# Patient Record
Sex: FEMALE | Employment: OTHER | URBAN - METROPOLITAN AREA
[De-identification: names, ages, dates, MRNs, and addresses within clinical notes are randomized per-mention and may not be internally consistent; named-entity substitution may affect disease eponyms.]

---

## 2018-05-22 ENCOUNTER — HOSPITAL ENCOUNTER (OUTPATIENT)
Dept: NON INVASIVE DIAGNOSTICS | Age: 68
Discharge: HOME | End: 2018-05-22

## 2018-05-22 ENCOUNTER — HOSPITAL ENCOUNTER (OUTPATIENT)
Dept: GENERAL RADIOLOGY | Age: 68
Discharge: HOME | End: 2018-05-22
Attending: OBSTETRICS & GYNECOLOGY
Payer: MEDICARE

## 2018-05-22 ENCOUNTER — HOSPITAL ENCOUNTER (OUTPATIENT)
Dept: PREADMISSION TESTING | Age: 68
Discharge: HOME | End: 2018-05-22
Payer: MEDICARE

## 2018-05-22 ENCOUNTER — HOSPITAL ENCOUNTER (OUTPATIENT)
Dept: GENERAL RADIOLOGY | Age: 68
Discharge: HOME | End: 2018-05-22
Payer: MEDICARE

## 2018-05-22 VITALS — BODY MASS INDEX: 36.82 KG/M2 | WEIGHT: 195 LBS | HEIGHT: 61 IN

## 2018-05-22 LAB
ALBUMIN SERPL-MCNC: 4 G/DL (ref 3.4–5)
ALBUMIN/GLOB SERPL: 1.1 {RATIO} (ref 0.8–1.7)
ALP SERPL-CCNC: 75 U/L (ref 45–117)
ALT SERPL-CCNC: 26 U/L (ref 13–56)
ANION GAP SERPL CALC-SCNC: 14 MMOL/L (ref 3–18)
AST SERPL-CCNC: 19 U/L (ref 15–37)
ATRIAL RATE: 98 BPM
BASOPHILS # BLD: 0 K/UL (ref 0–0.06)
BASOPHILS NFR BLD: 0 % (ref 0–2)
BILIRUB SERPL-MCNC: 0.4 MG/DL (ref 0.2–1)
BUN SERPL-MCNC: 14 MG/DL (ref 7–18)
BUN/CREAT SERPL: 18 (ref 12–20)
CALCIUM SERPL-MCNC: 9.1 MG/DL (ref 8.5–10.1)
CALCULATED P AXIS, ECG09: 9 DEGREES
CALCULATED R AXIS, ECG10: 19 DEGREES
CALCULATED T AXIS, ECG11: 19 DEGREES
CHLORIDE SERPL-SCNC: 101 MMOL/L (ref 100–108)
CO2 SERPL-SCNC: 25 MMOL/L (ref 21–32)
CREAT SERPL-MCNC: 0.78 MG/DL (ref 0.6–1.3)
DIAGNOSIS, 93000: NORMAL
DIFFERENTIAL METHOD BLD: ABNORMAL
EOSINOPHIL # BLD: 0.2 K/UL (ref 0–0.4)
EOSINOPHIL NFR BLD: 2 % (ref 0–5)
ERYTHROCYTE [DISTWIDTH] IN BLOOD BY AUTOMATED COUNT: 19.6 % (ref 11.6–14.5)
EST. AVERAGE GLUCOSE BLD GHB EST-MCNC: 137 MG/DL
GLOBULIN SER CALC-MCNC: 3.5 G/DL (ref 2–4)
GLUCOSE SERPL-MCNC: 104 MG/DL (ref 74–99)
HBA1C MFR BLD: 6.4 % (ref 4.5–5.6)
HCT VFR BLD AUTO: 40.6 % (ref 35–45)
HGB BLD-MCNC: 12.5 G/DL (ref 12–16)
LYMPHOCYTES # BLD: 3.8 K/UL (ref 0.9–3.6)
LYMPHOCYTES NFR BLD: 32 % (ref 21–52)
MCH RBC QN AUTO: 22 PG (ref 24–34)
MCHC RBC AUTO-ENTMCNC: 30.8 G/DL (ref 31–37)
MCV RBC AUTO: 71.4 FL (ref 74–97)
MONOCYTES # BLD: 1 K/UL (ref 0.05–1.2)
MONOCYTES NFR BLD: 8 % (ref 3–10)
NEUTS SEG # BLD: 6.9 K/UL (ref 1.8–8)
NEUTS SEG NFR BLD: 58 % (ref 40–73)
P-R INTERVAL, ECG05: 158 MS
PLATELET # BLD AUTO: 295 K/UL (ref 135–420)
POTASSIUM SERPL-SCNC: 3.9 MMOL/L (ref 3.5–5.5)
PROT SERPL-MCNC: 7.5 G/DL (ref 6.4–8.2)
Q-T INTERVAL, ECG07: 356 MS
QRS DURATION, ECG06: 86 MS
QTC CALCULATION (BEZET), ECG08: 454 MS
RBC # BLD AUTO: 5.69 M/UL (ref 4.2–5.3)
RBC MORPH BLD: ABNORMAL
SODIUM SERPL-SCNC: 140 MMOL/L (ref 136–145)
VENTRICULAR RATE, ECG03: 98 BPM
WBC # BLD AUTO: 11.9 K/UL (ref 4.6–13.2)

## 2018-05-22 PROCEDURE — 85025 COMPLETE CBC W/AUTO DIFF WBC: CPT | Performed by: OBSTETRICS & GYNECOLOGY

## 2018-05-22 PROCEDURE — 80053 COMPREHEN METABOLIC PANEL: CPT | Performed by: OBSTETRICS & GYNECOLOGY

## 2018-05-22 PROCEDURE — 71045 X-RAY EXAM CHEST 1 VIEW: CPT

## 2018-05-22 PROCEDURE — 83036 HEMOGLOBIN GLYCOSYLATED A1C: CPT | Performed by: OBSTETRICS & GYNECOLOGY

## 2018-05-22 PROCEDURE — 93005 ELECTROCARDIOGRAM TRACING: CPT

## 2018-05-22 RX ORDER — METFORMIN HYDROCHLORIDE 1000 MG/1
2000 TABLET ORAL
COMMUNITY
End: 2018-06-04

## 2018-05-22 RX ORDER — AMLODIPINE BESYLATE 10 MG/1
10 TABLET ORAL DAILY
COMMUNITY
End: 2018-07-10

## 2018-05-22 RX ORDER — SODIUM CHLORIDE, SODIUM LACTATE, POTASSIUM CHLORIDE, CALCIUM CHLORIDE 600; 310; 30; 20 MG/100ML; MG/100ML; MG/100ML; MG/100ML
125 INJECTION, SOLUTION INTRAVENOUS CONTINUOUS
Status: CANCELLED | OUTPATIENT
Start: 2018-05-22

## 2018-05-22 RX ORDER — VERAPAMIL HYDROCHLORIDE 180 MG/1
180 CAPSULE, EXTENDED RELEASE ORAL DAILY
COMMUNITY
End: 2018-07-10

## 2018-05-22 RX ORDER — ATORVASTATIN CALCIUM 10 MG/1
10 TABLET, FILM COATED ORAL DAILY
COMMUNITY
End: 2018-07-10

## 2018-05-22 RX ORDER — PHENAZOPYRIDINE HYDROCHLORIDE 100 MG/1
100 TABLET, FILM COATED ORAL ONCE
Status: CANCELLED | OUTPATIENT
Start: 2018-05-22 | End: 2018-05-23

## 2018-05-22 RX ORDER — LOSARTAN POTASSIUM AND HYDROCHLOROTHIAZIDE 12.5; 1 MG/1; MG/1
1 TABLET ORAL DAILY
COMMUNITY
End: 2018-07-10

## 2018-05-22 NOTE — PERIOP NOTES
No sleep apnea or previous sleep studies. No history of MH. PCP is aware of surgery. Care fusion instructions reviewed and kit given. Does not meet criteria for special population at this time. Not participating in clinical trials or research study.

## 2018-05-26 ENCOUNTER — ANESTHESIA EVENT (OUTPATIENT)
Dept: SURGERY | Age: 68
DRG: 737 | End: 2018-05-26
Payer: MEDICARE

## 2018-05-29 ENCOUNTER — HOSPITAL ENCOUNTER (INPATIENT)
Age: 68
LOS: 3 days | Discharge: HOME | DRG: 737 | End: 2018-06-01
Attending: OBSTETRICS & GYNECOLOGY | Admitting: OBSTETRICS & GYNECOLOGY
Payer: MEDICARE

## 2018-05-29 ENCOUNTER — ANESTHESIA (OUTPATIENT)
Dept: SURGERY | Age: 68
DRG: 737 | End: 2018-05-29
Payer: MEDICARE

## 2018-05-29 DIAGNOSIS — G89.18 POST-OP PAIN: Primary | ICD-10-CM

## 2018-05-29 PROBLEM — C56.2 LEFT OVARIAN EPITHELIAL CANCER (HCC): Status: ACTIVE | Noted: 2018-05-29

## 2018-05-29 LAB
ABO + RH BLD: NORMAL
BLOOD GROUP ANTIBODIES SERPL: NORMAL
GLUCOSE BLD STRIP.AUTO-MCNC: 139 MG/DL (ref 70–110)
GLUCOSE BLD STRIP.AUTO-MCNC: 177 MG/DL (ref 70–110)
SPECIMEN EXP DATE BLD: NORMAL

## 2018-05-29 PROCEDURE — 77030010512 HC APPL CLP LIG J&J -C: Performed by: OBSTETRICS & GYNECOLOGY

## 2018-05-29 PROCEDURE — 77030011640 HC PAD GRND REM COVD -A: Performed by: OBSTETRICS & GYNECOLOGY

## 2018-05-29 PROCEDURE — 74011250636 HC RX REV CODE- 250/636: Performed by: OBSTETRICS & GYNECOLOGY

## 2018-05-29 PROCEDURE — 0UT90ZZ RESECTION OF UTERUS, OPEN APPROACH: ICD-10-PCS | Performed by: OBSTETRICS & GYNECOLOGY

## 2018-05-29 PROCEDURE — 74011000258 HC RX REV CODE- 258: Performed by: OBSTETRICS & GYNECOLOGY

## 2018-05-29 PROCEDURE — 77030034154 HC SHR COAG HARM ACE J&J -F: Performed by: OBSTETRICS & GYNECOLOGY

## 2018-05-29 PROCEDURE — 88112 CYTOPATH CELL ENHANCE TECH: CPT | Performed by: OBSTETRICS & GYNECOLOGY

## 2018-05-29 PROCEDURE — 77030002933 HC SUT MCRYL J&J -A: Performed by: OBSTETRICS & GYNECOLOGY

## 2018-05-29 PROCEDURE — 77030016151 HC PROTCTR LNS DFOG COVD -B: Performed by: OBSTETRICS & GYNECOLOGY

## 2018-05-29 PROCEDURE — 88305 TISSUE EXAM BY PATHOLOGIST: CPT | Performed by: OBSTETRICS & GYNECOLOGY

## 2018-05-29 PROCEDURE — 0WJG4ZZ INSPECTION OF PERITONEAL CAVITY, PERCUTANEOUS ENDOSCOPIC APPROACH: ICD-10-PCS | Performed by: OBSTETRICS & GYNECOLOGY

## 2018-05-29 PROCEDURE — 77030020362 HC SOL INJ STRL H2O 1000ML BG LF: Performed by: OBSTETRICS & GYNECOLOGY

## 2018-05-29 PROCEDURE — 0TJB8ZZ INSPECTION OF BLADDER, VIA NATURAL OR ARTIFICIAL OPENING ENDOSCOPIC: ICD-10-PCS | Performed by: OBSTETRICS & GYNECOLOGY

## 2018-05-29 PROCEDURE — 76210000001 HC OR PH I REC 2.5 TO 3 HR: Performed by: OBSTETRICS & GYNECOLOGY

## 2018-05-29 PROCEDURE — 74011250637 HC RX REV CODE- 250/637: Performed by: OBSTETRICS & GYNECOLOGY

## 2018-05-29 PROCEDURE — 65270000029 HC RM PRIVATE

## 2018-05-29 PROCEDURE — 77030020782 HC GWN BAIR PAWS FLX 3M -B: Performed by: OBSTETRICS & GYNECOLOGY

## 2018-05-29 PROCEDURE — 77030002996 HC SUT SLK J&J -A: Performed by: OBSTETRICS & GYNECOLOGY

## 2018-05-29 PROCEDURE — 86304 IMMUNOASSAY TUMOR CA 125: CPT | Performed by: OBSTETRICS & GYNECOLOGY

## 2018-05-29 PROCEDURE — 77030018836 HC SOL IRR NACL ICUM -A: Performed by: OBSTETRICS & GYNECOLOGY

## 2018-05-29 PROCEDURE — 77030019927 HC TBNG IRR CYSTO BAXT -A: Performed by: OBSTETRICS & GYNECOLOGY

## 2018-05-29 PROCEDURE — 74011250636 HC RX REV CODE- 250/636: Performed by: ANESTHESIOLOGY

## 2018-05-29 PROCEDURE — 88309 TISSUE EXAM BY PATHOLOGIST: CPT | Performed by: OBSTETRICS & GYNECOLOGY

## 2018-05-29 PROCEDURE — 77030011294 HC FCPS BPLR MCR J&J -B: Performed by: OBSTETRICS & GYNECOLOGY

## 2018-05-29 PROCEDURE — 0UT70ZZ RESECTION OF BILATERAL FALLOPIAN TUBES, OPEN APPROACH: ICD-10-PCS | Performed by: OBSTETRICS & GYNECOLOGY

## 2018-05-29 PROCEDURE — 74011000250 HC RX REV CODE- 250

## 2018-05-29 PROCEDURE — 77030002904 HC SUT DEV RNNG LSIS -C: Performed by: OBSTETRICS & GYNECOLOGY

## 2018-05-29 PROCEDURE — 77030037400 HC ADH TISS HI VISC EXOFIN CHMP -B: Performed by: OBSTETRICS & GYNECOLOGY

## 2018-05-29 PROCEDURE — 0UTC0ZZ RESECTION OF CERVIX, OPEN APPROACH: ICD-10-PCS | Performed by: OBSTETRICS & GYNECOLOGY

## 2018-05-29 PROCEDURE — 74011250636 HC RX REV CODE- 250/636

## 2018-05-29 PROCEDURE — 77030002968 HC SUT PDS LSIS -B: Performed by: OBSTETRICS & GYNECOLOGY

## 2018-05-29 PROCEDURE — 77030003029 HC SUT VCRL J&J -B: Performed by: OBSTETRICS & GYNECOLOGY

## 2018-05-29 PROCEDURE — 77030002882 HC SUT CART KNOT LSIS -B: Performed by: OBSTETRICS & GYNECOLOGY

## 2018-05-29 PROCEDURE — 76060000041 HC ANESTHESIA 5 TO 5.5 HR: Performed by: OBSTETRICS & GYNECOLOGY

## 2018-05-29 PROCEDURE — 77030002903 HC SUT DEV PLCMNT LSIS -C: Performed by: OBSTETRICS & GYNECOLOGY

## 2018-05-29 PROCEDURE — 07BC0ZZ EXCISION OF PELVIS LYMPHATIC, OPEN APPROACH: ICD-10-PCS | Performed by: OBSTETRICS & GYNECOLOGY

## 2018-05-29 PROCEDURE — 77030008603 HC TRCR ENDOSC EPATH J&J -C: Performed by: OBSTETRICS & GYNECOLOGY

## 2018-05-29 PROCEDURE — 88331 PATH CONSLTJ SURG 1 BLK 1SPC: CPT | Performed by: OBSTETRICS & GYNECOLOGY

## 2018-05-29 PROCEDURE — 77030037241 HC PRT ACC BLDLSS AIRSEAL CNMD -B: Performed by: OBSTETRICS & GYNECOLOGY

## 2018-05-29 PROCEDURE — 0UT20ZZ RESECTION OF BILATERAL OVARIES, OPEN APPROACH: ICD-10-PCS | Performed by: OBSTETRICS & GYNECOLOGY

## 2018-05-29 PROCEDURE — 77030002966 HC SUT PDS J&J -A: Performed by: OBSTETRICS & GYNECOLOGY

## 2018-05-29 PROCEDURE — 74011000250 HC RX REV CODE- 250: Performed by: OBSTETRICS & GYNECOLOGY

## 2018-05-29 PROCEDURE — 88307 TISSUE EXAM BY PATHOLOGIST: CPT | Performed by: OBSTETRICS & GYNECOLOGY

## 2018-05-29 PROCEDURE — 77030034849: Performed by: OBSTETRICS & GYNECOLOGY

## 2018-05-29 PROCEDURE — 0DBU0ZZ EXCISION OF OMENTUM, OPEN APPROACH: ICD-10-PCS | Performed by: OBSTETRICS & GYNECOLOGY

## 2018-05-29 PROCEDURE — 07BD0ZZ EXCISION OF AORTIC LYMPHATIC, OPEN APPROACH: ICD-10-PCS | Performed by: OBSTETRICS & GYNECOLOGY

## 2018-05-29 PROCEDURE — 77030031139 HC SUT VCRL2 J&J -A: Performed by: OBSTETRICS & GYNECOLOGY

## 2018-05-29 PROCEDURE — 77030010517 HC APPL SEAL FLOSEL BAXT -B: Performed by: OBSTETRICS & GYNECOLOGY

## 2018-05-29 PROCEDURE — 77030011264 HC ELECTRD BLD EXT COVD -A: Performed by: OBSTETRICS & GYNECOLOGY

## 2018-05-29 PROCEDURE — 77030018673: Performed by: OBSTETRICS & GYNECOLOGY

## 2018-05-29 PROCEDURE — 76010000137 HC OR TIME 5 TO 5.5 HR: Performed by: OBSTETRICS & GYNECOLOGY

## 2018-05-29 PROCEDURE — 74011636637 HC RX REV CODE- 636/637: Performed by: ANESTHESIOLOGY

## 2018-05-29 PROCEDURE — C9113 INJ PANTOPRAZOLE SODIUM, VIA: HCPCS | Performed by: OBSTETRICS & GYNECOLOGY

## 2018-05-29 PROCEDURE — 86900 BLOOD TYPING SEROLOGIC ABO: CPT | Performed by: OBSTETRICS & GYNECOLOGY

## 2018-05-29 PROCEDURE — 77030018835 HC SOL IRR LR ICUM -A: Performed by: OBSTETRICS & GYNECOLOGY

## 2018-05-29 PROCEDURE — 77030032490 HC SLV COMPR SCD KNE COVD -B: Performed by: OBSTETRICS & GYNECOLOGY

## 2018-05-29 PROCEDURE — 77030018823 HC SLV COMPR VENO -B: Performed by: OBSTETRICS & GYNECOLOGY

## 2018-05-29 PROCEDURE — 36415 COLL VENOUS BLD VENIPUNCTURE: CPT | Performed by: OBSTETRICS & GYNECOLOGY

## 2018-05-29 PROCEDURE — 77030018832 HC SOL IRR H20 ICUM -A: Performed by: OBSTETRICS & GYNECOLOGY

## 2018-05-29 PROCEDURE — 82962 GLUCOSE BLOOD TEST: CPT

## 2018-05-29 RX ORDER — PROPOFOL 10 MG/ML
INJECTION, EMULSION INTRAVENOUS AS NEEDED
Status: DISCONTINUED | OUTPATIENT
Start: 2018-05-29 | End: 2018-05-29 | Stop reason: HOSPADM

## 2018-05-29 RX ORDER — BISACODYL 5 MG
10 TABLET, DELAYED RELEASE (ENTERIC COATED) ORAL DAILY
Status: DISCONTINUED | OUTPATIENT
Start: 2018-05-30 | End: 2018-06-01 | Stop reason: HOSPADM

## 2018-05-29 RX ORDER — ONDANSETRON 2 MG/ML
INJECTION INTRAMUSCULAR; INTRAVENOUS AS NEEDED
Status: DISCONTINUED | OUTPATIENT
Start: 2018-05-29 | End: 2018-05-29 | Stop reason: HOSPADM

## 2018-05-29 RX ORDER — FLUMAZENIL 0.1 MG/ML
0.2 INJECTION INTRAVENOUS
Status: DISCONTINUED | OUTPATIENT
Start: 2018-05-29 | End: 2018-05-29 | Stop reason: HOSPADM

## 2018-05-29 RX ORDER — FENTANYL CITRATE 50 UG/ML
50 INJECTION, SOLUTION INTRAMUSCULAR; INTRAVENOUS
Status: DISCONTINUED | OUTPATIENT
Start: 2018-05-29 | End: 2018-05-29 | Stop reason: HOSPADM

## 2018-05-29 RX ORDER — DEXAMETHASONE SODIUM PHOSPHATE 4 MG/ML
INJECTION, SOLUTION INTRA-ARTICULAR; INTRALESIONAL; INTRAMUSCULAR; INTRAVENOUS; SOFT TISSUE AS NEEDED
Status: DISCONTINUED | OUTPATIENT
Start: 2018-05-29 | End: 2018-05-29 | Stop reason: HOSPADM

## 2018-05-29 RX ORDER — KETOROLAC TROMETHAMINE 15 MG/ML
15 INJECTION, SOLUTION INTRAMUSCULAR; INTRAVENOUS EVERY 6 HOURS
Status: DISCONTINUED | OUTPATIENT
Start: 2018-05-29 | End: 2018-06-01 | Stop reason: HOSPADM

## 2018-05-29 RX ORDER — PHENAZOPYRIDINE HYDROCHLORIDE 100 MG/1
100 TABLET, FILM COATED ORAL ONCE
Status: COMPLETED | OUTPATIENT
Start: 2018-05-29 | End: 2018-05-29

## 2018-05-29 RX ORDER — KETAMINE HYDROCHLORIDE 10 MG/ML
INJECTION, SOLUTION INTRAMUSCULAR; INTRAVENOUS AS NEEDED
Status: DISCONTINUED | OUTPATIENT
Start: 2018-05-29 | End: 2018-05-29 | Stop reason: HOSPADM

## 2018-05-29 RX ORDER — ROCURONIUM BROMIDE 10 MG/ML
INJECTION, SOLUTION INTRAVENOUS AS NEEDED
Status: DISCONTINUED | OUTPATIENT
Start: 2018-05-29 | End: 2018-05-29 | Stop reason: HOSPADM

## 2018-05-29 RX ORDER — KETOROLAC TROMETHAMINE 30 MG/ML
INJECTION, SOLUTION INTRAMUSCULAR; INTRAVENOUS AS NEEDED
Status: DISCONTINUED | OUTPATIENT
Start: 2018-05-29 | End: 2018-05-29 | Stop reason: HOSPADM

## 2018-05-29 RX ORDER — DEXTROSE, SODIUM CHLORIDE, AND POTASSIUM CHLORIDE 5; .9; .15 G/100ML; G/100ML; G/100ML
125 INJECTION INTRAVENOUS CONTINUOUS
Status: DISCONTINUED | OUTPATIENT
Start: 2018-05-29 | End: 2018-06-01

## 2018-05-29 RX ORDER — AMLODIPINE BESYLATE 5 MG/1
10 TABLET ORAL DAILY
Status: DISCONTINUED | OUTPATIENT
Start: 2018-05-30 | End: 2018-06-01 | Stop reason: HOSPADM

## 2018-05-29 RX ORDER — LIDOCAINE HYDROCHLORIDE 20 MG/ML
INJECTION, SOLUTION EPIDURAL; INFILTRATION; INTRACAUDAL; PERINEURAL AS NEEDED
Status: DISCONTINUED | OUTPATIENT
Start: 2018-05-29 | End: 2018-05-29 | Stop reason: HOSPADM

## 2018-05-29 RX ORDER — INSULIN LISPRO 100 [IU]/ML
INJECTION, SOLUTION INTRAVENOUS; SUBCUTANEOUS ONCE
Status: COMPLETED | OUTPATIENT
Start: 2018-05-29 | End: 2018-05-29

## 2018-05-29 RX ORDER — MIDAZOLAM HYDROCHLORIDE 1 MG/ML
INJECTION, SOLUTION INTRAMUSCULAR; INTRAVENOUS AS NEEDED
Status: DISCONTINUED | OUTPATIENT
Start: 2018-05-29 | End: 2018-05-29 | Stop reason: HOSPADM

## 2018-05-29 RX ORDER — ONDANSETRON 2 MG/ML
4 INJECTION INTRAMUSCULAR; INTRAVENOUS
Status: DISCONTINUED | OUTPATIENT
Start: 2018-05-29 | End: 2018-06-01 | Stop reason: HOSPADM

## 2018-05-29 RX ORDER — ATORVASTATIN CALCIUM 10 MG/1
10 TABLET, FILM COATED ORAL DAILY
Status: DISCONTINUED | OUTPATIENT
Start: 2018-05-30 | End: 2018-06-01 | Stop reason: HOSPADM

## 2018-05-29 RX ORDER — GLYCOPYRROLATE 0.2 MG/ML
INJECTION INTRAMUSCULAR; INTRAVENOUS AS NEEDED
Status: DISCONTINUED | OUTPATIENT
Start: 2018-05-29 | End: 2018-05-29 | Stop reason: HOSPADM

## 2018-05-29 RX ORDER — SODIUM CHLORIDE 0.9 % (FLUSH) 0.9 %
5-10 SYRINGE (ML) INJECTION AS NEEDED
Status: DISCONTINUED | OUTPATIENT
Start: 2018-05-29 | End: 2018-06-01 | Stop reason: HOSPADM

## 2018-05-29 RX ORDER — VERAPAMIL HYDROCHLORIDE 180 MG/1
180 TABLET, EXTENDED RELEASE ORAL DAILY
Status: DISCONTINUED | OUTPATIENT
Start: 2018-05-30 | End: 2018-06-01 | Stop reason: HOSPADM

## 2018-05-29 RX ORDER — ENOXAPARIN SODIUM 100 MG/ML
40 INJECTION SUBCUTANEOUS DAILY
Status: DISCONTINUED | OUTPATIENT
Start: 2018-05-29 | End: 2018-06-01 | Stop reason: HOSPADM

## 2018-05-29 RX ORDER — NEOSTIGMINE METHYLSULFATE 5 MG/5 ML
SYRINGE (ML) INTRAVENOUS AS NEEDED
Status: DISCONTINUED | OUTPATIENT
Start: 2018-05-29 | End: 2018-05-29 | Stop reason: HOSPADM

## 2018-05-29 RX ORDER — DIPHENHYDRAMINE HYDROCHLORIDE 50 MG/ML
12.5 INJECTION, SOLUTION INTRAMUSCULAR; INTRAVENOUS
Status: DISCONTINUED | OUTPATIENT
Start: 2018-05-29 | End: 2018-06-01 | Stop reason: HOSPADM

## 2018-05-29 RX ORDER — NALOXONE HYDROCHLORIDE 0.4 MG/ML
0.1 INJECTION, SOLUTION INTRAMUSCULAR; INTRAVENOUS; SUBCUTANEOUS AS NEEDED
Status: DISCONTINUED | OUTPATIENT
Start: 2018-05-29 | End: 2018-06-01 | Stop reason: HOSPADM

## 2018-05-29 RX ORDER — FENTANYL CITRATE 50 UG/ML
INJECTION, SOLUTION INTRAMUSCULAR; INTRAVENOUS AS NEEDED
Status: DISCONTINUED | OUTPATIENT
Start: 2018-05-29 | End: 2018-05-29 | Stop reason: HOSPADM

## 2018-05-29 RX ORDER — SODIUM CHLORIDE, SODIUM LACTATE, POTASSIUM CHLORIDE, CALCIUM CHLORIDE 600; 310; 30; 20 MG/100ML; MG/100ML; MG/100ML; MG/100ML
125 INJECTION, SOLUTION INTRAVENOUS CONTINUOUS
Status: DISCONTINUED | OUTPATIENT
Start: 2018-05-29 | End: 2018-05-29

## 2018-05-29 RX ORDER — SODIUM CHLORIDE 0.9 % (FLUSH) 0.9 %
5-10 SYRINGE (ML) INJECTION AS NEEDED
Status: DISCONTINUED | OUTPATIENT
Start: 2018-05-29 | End: 2018-05-29 | Stop reason: HOSPADM

## 2018-05-29 RX ORDER — SODIUM CHLORIDE 0.9 % (FLUSH) 0.9 %
5-10 SYRINGE (ML) INJECTION EVERY 8 HOURS
Status: DISCONTINUED | OUTPATIENT
Start: 2018-05-29 | End: 2018-06-01

## 2018-05-29 RX ORDER — DEXTROMETHORPHAN HYDROBROMIDE, GUAIFENESIN 5; 100 MG/5ML; MG/5ML
650 LIQUID ORAL
COMMUNITY
End: 2018-07-10

## 2018-05-29 RX ORDER — NALOXONE HYDROCHLORIDE 0.4 MG/ML
0.1 INJECTION, SOLUTION INTRAMUSCULAR; INTRAVENOUS; SUBCUTANEOUS AS NEEDED
Status: DISCONTINUED | OUTPATIENT
Start: 2018-05-29 | End: 2018-05-29 | Stop reason: HOSPADM

## 2018-05-29 RX ORDER — ACETAMINOPHEN 10 MG/ML
INJECTION, SOLUTION INTRAVENOUS AS NEEDED
Status: DISCONTINUED | OUTPATIENT
Start: 2018-05-29 | End: 2018-05-29 | Stop reason: HOSPADM

## 2018-05-29 RX ORDER — MAGNESIUM SULFATE 100 %
4 CRYSTALS MISCELLANEOUS AS NEEDED
Status: DISCONTINUED | OUTPATIENT
Start: 2018-05-29 | End: 2018-05-29 | Stop reason: HOSPADM

## 2018-05-29 RX ORDER — SODIUM CHLORIDE, SODIUM LACTATE, POTASSIUM CHLORIDE, CALCIUM CHLORIDE 600; 310; 30; 20 MG/100ML; MG/100ML; MG/100ML; MG/100ML
1000 INJECTION, SOLUTION INTRAVENOUS CONTINUOUS
Status: DISCONTINUED | OUTPATIENT
Start: 2018-05-29 | End: 2018-05-29 | Stop reason: HOSPADM

## 2018-05-29 RX ORDER — DEXTROSE 50 % IN WATER (D50W) INTRAVENOUS SYRINGE
25-50 AS NEEDED
Status: DISCONTINUED | OUTPATIENT
Start: 2018-05-29 | End: 2018-05-29 | Stop reason: HOSPADM

## 2018-05-29 RX ORDER — BUPIVACAINE HYDROCHLORIDE AND EPINEPHRINE 5; 5 MG/ML; UG/ML
INJECTION, SOLUTION EPIDURAL; INTRACAUDAL; PERINEURAL AS NEEDED
Status: DISCONTINUED | OUTPATIENT
Start: 2018-05-29 | End: 2018-05-29 | Stop reason: HOSPADM

## 2018-05-29 RX ORDER — SODIUM CHLORIDE 0.9 % (FLUSH) 0.9 %
5-10 SYRINGE (ML) INJECTION EVERY 8 HOURS
Status: DISCONTINUED | OUTPATIENT
Start: 2018-05-29 | End: 2018-06-01 | Stop reason: HOSPADM

## 2018-05-29 RX ADMIN — SODIUM CHLORIDE, SODIUM LACTATE, POTASSIUM CHLORIDE, AND CALCIUM CHLORIDE 125 ML/HR: 600; 310; 30; 20 INJECTION, SOLUTION INTRAVENOUS at 09:28

## 2018-05-29 RX ADMIN — FENTANYL CITRATE 50 MCG: 50 INJECTION, SOLUTION INTRAMUSCULAR; INTRAVENOUS at 12:52

## 2018-05-29 RX ADMIN — KETAMINE HYDROCHLORIDE 20 MG: 10 INJECTION, SOLUTION INTRAMUSCULAR; INTRAVENOUS at 10:51

## 2018-05-29 RX ADMIN — SODIUM CHLORIDE, SODIUM LACTATE, POTASSIUM CHLORIDE, AND CALCIUM CHLORIDE 125 ML/HR: 600; 310; 30; 20 INJECTION, SOLUTION INTRAVENOUS at 17:15

## 2018-05-29 RX ADMIN — DEXAMETHASONE SODIUM PHOSPHATE 4 MG: 4 INJECTION, SOLUTION INTRA-ARTICULAR; INTRALESIONAL; INTRAMUSCULAR; INTRAVENOUS; SOFT TISSUE at 10:38

## 2018-05-29 RX ADMIN — PROPOFOL 200 MG: 10 INJECTION, EMULSION INTRAVENOUS at 10:04

## 2018-05-29 RX ADMIN — CEFOXITIN SODIUM 2 G: 2 POWDER, FOR SOLUTION INTRAVENOUS at 17:16

## 2018-05-29 RX ADMIN — KETAMINE HYDROCHLORIDE 30 MG: 10 INJECTION, SOLUTION INTRAMUSCULAR; INTRAVENOUS at 10:36

## 2018-05-29 RX ADMIN — FENTANYL CITRATE 50 MCG: 50 INJECTION, SOLUTION INTRAMUSCULAR; INTRAVENOUS at 12:37

## 2018-05-29 RX ADMIN — ROCURONIUM BROMIDE 10 MG: 10 INJECTION, SOLUTION INTRAVENOUS at 11:43

## 2018-05-29 RX ADMIN — PHENAZOPYRIDINE HYDROCHLORIDE 100 MG: 100 TABLET ORAL at 09:32

## 2018-05-29 RX ADMIN — KETAMINE HYDROCHLORIDE 20 MG: 10 INJECTION, SOLUTION INTRAMUSCULAR; INTRAVENOUS at 12:38

## 2018-05-29 RX ADMIN — SODIUM CHLORIDE, SODIUM LACTATE, POTASSIUM CHLORIDE, AND CALCIUM CHLORIDE: 600; 310; 30; 20 INJECTION, SOLUTION INTRAVENOUS at 12:02

## 2018-05-29 RX ADMIN — FENTANYL CITRATE 100 MCG: 50 INJECTION, SOLUTION INTRAMUSCULAR; INTRAVENOUS at 10:04

## 2018-05-29 RX ADMIN — KETOROLAC TROMETHAMINE 15 MG: 15 INJECTION, SOLUTION INTRAMUSCULAR; INTRAVENOUS at 20:15

## 2018-05-29 RX ADMIN — FENTANYL CITRATE 50 MCG: 50 INJECTION, SOLUTION INTRAMUSCULAR; INTRAVENOUS at 10:39

## 2018-05-29 RX ADMIN — Medication: at 17:42

## 2018-05-29 RX ADMIN — ROCURONIUM BROMIDE 20 MG: 10 INJECTION, SOLUTION INTRAVENOUS at 12:52

## 2018-05-29 RX ADMIN — FENTANYL CITRATE 50 MCG: 50 INJECTION, SOLUTION INTRAMUSCULAR; INTRAVENOUS at 15:58

## 2018-05-29 RX ADMIN — ONDANSETRON 4 MG: 2 INJECTION INTRAMUSCULAR; INTRAVENOUS at 22:10

## 2018-05-29 RX ADMIN — CEFOXITIN 2 G: 2 INJECTION, POWDER, FOR SOLUTION INTRAVENOUS at 10:15

## 2018-05-29 RX ADMIN — FENTANYL CITRATE 50 MCG: 50 INJECTION, SOLUTION INTRAMUSCULAR; INTRAVENOUS at 10:25

## 2018-05-29 RX ADMIN — FENTANYL CITRATE 50 MCG: 50 INJECTION, SOLUTION INTRAMUSCULAR; INTRAVENOUS at 11:09

## 2018-05-29 RX ADMIN — FENTANYL CITRATE 50 MCG: 50 INJECTION, SOLUTION INTRAMUSCULAR; INTRAVENOUS at 11:43

## 2018-05-29 RX ADMIN — SODIUM CHLORIDE, SODIUM LACTATE, POTASSIUM CHLORIDE, AND CALCIUM CHLORIDE: 600; 310; 30; 20 INJECTION, SOLUTION INTRAVENOUS at 11:01

## 2018-05-29 RX ADMIN — ROCURONIUM BROMIDE 40 MG: 10 INJECTION, SOLUTION INTRAVENOUS at 10:04

## 2018-05-29 RX ADMIN — CEFOXITIN SODIUM 2 G: 2 POWDER, FOR SOLUTION INTRAVENOUS at 22:11

## 2018-05-29 RX ADMIN — LIDOCAINE HYDROCHLORIDE 100 MG: 20 INJECTION, SOLUTION EPIDURAL; INFILTRATION; INTRACAUDAL; PERINEURAL at 10:04

## 2018-05-29 RX ADMIN — INSULIN LISPRO 3 UNITS: 100 INJECTION, SOLUTION INTRAVENOUS; SUBCUTANEOUS at 15:19

## 2018-05-29 RX ADMIN — GLYCOPYRROLATE 0.8 MG: 0.2 INJECTION INTRAMUSCULAR; INTRAVENOUS at 14:35

## 2018-05-29 RX ADMIN — SODIUM CHLORIDE, SODIUM LACTATE, POTASSIUM CHLORIDE, AND CALCIUM CHLORIDE: 600; 310; 30; 20 INJECTION, SOLUTION INTRAVENOUS at 14:03

## 2018-05-29 RX ADMIN — ROCURONIUM BROMIDE 10 MG: 10 INJECTION, SOLUTION INTRAVENOUS at 10:45

## 2018-05-29 RX ADMIN — CEFOXITIN 2 G: 2 INJECTION, POWDER, FOR SOLUTION INTRAVENOUS at 14:23

## 2018-05-29 RX ADMIN — MIDAZOLAM HYDROCHLORIDE 2 MG: 1 INJECTION, SOLUTION INTRAMUSCULAR; INTRAVENOUS at 09:58

## 2018-05-29 RX ADMIN — FENTANYL CITRATE 50 MCG: 50 INJECTION, SOLUTION INTRAMUSCULAR; INTRAVENOUS at 14:40

## 2018-05-29 RX ADMIN — FENTANYL CITRATE 50 MCG: 50 INJECTION, SOLUTION INTRAMUSCULAR; INTRAVENOUS at 16:14

## 2018-05-29 RX ADMIN — DEXTROSE MONOHYDRATE, SODIUM CHLORIDE, AND POTASSIUM CHLORIDE 125 ML/HR: 50; 9; 1.49 INJECTION, SOLUTION INTRAVENOUS at 20:35

## 2018-05-29 RX ADMIN — SODIUM CHLORIDE, SODIUM LACTATE, POTASSIUM CHLORIDE, AND CALCIUM CHLORIDE: 600; 310; 30; 20 INJECTION, SOLUTION INTRAVENOUS at 12:55

## 2018-05-29 RX ADMIN — ONDANSETRON 4 MG: 2 INJECTION INTRAMUSCULAR; INTRAVENOUS at 10:38

## 2018-05-29 RX ADMIN — ACETAMINOPHEN 1000 MG: 10 INJECTION, SOLUTION INTRAVENOUS at 10:15

## 2018-05-29 RX ADMIN — ROCURONIUM BROMIDE 10 MG: 10 INJECTION, SOLUTION INTRAVENOUS at 13:39

## 2018-05-29 RX ADMIN — Medication 4 MG: at 14:35

## 2018-05-29 RX ADMIN — ROCURONIUM BROMIDE 10 MG: 10 INJECTION, SOLUTION INTRAVENOUS at 12:03

## 2018-05-29 RX ADMIN — SODIUM CHLORIDE 40 MG: 9 INJECTION, SOLUTION INTRAMUSCULAR; INTRAVENOUS; SUBCUTANEOUS at 17:21

## 2018-05-29 RX ADMIN — KETOROLAC TROMETHAMINE 30 MG: 30 INJECTION, SOLUTION INTRAMUSCULAR; INTRAVENOUS at 14:17

## 2018-05-29 NOTE — BRIEF OP NOTE
BRIEF OPERATIVE NOTE    Date of Procedure: 5/29/2018   Preoperative Diagnosis: LEFT PELVIC MASS SUSPICIOUS FOR MALIGNANCY, URINARY URGENCY  Postoperative Diagnosis: LEFT OVARIAN ADENOCARCINOMA, URINARY URGENCY    Procedure(s):  LAPAROSCOPY CONVERTED TO LAPAROTOMY, TOTAL ABDOMINAL HYSTERECTOMY, BILATERAL SALPINGO OOPHORECTOMY, FROZEN PATHOLOGY [facility] SPECIMEN COLLECTION FOR CARIS, BILATERAL PELVIC AND PARA-AORTIC LYMPHADENECTOMY, RADICAL TUMOR DEBULKING TO R0, CYSTOSCOPY   Surgeon(s) and Role:     * Alan Flynn MD - Primary         Surgical Assistant: Rita Fairchild SA    Surgical Staff:  Circ-1: Charlie Urbina RN  Circ-Relief: Michael Manuel, RN  Scrub RN-1: Tiff Grimaldo RN  Surg Asst-1: Lisa Ponce  Surg Asst-2: Adele Vazquez  Surg Asst-Relief: Adele Vazquez  Event Time In   Incision Start 1030   Incision Close 1446     Anesthesia: General   Anesthesiologist: Demarco Zepeda MD  CRNA: Mary Arndt CRNA  Estimated Blood Loss: 250cc  Specimens:   ID Type Source Tests Collected by Time Destination   1 : UTERUS, CERVIX, BILATERAL TUBES AND OVARIES, LEFT PELVIC MASS Frozen Section   Alan Flynn MD 5/29/2018 1129 Pathology   2 : RIGHT PELVIC LYMPH NODE Preservative   Alan Flynn MD 5/29/2018 1218 Pathology   3 : LEFT PELVIC LYMPH NODE Preservative Pelvis  Alan Flynn MD 5/29/2018 1240 Pathology   4 : [facility], MD 5/29/2018 1249 Pathology   5 :  RIGHT PARAORTIC LYMPH NODE Preservative   Alan Flynn MD 5/29/2018 1323 Pathology   6 : LEFT PARAORTIC LYMPH NODE Preservative   Alan Flynn MD 5/29/2018 1351 Pathology   1 : PELVIC WASHING Fresh   Alan Flynn MD 5/29/2018 1135 Cytology      Findings: 15 cm cystic left pelvic mass densely adherent to the sigmoid colon, left pelvic sidewall, uterus, and pelvic culdesac. Frozen pathology was consistent with adenocarcinoma. Mass was ruptured during removal. Small uterus.  Atrophic right ovary densely adherent to the right pelvic sidewall. Normal appearing right fallopian tube. Generous omentum with no visible metastasis. No peritoneal implants. No enlarged pelvic or lolis-aortic lymph nodes. Dilated gallbladder. No gallstones. Normal stomach, liver, spleen, and bowel. Normal cystoscopy with active jets of urine seen from the right and left ureteral orifices. At the conclusion of surgery there was no visible or palpable residual disease. This was a R0 surgical resection.    Complications: None  Implants: * No implants in log *

## 2018-05-29 NOTE — ANESTHESIA PREPROCEDURE EVALUATION
Anesthetic History   No history of anesthetic complications            Review of Systems / Medical History  Patient summary reviewed, nursing notes reviewed and pertinent labs reviewed    Pulmonary  Within defined limits                 Neuro/Psych   Within defined limits           Cardiovascular    Hypertension              Exercise tolerance: >4 METS     GI/Hepatic/Renal  Within defined limits              Endo/Other    Diabetes: well controlled         Other Findings              Physical Exam    Airway  Mallampati: II  TM Distance: 4 - 6 cm  Neck ROM: normal range of motion        Cardiovascular  Regular rate and rhythm,  S1 and S2 normal,  no murmur, click, rub, or gallop             Dental         Pulmonary  Breath sounds clear to auscultation               Abdominal  GI exam deferred       Other Findings            Anesthetic Plan    ASA: 2  Anesthesia type: general          Induction: Intravenous  Anesthetic plan and risks discussed with: Patient

## 2018-05-29 NOTE — PERIOP NOTES
Dual skin assessment completed with Bryan Angel RN. Reviewed PTA medication list with patient/caregiver and patient/caregiver denies any additional medications.  Patient admits to having a responsible adult care for them for at least 24 hours after surgery.

## 2018-05-29 NOTE — IP AVS SNAPSHOT
[address] [address] 
[phone] Patient: Wilder Lowery MRN: [id number] HGH:3/1/[birth year]20 A check nereyda indicates which time of day the medication should be taken. My Medications START taking these medications Instructions Each Dose to Equal  
 Morning Noon Evening Bedtime  
 acetaminophen-codeine 300-30 mg per tablet Commonly known as:  TYLENOL #3 Your last dose was: Your next dose is: Take 1 Tab by mouth every four (4) hours as needed for Pain. Max Daily Amount: 6 Tabs. 1 Tab HYDROmorphone 2 mg tablet Commonly known as:  DILAUDID Your last dose was: Your next dose is: Take 1 Tab by mouth every three (3) hours as needed. Max Daily Amount: 16 mg.  
 2 mg CONTINUE taking these medications Instructions Each Dose to Equal  
 Morning Noon Evening Bedtime  
 amLODIPine 10 mg tablet Commonly known as:  Tunde Matar Your last dose was: Your next dose is: Take 10 mg by mouth daily. 10 mg  
    
   
   
   
  
 atorvastatin 10 mg tablet Commonly known as:  LIPITOR Your last dose was: Your next dose is: Take 10 mg by mouth daily. 10 mg  
    
   
   
   
  
 GLUCOPHAGE 1,000 mg tablet Generic drug:  metFORMIN Your last dose was: Your next dose is: Take 2,000 mg by mouth nightly. 2000 mg  
    
   
   
   
  
 losartan-hydroCHLOROthiazide 100-12.5 mg per tablet Commonly known as:  HYZAAR Your last dose was: Your next dose is: Take 1 Tab by mouth daily. 1 Tab  
    
   
   
   
  
 phenylephrine 1 % Spry Commonly known as:  NEOSYNEPHRINE Your last dose was: Your next dose is: 2 Sprays by Both Nostrils route every six (6) hours as needed. 2 Spray TYLENOL ARTHRITIS PAIN 650 mg Remedios Carbon Generic drug:  acetaminophen Your last dose was: Your next dose is: Take 650 mg by mouth every eight (8) hours. 650 mg  
    
   
   
   
  
 verapamil  mg CR capsule Commonly known as:  Fabrizio Fix Your last dose was: Your next dose is: Take 180 mg by mouth daily. 180 mg Where to Get Your Medications Information on where to get these meds will be given to you by the nurse or doctor. ! Ask your nurse or doctor about these medications  
  acetaminophen-codeine 300-30 mg per tablet HYDROmorphone 2 mg tablet

## 2018-05-29 NOTE — IP AVS SNAPSHOT
303 [facility] 
 
 
 [address] [address] 
[phone] Patient: Williams Rodgers MRN: [id number] ENB:[birth year] About your hospitalization You were admitted on:  May 29, 2018 You last received care in the:  [address] [place] [address] You were discharged on:  June 1, 2018 Why you were hospitalized Your primary diagnosis was:  Not on File Your diagnoses also included:  Left Ovarian Epithelial Cancer (Hcc) Follow-up Information Follow up With Details Comments Contact Info Jozef Figueroa, [address] [facility] [address] 
[phone] Jermaine Buitrago MD On 6/11/2018 Follow up appointment scheduled for June 11, 2018 at 3:00 p.m. [address] [address] 
[phone] Discharge Orders None A check nereyda indicates which time of day the medication should be taken. My Medications START taking these medications Instructions Each Dose to Equal  
 Morning Noon Evening Bedtime  
 acetaminophen-codeine 300-30 mg per tablet Commonly known as:  TYLENOL #3 Your last dose was: Your next dose is: Take 1 Tab by mouth every four (4) hours as needed for Pain. Max Daily Amount: 6 Tabs. 1 Tab HYDROmorphone 2 mg tablet Commonly known as:  DILAUDID Your last dose was: Your next dose is: Take 1 Tab by mouth every three (3) hours as needed. Max Daily Amount: 16 mg.  
 2 mg CONTINUE taking these medications Instructions Each Dose to Equal  
 Morning Noon Evening Bedtime  
 amLODIPine 10 mg tablet Commonly known as:  Richard Rings Your last dose was: Your next dose is: Take 10 mg by mouth daily. 10 mg  
    
   
   
   
  
 atorvastatin 10 mg tablet Commonly known as:  LIPITOR Your last dose was: Your next dose is: Take 10 mg by mouth daily. 10 mg  
    
   
   
   
  
 GLUCOPHAGE 1,000 mg tablet Generic drug:  metFORMIN Your last dose was: Your next dose is: Take 2,000 mg by mouth nightly. 2000 mg  
    
   
   
   
  
 losartan-hydroCHLOROthiazide 100-12.5 mg per tablet Commonly known as:  HYZAAR Your last dose was: Your next dose is: Take 1 Tab by mouth daily. 1 Tab  
    
   
   
   
  
 phenylephrine 1 % Spry Commonly known as:  NEOSYNEPHRINE Your last dose was: Your next dose is: 2 Sprays by Both Nostrils route every six (6) hours as needed. 2 Model TYLENOL ARTHRITIS PAIN 650 mg Tran Johnson Generic drug:  acetaminophen Your last dose was: Your next dose is: Take 650 mg by mouth every eight (8) hours. 650 mg  
    
   
   
   
  
 verapamil  mg CR capsule Commonly known as:  Blanca Gamaliel Your last dose was: Your next dose is: Take 180 mg by mouth daily. 180 mg Where to Get Your Medications Information on where to get these meds will be given to you by the nurse or doctor. ! Ask your nurse or doctor about these medications  
  acetaminophen-codeine 300-30 mg per tablet HYDROmorphone 2 mg tablet Opioid Education Prescription Opioids: What You Need to Know: 
 
 
 
F-face looks uneven A-arms unable to move or move unevenly S-speech slurred or non-existent T-time-call 911 as soon as signs and symptoms begin-DO NOT go Back to bed or wait to see if you get better-TIME IS BRAIN. Warning Signs of HEART ATTACK Call 911 if you have these symptoms: 
? Chest discomfort. Most heart attacks involve discomfort in the center of the chest that lasts more than a few minutes, or that goes away and comes back. It can feel like uncomfortable pressure, squeezing, fullness, or pain. ? Discomfort in other areas of the upper body. Symptoms can include pain or discomfort in one or both arms, the back, neck, jaw, or stomach. ? Shortness of breath with or without chest discomfort. ? Other signs may include breaking out in a cold sweat, nausea, or lightheadedness. Don't wait more than five minutes to call [address]! Fast action can save your life. Calling 911 is almost always the fastest way to get lifesaving treatment. Emergency Medical Services staff can begin treatment when they arrive  up to an hour sooner than if someone gets to the hospital by car. The discharge information has been reviewed with the patient. The patient verbalized understanding. Discharge medications reviewed with the patient and appropriate educational materials and side effects teaching were provided. ___________________________________________________________________________________________________________________________________ Introducing [facility]! [employer] introduces ReplyBuy patient portal. Now you can access parts of your medical record, email your doctor's office, and request medication refills online. 1. In your internet browser, go to [email or web address] [email or web address] 2. Click on the First Time User? Click Here link in the Sign In box. You will see the New Member Sign Up page. 3. Enter your [employer]Buy Access Code exactly as it appears below. You will not need to use this code after youve completed the sign-up process.  If you do not sign up before the expiration date, you must request a new code. · [employer] Access Code: [email or web address] Expires: 8/20/2018  2:58 PM 
 
4. Enter the last four digits of your Social Security Number (xxxx) and Date of Birth (mm/dd/yyyy) as indicated and click Submit. You will be taken to the next sign-up page. 5. Create a [employer]cross ID. This will be your [employer]cross login ID and cannot be changed, so think of one that is secure and easy to remember. 6. Create a Skycross password. You can change your password at any time. 7. Enter your Password Reset Question and Answer. This can be used at a later time if you forget your password. 8. Enter your e-mail address. You will receive e-mail notification when new information is available in [address] 9. Click Sign Up. You can now view and download portions of your medical record. 10. Click the Download Summary menu link to download a portable copy of your medical information. If you have questions, please visit the Frequently Asked Questions section of the [employer] website. Remember, [employer] is NOT to be used for urgent needs. For medical emergencies, dial 911. Now available from your iPhone and Android! Introducing Filippo Pop As a [facility] patient, I wanted to make you aware of our electronic visit tool called Filippo Pop. [facility] 24/7 allows you to connect within minutes with a medical provider 24 hours a day, seven days a week via a mobile device or tablet or logging into a secure website from your computer. You can access Filippo Pop from anywhere in the [place].  
 
A virtual visit might be right for you when you have a simple condition and feel like you just dont want to get out of bed, or cant get away from work for an appointment, when your regular [facility] provider is not available (evenings, weekends or holidays), or when youre out of town and need minor care. Electronic visits cost only $49 and if the [facility] provider determines a prescription is needed to treat your condition, one can be electronically transmitted to a nearby pharmacy*. Please take a moment to enroll today if you have not already done so. The enrollment process is free and takes just a few minutes. To enroll, please download the [facility] joseph to your tablet or phone, or visit [email or web address] to enroll on your computer. And, as an [address] [place] [address] patient with a [employer] account, the results of your visits will be scanned into your electronic medical record and your primary care provider will be able to view the scanned results. We urge you to continue to see your regular [facility] provider for your ongoing medical care. And while your primary care provider may not be the one available when you seek a [facility] virtual visit, the peace of mind you get from getting a real diagnosis real time can be priceless. For more information on [facility], view our Frequently Asked Questions (FAQs) at [email or web address]. Sincerely, 
 
Jaida Garcia MD 
Chief Medical Officer [address] [place] [address] *:  certain medications cannot be prescribed via [facility] Providers Seen During Your Hospitalization Provider Specialty Primary office phone Mandy Cherry MD Gynecologic Oncology [phone] Your Primary Care Physician (PCP) Primary Care Physician Office Phone Office Fax [address], [address] [phone] You are allergic to the following Allergen Reactions Hydrocodone Other (comments) Breaks into cold sweat Metformin Other (comments) Breaks out into cold sweat. Can Take Glucophage brand name med Recent Documentation Height Weight Breastfeeding? BMI OB Status Smoking Status 1.549 m 90.5 kg No 37.7 kg/m2 Postmenopausal Never Smoker Emergency Contacts Name Discharge Info Relation Home Work Mobile Janes Mathew DISCHARGE CAREGIVER [3] Brother [24] [phone] Patient Belongings The following personal items are in your possession at time of discharge: 
  Dental Appliances: None  Visual Aid: Glasses, With patient      Home Medications: None   Jewelry: None  Clothing: Undergarments, Socks, Footwear, Shirt, Pants (to be given to brotherMeche)    Other Valuables: Eyeglasses, Cell Phone, Marybel Feliciano (cell phone and wallet with brothMeche mcdermott - glasses to be given to him) Discharge Instructions Attachments/References HYSTERECTOMY: ABDOMINAL: POST-OP (ENGLISH) Patient Handouts Abdominal Hysterectomy: What to Expect at [facility] Your Recovery You can expect to feel better and stronger each day, although you may need pain medicine for a week or two. You may get tired easily or have less energy than usual. This may last for several weeks after surgery. You will probably notice that your belly is swollen and puffy. This is common. The swelling will take several weeks to go down. It may take about 4 to 6 weeks to fully recover. It is important to avoid lifting while you are recovering so that you can heal. 
This care sheet gives you a general idea about how long it will take for you to recover. But each person recovers at a different pace. Follow the steps below to get better as quickly as possible. How can you care for yourself at home? Activity ? · Rest when you feel tired. Getting enough sleep will help you recover. ? · Try to walk each day. Start by walking a little more than you did the day before. Bit by bit, increase the amount you walk. Walking boosts blood flow and helps prevent pneumonia and constipation. ? · Avoid lifting anything that would make you strain.  This may include a child, heavy grocery bags and milk containers, a heavy briefcase or backpack, cat litter or dog food bags, or a vacuum . ? · Avoid strenuous activities, such as biking, jogging, weight lifting, or aerobic exercise, until your doctor says it is okay. ? · You may shower. Pat the cut (incision) dry. Do not take a bath for the first 2 weeks, or until your doctor tells you it is okay. ? · Ask your doctor when you can drive again. ? · You will probably need to take 2 to 4 weeks off from work. It depends on the type of work you do and how you feel. ? · Your doctor will tell you when you can have sex again. Diet ? · You can eat your normal diet. If your stomach is upset, try bland, low-fat foods like plain rice, broiled chicken, toast, and yogurt. ? · Drink plenty of fluids (unless your doctor tells you not to). ? · You may notice that your bowel movements are not regular right after your surgery. This is common. Try to avoid constipation and straining with bowel movements. You may want to take a fiber supplement every day. If you have not had a bowel movement after a couple of days, ask your doctor about taking a mild laxative. Medicines ? · Your doctor will tell you if and when you can restart your medicines. He or she will also give you instructions about taking any new medicines. ? · If you take blood thinners, such as warfarin (Coumadin), clopidogrel (Plavix), or aspirin, be sure to talk to your doctor. He or she will tell you if and when to start taking those medicines again. Make sure that you understand exactly what your doctor wants you to do. ? · Be safe with medicines. Take pain medicines exactly as directed. ¨ If the doctor gave you a prescription medicine for pain, take it as prescribed. ¨ If you are not taking a prescription pain medicine, ask your doctor if you can take an over-the-counter medicine. ? · If your doctor prescribed antibiotics, take them as directed.  Do not stop taking them just because you feel better. You need to take the full course of antibiotics. ? · If you think your pain medicine is making you sick to your stomach: 
¨ Take your medicine after meals (unless your doctor has told you not to). ¨ Ask your doctor for a different pain medicine. Incision care ? · If you have strips of tape on the cut (incision) the doctor made, leave the tape on for a week or until it falls off. Or follow your doctor's instructions for removing the tape. ? · Wash the area daily with warm, soapy water, and pat it dry. Don't use hydrogen peroxide or alcohol, which can slow healing. You may cover the area with a gauze bandage if it weeps or rubs against clothing. Change the bandage every day. ? · Keep the area clean and dry. Other instructions ? · You may have some light vaginal bleeding. Wear sanitary pads if needed. Do not douche or use tampons. Follow-up care is a key part of your treatment and safety. Be sure to make and go to all appointments, and call your doctor if you are having problems. It's also a good idea to know your test results and keep a list of the medicines you take. When should you call for help? Call 911 anytime you think you may need emergency care. For example, call if: 
? · You passed out (lost consciousness). ? · You have chest pain, are short of breath, or cough up blood. ?Call your doctor now or seek immediate medical care if: 
? · You have pain that does not get better after you take pain medicine. ? · You cannot pass stools or gas. ? · You have vaginal discharge that has increased in amount or smells bad.  
? · You are sick to your stomach or cannot drink fluids. ? · You have loose stitches, or your incision comes open. ? · Bright red blood has soaked through the bandage over your incision. ? · You have signs of infection, such as: 
¨ Increased pain, swelling, warmth, or redness. ¨ Red streaks leading from the incision. ¨ Pus draining from the incision. ¨ A fever. ? · You have bright red vaginal bleeding that soaks one or more pads in an hour, or you have large clots. ? · You have signs of a blood clot in your leg (called a deep vein thrombosis), such as: 
¨ Pain in your calf, back of the knee, thigh, or groin. ¨ Redness and swelling in your leg. ? Watch closely for changes in your health, and be sure to contact your doctor if you have any problems. Where can you learn more? Go to http://eric-corby.info/. Enter M280 in the search box to learn more about \"Abdominal Hysterectomy: What to Expect at Home. \" Current as of: October 13, 2016 Content Version: 11.4 © 0364-8664 Healthwise, Incorporated. Care instructions adapted under license by [employer] (which disclaims liability or warranty for this information). If you have questions about a medical condition or this instruction, always ask your healthcare professional. [address] any warranty or liability for your use of this information. Please provide this summary of care documentation to your next provider. Signatures-by signing, you are acknowledging that this After Visit Summary has been reviewed with you and you have received a copy. Patient Signature:  ____________________________________________________________ Date:  ____________________________________________________________  
  
[facility] Provider Signature:  ____________________________________________________________ Date:  ____________________________________________________________

## 2018-05-29 NOTE — PERIOP NOTES
Patient still complain of pain see mar she is able to do the incentive spirometer 2000ml, need encouragement.

## 2018-05-29 NOTE — INTERVAL H&P NOTE
H&P Update:  Gustavo Rodriguez was seen and examined. History and physical has been reviewed. The patient has been examined.  There have been no significant clinical changes since the completion of the originally dated History and Physical.    Signed By: Jaxson Benitez MD     May 29, 2018 9:40 AM

## 2018-05-29 NOTE — PERIOP NOTES
Patient is real drowsy placed on nasal cannula 3lpm instructed on how to use spirometer unable to use presently. Encouraged to take deep breaths, skin warm and flushed appearance to face cool cloth applied.

## 2018-05-29 NOTE — PERIOP NOTES
Patient received by Graciela DERAS, Blood pressure 101/69, pulse 89, temperature 97.9 °F (36.6 °C), resp. rate 15, height 5' 1\" (1.549 m), weight 86.9 kg (191 lb 8 oz), SpO2 95 %. Patient stable, all questions answered, dual skin assessment performed, no skin brakedown noted.

## 2018-05-29 NOTE — ROUTINE PROCESS
TRANSFER - IN REPORT:    Verbal report received from [facility] RN(name) on Angie Eye  being received from [employer]) for routine post - op      Report consisted of patients Situation, Background, Assessment and   Recommendations(SBAR). Information from the following report(s) SBAR, Kardex, ED Summary, OR Summary, Procedure Summary, Intake/Output, MAR, Accordion, Recent Results, Med Rec Status and Alarm Parameters  was reviewed with the receiving nurse. Opportunity for questions and clarification was provided. Assessment completed upon patients arrival to unit and care assumed.

## 2018-05-29 NOTE — ANESTHESIA POSTPROCEDURE EVALUATION
Post-Anesthesia Evaluation & Assessment    Visit Vitals    /80    Pulse 90    Temp 37.8 °C (100 °F)    Resp 14    Ht 5' 1\" (1.549 m)    Wt 86.9 kg (191 lb 8 oz)    SpO2 99%    BMI 36.18 kg/m2       No untreated/active PONV    Post-operative hydration adequate. Adequate post-operative analgesia per PACU discharge criteria    Mental status & level of consciousness: alert and oriented x 3    Respiratory status: patent unassisted airway     No apparent anesthetic complications requiring additional post-anesthetic care    Patient has met all discharge requirements.             Luzmaria Crisostomo MD

## 2018-05-29 NOTE — PERIOP NOTES
TRANSFER - IN REPORT:    Verbal report received from ORN & CRNA on Ricki Valenzuela  being received from OR (unit) for routine post - op      Report consisted of patients Situation, Background, Assessment and   Recommendations(SBAR). Information from the following report(s) SBAR, Intake/Output and MAR was reviewed with the receiving nurse. Opportunity for questions and clarification was provided. Assessment completed upon patients arrival to unit and care assumed. Received to pacu via bed patent airway resp easy and even, skin warm and dry abdominal dressing is exofen  Fry drains gómez urine. Reoriented to place and time encouraged to keep face mask in place for now. Gown changed.

## 2018-05-30 LAB
ALBUMIN SERPL-MCNC: 2.8 G/DL (ref 3.4–5)
ALBUMIN/GLOB SERPL: 1 {RATIO} (ref 0.8–1.7)
ALP SERPL-CCNC: 53 U/L (ref 45–117)
ALT SERPL-CCNC: 21 U/L (ref 13–56)
ANION GAP SERPL CALC-SCNC: 8 MMOL/L (ref 3–18)
AST SERPL-CCNC: 16 U/L (ref 15–37)
BASOPHILS # BLD: 0 K/UL (ref 0–0.06)
BASOPHILS NFR BLD: 0 % (ref 0–2)
BILIRUB SERPL-MCNC: 0.4 MG/DL (ref 0.2–1)
BUN SERPL-MCNC: 11 MG/DL (ref 7–18)
BUN/CREAT SERPL: 12 (ref 12–20)
CALCIUM SERPL-MCNC: 7.7 MG/DL (ref 8.5–10.1)
CANCER AG125 SERPL-ACNC: 17.1 U/ML (ref 0–38.1)
CHLORIDE SERPL-SCNC: 105 MMOL/L (ref 100–108)
CO2 SERPL-SCNC: 28 MMOL/L (ref 21–32)
CREAT SERPL-MCNC: 0.93 MG/DL (ref 0.6–1.3)
DIFFERENTIAL METHOD BLD: ABNORMAL
EOSINOPHIL # BLD: 0 K/UL (ref 0–0.4)
EOSINOPHIL NFR BLD: 0 % (ref 0–5)
ERYTHROCYTE [DISTWIDTH] IN BLOOD BY AUTOMATED COUNT: 19.4 % (ref 11.6–14.5)
GLOBULIN SER CALC-MCNC: 2.9 G/DL (ref 2–4)
GLUCOSE BLD STRIP.AUTO-MCNC: 171 MG/DL (ref 70–110)
GLUCOSE SERPL-MCNC: 192 MG/DL (ref 74–99)
HCT VFR BLD AUTO: 32.1 % (ref 35–45)
HGB BLD-MCNC: 9.7 G/DL (ref 12–16)
LYMPHOCYTES # BLD: 1.3 K/UL (ref 0.9–3.6)
LYMPHOCYTES NFR BLD: 9 % (ref 21–52)
MAGNESIUM SERPL-MCNC: 1.7 MG/DL (ref 1.6–2.6)
MCH RBC QN AUTO: 21.7 PG (ref 24–34)
MCHC RBC AUTO-ENTMCNC: 30.2 G/DL (ref 31–37)
MCV RBC AUTO: 71.7 FL (ref 74–97)
MONOCYTES # BLD: 1.2 K/UL (ref 0.05–1.2)
MONOCYTES NFR BLD: 8 % (ref 3–10)
NEUTS SEG # BLD: 11.6 K/UL (ref 1.8–8)
NEUTS SEG NFR BLD: 83 % (ref 40–73)
PLATELET # BLD AUTO: 213 K/UL (ref 135–420)
POTASSIUM SERPL-SCNC: 4.3 MMOL/L (ref 3.5–5.5)
PROT SERPL-MCNC: 5.7 G/DL (ref 6.4–8.2)
RBC # BLD AUTO: 4.48 M/UL (ref 4.2–5.3)
SODIUM SERPL-SCNC: 141 MMOL/L (ref 136–145)
WBC # BLD AUTO: 14.1 K/UL (ref 4.6–13.2)

## 2018-05-30 PROCEDURE — C9113 INJ PANTOPRAZOLE SODIUM, VIA: HCPCS | Performed by: OBSTETRICS & GYNECOLOGY

## 2018-05-30 PROCEDURE — 85025 COMPLETE CBC W/AUTO DIFF WBC: CPT | Performed by: OBSTETRICS & GYNECOLOGY

## 2018-05-30 PROCEDURE — 74011250637 HC RX REV CODE- 250/637: Performed by: OBSTETRICS & GYNECOLOGY

## 2018-05-30 PROCEDURE — 77010033678 HC OXYGEN DAILY

## 2018-05-30 PROCEDURE — 74011636637 HC RX REV CODE- 636/637: Performed by: OBSTETRICS & GYNECOLOGY

## 2018-05-30 PROCEDURE — 74011000258 HC RX REV CODE- 258: Performed by: OBSTETRICS & GYNECOLOGY

## 2018-05-30 PROCEDURE — 74011250636 HC RX REV CODE- 250/636: Performed by: OBSTETRICS & GYNECOLOGY

## 2018-05-30 PROCEDURE — 82962 GLUCOSE BLOOD TEST: CPT

## 2018-05-30 PROCEDURE — 36415 COLL VENOUS BLD VENIPUNCTURE: CPT | Performed by: OBSTETRICS & GYNECOLOGY

## 2018-05-30 PROCEDURE — 83735 ASSAY OF MAGNESIUM: CPT | Performed by: OBSTETRICS & GYNECOLOGY

## 2018-05-30 PROCEDURE — 65270000029 HC RM PRIVATE

## 2018-05-30 PROCEDURE — 80053 COMPREHEN METABOLIC PANEL: CPT | Performed by: OBSTETRICS & GYNECOLOGY

## 2018-05-30 RX ORDER — INSULIN LISPRO 100 [IU]/ML
INJECTION, SOLUTION INTRAVENOUS; SUBCUTANEOUS
Status: DISCONTINUED | OUTPATIENT
Start: 2018-05-30 | End: 2018-06-01 | Stop reason: HOSPADM

## 2018-05-30 RX ADMIN — CEFOXITIN SODIUM 2 G: 2 POWDER, FOR SOLUTION INTRAVENOUS at 05:26

## 2018-05-30 RX ADMIN — BISACODYL 10 MG: 5 TABLET, COATED ORAL at 08:56

## 2018-05-30 RX ADMIN — HYDROCHLOROTHIAZIDE: 12.5 CAPSULE ORAL at 08:56

## 2018-05-30 RX ADMIN — AMLODIPINE BESYLATE 10 MG: 5 TABLET ORAL at 08:56

## 2018-05-30 RX ADMIN — ONDANSETRON 4 MG: 2 INJECTION INTRAMUSCULAR; INTRAVENOUS at 05:26

## 2018-05-30 RX ADMIN — INSULIN LISPRO 2 UNITS: 100 INJECTION, SOLUTION INTRAVENOUS; SUBCUTANEOUS at 22:04

## 2018-05-30 RX ADMIN — KETOROLAC TROMETHAMINE 15 MG: 15 INJECTION, SOLUTION INTRAMUSCULAR; INTRAVENOUS at 01:43

## 2018-05-30 RX ADMIN — KETOROLAC TROMETHAMINE 15 MG: 15 INJECTION, SOLUTION INTRAMUSCULAR; INTRAVENOUS at 17:11

## 2018-05-30 RX ADMIN — ATORVASTATIN CALCIUM 10 MG: 10 TABLET, FILM COATED ORAL at 08:56

## 2018-05-30 RX ADMIN — DEXTROSE MONOHYDRATE, SODIUM CHLORIDE, AND POTASSIUM CHLORIDE 125 ML/HR: 50; 9; 1.49 INJECTION, SOLUTION INTRAVENOUS at 15:46

## 2018-05-30 RX ADMIN — VERAPAMIL HYDROCHLORIDE 180 MG: 180 TABLET, FILM COATED, EXTENDED RELEASE ORAL at 08:56

## 2018-05-30 RX ADMIN — DEXTROSE MONOHYDRATE, SODIUM CHLORIDE, AND POTASSIUM CHLORIDE 125 ML/HR: 50; 9; 1.49 INJECTION, SOLUTION INTRAVENOUS at 05:26

## 2018-05-30 RX ADMIN — KETOROLAC TROMETHAMINE 15 MG: 15 INJECTION, SOLUTION INTRAMUSCULAR; INTRAVENOUS at 05:26

## 2018-05-30 RX ADMIN — SODIUM CHLORIDE 40 MG: 9 INJECTION, SOLUTION INTRAMUSCULAR; INTRAVENOUS; SUBCUTANEOUS at 17:12

## 2018-05-30 RX ADMIN — KETOROLAC TROMETHAMINE 15 MG: 15 INJECTION, SOLUTION INTRAMUSCULAR; INTRAVENOUS at 11:40

## 2018-05-30 NOTE — ROUTINE PROCESS
Bedside and Verbal shift change report given to Antonette Fang RN (oncoming nurse) by Perla Faulkner RN (offgoing nurse). Report included the following information SBAR, Kardex, OR Summary, Procedure Summary, Intake/Output, MAR, Recent Results and Med Rec Status.

## 2018-05-30 NOTE — PROGRESS NOTES
Assumed pt care. A&Ox4. Drowsy. Dual skin and VS assessment complete. Midline incision; approximated, no drainage. Lap sites x3; approximated, no drainage. Ice applied. SCD's on pt. Machine turned on. PCA pump dual verified. Dilaudid 30mg/30mL. Button placed in pt hand. Pt informed to push button Q 6 min when in pain. Bed in lowest position. Call bell w/ in reach. Will continue to monitor. [address] Pt more alert. Pt stated was not in pain, 0/10. Went over PCA button again. Pt verbalized understanding. 1850 Pt tolerated Clear liquid diet well. Bedside and Verbal shift change report given to Jorge Quiros RN (oncoming nurse) by Merline Sill RN (offgoing nurse). Report included the following information SBAR, Kardex, ED Summary, OR Summary, Procedure Summary, Intake/Output, MAR, Accordion, Recent Results, Med Rec Status and Alarm Parameters .

## 2018-05-30 NOTE — PROGRESS NOTES
1153 assisted patient to ambulate on the bedside commode. Patient voided. 1215 patient IV site infiltrated, reinserted another IV line. 1425 patient complaining of headache, schedule medication given.

## 2018-05-30 NOTE — PROGRESS NOTES
Transition of Care (YAMIL) Plan:        Pt admitted for an elective surgical procedure. Pt is independent. Please encourage ambulation. No plan of care needs identified. Anticipate pt will be medically stable for discharge within the next 24-48 hours. CM available to assist as needed. YAMIL Transportation:   How is patient being transported at discharge? Family/Friend      When? Once cleared by physician     Is transport scheduled? N/A      Follow-up appointment and transportation:   PCP/Specialist?  See AVS for Appointment         Who is transporting to the follow-up appointment? Self/Family/Friend      Is transport for follow up appointment scheduled? N/A    Communication plan (with patient/family): Who is being called? Patient or Next of Kin? Responsible party? Patient      What number(s) is to be used? See Facesheet      What service provider is calling for [facility] services? When are they calling? Readmission Risk? (Green/Low; Yellow/Moderate; Red/High):  Green      Care Management Interventions  PCP Verified by CM: Yes  Mode of Transport at Discharge:  Other (see comment) (Family)  Transition of Care Consult (CM Consult): Discharge Planning  Health Maintenance Reviewed: Yes  Current Support Network: Family Lives Nearby  Confirm Follow Up Transport: Family  Plan discussed with Pt/Family/Caregiver: Yes  Discharge Location  Discharge Placement: Home with family assistance

## 2018-05-30 NOTE — OP NOTES
[address] REPORT    Juaquin Marc  MR#: [id number]  : 1950  ACCOUNT #: [de-identified]   DATE OF SERVICE: 2018    PREOPERATIVE DIAGNOSES:  1. Left pelvic mass suspicious for malignancy. 2.  Urinary urgency and frequency. POSTOPERATIVE DIAGNOSES:   1. 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 2.  Pelvic and peritoneal adhesions. 3.  Urinary urgency and frequency. PROCEDURE PERFORMED:    1. Laparoscopy converted to laparotomy, total abdominal hysterectomy, bilateral salpingo-oophorectomy, omentectomy, bilateral pelvic and periaortic lymphadenectomy and radical tumor debulking to R0.  2.  Frozen pathology with specimen collection for Caris. 3.  Cystoscopy. SURGEON:  Laurel Davison MD    ASSISTANT:   Casimiro Olson SA    ANESTHESIA:  General.    ANESTHESIOLOGIST:  Rogerio Negrete MD    CRNA:  Trinidad Terrell. ESTIMATED BLOOD LOSS:  250 mL. SPECIMENS REMOVED:    1. Peritoneal washings for cytology. 2.  Uterus, cervix, bilateral fallopian tubes and ovaries including left pelvic mass for frozen pathologic evaluation. 3.  Right pelvic lymph nodes. 4.  Left pelvic lymph nodes. 5.  Omentum. 6.  Right paraaortic lymph nodes. 7.  Left periaortic lymph nodes. FINDINGS: A 15 cm cystic left pelvic mass densely adherent to the sigmoid colon, colon  mesentery, retroperitoneum, uterus and pelvic sidewall and pelvic cul-de-sac. Frozen pathology consistent with adenocarcinoma. The mass was ruptured during removal.  Uterus was small. Right ovary was atrophic and densely adherent to the right pelvic sidewall. The right fallopian tube was normal appearing. Omentum was generous with no visible metastasis. There were no peritoneal implants. No enlarged pelvic or periaortic lymph nodes. The gallbladder was very dilated. There was no palpable gallstones. Normal stomach, liver, spleen and bowel.   Cystoscopy was normal with normal urethra, urethral mucosa, bladder and active jets of urine were seen from the right and left ureteral orifices. At the conclusion of surgery, there was no visible or palpable residual disease. This was a R0 surgical resection. COMPLICATIONS:  None. IMPLANTS:  none. INDICATIONS:  The patient is a 65-year-old  [de-identified]  female referred by Dr. Matthew Milner, for a 14 cm pelvic mass identified on a well-woman examination. The patient's only reported symptoms were urinary urgency and frequency. Pelvic ultrasound on 2018 showed the mass to measure 10.6 x 12.6 x 14.0 cm predominantly cystic with mural nodularity inseparable from the uterus and displacing the uterus to the posterior right pelvis. A small uterine fundal fibroid was noted. CA-125 on 2018 was normal at 22.9. The patient had no personal or family history of ovarian, breast, endometrial colon malignancy. The patient's father had prostate cancer. The patient was seen in consultation and counseled regarding surgery for definitive diagnosis and treatment. Risks, benefits and alternatives of surgery were reviewed with the patient. The patient decided to proceed with surgery and written consent was obtained. DESCRIPTION OF PROCEDURE:  After the patient was accurately identified, consent was verified, signed on the chart and all questions were answered. The patient was taken to the operating room with IV running. She was placed in the supine position. Sequential compression devices were placed in the bilateral lower extremities and were functioning prior to induction of anesthesia. Two grams of Mefoxin was given intravenously as prophylactic antibiotics within 1 hour prior to surgery. Patient underwent dosing of general endotracheal anesthesia.   She was placed in the low dorsal lithotomy position in [facility] and prepped and draped in a normal fashion using Betadine in the perineum, Betadine in the vagina and ChloraPrep on the abdomen. A surgical timeout was performed and all OR staff present and participated including the anesthesia provider and myself. The patient was virginal and a speculum examination was not possible. The cervix was palpably normal, but no instrumentation could be placed on the cervix for manipulation. A Fry catheter was placed into the bladder via the urethra and left to gravity drainage. Attention was then paid to the abdomen. 0.5% Marcaine with epinephrine was injected in the skin prior to incisions. A 5 mm skin incision was made in the left upper quadrant. A 5 mm tissue  atraumatic trocar was placed directly into the peritoneal cavity. Once peritoneal access was obtained, the abdomen was insufflated with CO2 gas. Two additional trocars were placed. A 5 mm trocar in the left lower quadrant and a 5 mm trocar in the right lower quadrant. A complete pelvic and abdominal exploration were performed. The findings were mentioned above. Due to the size of the mass, the fact that the mass was inseparable from the uterus and the sigmoid colon, the decision was made to proceed with a laparotomy for completion of the surgery. Therefore, the air was allowed to escape from the abdomen. The trocars were removed. The remaining 0.5% Marcaine with epinephrine was injected into the vertical midline abdomen and a vertical midline skin incision was made from the pubic symphysis around the right of the umbilicus and up to 4 cm above the umbilicus with the 10 blade scalpel. Bovie cautery was used to cauterize vessels in the skin for hemostasis. Bovie cautery was used to incise the subcutaneous tissue to expose the fascia. The fascia was incised. The midline was identified. The fascial incision was extended. The rectus muscle was  and the peritoneum was entered sharply. The peritoneal incision was extended for the full length of the skin incision.   A Bookwalter retractor was assembled and the abdominal wall was retracted for surgical visualization. The small bowel was packed into the upper abdomen. The rectosigmoid colon was carefully dissected free from the cystic pelvic mass. The mass was very thin walled and ruptured with dissection. The cystic contents were aspirated. The pelvis and abdomen were copiously irrigated and the cyst wall was carefully dissected off of the colon and off of the mesentery of the colon, off of the pelvic sidewall and pelvic cul-de-sac. The round ligaments were suture ligated and transected. The retroperitoneum was incised bilaterally in a cephalad fashion and bilaterally in a caudad fashion extending this peritoneal incision over the lower uterine segment. The retroperitoneal spaces were developed including the pararectal and paravesical spaces. The ureter was identified bilaterally. The ovarian vessels were identified, skeletonized, clamped with curved Rudolph clamps, transected, and doubly suture ligated with 0 Vicryl suture. The broad ligament was incised. The uterine arteries were identified, cross clamped with curved Rudolph clamps, transected and suture ligated with 0 Vicryl suture. Serial pedicles of the broad ligament were created. The cardinal ligaments were cross clamped as well. Pedicles were created with hysterectomy clamps   and suture ligated with 0 Vicryl suture. Once the distal cervix was reached, the vagina was cross clamped just distal to the cervix and the cervix, uterus, bilateral adnexa including the left pelvic mass were all amputated from the vagina and sent to Pathology for frozen pathologic evaluation. The pelvis and abdomen were copiously irrigated. The vaginal cuff was closed with 0 Vicryl suture in interrupted figure-of-eight fashion. Attention was then paid to the retroperitoneum where the lymph nodes were examined. A lymphadenectomy, bilateral pelvic and periaortic was completed.   Careful dissection was performed sharply with Metzenbaum scissors, blunt dissection, Bovie cautery and Hemoclips. Lymph nodes were sent in bundles of right pelvic lymph nodes, left pelvic lymph nodes, right periaortic and left periaortic lymph nodes. The boundaries of the lymph node dissection where the deep circumflex iliac artery caudad the genitofemoral nerve lateral, the ureter and superior vesicle artery medially, the obturator nerve deep, and the aorta and vena cava deep in the upper abdomen and above the inferior mesenteric artery cephalad. All lymph node bearing tissue in these areas was removed entirely and sent to pathology for permanent pathologic evaluation. The area was irrigated. FloSeal was placed over the lymph node beds and attention was then paid to the upper abdomen. The diaphragm surfaces were smooth. There was no evidence of tumor in the upper abdomen. The omentum was generous, but there was no evidence of gross tumor. The gallbladder was dilated. There were no gallstones palpable. The spleen was palpably normal.  The omentectomy was then performed. The omentum was elevated and the avascular posterior omentum as it attached to the transverse colon was incised with Bovie cautery. The lesser sac was entered through the gastrocolic ligament and the short gastric vessels were cross clamped, transected and suture ligated with 0 silk suture. The omentum was removed and sent to Pathology for permanent pathologic evaluation. All pedicles were reexamined and hemostatic. The upper abdomen was copiously irrigated and aspirated. Attention was again paid to the pelvis. All pedicles were reexamined and hemostatic. The abdominal wall was closed in a bulk fashion using a #1 looped PDS in a modified Smead-Alvarez fashion. The suprafascial tissue was irrigated. Bleeding points were cauterized. The deep dermis was reapproximated with 3-0 Vicryl. The skin was reapproximated with 4-0 Monocryl.   Dermabond was placed as a dressing. The laparoscopic sites were also closed with 4-0 Monocryl and Dermabond was placed with dressing. All sharp, instrument, sponge counts were correct at all layers of closure of the abdomen. Attention was then paid to the perineum. The Fry catheter was removed and a cystourethroscopy was then performed. A 70 degree cystoscope, a 17-Pashto sheath was easily passed the urethra. The urethra mucosa was visualized. Bladder mucosa visualized. Active jets of urine were seen from the right and left ureteral orifices. There were no abnormalities in the bladder. The cystoscope was removed. The Fry catheter was replaced. The patient was replaced in the supine position, awakened from anesthesia, extubated, transferred to the hospital bed and transported to the PACU awake in stable condition. All sharp instruments and sponge counts were correct. There were no complications with this procedure.       Sameera Bueno MD DMD / ALBERTINA  D: 05/30/2018 18:38     T: 05/30/2018 19:54  JOB #: [id number]

## 2018-05-30 NOTE — DIABETES MGMT
GLYCEMIC CONTROL SCREENING INITIATED:  -known h/o T2DM, HbA1C within recommended range for age + comorbids on oral home regimen    Lab Results   Component Value Date/Time    Hemoglobin A1c 6.4 (H) 05/22/2018 02:15 PM      Lab Results   Component Value Date/Time    Glucose 192 (H) 05/30/2018 01:30 AM         [x]  Glucose values exceeding inpatient target range: -180mg/dl            non-ICU 70-140mg/dl      []  Patient meets criteria for pre-diabetes   HbA1c = 5.7-6.4%      [x]  Patient has h/o diabetes HbA1c ? 6.5%      []  Recommend discontinuing oral anti-diabetic medications until discharge. []  Recommend basal insulin per IP Insulin order set. []  Recommend meal time insulin per IP Insulin order set. [x]  Recommend corrective insulin per IP Insulin order set. [x]  Standard insulin scale  i[]  Very insuln resistant scale       []  Patient meets criteria for IV Insulin Infusion/GlucoStabilizer order set. []  Patient has had a hypoglycemic event, recommend      [x]  Recommend blood glucose monitoring. []  Patient will need prescription for glucometer, test strips and lancets. [x]  Recommend carbohydrate controlled diabetic diet, once diet advances. []  Diabetes Self-Management class schedule provided and patient encouraged to attend.     [] Other      Toney Mason RN, MS  Glycemic Control Team  Pager [phone]  *After Hours pager [phone]

## 2018-05-30 NOTE — PROGRESS NOTES
Bedside and Verbal shift change report given to SLY Guzman RN (oncoming nurse) by Cesar Zarate RN (offgoing nurse). Report included the following information SBAR and Kardex.      Patient Vitals for the past 12 hrs:   Temp Pulse Resp BP SpO2   05/30/18 1542 98.9 °F (37.2 °C) 94 16 123/73 92 %   05/30/18 1117 98.1 °F (36.7 °C) 88 17 122/77 95 %

## 2018-05-30 NOTE — ROUTINE PROCESS
Bedside and Verbal shift change report given to Catherine Portillo (oncoming nurse) by William Bettencourt RN   (offgoing nurse). Report included the following information SBAR, Kardex, Intake/Output and MAR.

## 2018-05-30 NOTE — PROGRESS NOTES
POD#1 status post LAPAROSCOPY CONVERTED TO LAPAROTOMY, TOTAL ABDOMINAL HYSTERECTOMY, BILATERAL SALPINGO OOPHORECTOMY, FROZEN PATHOLOGY WT SPECIMEN COLLECTION FOR CARIS, BILATERAL PELVIC AND PARA-AORTIC LYMPHADENECTOMY, RADICAL TUMOR DEBULKING TO R0, CYSTOSCOPY  S: Complains of nausea, no vomiting. No appetite. Taking very little of the clears. Pain controlled. Voiding without difficulty. Only ambulated to the BR.  O: AF,VSS  Gen A&O lying in bed  Lungs CTA, decreased in bases. IS within reach  Abdomen soft, moderately distended, NABS  Ext SCDs intact  A/P: Hemodynamically Stable  Routine post op care  Encourage IS  Will continue clear liquids until nausea subsides  Continue IV PCA  Reviewed surgical findings, cancer diagnosis and expected adjuvant treatment with patient.   Dawit Hogue MD

## 2018-05-31 PROBLEM — E66.01 SEVERE OBESITY (BMI 35.0-39.9): Status: ACTIVE | Noted: 2018-05-31

## 2018-05-31 LAB
ALBUMIN SERPL-MCNC: 2.8 G/DL (ref 3.4–5)
ALBUMIN/GLOB SERPL: 0.8 {RATIO} (ref 0.8–1.7)
ALP SERPL-CCNC: 53 U/L (ref 45–117)
ALT SERPL-CCNC: 20 U/L (ref 13–56)
ANION GAP SERPL CALC-SCNC: 8 MMOL/L (ref 3–18)
AST SERPL-CCNC: 19 U/L (ref 15–37)
BASOPHILS # BLD: 0 K/UL (ref 0–0.06)
BASOPHILS NFR BLD: 0 % (ref 0–2)
BILIRUB SERPL-MCNC: 0.4 MG/DL (ref 0.2–1)
BUN SERPL-MCNC: 4 MG/DL (ref 7–18)
BUN/CREAT SERPL: 6 (ref 12–20)
CALCIUM SERPL-MCNC: 8 MG/DL (ref 8.5–10.1)
CHLORIDE SERPL-SCNC: 105 MMOL/L (ref 100–108)
CO2 SERPL-SCNC: 28 MMOL/L (ref 21–32)
CREAT SERPL-MCNC: 0.72 MG/DL (ref 0.6–1.3)
DIFFERENTIAL METHOD BLD: ABNORMAL
EOSINOPHIL # BLD: 0.1 K/UL (ref 0–0.4)
EOSINOPHIL NFR BLD: 0 % (ref 0–5)
ERYTHROCYTE [DISTWIDTH] IN BLOOD BY AUTOMATED COUNT: 19.7 % (ref 11.6–14.5)
GLOBULIN SER CALC-MCNC: 3.4 G/DL (ref 2–4)
GLUCOSE BLD STRIP.AUTO-MCNC: 147 MG/DL (ref 70–110)
GLUCOSE BLD STRIP.AUTO-MCNC: 147 MG/DL (ref 70–110)
GLUCOSE BLD STRIP.AUTO-MCNC: 173 MG/DL (ref 70–110)
GLUCOSE BLD STRIP.AUTO-MCNC: 173 MG/DL (ref 70–110)
GLUCOSE SERPL-MCNC: 181 MG/DL (ref 74–99)
HCT VFR BLD AUTO: 31.1 % (ref 35–45)
HGB BLD-MCNC: 9.5 G/DL (ref 12–16)
LYMPHOCYTES # BLD: 1.7 K/UL (ref 0.9–3.6)
LYMPHOCYTES NFR BLD: 12 % (ref 21–52)
MAGNESIUM SERPL-MCNC: 1.8 MG/DL (ref 1.6–2.6)
MCH RBC QN AUTO: 22 PG (ref 24–34)
MCHC RBC AUTO-ENTMCNC: 30.5 G/DL (ref 31–37)
MCV RBC AUTO: 72.2 FL (ref 74–97)
MONOCYTES # BLD: 1.1 K/UL (ref 0.05–1.2)
MONOCYTES NFR BLD: 8 % (ref 3–10)
NEUTS SEG # BLD: 10.9 K/UL (ref 1.8–8)
NEUTS SEG NFR BLD: 80 % (ref 40–73)
PLATELET # BLD AUTO: 175 K/UL (ref 135–420)
POTASSIUM SERPL-SCNC: 3.8 MMOL/L (ref 3.5–5.5)
PROT SERPL-MCNC: 6.2 G/DL (ref 6.4–8.2)
RBC # BLD AUTO: 4.31 M/UL (ref 4.2–5.3)
SODIUM SERPL-SCNC: 141 MMOL/L (ref 136–145)
WBC # BLD AUTO: 13.7 K/UL (ref 4.6–13.2)

## 2018-05-31 PROCEDURE — 65270000029 HC RM PRIVATE

## 2018-05-31 PROCEDURE — 74011250636 HC RX REV CODE- 250/636: Performed by: OBSTETRICS & GYNECOLOGY

## 2018-05-31 PROCEDURE — 97116 GAIT TRAINING THERAPY: CPT

## 2018-05-31 PROCEDURE — 82962 GLUCOSE BLOOD TEST: CPT

## 2018-05-31 PROCEDURE — 83735 ASSAY OF MAGNESIUM: CPT | Performed by: OBSTETRICS & GYNECOLOGY

## 2018-05-31 PROCEDURE — 36415 COLL VENOUS BLD VENIPUNCTURE: CPT | Performed by: OBSTETRICS & GYNECOLOGY

## 2018-05-31 PROCEDURE — 85025 COMPLETE CBC W/AUTO DIFF WBC: CPT | Performed by: OBSTETRICS & GYNECOLOGY

## 2018-05-31 PROCEDURE — 97535 SELF CARE MNGMENT TRAINING: CPT

## 2018-05-31 PROCEDURE — 97165 OT EVAL LOW COMPLEX 30 MIN: CPT

## 2018-05-31 PROCEDURE — 74011636637 HC RX REV CODE- 636/637: Performed by: OBSTETRICS & GYNECOLOGY

## 2018-05-31 PROCEDURE — 97162 PT EVAL MOD COMPLEX 30 MIN: CPT

## 2018-05-31 PROCEDURE — 74011250637 HC RX REV CODE- 250/637: Performed by: OBSTETRICS & GYNECOLOGY

## 2018-05-31 PROCEDURE — C9113 INJ PANTOPRAZOLE SODIUM, VIA: HCPCS | Performed by: OBSTETRICS & GYNECOLOGY

## 2018-05-31 PROCEDURE — 80053 COMPREHEN METABOLIC PANEL: CPT | Performed by: OBSTETRICS & GYNECOLOGY

## 2018-05-31 RX ORDER — HYDROMORPHONE HYDROCHLORIDE 2 MG/1
2-4 TABLET ORAL
Status: DISCONTINUED | OUTPATIENT
Start: 2018-05-31 | End: 2018-06-01 | Stop reason: HOSPADM

## 2018-05-31 RX ORDER — ACETAMINOPHEN AND CODEINE PHOSPHATE 300; 30 MG/1; MG/1
1 TABLET ORAL
Status: DISCONTINUED | OUTPATIENT
Start: 2018-05-31 | End: 2018-06-01 | Stop reason: HOSPADM

## 2018-05-31 RX ADMIN — SODIUM CHLORIDE 40 MG: 9 INJECTION, SOLUTION INTRAMUSCULAR; INTRAVENOUS; SUBCUTANEOUS at 16:35

## 2018-05-31 RX ADMIN — KETOROLAC TROMETHAMINE 15 MG: 15 INJECTION, SOLUTION INTRAMUSCULAR; INTRAVENOUS at 06:52

## 2018-05-31 RX ADMIN — ATORVASTATIN CALCIUM 10 MG: 10 TABLET, FILM COATED ORAL at 08:39

## 2018-05-31 RX ADMIN — DEXTROSE MONOHYDRATE, SODIUM CHLORIDE, AND POTASSIUM CHLORIDE 125 ML/HR: 50; 9; 1.49 INJECTION, SOLUTION INTRAVENOUS at 17:00

## 2018-05-31 RX ADMIN — Medication 10 ML: at 16:36

## 2018-05-31 RX ADMIN — HYDROMORPHONE HYDROCHLORIDE 4 MG: 2 TABLET ORAL at 09:47

## 2018-05-31 RX ADMIN — INSULIN LISPRO 2 UNITS: 100 INJECTION, SOLUTION INTRAVENOUS; SUBCUTANEOUS at 08:40

## 2018-05-31 RX ADMIN — AMLODIPINE BESYLATE 10 MG: 5 TABLET ORAL at 08:39

## 2018-05-31 RX ADMIN — KETOROLAC TROMETHAMINE 15 MG: 15 INJECTION, SOLUTION INTRAMUSCULAR; INTRAVENOUS at 00:15

## 2018-05-31 RX ADMIN — INSULIN LISPRO 2 UNITS: 100 INJECTION, SOLUTION INTRAVENOUS; SUBCUTANEOUS at 12:17

## 2018-05-31 RX ADMIN — HYDROMORPHONE HYDROCHLORIDE 2 MG: 2 TABLET ORAL at 16:35

## 2018-05-31 RX ADMIN — HYDROMORPHONE HYDROCHLORIDE 2 MG: 2 TABLET ORAL at 20:50

## 2018-05-31 RX ADMIN — DEXTROSE MONOHYDRATE, SODIUM CHLORIDE, AND POTASSIUM CHLORIDE 125 ML/HR: 50; 9; 1.49 INJECTION, SOLUTION INTRAVENOUS at 08:38

## 2018-05-31 RX ADMIN — KETOROLAC TROMETHAMINE 15 MG: 15 INJECTION, SOLUTION INTRAMUSCULAR; INTRAVENOUS at 12:15

## 2018-05-31 RX ADMIN — VERAPAMIL HYDROCHLORIDE 180 MG: 180 TABLET, FILM COATED, EXTENDED RELEASE ORAL at 08:39

## 2018-05-31 RX ADMIN — ACETAMINOPHEN AND CODEINE PHOSPHATE 1 TABLET: 300; 30 TABLET ORAL at 12:15

## 2018-05-31 RX ADMIN — KETOROLAC TROMETHAMINE 15 MG: 15 INJECTION, SOLUTION INTRAMUSCULAR; INTRAVENOUS at 17:01

## 2018-05-31 RX ADMIN — DEXTROSE MONOHYDRATE, SODIUM CHLORIDE, AND POTASSIUM CHLORIDE 125 ML/HR: 50; 9; 1.49 INJECTION, SOLUTION INTRAVENOUS at 00:15

## 2018-05-31 RX ADMIN — BISACODYL 10 MG: 5 TABLET, COATED ORAL at 08:39

## 2018-05-31 RX ADMIN — HYDROCHLOROTHIAZIDE: 12.5 CAPSULE ORAL at 08:39

## 2018-05-31 NOTE — PROGRESS NOTES
POD#2 status post LAPAROSCOPY CONVERTED TO LAPAROTOMY, TOTAL ABDOMINAL HYSTERECTOMY, BILATERAL SALPINGO OOPHORECTOMY, FROZEN PATHOLOGY WT SPECIMEN COLLECTION FOR CARIS, BILATERAL PELVIC AND PARA-AORTIC LYMPHADENECTOMY, RADICAL TUMOR DEBULKING TO R0, CYSTOSCOPY  S: Complains of soreness. No flatus yet. O: AF,VSS  Gen A&O lying in bed  Lungs CTA, decreased in bases. IS within reach patient acknowledges use  Abdomen soft, moderately distended, NABS  Ext SCDs intact  A/P: Hemodynamically Stable  Routine post op care  Needs to ambulate and sit up more  Decreased O2 sats likely due to atelectasis and splinting. Will change pain meds. Stressed importance of IS and ambulation and sitting up with patient. Will advance diet to regular now. Headache. Patient says she takes Tylenol #3 for this at home. Order placed.     Salina Castro MD

## 2018-05-31 NOTE — ROUTINE PROCESS
1910 Bedside and Verbal shift change  Received from Bruno Keller RN (outgoing nurse), to COLTEN Brown (oncoming)  Pt. Is AOX 4. IV patent and infusing well, Pt. denies  pain at this time. Report included the following information SBAR, Kardex, Procedure Summary, Intake/Output, MAR, Recent Lab Results. Will resume care and monitor Pt. Condition. Pt. Educated on call bell when in need of help and assistance. Pt. verbalized understanding. Pt. Head to toe Assessment Done and documented. 2050 Pt. Given dilaudid po for abd pain per [facility] for pain management. 2145  Pt. Resting in bed comfortably, no sign of distress. 2300 Pt. In bed watching TV.    0029  Pt. Given pain meds per [facility].     0145 Pt made no complaints. Resting in bed comfortably. 0330  Pt. Resting comfortably in bed no sign of distress. 2356  Tylenol #3 given. 0630  Pt. Walk to the hallway with walker and standby assist.      Verbal and bedside Shift changed report given to Michel Salcido RN (oncoming RN) on Pt. Condition. Report consisted of patients Situation, History, Activities, intake/output,  Background, Assessment and Recommendations(SBAR). Information from the following report(s) Kardex, order Summary, Lab results and MAR was reviewed with the receiving nurse. Opportunity for questions and clarification was provided.

## 2018-05-31 NOTE — ROUTINE PROCESS
Bedside and Verbal shift change report given to ILANA Orozco Aas, RN (oncoming nurse) by Bonny Chawla RN   (offgoing nurse). Report included the following information SBAR, Kardex, OR Summary, Intake/Output, MAR and Recent Results.

## 2018-05-31 NOTE — PROGRESS NOTES
Problem: Mobility Impaired (Adult and Pediatric)  Goal: *Acute Goals and Plan of Care (Insert Text)  Physical Therapy Goals  Initiated 5/31/2018 and to be accomplished within 7 day(s)  1. Patient will move from supine to sit and sit to supine  in bed with supervision/set-up. 2.  Patient will transfer from bed to chair and chair to bed with supervision/set-up using the least restrictive device. 3.  Patient will perform sit to stand with supervision/set-up. 4.  Patient will ambulate with supervision/set-up for >150 feet with the least restrictive device. 5.  Patient will ascend/descend 4 stairs with handrail(s) with minimal assistance/contact guard assist for safe home entry. physical Therapy EVALUATION    Patient: Saumya Chance (5 y.o. female)  Date: 5/31/2018  Primary Diagnosis: LEFT PELVIC MASS  Left ovarian epithelial cancer (HCC)  Procedure(s) (LRB):  LAPAROSCOPY CONVERTED TO LAPAROTOMY, TOTAL ABDOMINAL HYSTERECTOMY, BILATERAL SALPINGO OOPHORECTOMY, FROZEN PATHOLOGY WT SPECIMEN COLLECTION FOR CARIS, BILATERAL PELVIC AND PARA-AORTIC LYMPHADENECTOMY, RADICAL TUMOR DEBULKING, CYSTOSCOPY  (N/A)   EXPLORATORY LAPAROTOMY WITH RESECTION OF PELVIC MASS (N/A) 2 Days Post-Op   Precautions:   Fall, WBAT    ASSESSMENT :  Based on the objective data described below, Patient present to PT with decreased functional mobility with regard to bed mobility, transfers, gait, balance, weakness, and overall tolerance for activity s/p Laprotomy. Pt c/o abdominal pain with any mobility. Educated pt on log roll technique and bracing with a pillow. Pt with flat affect and decreased motivation to participate; Olayinka Kwok RN notified. During gait training pt required the use of a RW due to fair dynamic standing balance; pt ambulated a total of 24 feet with a slow/antalgic gait pattern. Left pt in bed with all needs met and SCDs applied. Recommend HHPT vs. SNF at time of discharge.     Patient will benefit from skilled intervention to address the above impairments. Patients rehabilitation potential is considered to be Good  Factors which may influence rehabilitation potential include:   []         None noted  []         Mental ability/status  [x]         Medical condition  []         Home/family situation and support systems  []         Safety awareness  []         Pain tolerance/management  []         Other:      PLAN :  Recommendations and Planned Interventions:  [x]           Bed Mobility Training             [x]    Neuromuscular Re-Education  [x]           Transfer Training                   []    Orthotic/Prosthetic Training  [x]           Gait Training                          []    Modalities  [x]           Therapeutic Exercises          []    Edema Management/Control  [x]           Therapeutic Activities            [x]    Patient and Family Training/Education  []           Other (comment):    Frequency/Duration: Patient will be followed by physical therapy 1-2 times per day/4-7 days per week to address goals.   Discharge Recommendations: Home Health  Further Equipment Recommendations for Discharge: rolling walker     SUBJECTIVE:   Patient stated I am feeling a lot of pain, especially when I breathe in.    OBJECTIVE DATA SUMMARY:     Past Medical History:   Diagnosis Date    Diabetes ([facility] [address]     many years type 2    Hypertension     many years     Past Surgical History:   Procedure Laterality Date    HX TONSILLECTOMY      as a child      Barriers to Learning/Limitations: None  Compensate with: visual, verbal, tactile, kinesthetic cues/model  Prior Level of Function/Home Situation: Pt stated she was independent with ADLs  Home Situation  Home Environment: Private residence  # Steps to Enter: 4  Rails to Enter: Yes  Hand Rails : Bilateral  One/Two Story Residence: One story  Living Alone: No  Support Systems: Family member(s)  Patient Expects to be Discharged to[de-identified] Private residence  Current DME Used/Available at Home: Walker, rolling  Critical Behavior:  Neurologic State: Alert; Appropriate for age  Orientation Level: Appropriate for age;Oriented X4  Cognition: Appropriate decision making; Appropriate for age attention/concentration; Appropriate safety awareness; Follows commands  Safety/Judgement: Awareness of environment; Fall prevention;Good awareness of safety precautions; Insight into deficits  Psychosocial  Patient Behaviors: Flat affect;Calm  Purposeful Interaction: Yes  Pt Identified Daily Priority: Clinical issues (comment)  Caritas Process: Nurture loving kindness;Establish trust;Teaching/learning;Create healing environment  Caring Interventions: Reassure  Reassure: Therapeutic listening;Caring rounds  Therapeutic Modalities: Intentional therapeutic touch  Skin Condition/Temp: Dry;Warm   Skin Integrity: Incision (comment) (x3 lap sites to abd and midline incision)  Skin Integumentary  Skin Color: Appropriate for ethnicity  Skin Condition/Temp: Dry;Warm  Skin Integrity: Incision (comment) (x3 lap sites to abd and midline incision)  Turgor: Non-tenting  Hair Growth: Present  Varicosities: Absent   Strength:    Strength: Generally decreased, functional   Tone & Sensation:   Tone: Normal   Sensation: Intact   Range Of Motion:  AROM: Generally decreased, functional   PROM: Generally decreased, functional   Functional Mobility:  Bed Mobility:  Rolling: Minimum assistance  Supine to Sit: Minimum assistance; Additional time  Sit to Supine: Minimum assistance; Additional time  Scooting: Minimum assistance; Additional time  Transfers:  Sit to Stand: Minimum assistance; Additional time  Stand to Sit: Minimum assistance; Additional time  Balance:   Sitting: Intact  Standing: Impaired; With support  Standing - Static: Fair  Standing - Dynamic : Fair  Ambulation/Gait Training:  Distance (ft): 24 Feet (ft)  Assistive Device: Gait belt;Walker, rolling  Ambulation - Level of Assistance: Contact guard assistance   Gait Description (WDL): Exceptions to WDL  Gait Abnormalities: Antalgic;Decreased step clearance   Base of Support: Widened  Stance: Weight shift  Speed/Yanira: Slow;Shuffled  Step Length: Right shortened;Left shortened  Swing Pattern: Right asymmetrical;Left asymmetrical   Interventions: Visual/Demos; Verbal cues; Tactile cues; Safety awareness training   Therapeutic Exercises:   HEP included ankle pumps, heel slides x 10 reps  Pain:  Pain Scale 1: Numeric (0 - 10)  Pain Intensity 1: 6  Pain Location 1: Abdomen  Pain Orientation 1: Anterior  Pain Description 1: Aching  Pain Intervention(s) 1: Cold pack; Rest;Repositioned;Nurse notified  Activity Tolerance:   Fair  Please refer to the flowsheet for vital signs taken during this treatment. After treatment:   []         Patient left in no apparent distress sitting up in chair  [x]         Patient left in no apparent distress in bed  [x]         Call bell left within reach  [x]         Nursing notified  [x]         Caregiver present  []         Bed alarm activated    COMMUNICATION/EDUCATION:   [x]         Fall prevention education was provided and the patient/caregiver indicated understanding. [x]         Patient/family have participated as able in goal setting and plan of care. [x]         Patient/family agree to work toward stated goals and plan of care. []         Patient understands intent and goals of therapy, but is neutral about his/her participation. []         Patient is unable to participate in goal setting and plan of care.     Thank you for this referral.  Bernardo Ceja PT, DPT       Time Calculation: 25 mins

## 2018-05-31 NOTE — PROGRESS NOTES
Problem: Falls - Risk of  Goal: *Absence of Falls  Document Goyo Fall Risk and appropriate interventions in the flowsheet. Outcome: Progressing Towards Goal  Fall Risk Interventions:            Medication Interventions: Patient to call before getting OOB    Elimination Interventions: Call light in reach, Patient to call for help with toileting needs         5/30/18 2200 Pt refused Lovenox, SCD's on. Instructed on use and reminded to use IS when awake, pillow provided for splinting abdomen. PCA Dilaudid infusing      0600 Pt encouraged to use IS more, assisted up to [facility] with pillow used as splint. Abdomen with BS more on the L, tender to touch. Reminded to use PCA for out of bed activity.  Shift uneventful

## 2018-05-31 NOTE — PROGRESS NOTES
Bedside and Verbal shift change report given to COLTEN Villalobos RN (oncoming nurse) by Ericka Tanner RN (offgoing nurse). Report included the following information SBAR and Kardex.      Patient Vitals for the past 12 hrs:   Temp Pulse Resp BP SpO2   05/31/18 1048 98 °F (36.7 °C) (!) 103 20 127/72 93 %

## 2018-05-31 NOTE — PROGRESS NOTES
Problem: Self Care Deficits Care Plan (Adult)  Goal: *Acute Goals and Plan of Care (Insert Text)  Occupational Therapy Goals  Initiated 5/31/2018 within 7 day(s). 1.  Patient will perform lower body dressing with  modified independence using AE prn while sitting on EOB  2. Patient will perform grooming with Mod I while standing at sink. 3.  Patient will perform toilet transfers with  modified independence. 4.  Patient will perform all aspects of toileting with  modified independence. 5.  Patient will perform upper extremity therapeutic exercise/activities with  supervision/set-up for 15 minutes. 6.  Patient will utilize energy conservation techniques during functional activities with verbal cue(s). Occupational Therapy EVALUATION    Patient: Narendra Sandhu (68 y.o. female)  Date: 5/31/2018  Primary Diagnosis: LEFT PELVIC MASS  Left ovarian epithelial cancer (HCC)  Procedure(s) (LRB):  LAPAROSCOPY CONVERTED TO LAPAROTOMY, TOTAL ABDOMINAL HYSTERECTOMY, BILATERAL SALPINGO OOPHORECTOMY, FROZEN PATHOLOGY [facility] SPECIMEN COLLECTION FOR CARIS, BILATERAL PELVIC AND PARA-AORTIC LYMPHADENECTOMY, RADICAL TUMOR DEBULKING, CYSTOSCOPY  (N/A)   EXPLORATORY LAPAROTOMY WITH RESECTION OF PELVIC MASS (N/A) 2 Days Post-Op   Precautions:  Fall, WBAT    ASSESSMENT :  Based on the objective data described below, the patient presents with decreased activity tolerance, decreased independence with LB ADL completion and decreased functional mobility following exp lap surgery. Pt received in bed. Pt reporting abdominal soreness 5 out of 10 on numeric scale with mobility. SBA with bed mobility w/ HOB elevated. CGA for sit to stand transfer and ambulation to/from bathroom using RW for toileting. Pt able to perform toileting hygiene independently and tolerated standing at sink to perform handwashing, brush teeth and to wash face with set up/S and no LOB.   Max A and decreased LB reach for LB ADL completion due to abdominal soreness. Pt returned to bed at end of session. Encouraged pt to sit up for meals and to continue to ambulate to/from bathroom for toileting with assistance to increase LOF. Recommend HH therapy vs. SNF pending further progress. Cont OT    Patient will benefit from skilled intervention to address the above impairments. Patients rehabilitation potential is considered to be Good  Factors which may influence rehabilitation potential include:   []             None noted  []             Mental ability/status  []             Medical condition  []             Home/family situation and support systems  []             Safety awareness  []             Pain tolerance/management  []             Other:        PLAN :  Recommendations and Planned Interventions:  [x]               Self Care Training                  [x]        Therapeutic Activities  [x]               Functional Mobility Training    []        Cognitive Retraining  [x]               Therapeutic Exercises           [x]        Endurance Activities  []               Balance Training                   []        Neuromuscular Re-Education  []               Visual/Perceptual Training     []   Home Safety Training  [x]               Patient Education                 []        Family Training/Education  []               Other (comment):    Frequency/Duration: Patient will be followed by occupational therapy 3x per week to address goals. Discharge Recommendations: Home Health vs. rehab  Further Equipment Recommendations for Discharge: tbd     SUBJECTIVE:   Patient stated I need to use the bathroom.     OBJECTIVE DATA SUMMARY:     Past Medical History:   Diagnosis Date    Diabetes ([facility] [address]     many years type 2    Hypertension     many years     Past Surgical History:   Procedure Laterality Date    HX TONSILLECTOMY      as a child      Barriers to Learning/Limitations: None  Compensate with: visual, verbal, tactile, kinesthetic cues/model  Prior Level of Function/Home Situation: Indep with ADL, did not use AD and still driving  Home Situation  Home Environment: Private residence  # Steps to Enter: 4  Rails to Enter: Yes  Hand Rails : Bilateral  One/Two Story Residence: One story  Living Alone: No  Support Systems: Family member(s)  Patient Expects to be Discharged to[de-identified] Private residence  Current DME Used/Available at Home: Shower chair, Walker, rolling  Tub or Shower Type: Shower  [x]  Right hand dominant   []  Left hand dominant  Cognitive/Behavioral Status:  Neurologic State: Alert  Orientation Level: Oriented X4  Cognition: Appropriate decision making; Follows commands  Safety/Judgement: Awareness of environment  Skin: incision open to air abdomen  Vision/Perceptual:    Corrective Lenses: Glasses  Coordination:  Coordination: Within functional limits  Balance:  Sitting: Intact  Standing: Intact; With support  Standing - Static: Good  Standing - Dynamic : Fair  Strength:  Strength: Generally decreased, functional     Tone & Sensation:  Tone: Normal  Sensation: Intact  Range of Motion:  AROM: Generally decreased, functional  PROM: Generally decreased, functional  Functional Mobility and Transfers for ADLs:  Bed Mobility:  Rolling: Minimum assistance  Supine to Sit: Stand-by assistance (w/ HOB elevated)  Sit to Supine: Stand-by assistance (w/ HOB elevated)  Scooting: Minimum assistance; Additional time  Transfers:  Sit to Stand: Contact guard assistance   Toilet Transfer : Supervision;Contact guard assistance  ADL Assessment:  Feeding: Independent  Oral Facial Hygiene/Grooming: Supervision;Setup  Bathing:  Moderate assistance  Upper Body Dressing: Setup  Lower Body Dressing: Maximum assistance  Toileting: Supervision     ADL Intervention:  Grooming  Washing Face: Supervision/set-up  Brushing Teeth: Supervision/set-up     Toileting  Bladder Hygiene: Independent  Adaptive Equipment: Walker  Cognitive Retraining  Safety/Judgement: Awareness of environment    Pain:  Pre-treatment: 5/10 abdomen  Post-treatment: 5/10 abdomen    Activity Tolerance:   good  Please refer to the flowsheet for vital signs taken during this treatment. After treatment:   [] Patient left in no apparent distress sitting up in chair  [x] Patient left in no apparent distress in bed  [x] Call bell left within reach  [x] Nursing notified  [] Caregiver present  [] Bed alarm activated    COMMUNICATION/EDUCATION:    [] Home safety education was provided and the patient/caregiver indicated understanding. [] Patient/family have participated as able in goal setting and plan of care. [x] Patient/family agree to work toward stated goals and plan of care. [] Patient understands intent and goals of therapy, but is neutral about his/her participation. [] Patient is unable to participate in goal setting and plan of care. Thank you for this referral.  Monique Palafox, OTR/L  Time Calculation: 23 mins    G-Codes (GP)  Self Care   Current  CM= 80-99%   Goal  CI= 1-19%  The severity rating is based on the professional judgement & direct observation of Level of Assistance required for Functional Mobility and ADLs. Eval Complexity: History: LOW Complexity : Brief history review ; Examination: MEDIUM Complexity : 3-5 performance deficits relating to physical, cognitive , or psychosocial skils that result in activity limitations and / or participation restrictions; Decision Making:MEDIUM Complexity : Patient may present with comorbidities that affect occupational performnce.  Miniml to moderate modification of tasks or assistance (eg, physical or verbal ) with assesment(s) is necessary to enable patient to complete evaluation

## 2018-05-31 NOTE — PROGRESS NOTES
Occupational Therapy Screening:  Services are indicated. Evaluate and Treat Order is requested. An InBasket screening referral was triggered for occupational therapy based on results obtained during the nursing admission assessment. The patients chart was reviewed and the patient is appropriate for a skilled therapy evaluation. Patient s/p abdominal surgery and having difficulty with LE ADLs per PT. Please order a consult for occupational therapy if you are in agreement and would like an evaluation to be completed. Thank you.   Kaylynn Murray, OTR/L

## 2018-05-31 NOTE — ROUTINE PROCESS
Bedside and Verbal shift change report given to Zuleyka Foster (oncoming nurse) by Mohsen Cruz RN   (offgoing nurse). Report included the following information SBAR, Kardex, Intake/Output and MAR.

## 2018-06-01 VITALS
OXYGEN SATURATION: 94 % | HEIGHT: 61 IN | SYSTOLIC BLOOD PRESSURE: 141 MMHG | BODY MASS INDEX: 37.66 KG/M2 | TEMPERATURE: 98.5 F | HEART RATE: 102 BPM | RESPIRATION RATE: 14 BRPM | WEIGHT: 199.5 LBS | DIASTOLIC BLOOD PRESSURE: 79 MMHG

## 2018-06-01 LAB
ALBUMIN SERPL-MCNC: 2.5 G/DL (ref 3.4–5)
ALBUMIN/GLOB SERPL: 0.7 {RATIO} (ref 0.8–1.7)
ALP SERPL-CCNC: 56 U/L (ref 45–117)
ALT SERPL-CCNC: 20 U/L (ref 13–56)
ANION GAP SERPL CALC-SCNC: 11 MMOL/L (ref 3–18)
AST SERPL-CCNC: 18 U/L (ref 15–37)
BASOPHILS # BLD: 0 K/UL (ref 0–0.06)
BASOPHILS NFR BLD: 0 % (ref 0–2)
BILIRUB SERPL-MCNC: 0.4 MG/DL (ref 0.2–1)
BUN SERPL-MCNC: 3 MG/DL (ref 7–18)
BUN/CREAT SERPL: 5 (ref 12–20)
CALCIUM SERPL-MCNC: 7.8 MG/DL (ref 8.5–10.1)
CHLORIDE SERPL-SCNC: 106 MMOL/L (ref 100–108)
CO2 SERPL-SCNC: 25 MMOL/L (ref 21–32)
CREAT SERPL-MCNC: 0.62 MG/DL (ref 0.6–1.3)
DIFFERENTIAL METHOD BLD: ABNORMAL
EOSINOPHIL # BLD: 0.2 K/UL (ref 0–0.4)
EOSINOPHIL NFR BLD: 3 % (ref 0–5)
ERYTHROCYTE [DISTWIDTH] IN BLOOD BY AUTOMATED COUNT: 19.7 % (ref 11.6–14.5)
GLOBULIN SER CALC-MCNC: 3.6 G/DL (ref 2–4)
GLUCOSE BLD STRIP.AUTO-MCNC: 121 MG/DL (ref 70–110)
GLUCOSE BLD STRIP.AUTO-MCNC: 162 MG/DL (ref 70–110)
GLUCOSE SERPL-MCNC: 156 MG/DL (ref 74–99)
HCT VFR BLD AUTO: 30.7 % (ref 35–45)
HGB BLD-MCNC: 9.2 G/DL (ref 12–16)
LYMPHOCYTES # BLD: 1.4 K/UL (ref 0.9–3.6)
LYMPHOCYTES NFR BLD: 20 % (ref 21–52)
MAGNESIUM SERPL-MCNC: 1.9 MG/DL (ref 1.6–2.6)
MCH RBC QN AUTO: 21.6 PG (ref 24–34)
MCHC RBC AUTO-ENTMCNC: 30 G/DL (ref 31–37)
MCV RBC AUTO: 72.2 FL (ref 74–97)
MONOCYTES # BLD: 0.6 K/UL (ref 0.05–1.2)
MONOCYTES NFR BLD: 9 % (ref 3–10)
NEUTS SEG # BLD: 5.1 K/UL (ref 1.8–8)
NEUTS SEG NFR BLD: 68 % (ref 40–73)
PLATELET # BLD AUTO: 186 K/UL (ref 135–420)
POTASSIUM SERPL-SCNC: 3.4 MMOL/L (ref 3.5–5.5)
PROT SERPL-MCNC: 6.1 G/DL (ref 6.4–8.2)
RBC # BLD AUTO: 4.25 M/UL (ref 4.2–5.3)
SODIUM SERPL-SCNC: 142 MMOL/L (ref 136–145)
WBC # BLD AUTO: 7.3 K/UL (ref 4.6–13.2)

## 2018-06-01 PROCEDURE — 97116 GAIT TRAINING THERAPY: CPT

## 2018-06-01 PROCEDURE — 83735 ASSAY OF MAGNESIUM: CPT | Performed by: OBSTETRICS & GYNECOLOGY

## 2018-06-01 PROCEDURE — 85025 COMPLETE CBC W/AUTO DIFF WBC: CPT | Performed by: OBSTETRICS & GYNECOLOGY

## 2018-06-01 PROCEDURE — 80053 COMPREHEN METABOLIC PANEL: CPT | Performed by: OBSTETRICS & GYNECOLOGY

## 2018-06-01 PROCEDURE — 36415 COLL VENOUS BLD VENIPUNCTURE: CPT | Performed by: OBSTETRICS & GYNECOLOGY

## 2018-06-01 PROCEDURE — 82962 GLUCOSE BLOOD TEST: CPT

## 2018-06-01 PROCEDURE — 74011250636 HC RX REV CODE- 250/636: Performed by: OBSTETRICS & GYNECOLOGY

## 2018-06-01 PROCEDURE — 74011250637 HC RX REV CODE- 250/637: Performed by: OBSTETRICS & GYNECOLOGY

## 2018-06-01 RX ORDER — HYDROMORPHONE HYDROCHLORIDE 2 MG/1
2 TABLET ORAL
Qty: 30 TAB | Refills: 0 | Status: SHIPPED | OUTPATIENT
Start: 2018-06-01 | End: 2018-07-10

## 2018-06-01 RX ORDER — RANITIDINE 150 MG/1
150 TABLET, FILM COATED ORAL 2 TIMES DAILY
Status: DISCONTINUED | OUTPATIENT
Start: 2018-06-01 | End: 2018-06-01 | Stop reason: HOSPADM

## 2018-06-01 RX ORDER — ACETAMINOPHEN AND CODEINE PHOSPHATE 300; 30 MG/1; MG/1
1 TABLET ORAL
Qty: 60 TAB | Refills: 0 | Status: SHIPPED | OUTPATIENT
Start: 2018-06-01 | End: 2018-07-10

## 2018-06-01 RX ADMIN — RANITIDINE 150 MG: 150 TABLET ORAL at 09:06

## 2018-06-01 RX ADMIN — ACETAMINOPHEN AND CODEINE PHOSPHATE 1 TABLET: 300; 30 TABLET ORAL at 09:07

## 2018-06-01 RX ADMIN — VERAPAMIL HYDROCHLORIDE 180 MG: 180 TABLET, FILM COATED, EXTENDED RELEASE ORAL at 09:06

## 2018-06-01 RX ADMIN — BISACODYL 10 MG: 5 TABLET, COATED ORAL at 09:06

## 2018-06-01 RX ADMIN — KETOROLAC TROMETHAMINE 15 MG: 15 INJECTION, SOLUTION INTRAMUSCULAR; INTRAVENOUS at 00:25

## 2018-06-01 RX ADMIN — DEXTROSE MONOHYDRATE, SODIUM CHLORIDE, AND POTASSIUM CHLORIDE 125 ML/HR: 50; 9; 1.49 INJECTION, SOLUTION INTRAVENOUS at 00:25

## 2018-06-01 RX ADMIN — HYDROCHLOROTHIAZIDE: 12.5 CAPSULE ORAL at 09:06

## 2018-06-01 RX ADMIN — Medication 10 ML: at 05:12

## 2018-06-01 RX ADMIN — HYDROMORPHONE HYDROCHLORIDE 4 MG: 2 TABLET ORAL at 00:29

## 2018-06-01 RX ADMIN — KETOROLAC TROMETHAMINE 15 MG: 15 INJECTION, SOLUTION INTRAMUSCULAR; INTRAVENOUS at 05:12

## 2018-06-01 RX ADMIN — ACETAMINOPHEN AND CODEINE PHOSPHATE 1 TABLET: 300; 30 TABLET ORAL at 05:16

## 2018-06-01 RX ADMIN — HYDROMORPHONE HYDROCHLORIDE 4 MG: 2 TABLET ORAL at 10:56

## 2018-06-01 RX ADMIN — AMLODIPINE BESYLATE 10 MG: 5 TABLET ORAL at 09:07

## 2018-06-01 RX ADMIN — ATORVASTATIN CALCIUM 10 MG: 10 TABLET, FILM COATED ORAL at 09:07

## 2018-06-01 NOTE — PROGRESS NOTES
Problem: Mobility Impaired (Adult and Pediatric)  Goal: *Acute Goals and Plan of Care (Insert Text)  Physical Therapy Goals  Initiated 5/31/2018 and to be accomplished within 7 day(s)  1. Patient will move from supine to sit and sit to supine  in bed with supervision/set-up. 2.  Patient will transfer from bed to chair and chair to bed with supervision/set-up using the least restrictive device. 3.  Patient will perform sit to stand with supervision/set-up. 4.  Patient will ambulate with supervision/set-up for >150 feet with the least restrictive device. 5.  Patient will ascend/descend 4 stairs with handrail(s) with minimal assistance/contact guard assist for safe home entry. Outcome: Resolved/Met Date Met: 06/01/18  physical Therapy TREATMENT/DISCHARGE    Patient: Angie Rushing (18 y.o. female)  Date: 6/1/2018  Diagnosis: LEFT PELVIC MASS  Left ovarian epithelial cancer ([facility] [address] <principal problem not specified>  Procedure(s) (LRB):  LAPAROSCOPY CONVERTED TO LAPAROTOMY, TOTAL ABDOMINAL HYSTERECTOMY, BILATERAL SALPINGO OOPHORECTOMY, FROZEN PATHOLOGY WT SPECIMEN COLLECTION FOR CARIS, BILATERAL PELVIC AND PARA-AORTIC LYMPHADENECTOMY, RADICAL TUMOR DEBULKING, CYSTOSCOPY  (N/A)   EXPLORATORY LAPAROTOMY WITH RESECTION OF PELVIC MASS (N/A) 3 Days Post-Op  Precautions: Fall, WBAT  Chart, physical therapy assessment, plan of care and goals were reviewed. PLOF: independent, ambulatory without AD, has all necessary equip at home  ASSESSMENT:  Pt performed log roll to the L with VC and use of bed rail. No difficulty sit to stand with bed in lowest position. Ambulated 180ft with RW, reciprocal gt pattern, steady pace, no LOB or path deviations. Safely negotiated 4 steps holding (B) hand rails. Returned to supine in bed utilizing log roll tech. Pt reports comfort performing log roll, 0/10 pain.   EDUCATION log roll, stair nego tech, ambulate at home for exercise  Progression toward goals:  [x]      Goals met  [] Improving appropriately and progressing toward goals  []      Improving slowly and progressing toward goals  []      Not making progress toward goals and plan of care will be adjusted     PLAN:  Patient will be discharged from physical therapy at this time. Rationale for discharge:  [x] Goals Achieved  [] [address]  [] Patient not participating in therapy  [] Other:  Discharge Recommendations:  Home Health  Further Equipment Recommendations for Discharge:  Pt has all necessary equip at home     SUBJECTIVE:   Patient stated Tavon Farias don't have any pain.     OBJECTIVE DATA SUMMARY:     G CODES: n/a     Critical Behavior:  Neurologic State: Alert  Orientation Level: Oriented X4  Cognition: Appropriate decision making  Safety/Judgement: Awareness of environment  Functional Mobility Training:  Bed Mobility:  Rolling: Modified independent  Supine to Sit: Contact guard assistance;Modified independent  Sit to Supine: Contact guard assistance  Transfers:  Sit to Stand: Stand-by assistance  Stand to Sit: Supervision  Balance:  Sitting: Intact  Standing: Intact  Standing - Static: Good  Standing - Dynamic : Good  Ambulation/Gait Training:  Distance (ft): 180 Feet (ft)  Assistive Device: Gait belt;Walker, rolling  Ambulation - Level of Assistance: Modified independent  Stairs:  Number of Stairs Trained: 4  Stairs - Level of Assistance: Supervision   Rail Use: Both  Pain:  Pre treatment pain:  0  Post treatment pain:  0  Pain Scale 1: Numeric (0 - 10)  Activity Tolerance:   Good  Please refer to the flowsheet for vital signs taken during this treatment.   After treatment:   [] Patient left in no apparent distress sitting up in chair  [x] Patient left in no apparent distress in bed  [x] Call bell left within reach  [x] Nursing notified  [] Caregiver present  [] Bed alarm activated  Trae Thornton PTA   Time Calculation: 18 mins

## 2018-06-01 NOTE — PROGRESS NOTES
POD#3 status post LAPAROSCOPY CONVERTED TO LAPAROTOMY, TOTAL ABDOMINAL HYSTERECTOMY, BILATERAL SALPINGO OOPHORECTOMY, FROZEN PATHOLOGY [facility] SPECIMEN COLLECTION FOR CARIS, BILATERAL PELVIC AND PARA-AORTIC LYMPHADENECTOMY, RADICAL TUMOR DEBULKING TO R0, CYSTOSCOPY  S: No complaints. Pain is well controlled. Had loose BM last pm. Passing gas. Nursing reports that she is not moving much. Patient states that she has been getting up \"alot\" and twice by her self. She feels safe with ambulation. She has a walker at home that her mother had used. O: AF,VSS  Gen A&O lying in bed  Lungs CTA. IS within reach patient acknowledges use  Abdomen soft, moderately distended, NABS  Inc: c/d/i with ecchymosis  Ext: no edema  A/P: Needs to be cleared by PT for discharge home with or without home PT. Patient feels safe but we need and objective evaluation.   If clears with PT can be discharged this afternoon     Jose Dixon MD

## 2018-06-01 NOTE — PROGRESS NOTES
26- Assumed care of patient at this time. Report received from Brad Gooden RN. Patient in bed. Pain 1/10. Call bell left within reach. 0177- Patient in bed this time, assisted to bathroom then back to bed. AM medications tolerated well. A/O x 4. Lungs clear, abdomen soft and non-distended. Bowel sounds active, 22 G IV to right arm, capped. No signs of phlebitis or infiltration noted. Skin warm ,dry with midline incisioin to abdomen and 3 lap sites to abdominal area, closed with dermabond and open to air, no dressing in place. Patient denies pain or discomfort. Bed placed in lowest position, call bell within reach. 1200- Patient discharged by Kim Marroquin doing discharges for unit at this time.

## 2018-06-01 NOTE — DISCHARGE INSTRUCTIONS
DISCHARGE SUMMARY from Nurse    PATIENT INSTRUCTIONS:    After general anesthesia or intravenous sedation, for 24 hours or while taking prescription Narcotics:  · Limit your activities  · Do not drive and operate hazardous machinery  · Do not make important personal or business decisions  · Do  not drink alcoholic beverages  · If you have not urinated within 8 hours after discharge, please contact your surgeon on call. Report the following to your surgeon:  · Excessive pain, swelling, redness or odor of or around the surgical area  · Temperature over 100.5  · Nausea and vomiting lasting longer than 4 hours or if unable to take medications  · Any signs of decreased circulation or nerve impairment to extremity: change in color, persistent  numbness, tingling, coldness or increase pain  · Any questions    What to do at Home:  Recommended activity: Activity as tolerated    *  Please give a list of your current medications to your Primary Care Provider. *  Please update this list whenever your medications are discontinued, doses are      changed, or new medications (including over-the-counter products) are added. *  Please carry medication information at all times in case of emergency situations. These are general instructions for a healthy lifestyle:    No smoking/ No tobacco products/ Avoid exposure to second hand smoke  Surgeon General's Warning:  Quitting smoking now greatly reduces serious risk to your health. Obesity, smoking, and sedentary lifestyle greatly increases your risk for illness    A healthy diet, regular physical exercise & weight monitoring are important for maintaining a healthy lifestyle    You may be retaining fluid if you have a history of heart failure or if you experience any of the following symptoms:  Weight gain of 3 pounds or more overnight or 5 pounds in a week, increased swelling in our hands or feet or shortness of breath while lying flat in bed.   Please call your doctor as soon as you notice any of these symptoms; do not wait until your next office visit. Recognize signs and symptoms of STROKE:    F-face looks uneven    A-arms unable to move or move unevenly    S-speech slurred or non-existent    T-time-call 911 as soon as signs and symptoms begin-DO NOT go       Back to bed or wait to see if you get better-TIME IS BRAIN. Warning Signs of HEART ATTACK     Call 911 if you have these symptoms:   Chest discomfort. Most heart attacks involve discomfort in the center of the chest that lasts more than a few minutes, or that goes away and comes back. It can feel like uncomfortable pressure, squeezing, fullness, or pain.  Discomfort in other areas of the upper body. Symptoms can include pain or discomfort in one or both arms, the back, neck, jaw, or stomach.  Shortness of breath with or without chest discomfort.  Other signs may include breaking out in a cold sweat, nausea, or lightheadedness. Don't wait more than five minutes to call 911 - MINUTES MATTER! Fast action can save your life. Calling 911 is almost always the fastest way to get lifesaving treatment. Emergency Medical Services staff can begin treatment when they arrive -- up to an hour sooner than if someone gets to the hospital by car. The discharge information has been reviewed with the patient. The patient verbalized understanding. Discharge medications reviewed with the patient and appropriate educational materials and side effects teaching were provided.   ___________________________________________________________________________________________________________________________________

## 2018-06-01 NOTE — PROGRESS NOTES
Transition of Care (YAMIL) Plan:      Pt is to be discharged today. Pt has been cleared by therapy and is safe to return home. CM met with pt at bedside to discuss plan of care options. Pt has declined need for any HH services. Pt's brother will transport her home later today. No plan of care needs have been identified. YAMIL Transportation:       How is patient being transported at discharge? Family     When? 6/1/18     Is transport scheduled? N/A    Follow-up appointment and transportation:     PCP? See AVS     Specialist? See AVS     Time/Date? See AVS     Who is transporting to the follow-up appointment? Patient vs brother      Is transport for follow up appointment scheduled? N/A    Communication plan (with patient/family): Who is being called? Patient or Next of Kin? Responsible party? patient      What number(s) is to be used? See face sheet      What service provider is calling for [facility] services? N/A      When are they calling? N/A    Readmission Risk? (Green/Low; Yellow/Moderate; Red/High):  Low    Care Management Interventions  PCP Verified by CM: Yes  Mode of Transport at Discharge:  Other (see comment) (Family)  Transition of Care Consult (CM Consult): Discharge Planning  Health Maintenance Reviewed: Yes  Current Support Network: Family Lives Nearby  Confirm Follow Up Transport: Family  Plan discussed with Pt/Family/Caregiver: Yes  Discharge Location  Discharge Placement: Home with family assistance

## 2018-06-04 ENCOUNTER — HOSPITAL ENCOUNTER (INPATIENT)
Age: 68
LOS: 36 days | Discharge: NURSING, REHAB OR LONG-TERM CARE | DRG: 853 | End: 2018-07-10
Attending: EMERGENCY MEDICINE | Admitting: OBSTETRICS & GYNECOLOGY
Payer: MEDICARE

## 2018-06-04 ENCOUNTER — APPOINTMENT (OUTPATIENT)
Dept: GENERAL RADIOLOGY | Age: 68
DRG: 853 | End: 2018-06-04
Attending: EMERGENCY MEDICINE
Payer: MEDICARE

## 2018-06-04 ENCOUNTER — APPOINTMENT (OUTPATIENT)
Dept: CT IMAGING | Age: 68
DRG: 853 | End: 2018-06-04
Attending: EMERGENCY MEDICINE
Payer: MEDICARE

## 2018-06-04 ENCOUNTER — APPOINTMENT (OUTPATIENT)
Dept: GENERAL RADIOLOGY | Age: 68
DRG: 853 | End: 2018-06-04
Attending: INTERNAL MEDICINE
Payer: MEDICARE

## 2018-06-04 DIAGNOSIS — G89.18 POST-OP PAIN: ICD-10-CM

## 2018-06-04 DIAGNOSIS — R10.84 ABDOMINAL PAIN, GENERALIZED: Primary | ICD-10-CM

## 2018-06-04 DIAGNOSIS — E87.20 LACTIC ACID ACIDOSIS: ICD-10-CM

## 2018-06-04 PROBLEM — C56.9 OVARIAN CA (HCC): Status: ACTIVE | Noted: 2018-05-29

## 2018-06-04 PROBLEM — A41.9 SEPSIS (HCC): Status: ACTIVE | Noted: 2018-06-04

## 2018-06-04 PROBLEM — R10.9 ABDOMINAL PAIN: Status: ACTIVE | Noted: 2018-06-04

## 2018-06-04 PROBLEM — R34 OLIGURIA: Status: ACTIVE | Noted: 2018-06-04

## 2018-06-04 PROBLEM — K55.9 ISCHEMIC COLITIS (HCC): Status: ACTIVE | Noted: 2018-06-04

## 2018-06-04 LAB
ALBUMIN SERPL-MCNC: 2.5 G/DL (ref 3.4–5)
ALBUMIN SERPL-MCNC: 2.9 G/DL (ref 3.4–5)
ALBUMIN/GLOB SERPL: 0.7 {RATIO} (ref 0.8–1.7)
ALBUMIN/GLOB SERPL: 0.7 {RATIO} (ref 0.8–1.7)
ALP SERPL-CCNC: 63 U/L (ref 45–117)
ALP SERPL-CCNC: 74 U/L (ref 45–117)
ALT SERPL-CCNC: 31 U/L (ref 13–56)
ALT SERPL-CCNC: 37 U/L (ref 13–56)
ANION GAP SERPL CALC-SCNC: 17 MMOL/L (ref 3–18)
ANION GAP SERPL CALC-SCNC: 17 MMOL/L (ref 3–18)
ANION GAP SERPL CALC-SCNC: 18 MMOL/L (ref 3–18)
APAP SERPL-MCNC: <2 UG/ML (ref 10–30)
APPEARANCE UR: ABNORMAL
APTT PPP: 26.9 SEC (ref 23–36.4)
ARTERIAL PATENCY WRIST A: YES
AST SERPL-CCNC: 19 U/L (ref 15–37)
AST SERPL-CCNC: 29 U/L (ref 15–37)
BACTERIA URNS QL MICRO: ABNORMAL /HPF
BASE DEFICIT BLD-SCNC: 8 MMOL/L
BASOPHILS # BLD: 0 K/UL (ref 0–0.06)
BASOPHILS NFR BLD: 0 % (ref 0–2)
BDY SITE: ABNORMAL
BILIRUB DIRECT SERPL-MCNC: 0.3 MG/DL (ref 0–0.2)
BILIRUB SERPL-MCNC: 0.8 MG/DL (ref 0.2–1)
BILIRUB SERPL-MCNC: 0.9 MG/DL (ref 0.2–1)
BILIRUB UR QL: ABNORMAL
BODY TEMPERATURE: 98.6
BUN SERPL-MCNC: 15 MG/DL (ref 7–18)
BUN SERPL-MCNC: 16 MG/DL (ref 7–18)
BUN SERPL-MCNC: 19 MG/DL (ref 7–18)
BUN/CREAT SERPL: 10 (ref 12–20)
BUN/CREAT SERPL: 10 (ref 12–20)
BUN/CREAT SERPL: 13 (ref 12–20)
CA-I SERPL-SCNC: 1.05 MMOL/L (ref 1.12–1.32)
CALCIUM SERPL-MCNC: 7.5 MG/DL (ref 8.5–10.1)
CALCIUM SERPL-MCNC: 7.7 MG/DL (ref 8.5–10.1)
CALCIUM SERPL-MCNC: 8.9 MG/DL (ref 8.5–10.1)
CHLORIDE SERPL-SCNC: 103 MMOL/L (ref 100–108)
CHLORIDE SERPL-SCNC: 103 MMOL/L (ref 100–108)
CHLORIDE SERPL-SCNC: 98 MMOL/L (ref 100–108)
CO2 SERPL-SCNC: 18 MMOL/L (ref 21–32)
CO2 SERPL-SCNC: 20 MMOL/L (ref 21–32)
CO2 SERPL-SCNC: 22 MMOL/L (ref 21–32)
COLOR UR: ABNORMAL
CREAT SERPL-MCNC: 1.16 MG/DL (ref 0.6–1.3)
CREAT SERPL-MCNC: 1.53 MG/DL (ref 0.6–1.3)
CREAT SERPL-MCNC: 1.93 MG/DL (ref 0.6–1.3)
DIFFERENTIAL METHOD BLD: ABNORMAL
EOSINOPHIL # BLD: 0 K/UL (ref 0–0.4)
EOSINOPHIL NFR BLD: 0 % (ref 0–5)
EPITH CASTS URNS QL MICRO: ABNORMAL /LPF (ref 0–5)
ERYTHROCYTE [DISTWIDTH] IN BLOOD BY AUTOMATED COUNT: 19.3 % (ref 11.6–14.5)
ERYTHROCYTE [DISTWIDTH] IN BLOOD BY AUTOMATED COUNT: 19.4 % (ref 11.6–14.5)
ERYTHROCYTE [DISTWIDTH] IN BLOOD BY AUTOMATED COUNT: 19.4 % (ref 11.6–14.5)
EST. AVERAGE GLUCOSE BLD GHB EST-MCNC: 137 MG/DL
GAS FLOW.O2 O2 DELIVERY SYS: ABNORMAL L/MIN
GAS FLOW.O2 SETTING OXYMISER: 12 L/M
GLOBULIN SER CALC-MCNC: 3.6 G/DL (ref 2–4)
GLOBULIN SER CALC-MCNC: 3.9 G/DL (ref 2–4)
GLUCOSE BLD STRIP.AUTO-MCNC: 175 MG/DL (ref 70–110)
GLUCOSE BLD STRIP.AUTO-MCNC: 185 MG/DL (ref 70–110)
GLUCOSE SERPL-MCNC: 148 MG/DL (ref 74–99)
GLUCOSE SERPL-MCNC: 168 MG/DL (ref 74–99)
GLUCOSE SERPL-MCNC: 191 MG/DL (ref 74–99)
GLUCOSE UR STRIP.AUTO-MCNC: NEGATIVE MG/DL
HBA1C MFR BLD: 6.4 % (ref 4.5–5.6)
HCO3 BLD-SCNC: 18 MMOL/L (ref 22–26)
HCT VFR BLD AUTO: 33.6 % (ref 35–45)
HCT VFR BLD AUTO: 36.6 % (ref 35–45)
HCT VFR BLD AUTO: 40.5 % (ref 35–45)
HGB BLD-MCNC: 10.3 G/DL (ref 12–16)
HGB BLD-MCNC: 11.6 G/DL (ref 12–16)
HGB BLD-MCNC: 12.8 G/DL (ref 12–16)
HGB UR QL STRIP: NEGATIVE
HYALINE CASTS URNS QL MICRO: ABNORMAL /LPF (ref 0–2)
INR PPP: 1.3 (ref 0.8–1.2)
KETONES UR QL STRIP.AUTO: ABNORMAL MG/DL
LACTATE SERPL-SCNC: 4 MMOL/L (ref 0.4–2)
LACTATE SERPL-SCNC: 4.5 MMOL/L (ref 0.4–2)
LACTATE SERPL-SCNC: 4.7 MMOL/L (ref 0.4–2)
LACTATE SERPL-SCNC: 5.7 MMOL/L (ref 0.4–2)
LEUKOCYTE ESTERASE UR QL STRIP.AUTO: NEGATIVE
LIPASE SERPL-CCNC: 49 U/L (ref 73–393)
LYMPHOCYTES # BLD: 0.8 K/UL (ref 0.9–3.6)
LYMPHOCYTES NFR BLD: 18 % (ref 21–52)
MAGNESIUM SERPL-MCNC: 1.5 MG/DL (ref 1.6–2.6)
MAGNESIUM SERPL-MCNC: 2.2 MG/DL (ref 1.6–2.6)
MCH RBC QN AUTO: 22 PG (ref 24–34)
MCH RBC QN AUTO: 22.3 PG (ref 24–34)
MCH RBC QN AUTO: 22.5 PG (ref 24–34)
MCHC RBC AUTO-ENTMCNC: 30.7 G/DL (ref 31–37)
MCHC RBC AUTO-ENTMCNC: 31.6 G/DL (ref 31–37)
MCHC RBC AUTO-ENTMCNC: 31.7 G/DL (ref 31–37)
MCV RBC AUTO: 70.7 FL (ref 74–97)
MCV RBC AUTO: 70.9 FL (ref 74–97)
MCV RBC AUTO: 71.8 FL (ref 74–97)
MONOCYTES # BLD: 0.2 K/UL (ref 0.05–1.2)
MONOCYTES NFR BLD: 4 % (ref 3–10)
MUCOUS THREADS URNS QL MICRO: ABNORMAL /LPF
NEUTS SEG # BLD: 3.5 K/UL (ref 1.8–8)
NEUTS SEG NFR BLD: 78 % (ref 40–73)
NITRITE UR QL STRIP.AUTO: NEGATIVE
O2/TOTAL GAS SETTING VFR VENT: 100 %
PCO2 BLD: 36.7 MMHG (ref 35–45)
PH BLD: 7.3 [PH] (ref 7.35–7.45)
PH UR STRIP: 5 [PH] (ref 5–8)
PHOSPHATE SERPL-MCNC: 6.4 MG/DL (ref 2.5–4.9)
PLATELET # BLD AUTO: 244 K/UL (ref 135–420)
PLATELET # BLD AUTO: 279 K/UL (ref 135–420)
PLATELET # BLD AUTO: 419 K/UL (ref 135–420)
PMV BLD AUTO: 10.6 FL (ref 9.2–11.8)
PMV BLD AUTO: 10.9 FL (ref 9.2–11.8)
PMV BLD AUTO: 11 FL (ref 9.2–11.8)
PO2 BLD: 87 MMHG (ref 80–100)
POTASSIUM SERPL-SCNC: 3.1 MMOL/L (ref 3.5–5.5)
POTASSIUM SERPL-SCNC: 3.8 MMOL/L (ref 3.5–5.5)
POTASSIUM SERPL-SCNC: 4.1 MMOL/L (ref 3.5–5.5)
PROT SERPL-MCNC: 6.1 G/DL (ref 6.4–8.2)
PROT SERPL-MCNC: 6.8 G/DL (ref 6.4–8.2)
PROT UR STRIP-MCNC: 30 MG/DL
PROTHROMBIN TIME: 15.3 SEC (ref 11.5–15.2)
RBC # BLD AUTO: 4.68 M/UL (ref 4.2–5.3)
RBC # BLD AUTO: 5.16 M/UL (ref 4.2–5.3)
RBC # BLD AUTO: 5.73 M/UL (ref 4.2–5.3)
RBC #/AREA URNS HPF: ABNORMAL /HPF (ref 0–5)
SAO2 % BLD: 96 % (ref 92–97)
SERVICE CMNT-IMP: ABNORMAL
SODIUM SERPL-SCNC: 137 MMOL/L (ref 136–145)
SODIUM SERPL-SCNC: 139 MMOL/L (ref 136–145)
SODIUM SERPL-SCNC: 140 MMOL/L (ref 136–145)
SP GR UR REFRACTOMETRY: 1.03 (ref 1–1.03)
SPECIMEN TYPE: ABNORMAL
TOTAL RESP. RATE, ITRR: 15
TROPONIN I SERPL-MCNC: <0.02 NG/ML (ref 0–0.06)
UROBILINOGEN UR QL STRIP.AUTO: 1 EU/DL (ref 0.2–1)
WBC # BLD AUTO: 16.1 K/UL (ref 4.6–13.2)
WBC # BLD AUTO: 17.4 K/UL (ref 4.6–13.2)
WBC # BLD AUTO: 4.6 K/UL (ref 4.6–13.2)
WBC URNS QL MICRO: ABNORMAL /HPF (ref 0–5)

## 2018-06-04 PROCEDURE — 84100 ASSAY OF PHOSPHORUS: CPT | Performed by: INTERNAL MEDICINE

## 2018-06-04 PROCEDURE — 96375 TX/PRO/DX INJ NEW DRUG ADDON: CPT

## 2018-06-04 PROCEDURE — 74011000258 HC RX REV CODE- 258: Performed by: EMERGENCY MEDICINE

## 2018-06-04 PROCEDURE — 77030008768 HC TU NG VYGC -A

## 2018-06-04 PROCEDURE — 74018 RADEX ABDOMEN 1 VIEW: CPT

## 2018-06-04 PROCEDURE — 83735 ASSAY OF MAGNESIUM: CPT | Performed by: EMERGENCY MEDICINE

## 2018-06-04 PROCEDURE — 74011636637 HC RX REV CODE- 636/637: Performed by: INTERNAL MEDICINE

## 2018-06-04 PROCEDURE — 36600 WITHDRAWAL OF ARTERIAL BLOOD: CPT

## 2018-06-04 PROCEDURE — 80076 HEPATIC FUNCTION PANEL: CPT | Performed by: OBSTETRICS & GYNECOLOGY

## 2018-06-04 PROCEDURE — 83605 ASSAY OF LACTIC ACID: CPT | Performed by: SURGERY

## 2018-06-04 PROCEDURE — 77030013797 HC KT TRNSDUC PRSSR EDWD -A

## 2018-06-04 PROCEDURE — 85027 COMPLETE CBC AUTOMATED: CPT | Performed by: INTERNAL MEDICINE

## 2018-06-04 PROCEDURE — 93005 ELECTROCARDIOGRAM TRACING: CPT

## 2018-06-04 PROCEDURE — 77030019563 HC DEV ATTCH FEED HOLL -A

## 2018-06-04 PROCEDURE — 82962 GLUCOSE BLOOD TEST: CPT

## 2018-06-04 PROCEDURE — 02H633Z INSERTION OF INFUSION DEVICE INTO RIGHT ATRIUM, PERCUTANEOUS APPROACH: ICD-10-PCS | Performed by: INTERNAL MEDICINE

## 2018-06-04 PROCEDURE — 36592 COLLECT BLOOD FROM PICC: CPT

## 2018-06-04 PROCEDURE — 83735 ASSAY OF MAGNESIUM: CPT | Performed by: INTERNAL MEDICINE

## 2018-06-04 PROCEDURE — C1751 CATH, INF, PER/CENT/MIDLINE: HCPCS

## 2018-06-04 PROCEDURE — 99285 EMERGENCY DEPT VISIT HI MDM: CPT

## 2018-06-04 PROCEDURE — 83690 ASSAY OF LIPASE: CPT | Performed by: EMERGENCY MEDICINE

## 2018-06-04 PROCEDURE — 80053 COMPREHEN METABOLIC PANEL: CPT | Performed by: EMERGENCY MEDICINE

## 2018-06-04 PROCEDURE — 82330 ASSAY OF CALCIUM: CPT | Performed by: INTERNAL MEDICINE

## 2018-06-04 PROCEDURE — C8929 TTE W OR WO FOL WCON,DOPPLER: HCPCS

## 2018-06-04 PROCEDURE — 74011250636 HC RX REV CODE- 250/636: Performed by: EMERGENCY MEDICINE

## 2018-06-04 PROCEDURE — 83605 ASSAY OF LACTIC ACID: CPT | Performed by: EMERGENCY MEDICINE

## 2018-06-04 PROCEDURE — 77030033269 HC SLV COMPR SCD KNE2 CARD -B

## 2018-06-04 PROCEDURE — 85730 THROMBOPLASTIN TIME PARTIAL: CPT | Performed by: INTERNAL MEDICINE

## 2018-06-04 PROCEDURE — 86900 BLOOD TYPING SEROLOGIC ABO: CPT | Performed by: EMERGENCY MEDICINE

## 2018-06-04 PROCEDURE — 80048 BASIC METABOLIC PNL TOTAL CA: CPT | Performed by: INTERNAL MEDICINE

## 2018-06-04 PROCEDURE — 74011250636 HC RX REV CODE- 250/636: Performed by: INTERNAL MEDICINE

## 2018-06-04 PROCEDURE — 71045 X-RAY EXAM CHEST 1 VIEW: CPT

## 2018-06-04 PROCEDURE — 85025 COMPLETE CBC W/AUTO DIFF WBC: CPT | Performed by: EMERGENCY MEDICINE

## 2018-06-04 PROCEDURE — 74177 CT ABD & PELVIS W/CONTRAST: CPT

## 2018-06-04 PROCEDURE — 51702 INSERT TEMP BLADDER CATH: CPT

## 2018-06-04 PROCEDURE — 74011250636 HC RX REV CODE- 250/636: Performed by: SURGERY

## 2018-06-04 PROCEDURE — 74011250636 HC RX REV CODE- 250/636: Performed by: OBSTETRICS & GYNECOLOGY

## 2018-06-04 PROCEDURE — 77030005514 HC CATH URETH FOL14 BARD -A

## 2018-06-04 PROCEDURE — 84484 ASSAY OF TROPONIN QUANT: CPT | Performed by: INTERNAL MEDICINE

## 2018-06-04 PROCEDURE — 81001 URINALYSIS AUTO W/SCOPE: CPT | Performed by: EMERGENCY MEDICINE

## 2018-06-04 PROCEDURE — 82803 BLOOD GASES ANY COMBINATION: CPT

## 2018-06-04 PROCEDURE — 74011000258 HC RX REV CODE- 258: Performed by: INTERNAL MEDICINE

## 2018-06-04 PROCEDURE — 80307 DRUG TEST PRSMV CHEM ANLYZR: CPT | Performed by: EMERGENCY MEDICINE

## 2018-06-04 PROCEDURE — 65270000029 HC RM PRIVATE

## 2018-06-04 PROCEDURE — 74011636320 HC RX REV CODE- 636/320: Performed by: EMERGENCY MEDICINE

## 2018-06-04 PROCEDURE — 96365 THER/PROPH/DIAG IV INF INIT: CPT

## 2018-06-04 PROCEDURE — 74011250636 HC RX REV CODE- 250/636

## 2018-06-04 PROCEDURE — 74011000250 HC RX REV CODE- 250: Performed by: INTERNAL MEDICINE

## 2018-06-04 PROCEDURE — 85610 PROTHROMBIN TIME: CPT | Performed by: INTERNAL MEDICINE

## 2018-06-04 PROCEDURE — 77030018846 HC SOL IRR STRL H20 ICUM -A

## 2018-06-04 PROCEDURE — 36415 COLL VENOUS BLD VENIPUNCTURE: CPT | Performed by: INTERNAL MEDICINE

## 2018-06-04 PROCEDURE — 87040 BLOOD CULTURE FOR BACTERIA: CPT | Performed by: EMERGENCY MEDICINE

## 2018-06-04 PROCEDURE — 83036 HEMOGLOBIN GLYCOSYLATED A1C: CPT | Performed by: INTERNAL MEDICINE

## 2018-06-04 PROCEDURE — 86920 COMPATIBILITY TEST SPIN: CPT | Performed by: EMERGENCY MEDICINE

## 2018-06-04 RX ORDER — DEXTROSE 50 % IN WATER (D50W) INTRAVENOUS SYRINGE
25-50 AS NEEDED
Status: DISCONTINUED | OUTPATIENT
Start: 2018-06-04 | End: 2018-06-25 | Stop reason: SDUPTHER

## 2018-06-04 RX ORDER — DEXTROSE 50 % IN WATER (D50W) INTRAVENOUS SYRINGE
25-50 AS NEEDED
Status: DISCONTINUED | OUTPATIENT
Start: 2018-06-04 | End: 2018-06-04 | Stop reason: SDUPTHER

## 2018-06-04 RX ORDER — MAGNESIUM SULFATE 100 %
4 CRYSTALS MISCELLANEOUS AS NEEDED
Status: DISCONTINUED | OUTPATIENT
Start: 2018-06-04 | End: 2018-06-04 | Stop reason: SDUPTHER

## 2018-06-04 RX ORDER — HYDROMORPHONE HYDROCHLORIDE 1 MG/ML
4 INJECTION, SOLUTION INTRAMUSCULAR; INTRAVENOUS; SUBCUTANEOUS
Status: COMPLETED | OUTPATIENT
Start: 2018-06-04 | End: 2018-06-04

## 2018-06-04 RX ORDER — HYDROMORPHONE HYDROCHLORIDE 1 MG/ML
1 INJECTION, SOLUTION INTRAMUSCULAR; INTRAVENOUS; SUBCUTANEOUS
Status: DISCONTINUED | OUTPATIENT
Start: 2018-06-04 | End: 2018-06-05

## 2018-06-04 RX ORDER — HYDROMORPHONE HYDROCHLORIDE 2 MG/ML
1 INJECTION, SOLUTION INTRAMUSCULAR; INTRAVENOUS; SUBCUTANEOUS ONCE
Status: COMPLETED | OUTPATIENT
Start: 2018-06-04 | End: 2018-06-04

## 2018-06-04 RX ORDER — MAGNESIUM SULFATE 100 %
4 CRYSTALS MISCELLANEOUS AS NEEDED
Status: DISCONTINUED | OUTPATIENT
Start: 2018-06-04 | End: 2018-07-11 | Stop reason: HOSPADM

## 2018-06-04 RX ORDER — FLUTICASONE PROPIONATE 50 MCG
2 SPRAY, SUSPENSION (ML) NASAL
COMMUNITY
End: 2019-01-21

## 2018-06-04 RX ORDER — MORPHINE SULFATE 4 MG/ML
6 INJECTION INTRAVENOUS
Status: COMPLETED | OUTPATIENT
Start: 2018-06-04 | End: 2018-06-04

## 2018-06-04 RX ORDER — SODIUM CHLORIDE 9 MG/ML
150 INJECTION, SOLUTION INTRAVENOUS CONTINUOUS
Status: DISCONTINUED | OUTPATIENT
Start: 2018-06-04 | End: 2018-06-04

## 2018-06-04 RX ORDER — SODIUM CHLORIDE 9 MG/ML
250 INJECTION, SOLUTION INTRAVENOUS AS NEEDED
Status: DISCONTINUED | OUTPATIENT
Start: 2018-06-04 | End: 2018-06-05

## 2018-06-04 RX ORDER — METFORMIN HYDROCHLORIDE 500 MG/1
2000 TABLET, EXTENDED RELEASE ORAL
COMMUNITY
End: 2018-07-10

## 2018-06-04 RX ORDER — DEXTROSE MONOHYDRATE AND SODIUM CHLORIDE 5; .9 G/100ML; G/100ML
125 INJECTION, SOLUTION INTRAVENOUS CONTINUOUS
Status: DISCONTINUED | OUTPATIENT
Start: 2018-06-04 | End: 2018-06-04

## 2018-06-04 RX ORDER — METRONIDAZOLE 500 MG/100ML
500 INJECTION, SOLUTION INTRAVENOUS EVERY 8 HOURS
Status: DISCONTINUED | OUTPATIENT
Start: 2018-06-04 | End: 2018-06-07

## 2018-06-04 RX ORDER — SODIUM CHLORIDE 0.9 % (FLUSH) 0.9 %
5-10 SYRINGE (ML) INJECTION AS NEEDED
Status: DISCONTINUED | OUTPATIENT
Start: 2018-06-04 | End: 2018-07-11 | Stop reason: HOSPADM

## 2018-06-04 RX ORDER — HYDROMORPHONE HYDROCHLORIDE 1 MG/ML
1 INJECTION, SOLUTION INTRAMUSCULAR; INTRAVENOUS; SUBCUTANEOUS ONCE
Status: DISCONTINUED | OUTPATIENT
Start: 2018-06-04 | End: 2018-06-04 | Stop reason: RX

## 2018-06-04 RX ORDER — MORPHINE SULFATE 4 MG/ML
8 INJECTION INTRAVENOUS
Status: COMPLETED | OUTPATIENT
Start: 2018-06-04 | End: 2018-06-04

## 2018-06-04 RX ORDER — MAGNESIUM SULFATE HEPTAHYDRATE 40 MG/ML
2 INJECTION, SOLUTION INTRAVENOUS ONCE
Status: COMPLETED | OUTPATIENT
Start: 2018-06-04 | End: 2018-06-04

## 2018-06-04 RX ORDER — POTASSIUM CHLORIDE 7.45 MG/ML
10 INJECTION INTRAVENOUS
Status: COMPLETED | OUTPATIENT
Start: 2018-06-04 | End: 2018-06-04

## 2018-06-04 RX ORDER — INSULIN LISPRO 100 [IU]/ML
INJECTION, SOLUTION INTRAVENOUS; SUBCUTANEOUS EVERY 6 HOURS
Status: DISPENSED | OUTPATIENT
Start: 2018-06-04 | End: 2018-06-18

## 2018-06-04 RX ORDER — VANCOMYCIN HYDROCHLORIDE
1250 EVERY 12 HOURS
Status: DISCONTINUED | OUTPATIENT
Start: 2018-06-04 | End: 2018-06-05

## 2018-06-04 RX ORDER — INSULIN LISPRO 100 [IU]/ML
INJECTION, SOLUTION INTRAVENOUS; SUBCUTANEOUS EVERY 6 HOURS
Status: DISCONTINUED | OUTPATIENT
Start: 2018-06-04 | End: 2018-06-04 | Stop reason: SDUPTHER

## 2018-06-04 RX ORDER — SODIUM CHLORIDE, SODIUM LACTATE, POTASSIUM CHLORIDE, CALCIUM CHLORIDE 600; 310; 30; 20 MG/100ML; MG/100ML; MG/100ML; MG/100ML
1000 INJECTION, SOLUTION INTRAVENOUS ONCE
Status: COMPLETED | OUTPATIENT
Start: 2018-06-04 | End: 2018-06-10

## 2018-06-04 RX ORDER — SODIUM CHLORIDE 0.9 % (FLUSH) 0.9 %
5-10 SYRINGE (ML) INJECTION EVERY 8 HOURS
Status: DISCONTINUED | OUTPATIENT
Start: 2018-06-04 | End: 2018-06-05

## 2018-06-04 RX ORDER — HYDROMORPHONE HYDROCHLORIDE 1 MG/ML
INJECTION, SOLUTION INTRAMUSCULAR; INTRAVENOUS; SUBCUTANEOUS
Status: DISCONTINUED
Start: 2018-06-04 | End: 2018-06-05

## 2018-06-04 RX ADMIN — POTASSIUM CHLORIDE 10 MEQ: 10 INJECTION, SOLUTION INTRAVENOUS at 12:02

## 2018-06-04 RX ADMIN — PIPERACILLIN SODIUM,TAZOBACTAM SODIUM 3.38 G: 3; .375 INJECTION, POWDER, FOR SOLUTION INTRAVENOUS at 08:02

## 2018-06-04 RX ADMIN — Medication 10 ML: at 15:22

## 2018-06-04 RX ADMIN — Medication: at 14:57

## 2018-06-04 RX ADMIN — METRONIDAZOLE 500 MG: 500 INJECTION, SOLUTION INTRAVENOUS at 21:42

## 2018-06-04 RX ADMIN — Medication 10 ML: at 22:27

## 2018-06-04 RX ADMIN — CALCIUM GLUCONATE 2 G: 94 INJECTION, SOLUTION INTRAVENOUS at 22:25

## 2018-06-04 RX ADMIN — HYDROMORPHONE HYDROCHLORIDE 4 MG: 1 INJECTION, SOLUTION INTRAMUSCULAR; INTRAVENOUS; SUBCUTANEOUS at 14:46

## 2018-06-04 RX ADMIN — Medication 1 MG: at 22:25

## 2018-06-04 RX ADMIN — INSULIN LISPRO 2 UNITS: 100 INJECTION, SOLUTION INTRAVENOUS; SUBCUTANEOUS at 18:00

## 2018-06-04 RX ADMIN — MORPHINE SULFATE 8 MG: 4 INJECTION INTRAVENOUS at 08:31

## 2018-06-04 RX ADMIN — MAGNESIUM SULFATE HEPTAHYDRATE 2 G: 40 INJECTION, SOLUTION INTRAVENOUS at 10:21

## 2018-06-04 RX ADMIN — SODIUM CHLORIDE 150 ML/HR: 900 INJECTION, SOLUTION INTRAVENOUS at 12:06

## 2018-06-04 RX ADMIN — METRONIDAZOLE 500 MG: 500 INJECTION, SOLUTION INTRAVENOUS at 15:19

## 2018-06-04 RX ADMIN — PIPERACILLIN SODIUM,TAZOBACTAM SODIUM 3.38 G: 3; .375 INJECTION, POWDER, FOR SOLUTION INTRAVENOUS at 13:18

## 2018-06-04 RX ADMIN — PERFLUTREN 1 ML: 6.52 INJECTION, SUSPENSION INTRAVENOUS at 17:10

## 2018-06-04 RX ADMIN — MORPHINE SULFATE 6 MG: 4 INJECTION INTRAVENOUS at 08:02

## 2018-06-04 RX ADMIN — VANCOMYCIN HYDROCHLORIDE 1250 MG: 10 INJECTION, POWDER, LYOPHILIZED, FOR SOLUTION INTRAVENOUS at 17:31

## 2018-06-04 RX ADMIN — IOPAMIDOL 100 ML: 612 INJECTION, SOLUTION INTRAVENOUS at 11:16

## 2018-06-04 RX ADMIN — HYDROMORPHONE HYDROCHLORIDE 1 MG: 2 INJECTION INTRAMUSCULAR; INTRAVENOUS; SUBCUTANEOUS at 10:38

## 2018-06-04 RX ADMIN — SODIUM CHLORIDE, SODIUM LACTATE, POTASSIUM CHLORIDE, AND CALCIUM CHLORIDE 250 ML: 600; 310; 30; 20 INJECTION, SOLUTION INTRAVENOUS at 15:51

## 2018-06-04 RX ADMIN — PIPERACILLIN SODIUM,TAZOBACTAM SODIUM 3.38 G: 3; .375 INJECTION, POWDER, FOR SOLUTION INTRAVENOUS at 19:40

## 2018-06-04 RX ADMIN — SODIUM CHLORIDE 1000 ML: 900 INJECTION, SOLUTION INTRAVENOUS at 07:49

## 2018-06-04 RX ADMIN — POTASSIUM CHLORIDE 10 MEQ: 10 INJECTION, SOLUTION INTRAVENOUS at 10:36

## 2018-06-04 RX ADMIN — SODIUM CHLORIDE 517 ML: 900 INJECTION, SOLUTION INTRAVENOUS at 08:33

## 2018-06-04 RX ADMIN — SODIUM CHLORIDE, SODIUM LACTATE, POTASSIUM CHLORIDE, AND CALCIUM CHLORIDE 1000 ML: 600; 310; 30; 20 INJECTION, SOLUTION INTRAVENOUS at 21:54

## 2018-06-04 RX ADMIN — DEXTROSE MONOHYDRATE AND SODIUM CHLORIDE: 5; .45 INJECTION, SOLUTION INTRAVENOUS at 23:48

## 2018-06-04 RX ADMIN — DEXTROSE MONOHYDRATE AND SODIUM CHLORIDE: 5; .45 INJECTION, SOLUTION INTRAVENOUS at 16:49

## 2018-06-04 NOTE — PROGRESS NOTES
Admission Medication Reconciliation has been performed on this ED patient consisting of interview of the patient regarding their PTA Home Medication List, Allergies and PMH as well as obtaining outpatient pharmacy information. Interviewed patient and her brother who were a good historian and are health literate. Patient did not provide a written list of home medications. Discussed with pt the importance of and strategies they could employ to maintain a current and readily available home medication list .  Medications are managed by: self and brother  [facility]            Medication Compliance Issues and/or Medication Concerns:   Pt returns to [facility] for increased pain not controlled by dilaudid and Tylenol #3 prescribed post op last week. Brother states pain initially controlled then over past day or two not. Pt takes brand name only Glucophage XR 500mg #4 qpm stating generic gives her cold sweats. Pt suffers from chronic nasal congestion for which she uses phenylephrine nasal spray several times a day EVERY day. Of note:  brother presented an OTC Afrin (oxymetazoline) bottle stating she pours the phenylephrine into this bottle because the sprayer works better for her. Pt states she has chronic daily headaches for which she took #2 Goody's headache powders daily until it was held for surgery then she switched to Tylenol Arthritis which she uses EVERY DAY (in addition to tylenol #3 q4h around the clock) there is not currently a lab order for level. Wonder if HA attributable to chronic nasal decongestant use? Pt states she had seen ENT in the past for chronic sinus congestion and states they were no help. Updated PAML with med modifications and pharmacy info.         [address] [place] [address] Pharmacist  [phone]

## 2018-06-04 NOTE — ED TRIAGE NOTES
Pt reports removal of ovaries on 05/29 for tumor here at [facility] with Dr. Mykel Anders. Began to have increased pain in upper abd and on both sides of abd last night. Pain goes down into legs. Pt given 4 mg zofran IM and 50 mcg fentanyl IM en route.

## 2018-06-04 NOTE — CONSULTS
[address] [facility]  Phone [phone] Fax [phone]  Pulmonary Critical Care & Sleep Medicine       Name: Mattie Horvath MRN: [id number]   : 1950 Hospital: [facility] [place]   Date: 2018        Critical Care Initial Patient Consult    Requesting MD:                                                  Reason for CC Consult:    IMPRESSION:   Patient Active Problem List   Diagnosis Code    Left ovarian epithelial cancer ([facility] [address] C56.2    Severe obesity (BMI 35.0-39.9) ([facility] [address] E66.01    Abdominal pain R10.9 ·   ? Ischemic bowel vs severe sepsis with elevated lactate  · Hypotension post diludid   · Worsening urine out put worry about abdominal compartment syndrome   · Metabolic acidsis with lactate elevation ? Related to sepsis vs po   PLAN:  -- Check abd compartment pressure  -- Worry something acute like ischemic bowel going on Dr. Mamadou Martinez is out of town I think patient might need to go back to OR spoke with her over phone suggested to place General surgery consult and she will call Dr. Deangelo Dubon for official consult  -- NPO   -- NG tube and suction  -- ABG done showed acidosis will change iv fluid to D5 1/2 ns with bicarb and continue 125 cc/hr  -- Possible Sepsis -- pan culture-- add vanco and flagyl to zosyn  -- Glucose and electrolyte protocol  -- Received diludid 4 mg per gyn again its a very high dose advised patient to be very cautious on giving large doses of pain meds will defer to surgery and ob gyn    CVS:Echo monitor for blood pressure, Give iv fluids as much as possible,   RS:PO2 is low after diludid, ?  Due to poor ventilation-- will place on high flow  -- Aspiration precaution  -- Add bronchodilators  -- HOB elevated  ID:Received fluids per sepsis protocol Add vanco and flagyl to zosyn  ENDO:SSI monitor blood sugars  GI:NPO add PPI  RENAL:No urine out put worry on abdominal compartment asked nursing to check abdominal compartment pressure. Monitor UO  CNS:Mentation is ok for now on and off drowsy due to pain meds   HEMATOLOGY: Monitor hb get PT and INR  MUSCULOSKELETAL:no acute issued  PAIN AND SEDATION: Prn diludid  · Skin/Wound: Surgical wound looks clean   · Electrolytes: Replace electrolytes per ICU electrolyte replacement protocol. · IVF: D5 1/2 ns with bicarb  · Nutrition: NPO for now  · Prophylaxis: DVT Prophylaxis with scd,. GI Prophylaxis. · Restraints: none  · PT/OT eval and treat. OOB when appropriate. · Lines/Tubes: none. No romero or central line. ADVANCE DIRECTIVE:Full code  FAMILY DISCUSSION:spoke with patient and family, Also spoke with Dr. Lc Sahni and Dr Phi Garcia: PPI, DVT prophylaxis, HOB elevated, Infection control all reviewed and addressed. Events and notes from last 24 hours reviewed. Care plan discussed with nursing CC TIME:55 min excluding procedure time    · Labs and images personally seen and available reports reviewed. · All current medicines are reviewed and doses and prescription adjusted. Subjective/History:   Teja Lofton is a 75 y.o.  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 Was doing ok at home until yesterday when she started feeling a little more discomfort. She presented last night when she felt her pain was significant. In ER received IV fluid and zosyn   Lactate found to be in 5  Abd ct done showed area concerning for ?  Ischemic bowel  Spoke with Dr Lc Sahni on phone she is out of town and wants general surgery to be consulted   Patient just received 4 mg IV diludid by OB GYN in ICU post that her blood pressure running on lower side   She is on NRB after diludid  We asked IR to place PICC line but they declined and wanted to be done tomorrow as patient has peripheral line and concern for sepsis  No urine out put       Past Medical History:   Diagnosis Date  Diabetes ([facility] [address]     many years type 2    Hypertension     many years      Past Surgical History:   Procedure Laterality Date    HX OOPHORECTOMY  05/29/2018    HX TONSILLECTOMY      as a child       Prior to Admission medications    Medication Sig Start Date End Date Taking? Authorizing Provider   metFORMIN ER (GLUCOPHAGE XR) 500 mg tablet Take 2,000 mg by mouth daily (with dinner). Brand name only, pt reports allergy of cold sweats to the generic   Yes Historical Provider   fluticasone (FLONASE ALLERGY RELIEF) 50 mcg/actuation nasal spray 2 Sprays by Both Nostrils route daily as needed for Rhinitis. Yes Historical Provider   acetaminophen-codeine (TYLENOL #3) 300-30 mg per tablet Take 1 Tab by mouth every four (4) hours as needed for Pain. Max Daily Amount: 6 Tabs. 6/1/18  Yes Claribel Whelan MD   HYDROmorphone (DILAUDID) 2 mg tablet Take 1 Tab by mouth every three (3) hours as needed. Max Daily Amount: 16 mg. 6/1/18  Yes Claribel Whelan MD   acetaminophen (TYLENOL ARTHRITIS PAIN) 650 mg TbER Take 650 mg by mouth every eight (8) hours as needed. Yes Historical Provider   phenylephrine (NEOSYNEPHRINE) 1 % spry 2 Sprays by Both Nostrils route every six (6) hours as needed. Pt uses several times every day for chronic nasal congestion    **pt pours phenylephrine into an AFRIN nasal spray bottle as she prefers this device**   Yes Historical Provider   amLODIPine (NORVASC) 10 mg tablet Take 10 mg by mouth daily. Yes Historical Provider   verapamil ER (VERELAN) 180 mg CR capsule Take 180 mg by mouth daily. Yes Historical Provider   atorvastatin (LIPITOR) 10 mg tablet Take 10 mg by mouth daily. Yes Historical Provider   losartan-hydroCHLOROthiazide (HYZAAR) 100-12.5 mg per tablet Take 1 Tab by mouth daily. Yes Historical Provider   Aspirin-Acetaminophen-Caffeine (GOODY'S EXTRA STRENGTH) 500-325-65 mg pwpk Take 2 Packets by mouth daily.  Pt took daily for chronic HA until held for surgery in May 2018 then switched to tylenol arthritis daily    Historical Provider     Current Facility-Administered Medications   Medication Dose Route Frequency    piperacillin-tazobactam (ZOSYN) 3.375 g in 0.9% sodium chloride (MBP/ADV) 100 mL MBP  3.375 g IntraVENous Q6H    0.9% sodium chloride infusion  150 mL/hr IntraVENous CONTINUOUS    metroNIDAZOLE (FLAGYL) IVPB premix 500 mg  500 mg IntraVENous Q8H    sodium chloride (NS) flush 5-10 mL  5-10 mL IntraVENous Q8H    HYDROmorphone (PF) (DILAUDID) 30 mg / 30 mL PCA   IntraVENous TITRATE    Vancomycin - Pharmacokinetic Dosing  1 Each Other Rx Dosing/Monitoring    vancomycin (VANCOCIN) 1250 mg in  ml infusion  1,250 mg IntraVENous Q12H     Allergies   Allergen Reactions    Hydrocodone Other (comments)     Breaks into cold sweat    Metformin Other (comments)     Breaks out into cold sweat. Can Take Glucophage brand name med      Social History   Substance Use Topics    Smoking status: Never Smoker    Smokeless tobacco: Never Used    Alcohol use No      History reviewed. No pertinent family history.      Objective:   Vital Signs:    Visit Vitals    /65    Pulse (!) 113    Temp 97.8 °F (36.6 °C)    Resp 23    Ht 5' 1\" (1.549 m)    Wt 89.1 kg (196 lb 6.9 oz)    SpO2 90%    BMI 37.12 kg/m2       O2 Device: Non-rebreather mask   O2 Flow Rate (L/min): 6 l/min   Temp (24hrs), Av.7 °F (36.5 °C), Min:97.6 °F (36.4 °C), Max:97.8 °F (36.6 °C)       Intake/Output:   Last shift:       07 -  1900  In: 100 [I.V.:100]  Out: 750 [Urine:50]  Last 3 shifts:      Intake/Output Summary (Last 24 hours) at 18 1529  Last data filed at 18 1519   Gross per 24 hour   Intake              100 ml   Output              750 ml   Net             -650 ml     Review of Systems:  Limited history as patient just received pain meds     Objective:    General: comfortable, no acute distress  HEENT: pupils reactive, sclera anicteric, EOM intact  Neck: No adenopathy or thyroid swelling, no lymphadenopathy or JVD, supple  CVS: S1S2 no murmurs  RS: Mod AE bilaterally, no tactile fremitus or egophony, no accessory muscle use  Abd: distended complains of lots of pain   Neuro: non focal, awake, alert  Extrm: no leg edema, clubbing or cyanosis  Lymph node: No obvious palpable lymph node appreciated. Skin: no rash  CBC w/Diff Recent Labs      06/04/18   0740   WBC  4.6   RBC  5.73*   HGB  12.8   HCT  40.5   PLT  419   GRANS  78*   LYMPH  18*   EOS  0        Chemistry Recent Labs      06/04/18   0740   GLU  168*   NA  137   K  3.1*   CL  98*   CO2  22   BUN  15   CREA  1.16   CA  8.9   MG  1.5*   AGAP  17   BUCR  13   AP  74   TP  6.8   ALB  2.9*   GLOB  3.9   AGRAT  0.7*        Lactic Acid Lactic acid   Date Value Ref Range Status   06/04/2018 4.0 (HH) 0.4 - 2.0 MMOL/L Final     Comment:     CALLED TO AND CORRECTLY REPEATED BY:  AUGUSTA RODRIGUEZ ICU ON 06/04/18 AT 1423 TMB       Recent Labs      06/04/18   1324  06/04/18   0858  06/04/18   0740   LAC  4.0*  4.7*  5.7*        Micro  No results for input(s): SDES, CULT in the last 72 hours. No results for input(s): CULT in the last 72 hours. ABG No results for input(s): PHI, PHI, POC2, PCO2I, PO2, PO2I, HCO3, HCO3I, FIO2, FIO2I in the last 72 hours. Liver Enzymes Protein, total   Date Value Ref Range Status   06/04/2018 6.8 6.4 - 8.2 g/dL Final     Albumin   Date Value Ref Range Status   06/04/2018 2.9 (L) 3.4 - 5.0 g/dL Final     Globulin   Date Value Ref Range Status   06/04/2018 3.9 2.0 - 4.0 g/dL Final     A-G Ratio   Date Value Ref Range Status   06/04/2018 0.7 (L) 0.8 - 1.7   Final     AST (SGOT)   Date Value Ref Range Status   06/04/2018 29 15 - 37 U/L Final     Alk.  phosphatase   Date Value Ref Range Status   06/04/2018 74 45 - 117 U/L Final     Recent Labs      06/04/18   0740   TP  6.8   ALB  2.9*   GLOB  3.9   AGRAT  0.7*   SGOT  29   AP  74        Cardiac Enzymes No results found for: CPK, CK, CKMMB, CKMB, RCK3, CKMBT, CKNDX, CKND1, FABIAN, TROPT, TROIQ, KAYLAN, TROPT, TNIPOC, BNP, BNPP     BNP No results found for: BNP, BNPP, XBNPT     Coagulation Recent Labs      06/04/18   1438   PTP  15.3*   INR  1.3*   APTT  26.9         Thyroid  No results found for: T4, T3U, TSH, TSHEXT       Lipid Panel No results found for: CHOL, CHOLPOCT, CHOLX, CHLST, CHOLV, 152813, HDL, LDL, LDLC, DLDLP, 721772, VLDLC, VLDL, TGLX, TRIGL, TRIGP, TGLPOCT, CHHD, CHHDX       Urinalysis Lab Results   Component Value Date/Time    Color DARK YELLOW 06/04/2018 08:42 AM    Appearance CLOUDY 06/04/2018 08:42 AM    Specific gravity 1.028 06/04/2018 08:42 AM    pH (UA) 5.0 06/04/2018 08:42 AM    Protein 30 (A) 06/04/2018 08:42 AM    Glucose NEGATIVE  06/04/2018 08:42 AM    Ketone TRACE (A) 06/04/2018 08:42 AM    Bilirubin SMALL (A) 06/04/2018 08:42 AM    Urobilinogen 1.0 06/04/2018 08:42 AM    Nitrites NEGATIVE  06/04/2018 08:42 AM    Leukocyte Esterase NEGATIVE  06/04/2018 08:42 AM    Epithelial cells FEW 06/04/2018 08:42 AM    Bacteria 1+ (A) 06/04/2018 08:42 AM    WBC 0 to 3 06/04/2018 08:42 AM    RBC 0 to 3 06/04/2018 08:42 AM        XR (Most Recent). CXR reviewed by me and compared with previous CXR   Results from Hospital Encounter encounter on 06/04/18   XR ABD (KUB)   Narrative Abdomen, single view    COMPARISON: None. INDICATION: Abdominal pain    FINDINGS: Supine AP portable exam of the abdomen obtained. Nonobstructive and  nonspecific bowel gas pattern demonstrated. Surgical clips project over the  pelvis. Multilevel lumbar spondylosis noted. Compression deformity L1 vertebral  body, and likely to a lesser extent L2 vertebral body noted. Impression Impression:    1. Nonobstructive and nonspecific bowel gas pattern. 2. Compression deformities of the L1 and L2 vertebral bodies.           CT (Most Recent)   Results from Hospital Encounter encounter on 06/04/18   CT ABD PELV W CONT   Narrative EXAM: CT of the Abdomen and Pelvis    INDICATION: Abdominal pain with abdominal distention, status post hysterectomy  and appendectomy    COMPARISON: None. TECHNIQUE: Axial CT imaging of the abdomen and pelvis was performed with  intravenous contrast. Multiplanar reformats were generated. Dose reduction  techniques used: Automated exposure control, adjustment of the mAs and/or kVp  according to patient's size, and iterative reconstruction techniques. _______________    FINDINGS:    LOWER CHEST: Minimal bibasilar atelectasis without significant pleural or  pericardial effusion coronary artery atherosclerotic calcifications are  present. Sudie Mar LIVER, BILIARY: Hepatomegaly measuring 20.2 cm with mild diffuse parenchymal low  attenuation/steatosis. There are 2 separate rounded low attenuating lesions in  the left lobe of liver measuring 1.2 cm and 0.9 cm, measuring water attenuation  Hounsfield units. No enhancing hepatic mass. No biliary dilation. Distended  gallbladder/gallbladder hydrops which may be secondary to nothing by mouth  status. PANCREAS: Normal.    SPLEEN: Normal.    ADRENALS: Normal.    KIDNEYS: The kidneys are iso-attenuating without evidence of hydronephrosis,  nephrolithiasis or masses. No perinephric fluid collections. No hydroureter. LYMPH NODES: No enlarged lymph nodes. Bilateral pelvic lymphadenectomy surgical  clips are present. GASTROINTESTINAL TRACT:   -Nasogastric tube is present with the tip projecting towards the distal stomach  with moderate mid to proximal air-fluid level.  -Numerous abnormal edematous thick-walled small bowel loops in the left abdomen  and bilateral pelvis, with definite areas of lamellar edematous transformation  notably in the right lower quadrant. No renetta pneumatosis intestinalis. -Stool is present in the cecum and right colon to the hepatic flexure with  circumferential air, with mildly prominent but not discretely thick-walled  appearance.  Bowel gas is present in the transverse colon with decompressed  splenic flexure. Decompressed descending colon. Edematous thick-walled sigmoid  colon which may be secondary to decompression and diverticulosis. PELVIC ORGANS: Postsurgical changes of hysterectomy and bilateral nephrectomy  with a few tiny locules of gas far lateral right inferior pelvis. Decompressed bladder with Fry catheter in place. VASCULATURE: Normal caliber aorta without evidence of atherosclerotic disease. Patent celiac and SMA. Patent WILLIE. Patent bilateral iliofemoral vessels. Ovoid  infrarenal IVC. OTHER: Small volume of low attenuating abdominal and pelvic ascites,, with  mesenteric stranding. There is a very small amount of interloop mesenteric  fluid. No significant pneumatosis. Mild diffuse soft tissue anasarca  There is a small amount of subcutaneous emphysema lateral left upper abdominal  the soft tissues and left groin. Midline incision with no fluid collections    BONES: Age-indeterminate moderate to severe L1 vertebral body compression  fracture deformity with minimal dorsal retropulsion of the posterior superior  corner, producing mild central stenosis. Multilevel degenerative disc disease  and spondylosis with vacuum disc phenomena at several levels in the lumbar  spine. .    _______________         Impression IMPRESSION:    1. Postsurgical hysterectomy, bilateral oophorectomy, pelvic lymphadenectomy and  a mastectomy surgical changes. Small volume of ascites in the abdomen and  pelvis, with a few tiny locules of gas in the right hemipelvis would be in  keeping with recent postsurgical etiology. 2. Abnormal edematous thick-walled small bowel loops in the left abdomen and  pelvis, with TRAM lamellar edematous configuration, induration. No findings of  pneumatosis. The overall appearance is nonspecific. Diagnostic considerations  include infectious etiology, nonspecific edema which may be unresolved  postsurgical etiology.  Ischemia is also included in the differential diagnostic  consideration, however, there are no findings of pneumatosis, portal venous gas. 3. Stool in the right colon extending to the hepatic flexure with surrounding  gas, foci of pneumatosis could be obscured. No associated bowel wall thickening. 4. Satisfactory position of nasogastric tube. 5. Hepatomegaly, steatosis with 2 rounded low-density lesions, potentially  cysts. 6. Bibasilar atelectasis. -The findings of this exam were discussed extensively with Dr. Racquel Cano on  06/04/2018, prior to dictation. Imaging:  I have personally reviewed the patients radiographs and have reviewed the reports:     Best practice :  Fluids started at 30 ml/kg with aim to keep CVP 8 or above  Lactic acid ordered- initial and repeat Q6hrs if elevated till normalized. Cultures drawn. Antibiotic administered within 1hr-ICU  And 3hrs ED  Pressors aim MAP >65mmHg  Glycemic control  Sress ulcer prophylaxis  DVT prophylaxis    Vent bundle, Fry bundle and central line bundle followed. Scott Reyez M.D.   Pulmonary Critical Care & Sleep Medicine

## 2018-06-04 NOTE — ED PROVIDER NOTES
EMERGENCY DEPARTMENT HISTORY AND PHYSICAL EXAM    Date: 6/4/2018  Patient Name: Lily Pope    History of Presenting Illness     Chief Complaint   Patient presents with    Post-Op Problem         History Provided By: Patient    Chief Complaint: abd pain  Duration: last night   Timing:  Acute  Location: generalized  Severity: 10 out of 10  Modifying Factors: Unrelieved by Dilaudid and Tylenol. Associated Symptoms: genearlized weakness and nausea    Additional History (Context):   7:00 AM  Lily Pope is a 75 y.o. female with PMHX of HTN and DM who presents to the emergency department via EMS (given 4 mg Zofran IM and 50 mcg Fentanyl IM) C/O generalized abd pain onset least night s/p oophorectomy on 5/29/2018 with Dr. Angi Acosta. Pt was recovering well from home until last night. Associated sxs include generalized weakness and nausea. Unrelieved by Dilaudid and Tylenol. 911 called by brother. Vaginal bleeding resolved. Allergies reported to Hydrocodone and Metformin. Other PSHx of tonsillectomy. Pt is a non smoker and a non EtOH user. Pt denies vomiting, constipation, dysuria, and any other sxs or complaints.      PCP: Cecy Bernard MD    Current Facility-Administered Medications   Medication Dose Route Frequency Provider Last Rate Last Dose    sodium chloride 0.9 % bolus infusion 1,000 mL  1,000 mL IntraVENous ONCE Valentin Cason MD        morphine injection 6 mg  6 mg IntraVENous NOW Valentin Cason MD        piperacillin-tazobactam (ZOSYN) 3.375 g in 0.9% sodium chloride (MBP/ADV) 100 mL MBP  3.375 g IntraVENous Q6H Valentin Cason MD        sodium chloride 0.9 % bolus infusion 1,000 mL  1,000 mL IntraVENous ONCE Valentin Cason MD        Followed by   Sherry Malhotra sodium chloride 0.9 % bolus infusion 1,000 mL  1,000 mL IntraVENous ONCE Valentin Cason MD        Followed by   Sherry Malhotra sodium chloride 0.9 % bolus infusion 517 mL  517 mL IntraVENous ONCE Valentin Cason MD         Current Outpatient Prescriptions Medication Sig Dispense Refill    acetaminophen (TYLENOL ARTHRITIS PAIN) 650 mg TbER Take 650 mg by mouth every eight (8) hours.  phenylephrine (NEOSYNEPHRINE) 1 % spry 2 Sprays by Both Nostrils route every six (6) hours as needed.  amLODIPine (NORVASC) 10 mg tablet Take 10 mg by mouth daily.  verapamil ER (VERELAN) 180 mg CR capsule Take 180 mg by mouth daily.  atorvastatin (LIPITOR) 10 mg tablet Take 10 mg by mouth daily.  losartan-hydroCHLOROthiazide (HYZAAR) 100-12.5 mg per tablet Take 1 Tab by mouth daily.  acetaminophen-codeine (TYLENOL #3) 300-30 mg per tablet Take 1 Tab by mouth every four (4) hours as needed for Pain. Max Daily Amount: 6 Tabs. 60 Tab 0    HYDROmorphone (DILAUDID) 2 mg tablet Take 1 Tab by mouth every three (3) hours as needed. Max Daily Amount: 16 mg. 30 Tab 0    metFORMIN (GLUCOPHAGE) 1,000 mg tablet Take 2,000 mg by mouth nightly. Past History     Past Medical History:  Past Medical History:   Diagnosis Date    Diabetes ([address]     many years type 2    Hypertension     many years       Past Surgical History:  Past Surgical History:   Procedure Laterality Date    HX TONSILLECTOMY      as a child        Family History:  History reviewed. No pertinent family history. Social History:  Social History   Substance Use Topics    Smoking status: Never Smoker    Smokeless tobacco: Never Used    Alcohol use No       Allergies: Allergies   Allergen Reactions    Hydrocodone Other (comments)     Breaks into cold sweat    Metformin Other (comments)     Breaks out into cold sweat. Can Take Glucophage brand name med         Review of Systems   Review of Systems   Gastrointestinal: Positive for abdominal pain and nausea. Negative for constipation and vomiting. Genitourinary: Positive for vaginal bleeding (resolved now though). Negative for dysuria. Neurological: Positive for weakness (generalized).    All other systems reviewed and are negative. Physical Exam     Vitals:    06/04/18 0705   BP: 114/68   Pulse: (!) 116   Resp: 18   Temp: 97.6 °F (36.4 °C)   SpO2: 97%   Weight: 83.9 kg (185 lb)   Height: 5' 1\" (1.549 m)     Physical Exam   Nursing note and vitals reviewed. Constitutional: Ill appearing, severe acute distress. Pale. Head: Normocephalic, Atraumatic  Eyes: Pupils are equal, round, and reactive to light, EOMI  Neck: Supple, non-tender  Cardiovascular: Tachycardic rate and Irregularly irregular rhythm, no murmurs, rubs, or gallops  Chest: Normal work of breathing and chest excursion bilaterally  Lungs: Diminished to ausculation bilaterally  Abdomen: Soft, Exquisitely tender with guarding, distended, hypoactive bowel sounds  Back: No evidence of trauma or deformity  Extremities: No evidence of trauma or deformity, mild LE edema  Skin: Warm and dry, normal cap refill  Neuro: Alert and appropriate  Psychiatric: Normal mood and affect         Diagnostic Study Results     Labs -   No results found for this or any previous visit (from the past 12 hour(s)). Radiologic Studies -   XR CHEST PORT   Final Result   Impression:  Underexpanded lungs without superimposed acute radiographic abnormality. XR ABD (KUB)   Final Result   Impression:     1. Nonobstructive and nonspecific bowel gas pattern. 2. Compression deformities of the L1 and L2 vertebral bodies.     CT ABD PELV W CONT    (Results Pending)   As read by the radiologist.     CT Results  (Last 48 hours)    None            Medications given in the ED-  Medications   sodium chloride 0.9 % bolus infusion 1,000 mL (not administered)   morphine injection 6 mg (not administered)   piperacillin-tazobactam (ZOSYN) 3.375 g in 0.9% sodium chloride (MBP/ADV) 100 mL MBP (not administered)   sodium chloride 0.9 % bolus infusion 1,000 mL (not administered)     Followed by   sodium chloride 0.9 % bolus infusion 1,000 mL (not administered)     Followed by   sodium chloride 0.9 % bolus infusion 517 mL (not administered)         Medical Decision Making   I am the first provider for this patient. I reviewed the vital signs, available nursing notes, past medical history, past surgical history, family history and social history. Vital Signs-Reviewed the patient's vital signs. Pulse Oximetry Analysis - 97% on room air. Cardiac Monitor:  Rate: 108 bpm  Rhythm: sinus tachycardia    EKG interpretation: (Preliminary)  8:53 AM   Sinus tachycardia with Premature PACs at 114 bpm. QRS duration at 82 ms. EKG read by Vikki London MD at 8:57 AM     Records Reviewed: Nursing Notes    Provider Notes (Medical Decision Making): 75year old female less than 1 week post op from oophorectomy with peritoneal abdominal exam with vitals and labs concerning for sepsis. Evaluated by OB/GYN who will admit for further management. Sepsis protocol initiated. Procedures:  Procedures    ED Course:   7:00 AM Initial assessment performed. The patients presenting problems have been discussed, and they are in agreement with the care plan formulated and outlined with them. I have encouraged them to ask questions as they arise throughout their visit. 7:46 AM Discussed patient's history, exam, and available diagnostics results with Dr. Gianluca Pickard. MD Dasia (Gynecologic Oncology). He is aware of pt condition. 7:35 AM Pt appearing very uncomfortable. Pt had an episode of desaturation which improved with nasal cannula.    9:21 AM Discussed patient's history, exam, and available diagnostics results with Jovani Villalobos MD (ObGYN) who agree with admitting pt under her service. 8:04 AM Dr. Selvin Mancia called again. He is out of town, but partner Jovani Villalobos MD (ObGYN) will examine pt.      SEPSIS ASSESSMENT NOTE:   9:16 AM  Jag Guthrie MD has seen and assessed the patient as follows:    Capillary Refill:normal/brisk  Cardiopulmonary Evaluation:   Lung Sounds: dull/diminished   Cardiac Sounds: tachycardic and irregular  Peripheral Pulses: present  Skin Exam: dry, pink    Visit Vitals    BP (!) 79/43 (BP 1 Location: Left arm, BP Patient Position: At rest)    Pulse (!) 108    Temp 97.7 °F (36.5 °C)    Resp 30    Ht 5' 1\" (1.549 m)    Wt 83.9 kg (185 lb)    SpO2 90%    BMI 34.96 kg/m2       Written by ESAU Goodman, as dictated by Ynoi Julio MD     10:50 AM Lyssa Marquis MD (ObGYN) agrees that pt can go to ICU. Diagnosis and Disposition     Critical Care Time: 9:22 AM  I have spent 42 minutes of critical care time involved in lab review, consultations with specialist, family decision-making, and documentation. During this entire length of time I was immediately available to the patient. Critical Care: The reason for providing this level of medical care for this critically ill patient was due a critical illness that impaired one or more vital organ systems such that there was a high probability of imminent or life threatening deterioration in the patients condition. This care involved high complexity decision making to assess, manipulate, and support vital system functions, to treat this degreee vital organ system failure and to prevent further life threatening deterioration of the patients condition. Core Measures:  For Hospitalized Patients:    1. Hospitalization Decision Time:  The decision to hospitalize the patient was made by Yoni Julio MD at 7:00 AM on 6/4/2018    2. Aspirin: Aspirin was not given because the patient did not present with a stroke at the time of their Emergency Department evaluation    9:21 AM  Patient is being admitted to the hospital by Lyssa Marquis MD (ObGYN). The results of their tests and reasons for their admission have been discussed with them and/or available family. They convey agreement and understanding for the need to be admitted and for their admission diagnosis. CONDITIONS ON ADMISSION:  Sepsis is present at the time of admission. Deep Vein Thrombosis is not present at the time of admission. Thrombosis is not present at the time of admission. Urinary Tract Infection is not present at the time of admission. Pneumonia is not present at the time of admission. MRSA is not present at the time of admission. Wound infection is not present at the time of admission. Pressure Ulcer is not present at the time of admission. CLINICAL IMPRESSION:    1. Abdominal pain, generalized    2. Lactic acid acidosis        PLAN:  1. ADMIT    _______________________________    Attestations: This note is prepared by Kartik Warren, acting as Scribe for Rosie Matute MD.    Rosie Matute MD:  The scribe's documentation has been prepared under my direction and personally reviewed by me in its entirety.   I confirm that the note above accurately reflects all work, treatment, procedures, and medical decision making performed by me.  _______________________________

## 2018-06-04 NOTE — ED NOTES
Pt with 10mL urine output over past 90 minutes. O2 sats on 2L O2 hovering around 88-90%. Increased to 3L and sats 92%. Increased to 4L with sat increased to 95%. 300mL greenish/blue output noted from NG tube. Pt abdomen remains distended and painful. MEWS score 4. Discussed with Dr. Tara Cordero. Bed request changed to ICU bed. Discussed with pt and family at bedside.

## 2018-06-04 NOTE — IP AVS SNAPSHOT
[address] [place] [id number] 
[phone] Patient: Mattie Horvath MRN: [id number] JESS:[birth year] A check nereyda indicates which time of day the medication should be taken. My Medications START taking these medications Instructions Each Dose to Equal  
 Morning Noon Evening Bedtime  
 acetaminophen 325 mg tablet Commonly known as:  TYLENOL Replaces:  TYLENOL ARTHRITIS PAIN 650 mg Tber Your last dose was: Your next dose is: Take 1 Tab by mouth every six (6) hours as needed for Pain or Fever. 325 mg  
    
   
   
   
  
 alum-mag hydroxide-simeth 200-200-20 mg/5 mL Susp Commonly known as:  MYLANTA Your last dose was: Your next dose is: Take 30 mL by mouth every four (4) hours as needed for up to 14 days. 30 mL  
    
   
   
   
  
 furosemide 40 mg tablet Commonly known as:  LASIX Your last dose was: Your next dose is: Take 1 Tab by mouth daily. 40 mg  
    
   
   
   
  
 insulin glargine 100 unit/mL injection Commonly known as:  LANTUS Start taking on:  7/11/2018 Your last dose was: Your next dose is:    
   
   
 20 units subcutaneous daily at 0900  
     
   
   
   
  
 insulin lispro 100 unit/mL injection Commonly known as:  HUMALOG Your last dose was: Your next dose is: As of 6/22/2018: INITIATE INSULIN CORRECTIVE PROTOCOL: Normal Insulin Sensitivity  For Blood Sugar (mg/dL) of:  Less than 150 = 0 units  150 -199 = 2 units 200 -249 = 4 units 250 -299 = 6 units 300 -349 = 8 units 350 and above = 10 units and Call Physician  
     
   
   
   
  
 ipratropium 0.02 % Soln Commonly known as:  ATROVENT Your last dose was: Your next dose is:    
   
   
 2.5 mL by Nebulization route every four (4) hours as needed for up to 14 days.   
 0.5 mg  
    
   
   
   
  
 metoprolol tartrate 50 mg tablet Commonly known as:  LOPRESSOR Your last dose was: Your next dose is: Take 1 Tab by mouth every twelve (12) hours for 14 days. 50 mg Omeprazole Magnesium 10 mg Sudr  
Commonly known as:  PriLOSEC Your last dose was: Your next dose is: Take 20 mg by mouth daily. 20 mg  
    
   
   
   
  
 saliva stimulant Spry Commonly known as:  Rm Dull Your last dose was: Your next dose is: Take 1 Spray by mouth as needed for Other (Dry Mouth). 1 Spray  
    
   
   
   
  
 sertraline 50 mg tablet Commonly known as:  ZOLOFT Start taking on:  7/11/2018 Your last dose was: Your next dose is: Take 1 Tab by mouth daily. 50 mg CONTINUE taking these medications Instructions Each Dose to Equal  
 Morning Noon Evening Bedtime FLONASE ALLERGY RELIEF 50 mcg/actuation nasal spray Generic drug:  fluticasone Your last dose was: Your next dose is: 2 Sprays by Both Nostrils route daily as needed for Rhinitis. 2 Spray STOP taking these medications   
 acetaminophen-codeine 300-30 mg per tablet Commonly known as:  TYLENOL #3 amLODIPine 10 mg tablet Commonly known as:  NORVASC  
   
  
 atorvastatin 10 mg tablet Commonly known as:  LIPITOR  
   
  
 glucophage  mg tablet Generic drug:  metFORMIN ER  
   
  
 GOODY'S EXTRA STRENGTH 500-325-65 mg Pwpk Generic drug:  Aspirin-Acetaminophen-Caffeine HYDROmorphone 2 mg tablet Commonly known as:  DILAUDID  
   
  
 losartan-hydroCHLOROthiazide 100-12.5 mg per tablet Commonly known as:  HYZAAR  
   
  
 phenylephrine 1 % Spry Commonly known as:  NEOSYNEPHRINE  
   
  
 TYLENOL ARTHRITIS PAIN 650 mg Tber Generic drug:  acetaminophen Replaced by:  acetaminophen 325 mg tablet verapamil  mg CR capsule Commonly known as:  Mely Harris Where to Get Your Medications These medications were sent to [address] - [address]  [address], [address] [place] [id number] Phone:  [phone]  
  alum-mag hydroxide-simeth 200-200-20 mg/5 mL Susp Information on where to get these meds will be given to you by the nurse or doctor. ! Ask your nurse or doctor about these medications  
  acetaminophen 325 mg tablet  
 furosemide 40 mg tablet  
 insulin glargine 100 unit/mL injection  
 insulin lispro 100 unit/mL injection  
 ipratropium 0.02 % Soln  
 metoprolol tartrate 50 mg tablet Omeprazole Magnesium 10 mg Sudr  
 saliva stimulant Spry  
 sertraline 50 mg tablet

## 2018-06-04 NOTE — Clinical Note
Status[de-identified] Inpatient [101] Type of Bed: Surgical [18] Inpatient Hospitalization Certified Necessary for the Following Reasons: 3. Patient receiving treatment that can only be provided in an inpatient setting (further clarification in H&P documentation) Admitting Diagnosis: Abdominal pain [279838] Admitting Physician: Mica Cha Attending Physician: Mica Cha Estimated Length of Stay: 2 Midnights Discharge Plan[de-identified] Home with Office Follow-up

## 2018-06-04 NOTE — PROGRESS NOTES
Vancomycin - Pharmacy to Dose  Consult provided for this 75y.o. year old ,female for indication of Sepsis of Unknown Etiology. Therapy day 1        Wt Readings from Last 1 Encounters:   06/04/18 89.1 kg (196 lb 6.9 oz)       Ht Readings from Last 1 Encounters:   06/04/18 154.9 cm (61\")     Dosing Weight:  89.1 kg       Additional Antibiotics:  Zosyn, Flagyl    Date:  6/4/18   Lab Results   Component Value Date/Time    Creatinine 1.16 06/04/2018 07:40 AM     creatinine clearance   47.1 ml/min    white blood cell count    4.6    Will dose Vancomycin 1250mg IV q12hrs. Trough Level after 4-5 doses infused. Dose calculated to approximate a therapeutic trough of 15-20 mcg/mL. Pharmacy to follow daily and will make changes to dose and/or frequency based on clinical status.       [id number] [facility], [address] [place] [address]

## 2018-06-04 NOTE — PROGRESS NOTES
Reason for Readmission:   C/o pain to upper abd            RRAT Score and Risk Level:  8        Level of Readmission:  2        Care Conference scheduled:   no       Resources/supports as identified by patient/family:  TBD        Top Challenges facing patient (as identified by patient/family and CM):   TBD      Finances/Medication cost?       Transportation        Support system or lack thereof? Living arrangements? Self-care/ADLs/Cognition? Current Advanced Directive/Advance Care Plan:  Not on file cm will inform physician to speak possibly placing [facility] services           Plan for utilizing home health:  Declined last visit,highly recommend if needed              Likelihood of additional readmission:   no             Transition of Care Plan:    Based on readmission, the patient's previous Plan of Care   has been evaluated and/or modified. The current Transition of Care Plan is: chart reviewed pt recently discharged 6-2-18  Surgical procedure with ,pt declined h/h when discharged,unit cm will follow thru with d/c planning.

## 2018-06-04 NOTE — ED NOTES
TRANSFER - OUT REPORT:    Verbal report given to AUGUSTA Davis(name) on Isael Mccormack  being transferred to ICU(unit) for routine progression of care       Report consisted of patients Situation, Background, Assessment and   Recommendations(SBAR). Information from the following report(s) SBAR, ED Summary, Procedure Summary and Intake/Output was reviewed with the receiving nurse. Lines:   Peripheral IV 06/04/18 Right (Active)   Site Assessment Clean, dry, & intact 6/4/2018  8:12 AM   Phlebitis Assessment 0 6/4/2018  8:12 AM   Infiltration Assessment 0 6/4/2018  8:12 AM   Dressing Type Transparent 6/4/2018  8:12 AM       Peripheral IV 06/04/18 Left Forearm (Active)   Site Assessment Clean, dry, & intact 6/4/2018 10:51 AM   Phlebitis Assessment 0 6/4/2018 10:51 AM   Infiltration Assessment 0 6/4/2018 10:51 AM   Hub Color/Line Status Pink 6/4/2018 10:51 AM        Opportunity for questions and clarification was provided. Patient transported with:   Monitor  O2 @ 4 liters   RN    Bedside skin assessment performed with Susan. Surgical site viewed and redness to abdomen noted. Tele strip given to RN and reviewed. Sinus tach. Dr. Nichelle Sweet at bedside in ICU.  Reviewed output and decreased urine output with MD.

## 2018-06-04 NOTE — H&P
Gynecology History and Physical    Name: Leah Wheeler MRN: [id number] SSN: [id number]    YOB: 1950  Age: 75 y.o. Sex: female       Subjective:      Chief complaint: abdominal pain    Trena Felty is a 75 y.o.  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 Was doing ok at home until yesterday when she started feeling a little more discomfort. She presented last night when she felt her pain was significant. Positive flatus and diarrhea at home. OB History     No data available        Past Medical History:   Diagnosis Date    Diabetes ([facility] [address]     many years type 2    Hypertension     many years     Past Surgical History:   Procedure Laterality Date    HX TONSILLECTOMY      as a child      Social History     Occupational History    Not on file. Social History Main Topics    Smoking status: Never Smoker    Smokeless tobacco: Never Used    Alcohol use No    Drug use: No    Sexual activity: Not on file     History reviewed. No pertinent family history. Allergies   Allergen Reactions    Hydrocodone Other (comments)     Breaks into cold sweat    Metformin Other (comments)     Breaks out into cold sweat. Can Take Glucophage brand name med     Prior to Admission medications    Medication Sig Start Date End Date Taking? Authorizing Provider   acetaminophen (TYLENOL ARTHRITIS PAIN) 650 mg TbER Take 650 mg by mouth every eight (8) hours. Yes Historical Provider   phenylephrine (NEOSYNEPHRINE) 1 % spry 2 Sprays by Both Nostrils route every six (6) hours as needed. Yes Historical Provider   amLODIPine (NORVASC) 10 mg tablet Take 10 mg by mouth daily. Yes Historical Provider   verapamil ER (VERELAN) 180 mg CR capsule Take 180 mg by mouth daily. Yes Historical Provider   atorvastatin (LIPITOR) 10 mg tablet Take 10 mg by mouth daily.    Yes Historical Provider   losartan-hydroCHLOROthiazide Shanta Orozco) 100-12.5 mg per tablet Take 1 Tab by mouth daily. Yes Historical Provider   acetaminophen-codeine (TYLENOL #3) 300-30 mg per tablet Take 1 Tab by mouth every four (4) hours as needed for Pain. Max Daily Amount: 6 Tabs. 6/1/18   Rabia Casarez MD   HYDROmorphone (DILAUDID) 2 mg tablet Take 1 Tab by mouth every three (3) hours as needed. Max Daily Amount: 16 mg. 6/1/18   Rabia Casarez MD   metFORMIN (GLUCOPHAGE) 1,000 mg tablet Take 2,000 mg by mouth nightly. Historical Provider        Review of Systems:  A comprehensive review of systems was negative except for that written in the History of Present Illness. Objective:     Vitals:    06/04/18 0705   BP: 114/68   Pulse: (!) 116   Resp: 18   Temp: 97.6 °F (36.4 °C)   SpO2: 97%   Weight: 83.9 kg (185 lb)   Height: 5' 1\" (1.549 m)       Physical Exam:  Abd: tender to palpation perfuse - No specific area but diffuse pain all over. +Bowel sounds    Assessment:     Probable Sepsis induced by ileus    Plan:     1) Pain control- will administer a dose of Dilaudid IV  2) CT abd with IV contrast now- abdominal flat plate overall unremarkable  3) NG tube placement  4) Admission  5) Elevated Lactate: Will await CT IV contrast for further evaluation;  Will repeat lactate labs and continue IV Zosyn    Signed By:  Eusebio Clark MD     June 4, 2018

## 2018-06-04 NOTE — ED NOTES
Hourly rounding complete. Safety  Pt resting   [ x ]  On stretcher with side rails up and bed in locked position, call bell within reach  [  ]  Sitting in chair with casters locked, call bell within reach    Toileting  [ x ] pt denies need to use bathroom  [  ] pt assisted to bathroom  [  ] pt assisted with bedpan  [  ] pt independent to bathroom as needed    Ongoing Plan of Care  Plan of care and expected time for test and results reviewed with pt. Pain Management / Comfort  [  ] dimmed lights  [ x ] warm blanket provided  [x  ] pain assessed  [ x ] monitor alarms reviewed    Pt and pt brother updated on plan of care. Call bell within reach. Pt with no needs expressed at this time. Pt states that her pain level is manageable and she does not desire medication at this time. Central monitor in place with sinus tach noted.

## 2018-06-04 NOTE — ED NOTES
Per LJ So, pharmacist, pt noted to be taking tylenol arthritis and tylenol #3 around the clock per pt and family member while completing pt's med rec. Dr. Chandler Benz informed and tylenol level order placed.

## 2018-06-04 NOTE — ED NOTES
Hourly rounding complete. Safety  Pt resting   [ x ]  On stretcher with side rails up and bed in locked position, call bell within reach  [  ]  Sitting in chair with casters locked, call bell within reach    Toileting  [x  ] pt denies need to use bathroom  [  ] pt assisted to bathroom  [  ] pt assisted with bedpan  [  ] pt independent to bathroom as needed    Ongoing Plan of Care  Plan of care and expected time for test and results reviewed with pt. Pain Management / Comfort  [  ] dimmed lights  [x  ] warm blanket provided  [x  ] pain assessed  [ x ] monitor alarms reviewed      Humidity added to O2 for pt comfort. Awaiting ICU for transfer.

## 2018-06-04 NOTE — IP AVS SNAPSHOT
Summary of Care Report The Summary of Care report has been created to help improve care coordination. Users with access to [employer] or [address] (Web-based application) may access additional patient information including the Discharge Summary. If you are not currently a [address] user and need more information, please call the number listed below in the [place] [address] section and ask to be connected with Medical Records. Facility Information Name Address Phone [facility] [place] [address] [place] [id number] [phone] Patient Information Patient Name Sex  Fausto Backer ([id number]) Female 1950 Discharge Information Admitting Provider Service Area Unit Shae Guy MD / [address] [place] [address] Intensive Care / [phone] Discharge Provider Discharge Date/Time Discharge Disposition Destination (none) 7/10/2018 Afternoon (Pending) SNF (none) Patient Language Language ENGLISH [13] Hospital Problems as of 7/10/2018  Reviewed: 2018 12:37 AM by Aarti Lawton MD  
  
  
  
 Class Noted - Resolved Last Modified POA Active Problems Ovarian ca ([facility] [address]  2018 - Present 2018 by Varsha Schroeder MD Yes Entered by Emily Stevenson MD  
  Abdominal pain  2018 - Present 2018 by Varsha Schroeder MD Yes Entered by Dm Martin MD  
  * (Principal)Sepsis [facility])  2018 - Present 2018 by Varsha Schroeder MD Yes Entered by Varsha Schroeder MD  
  Oliguria  2018 - Present 2018 by Varsha Schroeder MD Yes Entered by Varsha Schroeder MD  
  Acute respiratory failure ([facility] [address]  2018 - Present 2018 by Varsha Schroeder MD No  
  Entered by Jarad Alan MD  
  JESS (acute kidney injury) [facility])  2018 - Present 2018 by Jarad Alan MD Unknown   Entered by Jarad Alan MD  
 Metabolic acidosis  7/3/0580 - Present 6/16/2018 by Danilo Kincaid MD No  
  Entered by Luther Carey MD  
  Acute deep vein thrombosis (DVT) of lower extremity ([facility] [address]  6/7/2018 - Present 6/16/2018 by Danilo Kincaid MD Yes Entered by Luther Carey MD  
  S/P ileostomy [facility])  6/16/2018 - Present 6/16/2018 by Danilo Kincaid MD No  
  Entered by Danilo Kincaid MD  
  Hypoalbuminemia  6/16/2018 - Present 6/16/2018 by Danilo Kincaid MD Unknown Entered by Danilo Kincaid MD  
  Peritonitis [facility])  6/16/2018 - Present 6/16/2018 by Danilo Kincaid MD Unknown Entered by Danilo Kincaid MD  
  Hypernatremia  6/25/2018 - Present 6/25/2018 by Luther Carey MD Unknown Entered by Luther Carey MD  
  Hypokalemia  6/27/2018 - Present 6/27/2018 by Luther Carey MD Unknown Entered by Luther Carey MD  
  Clear cell adenocarcinoma of ovary, left ([facility] [address]  7/9/2018 - Present 7/9/2018 by Harsha Salazar MD Yes Entered by Harsha Salazar MD  
  
Non-Hospital Problems as of 7/10/2018  Reviewed: 6/5/2018 12:37 AM by Dao Castano MD  
  
  
  
 Class Noted - Resolved Last Modified Active Problems Severe obesity (BMI 35.0-39.9) ([facility] [address]  5/31/2018 - Present 5/31/2018 by Harsha Salazar MD  
  Entered by Harsha Salazar MD  
  
You are allergic to the following Allergen Reactions Hydrocodone Other (comments) Breaks into cold sweat Metformin Other (comments) Breaks out into cold sweat. Can Take Glucophage brand name med Current Discharge Medication List  
  
START taking these medications Dose & Instructions Dispensing Information Comments  
 acetaminophen 325 mg tablet Commonly known as:  TYLENOL Replaces:  TYLENOL ARTHRITIS PAIN 650 mg Tber Dose:  325 mg Take 1 Tab by mouth every six (6) hours as needed for Pain or Fever. Quantity:  30 Tab Refills:  0  
   
 alum-mag hydroxide-simeth 200-200-20 mg/5 mL Susp Commonly known as:  MYLANTA Dose:  30 mL Take 30 mL by mouth every four (4) hours as needed for up to 14 days. Quantity:  1 Bottle Refills:  0  
   
 furosemide 40 mg tablet Commonly known as:  LASIX Dose:  40 mg Take 1 Tab by mouth daily. Quantity:  30 Tab Refills:  0  
   
 insulin glargine 100 unit/mL injection Commonly known as:  LANTUS Start taking on:  7/11/2018  
 20 units subcutaneous daily at 0900 Quantity:  1 Vial  
Refills:  0  
   
 insulin lispro 100 unit/mL injection Commonly known as:  HUMALOG As of 6/22/2018: INITIATE INSULIN CORRECTIVE PROTOCOL: Normal Insulin Sensitivity  For Blood Sugar (mg/dL) of:  Less than 150 = 0 units  150 -199 = 2 units 200 -249 = 4 units 250 -299 = 6 units 300 -349 = 8 units 350 and above = 10 units and Call Physician Quantity:  1 Vial  
Refills:  0  
   
 ipratropium 0.02 % Soln Commonly known as:  ATROVENT Dose:  0.5 mg  
2.5 mL by Nebulization route every four (4) hours as needed for up to 14 days. Quantity:  1 Vial  
Refills:  0  
   
 metoprolol tartrate 50 mg tablet Commonly known as:  LOPRESSOR Dose:  50 mg Take 1 Tab by mouth every twelve (12) hours for 14 days. Quantity:  28 Tab Refills:  0 Omeprazole Magnesium 10 mg Sudr  
Commonly known as:  PriLOSEC Dose:  20 mg Take 20 mg by mouth daily. Quantity:  30 Each Refills:  0  
   
 saliva stimulant Spry Commonly known as:  Leung Laming Dose:  1 Spray Take 1 Spray by mouth as needed for Other (Dry Mouth). Quantity:  1 Bottle Refills:  0  
   
 sertraline 50 mg tablet Commonly known as:  ZOLOFT Start taking on:  7/11/2018 Dose:  50 mg Take 1 Tab by mouth daily. Quantity:  30 Tab Refills:  0 CONTINUE these medications which have NOT CHANGED Dose & Instructions Dispensing Information Comments FLONASE ALLERGY RELIEF 50 mcg/actuation nasal spray Generic drug:  fluticasone Dose:  2 Spray 2 Sprays by Both Nostrils route daily as needed for Rhinitis. Refills:  0 STOP taking these medications Comments  
 acetaminophen-codeine 300-30 mg per tablet Commonly known as:  TYLENOL #3 amLODIPine 10 mg tablet Commonly known as:  NORVASC  
   
   
 atorvastatin 10 mg tablet Commonly known as:  LIPITOR  
   
   
 glucophage  mg tablet Generic drug:  metFORMIN ER  
   
   
 GOODY'S EXTRA STRENGTH 500-325-65 mg Pwpk Generic drug:  Aspirin-Acetaminophen-Caffeine HYDROmorphone 2 mg tablet Commonly known as:  DILAUDID  
   
   
 losartan-hydroCHLOROthiazide 100-12.5 mg per tablet Commonly known as:  HYZAAR  
   
   
 phenylephrine 1 % Spry Commonly known as:  NEOSYNEPHRINE  
   
   
 TYLENOL ARTHRITIS PAIN 650 mg Tber Generic drug:  acetaminophen Replaced by:  acetaminophen 325 mg tablet  
   
   
 verapamil  mg CR capsule Commonly known as:  Nik Patterson Surgery Information ID Date/Time Status Primary Surgeon All Procedures Location [id number] 6/5/2018 [address] [facility] 271 MD Francois DIAGNOSTIC LAPAROSCOPY CONVERTED TO LAPAROTOMY AT 0225, PERITONEAL BIOPSIES, AEROBIC AND ANAEROBIC CULTURES OF PERITONEAL FLUID, PERITONEAL LAVAGE 
LAPAROTOMY EXPLORATORY [facility] MAIN OR    
 [id number] 6/9/2018 Posted   ZZANESTHESIA [facility] - DO NOT SCHEDULE    
 [id number] 6/9/2018 Posted Radha Khan MD BRONCHOSCOPY [facility] ENDOSCOPY    
 [id number] 6/15/2018 [address] YANDEL Herrera MD WOUND EXPLORATION, EXPLORATORY LAPAROTOMY, ILLEOSTMOY, LYSES OF ADHESIONS AND WOUND VAC PLACEMENT [facility] MAIN OR Follow-up Information Follow up With Details Comments Contact Info Ade Salguero, [address] [place] [address] 
[phone] Discharge Instructions None Chart Review Routing History No Routing History on File

## 2018-06-04 NOTE — PROGRESS NOTES
S: Pt. States that pain has increased since this morning specifically in right lower quadrant.  NG tube in place- 700cc emptied- biliary color  O: tachycardic, O2 sats: 80s to 90s with oxygen mask, afebrile, RR:20  Abdomen: TTP right lower quadrant  Incision site c/d/i  A/P: POD# 4 s/p ovarian cancer debulking and appendectomy for Clear Cell Carcinoma  1) Pain control: Given Dilaudid 4mg IV x 1 and will continue with PCA pump  2) cbc, cmp, lactic acid ordered  3) Will await lab results/CT results

## 2018-06-04 NOTE — PROGRESS NOTES
S: Pt. Having central line inserted; still oliguric  O: afebrile; still tahcycardic; 97%; RR: 20  Labs: metabolic acidosis primary cause appears to be from lactic acidosis  1) CT with nonspecific findings- stool collection up to hepatic flexure in area of pain- not clearly delineating pneumatosis.   2) General surgery consult- will await further reccomendations

## 2018-06-04 NOTE — PROGRESS NOTES
Spoke to RN about PICC placement. Radiologist has concerns about pending blood cultures. Pt has IV access, will consider placement in the morning.

## 2018-06-04 NOTE — PROCEDURES
CIRA [address] [place] [address]  Phone [phone]   Fax [phone]    Pulmonary Critical Care & Sleep Medicine         Central Line Procedure Note      Indication: Sepsis protocol    Risks, benefits, alternatives explained and consent obtained. All risks including pneumothorax bleeding and respiratory failure explained. All questions answered. Central line Bundle:    Full sterile barrier precautions used. 7-Step Sterility Protocol followed. (cap, mask sterile gown, sterile gloves, large sterile sheet, hand hygiene, 2% chlorhexidine for cutaneous antisepsis)  5 mL 1% Lidocaine placed at insertion site. Subclavian line Right cannulated x 1 attempt(s) utilizing the modified Seldinger technique. mild  Good blood return. Catheter secured & Biopatch applied. Sterile Tegaderm placed. Patient tolerated procedure well. No complication noted.     CXR reviewed at bedside pending official report no pneumo line position looks good    Kenneth Gerber MD   Pulmonary Critical Care & Sleep Medicine   5:27 PM

## 2018-06-05 ENCOUNTER — APPOINTMENT (OUTPATIENT)
Dept: GENERAL RADIOLOGY | Age: 68
DRG: 853 | End: 2018-06-05
Attending: INTERNAL MEDICINE
Payer: MEDICARE

## 2018-06-05 ENCOUNTER — ANESTHESIA (OUTPATIENT)
Dept: SURGERY | Age: 68
DRG: 853 | End: 2018-06-05
Payer: MEDICARE

## 2018-06-05 ENCOUNTER — ANESTHESIA EVENT (OUTPATIENT)
Dept: SURGERY | Age: 68
DRG: 853 | End: 2018-06-05
Payer: MEDICARE

## 2018-06-05 PROBLEM — N17.9 AKI (ACUTE KIDNEY INJURY) (HCC): Status: ACTIVE | Noted: 2018-06-05

## 2018-06-05 PROBLEM — E87.20 METABOLIC ACIDOSIS: Status: ACTIVE | Noted: 2018-06-05

## 2018-06-05 PROBLEM — J96.00 ACUTE RESPIRATORY FAILURE (HCC): Status: ACTIVE | Noted: 2018-06-05

## 2018-06-05 LAB
ALBUMIN SERPL-MCNC: 1.9 G/DL (ref 3.4–5)
ALBUMIN/GLOB SERPL: 0.6 {RATIO} (ref 0.8–1.7)
ALP SERPL-CCNC: 50 U/L (ref 45–117)
ALT SERPL-CCNC: 27 U/L (ref 13–56)
ANION GAP SERPL CALC-SCNC: 15 MMOL/L (ref 3–18)
ARTERIAL PATENCY WRIST A: ABNORMAL
AST SERPL-CCNC: 32 U/L (ref 15–37)
BASE DEFICIT BLD-SCNC: 8 MMOL/L
BASOPHILS # BLD: 0 K/UL (ref 0–0.1)
BASOPHILS NFR BLD: 0 % (ref 0–3)
BDY SITE: ABNORMAL
BILIRUB SERPL-MCNC: 1.1 MG/DL (ref 0.2–1)
BODY TEMPERATURE: 99
BUN SERPL-MCNC: 25 MG/DL (ref 7–18)
BUN/CREAT SERPL: 12 (ref 12–20)
CA-I SERPL-SCNC: 1.07 MMOL/L (ref 1.12–1.32)
CALCIUM SERPL-MCNC: 7.5 MG/DL (ref 8.5–10.1)
CHLORIDE SERPL-SCNC: 100 MMOL/L (ref 100–108)
CO2 SERPL-SCNC: 19 MMOL/L (ref 21–32)
CREAT SERPL-MCNC: 2.13 MG/DL (ref 0.6–1.3)
DIFFERENTIAL METHOD BLD: ABNORMAL
EOSINOPHIL # BLD: 0.2 K/UL (ref 0–0.4)
EOSINOPHIL NFR BLD: 1 % (ref 0–5)
ERYTHROCYTE [DISTWIDTH] IN BLOOD BY AUTOMATED COUNT: 19.8 % (ref 11.6–14.5)
GAS FLOW.O2 O2 DELIVERY SYS: ABNORMAL L/MIN
GAS FLOW.O2 SETTING OXYMISER: 16 BPM
GLOBULIN SER CALC-MCNC: 3.3 G/DL (ref 2–4)
GLUCOSE BLD STRIP.AUTO-MCNC: 151 MG/DL (ref 70–110)
GLUCOSE BLD STRIP.AUTO-MCNC: 176 MG/DL (ref 70–110)
GLUCOSE BLD STRIP.AUTO-MCNC: 190 MG/DL (ref 70–110)
GLUCOSE BLD STRIP.AUTO-MCNC: 201 MG/DL (ref 70–110)
GLUCOSE SERPL-MCNC: 191 MG/DL (ref 74–99)
HCO3 BLD-SCNC: 18 MMOL/L (ref 22–26)
HCT VFR BLD AUTO: 35 % (ref 35–45)
HGB BLD-MCNC: 10.6 G/DL (ref 12–16)
INSPIRATION.DURATION SETTING TIME VENT: 0.9 SEC
LACTATE SERPL-SCNC: 2.9 MMOL/L (ref 0.4–2)
LACTATE SERPL-SCNC: 3.6 MMOL/L (ref 0.4–2)
LACTATE SERPL-SCNC: 3.8 MMOL/L (ref 0.4–2)
LYMPHOCYTES # BLD: 1.8 K/UL (ref 0.8–3.5)
LYMPHOCYTES NFR BLD: 12 % (ref 20–51)
MAGNESIUM SERPL-MCNC: 1.8 MG/DL (ref 1.6–2.6)
MAGNESIUM SERPL-MCNC: 2.3 MG/DL (ref 1.6–2.6)
MCH RBC QN AUTO: 21.9 PG (ref 24–34)
MCHC RBC AUTO-ENTMCNC: 30.3 G/DL (ref 31–37)
MCV RBC AUTO: 72.2 FL (ref 74–97)
MONOCYTES # BLD: 0.9 K/UL (ref 0–1)
MONOCYTES NFR BLD: 6 % (ref 2–9)
NEUTS BAND NFR BLD MANUAL: 27 % (ref 0–5)
NEUTS SEG # BLD: 8.2 K/UL (ref 1.8–8)
NEUTS SEG NFR BLD: 54 % (ref 42–75)
O2/TOTAL GAS SETTING VFR VENT: 1 %
PCO2 BLD: 33.5 MMHG (ref 35–45)
PEEP RESPIRATORY: 10 CMH2O
PH BLD: 7.34 [PH] (ref 7.35–7.45)
PIP ISTAT,IPIP: 18
PLATELET # BLD AUTO: 264 K/UL (ref 135–420)
PMV BLD AUTO: 10.7 FL (ref 9.2–11.8)
PO2 BLD: 70 MMHG (ref 80–100)
POTASSIUM SERPL-SCNC: 3.9 MMOL/L (ref 3.5–5.5)
PROT SERPL-MCNC: 5.2 G/DL (ref 6.4–8.2)
RBC # BLD AUTO: 4.85 M/UL (ref 4.2–5.3)
RBC MORPH BLD: ABNORMAL
SAO2 % BLD: 93 % (ref 92–97)
SERVICE CMNT-IMP: ABNORMAL
SODIUM SERPL-SCNC: 134 MMOL/L (ref 136–145)
SPECIMEN TYPE: ABNORMAL
TOTAL RESP. RATE, ITRR: 18
VENTILATION MODE VENT: ABNORMAL
VOLUME CONTROL PLUS IVLCP: YES
VT SETTING VENT: 450 ML
WBC # BLD AUTO: 15.1 K/UL (ref 4.6–13.2)

## 2018-06-05 PROCEDURE — 77030011264 HC ELECTRD BLD EXT COVD -A: Performed by: OBSTETRICS & GYNECOLOGY

## 2018-06-05 PROCEDURE — 94640 AIRWAY INHALATION TREATMENT: CPT

## 2018-06-05 PROCEDURE — 77030031139 HC SUT VCRL2 J&J -A: Performed by: OBSTETRICS & GYNECOLOGY

## 2018-06-05 PROCEDURE — 74011000258 HC RX REV CODE- 258: Performed by: INTERNAL MEDICINE

## 2018-06-05 PROCEDURE — 77030018673: Performed by: OBSTETRICS & GYNECOLOGY

## 2018-06-05 PROCEDURE — 77030016151 HC PROTCTR LNS DFOG COVD -B: Performed by: OBSTETRICS & GYNECOLOGY

## 2018-06-05 PROCEDURE — 94002 VENT MGMT INPAT INIT DAY: CPT

## 2018-06-05 PROCEDURE — 74011250636 HC RX REV CODE- 250/636: Performed by: INTERNAL MEDICINE

## 2018-06-05 PROCEDURE — 88112 CYTOPATH CELL ENHANCE TECH: CPT | Performed by: OBSTETRICS & GYNECOLOGY

## 2018-06-05 PROCEDURE — 87077 CULTURE AEROBIC IDENTIFY: CPT | Performed by: OBSTETRICS & GYNECOLOGY

## 2018-06-05 PROCEDURE — 77030018836 HC SOL IRR NACL ICUM -A: Performed by: OBSTETRICS & GYNECOLOGY

## 2018-06-05 PROCEDURE — 77030018835 HC SOL IRR LR ICUM -A: Performed by: OBSTETRICS & GYNECOLOGY

## 2018-06-05 PROCEDURE — 71045 X-RAY EXAM CHEST 1 VIEW: CPT

## 2018-06-05 PROCEDURE — 88305 TISSUE EXAM BY PATHOLOGIST: CPT | Performed by: OBSTETRICS & GYNECOLOGY

## 2018-06-05 PROCEDURE — 82330 ASSAY OF CALCIUM: CPT | Performed by: SURGERY

## 2018-06-05 PROCEDURE — 87205 SMEAR GRAM STAIN: CPT | Performed by: OBSTETRICS & GYNECOLOGY

## 2018-06-05 PROCEDURE — 77030038020 HC MANFLD NEPTUNE STRY -B: Performed by: OBSTETRICS & GYNECOLOGY

## 2018-06-05 PROCEDURE — 74011250636 HC RX REV CODE- 250/636: Performed by: EMERGENCY MEDICINE

## 2018-06-05 PROCEDURE — 74011000258 HC RX REV CODE- 258: Performed by: EMERGENCY MEDICINE

## 2018-06-05 PROCEDURE — 77030008477 HC STYL SATN SLP COVD -A: Performed by: ANESTHESIOLOGY

## 2018-06-05 PROCEDURE — 77010033678 HC OXYGEN DAILY

## 2018-06-05 PROCEDURE — 77030008603 HC TRCR ENDOSC EPATH J&J -C: Performed by: OBSTETRICS & GYNECOLOGY

## 2018-06-05 PROCEDURE — 83605 ASSAY OF LACTIC ACID: CPT | Performed by: INTERNAL MEDICINE

## 2018-06-05 PROCEDURE — 82962 GLUCOSE BLOOD TEST: CPT

## 2018-06-05 PROCEDURE — 0WJG4ZZ INSPECTION OF PERITONEAL CAVITY, PERCUTANEOUS ENDOSCOPIC APPROACH: ICD-10-PCS | Performed by: OBSTETRICS & GYNECOLOGY

## 2018-06-05 PROCEDURE — 0BBT0ZX EXCISION OF DIAPHRAGM, OPEN APPROACH, DIAGNOSTIC: ICD-10-PCS | Performed by: OBSTETRICS & GYNECOLOGY

## 2018-06-05 PROCEDURE — 74011250636 HC RX REV CODE- 250/636

## 2018-06-05 PROCEDURE — 87075 CULTR BACTERIA EXCEPT BLOOD: CPT | Performed by: OBSTETRICS & GYNECOLOGY

## 2018-06-05 PROCEDURE — 77030011640 HC PAD GRND REM COVD -A: Performed by: OBSTETRICS & GYNECOLOGY

## 2018-06-05 PROCEDURE — 77030039266 HC ADH SKN EXOFIN S2SG -A: Performed by: OBSTETRICS & GYNECOLOGY

## 2018-06-05 PROCEDURE — 74011250636 HC RX REV CODE- 250/636: Performed by: SURGERY

## 2018-06-05 PROCEDURE — 82803 BLOOD GASES ANY COMBINATION: CPT

## 2018-06-05 PROCEDURE — 76010000131 HC OR TIME 2 TO 2.5 HR: Performed by: OBSTETRICS & GYNECOLOGY

## 2018-06-05 PROCEDURE — 77030032490 HC SLV COMPR SCD KNE COVD -B: Performed by: OBSTETRICS & GYNECOLOGY

## 2018-06-05 PROCEDURE — 74011000250 HC RX REV CODE- 250: Performed by: INTERNAL MEDICINE

## 2018-06-05 PROCEDURE — 93970 EXTREMITY STUDY: CPT

## 2018-06-05 PROCEDURE — 77030034154 HC SHR COAG HARM ACE J&J -F: Performed by: OBSTETRICS & GYNECOLOGY

## 2018-06-05 PROCEDURE — 83605 ASSAY OF LACTIC ACID: CPT | Performed by: OBSTETRICS & GYNECOLOGY

## 2018-06-05 PROCEDURE — 74011636637 HC RX REV CODE- 636/637: Performed by: INTERNAL MEDICINE

## 2018-06-05 PROCEDURE — 74011000250 HC RX REV CODE- 250: Performed by: OBSTETRICS & GYNECOLOGY

## 2018-06-05 PROCEDURE — 74011250637 HC RX REV CODE- 250/637: Performed by: INTERNAL MEDICINE

## 2018-06-05 PROCEDURE — 87086 URINE CULTURE/COLONY COUNT: CPT | Performed by: INTERNAL MEDICINE

## 2018-06-05 PROCEDURE — 65270000029 HC RM PRIVATE

## 2018-06-05 PROCEDURE — C9113 INJ PANTOPRAZOLE SODIUM, VIA: HCPCS | Performed by: INTERNAL MEDICINE

## 2018-06-05 PROCEDURE — 77030006643: Performed by: ANESTHESIOLOGY

## 2018-06-05 PROCEDURE — 83735 ASSAY OF MAGNESIUM: CPT | Performed by: OBSTETRICS & GYNECOLOGY

## 2018-06-05 PROCEDURE — P9045 ALBUMIN (HUMAN), 5%, 250 ML: HCPCS | Performed by: INTERNAL MEDICINE

## 2018-06-05 PROCEDURE — 74011000250 HC RX REV CODE- 250

## 2018-06-05 PROCEDURE — 36600 WITHDRAWAL OF ARTERIAL BLOOD: CPT

## 2018-06-05 PROCEDURE — 80053 COMPREHEN METABOLIC PANEL: CPT | Performed by: OBSTETRICS & GYNECOLOGY

## 2018-06-05 PROCEDURE — 77030002933 HC SUT MCRYL J&J -A: Performed by: OBSTETRICS & GYNECOLOGY

## 2018-06-05 PROCEDURE — 76060000035 HC ANESTHESIA 2 TO 2.5 HR: Performed by: OBSTETRICS & GYNECOLOGY

## 2018-06-05 PROCEDURE — 83735 ASSAY OF MAGNESIUM: CPT | Performed by: INTERNAL MEDICINE

## 2018-06-05 PROCEDURE — 74011000258 HC RX REV CODE- 258

## 2018-06-05 PROCEDURE — 0WBH0ZX EXCISION OF RETROPERITONEUM, OPEN APPROACH, DIAGNOSTIC: ICD-10-PCS | Performed by: OBSTETRICS & GYNECOLOGY

## 2018-06-05 PROCEDURE — 87186 SC STD MICRODIL/AGAR DIL: CPT | Performed by: OBSTETRICS & GYNECOLOGY

## 2018-06-05 PROCEDURE — 77030002966 HC SUT PDS J&J -A: Performed by: OBSTETRICS & GYNECOLOGY

## 2018-06-05 PROCEDURE — 85025 COMPLETE CBC W/AUTO DIFF WBC: CPT | Performed by: OBSTETRICS & GYNECOLOGY

## 2018-06-05 PROCEDURE — 0UBF0ZX EXCISION OF CUL-DE-SAC, OPEN APPROACH, DIAGNOSTIC: ICD-10-PCS | Performed by: OBSTETRICS & GYNECOLOGY

## 2018-06-05 PROCEDURE — 77030008683 HC TU ET CUF COVD -A: Performed by: ANESTHESIOLOGY

## 2018-06-05 PROCEDURE — 77030013079 HC BLNKT BAIR HGGR 3M -A: Performed by: ANESTHESIOLOGY

## 2018-06-05 RX ORDER — IPRATROPIUM BROMIDE AND ALBUTEROL SULFATE 2.5; .5 MG/3ML; MG/3ML
3 SOLUTION RESPIRATORY (INHALATION)
Status: DISCONTINUED | OUTPATIENT
Start: 2018-06-05 | End: 2018-06-06

## 2018-06-05 RX ORDER — MAGNESIUM SULFATE 1 G/100ML
1 INJECTION INTRAVENOUS ONCE
Status: COMPLETED | OUTPATIENT
Start: 2018-06-05 | End: 2018-06-10

## 2018-06-05 RX ORDER — VANCOMYCIN HYDROCHLORIDE
1250 EVERY 24 HOURS
Status: DISCONTINUED | OUTPATIENT
Start: 2018-06-06 | End: 2018-06-06 | Stop reason: DRUGHIGH

## 2018-06-05 RX ORDER — DEXTROSE 50 % IN WATER (D50W) INTRAVENOUS SYRINGE
25-50 AS NEEDED
Status: CANCELLED | OUTPATIENT
Start: 2018-06-05

## 2018-06-05 RX ORDER — ROCURONIUM BROMIDE 10 MG/ML
INJECTION, SOLUTION INTRAVENOUS AS NEEDED
Status: DISCONTINUED | OUTPATIENT
Start: 2018-06-05 | End: 2018-06-05 | Stop reason: HOSPADM

## 2018-06-05 RX ORDER — PHENYLEPHRINE HCL IN 0.9% NACL 1 MG/10 ML
SYRINGE (ML) INTRAVENOUS AS NEEDED
Status: DISCONTINUED | OUTPATIENT
Start: 2018-06-05 | End: 2018-06-05 | Stop reason: HOSPADM

## 2018-06-05 RX ORDER — BUPIVACAINE HYDROCHLORIDE AND EPINEPHRINE 5; 5 MG/ML; UG/ML
INJECTION, SOLUTION EPIDURAL; INTRACAUDAL; PERINEURAL AS NEEDED
Status: DISCONTINUED | OUTPATIENT
Start: 2018-06-05 | End: 2018-06-15 | Stop reason: HOSPADM

## 2018-06-05 RX ORDER — FLUMAZENIL 0.1 MG/ML
0.2 INJECTION INTRAVENOUS
Status: CANCELLED | OUTPATIENT
Start: 2018-06-05

## 2018-06-05 RX ORDER — MIDAZOLAM HYDROCHLORIDE 1 MG/ML
2 INJECTION, SOLUTION INTRAMUSCULAR; INTRAVENOUS
Status: DISCONTINUED | OUTPATIENT
Start: 2018-06-05 | End: 2018-06-07

## 2018-06-05 RX ORDER — ONDANSETRON 2 MG/ML
INJECTION INTRAMUSCULAR; INTRAVENOUS AS NEEDED
Status: DISCONTINUED | OUTPATIENT
Start: 2018-06-05 | End: 2018-06-05 | Stop reason: HOSPADM

## 2018-06-05 RX ORDER — LIDOCAINE HYDROCHLORIDE 20 MG/ML
INJECTION, SOLUTION EPIDURAL; INFILTRATION; INTRACAUDAL; PERINEURAL AS NEEDED
Status: DISCONTINUED | OUTPATIENT
Start: 2018-06-05 | End: 2018-06-05 | Stop reason: HOSPADM

## 2018-06-05 RX ORDER — FENTANYL CITRATE 50 UG/ML
INJECTION, SOLUTION INTRAMUSCULAR; INTRAVENOUS AS NEEDED
Status: DISCONTINUED | OUTPATIENT
Start: 2018-06-05 | End: 2018-06-05 | Stop reason: HOSPADM

## 2018-06-05 RX ORDER — PROPOFOL 10 MG/ML
INJECTION, EMULSION INTRAVENOUS AS NEEDED
Status: DISCONTINUED | OUTPATIENT
Start: 2018-06-05 | End: 2018-06-05 | Stop reason: HOSPADM

## 2018-06-05 RX ORDER — SODIUM CHLORIDE 0.9 % (FLUSH) 0.9 %
5-10 SYRINGE (ML) INJECTION AS NEEDED
Status: CANCELLED | OUTPATIENT
Start: 2018-06-05

## 2018-06-05 RX ORDER — KETAMINE HYDROCHLORIDE 10 MG/ML
INJECTION, SOLUTION INTRAMUSCULAR; INTRAVENOUS AS NEEDED
Status: DISCONTINUED | OUTPATIENT
Start: 2018-06-05 | End: 2018-06-05 | Stop reason: HOSPADM

## 2018-06-05 RX ORDER — SUCCINYLCHOLINE CHLORIDE 20 MG/ML
INJECTION INTRAMUSCULAR; INTRAVENOUS AS NEEDED
Status: DISCONTINUED | OUTPATIENT
Start: 2018-06-05 | End: 2018-06-05 | Stop reason: HOSPADM

## 2018-06-05 RX ORDER — CHLORHEXIDINE GLUCONATE 1.2 MG/ML
10 RINSE ORAL EVERY 12 HOURS
Status: DISCONTINUED | OUTPATIENT
Start: 2018-06-05 | End: 2018-06-08

## 2018-06-05 RX ORDER — MAGNESIUM SULFATE 100 %
4 CRYSTALS MISCELLANEOUS AS NEEDED
Status: CANCELLED | OUTPATIENT
Start: 2018-06-05

## 2018-06-05 RX ORDER — MIDAZOLAM HYDROCHLORIDE 1 MG/ML
INJECTION, SOLUTION INTRAMUSCULAR; INTRAVENOUS AS NEEDED
Status: DISCONTINUED | OUTPATIENT
Start: 2018-06-05 | End: 2018-06-05 | Stop reason: HOSPADM

## 2018-06-05 RX ORDER — FLUTICASONE PROPIONATE 50 MCG
2 SPRAY, SUSPENSION (ML) NASAL
Status: DISCONTINUED | OUTPATIENT
Start: 2018-06-05 | End: 2018-07-11 | Stop reason: HOSPADM

## 2018-06-05 RX ORDER — SODIUM CHLORIDE, SODIUM LACTATE, POTASSIUM CHLORIDE, CALCIUM CHLORIDE 600; 310; 30; 20 MG/100ML; MG/100ML; MG/100ML; MG/100ML
1000 INJECTION, SOLUTION INTRAVENOUS CONTINUOUS
Status: CANCELLED | OUTPATIENT
Start: 2018-06-05

## 2018-06-05 RX ORDER — SODIUM CHLORIDE, SODIUM LACTATE, POTASSIUM CHLORIDE, CALCIUM CHLORIDE 600; 310; 30; 20 MG/100ML; MG/100ML; MG/100ML; MG/100ML
INJECTION, SOLUTION INTRAVENOUS
Status: DISCONTINUED | OUTPATIENT
Start: 2018-06-05 | End: 2018-06-05 | Stop reason: HOSPADM

## 2018-06-05 RX ORDER — FUROSEMIDE 10 MG/ML
80 INJECTION INTRAMUSCULAR; INTRAVENOUS ONCE
Status: COMPLETED | OUTPATIENT
Start: 2018-06-05 | End: 2018-06-05

## 2018-06-05 RX ORDER — INSULIN LISPRO 100 [IU]/ML
INJECTION, SOLUTION INTRAVENOUS; SUBCUTANEOUS ONCE
Status: CANCELLED | OUTPATIENT
Start: 2018-06-05 | End: 2018-06-05

## 2018-06-05 RX ORDER — ALBUMIN HUMAN 50 G/1000ML
25 SOLUTION INTRAVENOUS EVERY 6 HOURS
Status: DISCONTINUED | OUTPATIENT
Start: 2018-06-05 | End: 2018-06-06

## 2018-06-05 RX ORDER — NALOXONE HYDROCHLORIDE 0.4 MG/ML
0.1 INJECTION, SOLUTION INTRAMUSCULAR; INTRAVENOUS; SUBCUTANEOUS AS NEEDED
Status: CANCELLED | OUTPATIENT
Start: 2018-06-05

## 2018-06-05 RX ADMIN — MIDAZOLAM HYDROCHLORIDE 2 MG: 1 INJECTION, SOLUTION INTRAMUSCULAR; INTRAVENOUS at 04:57

## 2018-06-05 RX ADMIN — INSULIN LISPRO 2 UNITS: 100 INJECTION, SOLUTION INTRAVENOUS; SUBCUTANEOUS at 23:55

## 2018-06-05 RX ADMIN — SODIUM CHLORIDE 40 MG: 9 INJECTION INTRAMUSCULAR; INTRAVENOUS; SUBCUTANEOUS at 09:05

## 2018-06-05 RX ADMIN — Medication 100 MCG/HR: at 17:17

## 2018-06-05 RX ADMIN — KETAMINE HYDROCHLORIDE 10 MG: 10 INJECTION, SOLUTION INTRAMUSCULAR; INTRAVENOUS at 02:09

## 2018-06-05 RX ADMIN — PIPERACILLIN SODIUM,TAZOBACTAM SODIUM 2.25 G: 2; .25 INJECTION, POWDER, FOR SOLUTION INTRAVENOUS at 20:15

## 2018-06-05 RX ADMIN — Medication 100 MCG: at 01:44

## 2018-06-05 RX ADMIN — CALCIUM GLUCONATE 2 G: 94 INJECTION, SOLUTION INTRAVENOUS at 07:27

## 2018-06-05 RX ADMIN — MIDAZOLAM HYDROCHLORIDE 1 MG: 1 INJECTION, SOLUTION INTRAMUSCULAR; INTRAVENOUS at 01:28

## 2018-06-05 RX ADMIN — ALBUMIN (HUMAN) 25 G: 12.5 INJECTION, SOLUTION INTRAVENOUS at 13:02

## 2018-06-05 RX ADMIN — ONDANSETRON 4 MG: 2 INJECTION INTRAMUSCULAR; INTRAVENOUS at 02:41

## 2018-06-05 RX ADMIN — IPRATROPIUM BROMIDE AND ALBUTEROL SULFATE 3 ML: .5; 3 SOLUTION RESPIRATORY (INHALATION) at 20:04

## 2018-06-05 RX ADMIN — MIDAZOLAM HYDROCHLORIDE 2 MG: 1 INJECTION, SOLUTION INTRAMUSCULAR; INTRAVENOUS at 22:54

## 2018-06-05 RX ADMIN — INSULIN LISPRO 2 UNITS: 100 INJECTION, SOLUTION INTRAVENOUS; SUBCUTANEOUS at 00:10

## 2018-06-05 RX ADMIN — INSULIN LISPRO 4 UNITS: 100 INJECTION, SOLUTION INTRAVENOUS; SUBCUTANEOUS at 18:24

## 2018-06-05 RX ADMIN — PIPERACILLIN SODIUM,TAZOBACTAM SODIUM 2.25 G: 2; .25 INJECTION, POWDER, FOR SOLUTION INTRAVENOUS at 14:50

## 2018-06-05 RX ADMIN — PIPERACILLIN SODIUM,TAZOBACTAM SODIUM 3.38 G: 3; .375 INJECTION, POWDER, FOR SOLUTION INTRAVENOUS at 01:59

## 2018-06-05 RX ADMIN — Medication 100 MCG: at 03:10

## 2018-06-05 RX ADMIN — SODIUM CHLORIDE, SODIUM LACTATE, POTASSIUM CHLORIDE, AND CALCIUM CHLORIDE 500 ML: 600; 310; 30; 20 INJECTION, SOLUTION INTRAVENOUS at 16:00

## 2018-06-05 RX ADMIN — Medication 100 MCG: at 01:42

## 2018-06-05 RX ADMIN — Medication 100 MCG: at 02:20

## 2018-06-05 RX ADMIN — Medication 100 MCG: at 01:56

## 2018-06-05 RX ADMIN — PROPOFOL 80 MG: 10 INJECTION, EMULSION INTRAVENOUS at 01:38

## 2018-06-05 RX ADMIN — LIDOCAINE HYDROCHLORIDE 40 MG: 20 INJECTION, SOLUTION EPIDURAL; INFILTRATION; INTRACAUDAL; PERINEURAL at 01:38

## 2018-06-05 RX ADMIN — MAGNESIUM SULFATE HEPTAHYDRATE 1 G: 1 INJECTION, SOLUTION INTRAVENOUS at 09:05

## 2018-06-05 RX ADMIN — CHLORHEXIDINE GLUCONATE 10 ML: 1.2 RINSE ORAL at 10:30

## 2018-06-05 RX ADMIN — Medication 50 MCG/HR: at 03:58

## 2018-06-05 RX ADMIN — SODIUM CHLORIDE, SODIUM LACTATE, POTASSIUM CHLORIDE, CALCIUM CHLORIDE: 600; 310; 30; 20 INJECTION, SOLUTION INTRAVENOUS at 01:28

## 2018-06-05 RX ADMIN — FUROSEMIDE 80 MG: 10 INJECTION, SOLUTION INTRAMUSCULAR; INTRAVENOUS at 10:29

## 2018-06-05 RX ADMIN — METRONIDAZOLE 500 MG: 500 INJECTION, SOLUTION INTRAVENOUS at 23:12

## 2018-06-05 RX ADMIN — Medication 1 MG: at 12:00

## 2018-06-05 RX ADMIN — ROCURONIUM BROMIDE 10 MG: 10 INJECTION, SOLUTION INTRAVENOUS at 02:28

## 2018-06-05 RX ADMIN — IPRATROPIUM BROMIDE AND ALBUTEROL SULFATE 3 ML: .5; 3 SOLUTION RESPIRATORY (INHALATION) at 15:06

## 2018-06-05 RX ADMIN — INSULIN LISPRO 2 UNITS: 100 INJECTION, SOLUTION INTRAVENOUS; SUBCUTANEOUS at 05:29

## 2018-06-05 RX ADMIN — INSULIN LISPRO 2 UNITS: 100 INJECTION, SOLUTION INTRAVENOUS; SUBCUTANEOUS at 12:34

## 2018-06-05 RX ADMIN — PIPERACILLIN SODIUM,TAZOBACTAM SODIUM 3.38 G: 3; .375 INJECTION, POWDER, FOR SOLUTION INTRAVENOUS at 09:09

## 2018-06-05 RX ADMIN — METRONIDAZOLE 500 MG: 500 INJECTION, SOLUTION INTRAVENOUS at 05:21

## 2018-06-05 RX ADMIN — FENTANYL CITRATE 25 MCG: 50 INJECTION, SOLUTION INTRAMUSCULAR; INTRAVENOUS at 01:38

## 2018-06-05 RX ADMIN — IPRATROPIUM BROMIDE AND ALBUTEROL SULFATE 3 ML: .5; 3 SOLUTION RESPIRATORY (INHALATION) at 11:40

## 2018-06-05 RX ADMIN — DEXTROSE MONOHYDRATE AND SODIUM CHLORIDE: 5; .45 INJECTION, SOLUTION INTRAVENOUS at 10:35

## 2018-06-05 RX ADMIN — KETAMINE HYDROCHLORIDE 10 MG: 10 INJECTION, SOLUTION INTRAMUSCULAR; INTRAVENOUS at 02:42

## 2018-06-05 RX ADMIN — METRONIDAZOLE 500 MG: 500 INJECTION, SOLUTION INTRAVENOUS at 13:04

## 2018-06-05 RX ADMIN — MIDAZOLAM HYDROCHLORIDE 2 MG: 1 INJECTION, SOLUTION INTRAMUSCULAR; INTRAVENOUS at 14:50

## 2018-06-05 RX ADMIN — CHLORHEXIDINE GLUCONATE 10 ML: 1.2 RINSE ORAL at 20:20

## 2018-06-05 RX ADMIN — VANCOMYCIN HYDROCHLORIDE 1250 MG: 10 INJECTION, POWDER, LYOPHILIZED, FOR SOLUTION INTRAVENOUS at 04:08

## 2018-06-05 RX ADMIN — SUCCINYLCHOLINE CHLORIDE 120 MG: 20 INJECTION INTRAMUSCULAR; INTRAVENOUS at 01:38

## 2018-06-05 RX ADMIN — ALBUMIN (HUMAN) 25 G: 12.5 INJECTION, SOLUTION INTRAVENOUS at 18:29

## 2018-06-05 RX ADMIN — DEXTROSE MONOHYDRATE AND SODIUM CHLORIDE: 5; .45 INJECTION, SOLUTION INTRAVENOUS at 20:42

## 2018-06-05 NOTE — PROGRESS NOTES
Rpt labs reviewed  Cr slightly up  Lactate still little up  Dr Fernando Zuleta gave another 1 liter fluid  Spoke and discuss about worsening lactate and worry about bowel ischemia with surgical team.   At this point we will support with iv fluids and antibiotics and surgery is following closely for intervention and will defer to them  Continue iv fluids and monitor for worsening acidosis or respiratory failure    Recheck abg in am  Low threshold for intubation  Will monitor closely

## 2018-06-05 NOTE — PROGRESS NOTES
I have reviewed all interval clinical data, discussed with Dr. Farrah Rodgers, Dr. Octavio Hernandez, and Dr. Gary Herzog, and examined and counseled patient. Patient is septic with unclear etiology but intra-abdominal source is suspected. I have counseled patient regarding the unclear etiology and my recommendation to proceed with surgery for diagnosis and treatment if indicated depending on findings. I reviewed surgical plan of diagnostic laparoscopy with laparotomy and bowel resection with anastomosis or ostomy, cystoscopy and any other indicated procedures. I explained to patient that it is possible that surgery may not improve the clinical course. I also informed patient that she will remain intubated at least overnight and until stable from the pulmonary standpoint and will return to the ICU after surgery. Patient requests that I call her brother Shana Devries to review the plan. I have called Dr. Radha Carrero, Anesthesiology, and reviewed clinical findings and surgical plan. She requested 2 units pRBCs crossmatched and 1 unit FFP ordered and this has been done. Posted case with OR. Consent signed with patient. S: complains of pain that has not improved since surgery, most prominent in the RLQ. No nausea or vomiting, no fever, no appetite, loose bowel movements since surgery. No BM today. 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 Sepsis, suspect intra-abdominal source.   Plan diagnostic LPS/LAP

## 2018-06-05 NOTE — PROGRESS NOTES
Patient blood pressure slightly dropped after Versed and increase in fentanyl  We are trying to get CVP recording  Will give 500 cc of LR bolus now   If BP remains low and if CVP is <8 will give fluid boluses till cvp reaches >8 after that will consider levophed  Plan of care discussed with nursing      [address]  Alfredo Alvarado [address] [facility] [address] [facility] [address]  Phone [phone]   Fax [phone]  Pulmonary Critical Care & Sleep Medicine

## 2018-06-05 NOTE — DISCHARGE SUMMARY
GYN Discharge Summary                        Patient ID:  Ranjana Barnett  75 y.o.                                                                  Admission Date: 5/29/2018  Discharge Date:  6/1/2018                                                 Attending: Divya Chicas MD   PCP: Caro Adams MD                                                                                               Reason for admission: Surgery for left pelvic mass    Discharge  diagnosis: Active Problems:    Ovarian ca [facility]) (5/29/2018)      Severe obesity (BMI 35.0-39.9) ([facility] [address] (5/31/2018)        Associated Conditions:        Patient Active Problem List   Diagnosis Code    Ovarian ca ([facility] [address] C56.9    Severe obesity (BMI 35.0-39.9) ([address] E66.01    Abdominal pain R10.9    Sepsis ([facility] [address] A41.9    Ischemic colitis ([facility] [address] K55.9    Oliguria R34              Past Medical History:   Diagnosis Date    Diabetes ([facility] [address]     many years type 2    Hypertension     many years       Operative Procedure: LAPAROSCOPY CONVERTED TO LAPAROTOMY, TOTAL ABDOMINAL HYSTERECTOMY, BILATERAL SALPINGO OOPHORECTOMY, FROZEN PATHOLOGY [facility] SPECIMEN COLLECTION FOR CARIS, BILATERAL PELVIC AND PARA-AORTIC LYMPHADENECTOMY, RADICAL TUMOR DEBULKING TO R0, CYSTOSCOPY    Complications:  none                   Transfusion:  none    Hospital Course: uncomplicated    Condition at discharge: stable, Afebrile, Ambulating and Eating, Drinking, Voiding    Labs:  Lab Results   Component Value Date/Time    WBC 17.4 (H) 06/04/2018 07:05 PM    HGB 10.3 (L) 06/04/2018 07:05 PM    HCT 33.6 (L) 06/04/2018 07:05 PM    PLATELET 294 07/27/5100 07:05 PM    MCV 71.8 (L) 06/04/2018 07:05 PM     Lab Results   Component Value Date/Time    Sodium 140 06/04/2018 07:05 PM    Potassium 4.1 06/04/2018 07:05 PM    Chloride 103 06/04/2018 07:05 PM    CO2 20 (L) 06/04/2018 07:05 PM    Anion gap 17 06/04/2018 07:05 PM    Glucose 191 (H) 06/04/2018 07:05 PM    BUN 19 (H) 06/04/2018 07:05 PM Creatinine 1.93 (H) 06/04/2018 07:05 PM    BUN/Creatinine ratio 10 (L) 06/04/2018 07:05 PM    GFR est AA 31 (L) 06/04/2018 07:05 PM    GFR est non-AA 26 (L) 06/04/2018 07:05 PM         This patient is currently admitted, however, discharge medications can only be displayed within the current admission encounter.         Patient Instructions:        Disposition:  Home    Follow-up:  Follow up with Dr. Arthur Lancaster in 2 weeks    Author:   Alan Flynn MD  Gynecologic Oncology  6/5/2018  12:37 AM

## 2018-06-05 NOTE — BRIEF OP NOTE
BRIEF OPERATIVE NOTE    Date of Procedure: 6/5/2018   Preoperative Diagnosis: Post operative day #5, Abdominal Pain, Sepsis with suspected abdominal source  Postoperative Diagnosis: Post operative day #5, Abdominal Pain, Sepsis with suspected abdominal source, Inflammatory ascites, Inflammatory adhesions   Procedure(s):  DIAGNOSTIC LAPAROSCOPY CONVERTED TO LAPAROTOMY, PERITONEAL BIOPSIES, AEROBIC AND ANAEROBIC CULTURES OF PERITONEAL FLUID, PERITONEAL LAVAGE  Surgeon(s) and Role:     * Dewayne Long MD - Primary         Surgical Assistant: Luisito Padilla SA    Surgical Staff:  Circ-1: Kelton Turcios RN  Scrub Tech-1: Katlin Hilton  Surg Asst-1: Karenmarbinbelkys Kelsy  Event Time In   Incision Start 0204   Incision Close      Anesthesia: General   Anesthesiologist: Maria Teresa Pichardo MD  CRNA: Juan Gray CRNA  Estimated Blood Loss: 20cc  Specimens:   1. Peritoneal fluid for cytology  2. Aerobic, anaerobic cultures and gram stain  3. Anterior cul de sac biopsy  4. Left lolis-colic gutter biopsy  5. Left pelvic side wall biopsy  6. Posterior cul de sac biopsy  7. Right lolis-colic gutter biopsy  8. Right pelvic side wall biopsy  9. Diaphragm biopsy  Findings: Inflammatory adhesions of the small bowel to the anterior abdominal wall and right and left abdominal and pelvic sidewalls. Loop to loop adhesions of the small bowel. Small bowel adherent to the exposed retroperitoneum at the para-aortic, pre-caval and pelvic lymphadenectomy sites. Inflammatory ascites. No particulate matter. No odor. Normal appendix. Normal cecum. Normal ascending colon, transverse colon, and descending colon. Indurated rectosigmoid colon and rectum. No evidence of bowel injury or bowel perforation. No evidence of bowel ischemia. Extremely dilated gallbladder, not tense and without stones. Hepatomegaly. Normal stomach. NGT in place. No evidence of residual malignancy.   Complications: none  Implants: * No implants in log * within normal limits

## 2018-06-05 NOTE — PROCEDURES
[facility]  *** FINAL REPORT ***    Name: Jessa Nichole  MRN: [id number]    Inpatient  : 01 Aug 1950  HIS Order #: [id number]  [id number] [facility] Visit #: [id number]  Date: 2018    TYPE OF TEST: Peripheral Venous Testing    REASON FOR TEST  Suspected pulmonary embolism    Right Leg:-  Deep venous thrombosis:           Yes  Proximal extent of thrombus:      Peroneal  Superficial venous thrombosis:    No  Deep venous insufficiency:        Not examined  Superficial venous insufficiency: Not examined    Left Leg:-  Deep venous thrombosis:           Yes  Proximal extent of thrombus:      Peroneal  Superficial venous thrombosis:    No  Deep venous insufficiency:        Not examined  Superficial venous insufficiency: Not examined      INTERPRETATION/FINDINGS  Duplex images were obtained using 2-D gray scale, color flow, and  spectral Doppler analysis. Right leg :  1. Deep veins visualized include the common femoral, deep femoral,  proximal femoral, mid femoral, distal femoral, popliteal(above knee),  popliteal(fossa), popliteal(below knee), posterior tibial and peroneal   veins. 2. Deep venous thrombosis identified in the peroneal vein. 3. Thrombus appears acute. 4. Superficial veins visualized include the proximal great saphenous  vein. 5. No evidence of superficial thrombosis detected. Left leg :  1. Deep veins visualized include the common femoral, deep femoral,  proximal femoral, mid femoral, distal femoral, popliteal(above knee),  popliteal(fossa), popliteal(below knee), posterior tibial and peroneal   veins. 2. Deep venous thrombosis identified in the peroneal vein. 3. Thrombus appears acute. 4. Superficial veins visualized include the proximal great saphenous  vein. 5. No evidence of superficial thrombosis detected.     ADDITIONAL COMMENTS  The preliminary technical findings were discussed with Dr. Miles Rothman during   the exam.    I have personally reviewed the data relevant to the interpretation of  this  study. TECHNOLOGIST: Renata Barkley  Signed: 06/05/2018 04:54 PM    PHYSICIAN: Jerie Barthel.  [place] MD  Signed: 06/06/2018 10:35 AM

## 2018-06-05 NOTE — ANESTHESIA POSTPROCEDURE EVALUATION
Post-Anesthesia Evaluation & Assessment    Visit Vitals    /70    Pulse (!) 114    Temp 37.2 °C (99 °F)    Resp 26    Ht 5' 1\" (1.549 m)    Wt 89.1 kg (196 lb 6.9 oz)    SpO2 96%    BMI 37.12 kg/m2           Post-operative hydration adequate.         Mental status & Level of consciousness: unable to assess    Neurological status: moves all extremities, sensation grossly intact    Pulmonary status: intubated    Complications related to anesthesia: none    Patient has met all discharge requirements for transfer to ICU    Additional comments:        Elisa Lakhani MD

## 2018-06-05 NOTE — PROGRESS NOTES
Vancomycin - Pharmacy to Dose ( Sepsis of Unknown Etiology )  Patient currently on Vancomycin 1250 mg IV q12hrs. Received 2 doses thus far. Due to decreased renal function, S. Creat 2.13 ( CrCl 25.7 ml/min ) today,   will adjust dosing to 1250 mg IV q24hrs. Daily BMP ordered. Pharmacy will continue to monitor and adjust.  Roma Cranker D. Vilinda [facility]   [phone]

## 2018-06-05 NOTE — PROGRESS NOTES
Initial assessment completed. Arrosable when name called & follow command by squeezing both hands lighly. Vented w/ spon't resp, Monitor shows st w/out ectopy. On bicarb drip , see mar for dosage & concentration. Fentanyl pca also  Infusssing. Feverish. 1000-  VS stable. No sob. Monitor no changed. Dr Miles Rothman & Dr Luis Harris visited w/ new orders. 1200- Assessment no changed. More awake now Pain medication given as ordered w/  W/ fentanyl drip. Monitor no changed. Remained feverish. Brother remained @ beside. 1400- Urine output borderline. Dr Selvin Mancia visited w/out new order. 1600- B/P syst down to 69 after Fentanyl up to 100mg & versed 2mg given per Dr Miles Rothman. Lactated ringer 500 cc bolus started. Rest of assessment no changed. Duplex studies being done now. 1700- CVP reading started, see flowsheet 13-15    1800- B/p better after fluid  Bolus, also pain better. Rest of assessment no changed. Cvp line  Being used now for albumin. 1930-Bedside and Verbal shift change report given to [facility] (oncoming nurse) by Azalea Dey RN (offgoing nurse). Report included the following information SBAR, Kardex, ED Summary, OR Summary, Procedure Summary, Intake/Output, MAR and Recent Results.

## 2018-06-05 NOTE — DIABETES MGMT
GLYCEMIC CONTROL PROGRESS NOTE:    -discussed in rounds, known h/o T2DM HbA1C within recommended range for age + comorbids on oral home regimen  -BG out of target range ICU: 140-180 mg/dL  -TDD = 6 units  -on vent, NPO, pt may benefit from conservative dose of mealtime insulin once diet resumes    Recent Glucose Results:   Lab Results   Component Value Date/Time     (H) 06/05/2018 05:20 AM     (H) 06/04/2018 07:05 PM     (H) 06/04/2018 02:38 PM    GLUCPOC 190 (H) 06/05/2018 05:21 AM    GLUCPOC 175 (H) 06/04/2018 11:53 PM    GLUCPOC 185 (H) 06/04/2018 05:34 PM         Franko Wesley RN, MS  Glycemic Control Team  Pager [phone] (M-TH 8:30-5P)  *After Hours pager [phone]

## 2018-06-05 NOTE — ROUTINE PROCESS
TRANSFER - IN REPORT:    Verbal report received from [facility] RN(name) on Chantal Parent  being received from Emergency Room(unit) for routine progression of care      Report consisted of patients Situation, Background, Assessment and   Recommendations(SBAR). Information from the following report(s) SBAR, Kardex, ED Summary, Intake/Output, MAR, Recent Results, Med Rec Status and Cardiac Rhythm ST was reviewed with the receiving nurse. Opportunity for questions and clarification was provided. Assessment completed upon patients arrival to unit and care assumed.

## 2018-06-05 NOTE — PROGRESS NOTES
0450-Primary RN off unit for RRT. Assisted XRay tech with Xray. After Xray, Sats 86-89% at this time on 100%O2 and 10 Peep. ET suctioned with minimal results. Notified RT and stated will be down soon for ABG's.  0457-Continues to breat over vent at this time, abdominally pulling and Sats, 88%, . Gave PRN Versed per orders for sedation. 0503-Respirations even and with vent at this time. Sats 90%. Will cont to monitor while primary, RN off unit.

## 2018-06-05 NOTE — PROGRESS NOTES
conducted an initial consultation and Spiritual Assessment for Ania Fonseca, who is a 75 y.o.,female. Patient is on vent and trying to communicate but we are having a difficult time understanding, other than making it clear that she wants the vent out. She is being reminded of the need for it and other treatments but the goal is to remove it as soon as she is medically ready. Patient's brother is at the bedside. He stated that he is the medical power of  and an AMD has been completed. He will try to remember to bring in a copy. Patient never , has no children, parents are , so siblings would be legal NOK. Pt and brother live together in a home they have been in since childhood. Patient is only girl (2nd in birth order) with 6 brothers. She and her mother were very close. They have attended [address] [place] [address]. The reason the Patient came to the hospital is:   Patient Active Problem List    Diagnosis Date Noted    Acute respiratory failure ([facility] 2018    JESS (acute kidney injury) ([facility]     Metabolic acidosis     Abdominal pain 2018    Sepsis ([address] 2018    Ischemic colitis ([address] 2018    Oliguria 2018    Severe obesity (BMI 35.0-39.9) ([address] 2018    Ovarian ca ([facility] 2018        The  provided the following Interventions:  Initiated a relationship of care and support. Listened empathically. Provided information about Spiritual Care Services. Offered prayer and assurance of continued prayers on patients behalf. Chart reviewed. The following outcomes were achieved:  Family member shared limited information about patient's medical narrative, spiritual journey and beliefs.  confirmed Patient's Ep[employer] Affiliation. Brother expressed gratitude for Spiritual Care visit. Patient was reviewed in ICU Interdisciplinary Rounds.      Assessment:  There are no significant spiritual or Scientology issues which require further intervention at this time. Patient does not have any Scientology or cultural needs that will affect patients preferences in health care. Plan:  Chaplains will continue to follow and will provide pastoral care as needed or requested.  recommends bedside caregivers page  on duty if patient shows signs of acute spiritual or emotional distress. [address] [facility] [address].   Board Certified   [phone] - Office

## 2018-06-05 NOTE — ANESTHESIA PREPROCEDURE EVALUATION
Anesthetic History   No history of anesthetic complications            Review of Systems / Medical History  Patient summary reviewed, nursing notes reviewed and pertinent labs reviewed    Pulmonary  Within defined limits                 Neuro/Psych   Within defined limits           Cardiovascular    Hypertension: well controlled              Exercise tolerance: >4 METS  Comments: ekg has changes but this is an emergency. GI/Hepatic/Renal         Renal disease: ARF    Pertinent negatives: No GERD   Endo/Other    Diabetes: well controlled    Morbid obesity and anemia  Pertinent negatives: No hypothyroidism and hyperthyroidism   Other Findings   Comments: Ischemic colitis, decreased urine output, elevated wbc         Physical Exam    Airway  Mallampati: III  TM Distance: < 4 cm  Neck ROM: normal range of motion   Mouth opening: Normal     Cardiovascular    Rhythm: regular  Rate: abnormal         Dental    Dentition: Caps/crowns     Pulmonary  Breath sounds clear to auscultation               Abdominal  GI exam deferred       Other Findings            Anesthetic Plan    ASA: 4, emergent  Anesthesia type: general          Induction: Intravenous  Anesthetic plan and risks discussed with: Patient      Underwent gen anesthesia last week.

## 2018-06-05 NOTE — PROGRESS NOTES
DOS status post Diagnostic laparoscopy converted to laparotomy, peritoneal biopsies, aerobic and anaerobic cultures of peritoneal fluid and peritoneal lavage. S: Intubated. Indicates some pain. O: AF, 113, 106/75, 16, 96% intubated with FiO2 80% PEEP 10  UOP marginal  Abdomen: soft, appropriately tender, mildly distended  Incision: c/d/i no erythema, no ecchymosis  Ext: SCDs on, no edema, cool  Hgb 10.6, WBC 15.1, Cr 2.13, Lactic Acid 3.8  A/P: Sepsis. Improving on abx and after Dx Lap with peritoneal lavage. No evidence of bowel injury, perforation, or ischemia. Reviewed surgical findings with patient and brother present. Peritoneal cultures obtained. NGTD  Blood cx NGTD  WBC and Lactate improving. 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 Will need adjuvant chemotherapy. I have signed out to my covering team and patient will be seen daily from Surgery standpoint. Please do not hesitate to contact me directly as well.    Laurel Dueñas MD

## 2018-06-05 NOTE — PROGRESS NOTES
DVT study prelim report suggest peroneal DVT on right is positive  Patient just had laproscopy in am so will be high risk of bleeding will check with surgeon about anticoagulation  Its a distal DVT so we can repeat DVT study in 1 week to see propagation and consider IVC fillter vs anticoagulation      CIRA Patel.  Gisele Goodpasture [address] [facility] Graciela Estes Tashi [address] [place] [address]19  Phone [phone]   Fax [phone]  Pulmonary Critical Care & Sleep Medicine

## 2018-06-05 NOTE — PROGRESS NOTES
Pharmacist Renal Dosing Progress Note for Zosyn    The following medication: Zosyn was automatically dose-adjusted per THE [facility] P&T Committee Protocol, with respect to renal function. Consult provided for this   75 y.o. , female , for the indication of intra-abdominal infection. Dose adjusted to: Zosyn 2.25 grams IV every 6 hours. (discussed during ICU rounds). Pt Weight:   Wt Readings from Last 1 Encounters:   06/04/18 89.1 kg (196 lb 6.9 oz)     Previous Regimen    Zosyn 3.375 grams IV q6h   Serum Creatinine Lab Results   Component Value Date/Time    Creatinine 2.13 (H) 06/05/2018 05:20 AM       Creatinine Clearance Estimated Creatinine Clearance: 25.7 mL/min (based on Cr of 2.13). BUN Lab Results   Component Value Date/Time    BUN 25 (H) 06/05/2018 05:20 AM         Pharmacy to continue to monitor patient daily. Will make dosage adjustments based upon changing renal function.   Signed Towana Goldberg, [address] information:   [phone]

## 2018-06-05 NOTE — CONSULTS
Consult Note    Patient: Dusty Dempsey MRN: [id number]  CSN: [id number]    YOB: 1950  Age: 75 y.o. Sex: female    DOA: 6/4/2018 LOS:  LOS: 0 days        Requesting Physician: Dr Barbara Melchor  Reason for Consultation: abdominal pain               HPI:     Dusty Dempsey is a 75 y.o. female who has been seen for abdominal pain post op from gyn-onc surgery. Pt did well and went home post op. Recently complained of increasing pain with diarrhea. No F/C/V, some nausea. Past Medical History:   Diagnosis Date    Diabetes ([facility]     many years type 2    Hypertension     many years       Past Surgical History:   Procedure Laterality Date    HX OOPHORECTOMY  05/29/2018    HX TONSILLECTOMY      as a child        History reviewed. No pertinent family history. Social History     Social History    Marital status: SINGLE     Spouse name: N/A    Number of children: N/A    Years of education: N/A     Social History Main Topics    Smoking status: Never Smoker    Smokeless tobacco: Never Used    Alcohol use No    Drug use: No    Sexual activity: Not Asked     Other Topics Concern    None     Social History Narrative       Prior to Admission medications    Medication Sig Start Date End Date Taking? Authorizing Provider   metFORMIN ER (GLUCOPHAGE XR) 500 mg tablet Take 2,000 mg by mouth daily (with dinner). Brand name only, pt reports allergy of cold sweats to the generic   Yes Historical Provider   fluticasone (FLONASE ALLERGY RELIEF) 50 mcg/actuation nasal spray 2 Sprays by Both Nostrils route daily as needed for Rhinitis. Yes Historical Provider   acetaminophen-codeine (TYLENOL #3) 300-30 mg per tablet Take 1 Tab by mouth every four (4) hours as needed for Pain. Max Daily Amount: 6 Tabs. 6/1/18  Yes Christene Litten, MD   HYDROmorphone (DILAUDID) 2 mg tablet Take 1 Tab by mouth every three (3) hours as needed.  Max Daily Amount: 16 mg. 6/1/18  Yes Christene Litten, MD   acetaminophen (TYLENOL ARTHRITIS PAIN) 650 mg TbER Take 650 mg by mouth every eight (8) hours as needed. Yes Historical Provider   phenylephrine (NEOSYNEPHRINE) 1 % spry 2 Sprays by Both Nostrils route every six (6) hours as needed. Pt uses several times every day for chronic nasal congestion    **pt pours phenylephrine into an AFRIN nasal spray bottle as she prefers this device**   Yes Historical Provider   amLODIPine (NORVASC) 10 mg tablet Take 10 mg by mouth daily. Yes Historical Provider   verapamil ER (VERELAN) 180 mg CR capsule Take 180 mg by mouth daily. Yes Historical Provider   atorvastatin (LIPITOR) 10 mg tablet Take 10 mg by mouth daily. Yes Historical Provider   losartan-hydroCHLOROthiazide (HYZAAR) 100-12.5 mg per tablet Take 1 Tab by mouth daily. Yes Historical Provider   Aspirin-Acetaminophen-Caffeine (GOODY'S EXTRA STRENGTH) 500-325-65 mg pwpk Take 2 Packets by mouth daily. Pt took daily for chronic HA until held for surgery in May 2018 then switched to tylenol arthritis daily    Historical Provider       Allergies   Allergen Reactions    Hydrocodone Other (comments)     Breaks into cold sweat    Metformin Other (comments)     Breaks out into cold sweat. Can Take Glucophage brand name med       Review of Systems  Review of systems not obtained due to patient factors.       Physical Exam:      Visit Vitals    /60    Pulse (!) 105    Temp 97.5 °F (36.4 °C)    Resp 11    Ht 5' 1\" (1.549 m)    Wt 89.1 kg (196 lb 6.9 oz)    SpO2 94%    BMI 37.12 kg/m2       Physical Exam:  Constitutional: negative except for malaise  Eyes: negative  Ears, nose, mouth, throat, and face: negative  Respiratory: negative for hemoptysis or wheezing  Cardiovascular: positive for tachycardia  Gastrointestinal: positive for TTP, diffuse but R>L, no BS, NGT with dark green outpt, wound CDI  Genitourinary:negative  Integument/breast: negative  Hematologic/lymphatic: negative  Musculoskeletal:negative  Neurological: negative except for weakness  Behavioral/Psych: negative  Endocrine: negative  Allergic/Immunologic: negative    Labs Reviewed:  BMP:   Lab Results   Component Value Date/Time     06/04/2018 07:05 PM    K 4.1 06/04/2018 07:05 PM     06/04/2018 07:05 PM    CO2 20 (L) 06/04/2018 07:05 PM    AGAP 17 06/04/2018 07:05 PM     (H) 06/04/2018 07:05 PM    BUN 19 (H) 06/04/2018 07:05 PM    CREA 1.93 (H) 06/04/2018 07:05 PM    GFRAA 31 (L) 06/04/2018 07:05 PM    GFRNA 26 (L) 06/04/2018 07:05 PM     CMP:   Lab Results   Component Value Date/Time     06/04/2018 07:05 PM    K 4.1 06/04/2018 07:05 PM     06/04/2018 07:05 PM    CO2 20 (L) 06/04/2018 07:05 PM    AGAP 17 06/04/2018 07:05 PM     (H) 06/04/2018 07:05 PM    BUN 19 (H) 06/04/2018 07:05 PM    CREA 1.93 (H) 06/04/2018 07:05 PM    GFRAA 31 (L) 06/04/2018 07:05 PM    GFRNA 26 (L) 06/04/2018 07:05 PM    CA 7.5 (L) 06/04/2018 07:05 PM    MG 2.2 06/04/2018 07:05 PM    PHOS 6.4 (H) 06/04/2018 07:05 PM    ALB 2.5 (L) 06/04/2018 02:38 PM    TP 6.1 (L) 06/04/2018 02:38 PM    GLOB 3.6 06/04/2018 02:38 PM    AGRAT 0.7 (L) 06/04/2018 02:38 PM    SGOT 19 06/04/2018 02:38 PM    ALT 31 06/04/2018 02:38 PM     CBC:   Lab Results   Component Value Date/Time    WBC 17.4 (H) 06/04/2018 07:05 PM    HGB 10.3 (L) 06/04/2018 07:05 PM    HCT 33.6 (L) 06/04/2018 07:05 PM     06/04/2018 07:05 PM     Recent Glucose Results:   Lab Results   Component Value Date/Time     (H) 06/04/2018 07:05 PM     (H) 06/04/2018 02:38 PM     (H) 06/04/2018 07:40 AM     ABG:   Lab Results   Component Value Date/Time    PHI 7.299 (L) 06/04/2018 04:00 PM    PCO2I 36.7 06/04/2018 04:00 PM    PO2I 87 06/04/2018 04:00 PM    HCO3I 18.0 (L) 06/04/2018 04:00 PM    FIO2I 100 06/04/2018 04:00 PM     COAGS:   Lab Results   Component Value Date/Time    APTT 26.9 06/04/2018 02:38 PM    PTP 15.3 (H) 06/04/2018 02:38 PM    INR 1.3 (H) 06/04/2018 02:38 PM     Liver Panel:   Lab Results   Component Value Date/Time    ALB 2.5 (L) 06/04/2018 02:38 PM    CBIL 0.3 (H) 06/04/2018 02:38 PM    TP 6.1 (L) 06/04/2018 02:38 PM    GLOB 3.6 06/04/2018 02:38 PM    AGRAT 0.7 (L) 06/04/2018 02:38 PM    SGOT 19 06/04/2018 02:38 PM    ALT 31 06/04/2018 02:38 PM    AP 63 06/04/2018 02:38 PM     Pancreatic Markers:   Lab Results   Component Value Date/Time    LPSE 49 (L) 06/04/2018 07:40 AM       Assessment/Plan     Principal Problem:    Sepsis ([facility] (6/4/2018)    Active Problems:    Ovarian ca ([facility] (5/29/2018)      Abdominal pain (6/4/2018)      Oliguria (6/4/2018)    Renal failure    Very critically ill. Images of CT viewed and analyzed. Thickening of small bowel but no obstruction or closed loop. Hepatic flexure inflammation. No free air,  No pneumatosis. Mild fluid might be consistent with post operative changes. Discussed with Dr. Dowell Sa, Pt and brother Nelli Smiling my findings and unclear etiology. Surgical management would likely include resection of some kind with ostomy - possibly proximal SB, enterocutaneous fistula, open abdomen and death. Medical resuscitation currently underway with antibiotics. Will watch for response and follow closely.

## 2018-06-05 NOTE — PROGRESS NOTES
Hospitalist Progress Note-critical care note     Patient: Maryam Onofre MRN: [id number]  CSN: [id number]    YOB: 1950  Age: 75 y.o. Sex: female    DOA: 6/4/2018 LOS:  LOS: 1 day            Chief complaint: acute respiratory failure ,  Ischemic colitis, sepsis , jess.  Metabolic acidosis     Assessment/Plan         Hospital Problems  Date Reviewed: 6/5/2018          Codes Class Noted POA    Acute respiratory failure ([facility] ICD-10-CM: J96.00  ICD-9-CM: 518.81  6/5/2018 Unknown        JESS (acute kidney injury) ([facility] ICD-10-CM: N17.9  ICD-9-CM: 584.9  6/5/2018 Unknown        Metabolic acidosis RKY-11-CK: E87.2  ICD-9-CM: 276.2  6/5/2018 Unknown        Abdominal pain ICD-10-CM: R10.9  ICD-9-CM: 789.00  6/4/2018 Yes        * (Principal)Sepsis ([facility] ICD-10-CM: A41.9  ICD-9-CM: 038.9, 995.91  6/4/2018 Yes        Ischemic colitis ([facility] ICD-10-CM: K55.9  ICD-9-CM: 557.9  6/4/2018 Yes        Oliguria ICD-10-CM: R34  ICD-9-CM: 788.5  6/4/2018 Yes        Ovarian ca [facility]) ICD-10-CM: C56.9  ICD-9-CM: 183.0  5/29/2018 Yes            Sepsis   Due to abdomen source   On vanc/zosyn and flagyl   Central line placed per intensive   bcx negative       Ischemic colitis   Dr. Jd Valera was on board      acute respiratory failure   Intubated, managed per intensive     Ovarian cancer    Postoperative day number 6, status post laparotomy for 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 Smita Rayo   Due to sepsis, surgeon on board for possible compartment syndrome     Metabolic acidosis   Due to sepsis , improving , on bicarb       Review of systems:    Unable to obtain due to intubated      Vital signs/Intake and Output:  Visit Vitals    BP 97/65    Pulse (!) 119    Temp 98.5 °F (36.9 °C)    Resp 21    Ht 5' 1\" (1.549 m)    Wt 89.1 kg (196 lb 6.9 oz)    SpO2 96%    BMI 37.12 kg/m2     Current Shift:  06/05 0701 - 06/05 1900  In: -   Out: 100 [Urine:100]  Last three shifts:  06/03 1901 - 06/05 0700  In: 2796.9 [I.V.:2796.9]  Out: 9479 [Urine:665]    Physical Exam:  General: Intubated  no acute distress    HEENT: NC, Atraumatic. PERRLA, ET tube noted   Lungs: Mild Rales. Heart:  Tachy   No murmur, No Rubs, No Gallops  Abdomen: Soft, Non distended, surgical site noted, no discharge   Extremities: No c/c/e  Psych:   Calm   Neurologic:  Unable to obtain due to intubation           Labs: Results:       Chemistry Recent Labs      06/05/18   0520 06/04/18 1905 06/04/18   1438  06/04/18   0740   GLU  191*  191*  148*  168*   NA  134*  140  139  137   K  3.9  4.1  3.8  3.1*   CL  100  103  103  98*   CO2  19*  20*  18*  22   BUN  25*  19*  16  15   CREA  2.13*  1.93*  1.53*  1.16   CA  7.5*  7.5*  7.7*  8.9   AGAP  15  17  18  17   BUCR  12  10*  10*  13   AP  50   --   63  74   TP  5.2*   --   6.1*  6.8   ALB  1.9*   --   2.5*  2.9*   GLOB  3.3   --   3.6  3.9   AGRAT  0.6*   --   0.7*  0.7*      CBC w/Diff Recent Labs      06/05/18   0520 06/04/18 1905 06/04/18   1438  06/04/18   0740   WBC  15.1*  17.4*  16.1*  4.6   RBC  4.85  4.68  5.16  5.73*   HGB  10.6*  10.3*  11.6*  12.8   HCT  35.0  33.6*  36.6  40.5   PLT  264  244  279  419   GRANS  54   --    --   78*   LYMPH  12*   --    --   18*   EOS  1   --    --   0      Cardiac Enzymes No results for input(s): CPK, CKND1, FABIAN in the last 72 hours. No lab exists for component: CKRMB, TROIP   Coagulation Recent Labs      06/04/18   1438   PTP  15.3*   INR  1.3*   APTT  26.9       Lipid Panel No results found for: CHOL, CHOLPOCT, CHOLX, CHLST, CHOLV, 185257, HDL, LDL, LDLC, DLDLP, 343837, VLDLC, VLDL, TGLX, TRIGL, TRIGP, TGLPOCT, CHHD, CHHDX   BNP No results for input(s): BNPP in the last 72 hours.    Liver Enzymes Recent Labs      06/05/18   0520   TP  5.2*   ALB  1.9*   AP  50   SGOT  32      Thyroid Studies No results found for: T4, T3U, TSH, TSHEXT, TSHEXT     Procedures/imaging: see electronic medical records for all procedures/Xrays and details which were not copied into this note but were reviewed prior to creation of Grace Jones MD

## 2018-06-05 NOTE — PROGRESS NOTES
Pt under resuscitated as seen by her increasing creatinine and lactate and minimal urine outpt. Giving 1 L bolus. Pt may become more hypoxic however end organ perfusion needs to be maintained and pt can be intubated. Pt needs volume for induction in OR. Anesthesia requested to eval in preparation for going to the OR and Dr. Cristel Montes requesting 2 units of crossmatched blood ready before transport to the OR.

## 2018-06-05 NOTE — CONSULTS
Consult Note  Consult requested by:Dr Lakisha German is a 75 y.o. female [facility] who is being seen on consult for renal failure. Chief Complaint   Patient presents with    Post-Op Problem     Admission diagnosis: Sepsis ([facility]    HPI:  76 yo PMH DM, HTN, clear cell ovarian ca s/p debulking who presented with abdominal pain and possible sepsis/ischemic colitis. Pt underwent laparotomy/peritoneal lavage/bx, noted to have decreased UOP and increasing Cr to 2.1 today from baseline 0.6. CT neg for mass or hydro. Pt given lasix this am, still with high O2 requirements on vent. Unable to provide further history  Past Medical History:   Diagnosis Date    Diabetes ([facility]     many years type 2    Hypertension     many years     Past Surgical History:   Procedure Laterality Date    HX OOPHORECTOMY  05/29/2018    HX TONSILLECTOMY      as a child      Social History     Social History    Marital status: SINGLE     Spouse name: N/A    Number of children: N/A    Years of education: N/A     Occupational History    Not on file. Social History Main Topics    Smoking status: Never Smoker    Smokeless tobacco: Never Used    Alcohol use No    Drug use: No    Sexual activity: Not on file     Other Topics Concern    Not on file     Social History Narrative   above  Family Hx: no hx kidney disease  Allergies   Allergen Reactions    Hydrocodone Other (comments)     Breaks into cold sweat    Metformin Other (comments)     Breaks out into cold sweat.  Can Take Glucophage brand name med       Home Medications:     reviewed  Review of Systems:     Unable to obtain as pt on vent  Visit Vitals    /75    Pulse (!) 119    Temp 98.5 °F (36.9 °C)    Resp 16    Ht 5' 1\" (1.549 m)    Wt 89.1 kg (196 lb 6.9 oz)    SpO2 95%    BMI 37.12 kg/m2           Physical Assessment:     Wt Readings from Last 3 Encounters:   06/04/18 89.1 kg (196 lb 6.9 oz)   05/31/18 90.5 kg (199 lb 8 oz)   05/22/18 88.5 kg (195 lb)     Temp Readings from Last 3 Encounters:   06/05/18 98.5 °F (36.9 °C)   06/01/18 98.5 °F (36.9 °C)     BP Readings from Last 3 Encounters:   06/05/18 106/75   06/01/18 141/79     Pulse Readings from Last 3 Encounters:   06/05/18 (!) 119   06/01/18 (!) 102      Vented, sedated  HEENT- NCAT  Chest- clear  CV- tachy  Abd- distended  Extremities- no edema  Neuro- moving all extremities, no focal deficits  Skin- no rash  Psych- cannot assess    WBC 15.1 H/H 10.6/35 plt 264  Na 134 K 3.9 Cl 100 CO2 19 BUN/Cr 25/2.1  UA tr prot, neg blood  Impression:   JESS- likely ATN, nonoliguric  Ovarian ca s/p debulking then laparoscopy w/lavage  Respiratory failure  DM  HTN  Anemia  Met acidosis  Plan:   Decrease IV bicarb   Agree w/ Lasix, 80 mg IV given this AM  Monitor I/Os, renal panel  No indication for RRT    Tayler Turner MD  W- [phone]  [phone]

## 2018-06-05 NOTE — OP NOTES
[address] REPORT    Jenny Robertson  MR#: [id number]  : 1950  ACCOUNT #: [de-identified]   DATE OF SERVICE: 2018    PREOPERATIVE DIAGNOSES:   1. 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 2.  Abdominal pain. 3.  Sepsis with suspected abdominal source. POSTOPERATIVE DIAGNOSES:  1. 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 2.  Abdominal pain. 3.  Sepsis with suspected abdominal source. 4.  Inflammatory ascites. 5.  Inflammatory adhesions. PROCEDURES PERFORMED:  Diagnostic laparoscopy converted to laparotomy, peritoneal biopsies, aerobic and anaerobic cultures of peritoneal fluid and peritoneal lavage. SURGEON:  Elsy Forman MD     ASSISTANT:  Jethro Zepeda SA     ANESTHESIA:  General.    ANESTHESIOLOGIST:  Fuad Padilla MD    CRNA:  Anitra Miller CRNA    ESTIMATED BLOOD LOSS:  20 mL    SPECIMENS REMOVED:  1. Peritoneal fluid for cytology. 2.  Aerobic and anaerobic cultures and Gram stain of peritoneal fluid. 3.  Anterior cul-de-sac biopsy. 4.  Left pericolic gutter biopsy. 5.  Left pelvic sidewall biopsy. 6.  Posterior cul-de-sac biopsy. 7.  Right pericolic gutter biopsy. 8.  Right pelvic sidewall biopsy. 9.  Diaphragm biopsy. IMPLANTS:  none    FINDINGS:  Inflammatory adhesions of the small bowel to the anterior abdominal wall and right and left abdominal and pelvic sidewalls. Loop to loop adhesions of the small bowel. Small bowel adherent to the exposed retroperitoneum at the paraaortic, precaval and pelvic lymphadenectomy sites. Inflammatory ascites. No particulate matter. No odor. Normal appendix. Normal cecum. Normal ascending colon, transverse colon and descending colon. Indurated rectosigmoid colon and rectum.   No evidence of bowel injury or bowel perforation. No evidence of bowel ischemia. Extremely dilated gallbladder not tense and without stones. Hepatomegaly. Normal stomach with NG tube in place. No evidence of residual malignancy. COMPLICATIONS:  None. INDICATIONS:  The patient is a 11-year-old  [de-identified]  female with clear cell  adenocarcinoma of the left ovary postoperative day #7 status post primary surgical debulking to no visible residual disease on 2018. Patient presented to the Emergency Department this a.m. with diffuse abdominal pain  and tachycardia. Laboratory findings were significant for a lactic acid of 5.7, white blood cell count of 4.6 and normal creatinine and hypokalemia to 3.4. Patient had clinical signs of sepsis and antibiotics were initiated and blood cultures and urine cultures were sent. Throughout the day the patient's clinical status deteriorated with continued tachycardia, increased hypoxia with increasing oxygen requirement, elevating white count up to 17.4, decreased urine output and elevated creatinine up to 1.93 and arterial blood gas consistent with a metabolic acidosis. Imaging including abdominal x-ray, chest x-ray and CT scan were nondiagnostic. CT scan did show postoperative changes including fluid, very small locules of gas and inflamed bowel wall with a particularly inflamed segment of small bowel. There was no evidence of abscess or obvious bowel perforation or significant free air. In light of deteriorating clinical course and continued abdominal pain with rebound and guarding decision was made to proceed with surgical exploration for presumed abdominal source of sepsis. Patient was counseled regarding the planned procedure as well as the risks and benefits of surgery. Consent was signed.     DESCRIPTION OF PROCEDURE:  After the patient was accurately identified, consent was verified signed on the chart and all questions were answered, the patient was taken to the operating room with IV running. She was placed in the supine position. Fry catheter was already in place. NG tube was already in place. SCDs were in place and functioning prior to induction of anesthesia. The patient was placed in a supine position, underwent dosing of general endotracheal anesthesia. She was then prepped and draped in the normal fashion using ChloraPrep on the abdomen and Betadine on the perineum. A surgical timeout was performed and all OR staff present participated including the anesthesia provider and myself. Patient was receiving scheduled broad spectrum antibiotics. No additional antibiotics were given. 0.5% Marcaine with epinephrine was injected into the skin prior to incisions. The Dermabond was removed from the left upper quadrant incision. This was bluntly opened with a hemostat. A 5 mm trocar with Optiview was placed directly into the LUQ peritoneal cavity. Once peritoneal access was verified, the abdomen was insufflated with CO2 gas. A second 5 mm trocar was placed in the left lower quadrant, an abdominal and pelvic exploration were performed. Blunt lysis of peritoneal adhesions was performed. The patient was placed in Trendelenburg. The right colon including the cecum and the appendix were not able to be visualized laparoscopically due to more dense adhesions. There was inflammatory ascites visible. Decision was made to proceed with reopening the laparotomy site. The Dermabond was removed. The sutures were cut and the sutures were removed entirely. The abdominal wall was retracted and blunt lysis of adhesions was performed. The appendix and cecum were visualized. The ileum at the ileocecal valve was visualized. The bowel was run in its entirety, small bowel as well as the colon. All peritoneal surfaces of the bowel and mesentery were visualized and palpated. There was no area concerning for injury, perforation or ischemia.   Peritoneal biopsies were obtained, aerobic and anaerobic cultures were obtained. Peritoneal fluid was obtained and sent for cytology. The abdomen and pelvis were copiously irrigated with 6 liters of sterile water. All lap, instrument, and sponge counts were correct. The abdominal wall was closed in a bulk fashion using #1 looped PDS. The wound was irrigated copiously with sterile water and the deep dermis was reapproximated with 2-0 Vicryl suture. The skin was reapproximated with 3-0 Vicryl suture. The laparoscopic sites were closed with 4-0 Monocryl. Dermabond was placed as a dressing on all incisions. Patient was replaced in a supine position, awakened from anesthesia, transferred to the hospital bed and transported to the ICU intubated. There were no complications with this procedure.       Blanca Benites MD DMD / Myrna.[email or web address]  D: 06/05/2018 04:07     T: 06/05/2018 07:28  JOB #: [id number]

## 2018-06-05 NOTE — PROGRESS NOTES
Terrie Avila M.D  [address] [facility] [address]  Phone [phone] Fax [phone]  Pulmonary Critical Care & Sleep Medicine       Name: Leah Wheeler MRN: [id number]   : 1950 Hospital: [facility]   Date: 2018          IMPRESSION:   Patient Active Problem List   Diagnosis Code    Ovarian ca ([address] C56.9    Severe obesity (BMI 35.0-39.9) ([address] E66.01    Abdominal pain R10.9    Sepsis ([facility] A41.9    Ischemic colitis ([address] K55.9    Oliguria R34    Acute respiratory failure ([address] J96.00    JESS (acute kidney injury) ([facility] K44.4    Metabolic acidosis T85.8 ·   Severe sepsis with lactic acidosis ? source of sepsis  · ARDS   · Hypotension ? Related to pain meds ? Due to sepsis  · Worsening urine out put -- Possible ARF due to sepsis and hypotension  · Metabolic acidsis with lactate elevation ? Related to sepsis    PLAN:  -- Continue ventilatory support as requiring 90% FIO2 not a candidate for weaning  -- Nephrology consult Spoke with Dr. Ruffin Gaucher will evaluate today  -- Give large dose of lasix to see if that helps with UP might be going in ATN and will take time to recover  -- Due to ongoing sepsis might also need fluids will continue 125cc/hr fluid and give albumin on top   -- Laproscopy failed to show ischmic bowel but improvement in lactate and wbc noted today am  -- NPO   -- NG tube and suction  -- Possible Sepsis -- pan culture-- add vanco and flagyl to zosyn  -- Glucose and electrolyte protocol   -- Will get DVT study   CVS:Echo reviewed EF 65% RV is ok not dilated still tachycardic ?  Due to pain or undergoing sepsis   RS:ARDs vent protocol, Taper FIO2 as tolerated   -- Aspiration precaution  -- Add bronchodilators  -- HOB elevated  ID:No clear documentation but as per nursing received sepsis fluid in ER will continue 125cc/hr add albumin, Send urine culture and sputum culture, Follow blood and ascitic fluid culture and decide   ENDO:SSI monitor blood sugars  GI:NPO On PPI  RENAL: Nephro consult , iv fluids monitor  CNS:Mentation is ok for now on and off drowsy due to sedation  HEMATOLOGY: Monitor hb get PT and INR  MUSCULOSKELETAL:no acute issued  PAIN AND SEDATION: Fentanyl drip and prn versed if needed will add precedex or propofol  · Skin/Wound: Surgical wound looks clean   · Electrolytes: Replace electrolytes per ICU electrolyte replacement protocol. · IVF: D5 1/2 ns with bicarb  · Nutrition: NPO for now  · Prophylaxis: DVT Prophylaxis with scd,. GI Prophylaxis. · Restraints: none  · PT/OT eval and treat. OOB when appropriate. · Lines/Tubes: Subclavian line 6/4, Fry 6/4, Vent 6/5  ADVANCE DIRECTIVE:Full code  FAMILY DISCUSSION:Spoke with patient and Dr. Steff Cruz: PPI, DVT prophylaxis, HOB elevated, Infection control all reviewed and addressed. Events and notes from last 24 hours reviewed. Care plan discussed with nursing CC TIME:55 min excluding procedure time    · Labs and images personally seen and available reports reviewed. · All current medicines are reviewed and doses and prescription adjusted. Subjective/History:  6/5/18  Overnight taken to OR by Dr. Dominick Rod  Per report no evidence of ischemic bowel but adhesions noted and cultures taken   No fever poor urine out put and borderline blood pressure in am  Echo did not show any significant abnormalit with EF 55-65%  Cr is 2.13  Lactate is 2.5   Currently on AC mode with 90% FIO@ and 10 of peep    Solange Moreira is a 75 y.o.  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 Was doing ok at home until yesterday when she started feeling a little more discomfort. She presented last night when she felt her pain was significant. In ER received IV fluid and zosyn   Lactate found to be in 5  Abd ct done showed area concerning for ?  Ischemic bowel  Spoke with Dr Dominick Rod on phone she is out of town and wants general surgery to be consulted   Patient just received 4 mg IV diludid by OB GYN in ICU post that her blood pressure running on lower side   She is on NRB after diludid  We asked IR to place PICC line but they declined and wanted to be done tomorrow as patient has peripheral line and concern for sepsis  No urine out put       Current Facility-Administered Medications   Medication Dose Route Frequency    fentaNYL (PF) 900 mcg/30 ml infusion soln   mcg/hr IntraVENous TITRATE    magnesium sulfate 1 g/100 ml IVPB (premix or compounded)  1 g IntraVENous ONCE    [START ON 2018] vancomycin (VANCOCIN) 1250 mg in  ml infusion  1,250 mg IntraVENous Q24H    furosemide (LASIX) injection 80 mg  80 mg IntraVENous ONCE    piperacillin-tazobactam (ZOSYN) 3.375 g in 0.9% sodium chloride (MBP/ADV) 100 mL MBP  3.375 g IntraVENous Q6H    metroNIDAZOLE (FLAGYL) IVPB premix 500 mg  500 mg IntraVENous Q8H    Vancomycin - Pharmacokinetic Dosing  1 Each Other Rx Dosing/Monitoring    insulin lispro (HUMALOG) injection   SubCUTAneous Q6H    pantoprazole (PROTONIX) 40 mg in sodium chloride 0.9% 10 mL injection  40 mg IntraVENous DAILY    sodium bicarbonate (8.4%) 50 mEq in dextrose 5 % - 0.45% NaCl 1,000 mL infusion   IntraVENous CONTINUOUS       Objective:   Vital Signs:    Visit Vitals    BP 97/65    Pulse (!) 119    Temp 98.5 °F (36.9 °C)    Resp 21    Ht 5' 1\" (1.549 m)    Wt 89.1 kg (196 lb 6.9 oz)    SpO2 96%    BMI 37.12 kg/m2       O2 Device: Endotracheal tube, Ventilator   O2 Flow Rate (L/min): 45 l/min   Temp (24hrs), Av.2 °F (36.8 °C), Min:97.5 °F (36.4 °C), Max:99.3 °F (37.4 °C)       Intake/Output:   Last shift:      701 - 1900  In: -   Out: 100 [Urine:100]  Last 3 shifts: 1901 -  07  In: 2796.9 [I.V.:2796.9]  Out: 5395 [Urine:665]    Intake/Output Summary (Last 24 hours) at 18 0931  Last data filed at 18 7403   Gross per 24 hour   Intake          2796.92 ml   Output             1885 ml   Net           911.92 ml     Review of Systems:  Limited history as patient just received pain meds     Objective:    General: comfortable,  Sedated intubated   HEENT: pupils reactive, sclera anicteric, EOM intact  Neck: No adenopathy or thyroid swelling, no lymphadenopathy or JVD, supple  CVS: S1S2 no murmurs  RS: Mod AE bilaterally, no tactile fremitus or egophony, decrease air entry on left side  Abd: distended complains of lots of pain   Neuro: non focal, awake, alert  Extrm: no leg edema, clubbing or cyanosis  Lymph node: No obvious palpable lymph node appreciated.   Skin: no rash  CBC w/Diff Recent Labs      06/05/18   0520  06/04/18   1905  06/04/18   1438  06/04/18   0740   WBC  15.1*  17.4*  16.1*  4.6   RBC  4.85  4.68  5.16  5.73*   HGB  10.6*  10.3*  11.6*  12.8   HCT  35.0  33.6*  36.6  40.5   PLT  264  244  279  419   GRANS  54   --    --   78*   LYMPH  12*   --    --   18*   EOS  1   --    --   0        Chemistry Recent Labs      06/05/18   0520  06/04/18   1905  06/04/18   1438  06/04/18   0740   GLU  191*  191*  148*  168*   NA  134*  140  139  137   K  3.9  4.1  3.8  3.1*   CL  100  103  103  98*   CO2  19*  20*  18*  22   BUN  25*  19*  16  15   CREA  2.13*  1.93*  1.53*  1.16   CA  7.5*  7.5*  7.7*  8.9   MG  1.8  2.2   --   1.5*   PHOS   --   6.4*   --    --    AGAP  15  17  18  17   BUCR  12  10*  10*  13   AP  50   --   63  74   TP  5.2*   --   6.1*  6.8   ALB  1.9*   --   2.5*  2.9*   GLOB  3.3   --   3.6  3.9   AGRAT  0.6*   --   0.7*  0.7*        Lactic Acid Lactic acid   Date Value Ref Range Status   06/05/2018 3.8 (HH) 0.4 - 2.0 MMOL/L Final     Comment:     CALLED TO AND CORRECTLY REPEATED BY:  LEENA ROSALES RN, ICU ON 06/05/2018 AT 0636 TO JDA       Recent Labs      06/05/18   0520  06/04/18   1905  06/04/18   1324   LAC  3.8*  4.5*  4.0*        Micro  Recent Labs      06/04/18   0740   CULT  NO GROWTH 1 DAY  NO GROWTH 1 DAY     Recent Labs      06/04/18   0740   CULT  NO GROWTH 1 DAY  NO GROWTH 1 DAY        ABG Recent Labs      06/05/18   0539  06/04/18   1600   PHI  7.340*  7.299*   PCO2I  33.5*  36.7   PO2I  70*  87   HCO3I  18.0*  18.0*   FIO2I  1.0  100        Liver Enzymes Protein, total   Date Value Ref Range Status   06/05/2018 5.2 (L) 6.4 - 8.2 g/dL Final     Albumin   Date Value Ref Range Status   06/05/2018 1.9 (L) 3.4 - 5.0 g/dL Final     Globulin   Date Value Ref Range Status   06/05/2018 3.3 2.0 - 4.0 g/dL Final     A-G Ratio   Date Value Ref Range Status   06/05/2018 0.6 (L) 0.8 - 1.7   Final     AST (SGOT)   Date Value Ref Range Status   06/05/2018 32 15 - 37 U/L Final     Alk.  phosphatase   Date Value Ref Range Status   06/05/2018 50 45 - 117 U/L Final     Recent Labs      06/05/18   0520  06/04/18   1438  06/04/18   0740   TP  5.2*  6.1*  6.8   ALB  1.9*  2.5*  2.9*   GLOB  3.3  3.6  3.9   AGRAT  0.6*  0.7*  0.7*   SGOT  32  19  29   AP  50  63  74        Cardiac Enzymes Lab Results   Component Value Date/Time    TROIQ <0.02 06/04/2018 02:38 PM        BNP No results found for: BNP, BNPP, XBNPT     Coagulation Recent Labs      06/04/18   1438   PTP  15.3*   INR  1.3*   APTT  26.9         Thyroid  No results found for: T4, T3U, TSH, TSHEXT, TSHEXT       Lipid Panel No results found for: CHOL, CHOLPOCT, CHOLX, CHLST, CHOLV, 322601, HDL, LDL, LDLC, DLDLP, 670799, VLDLC, VLDL, TGLX, TRIGL, TRIGP, TGLPOCT, CHHD, CHHDX       Urinalysis Lab Results   Component Value Date/Time    Color DARK YELLOW 06/04/2018 08:42 AM    Appearance CLOUDY 06/04/2018 08:42 AM    Specific gravity 1.028 06/04/2018 08:42 AM    pH (UA) 5.0 06/04/2018 08:42 AM    Protein 30 (A) 06/04/2018 08:42 AM    Glucose NEGATIVE  06/04/2018 08:42 AM    Ketone TRACE (A) 06/04/2018 08:42 AM    Bilirubin SMALL (A) 06/04/2018 08:42 AM    Urobilinogen 1.0 06/04/2018 08:42 AM    Nitrites NEGATIVE  06/04/2018 08:42 AM    Leukocyte Esterase NEGATIVE 06/04/2018 08:42 AM    Epithelial cells FEW 06/04/2018 08:42 AM    Bacteria 1+ (A) 06/04/2018 08:42 AM    WBC 0 to 3 06/04/2018 08:42 AM    RBC 0 to 3 06/04/2018 08:42 AM        XR (Most Recent). CXR reviewed by me and compared with previous CXR   Results from Hospital Encounter encounter on 06/04/18   XR CHEST PORT   Narrative Portable chest xray     Reason for exam:ETT placement    Images: 1    Comparison: 5/4/2018 chest x-ray    Findings:     Endotracheal tube is present. The tip projects about 4.5 cm above the tan. Right arm PICC line tip extending to the cavoatrial junction region. GI tube is  looped in the stomach. Lung volumes remain low with elevated right hemidiaphragm. Minimal hazy opacity  at the right upper lobe persist, possibly infiltrate. Basilar subsegmental  atelectasis and perhaps a small left pleural effusion are noted. The  cardiomediastinal contours are stable. The trachea is midline. The cortical margins are intact. Impression Impression:    Endotracheal tube has tip about 4.5 cm above the tan. Other support devices  are stable. Mild right upper lobe hazy density, minimal basilar atelectasis and probable  small pleural effusion are again seen. CT (Most Recent)   Results from Hospital Encounter encounter on 06/04/18   CT ABD PELV W CONT   Narrative EXAM: CT of the Abdomen and Pelvis    INDICATION: Abdominal pain with abdominal distention, status post hysterectomy  and appendectomy    COMPARISON: None. TECHNIQUE: Axial CT imaging of the abdomen and pelvis was performed with  intravenous contrast. Multiplanar reformats were generated. Dose reduction  techniques used: Automated exposure control, adjustment of the mAs and/or kVp  according to patient's size, and iterative reconstruction techniques.     _______________    FINDINGS:    LOWER CHEST: Minimal bibasilar atelectasis without significant pleural or  pericardial effusion coronary artery atherosclerotic calcifications are  present. Yesi Riser LIVER, BILIARY: Hepatomegaly measuring 20.2 cm with mild diffuse parenchymal low  attenuation/steatosis. There are 2 separate rounded low attenuating lesions in  the left lobe of liver measuring 1.2 cm and 0.9 cm, measuring water attenuation  Hounsfield units. No enhancing hepatic mass. No biliary dilation. Distended  gallbladder/gallbladder hydrops which may be secondary to nothing by mouth  status. PANCREAS: Normal.    SPLEEN: Normal.    ADRENALS: Normal.    KIDNEYS: The kidneys are iso-attenuating without evidence of hydronephrosis,  nephrolithiasis or masses. No perinephric fluid collections. No hydroureter. LYMPH NODES: No enlarged lymph nodes. Bilateral pelvic lymphadenectomy surgical  clips are present. GASTROINTESTINAL TRACT:   -Nasogastric tube is present with the tip projecting towards the distal stomach  with moderate mid to proximal air-fluid level.  -Numerous abnormal edematous thick-walled small bowel loops in the left abdomen  and bilateral pelvis, with definite areas of lamellar edematous transformation  notably in the right lower quadrant. No renetta pneumatosis intestinalis. -Stool is present in the cecum and right colon to the hepatic flexure with  circumferential air, with mildly prominent but not discretely thick-walled  appearance. Bowel gas is present in the transverse colon with decompressed  splenic flexure. Decompressed descending colon. Edematous thick-walled sigmoid  colon which may be secondary to decompression and diverticulosis. PELVIC ORGANS: Postsurgical changes of hysterectomy and bilateral nephrectomy  with a few tiny locules of gas far lateral right inferior pelvis. Decompressed bladder with Fry catheter in place. VASCULATURE: Normal caliber aorta without evidence of atherosclerotic disease. Patent celiac and SMA. Patent WILLIE. Patent bilateral iliofemoral vessels. Ovoid  infrarenal IVC. OTHER: Small volume of low attenuating abdominal and pelvic ascites,, with  mesenteric stranding. There is a very small amount of interloop mesenteric  fluid. No significant pneumatosis. Mild diffuse soft tissue anasarca  There is a small amount of subcutaneous emphysema lateral left upper abdominal  the soft tissues and left groin. Midline incision with no fluid collections    BONES: Age-indeterminate moderate to severe L1 vertebral body compression  fracture deformity with minimal dorsal retropulsion of the posterior superior  corner, producing mild central stenosis. Multilevel degenerative disc disease  and spondylosis with vacuum disc phenomena at several levels in the lumbar  spine. .    _______________         Impression IMPRESSION:    1. Postsurgical hysterectomy, bilateral oophorectomy, pelvic lymphadenectomy and  a mastectomy surgical changes. Small volume of ascites in the abdomen and  pelvis, with a few tiny locules of gas in the right hemipelvis would be in  keeping with recent postsurgical etiology. 2. Abnormal edematous thick-walled small bowel loops in the left abdomen and  pelvis, with TRAM lamellar edematous configuration, induration. No findings of  pneumatosis. The overall appearance is nonspecific. Diagnostic considerations  include infectious etiology, nonspecific edema which may be unresolved  postsurgical etiology. Ischemia is also included in the differential diagnostic  consideration, however, there are no findings of pneumatosis, portal venous gas. 3. Stool in the right colon extending to the hepatic flexure with surrounding  gas, foci of pneumatosis could be obscured. No associated bowel wall thickening. 4. Satisfactory position of nasogastric tube. 5. Hepatomegaly, steatosis with 2 rounded low-density lesions, potentially  cysts. 6. Bibasilar atelectasis.     -The findings of this exam were discussed extensively with Dr. Angi Acosta on  06/04/2018, prior to dictation. Imaging:  I have personally reviewed the patients radiographs and have reviewed the reports:     Best practice :  Fluids started at 30 ml/kg with aim to keep CVP 8 or above  Lactic acid ordered- initial and repeat Q6hrs if elevated till normalized. Cultures drawn. Antibiotic administered within 1hr-ICU  And 3hrs ED  Pressors aim MAP >65mmHg  Glycemic control  Sress ulcer prophylaxis  DVT prophylaxis    Vent bundle, Fry bundle and central line bundle followed. Yrn Jaime M.D.   Pulmonary Critical Care & Sleep Medicine

## 2018-06-05 NOTE — PROGRESS NOTES
1930- Report and care received, assessment completed per flow sheet. Alert, oriented, NAD. Denies pain at this time, however, does report right sided abdominal pain with deep breathing. Hi flow nasal cannula in place, deep breathing encouraged, lungs diminished and respirations shallow. IVF infusing via IV pump without difficulty. 2008- Luis Fernando Mcdonough updated regarding lab results, no orders received. 2145-  updated regarding status, lactic acid, and urine output. Orders received for a 1 liter bolus of LR.     2350-  in to see, to take to OR, preop checklist in progress, chlorhexidine bath completed, blood bank has 2 units PRBCs prepared per MD request. MD to speak with family. 0000- Reassessment completed and without change. 65- OR RN on unit, states to stop PCA and IVF with sodium bicarb per CRNA and . See MAR.    0114- Transported to Preop per OR RN with cardiac monitor in place. 36- Received from OR intubated, moves extremities occasionally spontaneously. Fentanyl PCA started, see MAR.    0404- Assessment completed. Sedated, flutters eyes to name, no eye opening, + gag. Tachycardic. ETT secured at 22 cm, to obtain CXR for ETT placement confirmation. IVF infusing via IV pump without difficulty. Abdomen soft, rounded, lower/mid abdominal incision approximated, with dermabond, no drainage. 1717- Soft bilateral wrist restraints applied to protect integrity of lines/tubes, flow sheet in progress,    0431- This RN off unit for RRT. Suzanne Gomez RN to monitor while off unit.    1547- Returned to ICU, events that occurred while gone noted, awaiting RT to draw ABG. Currently resting quietly, SpO2= 90-91%.

## 2018-06-05 NOTE — PROGRESS NOTES
1446--Patient c/o severe pain to abdominal area. Received orders for 4mg IVP Dilaudid. 1457--Dilaudid PCA started. 1551--Patient is having some slight hypotension following narcotic administration, received orders from Dr. Néstor Bhatia for LR 250ml bolus. 1700--Set up intra-abdominal pressure monitoring per Dr. Néstor Bhatia. Reading 15-17.     1721--R Subclavian central line was inserted by Dr. Néstor Bhatia.     1905--Repeat Lactic Acid, CBC, BMP, and eletrolyte protocol labs were drawn and sent. Dr. Néstor Bhatia has asked me to pass along to night shift calling those results to Dr. Deangelo Dubon.

## 2018-06-05 NOTE — ROUTINE PROCESS
Patient taken directly to ICU with Benita Castellon CRNA using ambu bag on O2 on patient. Also accompanied by Dr. Nicole and Samson London, RN. Report called to Farrah Rivera RN, ICU. Respiratory present to put patient on ventilator when we arrived. Additional report given and care of patient turned over to SLY Victor RN at 5011.

## 2018-06-05 NOTE — CONSULTS
[address] [facility] [place]   MR#: [id number]  : 1950  ACCOUNT #: [de-identified]   DATE OF SERVICE: 2018    PRIMARY CARE PHYSICIAN:  Imani Marino MD    ADMITTING DIAGNOSES:  1. Possible sepsis. 2.  Ischemic colitis. 3.  Oliguria. 4.  Recent ovarian cancer staging and laparotomy surgery. HISTORY OF PRESENT ILLNESS:  This is a 59-year-old white female who was admitted by the GYN service this morning. She came back to the hospital after recently leaving after having had a laparotomy and a total abdominal hysterectomy. She also had an oophorectomy, frozen pathology, paraaortic lymphadenectomy, radical tumor debulking, and a cystoscopy that was performed on May 30th, so 3 days postop, she was able to leave the hospital.  She ended up coming back to the emergency room today with increasing abdominal pain. She had been doing okay until she started having slight pain that worsened acutely and has subsequently been admitted to the intensive care unit. She has been seen by GYN as well as critical care. Surgery has been consulted as there is a concern for potential ischemic colitis. Abdominal compartment pressure has been checked in the ICU. Her urine output has been very poor throughout the course of today. She is having an extreme amount of abdominal pain and required a PCA pump. She has just had a central line placed. She is getting IV fluid hydration. PAST MEDICAL HISTORY:  Includes as noted already, cancer surgery with ovarian cancer. She has a history of diabetes, hypertension, allergic rhinitis. PAST SURGICAL HISTORY:  Tonsillectomy. SOCIAL HISTORY:  Not a smoker. No regular alcohol use. FAMILY HISTORY:  No family history contributory. MEDICATIONS:  She was on at home include Norvasc, Goody's powder, Lipitor, Dilaudid, Hyzaar, Glucophage, Noble-Synephrine nasal spray, verapamil.     REVIEW OF SYSTEMS:  Very difficult to engage her in this currently as she has just had a central line placed. She is on pain medication and she is clear that she has abdominal pain, but she is a little bit somnolent at this point in time and on high flow oxygen, so, from what I can ascertain, there have been no fevers or chills throughout the course of the day. She has required high flow oxygen due to diminished O2 saturations. She has had no complaints of chest pain, but she has diffuse abdominal pain, poor urine output throughout the course of the day. Denies any current dysuria. No myalgias or arthralgias. PHYSICAL EXAMINATION:  GENERAL:  This is an acutely ill-appearing  female who is lying flat on the bed. VITAL SIGNS:  Currently, her blood pressure is 86/54, prior to that 99/61, temperature 99.3, respiratory rate is 15, SpO2 is 97% on high flow 45 L of O2. NECK:  Supple now with a central line in place in the right side. Oropharynx appears dry. HEENT:  Sclerae are anicteric. Conjunctivae pale. LUNGS:  Clear anteriorly. No rales, rhonchi or wheezes. HEART:  Regular rhythm, tachycardic. No murmur, rub or gallop. ABDOMEN:  Diffusely tender with diminished bowel sounds. Surgical incision midline intact. No evidence or drainage or cellulitis. Tender across all 4 quadrants. Moderate distention. Diminished bowel sounds. LOWER EXTREMITIES:  Distal pulses are palpable. Feet are warm. No clubbing, cyanosis or edema noted. LABORATORY DATA:  This afternoon, white count of 16.1, H and H 11.6 and 36.6, platelets are 124. Urinalysis earlier today showed no nitrites or leukocyte esterase with 0-3 WBCs. Her chloride was normal, bicarb was 18, anion gap was 18 also. Creatinine 1.53 with prior creatinine of 0.62 on day of discharge. Lactic acid on admission was 5.7, it is down to 4. Troponin less than 0.02. Blood cultures are drawn and currently pending. Those are from 7 o'clock this morning.   She had a CAT scan of her abdomen and pelvis that showed postsurgical hysterectomy, oophorectomy, lymphadenectomy, and a mastectomy with surgical changes. Small volume of ascites in the abdomen and pelvis, abnormal edematous thick walled small bowel loops in the left abdomen and pelvis. No findings of pneumatosis. Concern for ischemia, potential infection. Stool in the right colon extending to the hepatic flexure. She has a nasogastric tube in place and she has bibasilar atelectasis. Chest x-ray done earlier showed no acute radiographic abnormality. KUB showed nonobstructive and nonspecific bowel gas pattern. An EKG, sinus tachycardia with PACs. IMPRESSION:  1. Sepsis syndrome with elevated lactic acid, potential for ischemic colitis, and recent pelvic surgery. 2.  Abdominal pain, concerning for ischemia. 3.  Status post ovarian cancer surgery and debulking with laparotomy and details of surgery noted above. 4.  History of diabetes. 5.  Hypertension. In discussion, her blood sugars are currently not being checked, so we will go ahead and make sure that those are being evaluated for her with correctional insulin also to be ordered. I am placing the order on that now for every 6 hour blood sugars and sliding scale insulin as needed. It looks like her last hemoglobin A1c was 6.4% on May 22nd. She is currently on antibiotics to include Flagyl, Zosyn and vancomycin. Sepsis protocol was initiated. Blood pressure is currently not requiring pressor support, but she is on sodium bicarb and she does have a Dilaudid pump. She received a bolus of this earlier, which did diminish her blood pressure, but that appears to be recovering after fluids. A central line has been placed for resuscitative measures to continue and she has Protonix IV daily. For DVT prophylaxis, SCDs are applied. A Fry catheter is in place. Abdominal compartment pressure has been measured.   There is concern about her diminished urine output and the intra-abdominal acute process potentially an occurrence, so surgery has been consulted. She is on electrolyte replacement protocol. She is being closely monitored in the ICU. Critical care pulmonology is on her case. General surgery is coming to see her this evening and the hospitalist will follow along and monitor her progression such that when she is able to come on to the floor, we can help with continued hospital management. We will follow along closely with you in the intensive care unit at this point in time and I thank you very much for the consult for this patient.       MD LENA Samuel / Natalie Dacosta  D: 06/04/2018 17:38     T: 06/04/2018 21:25  JOB #: [id number]

## 2018-06-06 ENCOUNTER — APPOINTMENT (OUTPATIENT)
Dept: GENERAL RADIOLOGY | Age: 68
DRG: 853 | End: 2018-06-06
Attending: INTERNAL MEDICINE
Payer: MEDICARE

## 2018-06-06 LAB
ALBUMIN SERPL-MCNC: 2 G/DL (ref 3.4–5)
ALBUMIN/GLOB SERPL: 0.7 {RATIO} (ref 0.8–1.7)
ALP SERPL-CCNC: 45 U/L (ref 45–117)
ALT SERPL-CCNC: 17 U/L (ref 13–56)
ANION GAP SERPL CALC-SCNC: 16 MMOL/L (ref 3–18)
APTT PPP: 130.7 SEC (ref 23–36.4)
APTT PPP: 30.3 SEC (ref 23–36.4)
ARTERIAL PATENCY WRIST A: ABNORMAL
AST SERPL-CCNC: 19 U/L (ref 15–37)
BASE DEFICIT BLD-SCNC: 6 MMOL/L
BASOPHILS # BLD: 0 K/UL (ref 0–0.06)
BASOPHILS # BLD: 0 K/UL (ref 0–0.06)
BASOPHILS NFR BLD: 0 % (ref 0–2)
BASOPHILS NFR BLD: 0 % (ref 0–2)
BDY SITE: ABNORMAL
BILIRUB SERPL-MCNC: 0.5 MG/DL (ref 0.2–1)
BODY TEMPERATURE: 97.8
BUN SERPL-MCNC: 35 MG/DL (ref 7–18)
BUN/CREAT SERPL: 12 (ref 12–20)
CA-I SERPL-SCNC: 1.01 MMOL/L (ref 1.12–1.32)
CALCIUM SERPL-MCNC: 7 MG/DL (ref 8.5–10.1)
CHLORIDE SERPL-SCNC: 99 MMOL/L (ref 100–108)
CO2 SERPL-SCNC: 21 MMOL/L (ref 21–32)
CREAT SERPL-MCNC: 2.92 MG/DL (ref 0.6–1.3)
DIFFERENTIAL METHOD BLD: ABNORMAL
DIFFERENTIAL METHOD BLD: ABNORMAL
EOSINOPHIL # BLD: 0 K/UL (ref 0–0.4)
EOSINOPHIL # BLD: 0.1 K/UL (ref 0–0.4)
EOSINOPHIL NFR BLD: 0 % (ref 0–5)
EOSINOPHIL NFR BLD: 0 % (ref 0–5)
ERYTHROCYTE [DISTWIDTH] IN BLOOD BY AUTOMATED COUNT: 18.9 % (ref 11.6–14.5)
ERYTHROCYTE [DISTWIDTH] IN BLOOD BY AUTOMATED COUNT: 19.1 % (ref 11.6–14.5)
GAS FLOW.O2 O2 DELIVERY SYS: ABNORMAL L/MIN
GAS FLOW.O2 SETTING OXYMISER: 12 BPM
GLOBULIN SER CALC-MCNC: 2.9 G/DL (ref 2–4)
GLUCOSE BLD STRIP.AUTO-MCNC: 182 MG/DL (ref 70–110)
GLUCOSE BLD STRIP.AUTO-MCNC: 183 MG/DL (ref 70–110)
GLUCOSE BLD STRIP.AUTO-MCNC: 184 MG/DL (ref 70–110)
GLUCOSE SERPL-MCNC: 199 MG/DL (ref 74–99)
HCO3 BLD-SCNC: 19.4 MMOL/L (ref 22–26)
HCT VFR BLD AUTO: 25.2 % (ref 35–45)
HCT VFR BLD AUTO: 26.2 % (ref 35–45)
HGB BLD-MCNC: 8.1 G/DL (ref 12–16)
HGB BLD-MCNC: 8.2 G/DL (ref 12–16)
INSPIRATION.DURATION SETTING TIME VENT: 0.9 SEC
LACTATE SERPL-SCNC: 3.6 MMOL/L (ref 0.4–2)
LYMPHOCYTES # BLD: 1.1 K/UL (ref 0.9–3.6)
LYMPHOCYTES # BLD: 1.3 K/UL (ref 0.9–3.6)
LYMPHOCYTES NFR BLD: 10 % (ref 21–52)
LYMPHOCYTES NFR BLD: 9 % (ref 21–52)
MAGNESIUM SERPL-MCNC: 2.1 MG/DL (ref 1.6–2.6)
MCH RBC QN AUTO: 21.8 PG (ref 24–34)
MCH RBC QN AUTO: 22.2 PG (ref 24–34)
MCHC RBC AUTO-ENTMCNC: 31.3 G/DL (ref 31–37)
MCHC RBC AUTO-ENTMCNC: 32.1 G/DL (ref 31–37)
MCV RBC AUTO: 69 FL (ref 74–97)
MCV RBC AUTO: 69.7 FL (ref 74–97)
MONOCYTES # BLD: 0.9 K/UL (ref 0.05–1.2)
MONOCYTES # BLD: 1.2 K/UL (ref 0.05–1.2)
MONOCYTES NFR BLD: 8 % (ref 3–10)
MONOCYTES NFR BLD: 9 % (ref 3–10)
NEUTS SEG # BLD: 11.3 K/UL (ref 1.8–8)
NEUTS SEG # BLD: 9.7 K/UL (ref 1.8–8)
NEUTS SEG NFR BLD: 82 % (ref 40–73)
NEUTS SEG NFR BLD: 82 % (ref 40–73)
O2/TOTAL GAS SETTING VFR VENT: 0.5 %
PCO2 BLD: 31.9 MMHG (ref 35–45)
PEEP RESPIRATORY: 10 CMH2O
PH BLD: 7.39 [PH] (ref 7.35–7.45)
PHOSPHATE SERPL-MCNC: 5.5 MG/DL (ref 2.5–4.9)
PIP ISTAT,IPIP: 16
PLATELET # BLD AUTO: 165 K/UL (ref 135–420)
PLATELET # BLD AUTO: 166 K/UL (ref 135–420)
PMV BLD AUTO: 10.1 FL (ref 9.2–11.8)
PMV BLD AUTO: 10.5 FL (ref 9.2–11.8)
PO2 BLD: 69 MMHG (ref 80–100)
POTASSIUM SERPL-SCNC: 2.9 MMOL/L (ref 3.5–5.5)
POTASSIUM SERPL-SCNC: 3.4 MMOL/L (ref 3.5–5.5)
PROT SERPL-MCNC: 4.9 G/DL (ref 6.4–8.2)
RBC # BLD AUTO: 3.65 M/UL (ref 4.2–5.3)
RBC # BLD AUTO: 3.76 M/UL (ref 4.2–5.3)
SAO2 % BLD: 94 % (ref 92–97)
SERVICE CMNT-IMP: ABNORMAL
SODIUM SERPL-SCNC: 136 MMOL/L (ref 136–145)
SPECIMEN TYPE: ABNORMAL
TOTAL RESP. RATE, ITRR: 16
VENTILATION MODE VENT: ABNORMAL
VOLUME CONTROL PLUS IVLCP: YES
VT SETTING VENT: 450 ML
WBC # BLD AUTO: 11.8 K/UL (ref 4.6–13.2)
WBC # BLD AUTO: 13.9 K/UL (ref 4.6–13.2)

## 2018-06-06 PROCEDURE — 74011636637 HC RX REV CODE- 636/637: Performed by: INTERNAL MEDICINE

## 2018-06-06 PROCEDURE — 82330 ASSAY OF CALCIUM: CPT | Performed by: INTERNAL MEDICINE

## 2018-06-06 PROCEDURE — 74011250637 HC RX REV CODE- 250/637: Performed by: INTERNAL MEDICINE

## 2018-06-06 PROCEDURE — 83605 ASSAY OF LACTIC ACID: CPT | Performed by: OBSTETRICS & GYNECOLOGY

## 2018-06-06 PROCEDURE — 74011000250 HC RX REV CODE- 250: Performed by: INTERNAL MEDICINE

## 2018-06-06 PROCEDURE — 94640 AIRWAY INHALATION TREATMENT: CPT

## 2018-06-06 PROCEDURE — 83735 ASSAY OF MAGNESIUM: CPT | Performed by: OBSTETRICS & GYNECOLOGY

## 2018-06-06 PROCEDURE — 36600 WITHDRAWAL OF ARTERIAL BLOOD: CPT

## 2018-06-06 PROCEDURE — 65270000029 HC RM PRIVATE

## 2018-06-06 PROCEDURE — 74011250636 HC RX REV CODE- 250/636: Performed by: OBSTETRICS & GYNECOLOGY

## 2018-06-06 PROCEDURE — 94003 VENT MGMT INPAT SUBQ DAY: CPT

## 2018-06-06 PROCEDURE — 82803 BLOOD GASES ANY COMBINATION: CPT

## 2018-06-06 PROCEDURE — 74011250636 HC RX REV CODE- 250/636

## 2018-06-06 PROCEDURE — 74011250636 HC RX REV CODE- 250/636: Performed by: HOSPITALIST

## 2018-06-06 PROCEDURE — P9045 ALBUMIN (HUMAN), 5%, 250 ML: HCPCS | Performed by: INTERNAL MEDICINE

## 2018-06-06 PROCEDURE — 36415 COLL VENOUS BLD VENIPUNCTURE: CPT | Performed by: INTERNAL MEDICINE

## 2018-06-06 PROCEDURE — 74011000258 HC RX REV CODE- 258: Performed by: EMERGENCY MEDICINE

## 2018-06-06 PROCEDURE — 74011250636 HC RX REV CODE- 250/636: Performed by: INTERNAL MEDICINE

## 2018-06-06 PROCEDURE — 82962 GLUCOSE BLOOD TEST: CPT

## 2018-06-06 PROCEDURE — C9113 INJ PANTOPRAZOLE SODIUM, VIA: HCPCS | Performed by: INTERNAL MEDICINE

## 2018-06-06 PROCEDURE — 85025 COMPLETE CBC W/AUTO DIFF WBC: CPT | Performed by: OBSTETRICS & GYNECOLOGY

## 2018-06-06 PROCEDURE — 84100 ASSAY OF PHOSPHORUS: CPT | Performed by: INTERNAL MEDICINE

## 2018-06-06 PROCEDURE — 74011250636 HC RX REV CODE- 250/636: Performed by: EMERGENCY MEDICINE

## 2018-06-06 PROCEDURE — 77010033678 HC OXYGEN DAILY

## 2018-06-06 PROCEDURE — 71045 X-RAY EXAM CHEST 1 VIEW: CPT

## 2018-06-06 PROCEDURE — 84132 ASSAY OF SERUM POTASSIUM: CPT | Performed by: OBSTETRICS & GYNECOLOGY

## 2018-06-06 PROCEDURE — 85730 THROMBOPLASTIN TIME PARTIAL: CPT | Performed by: INTERNAL MEDICINE

## 2018-06-06 RX ORDER — POTASSIUM CHLORIDE 7.45 MG/ML
10 INJECTION INTRAVENOUS
Status: COMPLETED | OUTPATIENT
Start: 2018-06-06 | End: 2018-06-10

## 2018-06-06 RX ORDER — POTASSIUM CHLORIDE 7.45 MG/ML
10 INJECTION INTRAVENOUS
Status: COMPLETED | OUTPATIENT
Start: 2018-06-06 | End: 2018-06-06

## 2018-06-06 RX ORDER — IPRATROPIUM BROMIDE 0.5 MG/2.5ML
0.5 SOLUTION RESPIRATORY (INHALATION)
Status: DISCONTINUED | OUTPATIENT
Start: 2018-06-06 | End: 2018-06-13

## 2018-06-06 RX ORDER — DEXTROSE, SODIUM CHLORIDE, AND POTASSIUM CHLORIDE 5; .45; .075 G/100ML; G/100ML; G/100ML
50 INJECTION INTRAVENOUS CONTINUOUS
Status: DISCONTINUED | OUTPATIENT
Start: 2018-06-06 | End: 2018-06-09

## 2018-06-06 RX ORDER — LORAZEPAM 2 MG/ML
1 INJECTION INTRAMUSCULAR ONCE
Status: COMPLETED | OUTPATIENT
Start: 2018-06-06 | End: 2018-06-06

## 2018-06-06 RX ORDER — LORAZEPAM 2 MG/ML
INJECTION INTRAMUSCULAR
Status: COMPLETED
Start: 2018-06-06 | End: 2018-06-06

## 2018-06-06 RX ORDER — HEPARIN SODIUM 10000 [USP'U]/100ML
18-36 INJECTION, SOLUTION INTRAVENOUS
Status: DISCONTINUED | OUTPATIENT
Start: 2018-06-06 | End: 2018-06-08

## 2018-06-06 RX ADMIN — INSULIN LISPRO 2 UNITS: 100 INJECTION, SOLUTION INTRAVENOUS; SUBCUTANEOUS at 18:00

## 2018-06-06 RX ADMIN — POTASSIUM CHLORIDE 10 MEQ: 10 INJECTION, SOLUTION INTRAVENOUS at 08:00

## 2018-06-06 RX ADMIN — DEXTROSE MONOHYDRATE, SODIUM CHLORIDE, AND POTASSIUM CHLORIDE 100 ML/HR: 50; 4.5; .745 INJECTION, SOLUTION INTRAVENOUS at 21:25

## 2018-06-06 RX ADMIN — ALBUMIN (HUMAN) 25 G: 12.5 INJECTION, SOLUTION INTRAVENOUS at 00:48

## 2018-06-06 RX ADMIN — POTASSIUM CHLORIDE 10 MEQ: 10 INJECTION, SOLUTION INTRAVENOUS at 11:47

## 2018-06-06 RX ADMIN — POTASSIUM CHLORIDE 10 MEQ: 10 INJECTION, SOLUTION INTRAVENOUS at 06:03

## 2018-06-06 RX ADMIN — POTASSIUM CHLORIDE 10 MEQ: 10 INJECTION, SOLUTION INTRAVENOUS at 21:19

## 2018-06-06 RX ADMIN — Medication 100 MCG/HR: at 20:54

## 2018-06-06 RX ADMIN — LORAZEPAM 1 MG: 2 INJECTION INTRAMUSCULAR at 03:00

## 2018-06-06 RX ADMIN — METRONIDAZOLE 500 MG: 500 INJECTION, SOLUTION INTRAVENOUS at 21:19

## 2018-06-06 RX ADMIN — PIPERACILLIN SODIUM,TAZOBACTAM SODIUM 2.25 G: 2; .25 INJECTION, POWDER, FOR SOLUTION INTRAVENOUS at 09:50

## 2018-06-06 RX ADMIN — METRONIDAZOLE 500 MG: 500 INJECTION, SOLUTION INTRAVENOUS at 06:03

## 2018-06-06 RX ADMIN — POTASSIUM CHLORIDE 10 MEQ: 10 INJECTION, SOLUTION INTRAVENOUS at 07:51

## 2018-06-06 RX ADMIN — MIDAZOLAM HYDROCHLORIDE 2 MG: 1 INJECTION, SOLUTION INTRAMUSCULAR; INTRAVENOUS at 11:19

## 2018-06-06 RX ADMIN — POTASSIUM CHLORIDE 10 MEQ: 10 INJECTION, SOLUTION INTRAVENOUS at 10:46

## 2018-06-06 RX ADMIN — IPRATROPIUM BROMIDE AND ALBUTEROL SULFATE 3 ML: .5; 3 SOLUTION RESPIRATORY (INHALATION) at 07:46

## 2018-06-06 RX ADMIN — SODIUM CHLORIDE 40 MG: 9 INJECTION INTRAMUSCULAR; INTRAVENOUS; SUBCUTANEOUS at 09:52

## 2018-06-06 RX ADMIN — POTASSIUM CHLORIDE 10 MEQ: 10 INJECTION, SOLUTION INTRAVENOUS at 23:02

## 2018-06-06 RX ADMIN — HEPARIN SODIUM 18 UNITS/KG/HR: 10000 INJECTION, SOLUTION INTRAVENOUS at 10:38

## 2018-06-06 RX ADMIN — INSULIN LISPRO 2 UNITS: 100 INJECTION, SOLUTION INTRAVENOUS; SUBCUTANEOUS at 12:00

## 2018-06-06 RX ADMIN — POTASSIUM CHLORIDE 10 MEQ: 10 INJECTION, SOLUTION INTRAVENOUS at 19:35

## 2018-06-06 RX ADMIN — VANCOMYCIN HYDROCHLORIDE 1250 MG: 10 INJECTION, POWDER, LYOPHILIZED, FOR SOLUTION INTRAVENOUS at 05:06

## 2018-06-06 RX ADMIN — Medication 1 MG: at 03:00

## 2018-06-06 RX ADMIN — IPRATROPIUM BROMIDE 0.5 MG: 0.5 SOLUTION RESPIRATORY (INHALATION) at 19:44

## 2018-06-06 RX ADMIN — CHLORHEXIDINE GLUCONATE 10 ML: 1.2 RINSE ORAL at 21:19

## 2018-06-06 RX ADMIN — PIPERACILLIN SODIUM,TAZOBACTAM SODIUM 2.25 G: 2; .25 INJECTION, POWDER, FOR SOLUTION INTRAVENOUS at 02:31

## 2018-06-06 RX ADMIN — PIPERACILLIN SODIUM,TAZOBACTAM SODIUM 2.25 G: 2; .25 INJECTION, POWDER, FOR SOLUTION INTRAVENOUS at 15:00

## 2018-06-06 RX ADMIN — PIPERACILLIN SODIUM,TAZOBACTAM SODIUM 2.25 G: 2; .25 INJECTION, POWDER, FOR SOLUTION INTRAVENOUS at 21:18

## 2018-06-06 RX ADMIN — METRONIDAZOLE 500 MG: 500 INJECTION, SOLUTION INTRAVENOUS at 14:57

## 2018-06-06 RX ADMIN — INSULIN LISPRO 2 UNITS: 100 INJECTION, SOLUTION INTRAVENOUS; SUBCUTANEOUS at 06:03

## 2018-06-06 RX ADMIN — Medication 100 MCG/HR: at 12:33

## 2018-06-06 RX ADMIN — MIDAZOLAM HYDROCHLORIDE 2 MG: 1 INJECTION, SOLUTION INTRAMUSCULAR; INTRAVENOUS at 13:30

## 2018-06-06 RX ADMIN — IPRATROPIUM BROMIDE AND ALBUTEROL SULFATE 3 ML: .5; 3 SOLUTION RESPIRATORY (INHALATION) at 01:08

## 2018-06-06 RX ADMIN — CHLORHEXIDINE GLUCONATE 10 ML: 1.2 RINSE ORAL at 09:51

## 2018-06-06 RX ADMIN — ALBUMIN (HUMAN) 25 G: 12.5 INJECTION, SOLUTION INTRAVENOUS at 07:36

## 2018-06-06 RX ADMIN — IPRATROPIUM BROMIDE 0.5 MG: 0.5 SOLUTION RESPIRATORY (INHALATION) at 13:02

## 2018-06-06 RX ADMIN — DEXTROSE MONOHYDRATE, SODIUM CHLORIDE, AND POTASSIUM CHLORIDE 100 ML/HR: 50; 4.5; .745 INJECTION, SOLUTION INTRAVENOUS at 10:43

## 2018-06-06 RX ADMIN — SODIUM CHLORIDE 500 ML: 900 INJECTION, SOLUTION INTRAVENOUS at 06:04

## 2018-06-06 RX ADMIN — MIDAZOLAM HYDROCHLORIDE 2 MG: 1 INJECTION, SOLUTION INTRAMUSCULAR; INTRAVENOUS at 01:23

## 2018-06-06 RX ADMIN — POTASSIUM CHLORIDE 10 MEQ: 10 INJECTION, SOLUTION INTRAVENOUS at 09:50

## 2018-06-06 RX ADMIN — Medication 100 MCG/HR: at 02:31

## 2018-06-06 NOTE — PROGRESS NOTES
Bedside  shift change report given to Petey Enriquez RN (oncoming nurse) by Dennise Mcneill and Diony Scott (offgoing nurse). Report included the following information SBAR, Kardex, ED Summary, Procedure Summary, Intake/Output, MAR, Accordion, Recent Results, Med Rec Status, Cardiac Rhythm ST and Alarm Parameters . Patient continues on Fentanyl PCA and verified, dual assessment completed with Era Sparks RN    0800 Assessment completed     1200 reassessment,no change   1600 Reassessment  No change     Lab collected 1716 for ptt, ptt resulted at 1913- heparin changed per protocol    1915-Bedside shift change report given to [employer] (oncoming nurse) by Gianna Hill (offgoing nurse). Report included the following information SBAR, Kardex, Intake/Output, MAR, Accordion, Recent Results, Med Rec Status, Cardiac Rhythm ST and Alarm Parameters .

## 2018-06-06 NOTE — PROGRESS NOTES
Gynecology Progress Note    Patient doing well post-op day 1 from Procedure(s):  DIAGNOSTIC LAPAROSCOPY CONVERTED TO LAPAROTOMY AT 0225, PERITONEAL BIOPSIES, AEROBIC AND ANAEROBIC CULTURES OF PERITONEAL FLUID, PERITONEAL LAVAGE  LAPAROTOMY EXPLORATORY pt responsive to touch still on ventilator. Patient is not passing flatus. Vitals:  Blood pressure 97/63, pulse (!) 114, temperature 98.6 °F (37 °C), resp. rate 16, height 5' 1\" (1.549 m), weight 89.1 kg (196 lb 6.9 oz), SpO2 99 %. Temp (24hrs), Av.9 °F (36.6 °C), Min:97.2 °F (36.2 °C), Max:98.7 °F (37.1 °C)        Exam:                 Abdomen soft,                 Incision dry and clean without erythema. Lower extremities are negative for swelling, cords, or tenderness.     Labs:   Recent Results (from the past 24 hour(s))   LACTIC ACID    Collection Time: 18 12:45 PM   Result Value Ref Range    Lactic acid 3.6 (HH) 0.4 - 2.0 MMOL/L   MAGNESIUM    Collection Time: 18 12:45 PM   Result Value Ref Range    Magnesium 2.3 1.6 - 2.6 mg/dL   GLUCOSE, POC    Collection Time: 18  5:55 PM   Result Value Ref Range    Glucose (POC) 201 (H) 70 - 110 mg/dL   LACTIC ACID    Collection Time: 18  9:00 PM   Result Value Ref Range    Lactic acid 2.9 (HH) 0.4 - 2.0 MMOL/L   GLUCOSE, POC    Collection Time: 18 11:31 PM   Result Value Ref Range    Glucose (POC) 151 (H) 70 - 709 mg/dL   METABOLIC PANEL, COMPREHENSIVE    Collection Time: 18  4:30 AM   Result Value Ref Range    Sodium 136 136 - 145 mmol/L    Potassium 2.9 (LL) 3.5 - 5.5 mmol/L    Chloride 99 (L) 100 - 108 mmol/L    CO2 21 21 - 32 mmol/L    Anion gap 16 3.0 - 18 mmol/L    Glucose 199 (H) 74 - 99 mg/dL    BUN 35 (H) 7.0 - 18 MG/DL    Creatinine 2.92 (H) 0.6 - 1.3 MG/DL    BUN/Creatinine ratio 12 12 - 20      GFR est AA 19 (L) >60 ml/min/1.73m2    GFR est non-AA 16 (L) >60 ml/min/1.73m2    Calcium 7.0 (L) 8.5 - 10.1 MG/DL    Bilirubin, total 0.5 0.2 - 1.0 MG/DL    ALT (SGPT) 17 13 - 56 U/L    AST (SGOT) 19 15 - 37 U/L    Alk. phosphatase 45 45 - 117 U/L    Protein, total 4.9 (L) 6.4 - 8.2 g/dL    Albumin 2.0 (L) 3.4 - 5.0 g/dL    Globulin 2.9 2.0 - 4.0 g/dL    A-G Ratio 0.7 (L) 0.8 - 1.7     CBC WITH AUTOMATED DIFF    Collection Time: 06/06/18  4:30 AM   Result Value Ref Range    WBC 11.8 4.6 - 13.2 K/uL    RBC 3.76 (L) 4.20 - 5.30 M/uL    HGB 8.2 (L) 12.0 - 16.0 g/dL    HCT 26.2 (L) 35.0 - 45.0 %    MCV 69.7 (L) 74.0 - 97.0 FL    MCH 21.8 (L) 24.0 - 34.0 PG    MCHC 31.3 31.0 - 37.0 g/dL    RDW 19.1 (H) 11.6 - 14.5 %    PLATELET 296 738 - 448 K/uL    MPV 10.1 9.2 - 11.8 FL    NEUTROPHILS 82 (H) 40 - 73 %    LYMPHOCYTES 10 (L) 21 - 52 %    MONOCYTES 8 3 - 10 %    EOSINOPHILS 0 0 - 5 %    BASOPHILS 0 0 - 2 %    ABS. NEUTROPHILS 9.7 (H) 1.8 - 8.0 K/UL    ABS. LYMPHOCYTES 1.1 0.9 - 3.6 K/UL    ABS. MONOCYTES 0.9 0.05 - 1.2 K/UL    ABS. EOSINOPHILS 0.0 0.0 - 0.4 K/UL    ABS. BASOPHILS 0.0 0.0 - 0.06 K/UL    DF AUTOMATED     LACTIC ACID    Collection Time: 06/06/18  4:30 AM   Result Value Ref Range    Lactic acid 3.6 (HH) 0.4 - 2.0 MMOL/L   MAGNESIUM    Collection Time: 06/06/18  4:30 AM   Result Value Ref Range    Magnesium 2.1 1.6 - 2.6 mg/dL   CALCIUM, IONIZED    Collection Time: 06/06/18  4:30 AM   Result Value Ref Range    Ionized Calcium 1.01 (L) 1.12 - 1.32 MMOL/L   PHOSPHORUS    Collection Time: 06/06/18  4:30 AM   Result Value Ref Range    Phosphorus 5.5 (H) 2.5 - 4.9 MG/DL   POC G3    Collection Time: 06/06/18  4:56 AM   Result Value Ref Range    Device: VENT      FIO2 (POC) 0.5 %    pH (POC) 7.389 7.35 - 7.45      pCO2 (POC) 31.9 (L) 35.0 - 45.0 MMHG    pO2 (POC) 69 (L) 80 - 100 MMHG    HCO3 (POC) 19.4 (L) 22 - 26 MMOL/L    sO2 (POC) 94 92 - 97 %    Base deficit (POC) 6 mmol/L    Mode ASSIST CONTROL      Tidal volume 450 ml    Set Rate 12 bpm    PEEP/CPAP (POC) 10 cmH2O    PIP (POC) 16      Allens test (POC) N/A      Inspiratory Time 0.9 sec    Total resp.  rate 16      Site RIGHT RADIAL      Patient temp. 97.8      Specimen type (POC) ARTERIAL      Performed by [facility]     Volume control plus YES     GLUCOSE, POC    Collection Time: 06/06/18  5:49 AM   Result Value Ref Range    Glucose (POC) 184 (H) 70 - 110 mg/dL   CBC WITH AUTOMATED DIFF    Collection Time: 06/06/18 10:04 AM   Result Value Ref Range    WBC 13.9 (H) 4.6 - 13.2 K/uL    RBC 3.65 (L) 4.20 - 5.30 M/uL    HGB 8.1 (L) 12.0 - 16.0 g/dL    HCT 25.2 (L) 35.0 - 45.0 %    MCV 69.0 (L) 74.0 - 97.0 FL    MCH 22.2 (L) 24.0 - 34.0 PG    MCHC 32.1 31.0 - 37.0 g/dL    RDW 18.9 (H) 11.6 - 14.5 %    PLATELET 154 292 - 356 K/uL    MPV 10.5 9.2 - 11.8 FL    NEUTROPHILS 82 (H) 40 - 73 %    LYMPHOCYTES 9 (L) 21 - 52 %    MONOCYTES 9 3 - 10 %    EOSINOPHILS 0 0 - 5 %    BASOPHILS 0 0 - 2 %    ABS. NEUTROPHILS 11.3 (H) 1.8 - 8.0 K/UL    ABS. LYMPHOCYTES 1.3 0.9 - 3.6 K/UL    ABS. MONOCYTES 1.2 0.05 - 1.2 K/UL    ABS. EOSINOPHILS 0.1 0.0 - 0.4 K/UL    ABS. BASOPHILS 0.0 0.0 - 0.06 K/UL    DF AUTOMATED     PTT    Collection Time: 06/06/18 10:04 AM   Result Value Ref Range    aPTT 30.3 23.0 - 36.4 SEC   GLUCOSE, POC    Collection Time: 06/06/18 11:52 AM   Result Value Ref Range    Glucose (POC) 183 (H) 70 - 110 mg/dL       Assessment and Plan: Procedure(s):  DIAGNOSTIC LAPAROSCOPY CONVERTED TO LAPAROTOMY AT 0225, PERITONEAL BIOPSIES, AEROBIC AND ANAEROBIC CULTURES OF PERITONEAL FLUID, PERITONEAL LAVAGE  LAPAROTOMY EXPLORATORY  Continue post-op intensive  care. And support. Appreciate renal and pulm and critical care team. Pt with improving urine output but still with renal insufficiency, now with dvt on heparin tx. bp improved but still with tachycardia and sepsis on antibiotics. intrabd gram stains with many wbc cx pending. incicion appears well.  Continue car in icu

## 2018-06-06 NOTE — INTERDISCIPLINARY ROUNDS
IDR Rounds   Name: Aleyda Felix MRN: [id number]   : 1950 Hospital: [facility] [place]   Date: 2018              Diagnosis:      ·   Patient Active Problem List ·   Diagnosis · Code ·    · Ovarian ca ([facility] [address] [address] · C56.9 ·    · Severe obesity (BMI 35.0-39.9) (HCC) · E66.01 ·    · Abdominal pain · R10.9 ·    · Sepsis ([address] · A41.9 ·    · Ischemic colitis ([address] · K55.9 ·    · Oliguria · R34 ·    · Acute respiratory failure ([address] · J96.00 ·    · JESS (acute kidney injury) ([facility] [address] · R65.1 ·    · Metabolic acidosis · K54.5 ·   Severe sepsis with lactic acidosis ? source of sepsis  · ARDS   · Hypotension ? Related to pain meds ? Due to sepsis  · Worsening urine out put -- Possible ARF due to sepsis and hypotension  · Metabolic acidsis with lactate elevation ? Related to sepsis   · Bilateral peroneal DVT <18> Unknown if presented on admission   PLAN:Per DR Dana Fleming  -- Decrease FIO2 to 50 and PEEP 5  -- Change iv fluids to d5 1/2 ns as ph is ok bicarb coming up  -- DC albumin  -- K 2.9 and its replaced  -- Stheraputic heparin will start today per protocol for dvt  -- Follow Cdiff if negative dc flagyl and continue vanco and zosyn  -- Nephrology consult appreciated  -- Monitor CVP maintaining around 9   -- Laproscopy failed to show ischmic bowel but improvement in lactate and wbc noted today am  --NO major NG out put will start feeds today  -- Possible Sepsis all culture is negative so far  -- Glucose and electrolyte protocol    CVS:Echo reviewed EF 65% RV is ok not dilated still tachycardic ?  Due to pain or undergoing sepsis, Dc albutero and start atrovent  RS:ARDs vent protocol, Taper FIO2 as tolerated   -- Aspiration precaution  -- Add bronchodilators-- change to atrovent due to tachycardia  -- HOB elevated  ID:Lactate still elevated not sure due to lots of LR, or Bicarb infusion, WIll hold both iv fluids and monitor, On vanco start date 6/3, Zosyn 6/3, flagyl /3 pending cdiff all other culture negative so far  ENDO:SSI monitor blood sugars  GI:Start feeds On PPI  RENAL: Nephro consult , iv fluids monitor  CNS:Mentation is ok for now on and off drowsy due to sedation  HEMATOLOGY: Monitor hb get PT and INR on heparin drip   MUSCULOSKELETAL:no acute issued  PAIN AND SEDATION: Fentanyl drip and prn versed if needed will add precedex or propofol  · Skin/Wound: Surgical wound looks clean   · Electrolytes: Replace electrolytes per ICU electrolyte replacement protocol. · IVF: D5 1/2 ns   · Nutrition: Start feeds for now  · Prophylaxis: DVT Prophylaxis with scd,. GI Prophylaxis. · Restraints: none  · PT/OT eval and treat. OOB when appropriate. · Lines/Tubes: Subclavian line 6/4, Fry 6/4, Vent 6/5  ADVANCE DIRECTIVE:Full code  Quality Care: PPI, DVT prophylaxis, HOB elevated, Infection control all reviewed and addressed. Events and notes from last 24 hours reviewed.  Care plan discussed in IDR rounds

## 2018-06-06 NOTE — PROGRESS NOTES
INITIAL NUTRITION ASSESSMENT     RECOMMENDATIONS/PLAN:   Recc Nepro @ 30ml/hr to provide 1188kcal 53g PRO 479ml H20 110g CHO 63g Fat  Recc starting @ 10ml/hr increasing by 5ml/hr Q6 until goal rate met  Will defer Free H20 Flushes to MD  Monitor labs/lytes, tube feed tolerance, skin integrity, wt, fluid status, BM    REASON FOR ASSESSMENT:       [x] ICU admission  NUTRITION ASSESSMENT:   Client History: 75 yrs old Female admitted with acute resp failure on vent, ischemic colitis, sepsis, JESS, metabolic acidosis. 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 PMHx: DM, HTN  Cultural/Jain Food Preferences: None Identified    FOOD/NUTRITION HISTORY  Diet History: pt is intubated unable to assess at this time    Food Allergies:  [x] NKFA     Pertinent PTA Medications: metformin, lipitor,      NUTRITION INTAKE   Diet Order:  NPO      Average PO Intake:       No data found. Pertinent Medications:  [x] Reviewed; protonix, zosyn, KCl, sodium bicarbonate, vancomycin  Electrolyte Replacement Protocol: [x]K  [x]Mg  [x]PO4    Insulin:  [x] SSI  [] Pre-meal   []  Basal   [] Drip  [] None  Pt expected to meet estimated nutrient needs through next review:          []  Yes     [x] No; NPO does not meet estimated needs ANTHROPOMETRICS  Height: 5' 1\" (154.9 cm)       Weight: 89.1 kg (196 lb 6.9 oz)    BMI: 37.1 kg/m^2  -  obese (30%-39.9% BMI)        Weight change: no wt hx in chart unable to assess at this time                                   Comparison to Reference Standards:  IBW: 105 lbs      %IBW: 187%      AdjBW: 58.1 kg    NUTRITION-FOCUSED PHYSICAL ASSESSMENT  Skin: No PU.      GI: No BM    BIOCHEMICAL DATA & MEDICAL TESTS  Pertinent Labs:  [x] Reviewed; K-2.9 Glucose-199 Ca-7.0 Phos-5.5      NUTRITION PRESCRIPTION  Calories: 979-1246 kcal/day based on 11-14kcal/kg  Protein: 71-89 g/day based on 0.8-1.0 g/kg  CHO: 122-156 g/day based on 50% of total energy  Fluid: 979-1246 ml/day based on 1 kcal/ml      NUTRITION DIAGNOSES:   1. Inadequate oral intake related to intubation as evidence by NPO diet order. NUTRITION INTERVENTIONS:   INTERVENTIONS:        GOALS:  1. EN:Nepro @ 30ml/hr to provide 1188kcal 53g PRO 479ml H20 110g CHO 63g Fat 1. Start tube feeds by next review 3 days     LEARNING NEEDS (Diet, Supplementation, Food/Nutrient-Drug Interaction):   [x] None Identified  [] Inpatient education provided/documented    [] Identified and patient:  [] Declined     [] Was not appropriate/indicated  NUTRITION MONITORING /EVALUATION:   Adjust EN/PN as appropriate  Monitor wt  Monitor renal labs, electrolytes, fluid status    [x] Participated in Interdisciplinary Rounds  [x] Interdisciplinary Care Plan Reviewed/Documented  DISCHARGE NUTRITION RECOMMENDATIONS ADDRESSED:        [x] To be determined closer to discharge    NUTRITION RISK:     [x]  At risk                     []  Not currently at risk     Will follow-up per policy.   Karen Pagan, [id number] [address]

## 2018-06-06 NOTE — PROGRESS NOTES
Hospitalist Progress Note    Patient: Hafsa Albert MRN: [id number]  CSN: [id number]    YOB: 1950  Age: 75 y.o. Sex: female    DOA: 6/4/2018 LOS:  LOS: 2 days          Chief Complaint: resp failure on vent          Assessment/Plan     Disposition :  Patient Active Problem List   Diagnosis Code    Ovarian ca ([facility] [address] C56.9    Severe obesity (BMI 35.0-39.9) (HCC) E66.01    Abdominal pain R10.9    Sepsis ([address] A41.9    Ischemic colitis ([address] K55.9    Oliguria R34    Acute respiratory failure ([address] J96.00    JESS (acute kidney injury) ([facility] [address] I39.0    Metabolic acidosis T83.6     76 y/o gyn patient for followed in consult for medical issues. S/p debulking with possible ischemic colitis and sepsis. Sepsis  Acute respiratory failure. Ovarian cancer  JESS    Continue supportive care in the icu. Defer mgmt to admitting team and pulm/cc in the icu. Will follow along. Subjective:    Seen and examined. Intubated. Review of systems:  Unable to obtain. Vital signs/Intake and Output:  Visit Vitals    /62    Pulse (!) 120    Temp 98.7 °F (37.1 °C)    Resp 19    Ht 5' 1\" (1.549 m)    Wt 89.1 kg (196 lb 6.9 oz)    SpO2 100%    BMI 37.12 kg/m2     Current Shift:  06/06 0701 - 06/06 1900  In: 50   Out: 1075 [QJKHI:2446]  Last three shifts:  06/04 1901 - 06/06 0700  In: 4323.2 [I.V.:4273.2]  Out: 993 [Urine:865]    Exam:    General: nad  Head/Neck: NCAT, supple, No masses, No lymphadenopathy  CVS:Regular rate and rhythm, no M/R/G, S1/S2 heard, no thrill  Lungs:intubated. /  Abdomen: Soft, Nontender, No distention, Normal Bowel sounds, No hepatomegaly  Extremities: No edema.                 Labs: Results:       Chemistry Recent Labs      06/06/18   0430  06/05/18   0520  06/04/18   1905  06/04/18   1438   GLU  199*  191*  191*  148*   NA  136  134*  140  139   K  2.9*  3.9  4.1  3.8   CL  99*  100  103  103   CO2  21  19*  20*  18*   BUN  35*  25*  19*  16   CREA  2.92* 2.13*  1.93*  1.53*   CA  7.0*  7.5*  7.5*  7.7*   AGAP  16  15  17  18   BUCR  12  12  10*  10*   AP  45  50   --   63   TP  4.9*  5.2*   --   6.1*   ALB  2.0*  1.9*   --   2.5*   GLOB  2.9  3.3   --   3.6   AGRAT  0.7*  0.6*   --   0.7*      CBC w/Diff Recent Labs      06/06/18   1004  06/06/18   0430  06/05/18   0520   WBC  13.9*  11.8  15.1*   RBC  3.65*  3.76*  4.85   HGB  8.1*  8.2*  10.6*   HCT  25.2*  26.2*  35.0   PLT  166  165  264   GRANS  82*  82*  54   LYMPH  9*  10*  12*   EOS  0  0  1      Cardiac Enzymes No results for input(s): CPK, CKND1, FABIAN in the last 72 hours. No lab exists for component: CKRMB, TROIP   Coagulation Recent Labs      06/06/18   1004  06/04/18   1438   PTP   --   15.3*   INR   --   1.3*   APTT  30.3  26.9       Lipid Panel No results found for: CHOL, CHOLPOCT, CHOLX, CHLST, CHOLV, 797916, HDL, LDL, LDLC, DLDLP, 258864, VLDLC, VLDL, TGLX, TRIGL, TRIGP, TGLPOCT, CHHD, CHHDX   BNP No results for input(s): BNPP in the last 72 hours.    Liver Enzymes Recent Labs      06/06/18   0430   TP  4.9*   ALB  2.0*   AP  45   SGOT  19      Thyroid Studies No results found for: T4, T3U, TSH, TSHEXT     Procedures/imaging: see electronic medical records for all procedures/Xrays and details which were not copied into this note but were reviewed prior to creation of Loree Manrique MD

## 2018-06-06 NOTE — PROGRESS NOTES
Surgery Progress Note    Patient: Hafsa Albert MRN: [id number]  CSN: [id number]    YOB: 1950  Age: 75 y.o. Sex: female    DOA: 6/4/2018 LOS:  LOS: 2 days          Chief Complaint:          Assessment/Plan     Still seriously critically ill.   Management by intensivist.    Patient Active Problem List   Diagnosis Code    Ovarian ca ([facility] [address] C56.9    Severe obesity (BMI 35.0-39.9) ([facility]) E66.01    Abdominal pain R10.9    Sepsis ([facility] [address] A41.9    Ischemic colitis ([facility] [address] K55.9    Oliguria R34    Acute respiratory failure ([facility]) J96.00    [facility] Q94.3    Metabolic acidosis P62.3       Subjective:    Post ex lap    Review of systems:    Constitutional: denies fevers, chills, myalgias  Respiratory: denies SOB, cough  Cardiovascular: denies chest pain, palpitations  Gastrointestinal: denies nausea, vomiting, diarrhea      Vital signs/Intake and Output:  Visit Vitals    /68    Pulse (!) 116    Temp 98.7 °F (37.1 °C)    Resp 20    Ht 5' 1\" (1.549 m)    Wt 89.1 kg (196 lb 6.9 oz)    SpO2 100%    BMI 37.12 kg/m2     Current Shift:  06/06 0701 - 06/06 1900  In: 50   Out: 1625 [Urine:1625]  Last three shifts:  06/04 1901 - 06/06 0700  In: 4323.2 [I.V.:4273.2]  Out: 885 [Urine:865]    Exam:    General: intubated/sedated  Head/Neck: NCAT, supple, No masses, No lymphadenopathy  CVS:Regular rhythm, tachy  Lungs:decreased breath sounds  Abdomen: Soft, tender,  distention, no Bowel sounds, No hepatomegaly  Extremities: edema, pulses palpable 2+  Skin:normal texture and turgor, no rashes, no lesions  Neuro:grossly normal ,  Psych:N/A                Labs: Results:       Chemistry Recent Labs      06/06/18   1716  06/06/18   0430  06/05/18   0520  06/04/18   1905  06/04/18   1438   GLU   --   199*  191*  191*  148*   NA   --   136  134*  140  139   K  3.4*  2.9*  3.9  4.1  3.8   CL   --   99*  100  103  103   CO2   --   21  19*  20*  18*   BUN   --   35*  25*  19*  16   CREA   -- 2.92*  2.13*  1.93*  1.53*   CA   --   7.0*  7.5*  7.5*  7.7*   AGAP   --   16  15  17  18   BUCR   --   12  12  10*  10*   AP   --   45  50   --   63   TP   --   4.9*  5.2*   --   6.1*   ALB   --   2.0*  1.9*   --   2.5*   GLOB   --   2.9  3.3   --   3.6   AGRAT   --   0.7*  0.6*   --   0.7*      CBC w/Diff Recent Labs      06/06/18   1004  06/06/18   0430  06/05/18   0520   WBC  13.9*  11.8  15.1*   RBC  3.65*  3.76*  4.85   HGB  8.1*  8.2*  10.6*   HCT  25.2*  26.2*  35.0   PLT  166  165  264   GRANS  82*  82*  54   LYMPH  9*  10*  12*   EOS  0  0  1      Cardiac Enzymes No results for input(s): CPK, CKND1, FABIAN in the last 72 hours. No lab exists for component: CKRMB, TROIP   Coagulation Recent Labs      06/06/18   1004  06/04/18   1438   PTP   --   15.3*   INR   --   1.3*   APTT  30.3  26.9       Lipid Panel No results found for: CHOL, CHOLPOCT, CHOLX, CHLST, CHOLV, 606553, HDL, LDL, LDLC, DLDLP, 790428, VLDLC, VLDL, TGLX, TRIGL, TRIGP, TGLPOCT, CHHD, CHHDX   BNP No results for input(s): BNPP in the last 72 hours.    Liver Enzymes Recent Labs      06/06/18   0430   TP  4.9*   ALB  2.0*   AP  45   SGOT  19      Thyroid Studies No results found for: T4, T3U, TSH, TSHEXT     Procedures/imaging: see electronic medical records for all procedures/Xrays and details which were not copied into this note but were reviewed prior to creation of Plan      Earl Yeung, DO

## 2018-06-06 NOTE — PROGRESS NOTES
7282- Spoke with Dr. Isamar Fairchild about pt heart rate. New order for Ativan received. 36- Spoke with Dr. Isamar Fairchild about critical labs. New orders received. See MAR.

## 2018-06-06 NOTE — PROGRESS NOTES
Alejandro Og M.D  [address] [facility] [address] [facility] [address] [address]  Phone [phone] Fax [phone]  Pulmonary Critical Care & Sleep Medicine       Name: Paco Murrell MRN: [id number]   : 1950 Hospital: [facility] [place]   Date: 2018          IMPRESSION:   Patient Active Problem List   Diagnosis Code    Ovarian ca ([facility] [address] C56.9    Severe obesity (BMI 35.0-39.9) ([facility] [address] E66.01    Abdominal pain R10.9    Sepsis ([facility] [address] A41.9    Ischemic colitis ([facility] [address] K55.9    Oliguria R34    Acute respiratory failure ([facility] [address] J96.00    JESS (acute kidney injury) ([facility] [address] Y72.5    Metabolic acidosis F30.4 ·   Severe sepsis with lactic acidosis ? source of sepsis  · ARDS   · Hypotension ? Related to pain meds ? Due to sepsis  · Worsening urine out put -- Possible ARF due to sepsis and hypotension  · Metabolic acidsis with lactate elevation ? Related to sepsis   · Bilateral peroneal DVT <18> Unknown if presented on admission   PLAN:  -- Decrease FIO2 to 50 and PEEP 5  -- Change iv fluids to d5 1/2 ns as ph is ok bicarb coming up  -- DC albumin  -- K 2.9 and its replaced  -- Spoke with Dr. Tierney Mayfield and ok to start theraputic heparin will start today per protocol for dvt  -- Follow Cdiff if negative dc flagyl and continue vanco and zosyn  -- Nephrology consult appreciated  -- Monitor CVP maintaining around 9   -- Laproscopy failed to show ischmic bowel but improvement in lactate and wbc noted today am  --NO major NG out put will start feeds today  -- Possible Sepsis all culture is negative so far  -- Glucose and electrolyte protocol    CVS:Echo reviewed EF 65% RV is ok not dilated still tachycardic ?  Due to pain or undergoing sepsis, Dc albutero and start atrovent  RS:ARDs vent protocol, Taper FIO2 as tolerated   -- Aspiration precaution  -- Add bronchodilators-- change to atrovent due to tachycardia  -- HOB elevated  ID:Lactate still elevated not sure due to lots of LR, or Bicarb infusion, WIll hold both iv fluids and monitor, On vanco start date 6/3, Zosyn 6/3, flagyl 6/3 pending cdiff all other culture negative so far  ENDO:SSI monitor blood sugars  GI:Start feeds On PPI  RENAL: Nephro consult , iv fluids monitor  CNS:Mentation is ok for now on and off drowsy due to sedation  HEMATOLOGY: Monitor hb get PT and INR on heparin drip   MUSCULOSKELETAL:no acute issued  PAIN AND SEDATION: Fentanyl drip and prn versed if needed will add precedex or propofol  · Skin/Wound: Surgical wound looks clean   · Electrolytes: Replace electrolytes per ICU electrolyte replacement protocol. · IVF: D5 1/2 ns   · Nutrition: Start feeds for now  · Prophylaxis: DVT Prophylaxis with scd,. GI Prophylaxis. · Restraints: none  · PT/OT eval and treat. OOB when appropriate. · Lines/Tubes: Subclavian line 6/4, Fry 6/4, Vent 6/5  ADVANCE DIRECTIVE:Full code  FAMILY DISCUSSION:Spoke with patient/brothoer and Dr. Nola Julio: PPI, DVT prophylaxis, HOB elevated, Infection control all reviewed and addressed. Events and notes from last 24 hours reviewed. Care plan discussed with nursing CC TIME:51 min excluding procedure time    · Labs and images personally seen and available reports reviewed. · All current medicines are reviewed and doses and prescription adjusted. Subjective/History:  6/6/18  Doing ok overnight  Able to drop FIO2 to 50%  Bilateral DVT positive, K is 2.9, Lactate still elevated to 3.6, Cr worsening to 2.92 but putting out urine now   No other change wakes up with deep stimulation   Spoke with brother at bedside     Jeri Guevara is a 75 y.o.  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 Was doing ok at home until yesterday when she started feeling a little more discomfort. She presented last night when she felt her pain was significant.      In ER received IV fluid and zosyn   Lactate found to be in 5  Abd ct done showed area concerning for ?  Ischemic bowel  Spoke with Dr Ceballos Doctor on phone she is out of town and wants general surgery to be consulted   Patient just received 4 mg IV diludid by OB GYN in ICU post that her blood pressure running on lower side   She is on NRB after diludid  We asked IR to place PICC line but they declined and wanted to be done tomorrow as patient has peripheral line and concern for sepsis  No urine out put     6/5/18: S/P laproscopy by Dr. Ceballos Doctor, No perforation or ischemic bowel some purulant looking inflamatory fluid removed, Post procedure on vent, DVT -- Peroneal, worsening cr -- renal evaluated      Current Facility-Administered Medications   Medication Dose Route Frequency    potassium chloride 10 mEq in 100 ml IVPB  10 mEq IntraVENous Q1H    [START ON 6/7/2018] Vancomycin Random Level with am labs 6/7/18  1 Each Other ONCE    ipratropium (ATROVENT) 0.02 % nebulizer solution 0.5 mg  0.5 mg Nebulization Q6H RT    dextrose 5% - 0.45% NaCl with KCl 10 mEq/L infusion  100 mL/hr IntraVENous CONTINUOUS    heparin 25,000 units in D5W 250 ml infusion  18-36 Units/kg/hr IntraVENous TITRATE    fentaNYL (PF) 900 mcg/30 ml infusion soln   mcg/hr IntraVENous TITRATE    chlorhexidine (PERIDEX) 0.12 % mouthwash 10 mL  10 mL Oral Q12H    piperacillin-tazobactam (ZOSYN) 2.25 g in 0.9% sodium chloride (MBP/ADV) 50 mL MBP  2.25 g IntraVENous Q6H    metroNIDAZOLE (FLAGYL) IVPB premix 500 mg  500 mg IntraVENous Q8H    Vancomycin - Pharmacokinetic Dosing  1 Each Other Rx Dosing/Monitoring    insulin lispro (HUMALOG) injection   SubCUTAneous Q6H    pantoprazole (PROTONIX) 40 mg in sodium chloride 0.9% 10 mL injection  40 mg IntraVENous DAILY       Objective:   Vital Signs:    Visit Vitals    BP 91/66    Pulse (!) 111    Temp 98.7 °F (37.1 °C)    Resp 13    Ht 5' 1\" (1.549 m)    Wt 89.1 kg (196 lb 6.9 oz)    SpO2 99%    BMI 37.12 kg/m2       O2 Device: Ventilator   O2 Flow Rate (L/min): 45 l/min   Temp (24hrs), Av °F (36.7 °C), Min:97.2 °F (36.2 °C), Max:99.2 °F (37.3 °C)       Intake/Output:   Last shift:         Last 3 shifts: 1901 -  0700  In: 4323.2 [I.V.:4273.2]  Out: 250 [Urine:865]    Intake/Output Summary (Last 24 hours) at 18 0943  Last data filed at 18 0602   Gross per 24 hour   Intake          2683.75 ml   Output              400 ml   Net          2283.75 ml     Review of Systems:  Limited history as patient just received pain meds     Objective:    General: comfortable,  Sedated intubated   HEENT: pupils reactive, sclera anicteric, EOM intact  Neck: No adenopathy or thyroid swelling, no lymphadenopathy or JVD, supple  CVS: S1S2 no murmurs  RS: Mod AE bilaterally, no tactile fremitus or egophony, decrease air entry on left side  Abd: distended complains of lots of pain   Neuro: non focal, awake, alert  Extrm: no leg edema, clubbing or cyanosis  Lymph node: No obvious palpable lymph node appreciated. Skin: no rash  CBC w/Diff Recent Labs      18   0430  18   0518   0740   WBC  11.8  15.1*  17.4*   < >  4.6   RBC  3.76*  4.85  4.68   < >  5.73*   HGB  8.2*  10.6*  10.3*   < >  12.8   HCT  26.2*  35.0  33.6*   < >  40.5   PLT  165  264  244   < >  419   GRANS  82*  54   --    --   78*   LYMPH  10*  12*   --    --   18*   EOS  0  1   --    --   0    < > = values in this interval not displayed.         Chemistry Recent Labs      18   0430  18   1245  18   0520  18   1905  18   1438   GLU  199*   --   191*  191*  148*   NA  136   --   134*  140  139   K  2.9*   --   3.9  4.1  3.8   CL  99*   --   100  103  103   CO2  21   --   19*  20*  18*   BUN  35*   --   25*  19*  16   CREA  2.92*   --   2.13*  1.93*  1.53*   CA  7.0*   --   7.5*  7.5*  7.7*   MG  2.1  2.3  1.8  2.2   --    PHOS  5.5*   --    --   6.4*   --    AGAP  16   --   15  17  18   BUCR  12   --   12  10*  10*   AP  45 --   50   --   63   TP  4.9*   --   5.2*   --   6.1*   ALB  2.0*   --   1.9*   --   2.5*   GLOB  2.9   --   3.3   --   3.6   AGRAT  0.7*   --   0.6*   --   0.7*        Lactic Acid Lactic acid   Date Value Ref Range Status   06/06/2018 3.6 (HH) 0.4 - 2.0 MMOL/L Final     Comment:     CALLED TO AND CORRECTLY REPEATED BY:  WILLIAM MENDENHALL RN ICU TO  AT 0528 ON [id number]       Recent Labs      06/06/18   0430  06/05/18   2100  06/05/18   1245   LAC  3.6*  2.9*  3.6*        Micro  Recent Labs      06/05/18   0220  06/04/18   0740   CULT  PENDING  NO GROWTH 2 DAYS  NO GROWTH 2 DAYS     Recent Labs      06/05/18   0220  06/04/18   0740   CULT  PENDING  NO GROWTH 2 DAYS  NO GROWTH 2 DAYS        ABG Recent Labs      06/06/18   0456  06/05/18   0539  06/04/18   1600   PHI  7.389  7.340*  7.299*   PCO2I  31.9*  33.5*  36.7   PO2I  69*  70*  87   HCO3I  19.4*  18.0*  18.0*   FIO2I  0.5  1.0  100        Liver Enzymes Protein, total   Date Value Ref Range Status   06/06/2018 4.9 (L) 6.4 - 8.2 g/dL Final     Albumin   Date Value Ref Range Status   06/06/2018 2.0 (L) 3.4 - 5.0 g/dL Final     Globulin   Date Value Ref Range Status   06/06/2018 2.9 2.0 - 4.0 g/dL Final     A-G Ratio   Date Value Ref Range Status   06/06/2018 0.7 (L) 0.8 - 1.7   Final     AST (SGOT)   Date Value Ref Range Status   06/06/2018 19 15 - 37 U/L Final     Alk.  phosphatase   Date Value Ref Range Status   06/06/2018 45 45 - 117 U/L Final     Recent Labs      06/06/18   0430  06/05/18   0520  06/04/18   1438   TP  4.9*  5.2*  6.1*   ALB  2.0*  1.9*  2.5*   GLOB  2.9  3.3  3.6   AGRAT  0.7*  0.6*  0.7*   SGOT  19  32  19   AP  45  50  63        Cardiac Enzymes No results found for: CPK, CK, CKMMB, CKMB, RCK3, CKMBT, CKNDX, CKND1, FABIAN, TROPT, TROIQ, KAYLAN, TROPT, TNIPOC, BNP, BNPP     BNP No results found for: BNP, BNPP, XBNPT     Coagulation Recent Labs      06/04/18   1438   PTP  15.3*   INR  1.3*   APTT  26.9         Thyroid  No results found for: T4, T3U, TSH, TSHEXT, TSHEXT       Lipid Panel No results found for: CHOL, CHOLPOCT, CHOLX, CHLST, CHOLV, 743980, HDL, LDL, LDLC, DLDLP, 161570, VLDLC, VLDL, TGLX, TRIGL, TRIGP, TGLPOCT, CHHD, CHHDX       Urinalysis Lab Results   Component Value Date/Time    Color DARK YELLOW 06/04/2018 08:42 AM    Appearance CLOUDY 06/04/2018 08:42 AM    Specific gravity 1.028 06/04/2018 08:42 AM    pH (UA) 5.0 06/04/2018 08:42 AM    Protein 30 (A) 06/04/2018 08:42 AM    Glucose NEGATIVE  06/04/2018 08:42 AM    Ketone TRACE (A) 06/04/2018 08:42 AM    Bilirubin SMALL (A) 06/04/2018 08:42 AM    Urobilinogen 1.0 06/04/2018 08:42 AM    Nitrites NEGATIVE  06/04/2018 08:42 AM    Leukocyte Esterase NEGATIVE  06/04/2018 08:42 AM    Epithelial cells FEW 06/04/2018 08:42 AM    Bacteria 1+ (A) 06/04/2018 08:42 AM    WBC 0 to 3 06/04/2018 08:42 AM    RBC 0 to 3 06/04/2018 08:42 AM        XR (Most Recent). CXR reviewed by me and compared with previous CXR   Results from Hospital Encounter encounter on 06/04/18   XR CHEST PORT   Narrative Portable chest xray     Reason for exam:ETT placement    Images: 1    Comparison: 5/4/2018 chest x-ray    Findings:     Endotracheal tube is present. The tip projects about 4.5 cm above the tan. Right arm PICC line tip extending to the cavoatrial junction region. GI tube is  looped in the stomach. Lung volumes remain low with elevated right hemidiaphragm. Minimal hazy opacity  at the right upper lobe persist, possibly infiltrate. Basilar subsegmental  atelectasis and perhaps a small left pleural effusion are noted. The  cardiomediastinal contours are stable. The trachea is midline. The cortical margins are intact. Impression Impression:    Endotracheal tube has tip about 4.5 cm above the tan. Other support devices  are stable. Mild right upper lobe hazy density, minimal basilar atelectasis and probable  small pleural effusion are again seen. CT (Most Recent)   Results from Hospital Encounter encounter on 06/04/18   CT ABD PELV W CONT   Narrative EXAM: CT of the Abdomen and Pelvis    INDICATION: Abdominal pain with abdominal distention, status post hysterectomy  and appendectomy    COMPARISON: None. TECHNIQUE: Axial CT imaging of the abdomen and pelvis was performed with  intravenous contrast. Multiplanar reformats were generated. Dose reduction  techniques used: Automated exposure control, adjustment of the mAs and/or kVp  according to patient's size, and iterative reconstruction techniques. _______________    FINDINGS:    LOWER CHEST: Minimal bibasilar atelectasis without significant pleural or  pericardial effusion coronary artery atherosclerotic calcifications are  present. [place] LIVER, BILIARY: Hepatomegaly measuring 20.2 cm with mild diffuse parenchymal low  attenuation/steatosis. There are 2 separate rounded low attenuating lesions in  the left lobe of liver measuring 1.2 cm and 0.9 cm, measuring water attenuation  Hounsfield units. No enhancing hepatic mass. No biliary dilation. Distended  gallbladder/gallbladder hydrops which may be secondary to nothing by mouth  status. PANCREAS: Normal.    SPLEEN: Normal.    ADRENALS: Normal.    KIDNEYS: The kidneys are iso-attenuating without evidence of hydronephrosis,  nephrolithiasis or masses. No perinephric fluid collections. No hydroureter. LYMPH NODES: No enlarged lymph nodes. Bilateral pelvic lymphadenectomy surgical  clips are present. GASTROINTESTINAL TRACT:   -Nasogastric tube is present with the tip projecting towards the distal stomach  with moderate mid to proximal air-fluid level.  -Numerous abnormal edematous thick-walled small bowel loops in the left abdomen  and bilateral pelvis, with definite areas of lamellar edematous transformation  notably in the right lower quadrant. No renetta pneumatosis intestinalis.   -Stool is present in the cecum and right colon to the hepatic flexure with  circumferential air, with mildly prominent but not discretely thick-walled  appearance. Bowel gas is present in the transverse colon with decompressed  splenic flexure. Decompressed descending colon. Edematous thick-walled sigmoid  colon which may be secondary to decompression and diverticulosis. PELVIC ORGANS: Postsurgical changes of hysterectomy and bilateral nephrectomy  with a few tiny locules of gas far lateral right inferior pelvis. Decompressed bladder with Fry catheter in place. VASCULATURE: Normal caliber aorta without evidence of atherosclerotic disease. Patent celiac and SMA. Patent WILLIE. Patent bilateral iliofemoral vessels. Ovoid  infrarenal IVC. OTHER: Small volume of low attenuating abdominal and pelvic ascites,, with  mesenteric stranding. There is a very small amount of interloop mesenteric  fluid. No significant pneumatosis. Mild diffuse soft tissue anasarca  There is a small amount of subcutaneous emphysema lateral left upper abdominal  the soft tissues and left groin. Midline incision with no fluid collections    BONES: Age-indeterminate moderate to severe L1 vertebral body compression  fracture deformity with minimal dorsal retropulsion of the posterior superior  corner, producing mild central stenosis. Multilevel degenerative disc disease  and spondylosis with vacuum disc phenomena at several levels in the lumbar  spine. .    _______________         Impression IMPRESSION:    1. Postsurgical hysterectomy, bilateral oophorectomy, pelvic lymphadenectomy and  a mastectomy surgical changes. Small volume of ascites in the abdomen and  pelvis, with a few tiny locules of gas in the right hemipelvis would be in  keeping with recent postsurgical etiology. 2. Abnormal edematous thick-walled small bowel loops in the left abdomen and  pelvis, with TRAM lamellar edematous configuration, induration. No findings of  pneumatosis. The overall appearance is nonspecific.  Diagnostic considerations  include infectious etiology, nonspecific edema which may be unresolved  postsurgical etiology. Ischemia is also included in the differential diagnostic  consideration, however, there are no findings of pneumatosis, portal venous gas. 3. Stool in the right colon extending to the hepatic flexure with surrounding  gas, foci of pneumatosis could be obscured. No associated bowel wall thickening. 4. Satisfactory position of nasogastric tube. 5. Hepatomegaly, steatosis with 2 rounded low-density lesions, potentially  cysts. 6. Bibasilar atelectasis. -The findings of this exam were discussed extensively with Dr. Benny Lujan on  06/04/2018, prior to dictation. Imaging:  I have personally reviewed the patients radiographs and have reviewed the reports:     Best practice :  Fluids started at 30 ml/kg with aim to keep CVP 8 or above  Lactic acid ordered- initial and repeat Q6hrs if elevated till normalized. Cultures drawn. Antibiotic administered within 1hr-ICU  And 3hrs ED  Pressors aim MAP >65mmHg  Glycemic control  Sress ulcer prophylaxis  DVT prophylaxis    Vent bundle, Fry bundle and central line bundle followed. Deyvi Mayfield M.D.   Pulmonary Critical Care & Sleep Medicine

## 2018-06-06 NOTE — DIABETES MGMT
GLYCEMIC CONTROL PROGRESS NOTE:    -discussed in rounds, known h/o T2DM HbA1C within recommended range for age + comorbids on oral home regimen  -BG out of target range ICU: 140-180 mg/dL, D5 IVF  -TDD = 12 units - Humalog Normal Insulin Sensitivity Corrective Coverage  -on vent weaning, NPO, pt may benefit from conservative dose of Lantus + mealtime insulin once diet resumes  -Nepro TF @ 30/hour to start today    Recent Glucose Results:   Lab Results   Component Value Date/Time     (H) 06/06/2018 04:30 AM    GLUCPOC 184 (H) 06/06/2018 05:49 AM    GLUCPOC 151 (H) 06/05/2018 11:31 PM    GLUCPOC 201 (H) 06/05/2018 05:55 PM         Jhoana Khan RN, MS  Glycemic Control Team  Pager [phone] (M-TH 8:30-5P)  *After Hours pager [phone]

## 2018-06-06 NOTE — PROGRESS NOTES
Vancomycin - Pharmacy to Dose ( Sepsis of Unknown Etiology )  S. Creat now 2.92 ( CrCl 18.7 ml/min )   Will Hold Vancomycin for now and obtain a Random Vancomycin Level with am labs. Pharmacy will continue to follow and adjust.  Kassy Henderson Marina   [phone]

## 2018-06-06 NOTE — CDMP QUERY
On 6/5 this patient was diagnosed with DVT positive in L&R Peroneal vein. Please note if this was likely Present on Admission    Pt was admitted on 6/4 with generalized abdominal pain. Pt is s/p oopherectomy on 5/29/18    Pt had laporotomy on 6/5 also (DVT discovered on afternoon of 6/5)    Thank you.   Renuka Fu RN [facility]  [phone]

## 2018-06-06 NOTE — PROGRESS NOTES
Nephrology Progress note    Subjective:     Hafsa Albert is a 75 y.o. female with PMH DM, HTN, clear cell ovarian ca s/p debulking who presented with abdominal pain and possible sepsis/ischemic colitis. Pt underwent laparotomy/peritoneal lavage/bx, noted to have decreased UOP and increasing Cr to 2.1 today from baseline 0.6. CT neg for mass or hydro. Pt given lasix yesterday, still with high O2 requirements on vent. Unable to provide further history. Given IV Lasix, had >2L out, decreasing FIO2 today. Cr up to 2.9        Admit Date: 6/4/2018  Allergy:  Allergies   Allergen Reactions    Hydrocodone Other (comments)     Breaks into cold sweat    Metformin Other (comments)     Breaks out into cold sweat.  Can Take Glucophage brand name med        Objective:     Visit Vitals    BP 97/63    Pulse (!) 114    Temp 98.6 °F (37 °C)    Resp 16    Ht 5' 1\" (1.549 m)    Wt 89.1 kg (196 lb 6.9 oz)    SpO2 99%    BMI 37.12 kg/m2         Intake/Output Summary (Last 24 hours) at 06/06/18 1326  Last data filed at 06/06/18 1157   Gross per 24 hour   Intake          2683.75 ml   Output              975 ml   Net          1708.75 ml       Physical Exam:       General: vented   HENT: Atraumatic and normocephalic   Eyes: Normal conjunctiva   Neck: Supple    Cardiovascular: Normal S1 & S2, no m/r/g   Pulmonary/Chest Wall: Clear to auscultation bilaterally   Abdominal: Soft and non-tender   Musculoskeletal: no edema   Neurological: No focal deficits       Data Review:    @daiana@  Lab Results   Component Value Date/Time    WBC 13.9 (H) 06/06/2018 10:04 AM    RBC 3.65 (L) 06/06/2018 10:04 AM    HCT 25.2 (L) 06/06/2018 10:04 AM    MCV 69.0 (L) 06/06/2018 10:04 AM    MCH 22.2 (L) 06/06/2018 10:04 AM    MCHC 32.1 06/06/2018 10:04 AM    RDW 18.9 (H) 06/06/2018 10:04 AM      No results found for: IRONNo components found for: FERRITIN  No components found for: PTHINT  Urinalysis  No results found for: UGLU         Impression: JESS- likely ATN, nonoliguric.  Cr up to 2.9 but good UOP  Ovarian ca s/p debulking then laparoscopy w/lavage  Respiratory failure  DM  HTN  Anemia  Met acidosis  Hypokalemia  Plan:     IV bicarb switch ed to D51/2NS w/KCL  Monitor I/O  Hold further lasix- give prn  No need for RRT      MD Denver Mercedes  [phone]

## 2018-06-07 ENCOUNTER — APPOINTMENT (OUTPATIENT)
Dept: GENERAL RADIOLOGY | Age: 68
DRG: 853 | End: 2018-06-07
Attending: INTERNAL MEDICINE
Payer: MEDICARE

## 2018-06-07 PROBLEM — I82.409 ACUTE DEEP VEIN THROMBOSIS (DVT) OF LOWER EXTREMITY (HCC): Status: ACTIVE | Noted: 2018-06-07

## 2018-06-07 LAB
ALBUMIN SERPL-MCNC: 2 G/DL (ref 3.4–5)
ALBUMIN/GLOB SERPL: 0.6 {RATIO} (ref 0.8–1.7)
ALP SERPL-CCNC: 56 U/L (ref 45–117)
ALT SERPL-CCNC: 17 U/L (ref 13–56)
ANION GAP SERPL CALC-SCNC: 11 MMOL/L (ref 3–18)
APTT PPP: 80 SEC (ref 23–36.4)
ARTERIAL PATENCY WRIST A: YES
ARTERIAL PATENCY WRIST A: YES
AST SERPL-CCNC: 22 U/L (ref 15–37)
BACTERIA SPEC CULT: NORMAL
BASE DEFICIT BLD-SCNC: 2 MMOL/L
BASE DEFICIT BLD-SCNC: 3 MMOL/L
BASOPHILS # BLD: 0 K/UL (ref 0–0.06)
BASOPHILS NFR BLD: 0 % (ref 0–2)
BDY SITE: ABNORMAL
BDY SITE: ABNORMAL
BILIRUB SERPL-MCNC: 0.4 MG/DL (ref 0.2–1)
BODY TEMPERATURE: 98.8
BUN SERPL-MCNC: 30 MG/DL (ref 7–18)
BUN/CREAT SERPL: 16 (ref 12–20)
CALCIUM SERPL-MCNC: 7.1 MG/DL (ref 8.5–10.1)
CHLORIDE SERPL-SCNC: 103 MMOL/L (ref 100–108)
CO2 SERPL-SCNC: 24 MMOL/L (ref 21–32)
CREAT SERPL-MCNC: 1.92 MG/DL (ref 0.6–1.3)
DIFFERENTIAL METHOD BLD: ABNORMAL
EOSINOPHIL # BLD: 0.2 K/UL (ref 0–0.4)
EOSINOPHIL NFR BLD: 2 % (ref 0–5)
ERYTHROCYTE [DISTWIDTH] IN BLOOD BY AUTOMATED COUNT: 18.9 % (ref 11.6–14.5)
FERRITIN SERPL-MCNC: 85 NG/ML (ref 8–388)
GAS FLOW.O2 O2 DELIVERY SYS: ABNORMAL L/MIN
GAS FLOW.O2 O2 DELIVERY SYS: ABNORMAL L/MIN
GAS FLOW.O2 SETTING OXYMISER: 12 BPM
GLOBULIN SER CALC-MCNC: 3.1 G/DL (ref 2–4)
GLUCOSE BLD STRIP.AUTO-MCNC: 148 MG/DL (ref 70–110)
GLUCOSE BLD STRIP.AUTO-MCNC: 166 MG/DL (ref 70–110)
GLUCOSE BLD STRIP.AUTO-MCNC: 166 MG/DL (ref 70–110)
GLUCOSE BLD STRIP.AUTO-MCNC: 186 MG/DL (ref 70–110)
GLUCOSE BLD STRIP.AUTO-MCNC: 192 MG/DL (ref 70–110)
GLUCOSE SERPL-MCNC: 156 MG/DL (ref 74–99)
HCO3 BLD-SCNC: 21.7 MMOL/L (ref 22–26)
HCO3 BLD-SCNC: 22.2 MMOL/L (ref 22–26)
HCT VFR BLD AUTO: 25.2 % (ref 35–45)
HGB BLD-MCNC: 8 G/DL (ref 12–16)
IRON SATN MFR SERPL: 5 %
IRON SERPL-MCNC: 8 UG/DL (ref 50–175)
LACTATE SERPL-SCNC: 1.2 MMOL/L (ref 0.4–2)
LYMPHOCYTES # BLD: 1.2 K/UL (ref 0.9–3.6)
LYMPHOCYTES NFR BLD: 9 % (ref 21–52)
MAGNESIUM SERPL-MCNC: 2.2 MG/DL (ref 1.6–2.6)
MCH RBC QN AUTO: 21.9 PG (ref 24–34)
MCHC RBC AUTO-ENTMCNC: 31.7 G/DL (ref 31–37)
MCV RBC AUTO: 68.9 FL (ref 74–97)
MONOCYTES # BLD: 1.1 K/UL (ref 0.05–1.2)
MONOCYTES NFR BLD: 8 % (ref 3–10)
NEUTS SEG # BLD: 10.9 K/UL (ref 1.8–8)
NEUTS SEG NFR BLD: 81 % (ref 40–73)
O2/TOTAL GAS SETTING VFR VENT: 0.5 %
O2/TOTAL GAS SETTING VFR VENT: 40 %
PCO2 BLD: 34.6 MMHG (ref 35–45)
PCO2 BLD: 34.9 MMHG (ref 35–45)
PEEP RESPIRATORY: 5 CMH2O
PEEP RESPIRATORY: 5 CMH2O
PH BLD: 7.41 [PH] (ref 7.35–7.45)
PH BLD: 7.41 [PH] (ref 7.35–7.45)
PLATELET # BLD AUTO: 177 K/UL (ref 135–420)
PMV BLD AUTO: 10.1 FL (ref 9.2–11.8)
PO2 BLD: 68 MMHG (ref 80–100)
PO2 BLD: 70 MMHG (ref 80–100)
POTASSIUM SERPL-SCNC: 3.4 MMOL/L (ref 3.5–5.5)
POTASSIUM SERPL-SCNC: 3.4 MMOL/L (ref 3.5–5.5)
PRESSURE SUPPORT SETTING VENT: 7 CMH2O
PROT SERPL-MCNC: 5.1 G/DL (ref 6.4–8.2)
RBC # BLD AUTO: 3.66 M/UL (ref 4.2–5.3)
SAO2 % BLD: 93 % (ref 92–97)
SAO2 % BLD: 94 % (ref 92–97)
SERVICE CMNT-IMP: ABNORMAL
SERVICE CMNT-IMP: ABNORMAL
SERVICE CMNT-IMP: NORMAL
SODIUM SERPL-SCNC: 138 MMOL/L (ref 136–145)
SPECIMEN TYPE: ABNORMAL
SPECIMEN TYPE: ABNORMAL
TIBC SERPL-MCNC: 165 UG/DL (ref 250–450)
TOTAL RESP. RATE, ITRR: 17
VANCOMYCIN SERPL-MCNC: 19.8 UG/ML (ref 5–40)
VENTILATION MODE VENT: ABNORMAL
VENTILATION MODE VENT: ABNORMAL
VT SETTING VENT: 450 ML
WBC # BLD AUTO: 13.4 K/UL (ref 4.6–13.2)

## 2018-06-07 PROCEDURE — 94640 AIRWAY INHALATION TREATMENT: CPT

## 2018-06-07 PROCEDURE — C9113 INJ PANTOPRAZOLE SODIUM, VIA: HCPCS | Performed by: INTERNAL MEDICINE

## 2018-06-07 PROCEDURE — 77010033678 HC OXYGEN DAILY

## 2018-06-07 PROCEDURE — 74011250636 HC RX REV CODE- 250/636: Performed by: EMERGENCY MEDICINE

## 2018-06-07 PROCEDURE — 77030013140 HC MSK NEB VYRM -A

## 2018-06-07 PROCEDURE — 84132 ASSAY OF SERUM POTASSIUM: CPT | Performed by: OBSTETRICS & GYNECOLOGY

## 2018-06-07 PROCEDURE — 82962 GLUCOSE BLOOD TEST: CPT

## 2018-06-07 PROCEDURE — 83540 ASSAY OF IRON: CPT | Performed by: OBSTETRICS & GYNECOLOGY

## 2018-06-07 PROCEDURE — 74011000258 HC RX REV CODE- 258: Performed by: EMERGENCY MEDICINE

## 2018-06-07 PROCEDURE — 65270000029 HC RM PRIVATE

## 2018-06-07 PROCEDURE — 83735 ASSAY OF MAGNESIUM: CPT | Performed by: OBSTETRICS & GYNECOLOGY

## 2018-06-07 PROCEDURE — 85730 THROMBOPLASTIN TIME PARTIAL: CPT | Performed by: INTERNAL MEDICINE

## 2018-06-07 PROCEDURE — 77030018798 HC PMP KT ENTRL FED COVD -A

## 2018-06-07 PROCEDURE — 83605 ASSAY OF LACTIC ACID: CPT | Performed by: OBSTETRICS & GYNECOLOGY

## 2018-06-07 PROCEDURE — 36415 COLL VENOUS BLD VENIPUNCTURE: CPT | Performed by: INTERNAL MEDICINE

## 2018-06-07 PROCEDURE — 74011250636 HC RX REV CODE- 250/636: Performed by: INTERNAL MEDICINE

## 2018-06-07 PROCEDURE — 82728 ASSAY OF FERRITIN: CPT | Performed by: OBSTETRICS & GYNECOLOGY

## 2018-06-07 PROCEDURE — 74011250636 HC RX REV CODE- 250/636

## 2018-06-07 PROCEDURE — 74011636637 HC RX REV CODE- 636/637: Performed by: INTERNAL MEDICINE

## 2018-06-07 PROCEDURE — 74011000250 HC RX REV CODE- 250: Performed by: INTERNAL MEDICINE

## 2018-06-07 PROCEDURE — 36600 WITHDRAWAL OF ARTERIAL BLOOD: CPT

## 2018-06-07 PROCEDURE — 85025 COMPLETE CBC W/AUTO DIFF WBC: CPT | Performed by: OBSTETRICS & GYNECOLOGY

## 2018-06-07 PROCEDURE — 82803 BLOOD GASES ANY COMBINATION: CPT

## 2018-06-07 PROCEDURE — 80202 ASSAY OF VANCOMYCIN: CPT | Performed by: OBSTETRICS & GYNECOLOGY

## 2018-06-07 PROCEDURE — 71045 X-RAY EXAM CHEST 1 VIEW: CPT

## 2018-06-07 PROCEDURE — 94003 VENT MGMT INPAT SUBQ DAY: CPT

## 2018-06-07 PROCEDURE — 74011250637 HC RX REV CODE- 250/637: Performed by: INTERNAL MEDICINE

## 2018-06-07 RX ORDER — FENTANYL CITRATE 50 UG/ML
INJECTION, SOLUTION INTRAMUSCULAR; INTRAVENOUS
Status: COMPLETED
Start: 2018-06-07 | End: 2018-06-07

## 2018-06-07 RX ORDER — FUROSEMIDE 10 MG/ML
40 INJECTION INTRAMUSCULAR; INTRAVENOUS ONCE
Status: COMPLETED | OUTPATIENT
Start: 2018-06-07 | End: 2018-06-07

## 2018-06-07 RX ORDER — POTASSIUM CHLORIDE 7.45 MG/ML
10 INJECTION INTRAVENOUS
Status: COMPLETED | OUTPATIENT
Start: 2018-06-07 | End: 2018-06-07

## 2018-06-07 RX ORDER — HYDROCODONE BITARTRATE AND ACETAMINOPHEN 10; 325 MG/1; MG/1
2 TABLET ORAL
Status: DISCONTINUED | OUTPATIENT
Start: 2018-06-07 | End: 2018-06-19

## 2018-06-07 RX ORDER — POTASSIUM CHLORIDE 20MEQ/15ML
30 LIQUID (ML) ORAL
Status: DISCONTINUED | OUTPATIENT
Start: 2018-06-07 | End: 2018-06-07

## 2018-06-07 RX ORDER — POTASSIUM CHLORIDE 7.45 MG/ML
INJECTION INTRAVENOUS
Status: DISPENSED
Start: 2018-06-07 | End: 2018-06-08

## 2018-06-07 RX ORDER — FUROSEMIDE 10 MG/ML
INJECTION INTRAMUSCULAR; INTRAVENOUS
Status: COMPLETED
Start: 2018-06-07 | End: 2018-06-07

## 2018-06-07 RX ORDER — FENTANYL CITRATE 50 UG/ML
25 INJECTION, SOLUTION INTRAMUSCULAR; INTRAVENOUS
Status: DISCONTINUED | OUTPATIENT
Start: 2018-06-07 | End: 2018-06-28

## 2018-06-07 RX ORDER — OXYMETAZOLINE HCL 0.05 %
2 SPRAY, NON-AEROSOL (ML) NASAL
Status: DISCONTINUED | OUTPATIENT
Start: 2018-06-07 | End: 2018-07-11 | Stop reason: HOSPADM

## 2018-06-07 RX ORDER — MAG HYDROX/ALUMINUM HYD/SIMETH 200-200-20
30 SUSPENSION, ORAL (FINAL DOSE FORM) ORAL
Status: DISCONTINUED | OUTPATIENT
Start: 2018-06-07 | End: 2018-07-11 | Stop reason: HOSPADM

## 2018-06-07 RX ORDER — DOCUSATE SODIUM 50 MG/5ML
200 LIQUID ORAL 2 TIMES DAILY
Status: DISCONTINUED | OUTPATIENT
Start: 2018-06-07 | End: 2018-06-09

## 2018-06-07 RX ORDER — POTASSIUM CHLORIDE 7.45 MG/ML
10 INJECTION INTRAVENOUS
Status: COMPLETED | OUTPATIENT
Start: 2018-06-07 | End: 2018-06-10

## 2018-06-07 RX ADMIN — MIDAZOLAM HYDROCHLORIDE 2 MG: 1 INJECTION, SOLUTION INTRAMUSCULAR; INTRAVENOUS at 00:12

## 2018-06-07 RX ADMIN — FENTANYL CITRATE 25 MCG: 50 INJECTION, SOLUTION INTRAMUSCULAR; INTRAVENOUS at 15:49

## 2018-06-07 RX ADMIN — FUROSEMIDE 40 MG: 10 INJECTION, SOLUTION INTRAMUSCULAR; INTRAVENOUS at 09:02

## 2018-06-07 RX ADMIN — INSULIN LISPRO 2 UNITS: 100 INJECTION, SOLUTION INTRAVENOUS; SUBCUTANEOUS at 05:27

## 2018-06-07 RX ADMIN — HYDROCODONE BITARTRATE AND ACETAMINOPHEN 2 TABLET: 10; 325 TABLET ORAL at 15:23

## 2018-06-07 RX ADMIN — POTASSIUM CHLORIDE 10 MEQ: 10 INJECTION, SOLUTION INTRAVENOUS at 07:18

## 2018-06-07 RX ADMIN — DOCUSATE SODIUM 200 MG: 50 LIQUID ORAL at 13:46

## 2018-06-07 RX ADMIN — IPRATROPIUM BROMIDE 0.5 MG: 0.5 SOLUTION RESPIRATORY (INHALATION) at 20:08

## 2018-06-07 RX ADMIN — ALUMINUM HYDROXIDE, MAGNESIUM HYDROXIDE, AND SIMETHICONE 30 ML: 200; 200; 20 SUSPENSION ORAL at 22:25

## 2018-06-07 RX ADMIN — POTASSIUM CHLORIDE 10 MEQ: 10 INJECTION, SOLUTION INTRAVENOUS at 19:58

## 2018-06-07 RX ADMIN — PIPERACILLIN SODIUM,TAZOBACTAM SODIUM 2.25 G: 2; .25 INJECTION, POWDER, FOR SOLUTION INTRAVENOUS at 14:27

## 2018-06-07 RX ADMIN — INSULIN LISPRO 2 UNITS: 100 INJECTION, SOLUTION INTRAVENOUS; SUBCUTANEOUS at 00:30

## 2018-06-07 RX ADMIN — MIDAZOLAM HYDROCHLORIDE 2 MG: 1 INJECTION, SOLUTION INTRAMUSCULAR; INTRAVENOUS at 05:01

## 2018-06-07 RX ADMIN — FUROSEMIDE 40 MG: 10 INJECTION INTRAMUSCULAR; INTRAVENOUS at 09:02

## 2018-06-07 RX ADMIN — PIPERACILLIN SODIUM,TAZOBACTAM SODIUM 2.25 G: 2; .25 INJECTION, POWDER, FOR SOLUTION INTRAVENOUS at 02:18

## 2018-06-07 RX ADMIN — DEXTROSE MONOHYDRATE, SODIUM CHLORIDE, AND POTASSIUM CHLORIDE 100 ML/HR: 50; 4.5; .745 INJECTION, SOLUTION INTRAVENOUS at 07:16

## 2018-06-07 RX ADMIN — IPRATROPIUM BROMIDE 0.5 MG: 0.5 SOLUTION RESPIRATORY (INHALATION) at 04:02

## 2018-06-07 RX ADMIN — METRONIDAZOLE 500 MG: 500 INJECTION, SOLUTION INTRAVENOUS at 05:27

## 2018-06-07 RX ADMIN — POTASSIUM CHLORIDE 10 MEQ: 10 INJECTION, SOLUTION INTRAVENOUS at 22:19

## 2018-06-07 RX ADMIN — CHLORHEXIDINE GLUCONATE 10 ML: 1.2 RINSE ORAL at 09:01

## 2018-06-07 RX ADMIN — HEPARIN SODIUM 16 UNITS/KG/HR: 10000 INJECTION, SOLUTION INTRAVENOUS at 19:36

## 2018-06-07 RX ADMIN — Medication 150 MCG/HR: at 05:29

## 2018-06-07 RX ADMIN — SODIUM CHLORIDE 40 MG: 9 INJECTION INTRAMUSCULAR; INTRAVENOUS; SUBCUTANEOUS at 09:01

## 2018-06-07 RX ADMIN — CHLORHEXIDINE GLUCONATE 10 ML: 1.2 RINSE ORAL at 20:00

## 2018-06-07 RX ADMIN — IPRATROPIUM BROMIDE 0.5 MG: 0.5 SOLUTION RESPIRATORY (INHALATION) at 08:24

## 2018-06-07 RX ADMIN — IPRATROPIUM BROMIDE 0.5 MG: 0.5 SOLUTION RESPIRATORY (INHALATION) at 15:05

## 2018-06-07 RX ADMIN — PIPERACILLIN SODIUM,TAZOBACTAM SODIUM 2.25 G: 2; .25 INJECTION, POWDER, FOR SOLUTION INTRAVENOUS at 09:00

## 2018-06-07 RX ADMIN — INSULIN LISPRO 2 UNITS: 100 INJECTION, SOLUTION INTRAVENOUS; SUBCUTANEOUS at 23:54

## 2018-06-07 RX ADMIN — HEPARIN SODIUM 16 UNITS/KG/HR: 10000 INJECTION, SOLUTION INTRAVENOUS at 02:16

## 2018-06-07 RX ADMIN — POTASSIUM CHLORIDE 10 MEQ: 10 INJECTION, SOLUTION INTRAVENOUS at 09:01

## 2018-06-07 RX ADMIN — PIPERACILLIN SODIUM,TAZOBACTAM SODIUM 2.25 G: 2; .25 INJECTION, POWDER, FOR SOLUTION INTRAVENOUS at 20:00

## 2018-06-07 RX ADMIN — POTASSIUM CHLORIDE 10 MEQ: 10 INJECTION, SOLUTION INTRAVENOUS at 06:16

## 2018-06-07 RX ADMIN — POTASSIUM CHLORIDE 10 MEQ: 10 INJECTION, SOLUTION INTRAVENOUS at 21:11

## 2018-06-07 NOTE — PROGRESS NOTES
Progress Note    Patient: Austin Yo MRN: [id number]  SSN: [id number]    YOB: 1950  Age: 75 y.o. Sex: female    ROOM: 109/  Subjective:     Postop Day: 3     The patient responds. The patient is not ambulating.  IV feeding, chemistry stabilizing     Objective:        Vitals table based on RN flowsheet:  Patient Vitals for the past 4 hrs:   Temp   06/07/18 0400 97.9 °F (36.6 °C)    Patient Vitals for the past 4 hrs:   Pulse   06/07/18 0700 (!) 114   06/07/18 0600 (!) 112   06/07/18 0500 (!) 116   06/07/18 0404 (!) 115   06/07/18 0400 (!) 115    Patient Vitals for the past 4 hrs:   Resp   06/07/18 0700 13   06/07/18 0600 11   06/07/18 0500 17   06/07/18 0404 23   06/07/18 0400 21    Patient Vitals for the past 4 hrs:   BP   06/07/18 0700 124/71   06/07/18 0600 121/78   06/07/18 0500 135/76   06/07/18 0400 125/75            Patient Vitals for the past 48 hrs:   BP Temp Pulse Resp SpO2   06/07/18 0700 124/71 - (!) 114 13 99 %   06/07/18 0600 121/78 - (!) 112 11 99 %   06/07/18 0500 135/76 - (!) 116 17 99 %   06/07/18 0404 - - (!) 115 23 98 %   06/07/18 0400 125/75 97.9 °F (36.6 °C) (!) 115 21 100 %   06/07/18 0300 118/76 - (!) 111 14 99 %   06/07/18 0200 113/68 - (!) 109 13 100 %   06/07/18 0100 96/59 - (!) 107 12 -   06/07/18 0000 127/86 98.4 °F (36.9 °C) (!) 117 16 100 %   06/06/18 2300 106/64 - (!) 114 13 97 %   06/06/18 2210 132/66 - (!) 125 18 97 %   06/06/18 2200 - - (!) 127 24 96 %   06/06/18 2100 115/74 - (!) 116 17 -   06/06/18 2040 - 99.5 °F (37.5 °C) - - -   06/06/18 2000 131/76 - (!) 115 14 100 %   06/06/18 1900 111/62 - (!) 114 17 -   06/06/18 1800 95/62 - (!) 113 16 100 %   06/06/18 1730 102/68 - (!) 116 20 100 %   06/06/18 1700 106/67 - (!) 116 12 100 %   06/06/18 1630 102/60 - (!) 113 14 100 %   06/06/18 1600 94/63 - (!) 113 13 100 %   06/06/18 1552 - 98.7 °F (37.1 °C) - - -   06/06/18 1537 - - (!) 120 19 100 %   06/06/18 1530 112/60 - (!) 115 16 100 %   06/06/18 1500 97/60 - (!) 113 13 100 %   06/06/18 1430 112/73 - (!) 117 23 -   06/06/18 1230 104/62 - (!) 115 15 99 %   06/06/18 1200 97/63 98.6 °F (37 °C) (!) 114 16 99 %   06/06/18 1157 - 98.6 °F (37 °C) - - -   06/06/18 1145 - - (!) 111 15 98 %   06/06/18 1130 (!) 85/53 - (!) 114 14 97 %   06/06/18 1100 101/66 - (!) 116 14 99 %   06/06/18 1030 106/70 - (!) 113 13 99 %   06/06/18 1000 109/64 - (!) 114 12 99 %   06/06/18 0930 91/66 - (!) 111 13 99 %   06/06/18 0900 99/61 - (!) 111 14 99 %   06/06/18 0830 115/62 - (!) 118 14 99 %   06/06/18 0800 114/64 98.7 °F (37.1 °C) (!) 137 15 99 %   06/06/18 0756 - - (!) 124 15 99 %   06/06/18 0730 103/67 - (!) 109 15 99 %   06/06/18 0722 - 98.1 °F (36.7 °C) - - -   06/06/18 0700 118/49 - (!) 109 13 99 %   06/06/18 0630 96/65 - (!) 107 13 100 %   06/06/18 0600 98/63 - (!) 109 14 100 %   06/06/18 0500 95/57 - (!) 109 14 -   06/06/18 0445 - - (!) 110 16 100 %   06/06/18 0400 (!) 86/52 - (!) 118 14 -   06/06/18 0359 - 97.8 °F (36.6 °C) - - -   06/06/18 0343 105/51 - (!) 117 18 -   06/06/18 0313 - - - - 100 %   06/06/18 0231 - - - - 100 %   06/06/18 0230 111/70 - (!) 127 18 -   06/06/18 0208 105/60 - (!) 129 15 99 %   06/06/18 0200 101/54 - (!) 130 13 99 %   06/06/18 0130 108/74 - (!) 128 22 100 %   06/06/18 0110 - - - - 93 %   06/06/18 0101 122/75 - (!) 116 13 97 %   06/06/18 0049 - - (!) 129 18 99 %   06/06/18 0002 106/64 - - - -   06/06/18 0000 - - (!) 110 12 95 %   06/05/18 2300 122/73 - (!) 148 16 94 %   06/05/18 2257 - 97.7 °F (36.5 °C) - - -   06/05/18 2200 108/63 - (!) 119 15 95 %   06/05/18 2100 112/61 - (!) 121 14 96 %   06/05/18 2004 - - (!) 116 19 94 %   06/05/18 2000 99/63 - (!) 115 14 93 %   06/05/18 1906 - 97.2 °F (36.2 °C) - - -   06/05/18 1900 108/53 - (!) 115 14 94 %   06/05/18 1800 93/64 - (!) 117 15 92 %   06/05/18 1745 - - (!) 113 13 94 %   06/05/18 1730 (!) 86/63 - (!) 114 15 94 %   06/05/18 1715 - - (!) 116 17 95 %   06/05/18 1700 91/55 - (!) 116 15 93 %   06/05/18 1645 - - (!) 121 14 95 %   06/05/18 1641 - 97.2 °F (36.2 °C) - - -   06/05/18 1630 100/62 - (!) 143 16 96 %   06/05/18 1615 - - (!) 117 14 95 %   06/05/18 1600 (!) 86/56 - (!) 121 13 94 %   06/05/18 1545 - - (!) 135 13 94 %   06/05/18 1530 (!) 76/51 - (!) 122 15 92 %   06/05/18 1515 - - (!) 115 16 95 %   06/05/18 1507 - - (!) 117 20 95 %   06/05/18 1500 (!) 83/53 - (!) 112 19 93 %   06/05/18 1445 (!) 88/56 - (!) 111 16 95 %   06/05/18 1430 (!) 88/58 - (!) 115 17 95 %   06/05/18 1415 94/62 - (!) 116 19 95 %   06/05/18 1400 93/60 - (!) 116 19 96 %   06/05/18 1330 (!) 81/52 - (!) 112 19 95 %   06/05/18 1300 92/59 - (!) 115 21 96 %   06/05/18 1230 101/66 - (!) 118 18 94 %   06/05/18 1200 101/66 - (!) 118 24 95 %   06/05/18 1140 - - (!) 113 16 96 %   06/05/18 1130 94/71 - (!) 116 15 96 %   06/05/18 1100 98/62 99.2 °F (37.3 °C) (!) 119 26 95 %   06/05/18 1030 106/75 - (!) 119 19 96 %   06/05/18 1029 106/75 - (!) 119 - -   06/05/18 1000 107/63 - (!) 116 18 93 %   06/05/18 0930 98/68 - (!) 120 16 95 %   06/05/18 0900 107/67 - (!) 118 24 96 %   06/05/18 0830 107/70 - (!) 115 15 96 %   06/05/18 0800 103/63 - (!) 117 26 96 %   06/05/18 0750 - - (!) 119 21 96 %   06/05/18 0747 - 98.5 °F (36.9 °C) - - -       Objective:      Patient Vitals for the past 24 hrs:   BP Temp Pulse Resp SpO2   06/07/18 0700 124/71 - (!) 114 13 99 %   06/07/18 0600 121/78 - (!) 112 11 99 %   06/07/18 0500 135/76 - (!) 116 17 99 %   06/07/18 0404 - - (!) 115 23 98 %   06/07/18 0400 125/75 97.9 °F (36.6 °C) (!) 115 21 100 %   06/07/18 0300 118/76 - (!) 111 14 99 %   06/07/18 0200 113/68 - (!) 109 13 100 %   06/07/18 0100 96/59 - (!) 107 12 -   06/07/18 0000 127/86 98.4 °F (36.9 °C) (!) 117 16 100 %   06/06/18 2300 106/64 - (!) 114 13 97 %   06/06/18 2210 132/66 - (!) 125 18 97 %   06/06/18 2200 - - (!) 127 24 96 %   06/06/18 2100 115/74 - (!) 116 17 -   06/06/18 2040 - 99.5 °F (37.5 °C) - - -   06/06/18 2000 131/76 - (!) 115 14 100 %   06/06/18 1900 111/62 - (!) 114 17 - 06/06/18 1800 95/62 - (!) 113 16 100 %   06/06/18 1730 102/68 - (!) 116 20 100 %   06/06/18 1700 106/67 - (!) 116 12 100 %   06/06/18 1630 102/60 - (!) 113 14 100 %   06/06/18 1600 94/63 - (!) 113 13 100 %   06/06/18 1552 - 98.7 °F (37.1 °C) - - -   06/06/18 1537 - - (!) 120 19 100 %   06/06/18 1530 112/60 - (!) 115 16 100 %   06/06/18 1500 97/60 - (!) 113 13 100 %   06/06/18 1430 112/73 - (!) 117 23 -   06/06/18 1230 104/62 - (!) 115 15 99 %   06/06/18 1200 97/63 98.6 °F (37 °C) (!) 114 16 99 %   06/06/18 1157 - 98.6 °F (37 °C) - - -   06/06/18 1145 - - (!) 111 15 98 %   06/06/18 1130 (!) 85/53 - (!) 114 14 97 %   06/06/18 1100 101/66 - (!) 116 14 99 %   06/06/18 1030 106/70 - (!) 113 13 99 %   06/06/18 1000 109/64 - (!) 114 12 99 %   06/06/18 0930 91/66 - (!) 111 13 99 %   06/06/18 0900 99/61 - (!) 111 14 99 %   06/06/18 0830 115/62 - (!) 118 14 99 %   06/06/18 0800 114/64 98.7 °F (37.1 °C) (!) 137 15 99 %   06/06/18 0756 - - (!) 124 15 99 %     LABS: Recent Results (from the past 48 hour(s))   GLUCOSE, POC    Collection Time: 06/05/18 12:10 PM   Result Value Ref Range    Glucose (POC) 176 (H) 70 - 110 mg/dL   CULTURE, URINE    Collection Time: 06/05/18 12:45 PM   Result Value Ref Range    Special Requests: NO SPECIAL REQUESTS      Culture result: NO GROWTH AFTER 16 HOURS     LACTIC ACID    Collection Time: 06/05/18 12:45 PM   Result Value Ref Range    Lactic acid 3.6 (HH) 0.4 - 2.0 MMOL/L   MAGNESIUM    Collection Time: 06/05/18 12:45 PM   Result Value Ref Range    Magnesium 2.3 1.6 - 2.6 mg/dL   GLUCOSE, POC    Collection Time: 06/05/18  5:55 PM   Result Value Ref Range    Glucose (POC) 201 (H) 70 - 110 mg/dL   LACTIC ACID    Collection Time: 06/05/18  9:00 PM   Result Value Ref Range    Lactic acid 2.9 (HH) 0.4 - 2.0 MMOL/L   GLUCOSE, POC    Collection Time: 06/05/18 11:31 PM   Result Value Ref Range    Glucose (POC) 151 (H) 70 - 031 mg/dL   METABOLIC PANEL, COMPREHENSIVE    Collection Time: 06/06/18  4:30 AM Result Value Ref Range    Sodium 136 136 - 145 mmol/L    Potassium 2.9 (LL) 3.5 - 5.5 mmol/L    Chloride 99 (L) 100 - 108 mmol/L    CO2 21 21 - 32 mmol/L    Anion gap 16 3.0 - 18 mmol/L    Glucose 199 (H) 74 - 99 mg/dL    BUN 35 (H) 7.0 - 18 MG/DL    Creatinine 2.92 (H) 0.6 - 1.3 MG/DL    BUN/Creatinine ratio 12 12 - 20      GFR est AA 19 (L) >60 ml/min/1.73m2    GFR est non-AA 16 (L) >60 ml/min/1.73m2    Calcium 7.0 (L) 8.5 - 10.1 MG/DL    Bilirubin, total 0.5 0.2 - 1.0 MG/DL    ALT (SGPT) 17 13 - 56 U/L    AST (SGOT) 19 15 - 37 U/L    Alk. phosphatase 45 45 - 117 U/L    Protein, total 4.9 (L) 6.4 - 8.2 g/dL    Albumin 2.0 (L) 3.4 - 5.0 g/dL    Globulin 2.9 2.0 - 4.0 g/dL    A-G Ratio 0.7 (L) 0.8 - 1.7     CBC WITH AUTOMATED DIFF    Collection Time: 06/06/18  4:30 AM   Result Value Ref Range    WBC 11.8 4.6 - 13.2 K/uL    RBC 3.76 (L) 4.20 - 5.30 M/uL    HGB 8.2 (L) 12.0 - 16.0 g/dL    HCT 26.2 (L) 35.0 - 45.0 %    MCV 69.7 (L) 74.0 - 97.0 FL    MCH 21.8 (L) 24.0 - 34.0 PG    MCHC 31.3 31.0 - 37.0 g/dL    RDW 19.1 (H) 11.6 - 14.5 %    PLATELET 475 135 - 532 K/uL    MPV 10.1 9.2 - 11.8 FL    NEUTROPHILS 82 (H) 40 - 73 %    LYMPHOCYTES 10 (L) 21 - 52 %    MONOCYTES 8 3 - 10 %    EOSINOPHILS 0 0 - 5 %    BASOPHILS 0 0 - 2 %    ABS. NEUTROPHILS 9.7 (H) 1.8 - 8.0 K/UL    ABS. LYMPHOCYTES 1.1 0.9 - 3.6 K/UL    ABS. MONOCYTES 0.9 0.05 - 1.2 K/UL    ABS. EOSINOPHILS 0.0 0.0 - 0.4 K/UL    ABS.  BASOPHILS 0.0 0.0 - 0.06 K/UL    DF AUTOMATED     LACTIC ACID    Collection Time: 06/06/18  4:30 AM   Result Value Ref Range    Lactic acid 3.6 (HH) 0.4 - 2.0 MMOL/L   MAGNESIUM    Collection Time: 06/06/18  4:30 AM   Result Value Ref Range    Magnesium 2.1 1.6 - 2.6 mg/dL   CALCIUM, IONIZED    Collection Time: 06/06/18  4:30 AM   Result Value Ref Range    Ionized Calcium 1.01 (L) 1.12 - 1.32 MMOL/L   PHOSPHORUS    Collection Time: 06/06/18  4:30 AM   Result Value Ref Range    Phosphorus 5.5 (H) 2.5 - 4.9 MG/DL   POC G3 Collection Time: 06/06/18  4:56 AM   Result Value Ref Range    Device: VENT      FIO2 (POC) 0.5 %    pH (POC) 7.389 7.35 - 7.45      pCO2 (POC) 31.9 (L) 35.0 - 45.0 MMHG    pO2 (POC) 69 (L) 80 - 100 MMHG    HCO3 (POC) 19.4 (L) 22 - 26 MMOL/L    sO2 (POC) 94 92 - 97 %    Base deficit (POC) 6 mmol/L    Mode ASSIST CONTROL      Tidal volume 450 ml    Set Rate 12 bpm    PEEP/CPAP (POC) 10 cmH2O    PIP (POC) 16      Allens test (POC) N/A      Inspiratory Time 0.9 sec    Total resp. rate 16      Site RIGHT RADIAL      Patient temp. 97.8      Specimen type (POC) ARTERIAL      Performed by Johnathan De Leon     Volume control plus YES     GLUCOSE, POC    Collection Time: 06/06/18  5:49 AM   Result Value Ref Range    Glucose (POC) 184 (H) 70 - 110 mg/dL   CBC WITH AUTOMATED DIFF    Collection Time: 06/06/18 10:04 AM   Result Value Ref Range    WBC 13.9 (H) 4.6 - 13.2 K/uL    RBC 3.65 (L) 4.20 - 5.30 M/uL    HGB 8.1 (L) 12.0 - 16.0 g/dL    HCT 25.2 (L) 35.0 - 45.0 %    MCV 69.0 (L) 74.0 - 97.0 FL    MCH 22.2 (L) 24.0 - 34.0 PG    MCHC 32.1 31.0 - 37.0 g/dL    RDW 18.9 (H) 11.6 - 14.5 %    PLATELET 181 059 - 119 K/uL    MPV 10.5 9.2 - 11.8 FL    NEUTROPHILS 82 (H) 40 - 73 %    LYMPHOCYTES 9 (L) 21 - 52 %    MONOCYTES 9 3 - 10 %    EOSINOPHILS 0 0 - 5 %    BASOPHILS 0 0 - 2 %    ABS. NEUTROPHILS 11.3 (H) 1.8 - 8.0 K/UL    ABS. LYMPHOCYTES 1.3 0.9 - 3.6 K/UL    ABS. MONOCYTES 1.2 0.05 - 1.2 K/UL    ABS. EOSINOPHILS 0.1 0.0 - 0.4 K/UL    ABS.  BASOPHILS 0.0 0.0 - 0.06 K/UL    DF AUTOMATED     PTT    Collection Time: 06/06/18 10:04 AM   Result Value Ref Range    aPTT 30.3 23.0 - 36.4 SEC   GLUCOSE, POC    Collection Time: 06/06/18 11:52 AM   Result Value Ref Range    Glucose (POC) 183 (H) 70 - 110 mg/dL   PTT    Collection Time: 06/06/18  5:16 PM   Result Value Ref Range    aPTT 130.7 (H) 23.0 - 36.4 SEC   POTASSIUM    Collection Time: 06/06/18  5:16 PM   Result Value Ref Range    Potassium 3.4 (L) 3.5 - 5.5 mmol/L   GLUCOSE, POC Collection Time: 06/06/18  5:39 PM   Result Value Ref Range    Glucose (POC) 182 (H) 70 - 110 mg/dL   GLUCOSE, POC    Collection Time: 06/07/18 12:18 AM   Result Value Ref Range    Glucose (POC) 186 (H) 70 - 110 mg/dL   PTT    Collection Time: 06/07/18  1:30 AM   Result Value Ref Range    aPTT 80.0 (H) 23.0 - 13.3 SEC   METABOLIC PANEL, COMPREHENSIVE    Collection Time: 06/07/18  4:40 AM   Result Value Ref Range    Sodium 138 136 - 145 mmol/L    Potassium 3.4 (L) 3.5 - 5.5 mmol/L    Chloride 103 100 - 108 mmol/L    CO2 24 21 - 32 mmol/L    Anion gap 11 3.0 - 18 mmol/L    Glucose 156 (H) 74 - 99 mg/dL    BUN 30 (H) 7.0 - 18 MG/DL    Creatinine 1.92 (H) 0.6 - 1.3 MG/DL    BUN/Creatinine ratio 16 12 - 20      GFR est AA 31 (L) >60 ml/min/1.73m2    GFR est non-AA 26 (L) >60 ml/min/1.73m2    Calcium 7.1 (L) 8.5 - 10.1 MG/DL    Bilirubin, total 0.4 0.2 - 1.0 MG/DL    ALT (SGPT) 17 13 - 56 U/L    AST (SGOT) 22 15 - 37 U/L    Alk. phosphatase 56 45 - 117 U/L    Protein, total 5.1 (L) 6.4 - 8.2 g/dL    Albumin 2.0 (L) 3.4 - 5.0 g/dL    Globulin 3.1 2.0 - 4.0 g/dL    A-G Ratio 0.6 (L) 0.8 - 1.7     CBC WITH AUTOMATED DIFF    Collection Time: 06/07/18  4:40 AM   Result Value Ref Range    WBC 13.4 (H) 4.6 - 13.2 K/uL    RBC 3.66 (L) 4.20 - 5.30 M/uL    HGB 8.0 (L) 12.0 - 16.0 g/dL    HCT 25.2 (L) 35.0 - 45.0 %    MCV 68.9 (L) 74.0 - 97.0 FL    MCH 21.9 (L) 24.0 - 34.0 PG    MCHC 31.7 31.0 - 37.0 g/dL    RDW 18.9 (H) 11.6 - 14.5 %    PLATELET 041 718 - 968 K/uL    MPV 10.1 9.2 - 11.8 FL    NEUTROPHILS 81 (H) 40 - 73 %    LYMPHOCYTES 9 (L) 21 - 52 %    MONOCYTES 8 3 - 10 %    EOSINOPHILS 2 0 - 5 %    BASOPHILS 0 0 - 2 %    ABS. NEUTROPHILS 10.9 (H) 1.8 - 8.0 K/UL    ABS. LYMPHOCYTES 1.2 0.9 - 3.6 K/UL    ABS. MONOCYTES 1.1 0.05 - 1.2 K/UL    ABS. EOSINOPHILS 0.2 0.0 - 0.4 K/UL    ABS.  BASOPHILS 0.0 0.0 - 0.06 K/UL    DF AUTOMATED     LACTIC ACID    Collection Time: 06/07/18  4:40 AM   Result Value Ref Range    Lactic acid 1.2 0.4 - 2.0 MMOL/L   MAGNESIUM    Collection Time: 06/07/18  4:40 AM   Result Value Ref Range    Magnesium 2.2 1.6 - 2.6 mg/dL   VANCOMYCIN, RANDOM    Collection Time: 06/07/18  4:40 AM   Result Value Ref Range    Vancomycin, random 19.8 5.0 - 40.0 UG/ML   GLUCOSE, POC    Collection Time: 06/07/18  5:17 AM   Result Value Ref Range    Glucose (POC) 166 (H) 70 - 110 mg/dL   POC G3    Collection Time: 06/07/18  5:20 AM   Result Value Ref Range    Device: VENT      FIO2 (POC) 0.50 %    pH (POC) 7.411 7.35 - 7.45      pCO2 (POC) 34.9 (L) 35.0 - 45.0 MMHG    pO2 (POC) 70 (L) 80 - 100 MMHG    HCO3 (POC) 22.2 22 - 26 MMOL/L    sO2 (POC) 94 92 - 97 %    Base deficit (POC) 2 mmol/L    Mode ASSIST CONTROL      Tidal volume 450 ml    Set Rate 12 bpm    PEEP/CPAP (POC) 5 cmH2O    Allens test (POC) YES      Total resp. rate 17      Site LEFT RADIAL      Patient temp. 98.8      Specimen type (POC) ARTERIAL      Performed by Monica Randhawa               Abdomen:  Soft. notdistended, bowel sounds present    mildly tender   Incision:  no significant drainage, no dehiscence   DVT Evaluation:  No evidence of DVT seen on physical exam.  Negative Rizwana's sign. No cords or calf tenderness. No significant calf/ankle edema. Lab/Data Review: All lab results for the last 24 hours reviewed.     Assessment:     Status postop:  Improving slowly          Signed By: Sadie Weeks MD     June 7, 2018

## 2018-06-07 NOTE — PROGRESS NOTES
Chart reviewed attended IDR's cm met with pt and brother Naveed Fair at bedside,pt recently extubated at time of visit still somewhat drowsy,able to answer some questions, brother informed cm that he lives with pt and will be assisting her with care,feels his sister would likely want home health for PT,cm did provide brother with agency list for pt to review when more alert,denies his sister uses home DME's or home 02, cm will speak with pt when more alert.

## 2018-06-07 NOTE — DIABETES MGMT
GLYCEMIC CONTROL PROGRESS NOTE:    -discussed in rounds, known h/o T2DM HbA1C within recommended range for age + comorbids on oral home regimen  -BG in target range ICU: 140-180 mg/dL, D5 IVF decreased  -TDD = 6 units - Humalog Normal Insulin Sensitivity Corrective Coverage  -pt extubated today, & NG tube to be discontinued with bedside swallow evaluation  - t benefit from conservative dose of Lantus + mealtime insulin once diet resumes    Recent Glucose Results:   Lab Results   Component Value Date/Time     (H) 06/07/2018 04:40 AM    GLUCPOC 166 (H) 06/07/2018 05:17 AM    GLUCPOC 186 (H) 06/07/2018 12:18 AM    GLUCPOC 182 (H) 06/06/2018 05:39 PM         Franko Wesley RN, MS  Glycemic Control Team  Pager [phone] (M-TH 8:30-5P)  *After Hours pager [phone]

## 2018-06-07 NOTE — PROGRESS NOTES
[address] [facility] [address] [facility] [address]  Phone [phone] Fax [phone]  Pulmonary Critical Care & Sleep Medicine       Name: Paco Murrell MRN: [id number]   : 1950 Hospital: [facility] [place]   Date: 2018          IMPRESSION:   Patient Active Problem List   Diagnosis Code    Ovarian ca ([facility] C56.9    Severe obesity (BMI 35.0-39.9) ([address] E66.01    Abdominal pain R10.9    Sepsis ([address] A41.9    Ischemic colitis ([address] K55.9    Oliguria R34    Acute respiratory failure ([address] J96.00    JESS (acute kidney injury) ([address] N60.4    Metabolic acidosis V65.2    Acute deep vein thrombosis (DVT) of lower extremity ([facility] I82.409 ·   Severe sepsis with lactic acidosis ? source of sepsis  · ARDS   · Hypotension ? Related to pain meds ? Due to sepsis  · Worsening urine out put -- Possible ARF due to sepsis and hypotension  · Metabolic acidsis with lactate elevation ? Related to sepsis   · Bilateral peroneal DVT <18> Unknown if presented on admission-- Started on heparin drip after Ok by GYN on 18    PLAN:  -- Decrease FIO2 to 40 and PEEP 5  -- Taper sedation and plan SBT  -- Decrease iv fluids give one dose lasix  --K replaced  -- DVT on heparin drip no bleed noted  --Cdiff could not be send as no diarrhoea will continue flagyl for abdominal source sepsis for 10 days  -- Nephrology consult appreciated  -- Monitor CVP maintaining around 9   -- Laproscopy failed to show ischmic bowel but improvement in lactate and wbc noted today am  -- Possible Sepsis all culture is negative so far  -- Glucose and electrolyte protocol    CVS:Echo reviewed EF 65% RV is ok not dilated still tachycardic ?  Due to pain or undergoing sepsis, Dc albutero and start atrovent-- improvement in tachycardia noted today  RS:ARDs vent protocol, Taper FIO2 as tolerated -- SBT 6/7/18  -- Aspiration precaution  -- Add bronchodilators-- change to atrovent due to tachycardia  -- HOB elevated  ID:Lactate is normalized now On vanco start date 6/3, Zosyn 6/3, flagyl 6/3 pending cdiff all other culture negative so far  ENDO:SSI monitor blood sugars -- MIld increase decrease D5 fluids and monitor  GI:Start feeds On PPI  RENAL: Nephro consult , iv fluids monitor-- mild improvement noted on 6/7 give one dose lasix and monitor  CNS:Mentation is ok for now on and off drowsy due to sedation  HEMATOLOGY: Monitor hb get PT and INR on heparin drip   MUSCULOSKELETAL:no acute issued  PAIN AND SEDATION: Fentanyl drip and prn versed if needed will add precedex or propofol  · Skin/Wound: Surgical wound looks clean   · Electrolytes: Replace electrolytes per ICU electrolyte replacement protocol. · IVF: D5 1/2 ns -- decrease  · Nutrition: Hold feeds today for SBT  · Prophylaxis: DVT Prophylaxis with scd,. GI Prophylaxis. · Restraints: none  · PT/OT eval and treat. OOB when appropriate. · Lines/Tubes: Subclavian line 6/4, Fry 6/4, Vent 6/5  ADVANCE DIRECTIVE:Full code  FAMILY DISCUSSION:Spoke with patient/brothoer and Dr. Trang Seals: PPI, DVT prophylaxis, HOB elevated, Infection control all reviewed and addressed. Events and notes from last 24 hours reviewed. Care plan discussed with nursing CC TIME:46 min excluding procedure time    · Labs and images personally seen and available reports reviewed. · All current medicines are reviewed and doses and prescription adjusted. Subjective/History:  6/7/18  More improvement in parameters  Putting out urine now  Lactate is normal 1.2  Cr improved to 1.92  About 1700 negative  CXR shows possible air space on right lower lobe vs fluid overload     Norma Mar is a 75 y.o.  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 Was doing ok at home until yesterday when she started feeling a little more discomfort.  She presented last night when she felt her pain was significant. In ER received IV fluid and zosyn   Lactate found to be in 5  Abd ct done showed area concerning for ?  Ischemic bowel  Spoke with Dr Mykel Anders on phone she is out of town and wants general surgery to be consulted   Patient just received 4 mg IV diludid by OB GYN in ICU post that her blood pressure running on lower side   She is on NRB after diludid  We asked IR to place PICC line but they declined and wanted to be done tomorrow as patient has peripheral line and concern for sepsis  No urine out put     6/5/18: S/P laproscopy by Dr. Mykel Anders, No perforation or ischemic bowel some purulant looking inflamatory fluid removed, Post procedure on vent, DVT -- Peroneal, worsening cr -- renal evaluated  6/6/18 PH improved Bicarb stopped all culture negative Started heparin for DVT      Current Facility-Administered Medications   Medication Dose Route Frequency    potassium chloride 10 mEq in 100 ml IVPB  10 mEq IntraVENous Q1H    furosemide (LASIX) injection 40 mg  40 mg IntraVENous ONCE    Vancomycin Random Level with am labs 6/7/18  1 Each Other ONCE    ipratropium (ATROVENT) 0.02 % nebulizer solution 0.5 mg  0.5 mg Nebulization Q6H RT    dextrose 5% - 0.45% NaCl with KCl 10 mEq/L infusion  50 mL/hr IntraVENous CONTINUOUS    heparin 25,000 units in D5W 250 ml infusion  18-36 Units/kg/hr IntraVENous TITRATE    fentaNYL (PF) 900 mcg/30 ml infusion soln   mcg/hr IntraVENous TITRATE    chlorhexidine (PERIDEX) 0.12 % mouthwash 10 mL  10 mL Oral Q12H    piperacillin-tazobactam (ZOSYN) 2.25 g in 0.9% sodium chloride (MBP/ADV) 50 mL MBP  2.25 g IntraVENous Q6H    metroNIDAZOLE (FLAGYL) IVPB premix 500 mg  500 mg IntraVENous Q8H    Vancomycin - Pharmacokinetic Dosing  1 Each Other Rx Dosing/Monitoring    insulin lispro (HUMALOG) injection   SubCUTAneous Q6H    pantoprazole (PROTONIX) 40 mg in sodium chloride 0.9% 10 mL injection  40 mg IntraVENous DAILY       Objective:   Vital Signs: Visit Vitals    /78    Pulse 88    Temp 99.3 °F (37.4 °C)    Resp 18    Ht 5' 1\" (1.549 m)    Wt 89.1 kg (196 lb 6.9 oz)    SpO2 100%    BMI 37.12 kg/m2       O2 Device: Ventilator   O2 Flow Rate (L/min): 50 l/min   Temp (24hrs), Av.8 °F (37.1 °C), Min:97.9 °F (36.6 °C), Max:99.5 °F (37.5 °C)       Intake/Output:   Last shift:      701 - 1900  In: 100 [I.V.:100]  Out: 450 [Urine:450]  Last 3 shifts: 1901 - 700  In: 2858.3 [I.V.:2808.3]  Out: 9358 [Urine:3275]    Intake/Output Summary (Last 24 hours) at 18  Last data filed at 18 0800   Gross per 24 hour   Intake             1100 ml   Output             2825 ml   Net            -1725 ml     Review of Systems:  Limited history as patient just received pain meds     Objective:    General: comfortable,  Sedated intubated   HEENT: pupils reactive, sclera anicteric, EOM intact  Neck: No adenopathy or thyroid swelling, no lymphadenopathy or JVD, supple  CVS: S1S2 no murmurs  RS: Mod AE bilaterally, no tactile fremitus or egophony, decrease air entry on left side  Abd: distended complains of lots of pain   Neuro: non focal, awake, alert  Extrm: no leg edema, clubbing or cyanosis  Lymph node: No obvious palpable lymph node appreciated.   Skin: no rash  CBC w/Diff Recent Labs      18   0440  18   1004  18   0430   WBC  13.4*  13.9*  11.8   RBC  3.66*  3.65*  3.76*   HGB  8.0*  8.1*  8.2*   HCT  25.2*  25.2*  26.2*   PLT  177  166  165   GRANS  81*  82*  82*   LYMPH  9*  9*  10*   EOS  2  0  0        Chemistry Recent Labs      18   0440  18   1716  18   0430  18   1245  18   0520  18   1905   GLU  156*   --   199*   --   191*  191*   NA  138   --   136   --   134*  140   K  3.4*  3.4*  2.9*   --   3.9  4.1   CL  103   --   99*   --   100  103   CO2  24   --   21   --   19*  20*   BUN  30*   --   35*   --   25*  19*   CREA  1.92*   --   2.92*   --   2.13* 1.93*   CA  7.1*   --   7.0*   --   7.5*  7.5*   MG  2.2   --   2.1  2.3  1.8  2.2   PHOS   --    --   5.5*   --    --   6.4*   AGAP  11   --   16   --   15  17   BUCR  16   --   12   --   12  10*   AP  56   --   45   --   50   --    TP  5.1*   --   4.9*   --   5.2*   --    ALB  2.0*   --   2.0*   --   1.9*   --    GLOB  3.1   --   2.9   --   3.3   --    AGRAT  0.6*   --   0.7*   --   0.6*   --         Lactic Acid Lactic acid   Date Value Ref Range Status   06/07/2018 1.2 0.4 - 2.0 MMOL/L Final     Recent Labs      06/07/18   0440  06/06/18   0430  06/05/18   2100   LAC  1.2  3.6*  2.9*        Micro  Recent Labs      06/05/18   1245  06/05/18   0220   CULT  NO GROWTH AFTER 16 HOURS  CULTURE IN PROGRESS,FURTHER UPDATES TO FOLLOW     Recent Labs      06/05/18   1245  06/05/18   0220   CULT  NO GROWTH AFTER 16 HOURS  CULTURE IN PROGRESS,FURTHER UPDATES TO FOLLOW        ABG Recent Labs      06/07/18   0520  06/06/18   0456  06/05/18   0539   PHI  7.411  7.389  7.340*   PCO2I  34.9*  31.9*  33.5*   PO2I  70*  69*  70*   HCO3I  22.2  19.4*  18.0*   FIO2I  0.50  0.5  1.0        Liver Enzymes Protein, total   Date Value Ref Range Status   06/07/2018 5.1 (L) 6.4 - 8.2 g/dL Final     Albumin   Date Value Ref Range Status   06/07/2018 2.0 (L) 3.4 - 5.0 g/dL Final     Globulin   Date Value Ref Range Status   06/07/2018 3.1 2.0 - 4.0 g/dL Final     A-G Ratio   Date Value Ref Range Status   06/07/2018 0.6 (L) 0.8 - 1.7   Final     AST (SGOT)   Date Value Ref Range Status   06/07/2018 22 15 - 37 U/L Final     Alk.  phosphatase   Date Value Ref Range Status   06/07/2018 56 45 - 117 U/L Final     Recent Labs      06/07/18   0440  06/06/18   0430  06/05/18   0520   TP  5.1*  4.9*  5.2*   ALB  2.0*  2.0*  1.9*   GLOB  3.1  2.9  3.3   AGRAT  0.6*  0.7*  0.6*   SGOT  22  19  32   AP  56  45  50        Cardiac Enzymes No results found for: CPK, CK, CKMMB, CKMB, RCK3, CKMBT, CKNDX, CKND1, FABIAN, TROPT, TROIQ, KAYLAN, TROPT, TNIPOC, BNP, BNPP BNP No results found for: BNP, BNPP, XBNPT     Coagulation Recent Labs      06/07/18   0130  06/06/18   1716  06/06/18   1004  06/04/18   1438   PTP   --    --    --   15.3*   INR   --    --    --   1.3*   APTT  80.0*  130.7*  30.3  26.9         Thyroid  No results found for: T4, T3U, TSH, TSHEXT, TSHEXT       Lipid Panel No results found for: CHOL, CHOLPOCT, CHOLX, CHLST, CHOLV, 486286, HDL, LDL, LDLC, DLDLP, 236647, VLDLC, VLDL, TGLX, TRIGL, TRIGP, TGLPOCT, CHHD, CHHDX       Urinalysis Lab Results   Component Value Date/Time    Color DARK YELLOW 06/04/2018 08:42 AM    Appearance CLOUDY 06/04/2018 08:42 AM    Specific gravity 1.028 06/04/2018 08:42 AM    pH (UA) 5.0 06/04/2018 08:42 AM    Protein 30 (A) 06/04/2018 08:42 AM    Glucose NEGATIVE  06/04/2018 08:42 AM    Ketone TRACE (A) 06/04/2018 08:42 AM    Bilirubin SMALL (A) 06/04/2018 08:42 AM    Urobilinogen 1.0 06/04/2018 08:42 AM    Nitrites NEGATIVE  06/04/2018 08:42 AM    Leukocyte Esterase NEGATIVE  06/04/2018 08:42 AM    Epithelial cells FEW 06/04/2018 08:42 AM    Bacteria 1+ (A) 06/04/2018 08:42 AM    WBC 0 to 3 06/04/2018 08:42 AM    RBC 0 to 3 06/04/2018 08:42 AM        XR (Most Recent). CXR reviewed by me and compared with previous CXR   Results from Hospital Encounter encounter on 06/04/18   XR CHEST PORT   Narrative Chest, single view    Indication: Respiratory failure, endotracheal tube placement    Comparison: Several prior CT examinations, most recently June 5, 2018    Findings:  Portable upright AP view of the chest was obtained. There is an  endotracheal tube which projects approximately 5.4 cm above the tan. A right  subclavian central venous catheter noted with tip projecting over the superior  cavoatrial junction. Nasogastric tube is below the diaphragm, looped within the  stomach. Underexpanded lungs noted on this rotated projection of the chest with linear  atelectatic opacities in the right mid and left lower lung.  No pneumothorax or  pleural effusion. Cardiac size and mediastinal contours are stable. No acute  osseous abnormality present. Impression Impression:    1. Endotracheal tube, right subclavian central venous catheter, and nasogastric  tube as above. 2. Low lung volumes with bibasilar atelectatic opacities, similar to prior. CT (Most Recent)   Results from Hospital Encounter encounter on 06/04/18   CT ABD PELV W CONT   Narrative EXAM: CT of the Abdomen and Pelvis    INDICATION: Abdominal pain with abdominal distention, status post hysterectomy  and appendectomy    COMPARISON: None. TECHNIQUE: Axial CT imaging of the abdomen and pelvis was performed with  intravenous contrast. Multiplanar reformats were generated. Dose reduction  techniques used: Automated exposure control, adjustment of the mAs and/or kVp  according to patient's size, and iterative reconstruction techniques. _______________    FINDINGS:    LOWER CHEST: Minimal bibasilar atelectasis without significant pleural or  pericardial effusion coronary artery atherosclerotic calcifications are  present. Carline Jannet LIVER, BILIARY: Hepatomegaly measuring 20.2 cm with mild diffuse parenchymal low  attenuation/steatosis. There are 2 separate rounded low attenuating lesions in  the left lobe of liver measuring 1.2 cm and 0.9 cm, measuring water attenuation  Hounsfield units. No enhancing hepatic mass. No biliary dilation. Distended  gallbladder/gallbladder hydrops which may be secondary to nothing by mouth  status. PANCREAS: Normal.    SPLEEN: Normal.    ADRENALS: Normal.    KIDNEYS: The kidneys are iso-attenuating without evidence of hydronephrosis,  nephrolithiasis or masses. No perinephric fluid collections. No hydroureter. LYMPH NODES: No enlarged lymph nodes. Bilateral pelvic lymphadenectomy surgical  clips are present.     GASTROINTESTINAL TRACT:   -Nasogastric tube is present with the tip projecting towards the distal stomach  with moderate mid to proximal air-fluid level.  -Numerous abnormal edematous thick-walled small bowel loops in the left abdomen  and bilateral pelvis, with definite areas of lamellar edematous transformation  notably in the right lower quadrant. No renetta pneumatosis intestinalis. -Stool is present in the cecum and right colon to the hepatic flexure with  circumferential air, with mildly prominent but not discretely thick-walled  appearance. Bowel gas is present in the transverse colon with decompressed  splenic flexure. Decompressed descending colon. Edematous thick-walled sigmoid  colon which may be secondary to decompression and diverticulosis. PELVIC ORGANS: Postsurgical changes of hysterectomy and bilateral nephrectomy  with a few tiny locules of gas far lateral right inferior pelvis. Decompressed bladder with Fry catheter in place. VASCULATURE: Normal caliber aorta without evidence of atherosclerotic disease. Patent celiac and SMA. Patent WILLIE. Patent bilateral iliofemoral vessels. Ovoid  infrarenal IVC. OTHER: Small volume of low attenuating abdominal and pelvic ascites,, with  mesenteric stranding. There is a very small amount of interloop mesenteric  fluid. No significant pneumatosis. Mild diffuse soft tissue anasarca  There is a small amount of subcutaneous emphysema lateral left upper abdominal  the soft tissues and left groin. Midline incision with no fluid collections    BONES: Age-indeterminate moderate to severe L1 vertebral body compression  fracture deformity with minimal dorsal retropulsion of the posterior superior  corner, producing mild central stenosis. Multilevel degenerative disc disease  and spondylosis with vacuum disc phenomena at several levels in the lumbar  spine. .    _______________         Impression IMPRESSION:    1. Postsurgical hysterectomy, bilateral oophorectomy, pelvic lymphadenectomy and  a mastectomy surgical changes.  Small volume of ascites in the abdomen and  pelvis, with a few tiny locules of gas in the right hemipelvis would be in  keeping with recent postsurgical etiology. 2. Abnormal edematous thick-walled small bowel loops in the left abdomen and  pelvis, with TRAM lamellar edematous configuration, induration. No findings of  pneumatosis. The overall appearance is nonspecific. Diagnostic considerations  include infectious etiology, nonspecific edema which may be unresolved  postsurgical etiology. Ischemia is also included in the differential diagnostic  consideration, however, there are no findings of pneumatosis, portal venous gas. 3. Stool in the right colon extending to the hepatic flexure with surrounding  gas, foci of pneumatosis could be obscured. No associated bowel wall thickening. 4. Satisfactory position of nasogastric tube. 5. Hepatomegaly, steatosis with 2 rounded low-density lesions, potentially  cysts. 6. Bibasilar atelectasis. -The findings of this exam were discussed extensively with Dr. Dominick Rod on  06/04/2018, prior to dictation. Imaging:  I have personally reviewed the patients radiographs and have reviewed the reports:     Best practice :  Fluids started at 30 ml/kg with aim to keep CVP 8 or above  Lactic acid ordered- initial and repeat Q6hrs if elevated till normalized. Cultures drawn. Antibiotic administered within 1hr-ICU  And 3hrs ED  Pressors aim MAP >65mmHg  Glycemic control  Sress ulcer prophylaxis  DVT prophylaxis    Vent bundle, Fry bundle and central line bundle followed. Fanta Heredia M.D.   Pulmonary Critical Care & Sleep Medicine

## 2018-06-07 NOTE — PROGRESS NOTES
Vancomycin - Pharmacy Dosing Services ( Sepsis of Unknown Etiology )    Vancomycin random level: 19.8 (6/7/18 at 04:40)   Vancomycin has now been discontinued by Dr. Donna Sommer - consult has been closed.     Kelly Hutchins, PharmD     [phone]

## 2018-06-07 NOTE — PROGRESS NOTES
Speech Therapy Note:    ST acknowledging speech therapy evaluation orders post-extubation this day.  SLP will follow up next AM. Please contact SLP with questions/concerns.       Thank you for this referral.    Sidney Small M.S., CF-SLP  Speech Language Pathologist

## 2018-06-07 NOTE — PROGRESS NOTES
Nephrology Progress note    Subjective:     Mirella Newman is a 75 y.o. female with PMH DM, HTN, clear cell ovarian ca s/p debulking who presented with abdominal pain and possible sepsis/ischemic colitis. Pt underwent laparotomy/peritoneal lavage/bx, noted to have decreased UOP and increasing Cr to 2.1 today from baseline 0.6. CT neg for mass or hydro. Pt given lasix yesterday, still with high O2 requirements on vent. Unable to provide further history. - Pt still intubated. Additional IV Lasix given today, w/ good UOP, Cr down to 1.9        Admit Date: 6/4/2018  Allergy:  Allergies   Allergen Reactions    Hydrocodone Other (comments)     Breaks into cold sweat    Metformin Other (comments)     Breaks out into cold sweat.  Can Take Glucophage brand name med        Objective:     Visit Vitals    /86    Pulse (!) 112    Temp 97.3 °F (36.3 °C)    Resp 13    Ht 5' 1\" (1.549 m)    Wt 89.1 kg (196 lb 6.9 oz)    SpO2 100%    BMI 37.12 kg/m2         Intake/Output Summary (Last 24 hours) at 06/07/18 1021  Last data filed at 06/07/18 0915   Gross per 24 hour   Intake             1050 ml   Output             2825 ml   Net            -1775 ml       Physical Exam:       General: vented   HENT: Atraumatic and normocephalic   Eyes: Normal conjunctiva   Neck: Supple    Cardiovascular: tachy S1 & S2, no m/r/g   Pulmonary/Chest Wall: Clear to auscultation bilaterally   Abdominal: Soft, obese   Musculoskeletal: no edema   Neurological: sedated       Data Review:      Lab Results   Component Value Date/Time    WBC 13.4 (H) 06/07/2018 04:40 AM    RBC 3.66 (L) 06/07/2018 04:40 AM    HCT 25.2 (L) 06/07/2018 04:40 AM    MCV 68.9 (L) 06/07/2018 04:40 AM    MCH 21.9 (L) 06/07/2018 04:40 AM    MCHC 31.7 06/07/2018 04:40 AM    RDW 18.9 (H) 06/07/2018 04:40 AM      No results found for: IRONNo components found for: FERRITIN  No components found for: PTHINT  Urinalysis  No results found for: UGLU         Impression:     -JESS- likely ATN, nonoliguric.  Improving w/ Cr down to 1.9, from  2.9, good UOP on S/P lasix  -Ovarian ca s/p debulking then laparoscopy w/lavage  -GN sepsis  -Respiratory failure, intubated   -DM  -HTN  -Anemia  -Met acidosis  -Hypokalemia    Plan:     OK to d/c IVF   Monitor I/O  Replace K per protocol   Abx  No need for RRT        Damaso Lawson MD  [place]  [phone]

## 2018-06-07 NOTE — PROGRESS NOTES
0700 Bedside shift change report given to Davion Enriquez RN (oncoming nurse) by Gerber Lara (offgoing nurse). Report included the following information SBAR, Kardex, ED Summary, OR Summary, Procedure Summary, Intake/Output, MAR, Accordion, Recent Results and Med Rec Status. Assuumed care of patient at this time, Assessment in progress, dual skin assessment completed, Repositioned patient at this time, Patient following commands,nods head yes oor no appropriately, Contact isolation discontinued, Heparin gtt, fentanyl pca  verified. Heparin recheck in am, levels therapeutic. Patient denies pain at this time. 0800 Assessment completed    0837 Respiratory therapist Decreased fio2 to 45%,02 sats 100%    0900 40 mg lasix given per order, tube feed stopped at this time, goal for pt to be extubated this am. Fentanyl decreased to 50mcg/hr, Patient alert denies pain follows commands. 1050 Patient Extubated  and placed on 4L NC.02 sats 95%. Tolerated well    1130 NG tube removed, pt tolerated well  Patient passed SBT and extubated in am doing well  Passed swallow and started on diet    Complains of pain off fentanyl drip ,hydrocodone?  Allergy noted but patient received diludid when she came without any problem, started on Norco PRN for paiin without incident  or any adverse reactions

## 2018-06-07 NOTE — PROGRESS NOTES
1900- Bedside shift change report given to Alfa Barger RN (oncoming nurse) by Sade Dong (offgoing nurse). Report included the following information SBAR, Kardex, ED Summary, OR Summary, Procedure Summary, Intake/Output, MAR, Accordion, Recent Results and Med Rec Status. 1915- Initial assessment completed. Pt is intubated but can nod appropriately to questions. Also points appropriately to picture sheet. Lung sounds clear. Sinus Tach. VSS. Lower abdominal incision intact and without swelling, redness or oozing. BUE, BLE are edematous with +1 pitting and trace edema. IV pump infusing with no difficulties, will continue to monitor and assess. 46- Spoke with Dr. Karyn Schirmer in regards to restraints. See Manage orders. 0000- No change to previous assessment. Will continue to monitor and assess. 0400- No changes to previous assessment. VSS.  Will continue to monitor and assess/

## 2018-06-07 NOTE — PROGRESS NOTES
Hospitalist Progress Note-critical care note     Patient: Hamzah Quintero MRN: [id number]  CSN: [id number]    YOB: 1950  Age: 75 y.o. Sex: female    DOA: 6/4/2018 LOS:  LOS: 3 days            Chief complaint: acute respiratory failure ,  Ischemic colitis, sepsis , jess.  Metabolic acidosis , dvt     Assessment/Plan         Hospital Problems  Date Reviewed: 6/5/2018          Codes Class Noted POA    Acute deep vein thrombosis (DVT) of lower extremity ([facility] ICD-10-CM: I82.409  ICD-9-CM: 453.40  6/7/2018 Unknown        Acute respiratory failure ([facility] ICD-10-CM: J96.00  ICD-9-CM: 518.81  6/5/2018 Unknown        JESS (acute kidney injury) ([facility] ICD-10-CM: N17.9  ICD-9-CM: 584.9  6/5/2018 Unknown        Metabolic acidosis KBR-18-YF: E87.2  ICD-9-CM: 276.2  6/5/2018 Unknown        Abdominal pain ICD-10-CM: R10.9  ICD-9-CM: 789.00  6/4/2018 Yes        * (Principal)Sepsis ([facility] ICD-10-CM: A41.9  ICD-9-CM: 038.9, 995.91  6/4/2018 Yes        Ischemic colitis ([facility] ICD-10-CM: K55.9  ICD-9-CM: 557.9  6/4/2018 Yes        Oliguria ICD-10-CM: R34  ICD-9-CM: 788.5  6/4/2018 Yes        Ovarian ca [facility]) ICD-10-CM: C56.9  ICD-9-CM: 183.0  5/29/2018 Yes            Respiratory   acute respiratory failure   Intubated, managed per intensive       Cardiology   Tachy cardia, respond to ativan, will optimize K and mg     ID ;  Sepsis   Due to abdomen source   On vanc/zosyn and flagyl -vanc was hold now   Central line placed per intensive   bcx negative       GI   Ischemic colitis   Dr. 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 Davis Patches per primary team     -Bilateral dvt   On heparin gtt     Jess   -Due to sepsis, renal on board, K replacement   -Metabolic acidosis Due to sepsis  Was  on bicarb     Nurse; HR better   30  minutes of critical care time spent in the direct evaluation and treatment of this high risk patient. The reason for providing this level of medical care for this critically ill patient was due a critical illness that impaired one or more vital organ systems such that there was a high probability of imminent or life threatening deterioration in the patients condition. This care involved high complexity decision making to assess, manipulate, and support vital system functions, to treat this degreee vital organ system failure and to prevent further life threatening deterioration of the patients condition. Review of systems:    Unable to obtain due to intubated      Vital signs/Intake and Output:  Visit Vitals    /86    Pulse (!) 115    Temp 97.4 °F (36.3 °C)    Resp 17    Ht 5' 1\" (1.549 m)    Wt 89.1 kg (196 lb 6.9 oz)    SpO2 95%    BMI 37.12 kg/m2     Current Shift:  06/07 0701 - 06/07 1900  In: 270 [P.O.:120; I.V.:150]  Out: 2750 [Urine:2750]  Last three shifts:  06/05 1901 - 06/07 0700  In: 2858.3 [I.V.:2808.3]  Out: 3275 [Urine:3275]    Physical Exam:  General: Intubated  no acute distress    HEENT: NC, Atraumatic. PERRLA, ET tube noted   Lungs: Mild Rales.   Heart:  Tachy   No murmur, No Rubs, No Gallops  Abdomen: Soft, Non distended, surgical site with staples noted, no discharge   Extremities: No c/c/e  Psych:   Calm   Neurologic:  Unable to obtain due to intubation           Labs: Results:       Chemistry Recent Labs      06/07/18   0440  06/06/18   1716  06/06/18   0430  06/05/18   0520   GLU  156*   --   199*  191*   NA  138   --   136  134*   K  3.4*  3.4*  2.9*  3.9   CL  103   --   99*  100   CO2  24   --   21  19*   BUN  30*   --   35*  25*   CREA  1.92*   --   2.92*  2.13*   CA  7.1*   --   7.0*  7.5*   AGAP  11   --   16  15   BUCR  16   --   12  12   AP  56   --   45  50   TP  5.1*   --   4.9*  5.2*   ALB  2.0*   --   2.0*  1.9*   GLOB  3.1   --   2.9  3.3   AGRAT  0.6*   --   0.7*  0.6*      CBC w/Diff Recent Labs 06/07/18   0440  06/06/18   1004  06/06/18   0430   WBC  13.4*  13.9*  11.8   RBC  3.66*  3.65*  3.76*   HGB  8.0*  8.1*  8.2*   HCT  25.2*  25.2*  26.2*   PLT  177  166  165   GRANS  81*  82*  82*   LYMPH  9*  9*  10*   EOS  2  0  0      Cardiac Enzymes No results for input(s): CPK, CKND1, FABIAN in the last 72 hours. No lab exists for component: CKRMB, TROIP   Coagulation Recent Labs      06/07/18   0130  06/06/18   1716   06/04/18   1438   PTP   --    --    --   15.3*   INR   --    --    --   1.3*   APTT  80.0*  130.7*   < >  26.9    < > = values in this interval not displayed. Lipid Panel No results found for: CHOL, CHOLPOCT, CHOLX, CHLST, CHOLV, 935477, HDL, LDL, LDLC, DLDLP, 590607, VLDLC, VLDL, TGLX, TRIGL, TRIGP, TGLPOCT, CHHD, CHHDX   BNP No results for input(s): BNPP in the last 72 hours.    Liver Enzymes Recent Labs      06/07/18   0440   TP  5.1*   ALB  2.0*   AP  56   SGOT  22      Thyroid Studies No results found for: T4, T3U, TSH, TSHEXT, TSHEXT     Procedures/imaging: see electronic medical records for all procedures/Xrays and details which were not copied into this note but were reviewed prior to creation of Dayton Thurston MD

## 2018-06-07 NOTE — PROGRESS NOTES
Surgery Progress Note    Patient: Kindra Nathan MRN: [id number]  CSN: [id number]    YOB: 1950  Age: 75 y.o. Sex: female    DOA: 6/4/2018 LOS:  LOS: 3 days          Chief Complaint:          Assessment/Plan     Most parameters improving. Extubated. Patient Active Problem List   Diagnosis Code    Ovarian ca ([facility] C56.9    Severe obesity (BMI 35.0-39.9) ([facility]) E66.01    Abdominal pain R10.9    Sepsis ([facility] A41.9    Ischemic colitis ([facility] K55.9    Oliguria R34    Acute respiratory failure ([facility]) J96.00    JESS (acute kidney injury) ([facility] D60.4    Metabolic acidosis X70.2    Acute deep vein thrombosis (DVT) of lower extremity ([facility]) I82.409       Subjective:  GNR +      Review of systems:    Constitutional: denies fevers, chills, myalgias  Respiratory: denies SOB, cough  Cardiovascular: denies chest pain, palpitations  Gastrointestinal: denies nausea, vomiting, diarrhea      Vital signs/Intake and Output:  Visit Vitals    /86    Pulse (!) 115    Temp 97.4 °F (36.3 °C)    Resp 17    Ht 5' 1\" (1.549 m)    Wt 89.1 kg (196 lb 6.9 oz)    SpO2 95%    BMI 37.12 kg/m2     Current Shift:  06/07 0701 - 06/07 1900  In: 270 [P.O.:120;  I.V.:150]  Out: 2750 [Urine:2750]  Last three shifts:  06/05 1901 - 06/07 0700  In: 2858.3 [I.V.:2808.3]  Out: 3275 [Urine:3275]    Exam:    General: Well developed, alert, NAD, OX3  Head/Neck: NCAT, supple, No masses, No lymphadenopathy  CVS:Regular rate and rhythm, no M/R/G, S1/S2 heard, no thrill  Lungs:Clear to auscultation bilaterally, no wheezes, rhonchi, or rales  Abdomen: Soft, tender, distention, No Bowel sounds, No hepatomegaly  Extremities: No C/C/E, pulses palpable 2+  Skin:anasarca  Neuro:grossly normal , follows commands  Psych:appropriate                Labs: Results:       Chemistry Recent Labs      06/07/18   0440  06/06/18   1716  06/06/18   0430  06/05/18   0520   GLU  156*   --   199*  191*   NA  138   --   136  134*   K  3.4*  3.4* 2. 9*  3.9   CL  103   --   99*  100   CO2  24   --   21  19*   BUN  30*   --   35*  25*   CREA  1.92*   --   2.92*  2.13*   CA  7.1*   --   7.0*  7.5*   AGAP  11   --   16  15   BUCR  16   --   12  12   AP  56   --   45  50   TP  5.1*   --   4.9*  5.2*   ALB  2.0*   --   2.0*  1.9*   GLOB  3.1   --   2.9  3.3   AGRAT  0.6*   --   0.7*  0.6*      CBC w/Diff Recent Labs      06/07/18   0440  06/06/18   1004  06/06/18   0430   WBC  13.4*  13.9*  11.8   RBC  3.66*  3.65*  3.76*   HGB  8.0*  8.1*  8.2*   HCT  25.2*  25.2*  26.2*   PLT  177  166  165   GRANS  81*  82*  82*   LYMPH  9*  9*  10*   EOS  2  0  0      Cardiac Enzymes No results for input(s): CPK, CKND1, FABIAN in the last 72 hours. No lab exists for component: CKRMB, TROIP   Coagulation Recent Labs      06/07/18   0130  06/06/18   1716   06/04/18   1438   PTP   --    --    --   15.3*   INR   --    --    --   1.3*   APTT  80.0*  130.7*   < >  26.9    < > = values in this interval not displayed. Lipid Panel No results found for: CHOL, CHOLPOCT, CHOLX, CHLST, CHOLV, 044401, HDL, LDL, LDLC, DLDLP, 827416, VLDLC, VLDL, TGLX, TRIGL, TRIGP, TGLPOCT, CHHD, CHHDX   BNP No results for input(s): BNPP in the last 72 hours.    Liver Enzymes Recent Labs      06/07/18   0440   TP  5.1*   ALB  2.0*   AP  56   SGOT  22      Thyroid Studies No results found for: T4, T3U, TSH, TSHEXT     Procedures/imaging: see electronic medical records for all procedures/Xrays and details which were not copied into this note but were reviewed prior to creation of [address] [place] [address], DO

## 2018-06-07 NOTE — PROGRESS NOTES
IDR Rounds   Name: Dusty Dempsey MRN: [id number]   : 1950 Hospital: [facility] [place]   Date: 2018                  Diagnosis:         ·   Patient Active Problem List ·   Diagnosis · Code ·    · Ovarian ca ([address] · C56.9 ·    · Severe obesity (BMI 35.0-39.9) (HCC) · E66.01 ·    · Abdominal pain · R10.9 ·    · Sepsis ([address] · A41.9 ·    · Ischemic colitis ([address] · K55.9 ·    · Oliguria · R34 ·    · Acute respiratory failure ([address] · J96.00 ·    · JESS (acute kidney injury) ([facility] · P76.3 ·    · Metabolic acidosis · N78.4     · Bilateral peroneal DVT <18> Unknown if presented on admission   PLAN:Per DR Barbara Melchor  -- Decrease FIO2 to 50 and PEEP 5, extubation this am   -- Change iv fluids to d5 1/2 ns as ph is ok bicarb coming up  -- DC albumin  -- K 2.9 and its replaced  -- Stheraputic heparin will start today per protocol for dvt  --  dc flagyl, vanco and cont  Zosyn, gram neg rods with peritoneal fluid culture drawn from 18  -- Nephrology consult appreciated  -- Monitor CVP maintaining around 9   -- Laproscopy failed to show ischmic bowel but improvement in lactate and wbc noted today am  -d/c  NG after extubation, and swallow eval-- -- Glucose and electrolyte protocol    CVS:Echo reviewed EF 65% RV is ok not dilated still tachycardic ?  Due to pain or undergoing sepsis, Dc albutero and start atrovent  RS:ARDs vent protocol, Taper FIO2 as tolerated   -- Aspiration precaution  -- Add bronchodilators-- change to atrovent due to tachycardia  -- HOB elevated  ID:Lactate still elevated not sure due to lots of LR, or Bicarb infusion, WIll hold both iv fluids and monitor, On vanco start date 6/3, Zosyn 6/3, flagyl 6/3 pending cdiff all other culture negative so far  ENDO:SSI monitor blood sugars  GI:Start feeds On PPI  RENAL: Nephro consult , iv fluids monitor  CNS:Mentation is ok for now on and off drowsy due to sedation  HEMATOLOGY: Monitor hb get PT and INR on heparin drip   MUSCULOSKELETAL:no acute issued  PAIN AND SEDATION: Fentanyl drip and prn versed if needed will add precedex or propofol  · Skin/Wound: Surgical wound looks clean   · Electrolytes: Replace electrolytes per ICU electrolyte replacement protocol. · IVF: D5 1/2 ns   · Nutrition: Start feeds for now  · Prophylaxis: DVT Prophylaxis with scd,. GI Prophylaxis. · Restraints: none  · PT/OT eval and treat. OOB when appropriate. · Lines/Tubes: Subclavian line 6/4, Fry 6/4, Vent 6/5  ADVANCE DIRECTIVE:Full code  Quality Care: PPI, DVT prophylaxis, HOB elevated, Infection control all reviewed and addressed. Events and notes from last 24 hours reviewed.  Care plan discussed in IDR rounds

## 2018-06-08 ENCOUNTER — APPOINTMENT (OUTPATIENT)
Dept: GENERAL RADIOLOGY | Age: 68
DRG: 853 | End: 2018-06-08
Attending: INTERNAL MEDICINE
Payer: MEDICARE

## 2018-06-08 LAB
ABO + RH BLD: NORMAL
ALBUMIN SERPL-MCNC: 2.1 G/DL (ref 3.4–5)
ALBUMIN/GLOB SERPL: 0.6 {RATIO} (ref 0.8–1.7)
ALP SERPL-CCNC: 81 U/L (ref 45–117)
ALT SERPL-CCNC: 21 U/L (ref 13–56)
ANION GAP SERPL CALC-SCNC: 13 MMOL/L (ref 3–18)
APTT PPP: 65.5 SEC (ref 23–36.4)
ARTERIAL PATENCY WRIST A: YES
ARTERIAL PATENCY WRIST A: YES
AST SERPL-CCNC: 24 U/L (ref 15–37)
BACTERIA SPEC CULT: ABNORMAL
BACTERIA SPEC CULT: ABNORMAL
BASE DEFICIT BLD-SCNC: 3 MMOL/L
BASE DEFICIT BLD-SCNC: 4 MMOL/L
BDY SITE: ABNORMAL
BDY SITE: ABNORMAL
BILIRUB SERPL-MCNC: 0.5 MG/DL (ref 0.2–1)
BLD PROD TYP BPU: NORMAL
BLD PROD TYP BPU: NORMAL
BLOOD GROUP ANTIBODIES SERPL: NORMAL
BODY TEMPERATURE: 97.5
BODY TEMPERATURE: 97.8
BPU ID: NORMAL
BPU ID: NORMAL
BUN SERPL-MCNC: 23 MG/DL (ref 7–18)
BUN/CREAT SERPL: 18 (ref 12–20)
CA-I SERPL-SCNC: 1.06 MMOL/L (ref 1.12–1.32)
CA-I SERPL-SCNC: 1.1 MMOL/L (ref 1.12–1.32)
CALCIUM SERPL-MCNC: 7.7 MG/DL (ref 8.5–10.1)
CHLORIDE SERPL-SCNC: 103 MMOL/L (ref 100–108)
CO2 SERPL-SCNC: 23 MMOL/L (ref 21–32)
CREAT SERPL-MCNC: 1.26 MG/DL (ref 0.6–1.3)
CROSSMATCH RESULT,%XM: NORMAL
CROSSMATCH RESULT,%XM: NORMAL
ERYTHROCYTE [DISTWIDTH] IN BLOOD BY AUTOMATED COUNT: 18.8 % (ref 11.6–14.5)
GAS FLOW.O2 O2 DELIVERY SYS: ABNORMAL L/MIN
GAS FLOW.O2 O2 DELIVERY SYS: ABNORMAL L/MIN
GAS FLOW.O2 SETTING OXYMISER: 50 L/M
GAS FLOW.O2 SETTING OXYMISER: 6 L/M
GLOBULIN SER CALC-MCNC: 3.6 G/DL (ref 2–4)
GLUCOSE BLD STRIP.AUTO-MCNC: 144 MG/DL (ref 70–110)
GLUCOSE BLD STRIP.AUTO-MCNC: 167 MG/DL (ref 70–110)
GLUCOSE BLD STRIP.AUTO-MCNC: 171 MG/DL (ref 70–110)
GLUCOSE SERPL-MCNC: 158 MG/DL (ref 74–99)
GRAM STN SPEC: ABNORMAL
GRAM STN SPEC: ABNORMAL
HCO3 BLD-SCNC: 18.7 MMOL/L (ref 22–26)
HCO3 BLD-SCNC: 20.8 MMOL/L (ref 22–26)
HCT VFR BLD AUTO: 29 % (ref 35–45)
HGB BLD-MCNC: 9.2 G/DL (ref 12–16)
MAGNESIUM SERPL-MCNC: 1.8 MG/DL (ref 1.6–2.6)
MAGNESIUM SERPL-MCNC: 1.9 MG/DL (ref 1.6–2.6)
MCH RBC QN AUTO: 21.9 PG (ref 24–34)
MCHC RBC AUTO-ENTMCNC: 31.7 G/DL (ref 31–37)
MCV RBC AUTO: 68.9 FL (ref 74–97)
O2/TOTAL GAS SETTING VFR VENT: 50 %
PCO2 BLD: 23.4 MMHG (ref 35–45)
PCO2 BLD: 28 MMHG (ref 35–45)
PH BLD: 7.48 [PH] (ref 7.35–7.45)
PH BLD: 7.51 [PH] (ref 7.35–7.45)
PHOSPHATE SERPL-MCNC: 2.2 MG/DL (ref 2.5–4.9)
PHOSPHATE SERPL-MCNC: 2.4 MG/DL (ref 2.5–4.9)
PLATELET # BLD AUTO: 258 K/UL (ref 135–420)
PMV BLD AUTO: 10.5 FL (ref 9.2–11.8)
PO2 BLD: 58 MMHG (ref 80–100)
PO2 BLD: 60 MMHG (ref 80–100)
POTASSIUM SERPL-SCNC: 3.4 MMOL/L (ref 3.5–5.5)
POTASSIUM SERPL-SCNC: 3.8 MMOL/L (ref 3.5–5.5)
PROT SERPL-MCNC: 5.7 G/DL (ref 6.4–8.2)
RBC # BLD AUTO: 4.21 M/UL (ref 4.2–5.3)
SAO2 % BLD: 93 % (ref 92–97)
SAO2 % BLD: 93 % (ref 92–97)
SERVICE CMNT-IMP: ABNORMAL
SODIUM SERPL-SCNC: 139 MMOL/L (ref 136–145)
SPECIMEN EXP DATE BLD: NORMAL
SPECIMEN TYPE: ABNORMAL
SPECIMEN TYPE: ABNORMAL
STATUS OF UNIT,%ST: NORMAL
STATUS OF UNIT,%ST: NORMAL
TOTAL RESP. RATE, ITRR: 25
TOTAL RESP. RATE, ITRR: 36
UNIT DIVISION, %UDIV: 0
UNIT DIVISION, %UDIV: 0
WBC # BLD AUTO: 16.3 K/UL (ref 4.6–13.2)

## 2018-06-08 PROCEDURE — 71045 X-RAY EXAM CHEST 1 VIEW: CPT

## 2018-06-08 PROCEDURE — 74011250636 HC RX REV CODE- 250/636: Performed by: INTERNAL MEDICINE

## 2018-06-08 PROCEDURE — 36592 COLLECT BLOOD FROM PICC: CPT

## 2018-06-08 PROCEDURE — 74011250636 HC RX REV CODE- 250/636: Performed by: EMERGENCY MEDICINE

## 2018-06-08 PROCEDURE — 74011636637 HC RX REV CODE- 636/637: Performed by: INTERNAL MEDICINE

## 2018-06-08 PROCEDURE — 92610 EVALUATE SWALLOWING FUNCTION: CPT

## 2018-06-08 PROCEDURE — 36600 WITHDRAWAL OF ARTERIAL BLOOD: CPT

## 2018-06-08 PROCEDURE — 84100 ASSAY OF PHOSPHORUS: CPT | Performed by: OBSTETRICS & GYNECOLOGY

## 2018-06-08 PROCEDURE — 97116 GAIT TRAINING THERAPY: CPT

## 2018-06-08 PROCEDURE — 74011000250 HC RX REV CODE- 250: Performed by: INTERNAL MEDICINE

## 2018-06-08 PROCEDURE — 84100 ASSAY OF PHOSPHORUS: CPT | Performed by: INTERNAL MEDICINE

## 2018-06-08 PROCEDURE — 74011000258 HC RX REV CODE- 258: Performed by: OBSTETRICS & GYNECOLOGY

## 2018-06-08 PROCEDURE — 97165 OT EVAL LOW COMPLEX 30 MIN: CPT

## 2018-06-08 PROCEDURE — C9113 INJ PANTOPRAZOLE SODIUM, VIA: HCPCS | Performed by: INTERNAL MEDICINE

## 2018-06-08 PROCEDURE — 83735 ASSAY OF MAGNESIUM: CPT | Performed by: INTERNAL MEDICINE

## 2018-06-08 PROCEDURE — 94660 CPAP INITIATION&MGMT: CPT

## 2018-06-08 PROCEDURE — 74011250636 HC RX REV CODE- 250/636: Performed by: OBSTETRICS & GYNECOLOGY

## 2018-06-08 PROCEDURE — 82330 ASSAY OF CALCIUM: CPT | Performed by: INTERNAL MEDICINE

## 2018-06-08 PROCEDURE — 97535 SELF CARE MNGMENT TRAINING: CPT

## 2018-06-08 PROCEDURE — 84132 ASSAY OF SERUM POTASSIUM: CPT | Performed by: OBSTETRICS & GYNECOLOGY

## 2018-06-08 PROCEDURE — 82962 GLUCOSE BLOOD TEST: CPT

## 2018-06-08 PROCEDURE — 83735 ASSAY OF MAGNESIUM: CPT | Performed by: OBSTETRICS & GYNECOLOGY

## 2018-06-08 PROCEDURE — 74011250636 HC RX REV CODE- 250/636

## 2018-06-08 PROCEDURE — 74011000258 HC RX REV CODE- 258: Performed by: EMERGENCY MEDICINE

## 2018-06-08 PROCEDURE — 74011250637 HC RX REV CODE- 250/637: Performed by: INTERNAL MEDICINE

## 2018-06-08 PROCEDURE — 74011250636 HC RX REV CODE- 250/636: Performed by: SURGERY

## 2018-06-08 PROCEDURE — 97530 THERAPEUTIC ACTIVITIES: CPT

## 2018-06-08 PROCEDURE — 74011000258 HC RX REV CODE- 258: Performed by: INTERNAL MEDICINE

## 2018-06-08 PROCEDURE — 77030035694 HC MSK BIPAP FLL FAC PERFMAX PHIL -B

## 2018-06-08 PROCEDURE — 85730 THROMBOPLASTIN TIME PARTIAL: CPT | Performed by: OBSTETRICS & GYNECOLOGY

## 2018-06-08 PROCEDURE — 65270000029 HC RM PRIVATE

## 2018-06-08 PROCEDURE — 82803 BLOOD GASES ANY COMBINATION: CPT

## 2018-06-08 PROCEDURE — 94640 AIRWAY INHALATION TREATMENT: CPT

## 2018-06-08 PROCEDURE — 85027 COMPLETE CBC AUTOMATED: CPT | Performed by: INTERNAL MEDICINE

## 2018-06-08 PROCEDURE — 97162 PT EVAL MOD COMPLEX 30 MIN: CPT

## 2018-06-08 PROCEDURE — 77010033678 HC OXYGEN DAILY

## 2018-06-08 RX ORDER — AMLODIPINE BESYLATE 5 MG/1
5 TABLET ORAL DAILY
Status: DISCONTINUED | OUTPATIENT
Start: 2018-06-08 | End: 2018-06-09

## 2018-06-08 RX ORDER — ONDANSETRON 2 MG/ML
INJECTION INTRAMUSCULAR; INTRAVENOUS
Status: COMPLETED
Start: 2018-06-08 | End: 2018-06-08

## 2018-06-08 RX ORDER — FUROSEMIDE 10 MG/ML
60 INJECTION INTRAMUSCULAR; INTRAVENOUS
Status: COMPLETED | OUTPATIENT
Start: 2018-06-08 | End: 2018-06-08

## 2018-06-08 RX ORDER — LEVOFLOXACIN 5 MG/ML
500 INJECTION, SOLUTION INTRAVENOUS EVERY 24 HOURS
Status: DISCONTINUED | OUTPATIENT
Start: 2018-06-08 | End: 2018-06-10

## 2018-06-08 RX ORDER — MAGNESIUM SULFATE 1 G/100ML
1 INJECTION INTRAVENOUS ONCE
Status: COMPLETED | OUTPATIENT
Start: 2018-06-08 | End: 2018-06-10

## 2018-06-08 RX ORDER — FUROSEMIDE 10 MG/ML
20 INJECTION INTRAMUSCULAR; INTRAVENOUS ONCE
Status: COMPLETED | OUTPATIENT
Start: 2018-06-08 | End: 2018-06-08

## 2018-06-08 RX ORDER — POTASSIUM CHLORIDE 7.45 MG/ML
10 INJECTION INTRAVENOUS ONCE
Status: COMPLETED | OUTPATIENT
Start: 2018-06-08 | End: 2018-06-10

## 2018-06-08 RX ORDER — POTASSIUM CHLORIDE 7.45 MG/ML
10 INJECTION INTRAVENOUS
Status: COMPLETED | OUTPATIENT
Start: 2018-06-08 | End: 2018-06-08

## 2018-06-08 RX ORDER — MORPHINE SULFATE 2 MG/ML
2 INJECTION, SOLUTION INTRAMUSCULAR; INTRAVENOUS ONCE
Status: COMPLETED | OUTPATIENT
Start: 2018-06-08 | End: 2018-06-08

## 2018-06-08 RX ORDER — METOPROLOL TARTRATE 5 MG/5ML
5 INJECTION INTRAVENOUS ONCE
Status: COMPLETED | OUTPATIENT
Start: 2018-06-08 | End: 2018-06-08

## 2018-06-08 RX ORDER — HEPARIN SODIUM 1000 [USP'U]/ML
40 INJECTION, SOLUTION INTRAVENOUS; SUBCUTANEOUS ONCE
Status: COMPLETED | OUTPATIENT
Start: 2018-06-08 | End: 2018-06-08

## 2018-06-08 RX ORDER — SODIUM,POTASSIUM PHOSPHATES 280-250MG
2 POWDER IN PACKET (EA) ORAL
Status: COMPLETED | OUTPATIENT
Start: 2018-06-08 | End: 2018-06-08

## 2018-06-08 RX ORDER — NALOXONE HYDROCHLORIDE 0.4 MG/ML
0.4 INJECTION, SOLUTION INTRAMUSCULAR; INTRAVENOUS; SUBCUTANEOUS
Status: DISCONTINUED | OUTPATIENT
Start: 2018-06-08 | End: 2018-07-11 | Stop reason: HOSPADM

## 2018-06-08 RX ORDER — ENOXAPARIN SODIUM 100 MG/ML
80 INJECTION SUBCUTANEOUS EVERY 12 HOURS
Status: DISCONTINUED | OUTPATIENT
Start: 2018-06-08 | End: 2018-06-08

## 2018-06-08 RX ORDER — HEPARIN SODIUM 10000 [USP'U]/100ML
18-36 INJECTION, SOLUTION INTRAVENOUS
Status: DISCONTINUED | OUTPATIENT
Start: 2018-06-09 | End: 2018-06-22

## 2018-06-08 RX ORDER — MORPHINE SULFATE 2 MG/ML
1 INJECTION, SOLUTION INTRAMUSCULAR; INTRAVENOUS ONCE
Status: COMPLETED | OUTPATIENT
Start: 2018-06-08 | End: 2018-06-08

## 2018-06-08 RX ORDER — POTASSIUM CHLORIDE 7.45 MG/ML
INJECTION INTRAVENOUS
Status: COMPLETED
Start: 2018-06-08 | End: 2018-06-08

## 2018-06-08 RX ORDER — ONDANSETRON 2 MG/ML
4 INJECTION INTRAMUSCULAR; INTRAVENOUS
Status: DISCONTINUED | OUTPATIENT
Start: 2018-06-08 | End: 2018-07-11 | Stop reason: HOSPADM

## 2018-06-08 RX ADMIN — FENTANYL CITRATE 25 MCG: 50 INJECTION, SOLUTION INTRAMUSCULAR; INTRAVENOUS at 15:19

## 2018-06-08 RX ADMIN — INSULIN LISPRO 2 UNITS: 100 INJECTION, SOLUTION INTRAVENOUS; SUBCUTANEOUS at 12:00

## 2018-06-08 RX ADMIN — IPRATROPIUM BROMIDE 0.5 MG: 0.5 SOLUTION RESPIRATORY (INHALATION) at 15:18

## 2018-06-08 RX ADMIN — IPRATROPIUM BROMIDE 0.5 MG: 0.5 SOLUTION RESPIRATORY (INHALATION) at 19:34

## 2018-06-08 RX ADMIN — PIPERACILLIN SODIUM,TAZOBACTAM SODIUM 2.25 G: 2; .25 INJECTION, POWDER, FOR SOLUTION INTRAVENOUS at 15:16

## 2018-06-08 RX ADMIN — FUROSEMIDE 20 MG: 10 INJECTION, SOLUTION INTRAMUSCULAR; INTRAVENOUS at 13:36

## 2018-06-08 RX ADMIN — HEPARIN SODIUM 3560 UNITS: 1000 INJECTION, SOLUTION INTRAVENOUS; SUBCUTANEOUS at 06:05

## 2018-06-08 RX ADMIN — FUROSEMIDE 60 MG: 10 INJECTION, SOLUTION INTRAMUSCULAR; INTRAVENOUS at 18:14

## 2018-06-08 RX ADMIN — MORPHINE SULFATE 1 MG: 2 INJECTION, SOLUTION INTRAMUSCULAR; INTRAVENOUS at 20:28

## 2018-06-08 RX ADMIN — POTASSIUM CHLORIDE 10 MEQ: 10 INJECTION, SOLUTION INTRAVENOUS at 06:13

## 2018-06-08 RX ADMIN — POTASSIUM & SODIUM PHOSPHATES POWDER PACK 280-160-250 MG 2 PACKET: 280-160-250 PACK at 06:36

## 2018-06-08 RX ADMIN — HYDROCODONE BITARTRATE AND ACETAMINOPHEN 2 TABLET: 10; 325 TABLET ORAL at 09:21

## 2018-06-08 RX ADMIN — FENTANYL CITRATE 25 MCG: 50 INJECTION, SOLUTION INTRAMUSCULAR; INTRAVENOUS at 03:49

## 2018-06-08 RX ADMIN — POTASSIUM CHLORIDE 10 MEQ: 10 INJECTION, SOLUTION INTRAVENOUS at 09:30

## 2018-06-08 RX ADMIN — ONDANSETRON 4 MG: 2 INJECTION INTRAMUSCULAR; INTRAVENOUS at 21:52

## 2018-06-08 RX ADMIN — PIPERACILLIN SODIUM,TAZOBACTAM SODIUM 2.25 G: 2; .25 INJECTION, POWDER, FOR SOLUTION INTRAVENOUS at 02:33

## 2018-06-08 RX ADMIN — LEVOFLOXACIN 500 MG: 5 INJECTION, SOLUTION INTRAVENOUS at 20:28

## 2018-06-08 RX ADMIN — ENOXAPARIN SODIUM 80 MG: 80 INJECTION SUBCUTANEOUS at 16:00

## 2018-06-08 RX ADMIN — IPRATROPIUM BROMIDE 0.5 MG: 0.5 SOLUTION RESPIRATORY (INHALATION) at 07:37

## 2018-06-08 RX ADMIN — CALCIUM GLUCONATE 2 G: 94 INJECTION, SOLUTION INTRAVENOUS at 09:21

## 2018-06-08 RX ADMIN — DEXTROSE MONOHYDRATE, SODIUM CHLORIDE, AND POTASSIUM CHLORIDE 50 ML/HR: 50; 4.5; .745 INJECTION, SOLUTION INTRAVENOUS at 19:44

## 2018-06-08 RX ADMIN — HYDROCODONE BITARTRATE AND ACETAMINOPHEN 2 TABLET: 10; 325 TABLET ORAL at 02:30

## 2018-06-08 RX ADMIN — PIPERACILLIN SODIUM,TAZOBACTAM SODIUM 2.25 G: 2; .25 INJECTION, POWDER, FOR SOLUTION INTRAVENOUS at 20:23

## 2018-06-08 RX ADMIN — FENTANYL CITRATE 25 MCG: 50 INJECTION, SOLUTION INTRAMUSCULAR; INTRAVENOUS at 09:20

## 2018-06-08 RX ADMIN — PIPERACILLIN SODIUM,TAZOBACTAM SODIUM 2.25 G: 2; .25 INJECTION, POWDER, FOR SOLUTION INTRAVENOUS at 09:22

## 2018-06-08 RX ADMIN — IPRATROPIUM BROMIDE 0.5 MG: 0.5 SOLUTION RESPIRATORY (INHALATION) at 01:05

## 2018-06-08 RX ADMIN — CALCIUM GLUCONATE 2 G: 94 INJECTION, SOLUTION INTRAVENOUS at 18:28

## 2018-06-08 RX ADMIN — FENTANYL CITRATE 25 MCG: 50 INJECTION, SOLUTION INTRAMUSCULAR; INTRAVENOUS at 18:26

## 2018-06-08 RX ADMIN — SODIUM CHLORIDE 40 MG: 9 INJECTION INTRAMUSCULAR; INTRAVENOUS; SUBCUTANEOUS at 09:21

## 2018-06-08 RX ADMIN — METOPROLOL TARTRATE 5 MG: 5 INJECTION, SOLUTION INTRAVENOUS at 21:35

## 2018-06-08 RX ADMIN — POTASSIUM CHLORIDE 10 MEQ: 10 INJECTION, SOLUTION INTRAVENOUS at 18:12

## 2018-06-08 RX ADMIN — FENTANYL CITRATE 25 MCG: 50 INJECTION, SOLUTION INTRAMUSCULAR; INTRAVENOUS at 21:35

## 2018-06-08 RX ADMIN — MAGNESIUM SULFATE HEPTAHYDRATE 1 G: 1 INJECTION, SOLUTION INTRAVENOUS at 19:40

## 2018-06-08 RX ADMIN — MORPHINE SULFATE 2 MG: 2 INJECTION, SOLUTION INTRAMUSCULAR; INTRAVENOUS at 05:52

## 2018-06-08 RX ADMIN — HYDROCODONE BITARTRATE AND ACETAMINOPHEN 2 TABLET: 10; 325 TABLET ORAL at 16:43

## 2018-06-08 RX ADMIN — DEXTROSE MONOHYDRATE, SODIUM CHLORIDE, AND POTASSIUM CHLORIDE 50 ML/HR: 50; 4.5; .745 INJECTION, SOLUTION INTRAVENOUS at 01:03

## 2018-06-08 RX ADMIN — AMLODIPINE BESYLATE 5 MG: 5 TABLET ORAL at 13:35

## 2018-06-08 RX ADMIN — POTASSIUM CHLORIDE 10 MEQ: 10 INJECTION, SOLUTION INTRAVENOUS at 07:29

## 2018-06-08 NOTE — PROGRESS NOTES
Nephrology Progress note    Subjective:     Lily Pope is a 75 y.o. female with PMH DM, HTN, clear cell ovarian ca s/p debulking who presented with abdominal pain and possible sepsis/ischemic colitis. Pt underwent laparotomy/peritoneal lavage/bx, noted to have decreased UOP and increasing Cr to 2.1 today from baseline 0.6. CT neg for mass or hydro. Pt given lasix yesterday, still with high O2 requirements on vent. Unable to provide further history. - Pt exttubated. Some SOB, no CP. Excellent UOP with Lasix. Cr down to 1.2        Admit Date: 6/4/2018  Allergy:  Allergies   Allergen Reactions    Hydrocodone Other (comments)     Breaks into cold sweat    Metformin Other (comments)     Breaks out into cold sweat.  Can Take Glucophage brand name med        Objective:     Visit Vitals    /60    Pulse (!) 112    Temp 97.4 °F (36.3 °C)    Resp (!) 32    Ht 5' 1\" (1.549 m)    Wt 89.1 kg (196 lb 6.9 oz)    SpO2 93%    BMI 37.12 kg/m2         Intake/Output Summary (Last 24 hours) at 06/08/18 1336  Last data filed at 06/07/18 2341   Gross per 24 hour   Intake              100 ml   Output             1550 ml   Net            -1450 ml       Physical Exam:       General: sleepy   HENT: Atraumatic and normocephalic   Eyes: Normal conjunctiva   Neck: Supple    Cardiovascular: tachy S1 & S2, no m/r/g   Pulmonary/Chest Wall: Clear to auscultation bilaterally   Abdominal: Soft, obese   Musculoskeletal: trace edema   Neurological: Sleepy, oriented       Data Review:      Lab Results   Component Value Date/Time    WBC 13.4 (H) 06/07/2018 04:40 AM    RBC 3.66 (L) 06/07/2018 04:40 AM    HCT 25.2 (L) 06/07/2018 04:40 AM    MCV 68.9 (L) 06/07/2018 04:40 AM    MCH 21.9 (L) 06/07/2018 04:40 AM    MCHC 31.7 06/07/2018 04:40 AM    RDW 18.9 (H) 06/07/2018 04:40 AM      Lab Results   Component Value Date    IRON 8 (L) 06/07/2018   No components found for: FERRITIN  No components found for: PTHINT  Urinalysis  No results found for: UGLU         Impression:     -JESS- likely ATN, nonoliguric. Improving w/ Cr down to 1.2 today, from peak of 2.9.  Excellent UOP, s/p lasix  -Ovarian ca s/p debulking then laparoscopy w/lavage  -KLEBSIELLA OXYTOCA sepsis (PD fluid)  -Respiratory failure, now extubated   -DM  -HTN  -Anemia  -Met acidosis, improving  -Hypokalemia  -Hypophosphatemia     Plan:   IVF can be d/maida   Replace K and Phos per protocol   Monitor I/O  Continue Abx  No need for RRT        MD Denver Shanks  [phone]

## 2018-06-08 NOTE — PROGRESS NOTES
1926- Report and care received, SpO2 down 90-91%, increased RR, tachycardic in the 140's, increased respiratory effort noted. Attempted to put BiPAP back on, however, refuses. Confused to place and time. States she's at the \"[employer]". Brother at bedside and states that they want everything done to include intubation, chest compressions, and cardioversion. RT called in order to attempt hi flow nasal cannula in light of BiPAP refusal.    1940- Assessment completed per flow sheet.  at bedside, CXR completed. Alert, confused. Denies pain except with movement or coughing. MAEE, PERRL. Tolerating hi flow nasal cannula, lungs clear, respirations less labored. IVF infusing via IV pumps without difficulty. 1959- ABG results noted, FiO2 increased from 50% to 60%. 2009-  updated regarding status to include refusal of BiPAP, usage of hi flow, ABG results, respiratory status, CXR results, HR, 's suggestion that Lovenox be discontinued and heparin drip restarted. Also notified of 's inquiry about starting another antibiotic. Orders received to monitor respiratory status on hi flow and to place BiPAP if allowed, for morphine, Levaquin, and a heparin drip without a bolus to start at 0600 tomorrow. MD states probable next step will be intubation. 0000- Reassessment completed and without change, frequently takes oxygen off requiring RN to replace, SpO2 drops into the 80's when removed. Deep breathing encouraged. 2318- Increased work of breathing noted, respirations labored, SpO2= 90%, RT called in order to attempt BiPAP. States \"Help me, I cant breath. \".    7272- Refusing BiPAP, pulling hi flow oxygen off, remains confused, respirations labored, SpO2 in the 80's. RR 47/48.  paged. 56-  updated regarding status, orders received to intubate. 0119- Anesthesia in to intubate. Patient updated.      0122- #7.0 ETT placed, @20 cm at the lip, CO2 detector with + color change, lung sounds auscultated and appropriate. Vomited during intubation, suctioned and received small amount of light, thick brown secretions. Fentanyl drip started per orders. Calm, sedated. 0128- OGT placed without difficulty, placement verified with auscultation, green drainage noted, placed to low continuous suction and received 1100 ml quickly. 0133- Restless, coughing, soft bilateral wrist restraints placed to protect integrity of lines/tubes, flow sheet in progress. Versed administered per orders. 0140- Resting quietly with eyes closed, NAD, RASS= -2.    0216- Restless, tachypneic, SpO2 91-93% on 70% fi02.  updated regarding respiratory and sedation status. Also notified of episode of emesis during intubation and OGT output. Orders received to increase FiO2 to 80% and for a versed drip.     0226-  (on call for Radha Mahi) updated regarding intubation, OGT placement, and > 1 liter OGT output. No orders received. 0230- Brother Derrick Blanco updated regarding status. 0241- Agitated, restless, versed drip started per orders. See MAR.    0303- Remains agitated, versed drip increased and PRN dose administered per orders. 0330- Resting, sedated, calm. 0400- Reassessment completed. Responds to repeated verbal stimuli, does not follow commands, becomes agitated with stimulation. ETT secured, lungs coarse, breathing pattern remains somewhat labored SpO2 90-93. [place] called and in to see, MD updated. Orders received for metoprolol and tylenol. 0435- SpO2 84%, Fi02 up to 100%, RT called for blood gas.  updated. 0459- ABG results noted,  updated regading status. Orders received to request RT suction patient, however, she was just suctioned at 0435 without any secretions to assess. Orders also received to increase peep to 10 and to \"fully sedate her\". 2777- Increased work of breathing, RASS= +1, versed increased and PRN fentanyl administered per orders.      0156- NAD, resting quietly, RASS= -1.    0715- Hypotensive, rechecked on other arm, versed decreased,  updated. Orders received for 1 liter normal saline bolus, a levophed drip if NIBP remains unresponsive to fluid, CXR, ABG in 1 hour, solumedrol, and 1 amp of sodium bicarbonate. States he is en route. 56- Report given to day RN.

## 2018-06-08 NOTE — PROGRESS NOTES
Occupational Therapy    Patient refused/declined OT evaluation due to:  []  Nausea/vomiting  []  Eating  [x]  Pain  []  Pt lethargic  []  Off Unit  Pt stating \"I need my pain medicine now\". Pt did not identify location of pain when asked stating \"I don't know. \" Nurse aware. Will f/u later as schedule allows. Thank you.   Isaac Brown, OTR/L

## 2018-06-08 NOTE — PROGRESS NOTES
Orders received. Chart reviewed. PT assessment not completed at this time as pt reports she is in a lot of pain and needs pain meds. Spoke with nurse about pt's pain and needing of pain meds. Will f/u with pt once pt's schedule allows.

## 2018-06-08 NOTE — PROGRESS NOTES
Problem: Dysphagia (Adult)  Goal: *Acute Goals and Plan of Care (Insert Text)  Recommendations:  Diet: Full thin liquid   Meds: Per patient preference  Aspiration Precautions  Oral Care TID    Goals:  Patient will:  1. Tolerate PO trials with 0 s/s overt distress in 4/5 trials  2. Utilize compensatory swallow strategies/maneuvers (decrease bite/sip, size/rate, alt. liq/sol) with min cues in 4/5 trials        Outcome: Progressing Towards Goal  Speech LAnguage Pathology bedside swallow evaluation    Patient: Razia Strauss (34 y.o. female)  Date: 6/8/2018  Primary Diagnosis: Abdominal pain  Abdominal pain  Abdominal Pain  Procedure(s) (LRB):  DIAGNOSTIC LAPAROSCOPY CONVERTED TO LAPAROTOMY AT 0225, PERITONEAL BIOPSIES, AEROBIC AND ANAEROBIC CULTURES OF PERITONEAL FLUID, PERITONEAL LAVAGE (N/A)  LAPAROTOMY EXPLORATORY (N/A) 3 Days Post-Op   Precautions: Aspiration       PLOF: Independent    ASSESSMENT :  Based on the objective data described below, the patient presents with intact swallow function with thin liquids post extubation on 6/7. Pt on thin liquid diet per surgical recommendation. Evaluation completed with HOB 40 degrees 2/2 reported abdominal pain. Pt A&Ox4. Oral-motor exam revealed structures grossly intact for mastication and deglutition. Basic cognitive-linguistic functioning appears intact. Pt accepted thin liquid + straw with functional hyolaryngeal elevation upon palpation and 0 overt s/s of aspiration. Pt refused puree and solid PO trials. ST safe to continue current thin liquid diet with aspiration precautions. Advance diet as tolerated with surgical recommendation. ST will follow as indicated. D/w RNs, Solo Magana and Sofiya Ruth. Patient will benefit from skilled intervention to address the above impairments.   Patients rehabilitation potential is considered to be Good  Factors which may influence rehabilitation potential include:   [x]            None noted  []            Mental ability/status  [] Medical condition  []            Home/family situation and support systems  []            Safety awareness  []            Pain tolerance/management  []            Other:      PLAN :  Recommendations and Planned Interventions:  Full thin liquid diet   Frequency/Duration: Patient will be followed by speech-language pathology 1-2 times per day/4-7 days per week to address goals. Discharge Recommendations: To Be Determined     SUBJECTIVE:   Patient stated I need to call my brother. OBJECTIVE:     Past Medical History:   Diagnosis Date    Diabetes ([facility] [address]     many years type 2    Hypertension     many years     Past Surgical History:   Procedure Laterality Date    HX OOPHORECTOMY  05/29/2018    HX TONSILLECTOMY      as a child      Prior Level of Function/Home Situation: Independent     Diet prior to admission: Regular/thin  Current Diet:  Full thin liquid    Cognitive and Communication Status:  Neurologic State: Drowsy  Orientation Level: Oriented X4  Cognition: Follows commands  Perception: Appears intact  Perseveration: No perseveration noted  Safety/Judgement: Awareness of environment  Oral Assessment:  Oral Assessment  Labial: No impairment  Dentition: Natural;Intact  Oral Hygiene: Good  Lingual: Decreased strength;Decreased rate  Velum: Unable to visualize  Mandible: No impairment  P.O. Trials:  Patient Position: HOB 40  Vocal quality prior to P.O.: No impairment  Consistency Presented:  Thin liquid  How Presented: SLP-fed/presented;Straw;Successive swallows     Bolus Acceptance: No impairment  Bolus Formation/Control: No impairment     Propulsion: No impairment  Oral Residue: None  Initiation of Swallow: No impairment  Laryngeal Elevation: Functional  Aspiration Signs/Symptoms: None  Pharyngeal Phase Characteristics: No impairment, issues, or problems   Effective Modifications: Small sips and bites  Cues for Modifications: Minimal       Oral Phase Severity: No impairment  Pharyngeal Phase Severity : No impairment    GCODESwallowing:  Swallow Current Status CJ= 20-39%   Swallow Goal Status CH= 0%    The severity rating is based on the following outcomes:  LEOPOLDO Noms Swallow Level 5    Clinical Judgement    PAIN:  Start of Eval: 10  End of Eval: 10     After treatment:   []            Patient left in no apparent distress sitting up in chair  [x]            Patient left in no apparent distress in bed  [x]            Call bell left within reach  [x]            Nursing notified  []            Family present  [x]            Caregiver present  []            Bed alarm activated    COMMUNICATION/EDUCATION:   [x]            Safe swallowing guidelines; compensatory techniques. [x]            Patient/family have participated as able in goal setting and plan of care. [x]            Patient/family agree to work toward stated goals and plan of care. []            Patient understands intent and goals of therapy; neutral about participation. []            Patient unable to participate in goal setting/plan of care; educ ongoing with interdisciplinary staff  []         Posted safety precautions in patient's room.     Thank you for this referral.  KENNETH Groves  Time Calculation: 20 mins

## 2018-06-08 NOTE — PROGRESS NOTES
0730  Report received from Jackson Urias RN, (offgoing nurse) at bedside using [employer]. All questions answered and clinicals reviewed. 1000 Family at bedside. Pt continues to require pain medication, both fentanyl and norco x 2. Pt states stabbing pain in right flank, lower right abdomen. .    1100 Pt diuresing with Lasix and urine color more clear yellow. Pt continues on 6L NC. Pt up with PT and OT to bedside commode and has moderate loose brown diarrhea. Pt tolerated up to commode however was somewhat dyspneic on exertion. 1800  Pt continues to complain of pain despite fentanyl and norco prn, however sleeps very soundly and then wakes up to complain and then falls back to sleep. RR 30-50,  O2 sat dropping over past 1 hour to 89-91%,  HR has climbed over the past hour to 140s in sinus tachycardia, no extopy. Pt diaphoretic. CVP 9-10 and afebrile. Pt states she feels short of breath. 1805  Orders received by telephone from Dr. Magalys Hartmann to administer 60 mg IV Lasix now x 1 dose. Bipap to be initiated and ABG 1 hour after bipap initiated. RT contacted and orders communicated to RT J Carlos.    1900 Pt not tolerating bipap, medicated with fentanyl again for 10/10 pain. Dr. Zahira Deluna in to see patient. HR 140s sinus tach. Pt placed back on 6L NC and refusing bipap. 1930  Report given to Farrah Rivera RN, at bedside using SBAR format. Pt continues to refuse bipap mask. RT in to place patient on high flow O2.     1945  Pts brother in room, pt becoming more confused, now oriented only to person. Pts brother confirmed Full Code status. Dr. Zahira Deluna states she plans to order CT scan of abd/pelvis for AM.  Electrolyte replacement protocol with replacements as ordered. Dr. Roni Martinez in to see patient. ABG pending.

## 2018-06-08 NOTE — PROGRESS NOTES
Patient was doing well in am  Received a call late evening around 6 pm that her sats are dropping and she is becoming tachycardic  No complain no pain sleeping after pain pill per nursing    Plan:  Lasix 60 one dose  BIPAP12/7  CXR  RPT abg if sign of worsening will consider reintubation  If continue to have elevated WBC will consider repeating CT scan  Will monitor closely      Silvia Acevedo M.D   Sofiya Gomes.  [place] [address] [address]  Phone [phone]   Fax [phone]  Pulmonary Critical Care & Sleep Medicine

## 2018-06-08 NOTE — PROGRESS NOTES
[address] [facility] [address] [address]  Phone [phone] Fax [phone]  Pulmonary Critical Care & Sleep Medicine       Name: Opal Gomes MRN: [id number]   : 1950 Hospital: [facility] [place]   Date: 2018          IMPRESSION:   Patient Active Problem List   Diagnosis Code    Ovarian ca ([address] C56.9    Severe obesity (BMI 35.0-39.9) ([address] E66.01    Abdominal pain R10.9    Sepsis ([address] A41.9    Ischemic colitis ([address] K55.9    Oliguria R34    Acute respiratory failure ([address] J96.00    JESS (acute kidney injury) ([address] W92.1    Metabolic acidosis G37.8    Acute deep vein thrombosis (DVT) of lower extremity ([address] I82.409 ·   Severe sepsis with lactic acidosis ? source of sepsis  · ARDS   · Hypotension ? Related to pain meds ? Due to sepsis  · Worsening urine out put -- Possible ARF due to sepsis and hypotension  · Metabolic acidsis with lactate elevation ? Related to sepsis   · Bilateral peroneal DVT <18> Unknown if presented on admission-- Started on heparin drip after Ok by GYN on 18    PLAN:  -- Taper oxygen as tolerated  -- Dc flagyl  -- Continue zosyn can be switched to po cipro or levaquin give total 14 days  -- Decrease iv fluids give one dose lasix  -- K replaced  -- DVT on heparin drip no cbc check in am -- change to lovenox  -- Nephrology consult appreciated  -- Possible Sepsis due to peritonitis improving on zosyn   -- Glucose and electrolyte protocol   -- Dc romero  -- Get peripheral line  -- Monitor in ICU today if continue to do well or pressed for beds can be transferred out of ICU   CVS:Echo reviewed EF 65% RV is ok not dilated still tachycardic ?  Due to pain or undergoing sepsis, Dc albutero and start atrovent-- improvement in tachycardia noted today  RS:Liberated from vent on 18  -- Aspiration precaution  -- Add bronchodilators-- change to atrovent due to tachycardia  -- HOB elevated  ID:Lactate is normalized now On vanco start date 6/3, Zosyn 6/3, flagyl 6/3 pending cdiff all other culture negative so far-- Vanco and flagyl stopped 6/7/18   ENDO:SSI monitor blood sugars   GI:Start feeds On PPI  RENAL: Nephro consult , iv fluids monitor-- mild improvement noted on 6/8 give one dose lasix and monitor  CNS:Mentation is ok for now on and off drowsy due to sedation  HEMATOLOGY: Monitor hb get PT and INR on heparin drip   MUSCULOSKELETAL:no acute issued  · PAIN AND SEDATION: PRN norco and fentanyl  · Skin/Wound: Surgical wound looks clean   · Electrolytes: Replace electrolytes per ICU electrolyte replacement protocol. · IVF: D5 1/2 ns -- decrease  · Nutrition: Hold feeds today for SBT  · Prophylaxis: DVT Prophylaxis with scd,. GI Prophylaxis. · Restraints: none  · PT/OT eval and treat. OOB when appropriate. · Lines/Tubes: Subclavian line 6/4, Fry 6/4, Vent 6/5  ADVANCE DIRECTIVE:Full code  FAMILY DISCUSSION:Spoke with patient/brothoer   Quality Care: PPI, DVT prophylaxis, HOB elevated, Infection control all reviewed and addressed. Events and notes from last 24 hours reviewed. Care plan discussed with nursing CC TIME:44 min excluding procedure time    · Labs and images personally seen and available reports reviewed. · All current medicines are reviewed and doses and prescription adjusted. Subjective/History:  6/8/18  More improvement in parameters  S/P liberation from ventiolation on 6/7/18  Lactate is normal 1.2  Cr improved   Klebsiella on peritoneal fluid  CXR shows possible air space on right lower lobe vs fluid overload     Sai Melgar is a 75 y.o.  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 Was doing ok at home until yesterday when she started feeling a little more discomfort. She presented last night when she felt her pain was significant.      In ER received IV fluid and zosyn   Lactate found to be in 5  Abd ct done showed area concerning for ?  Ischemic bowel  Spoke with Dr Lilliana Elizabeth on phone she is out of town and wants general surgery to be consulted   Patient just received 4 mg IV diludid by OB GYN in ICU post that her blood pressure running on lower side   She is on NRB after diludid  We asked IR to place PICC line but they declined and wanted to be done tomorrow as patient has peripheral line and concern for sepsis  No urine out put     18: S/P laproscopy by Dr. Lilliana Elizabeth, No perforation or ischemic bowel some purulant looking inflamatory fluid removed, Post procedure on vent, DVT -- Peroneal, worsening cr -- renal evaluated  18 PH improved Bicarb stopped all culture negative Started heparin for DVT  18 SBT and extubated      Current Facility-Administered Medications   Medication Dose Route Frequency    enoxaparin (LOVENOX) injection 80 mg  80 mg SubCUTAneous Q12H    amLODIPine (NORVASC) tablet 5 mg  5 mg Oral DAILY    furosemide (LASIX) injection 20 mg  20 mg IntraVENous ONCE    docusate (COLACE) 50 mg/5 mL oral liquid 200 mg  200 mg Oral BID    ipratropium (ATROVENT) 0.02 % nebulizer solution 0.5 mg  0.5 mg Nebulization Q6H RT    dextrose 5% - 0.45% NaCl with KCl 10 mEq/L infusion  50 mL/hr IntraVENous CONTINUOUS    piperacillin-tazobactam (ZOSYN) 2.25 g in 0.9% sodium chloride (MBP/ADV) 50 mL MBP  2.25 g IntraVENous Q6H    insulin lispro (HUMALOG) injection   SubCUTAneous Q6H    pantoprazole (PROTONIX) 40 mg in sodium chloride 0.9% 10 mL injection  40 mg IntraVENous DAILY       Objective:   Vital Signs:    Visit Vitals    BP (!) 152/94    Pulse (!) 112    Temp 97.4 °F (36.3 °C)    Resp (!) 32    Ht 5' 1\" (1.549 m)    Wt 89.1 kg (196 lb 6.9 oz)    SpO2 93%    BMI 37.12 kg/m2       O2 Device: Nasal cannula   O2 Flow Rate (L/min): 6 l/min   Temp (24hrs), Av.5 °F (36.4 °C), Min:97.4 °F (36.3 °C), Max:97.6 °F (36.4 °C)       Intake/Output:   Last shift:         Last 3 shifts: 06/06 1901 - 06/08 0700  In: 520 [P.O.:120; I.V.:400]  Out: 7150 [Urine:7150]    Intake/Output Summary (Last 24 hours) at 06/08/18 1139  Last data filed at 06/07/18 2341   Gross per 24 hour   Intake              270 ml   Output             3050 ml   Net            -2780 ml     Review of Systems:  Limited history as patient just received pain meds     Objective:    General: comfortable, extubated sitting up in chair  HEENT: pupils reactive, sclera anicteric, EOM intact  Neck: No adenopathy or thyroid swelling, no lymphadenopathy or JVD, supple  CVS: S1S2 no murmurs  RS: Mod AE bilaterally, no tactile fremitus or egophony, decrease air entry on left side  Abd: distended complains of lots of pain   Neuro: non focal, awake, alert  Extrm: no leg edema, clubbing or cyanosis  Lymph node: No obvious palpable lymph node appreciated. Skin: no rash  CBC w/Diff Recent Labs      06/07/18   0440  06/06/18   1004  06/06/18   0430   WBC  13.4*  13.9*  11.8   RBC  3.66*  3.65*  3.76*   HGB  8.0*  8.1*  8.2*   HCT  25.2*  25.2*  26.2*   PLT  177  166  165   GRANS  81*  82*  82*   LYMPH  9*  9*  10*   EOS  2  0  0        Chemistry Recent Labs      06/08/18   0500  06/07/18   1700  06/07/18   0440   06/06/18   0430   GLU  158*   --   156*   --   199*   NA  139   --   138   --   136   K  3.4*  3.4*  3.4*   < >  2.9*   CL  103   --   103   --   99*   CO2  23   --   24   --   21   BUN  23*   --   30*   --   35*   CREA  1.26   --   1.92*   --   2.92*   CA  7.7*   --   7.1*   --   7.0*   MG  1.9   --   2.2   --   2.1   PHOS  2.4*   --    --    --   5.5*   AGAP  13   --   11   --   16   BUCR  18   --   16   --   12   AP  81   --   56   --   45   TP  5.7*   --   5.1*   --   4.9*   ALB  2.1*   --   2.0*   --   2.0*   GLOB  3.6   --   3.1   --   2.9   AGRAT  0.6*   --   0.6*   --   0.7*    < > = values in this interval not displayed.         Lactic Acid Lactic acid   Date Value Ref Range Status   06/07/2018 1.2 0.4 - 2.0 MMOL/L Final Recent Labs      06/07/18   0440  06/06/18   0430  06/05/18   2100   LAC  1.2  3.6*  2.9*        Micro  Recent Labs      06/05/18   1245   CULT  NO GROWTH 2 DAYS     Recent Labs      06/05/18   1245   CULT  NO GROWTH 2 DAYS        ABG Recent Labs      06/08/18   0514  06/07/18   1034  06/07/18   0520  06/06/18   0456   PHI  7.476*  7.406  7.411  7.389   PCO2I  28.0*  34.6*  34.9*  31.9*   PO2I  60*  68*  70*  69*   HCO3I  20.8*  21.7*  22.2  19.4*   FIO2I   --   40  0.50  0.5        Liver Enzymes Protein, total   Date Value Ref Range Status   06/08/2018 5.7 (L) 6.4 - 8.2 g/dL Final     Albumin   Date Value Ref Range Status   06/08/2018 2.1 (L) 3.4 - 5.0 g/dL Final     Globulin   Date Value Ref Range Status   06/08/2018 3.6 2.0 - 4.0 g/dL Final     A-G Ratio   Date Value Ref Range Status   06/08/2018 0.6 (L) 0.8 - 1.7   Final     AST (SGOT)   Date Value Ref Range Status   06/08/2018 24 15 - 37 U/L Final     Alk.  phosphatase   Date Value Ref Range Status   06/08/2018 81 45 - 117 U/L Final     Recent Labs      06/08/18   0500  06/07/18   0440  06/06/18   0430   TP  5.7*  5.1*  4.9*   ALB  2.1*  2.0*  2.0*   GLOB  3.6  3.1  2.9   AGRAT  0.6*  0.6*  0.7*   SGOT  24  22  19   AP  81  56  45        Cardiac Enzymes No results found for: CPK, CK, CKMMB, CKMB, RCK3, CKMBT, CKNDX, CKND1, FABIAN, TROPT, TROIQ, KAYLAN, TROPT, TNIPOC, BNP, BNPP     BNP No results found for: BNP, BNPP, XBNPT     Coagulation Recent Labs      06/08/18   0500  06/07/18   0130  06/06/18   1716   APTT  65.5*  80.0*  130.7*         Thyroid  No results found for: T4, T3U, TSH, TSHEXT, TSHEXT       Lipid Panel No results found for: CHOL, CHOLPOCT, CHOLX, CHLST, CHOLV, 212023, HDL, LDL, LDLC, DLDLP, 021612, VLDLC, VLDL, TGLX, TRIGL, TRIGP, TGLPOCT, CHHD, CHHDX       Urinalysis Lab Results   Component Value Date/Time    Color DARK YELLOW 06/04/2018 08:42 AM    Appearance CLOUDY 06/04/2018 08:42 AM    Specific gravity 1.028 06/04/2018 08:42 AM    pH (UA) 5.0 06/04/2018 08:42 AM    Protein 30 (A) 06/04/2018 08:42 AM    Glucose NEGATIVE  06/04/2018 08:42 AM    Ketone TRACE (A) 06/04/2018 08:42 AM    Bilirubin SMALL (A) 06/04/2018 08:42 AM    Urobilinogen 1.0 06/04/2018 08:42 AM    Nitrites NEGATIVE  06/04/2018 08:42 AM    Leukocyte Esterase NEGATIVE  06/04/2018 08:42 AM    Epithelial cells FEW 06/04/2018 08:42 AM    Bacteria 1+ (A) 06/04/2018 08:42 AM    WBC 0 to 3 06/04/2018 08:42 AM    RBC 0 to 3 06/04/2018 08:42 AM        XR (Most Recent). CXR reviewed by me and compared with previous CXR   Results from Hospital Encounter encounter on 06/04/18   XR CHEST PORT   Narrative Portable chest    Indication: Respiratory failure    Comparison: June 7. Support lines and tubes:   > Right subclavian central line, tip in the right atrium, unchanged.   > Interval discontinuation of endotracheal and nasogastric tubes. No change in heart size or mediastinal contours. Again noted coarse markings in  both lung bases, unchanged on the right but appearing slightly worse on the  left. Impression IMPRESSION:       1. Interval discontinuation of endotracheal tube and nasogastric tube. 2. Persistent coarse markings in the right lung base with increasing coarse  markings in the left base. Findings favor subsegmental atelectasis, although  pneumonia may have a similar appearance or may be coexistent. CT (Most Recent)   Results from Hospital Encounter encounter on 06/04/18   CT ABD PELV W CONT   Narrative EXAM: CT of the Abdomen and Pelvis    INDICATION: Abdominal pain with abdominal distention, status post hysterectomy  and appendectomy    COMPARISON: None. TECHNIQUE: Axial CT imaging of the abdomen and pelvis was performed with  intravenous contrast. Multiplanar reformats were generated. Dose reduction  techniques used:  Automated exposure control, adjustment of the mAs and/or kVp  according to patient's size, and iterative reconstruction techniques. _______________    FINDINGS:    LOWER CHEST: Minimal bibasilar atelectasis without significant pleural or  pericardial effusion coronary artery atherosclerotic calcifications are  present. [place] LIVER, BILIARY: Hepatomegaly measuring 20.2 cm with mild diffuse parenchymal low  attenuation/steatosis. There are 2 separate rounded low attenuating lesions in  the left lobe of liver measuring 1.2 cm and 0.9 cm, measuring water attenuation  Hounsfield units. No enhancing hepatic mass. No biliary dilation. Distended  gallbladder/gallbladder hydrops which may be secondary to nothing by mouth  status. PANCREAS: Normal.    SPLEEN: Normal.    ADRENALS: Normal.    KIDNEYS: The kidneys are iso-attenuating without evidence of hydronephrosis,  nephrolithiasis or masses. No perinephric fluid collections. No hydroureter. LYMPH NODES: No enlarged lymph nodes. Bilateral pelvic lymphadenectomy surgical  clips are present. GASTROINTESTINAL TRACT:   -Nasogastric tube is present with the tip projecting towards the distal stomach  with moderate mid to proximal air-fluid level.  -Numerous abnormal edematous thick-walled small bowel loops in the left abdomen  and bilateral pelvis, with definite areas of lamellar edematous transformation  notably in the right lower quadrant. No renetta pneumatosis intestinalis. -Stool is present in the cecum and right colon to the hepatic flexure with  circumferential air, with mildly prominent but not discretely thick-walled  appearance. Bowel gas is present in the transverse colon with decompressed  splenic flexure. Decompressed descending colon. Edematous thick-walled sigmoid  colon which may be secondary to decompression and diverticulosis. PELVIC ORGANS: Postsurgical changes of hysterectomy and bilateral nephrectomy  with a few tiny locules of gas far lateral right inferior pelvis. Decompressed bladder with Fry catheter in place.     VASCULATURE: Normal caliber aorta without evidence of atherosclerotic disease. Patent celiac and SMA. Patent WILLIE. Patent bilateral iliofemoral vessels. Ovoid  infrarenal IVC. OTHER: Small volume of low attenuating abdominal and pelvic ascites,, with  mesenteric stranding. There is a very small amount of interloop mesenteric  fluid. No significant pneumatosis. Mild diffuse soft tissue anasarca  There is a small amount of subcutaneous emphysema lateral left upper abdominal  the soft tissues and left groin. Midline incision with no fluid collections    BONES: Age-indeterminate moderate to severe L1 vertebral body compression  fracture deformity with minimal dorsal retropulsion of the posterior superior  corner, producing mild central stenosis. Multilevel degenerative disc disease  and spondylosis with vacuum disc phenomena at several levels in the lumbar  spine. .    _______________         Impression IMPRESSION:    1. Postsurgical hysterectomy, bilateral oophorectomy, pelvic lymphadenectomy and  a mastectomy surgical changes. Small volume of ascites in the abdomen and  pelvis, with a few tiny locules of gas in the right hemipelvis would be in  keeping with recent postsurgical etiology. 2. Abnormal edematous thick-walled small bowel loops in the left abdomen and  pelvis, with TRAM lamellar edematous configuration, induration. No findings of  pneumatosis. The overall appearance is nonspecific. Diagnostic considerations  include infectious etiology, nonspecific edema which may be unresolved  postsurgical etiology. Ischemia is also included in the differential diagnostic  consideration, however, there are no findings of pneumatosis, portal venous gas. 3. Stool in the right colon extending to the hepatic flexure with surrounding  gas, foci of pneumatosis could be obscured. No associated bowel wall thickening. 4. Satisfactory position of nasogastric tube. 5. Hepatomegaly, steatosis with 2 rounded low-density lesions, potentially  cysts.     6. Bibasilar atelectasis. -The findings of this exam were discussed extensively with Dr. Joaquina Spence on  06/04/2018, prior to dictation. Imaging:  I have personally reviewed the patients radiographs and have reviewed the reports:            Felicity Hinton M.D.   Pulmonary Critical Care & Sleep Medicine

## 2018-06-08 NOTE — PROGRESS NOTES
Surgery Progress Note    Patient: Lauren Neville MRN: [id number]  CSN: [id number]    YOB: 1950  Age: 75 y.o. Sex: female    DOA: 6/4/2018 LOS:  LOS: 4 days          Chief Complaint:          Assessment/Plan     Most parameters improving but worsening leukocytosis. Clear diet if tolerated. Management per ICU    Patient Active Problem List   Diagnosis Code    Ovarian ca ([facility] C56.9    Severe obesity (BMI 35.0-39.9) ([facility]) E66.01    Abdominal pain R10.9    Sepsis ([facility] A41.9         Oliguria R34    Acute respiratory failure ([facility]) J96.00    JESS (acute kidney injury) ([facility] L80.1    Metabolic acidosis M97.9    Acute deep vein thrombosis (DVT) of lower extremity ([facility]) I82.409         Subjective:  GNR +      Review of systems:    Constitutional: denies fevers, chills, myalgias  Respiratory: denies SOB, cough  Cardiovascular: denies chest pain, palpitations  Gastrointestinal: denies nausea, vomiting, diarrhea      Vital signs/Intake and Output:  Visit Vitals    BP (!) 161/98    Pulse (!) 133    Temp 97.7 °F (36.5 °C)    Resp (!) 37    Ht 5' 1\" (1.549 m)    Wt 89.1 kg (196 lb 6.9 oz)    SpO2 91%    BMI 37.12 kg/m2     Current Shift:  06/08 0701 - 06/08 1900  In: -   Out: 1100 [Urine:1100]  Last three shifts:  06/06 1901 - 06/08 0700  In: 520 [P.O.:120;  I.V.:400]  Out: 7150 [Urine:7150]    Exam:    General: Well developed, alert, NAD, OX3  Head/Neck: NCAT, supple, No masses, No lymphadenopathy  CVS:Regular rate and rhythm, no M/R/G, S1/S2 heard, no thrill  Lungs:Clear to auscultation bilaterally, no wheezes, rhonchi, or rales  Abdomen: Soft, tender, distention, No Bowel sounds, No hepatomegaly  Extremities: No C/C/E, pulses palpable 2+  Skin:anasarca  Neuro:grossly normal , follows commands  Psych:appropriate                Labs: Results:       Chemistry Recent Labs      06/08/18   1656  06/08/18   0500  06/07/18   1700  06/07/18   0440   06/06/18   0430   GLU   --   158*   --   156*   -- 199*   NA   --   139   --   138   --   136   K  3.8  3.4*  3.4*  3.4*   < >  2.9*   CL   --   103   --   103   --   99*   CO2   --   23   --   24   --   21   BUN   --   23*   --   30*   --   35*   CREA   --   1.26   --   1.92*   --   2.92*   CA   --   7.7*   --   7.1*   --   7.0*   AGAP   --   13   --   11   --   16   BUCR   --   18   --   16   --   12   AP   --   81   --   56   --   45   TP   --   5.7*   --   5.1*   --   4.9*   ALB   --   2.1*   --   2.0*   --   2.0*   GLOB   --   3.6   --   3.1   --   2.9   AGRAT   --   0.6*   --   0.6*   --   0.7*    < > = values in this interval not displayed. CBC w/Diff Recent Labs      06/08/18   1347  06/07/18   0440  06/06/18   1004  06/06/18   0430   WBC  16.3*  13.4*  13.9*  11.8   RBC  4.21  3.66*  3.65*  3.76*   HGB  9.2*  8.0*  8.1*  8.2*   HCT  29.0*  25.2*  25.2*  26.2*   PLT  258  177  166  165   GRANS   --   81*  82*  82*   LYMPH   --   9*  9*  10*   EOS   --   2  0  0      Cardiac Enzymes No results for input(s): CPK, CKND1, FABIAN in the last 72 hours. No lab exists for component: CKRMB, TROIP   Coagulation Recent Labs      06/08/18   0500  06/07/18   0130   APTT  65.5*  80.0*       Lipid Panel No results found for: CHOL, CHOLPOCT, CHOLX, CHLST, CHOLV, 858686, HDL, LDL, LDLC, DLDLP, 471276, VLDLC, VLDL, TGLX, TRIGL, TRIGP, TGLPOCT, CHHD, CHHDX   BNP No results for input(s): BNPP in the last 72 hours.    Liver Enzymes Recent Labs      06/08/18   0500   TP  5.7*   ALB  2.1*   AP  81   SGOT  24      Thyroid Studies No results found for: T4, T3U, TSH, TSHEXT, TSHEXT     Procedures/imaging: see electronic medical records for all procedures/Xrays and details which were not copied into this note but were reviewed prior to creation of Plan      Carlos Felix DO

## 2018-06-08 NOTE — PROGRESS NOTES
NUTRITION FOLLOW-UP    RECOMMENDATIONS/PLAN:   - avoid prolonged NPO/clears as this cannot provide for estimated needs  - Monitor need for oral supplements based on PO intakes <75%  - Monitor labs/lytes, fluid status, bowel function, skin integrity, weight    NUTRITION ASSESSMENT:   Client Update: 75 yrs old Female with respiratory failure, on vent, ischemic colitis, sepsis, martha, metabolic acidosis. Extubated yesterday. On clear liquid diet. 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 FOOD/NUTRITION INTAKE   Diet Order:  Clear liquid  Supplements: n/a  Food Allergies: NKFA  Average PO Intake:      No data found. Pertinent Medications:  [x] Reviewed; protonix  Electrolyte Replacement Protocol: []K []Mg []PO4  Insulin:  []SSI  []Pre-meal   []Basal    []Drip  [x]None  Cultural/Nondenominational Food Preferences: None Identified    BIOCHEMICAL DATA & MEDICAL TESTS  Pertinent Labs:  [x] Reviewed; K-3.4, Phos-2.4 ANTHROPOMETRICS  Height: 5' 1\" (154.9 cm)       Weight: 89.1 kg (196 lb 6.9 oz)         BMI: 37.1 kg/m^2 obese (30%-39.9% BMI)   Adm Weight: 196lbs                Weight change: n/a  Adjusted Body Weight: 58.1kg    NUTRITION-FOCUSED PHYSICAL ASSESSMENT  Skin: skin intact per documentation  GI: loose 6/8    NUTRITION PRESCRIPTION  Calories: 979-1246 kcal/day based on 11-14kcal/kg  Protein: 71-89 g/day based on 0.8-1.0 g/kg  CHO: 122-156 g/day based on 50% of total energy  Fluid: 979-1246 ml/day based on 1 kcal/ml       NUTRITION DIAGNOSES:   1. Predicted suboptimal energy intake related to clear liquid diet as evidenced by diet order of clear liquid    NUTRITION INTERVENTIONS:   INTERVENTIONS:        GOALS:  1. Other: advance diet as soon as clinically feasible 1.  Diet advanced past clear liquid as soon as clinically feasible within the next 72 hours     LEARNING NEEDS (Diet, Supplementation, Food/Nutrient-Drug Interaction):   [x] None Identified   [] Education provided/documented      Identified and patient: [] Declined   [] Was not appropriate/indicated        NUTRITION MONITORING /EVALUATION:   Follow PO intake  Monitor wt  Monitor renal labs, electrolytes, fluid status  Monitor for additional supplement needs     Previous Recommendations Implemented: yes        Previous Goals Met:  yes       [] Participated in Interdisciplinary Rounds    [x] Interdisciplinary Care Plan Reviewed  DISCHARGE NUTRITION RECOMMENDATIONS ADDRESSED:       [x] To be determined closer to discharge    NUTRITION RISK:           [x] At risk                        [] Not currently at risk        Will follow-up per policy.   Jessica Pino, Trg Chantal 1

## 2018-06-08 NOTE — PROGRESS NOTES
Cm spoke with Melba Carrillo from PT at this time recommending rehab, cm spoke with pt aware of recommendation shook head no to rehab due to bad experience,pt does live with brother who assist but at this time per pPT pt needs 2 assist,spoke with bedside side nurse and attended IDR's no plan for weekend d/c at this time cm will f/o on Monday.

## 2018-06-08 NOTE — PROGRESS NOTES
Problem: Self Care Deficits Care Plan (Adult)  Goal: *Acute Goals and Plan of Care (Insert Text)  Occupational Therapy Goals  Initiated 6/8/2018 within 7 day(s). 1.  Patient will perform self feeding with supervision/set-up to increase independence with ADL  2. Patient will perform grooming with supervision/set-up while sitting on side of bed  3. Patient will perform bed to [facility] transfers with Mod A x 1 for toileting   4. Patient will perform all aspects of toileting with  moderate assistance . 5. Patient will perform upper extremity therapeutic exercise/activities with  supervision/set-up for 15 minutes. 6.  Patient will utilize energy conservation techniques during functional activities with verbal cue(s). Occupational Therapy EVALUATION    Patient: Maggie Urbina (7 y.o. female)  Date: 6/8/2018  Primary Diagnosis: Abdominal pain  Abdominal pain  Abdominal Pain  Procedure(s) (LRB):  DIAGNOSTIC LAPAROSCOPY CONVERTED TO LAPAROTOMY AT 0225, PERITONEAL BIOPSIES, AEROBIC AND ANAEROBIC CULTURES OF PERITONEAL FLUID, PERITONEAL LAVAGE (N/A)  LAPAROTOMY EXPLORATORY (N/A) 3 Days Post-Op   Precautions:   Fall    ASSESSMENT :  Based on the objective data described below, the patient presents with decreased activity tolerance, decreased functional balance, decreased AROM and decresed strength limiting independence with ADL and functional mobility. Pt seen with PT. Pt requiring Max A x 2 with bed mobility for logrolling and Max/Total A with UB/LB ADL completion. Pt able to take several steps from bed to [facility] using RW with assist x 2. Pt was able to have BM and requiring total assist for hygiene needs. Pt HR increasing to 140's and RR to 50 once with transfer from bed <-> [facility] for toileting. Pt assisted back to bed at end of session and nurse aware. Recommend rehab at d/c. Cont OT    Patient will benefit from skilled intervention to address the above impairments.   Patients rehabilitation potential is considered to be Good  Factors which may influence rehabilitation potential include:   [x]             None noted  []             Mental ability/status  []             Medical condition  []             Home/family situation and support systems  []             Safety awareness  []             Pain tolerance/management  []             Other:        PLAN :  Recommendations and Planned Interventions:  [x]               Self Care Training                  [x]        Therapeutic Activities  [x]               Functional Mobility Training    []        Cognitive Retraining  [x]               Therapeutic Exercises           [x]        Endurance Activities  [x]               Balance Training                   []        Neuromuscular Re-Education  []               Visual/Perceptual Training     []   Home Safety Training  [x]               Patient Education                 []        Family Training/Education  []               Other (comment):    Frequency/Duration: Patient will be followed by occupational therapy 3 times a week to address goals. Discharge Recommendations: Rehab  Further Equipment Recommendations for Discharge: tbd     SUBJECTIVE:   Patient stated Ashley Pleasure I get on the commode?     OBJECTIVE DATA SUMMARY:     Past Medical History:   Diagnosis Date    Diabetes ([facility]     many years type 2    Hypertension     many years     Past Surgical History:   Procedure Laterality Date    HX OOPHORECTOMY  05/29/2018    HX TONSILLECTOMY      as a child      Barriers to Learning/Limitations: None  Compensate with: visual, verbal, tactile, kinesthetic cues/model  Prior Level of Function/Home Situation: Indep with ADL PTA, still driving per patient  Home Situation  Home Environment: Private residence  # Steps to Enter: 4  One/Two Story Residence: One story  Living Alone: No  Support Systems: Family member(s)  Patient Expects to be Discharged to[de-identified] Unknown  Current DME Used/Available at Home: Commode, bedside, Shower chair, Walker, rolling, Wheelchair  Tub or Shower Type: Shower  []  Right hand dominant   []  Left hand dominant  Cognitive/Behavioral Status:  Neurologic State: Alert  Orientation Level: Oriented X4  Cognition: Follows commands  Safety/Judgement: Awareness of environment  Skin: abdominal incision  Vision/Perceptual:    Corrective Lenses: Glasses  Coordination:  Coordination: Within functional limits  Balance:  Sitting: Impaired  Sitting - Static: Fair (occasional)  Sitting - Dynamic: Fair (occasional)  Standing: Impaired; With support  Standing - Static: Fair  Standing - Dynamic : Poor  Strength:  Strength: Generally decreased, functional B UE and LE  Tone & Sensation:  Tone: Normal  Sensation: Intact  Range of Motion:  AROM: Generally decreased, functional B UE and LE  Functional Mobility and Transfers for ADLs:  Bed Mobility:  Rolling: Maximum assistance  Supine to Sit: Maximum assistance; Additional time;Assist x2  Sit to Supine: Maximum assistance; Additional time;Assist x2  Scooting: Maximum assistance; Additional time  Transfers:  Sit to Stand: Maximum assistance;Assist x2   Toilet Transfer : Maximum assistance;Assist x2  ADL Assessment:  Feeding: Maximum assistance  Oral Facial Hygiene/Grooming: Maximum assistance  Bathing: Total assistance  Upper Body Dressing: Maximum assistance  Lower Body Dressing: Total assistance  Toileting: Total assistance     ADL Intervention:  Grooming  Washing Face: Maximum assistance     Lower Body Dressing Assistance  Socks: Total assistance (dependent)  Toileting  Bowel Hygiene: Total assistance (dependent)  Cognitive Retraining  Safety/Judgement: Awareness of environment    Activity Tolerance:   Fair   Please refer to the flowsheet for vital signs taken during this treatment.   After treatment:   [] Patient left in no apparent distress sitting up in chair  [x] Patient left in no apparent distress in bed  [x] Call bell left within reach  [x] Nursing notified  [] Caregiver present  [] Bed alarm activated    COMMUNICATION/EDUCATION:    [] Home safety education was provided and the patient/caregiver indicated understanding. [] Patient/family have participated as able in goal setting and plan of care. [x] Patient/family agree to work toward stated goals and plan of care. [] Patient understands intent and goals of therapy, but is neutral about his/her participation. [] Patient is unable to participate in goal setting and plan of care. Thank you for this referral.  Carlos Mayo, OTR/L  Time Calculation: 41 mins    G-Codes (GP)  Self Care   Current  CM= 80-99%   Goal  CK= 40-59%  The severity rating is based on the professional judgement & direct observation of Level of Assistance required for Functional Mobility and ADLs. Eval Complexity: History: LOW Complexity : Brief history review ; Examination: HIGH Complexity : 5 or more performance deficits relating to physical, cognitive , or psychosocial skils that result in activity limitations and / or participation restrictions; Decision Making:MEDIUM Complexity : Patient may present with comorbidities that affect occupational performnce.  Miniml to moderate modification of tasks or assistance (eg, physical or verbal ) with assesment(s) is necessary to enable patient to complete evaluation

## 2018-06-08 NOTE — PROGRESS NOTES
Problem: Pressure Injury - Risk of  Goal: *Prevention of pressure injury  Document Oscar Scale and appropriate interventions in the flowsheet. Outcome: Progressing Towards Goal  Pressure Injury Interventions:  Sensory Interventions: Assess changes in LOC    Moisture Interventions: Absorbent underpads    Activity Interventions: Assess need for specialty bed    Mobility Interventions: Pressure redistribution bed/mattress (bed type)    Nutrition Interventions: Document food/fluid/supplement intake    Friction and Shear Interventions: Apply protective barrier, creams and emollients               Problem: Falls - Risk of  Goal: *Absence of Falls  Document Goyo Fall Risk and appropriate interventions in the flowsheet.    Outcome: Progressing Towards Goal  Fall Risk Interventions:  Mobility Interventions: Bed/chair exit alarm         Medication Interventions: Bed/chair exit alarm    Elimination Interventions: Bed/chair exit alarm

## 2018-06-08 NOTE — PROGRESS NOTES
@9446 pt taken over awake alert oriented x4 denies pain at this time, vital signs stable, remains on 4L/nc, spo2 94%. Demobond observed to midline abdomen vertically from umbilical down , intact. Fry remains in situ draining clear yellow urine. Heparin drip in progress. Assessment done and documented in appropriate flow sheets . Nursing management continues. @4427 pt finally agreed to have accu-check done as she refused per previous nurse. Pt need insulin per protocol but refuses . Relative present in room verbalizes that pt should not be allowed to make decision because of analgesia she had earlier. Off going nurse Alma Hernandez present, relative came to pt side all orientation questions were answered correctly and pt remains alert and oriented x4 she continues to refuse meds. Relative was told it is the patients right to refuse . At This time relative left room. Nursing care continues. @2130 no changes pt had several BM loose brown ,complaints of pain when moving only refuse analgesia at this time. @1854 pt complaint of some acid reflux Mylanta administered as prescribed  @2330 no new developments vital signs WNL no changes observation continues. @0130 pt asleep intermittently care continues. @0230 pt complaint of pain repositioned no relief analgesia administered care continues. @8357 pt verbalized that pain was reduced. Requested phone which was given to her , phone number was dialed several times no response care continues. @5562 pt complaint of pain while getting on bedpan , fentanyl administered as requested observation continues. @3754 pt verbalized pain has decreased but still present.]  @0539 pt complaint of pain to soon to give meds that are already ordered. Dr. Madhu Finch was called and orders received for morphine 2mg IV .  @1748 pt verbalized a reduction in pain  @0759 Bedside and Verbal shift change report given to Margie Nevarez (oncoming nurse) by Xiomara Lucero (offgoing nurse).  Report included the following information SBAR, Kardex, Intake/Output, MAR, Recent Results and Med Rec Status.    Alarm parameters reviewed, on and audible Appropriate for patient clinical condition

## 2018-06-08 NOTE — PROGRESS NOTES
Problem: Mobility Impaired (Adult and Pediatric)  Goal: *Acute Goals and Plan of Care (Insert Text)  Physical Therapy Goals   Initiated 6/8/2018 and to be accomplished within 5-7 day(s)  1. Patient will move from supine <> sit with Min-CGA in prep for out of bed activity and change of position. 2.  Patient will perform sit<> stand with Min-CGA with LRAD in prep for transfers/ambulation. 3.  Patient will transfer from bed <> chair with Min-CGA with LRAD for time up in chair for completion of ADL activity. 4.  Patient will ambulate >100 feet with LRAD/Min-CGA for improved functional mobility/safe discharge. 5.  Patient will ascend/descend 3-5 stairs with handrail with minimal assistance/contact guard assist for home re-entry as needed. Outcome: Progressing Towards Goal  physical Therapy EVALUATION    Patient: Razia Strauss (37 y.o. female)  Date: 6/8/2018  Primary Diagnosis: Abdominal pain  Abdominal pain  Abdominal Pain  Procedure(s) (LRB):  DIAGNOSTIC LAPAROSCOPY CONVERTED TO LAPAROTOMY AT 0225, PERITONEAL BIOPSIES, AEROBIC AND ANAEROBIC CULTURES OF PERITONEAL FLUID, PERITONEAL LAVAGE (N/A)  LAPAROTOMY EXPLORATORY (N/A) 3 Days Post-Op   Precautions:  Fall    ASSESSMENT :  Based on the objective data described below, the patient presents with decrease independence in bed mobility, transfers, gait, and step negotiation. Pt seen w/ supplemental O2,  central line, IV, telemonitor, pulse ox, BP cuff, and romero catheter. Pt reports 10/10 pain in abdomen prior to session but has already been administered pain meds at this time. Pt alert and oriented x 4 but drowsy during session. Pt has anxiety and  is very tachycardic and hyperpneic during mobility task. Pt educated on log rolling technique w/ pillow brace to alleviate pt's abdominal pain. Pt initially demonstrated impaired sitting balance however once given Vcing pt able to sitting upright w/o assistance.  Once in standing pt given VCing to maintain upright standing w/ RW/GB however pt demonstrated difficulty performing task and demonstrates a posterior COG. Pt able to ambulate to [facility] w/ RW/GB but requires contant VCing for proper sequencing w/ RW. Pt's HR increased to 150s and RR in the high 40s during mobility. After toileting task were completed pt was transferred back to supine in bed, call bell and tray in reach, pts RR assessed in the mid 30s, HR assessed in 120s, nurse notified after session. Patient will benefit from skilled intervention to address the above impairments. Patients rehabilitation potential is considered to be Fair  Factors which may influence rehabilitation potential include:   []         None noted  []         Mental ability/status  [x]         Medical condition  [x]         Home/family situation and support systems  [x]         Safety awareness  [x]         Pain tolerance/management  []         Other:      PLAN :  Recommendations and Planned Interventions:  [x]           Bed Mobility Training             [x]    Neuromuscular Re-Education  [x]           Transfer Training                   []    Orthotic/Prosthetic Training  [x]           Gait Training                          []    Modalities  [x]           Therapeutic Exercises          []    Edema Management/Control  [x]           Therapeutic Activities            [x]    Patient and Family Training/Education  []           Other (comment):    Frequency/Duration: Patient will be followed by physical therapy once/twice daily to address goals. Discharge Recommendations: Rehab pending pt's progress  Further Equipment Recommendations for Discharge: rolling walker     SUBJECTIVE:   Patient stated I have to move my bowels.     OBJECTIVE DATA SUMMARY:     Past Medical History:   Diagnosis Date    Diabetes ([facility] [address]     many years type 2    Hypertension     many years     Past Surgical History:   Procedure Laterality Date    HX OOPHORECTOMY  05/29/2018    HX TONSILLECTOMY      as a child Barriers to Learning/Limitations: yes;  physical  Compensate with: Verbal Cues and Tactile Cues  Prior Level of Function/Home Situation:   Home Situation  Home Environment: Private residence  # Steps to Enter: 4  One/Two Story Residence: One story  Living Alone: No  Support Systems: Family member(s)  Patient Expects to be Discharged to[de-identified] Unknown  Current DME Used/Available at Home: Commode, bedside, Shower chair, Walker, rolling, Wheelchair  Tub or Shower Type: Shower  Critical Behavior:  Neurologic State: Alert  Orientation Level: Oriented X4  Cognition: Follows commands  Safety/Judgement: Awareness of environment  Psychosocial  Patient Behaviors: Calm; Cooperative  Purposeful Interaction: Yes  Strength:    Strength: Generally decreased, functional  Tone & Sensation:   Tone: Normal  Sensation: Intact  Range Of Motion:  AROM: Generally decreased, functional  Functional Mobility:  Bed Mobility:  Rolling: Maximum assistance  Supine to Sit: Maximum assistance; Additional time;Assist x2  Sit to Supine: Maximum assistance; Additional time;Assist x2  Scooting: Maximum assistance; Additional time  Transfers:  Sit to Stand: Maximum assistance;Assist x2  Stand to Sit: Maximum assistance;Assist x2  Balance:   Sitting: Impaired  Sitting - Static: Fair (occasional)  Sitting - Dynamic: Fair (occasional)  Standing: Impaired; With support  Standing - Static: Fair  Standing - Dynamic : Poor  Ambulation/Gait Training:  Distance (ft): 5 Feet (ft)  Assistive Device: Gait belt;Walker, rolling  Ambulation - Level of Assistance: Maximum assistance; Additional time;Assist x2  Gait Description (WDL): Exceptions to WDL  Gait Abnormalities: Decreased step clearance;Shuffling gait  Base of Support: Widened  Stance: Time  Speed/Yanira: Shuffled; Slow  Step Length: Left shortened;Right shortened  Swing Pattern: Left asymmetrical;Right asymmetrical  Pain:  Pain Scale 1: Numeric (0 - 10)  Pain Intensity 1: 10  Pain Location 1: Abdomen  Pain Orientation 1: Anterior  Pain Description 1: Aching  Pain Intervention(s) 1: Medication (see MAR)  Activity Tolerance:   Fair-  Please refer to the flowsheet for vital signs taken during this treatment. After treatment:   []         Patient left in no apparent distress sitting up in chair  [x]         Patient left in no apparent distress in bed  [x]         Call bell left within reach  [x]         Nursing notified  []         Caregiver present  []         Bed alarm activated    COMMUNICATION/EDUCATION:   [x]         Fall prevention education was provided and the patient/caregiver indicated understanding. [x]         Patient/family have participated as able in goal setting and plan of care. [x]         Patient/family agree to work toward stated goals and plan of care. []         Patient understands intent and goals of therapy, but is neutral about his/her participation. []         Patient is unable to participate in goal setting and plan of care. Thank you for this referral.  Chaz Escalera, PT   Time Calculation: 41 mins     Mobility:  Current  CM= 80-99%   Goal  CJ= 20-39%. The severity rating is based on the Other Based on functional assessment

## 2018-06-08 NOTE — PROGRESS NOTES
Hospitalist Progress Note-critical care note     Patient: Asher London MRN: [id number]  CSN: [id number]    YOB: 1950  Age: 75 y.o. Sex: female    DOA: 6/4/2018 LOS:  LOS: 4 days            Chief complaint: acute respiratory failure ,  Ischemic colitis, sepsis , jess. Metabolic acidosis , dvt     Assessment/Plan         Hospital Problems  Date Reviewed: 6/5/2018          Codes Class Noted POA    Acute deep vein thrombosis (DVT) of lower extremity ([facility] ICD-10-CM: I82.409  ICD-9-CM: 453.40  6/7/2018 Unknown        Acute respiratory failure ([facility] ICD-10-CM: J96.00  ICD-9-CM: 518.81  6/5/2018 Unknown        JESS (acute kidney injury) ([facility] ICD-10-CM: N17.9  ICD-9-CM: 584.9  6/5/2018 Unknown        Metabolic acidosis FNP-97-AU: E87.2  ICD-9-CM: 276.2  6/5/2018 Unknown        Abdominal pain ICD-10-CM: R10.9  ICD-9-CM: 789.00  6/4/2018 Yes        * (Principal)Sepsis ([facility] ICD-10-CM: A41.9  ICD-9-CM: 038.9, 995.91  6/4/2018 Yes        Ischemic colitis ([facility] ICD-10-CM: K55.9  ICD-9-CM: 557.9  6/4/2018 Yes        Oliguria ICD-10-CM: R34  ICD-9-CM: 788.5  6/4/2018 Yes        Ovarian ca [facility]) ICD-10-CM: C56.9  ICD-9-CM: 183.0  5/29/2018 Yes            acute respiratory failure   Intubated-extubated yesterday      Cardiology   Tachy cardia,    Sepsis   Due to abdomen source   On vanc/zosyn and flagyl -will continue zosyn and flagyl   bcx negative         -Ovarian cancer    -status post laparotomy for 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 Lyndsay Barrett per primary team     -Bilateral dvt   On heparin gtt     Jess   Resolved per iv fluid     Metabolic acidosis Due to sepsis  Was  on bicarb , resolved     Nurse; no acute issue         Subjective: I am sick and dying, want to talk Jamey Hawk [phone], talked with [place] daughter and informed pt's wish.  She will let [place] father know   Review of systems:    General: no fever/chills, tired   Lung: sob better, no wheezing   Heart: no chest pain, no palpitation   Gi: abdomen pain,     Vital signs/Intake and Output:  Visit Vitals    BP (!) 152/94    Pulse (!) 112    Temp 97.4 °F (36.3 °C)    Resp (!) 32    Ht 5' 1\" (1.549 m)    Wt 89.1 kg (196 lb 6.9 oz)    SpO2 93%    BMI 37.12 kg/m2     Current Shift:     Last three shifts:  06/06 1901 - 06/08 0700  In: 520 [P.O.:120; I.V.:400]  Out: 7150 [Urine:7150]    Physical Exam:  General: Intubated  no acute distress    HEENT: NC, Atraumatic. PERRLA, ET tube noted   Lungs: Mild Rales. Heart:  Tachy   No murmur, No Rubs, No Gallops  Abdomen: Soft, Non distended, surgical site with staples noted, no discharge   Extremities: No c/c/e  Psych:   Calm   Neurologic:  Unable to obtain due to intubation           Labs: Results:       Chemistry Recent Labs      06/08/18   0500  06/07/18   1700  06/07/18   0440   06/06/18   0430   GLU  158*   --   156*   --   199*   NA  139   --   138   --   136   K  3.4*  3.4*  3.4*   < >  2.9*   CL  103   --   103   --   99*   CO2  23   --   24   --   21   BUN  23*   --   30*   --   35*   CREA  1.26   --   1.92*   --   2.92*   CA  7.7*   --   7.1*   --   7.0*   AGAP  13   --   11   --   16   BUCR  18   --   16   --   12   AP  81   --   56   --   45   TP  5.7*   --   5.1*   --   4.9*   ALB  2.1*   --   2.0*   --   2.0*   GLOB  3.6   --   3.1   --   2.9   AGRAT  0.6*   --   0.6*   --   0.7*    < > = values in this interval not displayed. CBC w/Diff Recent Labs      06/07/18   0440  06/06/18   1004  06/06/18   0430   WBC  13.4*  13.9*  11.8   RBC  3.66*  3.65*  3.76*   HGB  8.0*  8.1*  8.2*   HCT  25.2*  25.2*  26.2*   PLT  177  166  165   GRANS  81*  82*  82*   LYMPH  9*  9*  10*   EOS  2  0  0      Cardiac Enzymes No results for input(s): CPK, CKND1, FABIAN in the last 72 hours.     No lab exists for component: CKRMB, TROIP   Coagulation Recent Labs      06/08/18   0500  06/07/18   0130   APTT  65.5*  80.0*       Lipid Panel No results found for: CHOL, CHOLPOCT, CHOLX, CHLST, CHOLV, 168851, HDL, LDL, LDLC, DLDLP, 520977, VLDLC, VLDL, TGLX, TRIGL, TRIGP, TGLPOCT, CHHD, CHHDX   BNP No results for input(s): BNPP in the last 72 hours.    Liver Enzymes Recent Labs      06/08/18   0500   TP  5.7*   ALB  2.1*   AP  81   SGOT  24      Thyroid Studies No results found for: T4, T3U, TSH, TSHEXT, TSHEXT     Procedures/imaging: see electronic medical records for all procedures/Xrays and details which were not copied into this note but were reviewed prior to creation of Key Azevedo MD

## 2018-06-09 ENCOUNTER — ANESTHESIA (OUTPATIENT)
Dept: ICU | Age: 68
DRG: 853 | End: 2018-06-09
Payer: MEDICARE

## 2018-06-09 ENCOUNTER — ANESTHESIA EVENT (OUTPATIENT)
Dept: ICU | Age: 68
DRG: 853 | End: 2018-06-09
Payer: MEDICARE

## 2018-06-09 ENCOUNTER — APPOINTMENT (OUTPATIENT)
Dept: GENERAL RADIOLOGY | Age: 68
DRG: 853 | End: 2018-06-09
Attending: INTERNAL MEDICINE
Payer: MEDICARE

## 2018-06-09 LAB
ALBUMIN SERPL-MCNC: 1.8 G/DL (ref 3.4–5)
ALBUMIN/GLOB SERPL: 0.5 {RATIO} (ref 0.8–1.7)
ALP SERPL-CCNC: 70 U/L (ref 45–117)
ALT SERPL-CCNC: 17 U/L (ref 13–56)
ANION GAP SERPL CALC-SCNC: 15 MMOL/L (ref 3–18)
APTT PPP: 104.1 SEC (ref 23–36.4)
APTT PPP: 25.9 SEC (ref 23–36.4)
ARTERIAL PATENCY WRIST A: YES
AST SERPL-CCNC: 16 U/L (ref 15–37)
ATRIAL RATE: 114 BPM
BASE DEFICIT BLD-SCNC: 11 MMOL/L
BASE DEFICIT BLD-SCNC: 3 MMOL/L
BASE DEFICIT BLD-SCNC: 4 MMOL/L
BASE DEFICIT BLD-SCNC: 9 MMOL/L
BASOPHILS # BLD: 0 K/UL (ref 0–0.1)
BASOPHILS NFR BLD: 0 % (ref 0–3)
BDY SITE: ABNORMAL
BILIRUB SERPL-MCNC: 0.5 MG/DL (ref 0.2–1)
BODY TEMPERATURE: 100
BODY TEMPERATURE: 100.1
BODY TEMPERATURE: 100.1
BODY TEMPERATURE: 99.7
BRONCH. LAVAGE DIFF.,BR: NORMAL
BUN SERPL-MCNC: 27 MG/DL (ref 7–18)
BUN/CREAT SERPL: 15 (ref 12–20)
CA-I SERPL-SCNC: 1.14 MMOL/L (ref 1.12–1.32)
CALCIUM SERPL-MCNC: 8.3 MG/DL (ref 8.5–10.1)
CALCULATED P AXIS, ECG09: 43 DEGREES
CALCULATED R AXIS, ECG10: 28 DEGREES
CALCULATED T AXIS, ECG11: -37 DEGREES
CHLORIDE SERPL-SCNC: 103 MMOL/L (ref 100–108)
CO2 SERPL-SCNC: 19 MMOL/L (ref 21–32)
CREAT SERPL-MCNC: 1.84 MG/DL (ref 0.6–1.3)
DIAGNOSIS, 93000: NORMAL
DIFFERENTIAL METHOD BLD: ABNORMAL
EOSINOPHIL # BLD: 0 K/UL (ref 0–0.4)
EOSINOPHIL NFR BLD: 0 % (ref 0–5)
EOSINOPHIL NFR BRONCH MANUAL: 0 %
ERYTHROCYTE [DISTWIDTH] IN BLOOD BY AUTOMATED COUNT: 19.3 % (ref 11.6–14.5)
ERYTHROCYTE [DISTWIDTH] IN BLOOD BY AUTOMATED COUNT: 19.6 % (ref 11.6–14.5)
GAS FLOW.O2 O2 DELIVERY SYS: ABNORMAL L/MIN
GAS FLOW.O2 SETTING OXYMISER: 14 BPM
GLOBULIN SER CALC-MCNC: 3.7 G/DL (ref 2–4)
GLUCOSE BLD STRIP.AUTO-MCNC: 126 MG/DL (ref 70–110)
GLUCOSE BLD STRIP.AUTO-MCNC: 179 MG/DL (ref 70–110)
GLUCOSE BLD STRIP.AUTO-MCNC: 216 MG/DL (ref 70–110)
GLUCOSE SERPL-MCNC: 157 MG/DL (ref 74–99)
HCO3 BLD-SCNC: 17.4 MMOL/L (ref 22–26)
HCO3 BLD-SCNC: 18.2 MMOL/L (ref 22–26)
HCO3 BLD-SCNC: 20.8 MMOL/L (ref 22–26)
HCO3 BLD-SCNC: 20.9 MMOL/L (ref 22–26)
HCT VFR BLD AUTO: 34 % (ref 35–45)
HCT VFR BLD AUTO: 37.1 % (ref 35–45)
HGB BLD-MCNC: 11.1 G/DL (ref 12–16)
HGB BLD-MCNC: 11.6 G/DL (ref 12–16)
INSPIRATION.DURATION SETTING TIME VENT: 0.68 SEC
INSPIRATION.DURATION SETTING TIME VENT: 0.68 SEC
INSPIRATION.DURATION SETTING TIME VENT: 1 SEC
LACTATE SERPL-SCNC: 5.2 MMOL/L (ref 0.4–2)
LYMPHOCYTES # BLD: 2.9 K/UL (ref 0.8–3.5)
LYMPHOCYTES NFR BLD: 16 % (ref 20–51)
LYMPHOCYTES NFR BRONCH MANUAL: 33 %
MACROPHAGES NFR BRONCH MANUAL: 54 %
MAGNESIUM SERPL-MCNC: 2.1 MG/DL (ref 1.6–2.6)
MAGNESIUM SERPL-MCNC: 2.2 MG/DL (ref 1.6–2.6)
MCH RBC QN AUTO: 21.8 PG (ref 24–34)
MCH RBC QN AUTO: 22 PG (ref 24–34)
MCHC RBC AUTO-ENTMCNC: 31.3 G/DL (ref 31–37)
MCHC RBC AUTO-ENTMCNC: 32.6 G/DL (ref 31–37)
MCV RBC AUTO: 67.3 FL (ref 74–97)
MCV RBC AUTO: 69.7 FL (ref 74–97)
MONOCYTES # BLD: 0.4 K/UL (ref 0–1)
MONOCYTES NFR BLD: 2 % (ref 2–9)
NEUTROPHILS NFR BRONCH MANUAL: 13 %
NEUTS BAND NFR BLD MANUAL: 5 % (ref 0–5)
NEUTS SEG # BLD: 14 K/UL (ref 1.8–8)
NEUTS SEG NFR BLD: 77 % (ref 42–75)
NRBC BLD-RTO: 1 PER 100 WBC
O2/TOTAL GAS SETTING VFR VENT: 100 %
O2/TOTAL GAS SETTING VFR VENT: 80 %
P-R INTERVAL, ECG05: 142 MS
PCO2 BLD: 32.4 MMHG (ref 35–45)
PCO2 BLD: 36.1 MMHG (ref 35–45)
PCO2 BLD: 44.5 MMHG (ref 35–45)
PCO2 BLD: 46.3 MMHG (ref 35–45)
PEEP RESPIRATORY: 10 CMH2O
PEEP RESPIRATORY: 5 CMH2O
PH BLD: 7.19 [PH] (ref 7.35–7.45)
PH BLD: 7.22 [PH] (ref 7.35–7.45)
PH BLD: 7.37 [PH] (ref 7.35–7.45)
PH BLD: 7.42 [PH] (ref 7.35–7.45)
PHOSPHATE SERPL-MCNC: 2.9 MG/DL (ref 2.5–4.9)
PIP ISTAT,IPIP: 20
PIP ISTAT,IPIP: 22
PIP ISTAT,IPIP: 23
PLATELET # BLD AUTO: 297 K/UL (ref 135–420)
PLATELET # BLD AUTO: 356 K/UL (ref 135–420)
PLATELET COMMENTS,PCOM: ABNORMAL
PMV BLD AUTO: 10.6 FL (ref 9.2–11.8)
PO2 BLD: 40 MMHG (ref 80–100)
PO2 BLD: 41 MMHG (ref 80–100)
PO2 BLD: 62 MMHG (ref 80–100)
PO2 BLD: 65 MMHG (ref 80–100)
POTASSIUM SERPL-SCNC: 4 MMOL/L (ref 3.5–5.5)
POTASSIUM SERPL-SCNC: 4.4 MMOL/L (ref 3.5–5.5)
PROT SERPL-MCNC: 5.5 G/DL (ref 6.4–8.2)
Q-T INTERVAL, ECG07: 372 MS
QRS DURATION, ECG06: 82 MS
QTC CALCULATION (BEZET), ECG08: 512 MS
RBC # BLD AUTO: 5.05 M/UL (ref 4.2–5.3)
RBC # BLD AUTO: 5.32 M/UL (ref 4.2–5.3)
RBC MORPH BLD: ABNORMAL
SAO2 % BLD: 62 % (ref 92–97)
SAO2 % BLD: 72 % (ref 92–97)
SAO2 % BLD: 84 % (ref 92–97)
SAO2 % BLD: 92 % (ref 92–97)
SERVICE CMNT-IMP: ABNORMAL
SODIUM SERPL-SCNC: 137 MMOL/L (ref 136–145)
SPECIMEN TYPE: ABNORMAL
TOTAL RESP. RATE, ITRR: 30
TOTAL RESP. RATE, ITRR: 31
TOTAL RESP. RATE, ITRR: 34
TOTAL RESP. RATE, ITRR: 37
VENTILATION MODE VENT: ABNORMAL
VENTRICULAR RATE, ECG03: 114 BPM
VOLUME CONTROL IVLC: YES
VT SETTING VENT: 400 ML
WBC # BLD AUTO: 18.2 K/UL (ref 4.6–13.2)
WBC # BLD AUTO: 9.1 K/UL (ref 4.6–13.2)

## 2018-06-09 PROCEDURE — 5A1955Z RESPIRATORY VENTILATION, GREATER THAN 96 CONSECUTIVE HOURS: ICD-10-PCS | Performed by: OBSTETRICS & GYNECOLOGY

## 2018-06-09 PROCEDURE — C9113 INJ PANTOPRAZOLE SODIUM, VIA: HCPCS | Performed by: INTERNAL MEDICINE

## 2018-06-09 PROCEDURE — 83605 ASSAY OF LACTIC ACID: CPT | Performed by: INTERNAL MEDICINE

## 2018-06-09 PROCEDURE — 31500 INSERT EMERGENCY AIRWAY: CPT

## 2018-06-09 PROCEDURE — 74011000250 HC RX REV CODE- 250: Performed by: INTERNAL MEDICINE

## 2018-06-09 PROCEDURE — 74011000258 HC RX REV CODE- 258: Performed by: OBSTETRICS & GYNECOLOGY

## 2018-06-09 PROCEDURE — 89051 BODY FLUID CELL COUNT: CPT | Performed by: OBSTETRICS & GYNECOLOGY

## 2018-06-09 PROCEDURE — 74011250636 HC RX REV CODE- 250/636

## 2018-06-09 PROCEDURE — 88112 CYTOPATH CELL ENHANCE TECH: CPT | Performed by: INTERNAL MEDICINE

## 2018-06-09 PROCEDURE — 0BH17EZ INSERTION OF ENDOTRACHEAL AIRWAY INTO TRACHEA, VIA NATURAL OR ARTIFICIAL OPENING: ICD-10-PCS | Performed by: OBSTETRICS & GYNECOLOGY

## 2018-06-09 PROCEDURE — 87116 MYCOBACTERIA CULTURE: CPT | Performed by: INTERNAL MEDICINE

## 2018-06-09 PROCEDURE — 88305 TISSUE EXAM BY PATHOLOGIST: CPT | Performed by: INTERNAL MEDICINE

## 2018-06-09 PROCEDURE — 36415 COLL VENOUS BLD VENIPUNCTURE: CPT | Performed by: OBSTETRICS & GYNECOLOGY

## 2018-06-09 PROCEDURE — 84132 ASSAY OF SERUM POTASSIUM: CPT | Performed by: OBSTETRICS & GYNECOLOGY

## 2018-06-09 PROCEDURE — 94640 AIRWAY INHALATION TREATMENT: CPT

## 2018-06-09 PROCEDURE — 74011250636 HC RX REV CODE- 250/636: Performed by: INTERNAL MEDICINE

## 2018-06-09 PROCEDURE — 71045 X-RAY EXAM CHEST 1 VIEW: CPT

## 2018-06-09 PROCEDURE — 85025 COMPLETE CBC W/AUTO DIFF WBC: CPT | Performed by: INTERNAL MEDICINE

## 2018-06-09 PROCEDURE — 77010033678 HC OXYGEN DAILY

## 2018-06-09 PROCEDURE — 82330 ASSAY OF CALCIUM: CPT | Performed by: INTERNAL MEDICINE

## 2018-06-09 PROCEDURE — 85027 COMPLETE CBC AUTOMATED: CPT | Performed by: OBSTETRICS & GYNECOLOGY

## 2018-06-09 PROCEDURE — 85730 THROMBOPLASTIN TIME PARTIAL: CPT | Performed by: OBSTETRICS & GYNECOLOGY

## 2018-06-09 PROCEDURE — 74011000250 HC RX REV CODE- 250

## 2018-06-09 PROCEDURE — 0B9D8ZX DRAINAGE OF RIGHT MIDDLE LUNG LOBE, VIA NATURAL OR ARTIFICIAL OPENING ENDOSCOPIC, DIAGNOSTIC: ICD-10-PCS | Performed by: INTERNAL MEDICINE

## 2018-06-09 PROCEDURE — 94002 VENT MGMT INPAT INIT DAY: CPT

## 2018-06-09 PROCEDURE — 87102 FUNGUS ISOLATION CULTURE: CPT | Performed by: INTERNAL MEDICINE

## 2018-06-09 PROCEDURE — 83735 ASSAY OF MAGNESIUM: CPT | Performed by: OBSTETRICS & GYNECOLOGY

## 2018-06-09 PROCEDURE — 74011250636 HC RX REV CODE- 250/636: Performed by: OBSTETRICS & GYNECOLOGY

## 2018-06-09 PROCEDURE — 82803 BLOOD GASES ANY COMBINATION: CPT

## 2018-06-09 PROCEDURE — 87070 CULTURE OTHR SPECIMN AEROBIC: CPT | Performed by: INTERNAL MEDICINE

## 2018-06-09 PROCEDURE — 74011636637 HC RX REV CODE- 636/637: Performed by: INTERNAL MEDICINE

## 2018-06-09 PROCEDURE — 74011250637 HC RX REV CODE- 250/637: Performed by: INTERNAL MEDICINE

## 2018-06-09 PROCEDURE — 85730 THROMBOPLASTIN TIME PARTIAL: CPT | Performed by: INTERNAL MEDICINE

## 2018-06-09 PROCEDURE — 51798 US URINE CAPACITY MEASURE: CPT

## 2018-06-09 PROCEDURE — 36600 WITHDRAWAL OF ARTERIAL BLOOD: CPT

## 2018-06-09 PROCEDURE — 65270000029 HC RM PRIVATE

## 2018-06-09 PROCEDURE — 87040 BLOOD CULTURE FOR BACTERIA: CPT | Performed by: INTERNAL MEDICINE

## 2018-06-09 PROCEDURE — 76040000019: Performed by: INTERNAL MEDICINE

## 2018-06-09 RX ORDER — SODIUM BICARBONATE 1 MEQ/ML
SYRINGE (ML) INTRAVENOUS
Status: COMPLETED
Start: 2018-06-09 | End: 2018-06-09

## 2018-06-09 RX ORDER — ACETAMINOPHEN 325 MG/1
650 TABLET ORAL ONCE
Status: COMPLETED | OUTPATIENT
Start: 2018-06-09 | End: 2018-06-09

## 2018-06-09 RX ORDER — LEVALBUTEROL 1.25 MG/.5ML
1.25 SOLUTION, CONCENTRATE RESPIRATORY (INHALATION)
Status: DISCONTINUED | OUTPATIENT
Start: 2018-06-09 | End: 2018-06-09

## 2018-06-09 RX ORDER — MIDAZOLAM HYDROCHLORIDE 1 MG/ML
2 INJECTION, SOLUTION INTRAMUSCULAR; INTRAVENOUS
Status: DISCONTINUED | OUTPATIENT
Start: 2018-06-09 | End: 2018-06-17

## 2018-06-09 RX ORDER — SODIUM BICARBONATE 1 MEQ/ML
50 SYRINGE (ML) INTRAVENOUS ONCE
Status: COMPLETED | OUTPATIENT
Start: 2018-06-09 | End: 2018-06-09

## 2018-06-09 RX ORDER — MIDAZOLAM IN 0.9 % SOD.CHLORID 1 MG/ML
2-6 PLASTIC BAG, INJECTION (ML) INTRAVENOUS
Status: DISCONTINUED | OUTPATIENT
Start: 2018-06-09 | End: 2018-06-16

## 2018-06-09 RX ORDER — PROPOFOL 10 MG/ML
INJECTION, EMULSION INTRAVENOUS AS NEEDED
Status: DISCONTINUED | OUTPATIENT
Start: 2018-06-09 | End: 2018-06-12 | Stop reason: HOSPADM

## 2018-06-09 RX ORDER — SODIUM CHLORIDE 9 MG/ML
1000 INJECTION, SOLUTION INTRAVENOUS ONCE
Status: COMPLETED | OUTPATIENT
Start: 2018-06-10 | End: 2018-06-10

## 2018-06-09 RX ORDER — SODIUM CHLORIDE 9 MG/ML
1000 INJECTION, SOLUTION INTRAVENOUS ONCE
Status: COMPLETED | OUTPATIENT
Start: 2018-06-09 | End: 2018-06-10

## 2018-06-09 RX ORDER — METOPROLOL TARTRATE 5 MG/5ML
5 INJECTION INTRAVENOUS ONCE
Status: COMPLETED | OUTPATIENT
Start: 2018-06-09 | End: 2018-06-09

## 2018-06-09 RX ORDER — NOREPINEPHRINE BITARTRATE/D5W 8 MG/250ML
2-30 PLASTIC BAG, INJECTION (ML) INTRAVENOUS
Status: DISCONTINUED | OUTPATIENT
Start: 2018-06-09 | End: 2018-06-16

## 2018-06-09 RX ORDER — FLUCONAZOLE 2 MG/ML
200 INJECTION, SOLUTION INTRAVENOUS ONCE
Status: COMPLETED | OUTPATIENT
Start: 2018-06-09 | End: 2018-06-10

## 2018-06-09 RX ORDER — HYDROCORTISONE SODIUM SUCCINATE 100 MG/2ML
50 INJECTION, POWDER, FOR SOLUTION INTRAMUSCULAR; INTRAVENOUS EVERY 6 HOURS
Status: DISCONTINUED | OUTPATIENT
Start: 2018-06-09 | End: 2018-06-11

## 2018-06-09 RX ADMIN — MIDAZOLAM HYDROCHLORIDE 2 MG: 1 INJECTION, SOLUTION INTRAMUSCULAR; INTRAVENOUS at 01:33

## 2018-06-09 RX ADMIN — HYDROCORTISONE SODIUM SUCCINATE 50 MG: 100 INJECTION, POWDER, FOR SOLUTION INTRAMUSCULAR; INTRAVENOUS at 14:50

## 2018-06-09 RX ADMIN — SODIUM CHLORIDE 40 MG: 9 INJECTION INTRAMUSCULAR; INTRAVENOUS; SUBCUTANEOUS at 08:35

## 2018-06-09 RX ADMIN — HEPARIN SODIUM 18 UNITS/KG/HR: 10000 INJECTION, SOLUTION INTRAVENOUS at 10:20

## 2018-06-09 RX ADMIN — SODIUM CHLORIDE 1000 ML: 900 INJECTION, SOLUTION INTRAVENOUS at 21:30

## 2018-06-09 RX ADMIN — LEVOFLOXACIN 500 MG: 5 INJECTION, SOLUTION INTRAVENOUS at 21:30

## 2018-06-09 RX ADMIN — SODIUM CHLORIDE 1000 ML: 900 INJECTION, SOLUTION INTRAVENOUS at 07:25

## 2018-06-09 RX ADMIN — DEXTROSE MONOHYDRATE: 5 INJECTION, SOLUTION INTRAVENOUS at 19:18

## 2018-06-09 RX ADMIN — NOREPINEPHRINE BITARTRATE 6 MCG/MIN: 8 SOLUTION at 07:56

## 2018-06-09 RX ADMIN — MIDAZOLAM HYDROCHLORIDE 2 MG: 1 INJECTION, SOLUTION INTRAMUSCULAR; INTRAVENOUS at 03:02

## 2018-06-09 RX ADMIN — IPRATROPIUM BROMIDE 0.5 MG: 0.5 SOLUTION RESPIRATORY (INHALATION) at 20:27

## 2018-06-09 RX ADMIN — SODIUM CHLORIDE 1000 MG: 900 INJECTION, SOLUTION INTRAVENOUS at 09:55

## 2018-06-09 RX ADMIN — NOREPINEPHRINE BITARTRATE 10 MCG/MIN: 8 SOLUTION at 08:03

## 2018-06-09 RX ADMIN — INSULIN LISPRO 2 UNITS: 100 INJECTION, SOLUTION INTRAVENOUS; SUBCUTANEOUS at 00:06

## 2018-06-09 RX ADMIN — Medication 25 MCG/HR: at 19:21

## 2018-06-09 RX ADMIN — FENTANYL CITRATE 25 MCG: 50 INJECTION, SOLUTION INTRAMUSCULAR; INTRAVENOUS at 05:06

## 2018-06-09 RX ADMIN — HEPARIN SODIUM 18 UNITS/KG/HR: 10000 INJECTION, SOLUTION INTRAVENOUS at 05:36

## 2018-06-09 RX ADMIN — LEVALBUTEROL 1.25 MG: 1.25 SOLUTION, CONCENTRATE RESPIRATORY (INHALATION) at 07:33

## 2018-06-09 RX ADMIN — ACETAMINOPHEN 650 MG: 325 TABLET ORAL at 04:16

## 2018-06-09 RX ADMIN — METHYLPREDNISOLONE SODIUM SUCCINATE 100 MG: 125 INJECTION, POWDER, FOR SOLUTION INTRAMUSCULAR; INTRAVENOUS at 07:31

## 2018-06-09 RX ADMIN — NOREPINEPHRINE BITARTRATE 15 MCG/MIN: 8 SOLUTION at 08:24

## 2018-06-09 RX ADMIN — SODIUM CHLORIDE 1000 ML: 900 INJECTION, SOLUTION INTRAVENOUS at 23:45

## 2018-06-09 RX ADMIN — METOPROLOL TARTRATE 5 MG: 5 INJECTION, SOLUTION INTRAVENOUS at 04:16

## 2018-06-09 RX ADMIN — IPRATROPIUM BROMIDE 0.5 MG: 0.5 SOLUTION RESPIRATORY (INHALATION) at 01:47

## 2018-06-09 RX ADMIN — Medication 3 MG/HR: at 02:41

## 2018-06-09 RX ADMIN — PIPERACILLIN SODIUM,TAZOBACTAM SODIUM 2.25 G: 2; .25 INJECTION, POWDER, FOR SOLUTION INTRAVENOUS at 02:49

## 2018-06-09 RX ADMIN — NOREPINEPHRINE BITARTRATE 4 MCG/MIN: 8 SOLUTION at 07:48

## 2018-06-09 RX ADMIN — PIPERACILLIN SODIUM,TAZOBACTAM SODIUM 2.25 G: 2; .25 INJECTION, POWDER, FOR SOLUTION INTRAVENOUS at 08:35

## 2018-06-09 RX ADMIN — IPRATROPIUM BROMIDE 0.5 MG: 0.5 SOLUTION RESPIRATORY (INHALATION) at 07:33

## 2018-06-09 RX ADMIN — PROPOFOL 100 MG: 10 INJECTION, EMULSION INTRAVENOUS at 01:15

## 2018-06-09 RX ADMIN — Medication 50 MEQ: at 07:31

## 2018-06-09 RX ADMIN — FENTANYL CITRATE 25 MCG: 50 INJECTION, SOLUTION INTRAMUSCULAR; INTRAVENOUS at 03:10

## 2018-06-09 RX ADMIN — FLUCONAZOLE 200 MG: 2 INJECTION, SOLUTION INTRAVENOUS at 09:58

## 2018-06-09 RX ADMIN — Medication 50 MEQ: at 08:30

## 2018-06-09 RX ADMIN — PIPERACILLIN SODIUM,TAZOBACTAM SODIUM 2.25 G: 2; .25 INJECTION, POWDER, FOR SOLUTION INTRAVENOUS at 14:49

## 2018-06-09 RX ADMIN — SODIUM BICARBONATE 50 MEQ: 84 INJECTION, SOLUTION INTRAVENOUS at 07:31

## 2018-06-09 RX ADMIN — NOREPINEPHRINE BITARTRATE 2 MCG/MIN: 8 SOLUTION at 07:39

## 2018-06-09 RX ADMIN — NOREPINEPHRINE BITARTRATE 25 MCG/MIN: 8 SOLUTION at 19:34

## 2018-06-09 RX ADMIN — SODIUM BICARBONATE 50 MEQ: 84 INJECTION, SOLUTION INTRAVENOUS at 08:30

## 2018-06-09 RX ADMIN — Medication 50 MCG/HR: at 01:23

## 2018-06-09 RX ADMIN — HEPARIN SODIUM 18 UNITS/KG/HR: 10000 INJECTION, SOLUTION INTRAVENOUS at 19:35

## 2018-06-09 RX ADMIN — PIPERACILLIN SODIUM,TAZOBACTAM SODIUM 2.25 G: 2; .25 INJECTION, POWDER, FOR SOLUTION INTRAVENOUS at 21:30

## 2018-06-09 RX ADMIN — IPRATROPIUM BROMIDE 0.5 MG: 0.5 SOLUTION RESPIRATORY (INHALATION) at 13:30

## 2018-06-09 RX ADMIN — DEXTROSE MONOHYDRATE: 5 INJECTION, SOLUTION INTRAVENOUS at 09:18

## 2018-06-09 RX ADMIN — HYDROCORTISONE SODIUM SUCCINATE 50 MG: 100 INJECTION, POWDER, FOR SOLUTION INTRAMUSCULAR; INTRAVENOUS at 19:34

## 2018-06-09 NOTE — ANESTHESIA PROCEDURE NOTES
Emergent Intubation  Performed by: Clementine Mcgill  Authorized by: Clementine Mcgill     Emergent Intubation:   Location:  ICU  Date/Time:  6/9/2018 1:16 AM  Indications:  Respiratory failure  Spontaneous Ventilation: present    Level of Consciousness: awake  Preoxygenated: Yes      Airway Documentation:   Airway:  ETT - Cuffed  Technique:  Intubating stylet  Advanced Technique:  Patrick  Insertion Site:  Oral  Blade Size:  3  ETT size (mm):  7.0  ETT Line Kamron:  Lips  ETT Insertion depth (cm):  22  Placement verified by: auscultation and EtCO2    Attempts:  1  Difficult airway: No    Thin brownish liquid suctioned from below cords. crocoid pressure held during intubation.  Pt sedated with propofol 100mg iv at 0115

## 2018-06-09 NOTE — PROGRESS NOTES
Bronch without any significant food particle  Discussed at [facility] and overall extremly critical condition with 4 of the brothers and updated on plan of care by my self and Dr. Arti White  Patient family was very appreciative of efforts we have put  Will monitor closely      CIRA Gates.  Diego Leal [address] [facility] Graciela Akins [address] [place] [address] 6346  Phone [phone]   Fax [phone]  Pulmonary Critical Care & Sleep Medicine

## 2018-06-09 NOTE — PROGRESS NOTES
Assumed surgical care from Dr. Lisa Benito. Records reviewed. Discussed events in detail with Dr. Lisa Benito. Clinical picture highly concerning for PE with acute decompensation. Currently on vent support with FiO2 of 100% and PEEP of 10. On Heparin gtt not yet therapeutic but checking PTT now. TPA considered but with recent surgery risks versus benefits weighed and was not given. Patient was not stable enough to go to CT last PM   Currently treating for thromboembolic disease and sepsis. Dr. Kayla Castro has kept family updated continuously and family has made decision to change code status to DNR.   Rodrick Fernando MD

## 2018-06-09 NOTE — PROGRESS NOTES
At bedside with family  Dr. Jessica Almonte and Dr. Jovanni Haque at bedside discussing pt. Status  A/P:Hypotension and Respiratory Distress  Bronchoscopy this am; spoke with family and reassured that we will continue to monitor closely and she still has a chance of improvement at this point- will continue to follow.

## 2018-06-09 NOTE — PROGRESS NOTES
Acute decompensation overnight. Seems to have started from a respiratory standpoint. Pgt very unstable. Etioogy includes PE or sepsis but favor PE. At bedside for bronch. Talking to family. Will give more details    Dr. Dominick Rod aware this AM.   Ayo Mariee,   6/9/2018  10:01 AM      Examined pt and reviewed all numbers and findings and discussed with all consultants. Wound has been without redness, induration or drainage - even with palpation. Abd tender diffusely but without peritoneal signs yesterday and this am before the bronch. Yesterday while at the bedside with the brother at 7pm pt was on CPAP but talking and demanding CPAP removed. She also asked to be pulled up in the bed. She was afeb, tachy but had elevated BP to 150-160, and was having increasing hypoxia and lactic acid. Overnight pt worsened in status and became too unstable for CT scan/CTPAgram which had been ordered stat. Was re-intubated for respiratory status but at that time had maintained her BP. This morning she became hypotensive requiring pressors and has continued to fail from a respiratory status. Appears primary respiratory failure process, with renal failure and cardiovascular failure with possible sepsis. On maximal support vent settings and still unable to maintain sats above 90. Currently at 85%. Now unresponsive off sedation. On pressors and with zero urine output. Seriously critically ill. Very poor outlook and unlikely to survive. Discussed situation with family with Dr. Regino Dan. Pt family understood and questions answered.     D/W Dr. Regino Dan and nursing team and Dr. Ophelia Ko,   6/9/2018  11:51 AM

## 2018-06-09 NOTE — PROGRESS NOTES
Patient refusing to wear bipap  On high flow  P.H. is 7.51 and oxygen Satara are 94%  Cxr unchanged    Plan  Continue high flow if agree consider bipap if deteriorating will consider intubation  Add levaquin  Hold lovenox and start heparin in am  Will monitor closely  Discussed with nursing

## 2018-06-09 NOTE — PROGRESS NOTES
Pharmacy Dosing Services: Vancomycin    Consult for Vancomycin Dosing by Pharmacy by Dr. Loza Sites provided for this 75y.o. year old female , for indication of Sepsis of unknown etiology. Day of Therapy 1    Ht Readings from Last 1 Encounters:   06/04/18 154.9 cm (61\")        Wt Readings from Last 1 Encounters:   06/04/18 89.1 kg (196 lb 6.9 oz)        Last Level 19.8 mcg/mL   Other Current Antibiotics Levofloxacin   Significant Cultures Klebsiella oxytoca   Serum Creatinine Lab Results   Component Value Date/Time    Creatinine 1.84 (H) 06/09/2018 03:45 AM      Creatinine Clearance Estimated Creatinine Clearance: 29.7 mL/min (based on Cr of 1.84). BUN Lab Results   Component Value Date/Time    BUN 27 (H) 06/09/2018 03:45 AM      WBC Lab Results   Component Value Date/Time    WBC 9.1 06/09/2018 03:45 AM      H/H Lab Results   Component Value Date/Time    HGB 11.6 (L) 06/09/2018 03:45 AM      Platelets Lab Results   Component Value Date/Time    PLATELET 477 34/84/2073 03:45 AM      Temp 100.1 °F (37.8 °C)     Plan   Patient received 3 doses of Vancomycin 1250 mg IV on 6/4/18 @ 1731, 6/5/18 @ 0408, and 6/6/18 @ 0506   Vancomycin Random Level = 19.8 mcg/mL on 6/7/18 @ 0440   SCr fluctuating between 1.26 mg/dL to 2.92 mg/dL over past 4 days.  Will restart maintenance dose with Vancomycin 1000 mg IV every 24 hours starting on 6/9/18 @ 1000   Dose calculated to approximate a therapeutic trough of 15-20 mcg/mL.  Plan for Vanc trough before dose on Mon 6/10/18    Pharmacy to follow daily and will make changes to dose and/or frequency based on clinical status.     Pharmacist Laz Kuo, [address]

## 2018-06-09 NOTE — PROGRESS NOTES
5645  Report received from Paige Savage, RN, offgoing nurse, at bedside using [employer]. Pt hypotensive 48/22 at 0700. Repeat BPs bilateral arm cuffs verify SBP 40s. O2 Sat 78% on 100% Fio2. Versed currently at 6mg/hr. Versed turned to 3mg/hr by Paige Savage RN, then to off. Fentanyl continues at 50 mcg/hr via PCA. Heparin drip infusing at 18u/kg/hr. Orders received from Dr. Taz Haas. See MAR. Levophed started and titrating for MAP of 60. CXR completed.       5682  Dr. Lawrence Jacobo called and updated on pts status. He recommended Dr. Josh Jerez and Dr. Joaquina Spence be contacted. Dr. Josh Jerez paged and I spoke with her, she was updated and will call Dr. Joaquina Spence to inform her of pt status. 2932  Dr. Taz Haas called in to give orders and updatedd on pts status. Pts brother, Amy Alonzo, called and also updated. Levophed at 10mcg/kg/min and pts BP 66/24.    0900  Dr. Taz Haas in to speak with pts family. Consent received for bronchoscopy this am.  Dr. Josh Jerez in to see patient and updated on status. Dr. Carrasco Board also aware. Levo at 25mcg/kg/min. Map 40.    0930 Bronchoscopy in progress by Dr. Taz Haas after receiving consent from Arearlbigg Clinton, pts brother. 56 Dr. Taz Haas in to speak with family and update with 4 of pts brothers. Pt unresponsive and is not withdrawing to pain. Pupils 2, round and fixed bilaterally. Gag and cough absent. Pt without any urine output. Dr. Taz Haas and Elfego notified. 200  Dr. Josh Jerez at bedside. Heparin restarted at 18u/kg/hr. 1430  PTT and blood cultures sent from [facility]. Dr. Joaquina Spence at bedside. Pts brother, Amy Alonzo, at bedside with Dr. Taz Haas. Pts brother explains that all 5 brothers agree to change code status to DNR, however, will continue to treat pt currently without changes. 65  Dr. Joaquina Spence meeting with Dr. Taz Haas and pts family. Pt declining with RR 40s and O2 sats 79-82 on 100% Fio2 and 10 PEEP. Continues to be anuric.    1700 Pts O2 sats continue to deciline into the low 70's.    Dasia and Dr. Nahid Donald aware. 1900  Report given to tommy Pace RN, at bedside using SBAR format. All questions and clinicals reviewed. Alarm parameters verified. Family just left the hospital and any changes, they request a call to pts brother, Mary Cummings, at home tel number. Family would like us to continue all supportive care as appropriate during the night and, upon update of pt status in the am, will make a decision whether to change the plan to palliative/comfort care. Pt remains DNR.

## 2018-06-09 NOTE — PROGRESS NOTES
Problem: Mobility Impaired (Adult and Pediatric)  Goal: *Acute Goals and Plan of Care (Insert Text)  Physical Therapy Goals   Initiated 6/8/2018 and to be accomplished within 5-7 day(s)  1. Patient will move from supine <> sit with Min-CGA in prep for out of bed activity and change of position. 2.  Patient will perform sit<> stand with Min-CGA with LRAD in prep for transfers/ambulation. 3.  Patient will transfer from bed <> chair with Min-CGA with LRAD for time up in chair for completion of ADL activity. 4.  Patient will ambulate >100 feet with LRAD/Min-CGA for improved functional mobility/safe discharge. 5.  Patient will ascend/descend 3-5 stairs with handrail with minimal assistance/contact guard assist for home re-entry as needed. Patient had decline in respiratory function and subsequently intubated. Patient is not medically appropriate for PT at this time. Will discharge from Physical Therapy. Please reorder when medically stable. Thank you.  ITZEL Garcia

## 2018-06-09 NOTE — PROGRESS NOTES
Felicity Hinton M.D  [address] Ge[address] [facility] [address]  Phone [phone] Fax [phone]  Pulmonary Critical Care & Sleep Medicine       Name: Hal Johnson MRN: [id number]   : 1950 Hospital: [facility] [place]   Date: 2018          IMPRESSION:   Patient Active Problem List   Diagnosis Code    Ovarian ca ([address] C56.9    Severe obesity (BMI 35.0-39.9) ([address] E66.01    Abdominal pain R10.9    Sepsis ([address] A41.9    Oliguria R34    Acute respiratory failure ([address] J96.00    JESS (acute kidney injury) ([address] L25.5    Metabolic acidosis S20.7    Acute deep vein thrombosis (DVT) of lower extremity ([address] I82.409 ·   Severe sepsis with lactic acidosis ? source of sepsis ? Aspiration vs abdominal peritonitis  · ARDS   · Hypotension and shock due to overwhelming sepsis  · Worsening urine out put -- Possible ARF due to sepsis and hypotension  · Metabolic acidsis with lactate elevation ?  Related to sepsis   · Bilateral peroneal DVT <18> Unknown if presented on admission-- Started on heparin drip after Ok by GYN on 18    PLAN:  -- ARDS vent protocol, Currently on FIO2 100% and Peep of 10 with that sats remaining around 90%  -- Hypotension on iv fluids and levophed maxed out, added bicarb drip, added hydrocortisone  -- Klebsiella in abd fluid sensitive to zosyn but due to downward spiral we added vaco levaquin and diflucan to current zosyn  -- NPO  -- Too unstable to go for any CT scan  -- Patient with peroneal DVT was on lovenox and heparin so PE is very unlikely  -- Resume heparin after bronch  -- Taper oxygen as tolerated  -- Nephrology consult to follow  -- Possible Sepsis due to peritonitis unsure if its causing her to go back to septic shock will inform Dr. Fausto Pierre  -- Glucose and electrolyte protocol   -- Place romero back   CVS:Echo reviewed EF 65% RV is ok not dilated, Hypotension due to septic shock, IV fluid bolues received total 6L fluid yesterday, Continue levophed if needed add phenylephrine avoide vasopressin, Received some metoprolol in am given calcium to counterect, Will give dose of glucagone also   RS:Liberated from vent on 6/7/18, Reintubated on 6/9/18  -- ARDS vent protocol  -- DC zopenex and continue atrovent due to tachycardia  -- ? Aspiration will get bronch to lavage but looking at xray it is not causing significant atelectasis  -- Aspiration precaution  -- Add bronchodilators-- change to atrovent due to tachycardia  -- HOB elevated  ID:Another bout of septic shock, Added vanco/levaquin/diflucan to zosyn  ENDO:SSI monitor blood sugars started on hydrocortisone for septic shock  GI:NPO for now PPI  RENAL: Nephro consult ,another bout of renal failure, Ideally will benefit from CRRT but we do not have that available will defer to nephrology with hypotension like this she will not tolerate HD, She is not a candidate for transfer   CNS: Not responding  HEMATOLOGY: Monitor hb get PT and INR resume heparin drip after Bronch  MUSCULOSKELETAL:no acute issued  · PAIN AND SEDATION: On fentanyl drip versed if off due to hypotension  · Skin/Wound: Surgical wound looks clean   · Electrolytes: Replace electrolytes per ICU electrolyte replacement protocol. · IVF: Bicarb infusion  · Nutrition: Hold feeds   · Prophylaxis: DVT Prophylaxis with heparin,. GI Prophylaxis. · Restraints: none  · PT/OT eval and treat. OOB when appropriate. · Lines/Tubes: Subclavian line 6/4, Fry 6/4, Vent 6/5- 6/9  ADVANCE DIRECTIVE:Full code discussed advance directive he would like to speak with his brother  FAMILY DISCUSSION:Spoke with patient/brothoer   Quality Care: PPI, DVT prophylaxis, HOB elevated, Infection control all reviewed and addressed. Events and notes from last 24 hours reviewed. Care plan discussed with nursing CC TIME:75  min excluding procedure time    · Labs and images personally seen and available reports reviewed.   · All current medicines are reviewed and doses and prescription adjusted. Subjective/History:  6/9/18  Multiple event happened overnight  Around evening started becoming hypoxic managed with high flow ABG was ok, BIPAP and Lasix advised patient refused bipap  Around 1 am patient continue to decline -- reintubated, Abg showed some acidosis so bicarb given and added levaquin to zosyn. Around 6 am patient continue to detoriorate and BP started dropping Iv fluid bolus followed by levophed and steroids given added vanco   Am abg still showed worsening acidosis another amp of bicarb was given  Spoke with brother at length, Explained detoriorating condition and poor urine out put with worsening lactate of 5.2 and worsening acidosis  Plan for bronch in am as last night with NG tube about 1100 out and possible aspiration   Mark Jack is a 75 y.o.  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 Was doing ok at home until yesterday when she started feeling a little more discomfort. She presented last night when she felt her pain was significant. In ER received IV fluid and zosyn   Lactate found to be in 5  Abd ct done showed area concerning for ?  Ischemic bowel  Spoke with Dr Brad Dakins on phone she is out of town and wants general surgery to be consulted   Patient just received 4 mg IV diludid by OB GYN in ICU post that her blood pressure running on lower side   She is on NRB after diludid  We asked IR to place PICC line but they declined and wanted to be done tomorrow as patient has peripheral line and concern for sepsis  No urine out put     6/5/18: S/P laproscopy by Dr. Brad Dakins, No perforation or ischemic bowel some purulant looking inflamatory fluid removed, Post procedure on vent, DVT -- Peroneal, worsening cr -- renal evaluated  6/6/18 PH improved Bicarb stopped all culture negative Started heparin for DVT  6/7/18 SBT and extubated  6/8/18 Continue to improve, Sat in chair, Lactate normal and cr normalized      Current Facility-Administered Medications   Medication Dose Route Frequency    fentaNYL (PF) 900 mcg/30 ml infusion soln  25-50 mcg/hr IntraVENous TITRATE    midazolam in normal saline (VERSED) 1 mg/mL infusion  2-6 mg/hr IntraVENous TITRATE    NOREPINephrine (LEVOPHED) 8 mg in 5% dextrose 250mL infusion  2-16 mcg/min IntraVENous TITRATE    sodium bicarbonate (8.4%) 150 mEq in dextrose 5% 1,000 mL infusion   IntraVENous CONTINUOUS    sodium bicarbonate (8.4%) 150 mEq in dextrose 5% 1,000 mL infusion   IntraVENous CONTINUOUS    fluconazole (DIFLUCAN) 200mg/100 mL IVPB (premix)  200 mg IntraVENous ONCE    hydrocortisone Sod Succ (PF) (SOLU-CORTEF) injection 50 mg  50 mg IntraVENous Q6H    piperacillin-tazobactam (ZOSYN) 2.25 g in 0.9% sodium chloride (MBP/ADV) 50 mL MBP  2.25 g IntraVENous Q6H    levoFLOXacin (LEVAQUIN) 500 mg in D5W IVPB  500 mg IntraVENous Q24H    heparin 25,000 units in D5W 250 ml infusion  18-36 Units/kg/hr IntraVENous TITRATE    ipratropium (ATROVENT) 0.02 % nebulizer solution 0.5 mg  0.5 mg Nebulization Q6H RT    insulin lispro (HUMALOG) injection   SubCUTAneous Q6H    pantoprazole (PROTONIX) 40 mg in sodium chloride 0.9% 10 mL injection  40 mg IntraVENous DAILY       Objective:   Vital Signs:    Visit Vitals    BP (!) 52/14    Pulse (!) 113    Temp 100.1 °F (37.8 °C)    Resp 24    Ht 5' 1\" (1.549 m)    Wt 89.1 kg (196 lb 6.9 oz)    SpO2 (!) 84%    BMI 37.12 kg/m2       O2 Device: Hi flow nasal cannula   O2 Flow Rate (L/min): 50 l/min   Temp (24hrs), Av.6 °F (37 °C), Min:97.7 °F (36.5 °C), Max:100.1 °F (37.8 °C)       Intake/Output:   Last shift:         Last 3 shifts:  1901 -  0700  In: 6070.5 [I.V.:6070.5]  Out: 4250 [Urine:3150]    Intake/Output Summary (Last 24 hours) at 18 0908  Last data filed at 18 0700   Gross per 24 hour   Intake          2011.28 ml   Output             2950 ml Net          -938.72 ml     Review of Systems:  Limited history as patient just received pain meds     Objective:    General: Intubated in distress breathing above vent  HEENT: Pupil are minimally reactive  Neck: No adenopathy or thyroid swelling, no lymphadenopathy or JVD, supple  CVS: S1S2 no murmurs  RS: Mod AE bilaterally, no tactile fremitus or egophony, decrease air entry on left side  Abd: distended ? Soft tissue swelling around incision difficult to ascertain due to large belly   Neuro: Unresponsive  Extrm: Leg edema 2 plus  Lymph node: No obvious palpable lymph node appreciated. Skin: no rash  CBC w/Diff Recent Labs      06/09/18   0345  06/09/18   0006  06/08/18   1347  06/07/18   0440  06/06/18   1004   WBC  9.1  18.2*  16.3*  13.4*  13.9*   RBC  5.32*  5.05  4.21  3.66*  3.65*   HGB  11.6*  11.1*  9.2*  8.0*  8.1*   HCT  37.1  34.0*  29.0*  25.2*  25.2*   PLT  297  356  258  177  166   GRANS   --   77*   --   81*  82*   LYMPH   --   16*   --   9*  9*   EOS   --   0   --   2  0        Chemistry Recent Labs      06/09/18   0345  06/08/18   2355  06/08/18   1656  06/08/18   0500   06/07/18   0440   GLU  157*   --    --   158*   --   156*   NA  137   --    --   139   --   138   K  4.4  4.0  3.8  3.4*   < >  3.4*   CL  103   --    --   103   --   103   CO2  19*   --    --   23   --   24   BUN  27*   --    --   23*   --   30*   CREA  1.84*   --    --   1.26   --   1.92*   CA  8.3*   --    --   7.7*   --   7.1*   MG  2.2  2.1  1.8  1.9   --   2.2   PHOS   --   2.9  2.2*  2.4*   --    --    AGAP  15   --    --   13   --   11   BUCR  15   --    --   18   --   16   AP  70   --    --   81   --   56   TP  5.5*   --    --   5.7*   --   5.1*   ALB  1.8*   --    --   2.1*   --   2.0*   GLOB  3.7   --    --   3.6   --   3.1   AGRAT  0.5*   --    --   0.6*   --   0.6*    < > = values in this interval not displayed.         Lactic Acid Lactic acid   Date Value Ref Range Status   06/09/2018 5.2 (HH) 0.4 - 2.0 MMOL/L Final     Comment:     CALLED TO AND CORRECTLY REPEATED BY:  Antoine Gunderson RN ICU TO JRW AT 5614 06/09/18       Recent Labs      06/09/18   0535  06/07/18   0440   LAC  5.2*  1.2        Micro  No results for input(s): SDES, CULT in the last 72 hours. No results for input(s): CULT in the last 72 hours. ABG Recent Labs      06/09/18   0833  06/09/18   0454  06/08/18   1958   PHI  7.187*  7.223*  7.510*   PCO2I  46.3*  44.5  23.4*   PO2I  62*  40*  58*   HCO3I  17.4*  18.2*  18.7*   FIO2I  100  80  50        Liver Enzymes Protein, total   Date Value Ref Range Status   06/09/2018 5.5 (L) 6.4 - 8.2 g/dL Final     Albumin   Date Value Ref Range Status   06/09/2018 1.8 (L) 3.4 - 5.0 g/dL Final     Globulin   Date Value Ref Range Status   06/09/2018 3.7 2.0 - 4.0 g/dL Final     A-G Ratio   Date Value Ref Range Status   06/09/2018 0.5 (L) 0.8 - 1.7   Final     AST (SGOT)   Date Value Ref Range Status   06/09/2018 16 15 - 37 U/L Final     Alk.  phosphatase   Date Value Ref Range Status   06/09/2018 70 45 - 117 U/L Final     Recent Labs      06/09/18   0345  06/08/18   0500  06/07/18   0440   TP  5.5*  5.7*  5.1*   ALB  1.8*  2.1*  2.0*   GLOB  3.7  3.6  3.1   AGRAT  0.5*  0.6*  0.6*   SGOT  16  24  22   AP  70  81  56        Cardiac Enzymes No results found for: CPK, CK, CKMMB, CKMB, RCK3, CKMBT, CKNDX, CKND1, FABIAN, TROPT, TROIQ, KAYLAN, TROPT, TNIPOC, BNP, BNPP     BNP No results found for: BNP, BNPP, XBNPT     Coagulation Recent Labs      06/09/18   0345  06/08/18   0500  06/07/18   0130   APTT  25.9  65.5*  80.0*         Thyroid  No results found for: T4, T3U, TSH, TSHEXT, TSHEXT       Lipid Panel No results found for: CHOL, CHOLPOCT, CHOLX, CHLST, CHOLV, 320547, HDL, LDL, LDLC, DLDLP, 628015, VLDLC, VLDL, TGLX, TRIGL, TRIGP, TGLPOCT, CHHD, CHHDX       Urinalysis Lab Results   Component Value Date/Time    Color DARK YELLOW 06/04/2018 08:42 AM    Appearance CLOUDY 06/04/2018 08:42 AM    Specific gravity 1.028 06/04/2018 08:42 AM    pH (UA) 5.0 06/04/2018 08:42 AM    Protein 30 (A) 06/04/2018 08:42 AM    Glucose NEGATIVE  06/04/2018 08:42 AM    Ketone TRACE (A) 06/04/2018 08:42 AM    Bilirubin SMALL (A) 06/04/2018 08:42 AM    Urobilinogen 1.0 06/04/2018 08:42 AM    Nitrites NEGATIVE  06/04/2018 08:42 AM    Leukocyte Esterase NEGATIVE  06/04/2018 08:42 AM    Epithelial cells FEW 06/04/2018 08:42 AM    Bacteria 1+ (A) 06/04/2018 08:42 AM    WBC 0 to 3 06/04/2018 08:42 AM    RBC 0 to 3 06/04/2018 08:42 AM        XR (Most Recent). CXR reviewed by me and compared with previous CXR   Results from Hospital Encounter encounter on 06/04/18   XR CHEST PORT   Narrative A portable AP radiograph of the chest was obtained at 0735 hours:  History:  Hypoxia. Comparison:  Multiple prior studies most recent being 0207 hours same day. FINDINGS:     Lines and tubes: ET tube tip is 3.5 cm above the tan. NG/OG tube extends into  the stomach beyond the margin of the image. Right-sided subclavian line tip is  near the cavoatrial junction. Heart and mediastinum: Unremarkable. Lungs and pleura: Increasing consolidations are seen in the right perihilar and  particularly on the left basilar regions since the earlier study. No  pneumothorax. Aorta: Unremarkable. Bones: Within normal limits for age. Other: None         Impression IMPRESSION:    Worsening consolidations since the earlier study particularly left base. Lines and tubes as described without pneumothorax. CT (Most Recent)   Results from Hospital Encounter encounter on 06/04/18   CT ABD PELV W CONT   Narrative EXAM: CT of the Abdomen and Pelvis    INDICATION: Abdominal pain with abdominal distention, status post hysterectomy  and appendectomy    COMPARISON: None. TECHNIQUE: Axial CT imaging of the abdomen and pelvis was performed with  intravenous contrast. Multiplanar reformats were generated. Dose reduction  techniques used:  Automated exposure control, adjustment of the mAs and/or kVp  according to patient's size, and iterative reconstruction techniques. _______________    FINDINGS:    LOWER CHEST: Minimal bibasilar atelectasis without significant pleural or  pericardial effusion coronary artery atherosclerotic calcifications are  present. Christiane Cathy LIVER, BILIARY: Hepatomegaly measuring 20.2 cm with mild diffuse parenchymal low  attenuation/steatosis. There are 2 separate rounded low attenuating lesions in  the left lobe of liver measuring 1.2 cm and 0.9 cm, measuring water attenuation  Hounsfield units. No enhancing hepatic mass. No biliary dilation. Distended  gallbladder/gallbladder hydrops which may be secondary to nothing by mouth  status. PANCREAS: Normal.    SPLEEN: Normal.    ADRENALS: Normal.    KIDNEYS: The kidneys are iso-attenuating without evidence of hydronephrosis,  nephrolithiasis or masses. No perinephric fluid collections. No hydroureter. LYMPH NODES: No enlarged lymph nodes. Bilateral pelvic lymphadenectomy surgical  clips are present. GASTROINTESTINAL TRACT:   -Nasogastric tube is present with the tip projecting towards the distal stomach  with moderate mid to proximal air-fluid level.  -Numerous abnormal edematous thick-walled small bowel loops in the left abdomen  and bilateral pelvis, with definite areas of lamellar edematous transformation  notably in the right lower quadrant. No renetta pneumatosis intestinalis. -Stool is present in the cecum and right colon to the hepatic flexure with  circumferential air, with mildly prominent but not discretely thick-walled  appearance. Bowel gas is present in the transverse colon with decompressed  splenic flexure. Decompressed descending colon. Edematous thick-walled sigmoid  colon which may be secondary to decompression and diverticulosis. PELVIC ORGANS: Postsurgical changes of hysterectomy and bilateral nephrectomy  with a few tiny locules of gas far lateral right inferior pelvis.   Decompressed bladder with Fry catheter in place. VASCULATURE: Normal caliber aorta without evidence of atherosclerotic disease. Patent celiac and SMA. Patent WILLIE. Patent bilateral iliofemoral vessels. Ovoid  infrarenal IVC. OTHER: Small volume of low attenuating abdominal and pelvic ascites,, with  mesenteric stranding. There is a very small amount of interloop mesenteric  fluid. No significant pneumatosis. Mild diffuse soft tissue anasarca  There is a small amount of subcutaneous emphysema lateral left upper abdominal  the soft tissues and left groin. Midline incision with no fluid collections    BONES: Age-indeterminate moderate to severe L1 vertebral body compression  fracture deformity with minimal dorsal retropulsion of the posterior superior  corner, producing mild central stenosis. Multilevel degenerative disc disease  and spondylosis with vacuum disc phenomena at several levels in the lumbar  spine. .    _______________         Impression IMPRESSION:    1. Postsurgical hysterectomy, bilateral oophorectomy, pelvic lymphadenectomy and  a mastectomy surgical changes. Small volume of ascites in the abdomen and  pelvis, with a few tiny locules of gas in the right hemipelvis would be in  keeping with recent postsurgical etiology. 2. Abnormal edematous thick-walled small bowel loops in the left abdomen and  pelvis, with TRAM lamellar edematous configuration, induration. No findings of  pneumatosis. The overall appearance is nonspecific. Diagnostic considerations  include infectious etiology, nonspecific edema which may be unresolved  postsurgical etiology. Ischemia is also included in the differential diagnostic  consideration, however, there are no findings of pneumatosis, portal venous gas. 3. Stool in the right colon extending to the hepatic flexure with surrounding  gas, foci of pneumatosis could be obscured. No associated bowel wall thickening. 4. Satisfactory position of nasogastric tube.     5. Hepatomegaly, steatosis with 2 rounded low-density lesions, potentially  cysts. 6. Bibasilar atelectasis. -The findings of this exam were discussed extensively with Dr. Lilliana Elizabeth on  06/04/2018, prior to dictation. Imaging:  I have personally reviewed the patients radiographs and have reviewed the reports:                Josué Chamberlain M.D.   Pulmonary Critical Care & Sleep Medicine

## 2018-06-09 NOTE — PROGRESS NOTES
Hospitalist Progress Note    Patient: Maggie Urbina MRN: [id number]  CSN: [id number]    YOB: 1950  Age: 75 y.o. Sex: female    DOA: 6/4/2018 LOS:  LOS: 5 days          Chief Complaint: resp failure          Assessment/Plan       Disposition :  Patient Active Problem List   Diagnosis Code    Ovarian ca ([facility] C56.9    Severe obesity (BMI 35.0-39.9) ([facility]CC) E66.01    Abdominal pain R10.9    Sepsis ([facility] A41.9    Oliguria R34    Acute respiratory failure ([facility] J96.00    JESS (acute kidney injury) ([facility] H63.7    Metabolic acidosis U19.5    Acute deep vein thrombosis (DVT) of lower extremity ([facility] I82.409       Doing poorly. High risk of decompensation and or death. Care per pulm cc and surgical tream.    Will follow along. Subjective:    Seen and examined. Very ill. D/w Dr. Millie Serna. Review of systems:    Constitutional: denies fevers, chills, myalgias  Respiratory: denies SOB, cough  Cardiovascular: denies chest pain, palpitations  Gastrointestinal: denies nausea, vomiting, diarrhea      Vital signs/Intake and Output:  Visit Vitals    BP 99/64    Pulse (!) 140    Temp (!) 100.9 °F (38.3 °C)    Resp (!) 41    Ht 5' 1\" (1.549 m)    Wt 89.1 kg (196 lb 6.9 oz)    SpO2 (!) 67%    BMI 37.12 kg/m2     Current Shift:  06/09 0701 - 06/09 1900  In: 1669.3 [I.V.:1669.3]  Out: 0   Last three shifts:  06/07 1901 - 06/09 0700  In: 6070.5 [I.V.:6070.5]  Out: 4250 [Urine:3150]    Exam:    General: intubate  Head/Neck: mmm  CVS:Regular rate and rhythm, no M/R/G, S1/S2 heard, no thrill  Lungs:Clear to auscultation bilaterally, no wheezes, rhonchi, or rales  Abdomen: Soft, Nontender, No distention, Normal Bowel sounds, No hepatomegaly  Extremities: some edema.                 Labs: Results:       Chemistry Recent Labs      06/09/18   0345  06/08/18   2355  06/08/18   1656  06/08/18   0500   06/07/18   0440   GLU  157*   --    --   158*   --   156*   NA  137   --    --   139   --   138   K 4.4  4.0  3.8  3.4*   < >  3.4*   CL  103   --    --   103   --   103   CO2  19*   --    --   23   --   24   BUN  27*   --    --   23*   --   30*   CREA  1.84*   --    --   1.26   --   1.92*   CA  8.3*   --    --   7.7*   --   7.1*   AGAP  15   --    --   13   --   11   BUCR  15   --    --   18   --   16   AP  70   --    --   81   --   56   TP  5.5*   --    --   5.7*   --   5.1*   ALB  1.8*   --    --   2.1*   --   2.0*   GLOB  3.7   --    --   3.6   --   3.1   AGRAT  0.5*   --    --   0.6*   --   0.6*    < > = values in this interval not displayed. CBC w/Diff Recent Labs      06/09/18   0345  06/09/18   0006  06/08/18   1347  06/07/18   0440   WBC  9.1  18.2*  16.3*  13.4*   RBC  5.32*  5.05  4.21  3.66*   HGB  11.6*  11.1*  9.2*  8.0*   HCT  37.1  34.0*  29.0*  25.2*   PLT  297  356  258  177   GRANS   --   77*   --   81*   LYMPH   --   16*   --   9*   EOS   --   0   --   2      Cardiac Enzymes No results for input(s): CPK, CKND1, FABIAN in the last 72 hours. No lab exists for component: CKRMB, TROIP   Coagulation Recent Labs      06/09/18   1423  06/09/18   0345   APTT  104.1*  25.9       Lipid Panel No results found for: CHOL, CHOLPOCT, CHOLX, CHLST, CHOLV, 558878, HDL, LDL, LDLC, DLDLP, 019925, VLDLC, VLDL, TGLX, TRIGL, TRIGP, TGLPOCT, CHHD, CHHDX   BNP No results for input(s): BNPP in the last 72 hours.    Liver Enzymes Recent Labs      06/09/18   0345   TP  5.5*   ALB  1.8*   AP  70   SGOT  16      Thyroid Studies No results found for: T4, T3U, TSH, TSHEXT     Procedures/imaging: see electronic medical records for all procedures/Xrays and details which were not copied into this note but were reviewed prior to creation of Marilu Taveras MD

## 2018-06-09 NOTE — PROGRESS NOTES
Notified by the nurse as patient still remained somewhat visually dyspneic despite being intubated earlier in the night. Due to worsening of her resp status patient was intubated at the intensivist, Dr Collette Bullocks, discretion. Patient was also started on Versed and fentanyl at the time. Apparently as soon as OG tube was placed; >1L of gastric content was suctioned with some concerns of aspiration as well. At this time her temp is 99.7; BP 180s/110s; HR >140 Sinus tachycardia with sat 88-92% on 80% FiO2 and 5 PEEP. When I examined the patient, she remained sedated and intubated with very coarse diffuse exp breath sounds. Tachycardia noted, rest of the PE remained unchanged. Hard mass noted in abdominal area. She had put out about 700cc of urine after getting lasix 60mg iv last night. CXR showed some atelectasis with similar changes being noted post intubation. She is already on Levaquin and zosyn. Plans to start Hep drip this morning. Received therapeutic dose of lovenox yesterday evening. I am concerned at this time that her worsening resp status could be from Pulm embolism (she was already dx of DVT 4 days ago and has been on lovenox). I am hesitant to get stat CTA as she is already on Hep drip and her renal function is just starting to get better (am labs pending) but she will certainly need CT chest without contrast once her vitals are more stable to evaluate for any other pathology such as ARDS/pneumonitis? Santana Rutherford Would suggest also get CT A/P without contrast - possible as there is concern for possible intraperitoneal abscess. Will order One time tylenol and lopressor 5mg IV  AM labs pending.  Plans to get ABG at 5am.

## 2018-06-09 NOTE — PROCEDURES
Irais Maxwell, CIRA Shepherd. Elveria Apgar [address] [address]  Phone [phone]   Fax [phone]    Pulmonary Critical Care & Sleep Medicine               Name: Adiel Nur MRN: [id number]   : 1950 Hospital: [facility] [place]   Date: 2018        Bronchoscopy Report    Procedure: Diagnostic bronchoscopy. Indication: Mucus Plugging    Consent/Treatment: Informed consent was obtained from the  family after risks, benefits and alternatives were explained. Timeout verified the correct patient and correct procedure. Anesthesia:   Patient on ventilator and receiving  Fentanyl 50    Procedure Details:   -- The bronchoscope was introduced through an endotracheal tube. -- The vocal cords were found to be normal.  -- The trachea and tan were completely inspected and were found to be normal.  -- The right-sided endobronchial anatomy was completely inspected and was found to be normal.  -- The left-sided endobronchial anatomy was completely inspected and was found to be normal.     Specimens:    The bronchoscope was wedged in the RML and bronchoalveolar lavage was performed; material was sent for  microbiology, cytology, AFB smear and culture, fungal culture, surgical pathology and flow cytometry    Rapid On-Site Evaluation: A preliminary diagnosis of aspiration no significant secretions     Complications: none          Christen Morales MD  2018  10:19 AM

## 2018-06-09 NOTE — PROGRESS NOTES
3124  Report received from Lonny Brito RN, offgoing nurse, at bedside using [employer]. Pt hypotensive 48/22 at 0700. Repeat BPs bilateral arm cuffs verify SBP 40s. O2 Sat 78% on 100% Fio2. Versed currently at 6mg/hr. Versed turned to 3mg/hr by Lonny Brito RN, then to off. Fentanyl continues at 50 mcg/hr via PCA. Heparin drip infusing at 18u/kg/hr. Orders received from Dr. Earl Busch. See MAR. Levophed started and titrating for MAP of 60. CXR completed. Dr. Anette Ramirez called and updated on pts status. He recommended Dr. Fernando Zuleta and Dr. Sukumar Koenig be contacted. Dr. Fernando Zuleta paged and I spoke with her, she was updated and will call Dr. Sukumar Koenig to inform her of pt status.

## 2018-06-09 NOTE — PROGRESS NOTES
Nephrology Progress note    Subjective:     Opal Gomes is a 75 y.o. female with PMH DM, HTN, clear cell ovarian ca s/p debulking who presented with abdominal pain and possible sepsis/ischemic colitis. Pt underwent laparotomy/peritoneal lavage/bx, noted to have decreased UOP and increasing Cr to 2.1 today from baseline 0.6. CT neg for mass or hydro. Pt given lasix yesterday, still with high O2 requirements on vent. Unable to provide further history. - Pt extubated yesterday but unfortunately decompensated, ? Aspiration, now back on vent hypoxic despite  Fi02 of 100%, , Hypotensive on multiple pressors, Acidotic and on HC03 drip, Urine output decreased markedly. Admit Date: 6/4/2018  Allergy:  Allergies   Allergen Reactions    Hydrocodone Other (comments)     Breaks into cold sweat    Metformin Other (comments)     Breaks out into cold sweat. Can Take Glucophage brand name med        Objective:     Visit Vitals    BP (!) 52/14    Pulse (!) 113    Temp 100.1 °F (37.8 °C)    Resp 24    Ht 5' 1\" (1.549 m)    Wt 89.1 kg (196 lb 6.9 oz)    SpO2 (!) 84%    BMI 37.12 kg/m2         Intake/Output Summary (Last 24 hours) at 06/09/18 0934  Last data filed at 06/09/18 0700   Gross per 24 hour   Intake          2011.28 ml   Output             2950 ml   Net          -938.72 ml       Physical Exam:       General: On Vent, unresponsive   HENT: Atraumatic and normocephalic.  ET tube in place   Eyes: Sclera anicteric   Neck: No JVD   Cardiovascular: tachy S1 & S2, tachy   Pulmonary/Chest Wall: Decreased breath sounds bilaterally   Abdominal: Soft, obese   Musculoskeletal: trace edema   Neurological: Unresponsive       Data Review:      Lab Results   Component Value Date/Time    WBC 9.1 06/09/2018 03:45 AM    RBC 5.32 (H) 06/09/2018 03:45 AM    HCT 37.1 06/09/2018 03:45 AM    MCV 69.7 (L) 06/09/2018 03:45 AM    MCH 21.8 (L) 06/09/2018 03:45 AM    MCHC 31.3 06/09/2018 03:45 AM    RDW 19.3 (H) 06/09/2018 03:45 AM Lab Results   Component Value Date    IRON 8 (L) 06/07/2018   No components found for: FERRITIN  No components found for: PTHINT  Urinalysis  No results found for: UGLU         Impression:     -JESS- likely ATN,  S Cr was down to 1.2 on 6/8 now up 1.84 and oliguric following Hypotension and resp failure. - Septic Shock  -Resp Failure, now re-intubated and back on vent. Suspect Aspiration, ? PE  - Severe Met acidosis, Lactate 5.2  -Ovarian ca s/p debulking then laparoscopy w/lavage  -KLEBSIELLA OXYTOCA sepsis (PD fluid)  -DM  -HTN  -Anemia  Continue     Plan:   Continue Supportive care: Vent, IV pressors, IV Antibiotic  Continue IV HC03 drip  Anticipate Bronch this a.m. Monitor I/O  Monitor chemistry  Unfortunately patient will not be able to tolerate conventional Acute HD given her hemodynamic instability. CRRT is not an option at this institution and I agree that patient is rather unstable for transfer. Discussed with Critical care consultant.   Will follow      Hugo Officer, MD [place] Leonardo  [phone]

## 2018-06-09 NOTE — PROGRESS NOTES
There is lots of concern made about PE on this patient   Again patient has peroneal dvt so possibility of PE can not be excluded how ever patietn was on anticoagulation,   Patient has recent major surgery and redosurgery within 3 week and its a relative contraindication   Patient is already been treated with heparin drip and overall extremly poor condition with DNR status so without obvious diagnosis or PE and relative contraindication I think risk of giving TPA is higher than just maintaining with iv heparin  Will get Stat Echo per discussion with Dr Khalida Conrad to at least evaluate for Right side  Plan of care discussed and updated family  Spent another 20 min

## 2018-06-09 NOTE — PROGRESS NOTES
BRONCHOSCOPY PERFORMED AT BEDSIDE BY DR. Francheska Sorensen. PATIENT REMAINS ON FIO2 100%/PEEP 10.  CURRENT SPO2 89% POST PROCEDURE.

## 2018-06-09 NOTE — PROGRESS NOTES
1215  Dr. Lisa Back called with orders. Orlando Paul, pts brother, called with update on pts status. Pt on 10mcg/kg/min of Levophed with b/p 66/42.

## 2018-06-09 NOTE — PROGRESS NOTES
Patient brother came back they had discussion among them selves  He suggested he sees this as inevitable  He and family does not wish to escalate care at this point  They do not wish CPR if her heart stops  All questions answered  DNR order placed in chart  Will respect family wishes       CIRA [address]  [facility] [address] [facility] [address] [facility] [address]  Phone [phone]   Fax [phone]  Pulmonary Critical Care & Sleep Medicine

## 2018-06-10 ENCOUNTER — APPOINTMENT (OUTPATIENT)
Dept: GENERAL RADIOLOGY | Age: 68
DRG: 853 | End: 2018-06-10
Attending: INTERNAL MEDICINE
Payer: MEDICARE

## 2018-06-10 LAB
ALBUMIN SERPL-MCNC: 1.4 G/DL (ref 3.4–5)
ALBUMIN/GLOB SERPL: 0.4 {RATIO} (ref 0.8–1.7)
ALP SERPL-CCNC: 57 U/L (ref 45–117)
ALT SERPL-CCNC: 437 U/L (ref 13–56)
ANION GAP SERPL CALC-SCNC: 12 MMOL/L (ref 3–18)
APTT PPP: 107.4 SEC (ref 23–36.4)
APTT PPP: 145.7 SEC (ref 23–36.4)
APTT PPP: 32.1 SEC (ref 23–36.4)
ARTERIAL PATENCY WRIST A: YES
AST SERPL-CCNC: 805 U/L (ref 15–37)
BACTERIA SPEC CULT: NORMAL
BACTERIA SPEC CULT: NORMAL
BASE EXCESS BLD CALC-SCNC: 1 MMOL/L
BASOPHILS # BLD: 0 K/UL (ref 0–0.1)
BASOPHILS NFR BLD: 0 % (ref 0–3)
BDY SITE: ABNORMAL
BILIRUB SERPL-MCNC: 0.4 MG/DL (ref 0.2–1)
BLD PROD TYP BPU: NORMAL
BODY TEMPERATURE: 98.1
BPU ID: NORMAL
BUN SERPL-MCNC: 42 MG/DL (ref 7–18)
BUN/CREAT SERPL: 14 (ref 12–20)
CA-I SERPL-SCNC: 0.97 MMOL/L (ref 1.12–1.32)
CA-I SERPL-SCNC: 1.01 MMOL/L (ref 1.12–1.32)
CALCIUM SERPL-MCNC: 7.1 MG/DL (ref 8.5–10.1)
CHLORIDE SERPL-SCNC: 100 MMOL/L (ref 100–108)
CO2 SERPL-SCNC: 25 MMOL/L (ref 21–32)
CREAT SERPL-MCNC: 3.09 MG/DL (ref 0.6–1.3)
DIFFERENTIAL METHOD BLD: ABNORMAL
EOSINOPHIL # BLD: 0 K/UL (ref 0–0.4)
EOSINOPHIL NFR BLD: 0 % (ref 0–5)
ERYTHROCYTE [DISTWIDTH] IN BLOOD BY AUTOMATED COUNT: 18.9 % (ref 11.6–14.5)
ERYTHROCYTE [DISTWIDTH] IN BLOOD BY AUTOMATED COUNT: 19.1 % (ref 11.6–14.5)
GAS FLOW.O2 O2 DELIVERY SYS: ABNORMAL L/MIN
GAS FLOW.O2 SETTING OXYMISER: 14 BPM
GLOBULIN SER CALC-MCNC: 3.3 G/DL (ref 2–4)
GLUCOSE BLD STRIP.AUTO-MCNC: 181 MG/DL (ref 70–110)
GLUCOSE BLD STRIP.AUTO-MCNC: 182 MG/DL (ref 70–110)
GLUCOSE BLD STRIP.AUTO-MCNC: 183 MG/DL (ref 70–110)
GLUCOSE BLD STRIP.AUTO-MCNC: 198 MG/DL (ref 70–110)
GLUCOSE BLD STRIP.AUTO-MCNC: 217 MG/DL (ref 70–110)
GLUCOSE BLD STRIP.AUTO-MCNC: 226 MG/DL (ref 70–110)
GLUCOSE SERPL-MCNC: 192 MG/DL (ref 74–99)
HCO3 BLD-SCNC: 24 MMOL/L (ref 22–26)
HCT VFR BLD AUTO: 24.6 % (ref 35–45)
HCT VFR BLD AUTO: 24.9 % (ref 35–45)
HCT VFR BLD AUTO: 25.5 % (ref 35–45)
HGB BLD-MCNC: 7.9 G/DL (ref 12–16)
HGB BLD-MCNC: 8.2 G/DL (ref 12–16)
HGB BLD-MCNC: 8.2 G/DL (ref 12–16)
INR PPP: 1.6 (ref 0.8–1.2)
INSPIRATION.DURATION SETTING TIME VENT: 0.68 SEC
LACTATE SERPL-SCNC: 4.7 MMOL/L (ref 0.4–2)
LYMPHOCYTES # BLD: 4.6 K/UL (ref 0.8–3.5)
LYMPHOCYTES NFR BLD: 16 % (ref 20–51)
MAGNESIUM SERPL-MCNC: 1.7 MG/DL (ref 1.6–2.6)
MAGNESIUM SERPL-MCNC: 2.1 MG/DL (ref 1.6–2.6)
MCH RBC QN AUTO: 21.8 PG (ref 24–34)
MCH RBC QN AUTO: 21.9 PG (ref 24–34)
MCHC RBC AUTO-ENTMCNC: 32.1 G/DL (ref 31–37)
MCHC RBC AUTO-ENTMCNC: 32.2 G/DL (ref 31–37)
MCV RBC AUTO: 68 FL (ref 74–97)
MCV RBC AUTO: 68 FL (ref 74–97)
METAMYELOCYTES NFR BLD MANUAL: 1 %
MONOCYTES # BLD: 0.6 K/UL (ref 0–1)
MONOCYTES NFR BLD: 2 % (ref 2–9)
NEUTS BAND NFR BLD MANUAL: 21 % (ref 0–5)
NEUTS SEG # BLD: 17.1 K/UL (ref 1.8–8)
NEUTS SEG NFR BLD: 60 % (ref 42–75)
NRBC BLD-RTO: 1 PER 100 WBC
O2/TOTAL GAS SETTING VFR VENT: 90 %
PCO2 BLD: 31 MMHG (ref 35–45)
PEEP RESPIRATORY: 10 CMH2O
PH BLD: 7.5 [PH] (ref 7.35–7.45)
PHOSPHATE SERPL-MCNC: 3.6 MG/DL (ref 2.5–4.9)
PIP ISTAT,IPIP: 26
PLATELET # BLD AUTO: 223 K/UL (ref 135–420)
PLATELET # BLD AUTO: 226 K/UL (ref 135–420)
PLATELET COMMENTS,PCOM: ABNORMAL
PMV BLD AUTO: 10.5 FL (ref 9.2–11.8)
PMV BLD AUTO: 10.7 FL (ref 9.2–11.8)
PO2 BLD: 59 MMHG (ref 80–100)
POTASSIUM SERPL-SCNC: 3.8 MMOL/L (ref 3.5–5.5)
POTASSIUM SERPL-SCNC: 3.9 MMOL/L (ref 3.5–5.5)
PROT SERPL-MCNC: 4.7 G/DL (ref 6.4–8.2)
PROTHROMBIN TIME: 18 SEC (ref 11.5–15.2)
RBC # BLD AUTO: 3.62 M/UL (ref 4.2–5.3)
RBC # BLD AUTO: 3.75 M/UL (ref 4.2–5.3)
RBC MORPH BLD: ABNORMAL
SAO2 % BLD: 93 % (ref 92–97)
SERVICE CMNT-IMP: ABNORMAL
SERVICE CMNT-IMP: NORMAL
SERVICE CMNT-IMP: NORMAL
SODIUM SERPL-SCNC: 137 MMOL/L (ref 136–145)
SPECIMEN TYPE: ABNORMAL
STATUS OF UNIT,%ST: NORMAL
TOTAL RESP. RATE, ITRR: 26
UNIT DIVISION, %UDIV: 0
VENTILATION MODE VENT: ABNORMAL
VT SETTING VENT: 400 ML
WBC # BLD AUTO: 26.7 K/UL (ref 4.6–13.2)
WBC # BLD AUTO: 28.5 K/UL (ref 4.6–13.2)
WBC MORPH BLD: ABNORMAL

## 2018-06-10 PROCEDURE — C9113 INJ PANTOPRAZOLE SODIUM, VIA: HCPCS | Performed by: INTERNAL MEDICINE

## 2018-06-10 PROCEDURE — 74011636637 HC RX REV CODE- 636/637: Performed by: INTERNAL MEDICINE

## 2018-06-10 PROCEDURE — 74011250636 HC RX REV CODE- 250/636: Performed by: OBSTETRICS & GYNECOLOGY

## 2018-06-10 PROCEDURE — 94003 VENT MGMT INPAT SUBQ DAY: CPT

## 2018-06-10 PROCEDURE — 85018 HEMOGLOBIN: CPT | Performed by: INTERNAL MEDICINE

## 2018-06-10 PROCEDURE — 74011000258 HC RX REV CODE- 258: Performed by: INTERNAL MEDICINE

## 2018-06-10 PROCEDURE — 74011000250 HC RX REV CODE- 250: Performed by: INTERNAL MEDICINE

## 2018-06-10 PROCEDURE — 93306 TTE W/DOPPLER COMPLETE: CPT

## 2018-06-10 PROCEDURE — 36600 WITHDRAWAL OF ARTERIAL BLOOD: CPT

## 2018-06-10 PROCEDURE — 36415 COLL VENOUS BLD VENIPUNCTURE: CPT | Performed by: INTERNAL MEDICINE

## 2018-06-10 PROCEDURE — 83735 ASSAY OF MAGNESIUM: CPT | Performed by: OBSTETRICS & GYNECOLOGY

## 2018-06-10 PROCEDURE — 85610 PROTHROMBIN TIME: CPT | Performed by: OBSTETRICS & GYNECOLOGY

## 2018-06-10 PROCEDURE — 85730 THROMBOPLASTIN TIME PARTIAL: CPT | Performed by: INTERNAL MEDICINE

## 2018-06-10 PROCEDURE — 65270000029 HC RM PRIVATE

## 2018-06-10 PROCEDURE — 85025 COMPLETE CBC W/AUTO DIFF WBC: CPT | Performed by: OBSTETRICS & GYNECOLOGY

## 2018-06-10 PROCEDURE — 83605 ASSAY OF LACTIC ACID: CPT | Performed by: OBSTETRICS & GYNECOLOGY

## 2018-06-10 PROCEDURE — 85730 THROMBOPLASTIN TIME PARTIAL: CPT | Performed by: OBSTETRICS & GYNECOLOGY

## 2018-06-10 PROCEDURE — 82962 GLUCOSE BLOOD TEST: CPT

## 2018-06-10 PROCEDURE — 85027 COMPLETE CBC AUTOMATED: CPT | Performed by: INTERNAL MEDICINE

## 2018-06-10 PROCEDURE — 71045 X-RAY EXAM CHEST 1 VIEW: CPT

## 2018-06-10 PROCEDURE — 83735 ASSAY OF MAGNESIUM: CPT | Performed by: INTERNAL MEDICINE

## 2018-06-10 PROCEDURE — 82803 BLOOD GASES ANY COMBINATION: CPT

## 2018-06-10 PROCEDURE — 82330 ASSAY OF CALCIUM: CPT | Performed by: INTERNAL MEDICINE

## 2018-06-10 PROCEDURE — 77010033678 HC OXYGEN DAILY

## 2018-06-10 PROCEDURE — 84100 ASSAY OF PHOSPHORUS: CPT | Performed by: OBSTETRICS & GYNECOLOGY

## 2018-06-10 PROCEDURE — 74011250636 HC RX REV CODE- 250/636: Performed by: INTERNAL MEDICINE

## 2018-06-10 PROCEDURE — 94640 AIRWAY INHALATION TREATMENT: CPT

## 2018-06-10 PROCEDURE — 84132 ASSAY OF SERUM POTASSIUM: CPT | Performed by: OBSTETRICS & GYNECOLOGY

## 2018-06-10 PROCEDURE — 74011000258 HC RX REV CODE- 258: Performed by: OBSTETRICS & GYNECOLOGY

## 2018-06-10 RX ORDER — MAGNESIUM SULFATE 1 G/100ML
1 INJECTION INTRAVENOUS ONCE
Status: COMPLETED | OUTPATIENT
Start: 2018-06-10 | End: 2018-06-10

## 2018-06-10 RX ORDER — DEXTROSE MONOHYDRATE AND SODIUM CHLORIDE 5; .9 G/100ML; G/100ML
75 INJECTION, SOLUTION INTRAVENOUS CONTINUOUS
Status: DISCONTINUED | OUTPATIENT
Start: 2018-06-10 | End: 2018-06-14

## 2018-06-10 RX ORDER — LEVOFLOXACIN 5 MG/ML
500 INJECTION, SOLUTION INTRAVENOUS
Status: DISCONTINUED | OUTPATIENT
Start: 2018-06-11 | End: 2018-06-13

## 2018-06-10 RX ORDER — POTASSIUM CHLORIDE 7.45 MG/ML
10 INJECTION INTRAVENOUS ONCE
Status: COMPLETED | OUTPATIENT
Start: 2018-06-10 | End: 2018-06-10

## 2018-06-10 RX ORDER — HEPARIN SODIUM 1000 [USP'U]/ML
80 INJECTION, SOLUTION INTRAVENOUS; SUBCUTANEOUS ONCE
Status: COMPLETED | OUTPATIENT
Start: 2018-06-10 | End: 2018-06-10

## 2018-06-10 RX ORDER — FUROSEMIDE 10 MG/ML
60 INJECTION INTRAMUSCULAR; INTRAVENOUS ONCE
Status: COMPLETED | OUTPATIENT
Start: 2018-06-10 | End: 2018-06-10

## 2018-06-10 RX ADMIN — CALCIUM GLUCONATE 4 G: 94 INJECTION, SOLUTION INTRAVENOUS at 06:44

## 2018-06-10 RX ADMIN — IPRATROPIUM BROMIDE 0.5 MG: 0.5 SOLUTION RESPIRATORY (INHALATION) at 20:51

## 2018-06-10 RX ADMIN — POTASSIUM CHLORIDE 10 MEQ: 10 INJECTION, SOLUTION INTRAVENOUS at 09:41

## 2018-06-10 RX ADMIN — HYDROCORTISONE SODIUM SUCCINATE 50 MG: 100 INJECTION, POWDER, FOR SOLUTION INTRAMUSCULAR; INTRAVENOUS at 08:29

## 2018-06-10 RX ADMIN — MIDAZOLAM HYDROCHLORIDE 2 MG: 1 INJECTION, SOLUTION INTRAMUSCULAR; INTRAVENOUS at 14:09

## 2018-06-10 RX ADMIN — PIPERACILLIN SODIUM,TAZOBACTAM SODIUM 2.25 G: 2; .25 INJECTION, POWDER, FOR SOLUTION INTRAVENOUS at 17:01

## 2018-06-10 RX ADMIN — INSULIN LISPRO 4 UNITS: 100 INJECTION, SOLUTION INTRAVENOUS; SUBCUTANEOUS at 12:41

## 2018-06-10 RX ADMIN — MIDAZOLAM HYDROCHLORIDE 2 MG: 1 INJECTION, SOLUTION INTRAMUSCULAR; INTRAVENOUS at 08:56

## 2018-06-10 RX ADMIN — SODIUM CHLORIDE 40 MG: 9 INJECTION INTRAMUSCULAR; INTRAVENOUS; SUBCUTANEOUS at 09:00

## 2018-06-10 RX ADMIN — INSULIN LISPRO 2 UNITS: 100 INJECTION, SOLUTION INTRAVENOUS; SUBCUTANEOUS at 06:08

## 2018-06-10 RX ADMIN — MIDAZOLAM HYDROCHLORIDE 2 MG: 1 INJECTION, SOLUTION INTRAMUSCULAR; INTRAVENOUS at 23:11

## 2018-06-10 RX ADMIN — PIPERACILLIN SODIUM,TAZOBACTAM SODIUM 2.25 G: 2; .25 INJECTION, POWDER, FOR SOLUTION INTRAVENOUS at 02:56

## 2018-06-10 RX ADMIN — SODIUM CHLORIDE 250 ML: 900 INJECTION, SOLUTION INTRAVENOUS at 10:08

## 2018-06-10 RX ADMIN — INSULIN LISPRO 4 UNITS: 100 INJECTION, SOLUTION INTRAVENOUS; SUBCUTANEOUS at 00:30

## 2018-06-10 RX ADMIN — DEXTROSE MONOHYDRATE AND SODIUM CHLORIDE 75 ML/HR: 5; .9 INJECTION, SOLUTION INTRAVENOUS at 09:24

## 2018-06-10 RX ADMIN — NOREPINEPHRINE BITARTRATE 4 MCG/MIN: 8 SOLUTION at 11:11

## 2018-06-10 RX ADMIN — PIPERACILLIN SODIUM,TAZOBACTAM SODIUM 2.25 G: 2; .25 INJECTION, POWDER, FOR SOLUTION INTRAVENOUS at 21:23

## 2018-06-10 RX ADMIN — HEPARIN SODIUM 7130 UNITS: 1000 INJECTION, SOLUTION INTRAVENOUS; SUBCUTANEOUS at 06:15

## 2018-06-10 RX ADMIN — CALCIUM GLUCONATE 2 G: 94 INJECTION, SOLUTION INTRAVENOUS at 17:09

## 2018-06-10 RX ADMIN — HYDROCORTISONE SODIUM SUCCINATE 50 MG: 100 INJECTION, POWDER, FOR SOLUTION INTRAMUSCULAR; INTRAVENOUS at 14:47

## 2018-06-10 RX ADMIN — Medication 25 MCG/HR: at 00:37

## 2018-06-10 RX ADMIN — NOREPINEPHRINE BITARTRATE 3 MCG/MIN: 8 SOLUTION at 11:33

## 2018-06-10 RX ADMIN — DEXTROSE MONOHYDRATE: 5 INJECTION, SOLUTION INTRAVENOUS at 03:46

## 2018-06-10 RX ADMIN — IPRATROPIUM BROMIDE 0.5 MG: 0.5 SOLUTION RESPIRATORY (INHALATION) at 07:27

## 2018-06-10 RX ADMIN — IPRATROPIUM BROMIDE 0.5 MG: 0.5 SOLUTION RESPIRATORY (INHALATION) at 14:16

## 2018-06-10 RX ADMIN — DEXTROSE MONOHYDRATE AND SODIUM CHLORIDE 75 ML/HR: 5; .9 INJECTION, SOLUTION INTRAVENOUS at 23:11

## 2018-06-10 RX ADMIN — INSULIN LISPRO 2 UNITS: 100 INJECTION, SOLUTION INTRAVENOUS; SUBCUTANEOUS at 18:18

## 2018-06-10 RX ADMIN — HYDROCORTISONE SODIUM SUCCINATE 50 MG: 100 INJECTION, POWDER, FOR SOLUTION INTRAMUSCULAR; INTRAVENOUS at 02:56

## 2018-06-10 RX ADMIN — PIPERACILLIN SODIUM,TAZOBACTAM SODIUM 2.25 G: 2; .25 INJECTION, POWDER, FOR SOLUTION INTRAVENOUS at 08:19

## 2018-06-10 RX ADMIN — FUROSEMIDE 60 MG: 10 INJECTION, SOLUTION INTRAMUSCULAR; INTRAVENOUS at 21:22

## 2018-06-10 RX ADMIN — IPRATROPIUM BROMIDE 0.5 MG: 0.5 SOLUTION RESPIRATORY (INHALATION) at 00:27

## 2018-06-10 RX ADMIN — MAGNESIUM SULFATE HEPTAHYDRATE 1 G: 1 INJECTION, SOLUTION INTRAVENOUS at 08:20

## 2018-06-10 RX ADMIN — HEPARIN SODIUM 22 UNITS/KG/HR: 10000 INJECTION, SOLUTION INTRAVENOUS at 11:34

## 2018-06-10 RX ADMIN — SODIUM CHLORIDE 1000 MG: 900 INJECTION, SOLUTION INTRAVENOUS at 10:32

## 2018-06-10 RX ADMIN — HYDROCORTISONE SODIUM SUCCINATE 50 MG: 100 INJECTION, POWDER, FOR SOLUTION INTRAMUSCULAR; INTRAVENOUS at 21:22

## 2018-06-10 NOTE — PROGRESS NOTES
Came in to re-evaluate patient after receiving call from patient's brother who was worried that patient is suffering and wants to know if there is anything more we can do to make her comfortable. I called and spoke with attending nurse, Binh Chavez RN, regarding current clinical status which had improved somewhat. Repeated ABG which has improved. Decreased FiO2. Still no UOP despite MAP of 70. Gave 1L NS bolus. Currently, niece and her  are at the bedside. Patient responded to my voice and turned her head in my direction. Also nodded to nurses question. AF, P 114, R 20, /70 (on 25 Levo). O2 97% on FiO2 85%   Small amount of gómez urine in romero now. PERRL  Lungs clear  Abdomen moderate distension, tympanic in upper abdomen  Incision c/d/i without erythema  Extremities warm with trace edema and 1+ in RLE    Discussed status with family present. Will continue current support including ventilation, abx and hep gtt. Repeat bolus now. Will wean FiO2 and Levo as patient can tolerate. Will continue Fentanyl for pain. ECHO ordered to be done in am.  Labs and repeat ABG ordered for early am.  Discussed with attending nurse.     Emily Stevenson MD

## 2018-06-10 NOTE — PROGRESS NOTES
0700 - Bedside and Verbal shift change report given to Blaire Irvin RN (oncoming nurse) by Aarti Rodrigez (offgoing nurse). Report included the following information SBAR, Kardex and Cardiac Rhythm Sinus Tach . 0800 -  Initial assessment completed, see EMR. Triturating levophed per orders. Replacing electrolytes per protocol. 1144 - Levophed stopped. 1200- Reassessment completed, no changes. Family remains at bedside. 1310 - Heparin drip stopped per protocol, PTT returned 145.7.     1410 - Heparin restarted, with rate decreased by 3 per protocol. Next PTT to be drawn at 2000.     1600 - No changes to inistial assessment. Patients Sp02 desaturating to 89-90% respiration rate up to 30's, suctioned, scant secretions, called RT,  Fi02 changed to 100%, Sp02 saturating at 93%  Reviewed Echo, Ejection fraction was estimated in the range of 65 % to 70 %. 1900- Verbal shift change report given to Cyn Ann RN (oncoming nurse) by Blaire Irvin RN (offgoing nurse). Report included the following information SBAR, Kardex and Cardiac Rhythm Sinus Tach.

## 2018-06-10 NOTE — PROGRESS NOTES
More responsive this am. Opening eyes to voice. Grimaces with deep abdominal palpation but not to light touch. AF, 116 93% on FiO2 90%, R 28, 122/74, CVP 5  UOP 25 cc over past 3 hours. WBC 26.7, Lactate 4.7  Hgb 7.9  Cr 3.09  PTT subtherapeutic  LFTs elevated  Responds to voice  Lungs clear  Abdomen distended, tender to deep palpation, hypoBS  Ext warm, 1+ edema    Discussed status with family present. Will continue current support including ventilation, abx and hep gtt. Weaning FiO2 and Levo as patient can tolerate. Fentanyl for pain. Discussed with Dr. Vikas Brumfield and attending nurse.   Demarco Faria MD

## 2018-06-10 NOTE — PROGRESS NOTES
Hospitalist Progress Note    Patient: Dariela Richmond MRN: [id number]  CSN: [id number]    YOB: 1950  Age: 75 y.o. Sex: female    DOA: 6/4/2018 LOS:  LOS: 6 days          Chief Complaint: resp failure. Assessment/Plan       Disposition :  Patient Active Problem List   Diagnosis Code    Ovarian ca ([facility] C56.9    Severe obesity (BMI 35.0-39.9) ([facility]) E66.01    Abdominal pain R10.9    Sepsis ([facility] A41.9    Oliguria R34    Acute respiratory failure ([address] J96.00    JESS (acute kidney injury) ([facility] H51.2    Metabolic acidosis Y62.7    Acute deep vein thrombosis (DVT) of lower extremity ([facility] I82.409       Care in icu per icu physician and surgical team.  Will follow along peripherally. Nothing to add at this juncture. Subjective:    Seen and examined. Remains in icu on vent and levophed. Review of systems:    Unable to obtain. Vital signs/Intake and Output:  Visit Vitals    /68    Pulse (!) 120    Temp 98.1 °F (36.7 °C)    Resp (!) 31    Ht 5' 1\" (1.549 m)    Wt 89.1 kg (196 lb 6.9 oz)    SpO2 93%    BMI 37.12 kg/m2     Current Shift:     Last three shifts:  06/08 1901 - 06/10 0700  In: 4824 [I.V.:4734]  Out: 2273 [Urine:773]    Exam:    General: nad  Head/Neck: mmm  CVS:s1s2   Lungs:breath sounds b/l. Intubate on vent  Abdomen: Soft, bs infrequent  Extremities: Some edema.     Labs: Results:       Chemistry Recent Labs      06/10/18   0528  06/09/18   0345  06/08/18   2355   06/08/18   0500   GLU  192*  157*   --    --   158*   NA  137  137   --    --   139   K  3.8  4.4  4.0   < >  3.4*   CL  100  103   --    --   103   CO2  25  19*   --    --   23   BUN  42*  27*   --    --   23*   CREA  3.09*  1.84*   --    --   1.26   CA  7.1*  8.3*   --    --   7.7*   AGAP  12  15   --    --   13   BUCR  14  15   --    --   18   AP  57  70   --    --   81   TP  4.7*  5.5*   --    --   5.7*   ALB  1.4*  1.8*   --    --   2.1*   GLOB  3.3  3.7   --    --   3.6 AGRAT  0.4*  0.5*   --    --   0.6*    < > = values in this interval not displayed. CBC w/Diff Recent Labs      06/09/18   0345  06/09/18   0006  06/08/18   1347   WBC  9.1  18.2*  16.3*   RBC  5.32*  5.05  4.21   HGB  11.6*  11.1*  9.2*   HCT  37.1  34.0*  29.0*   PLT  297  356  258   GRANS   --   77*   --    LYMPH   --   16*   --    EOS   --   0   --       Cardiac Enzymes No results for input(s): CPK, CKND1, FABIAN in the last 72 hours. No lab exists for component: CKRMB, TROIP   Coagulation Recent Labs      06/10/18   0528  06/09/18   1423   PTP  18.0*   --    INR  1.6*   --    APTT  32.1  104.1*       Lipid Panel No results found for: CHOL, CHOLPOCT, CHOLX, CHLST, CHOLV, 135563, HDL, LDL, LDLC, DLDLP, 164821, VLDLC, VLDL, TGLX, TRIGL, TRIGP, TGLPOCT, CHHD, CHHDX   BNP No results for input(s): BNPP in the last 72 hours.    Liver Enzymes Recent Labs      06/10/18   0528   TP  4.7*   ALB  1.4*   AP  57   SGOT  805*      Thyroid Studies No results found for: T4, T3U, TSH, TSHEXT     Procedures/imaging: see electronic medical records for all procedures/Xrays and details which were not copied into this note but were reviewed prior to creation of Sierra Lorenz MD

## 2018-06-10 NOTE — PROGRESS NOTES
Steph Mackey, reported Hgb Results for Maribell Lechuga (MRN [id number]) as of 6/10/2018 13:44   Ref. Range 6/10/2018 10:43   HGB Latest Ref Range: 12.0 - 16.0 g/dL 8.2 (L)     Per MD, continue to monitor. Redraw Hgb at 1800, if <8.0 transfuse 1 unit pRBC's.

## 2018-06-10 NOTE — PROGRESS NOTES
Pharmacy Dosing Services: Vancomycin    Indication: Sepsis    Recent Labs      06/10/18   1043  06/10/18   0910  06/10/18   0528  06/09/18   0345   06/08/18   0500   WBC  28.5*  26.7*   --   9.1   < >   --    CREA   --    --   3.09*  1.84*   --   1.26   BUN   --    --   42*  27*   --   23*    < > = values in this interval not displayed. Estimated Creatinine Clearance: 17.7 mL/min (based on Cr of 3.09). Temp: 98.1 °F (36.7 °C)     Current Antibiotics:   Current Antimicrobial Therapy (168h ago through future)      Ordered     Start Stop      06/10/18 0904  levoFLOXacin (LEVAQUIN) 500 mg in D5W IVPB  500 mg,   IntraVENous,   EVERY 48 HOURS      06/11/18 2100 --    06/09/18 0942  vancomycin (VANCOCIN) 1,000 mg in 0.9% sodium chloride (MBP/ADV) 250 mL adv  1,000 mg,   IntraVENous,   EVERY 24 HOURS      06/09/18 1000 --    06/08/18 1707  piperacillin-tazobactam (ZOSYN) 2.25 g in 0.9% sodium chloride (MBP/ADV) 50 mL MBP  2.25 g,   IntraVENous,   EVERY 6 HOURS      06/08/18 2100 --          Significant Cultures: Body fluid cx: Klebsiella oxytoca. Cx from 6/9/18 Pending    Assessment/Plan  · Patient received 3 doses of Vancomycin 1250 mg IV on 6/4/18 @ 1731, 6/5/18 @ 0408, and 6/6/18 @ 0506. Restarted on 6/9. · Vancomycin Random Level = 19.8 mcg/mL on 6/7/18 @ 0440  · SCr inc from 1.84 mg/dL -> 3.09 mg/dL overnight (6/10/18)  · Vancomycin trough ordered for [facility] 6/11/18 @ 0930. · Levofloxacin adjusted per renal dosing protocol from Levofloxacin 500 IV q24h to Levofloxacin 500 mg IV q48h      Pharmacy to follow daily and will make changes to dose and/or frequency based on clinical status.     Pharmacist Martell Greenberg [address]

## 2018-06-10 NOTE — PROGRESS NOTES
CIRA Alatorre. Anthony Hou [address] [facility] [place] [address] [address] [address]  Phone [phone] Fax [phone]  Pulmonary Critical Care & Sleep Medicine       Name: Holli Alejandre MRN: [id number]   : 1950 Hospital: [facility] [place]   Date: 6/10/2018          IMPRESSION:   Patient Active Problem List   Diagnosis Code    Ovarian ca ([address] C56.9    Severe obesity (BMI 35.0-39.9) ([address] E66.01    Abdominal pain R10.9    Sepsis ([address] A41.9    Oliguria R34    Acute respiratory failure ([address] J96.00    JESS (acute kidney injury) ([address] M32.8    Metabolic acidosis G55.5    Acute deep vein thrombosis (DVT) of lower extremity ([address] I82.409 ·   Severe sepsis with lactic acidosis ? source of sepsis ? Aspiration vs abdominal peritonitis-- S/P bronch - no significant aspiration noted pendign culture  · ARDS   · Shock liver  · Foreign body reported on Peritoneal G biospy will discuss with Dr. Kathy Forman  · Hypotension and shock due to overwhelming sepsis  · Worsening urine out put -- Possible ARF due to sepsis and hypotension  · Metabolic acidsis with lactate elevation ?  Related to sepsis   · Bilateral peroneal DVT <18> Unknown if presented on admission-- Started on heparin drip after Ok by GYN on 18    PLAN:  -- Taper levophed as tolerated  -- DC bicarb drip and start D5ns at 75cc/hr  -- There was lots of concern raised for Massive PE for acute presentation-- Echo done at bedside prelim does not show enlarged RV suggest against massive pe I think all is related to sepsis  -- RPT blood cultures are negative so far  -- Some improvement but still unstable to go for CT scan  -- ARDS vent protocol, Currently on FIO2 90% and Peep of 10 with that sats remaining around 94%  -- Hypotension on iv fluids and levophed also on hydrocortisone  -- Klebsiella in abd fluid sensitive to zosyn but due to downward spiral we added vaco levaquin and diflucan to current zosyn  -- NPO  -- Patient with peroneal DVT was on lovenox and heparin so PE is very unlikely  -- On heparin per protocol  -- Taper oxygen as tolerated  -- Nephrology consult to follow  -- Possible Sepsis due to peritonitis unsure if its causing her to go back to septic shock -- Perioneal biopsy reported foreign body not sure what that is will discuss with Dr. Dominick Rod  -- Glucose and electrolyte protocol    CVS:Echo reviewed EF 65% RV is ok not dilated, Hypotension due to septic shock, , Continue levophed if needed add phenylephrine avoide vasopressin, Follow RPT echo done on 6/10/18  RS:Liberated from vent on 6/7/18, Reintubated on 6/9/18  -- ARDS vent protocol-- Decrease FIO2 to 90%, Pleatus is <30   -- DC zopenex and continue atrovent due to tachycardia  -- ? Aspiration S/P bronch follow am xray  -- Aspiration precaution  -- HOB elevated  ID:Another bout of septic shock, Added vanco/levaquin/diflucan to zosyn-- RPT culture is negative   ENDO:SSI monitor blood sugars started on hydrocortisone for septic shock  GI:NPO for now PPI  RENAL: Nephro consult ,another bout of renal failure, Ideally will benefit from CRRT but we do not have that available will defer to nephrology - Cr is getting worse but some urine out put last night. CNS: Minimal response  HEMATOLOGY: Monitor hb get PT and INR resume heparin drip pending CBC in am  MUSCULOSKELETAL:no acute issued  · PAIN AND SEDATION: On fentanyl drip versed if off due to hypotension  · Skin/Wound: Surgical wound looks clean   · Electrolytes: Replace electrolytes per ICU electrolyte replacement protocol. · IVF: D5 NS  · Nutrition: Hold feeds   · Prophylaxis: DVT Prophylaxis with heparin,. GI Prophylaxis. · Restraints: none  · PT/OT eval and treat. OOB when appropriate.    · Lines/Tubes: Subclavian line 6/4, Fry 6/4, Vent 6/5- 6/9  ADVANCE DIRECTIVE:DNR now wants to continue aggressive measures   FAMILY DISCUSSION:Spoke with patient/brothoer   Quality Care: PPI, DVT prophylaxis, HOB elevated, Infection control all reviewed and addressed. Events and notes from last 24 hours reviewed. Care plan discussed with nursing CC TIME:55  min excluding procedure time    · Labs and images personally seen and available reports reviewed. · All current medicines are reviewed and doses and prescription adjusted. Subjective/History:  6/10/18  Showing sign of improvement with nodding head and responding appropriately  BP is still low but on levophe 7 mcg and slowly improving  ABG showed PH imprved to 7.49, PO2 is still low about 59 but able to bring FIO2 down to 90% with PEEP 10  Worsening Cr 3.09 and sign of shock liver noted   Echo done at bedside    Solange Moreira is a 75 y.o.  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 Was doing ok at home until yesterday when she started feeling a little more discomfort. She presented last night when she felt her pain was significant. In ER received IV fluid and zosyn   Lactate found to be in 5  Abd ct done showed area concerning for ?  Ischemic bowel  Spoke with Dr Dominick Rod on phone she is out of town and wants general surgery to be consulted   Patient just received 4 mg IV diludid by OB GYN in ICU post that her blood pressure running on lower side   She is on NRB after diludid  We asked IR to place PICC line but they declined and wanted to be done tomorrow as patient has peripheral line and concern for sepsis  No urine out put     6/5/18: S/P laproscopy by Dr. Dominick Rod, No perforation or ischemic bowel some purulant looking inflamatory fluid removed, Post procedure on vent, DVT -- Peroneal, worsening cr -- renal evaluated  6/6/18 [address] improved Bicarb stopped all culture negative Started heparin for DVT  6/7/18 SBT and extubated  6/8/18 Continue to improve, Sat in chair, Lactate normal and cr normalized  6/9 possible another bout of septic shock, Managed with vent, bicarb drip, Added vanco/levaquin and diflucan/ Family made DNR but started showing some improvement at night, Bronch done but no significant aspiration      Current Facility-Administered Medications   Medication Dose Route Frequency    magnesium sulfate 1 g/100 ml IVPB (premix or compounded)  1 g IntraVENous ONCE    potassium chloride 10 mEq in 100 ml IVPB  10 mEq IntraVENous ONCE    [START ON 2018] levoFLOXacin (LEVAQUIN) 500 mg in D5W IVPB  500 mg IntraVENous Q48H    fentaNYL (PF) 900 mcg/30 ml infusion soln  25-50 mcg/hr IntraVENous TITRATE    midazolam in normal saline (VERSED) 1 mg/mL infusion  2-6 mg/hr IntraVENous TITRATE    NOREPINephrine (LEVOPHED) 8 mg in 5% dextrose 250mL infusion  2-30 mcg/min IntraVENous TITRATE    sodium bicarbonate (8.4%) 150 mEq in dextrose 5% 1,000 mL infusion   IntraVENous CONTINUOUS    hydrocortisone Sod Succ (PF) (SOLU-CORTEF) injection 50 mg  50 mg IntraVENous Q6H    Vancomycin - Pharmacy to dose  1 Each Other Rx Dosing/Monitoring    vancomycin (VANCOCIN) 1,000 mg in 0.9% sodium chloride (MBP/ADV) 250 mL adv  1,000 mg IntraVENous Q24H    piperacillin-tazobactam (ZOSYN) 2.25 g in 0.9% sodium chloride (MBP/ADV) 50 mL MBP  2.25 g IntraVENous Q6H    heparin 25,000 units in D5W 250 ml infusion  18-36 Units/kg/hr IntraVENous TITRATE    ipratropium (ATROVENT) 0.02 % nebulizer solution 0.5 mg  0.5 mg Nebulization Q6H RT    insulin lispro (HUMALOG) injection   SubCUTAneous Q6H    pantoprazole (PROTONIX) 40 mg in sodium chloride 0.9% 10 mL injection  40 mg IntraVENous DAILY       Objective:   Vital Signs:    Visit Vitals    /68    Pulse (!) 120    Temp 98.1 °F (36.7 °C)    Resp (!) 31    Ht 5' 1\" (1.549 m)    Wt 89.1 kg (196 lb 6.9 oz)    SpO2 93%    BMI 37.12 kg/m2       O2 Device: Endotracheal tube, Ventilator   O2 Flow Rate (L/min): 50 l/min   Temp (24hrs), Av.4 °F (37.4 °C), Min:98.1 °F (36.7 °C), Max:100.9 °F (38.3 °C)       Intake/Output:   Last shift:         Last 3 shifts: 06/08 1901 - 06/10 0700  In: 4824 [I.V.:4734]  Out: 0959 [Urine:773]    Intake/Output Summary (Last 24 hours) at 06/10/18 0905  Last data filed at 06/10/18 0616   Gross per 24 hour   Intake          3876.73 ml   Output               23 ml   Net          3853.73 ml     Review of Systems:  Limited history as patient just received pain meds     Objective:    General: Intubated minimal response appropriate  HEENT: Pupil are minimally reactive  Neck: No adenopathy or thyroid swelling, no lymphadenopathy or JVD, supple  CVS: S1S2 no murmurs  RS: Mod AE bilaterally, no tactile fremitus or egophony, decrease air entry on left side  Abd: distended ? Soft tissue swelling around incision difficult to ascertain due to large belly   Neuro: Minimal appropriate response  Extrm: Leg edema 2 plus  Lymph node: No obvious palpable lymph node appreciated.   Skin: no rash  CBC w/Diff Recent Labs      06/09/18   0345  06/09/18   0006  06/08/18   1347   WBC  9.1  18.2*  16.3*   RBC  5.32*  5.05  4.21   HGB  11.6*  11.1*  9.2*   HCT  37.1  34.0*  29.0*   PLT  297  356  258   GRANS   --   77*   --    LYMPH   --   16*   --    EOS   --   0   --         Chemistry Recent Labs      06/10/18   0528  06/09/18   0345  06/08/18   2355  06/08/18   1656  06/08/18   0500   GLU  192*  157*   --    --   158*   NA  137  137   --    --   139   K  3.8  4.4  4.0  3.8  3.4*   CL  100  103   --    --   103   CO2  25  19*   --    --   23   BUN  42*  27*   --    --   23*   CREA  3.09*  1.84*   --    --   1.26   CA  7.1*  8.3*   --    --   7.7*   MG  1.7  2.2  2.1  1.8  1.9   PHOS  3.6   --   2.9  2.2*  2.4*   AGAP  12  15   --    --   13   BUCR  14  15   --    --   18   AP  57  70   --    --   81   TP  4.7*  5.5*   --    --   5.7*   ALB  1.4*  1.8*   --    --   2.1*   GLOB  3.3  3.7   --    --   3.6   AGRAT  0.4*  0.5*   --    --   0.6*        Lactic Acid Lactic acid   Date Value Ref Range Status   06/10/2018 4.7 (HH) 0.4 - 2.0 MMOL/L Final     Comment:     CALLED TO AND CORRECTLY REPEATED BY:  SOBEIDA ROSALES RN ICU TO  AT 4040 [facility] [id number]       Recent Labs      06/10/18   0528  06/09/18   0535   LAC  4.7*  5.2*        Micro  Recent Labs      06/09/18   1423  06/09/18   1223  06/09/18   1024   CULT  NO GROWTH AFTER 16 HOURS  NO GROWTH AFTER 16 HOURS  PENDING  PENDING     Recent Labs      06/09/18   1423  06/09/18   1223  06/09/18   1024   CULT  NO GROWTH AFTER 16 HOURS  NO GROWTH AFTER 16 HOURS  PENDING  PENDING        ABG Recent Labs      06/10/18   0516  06/09/18 2051 06/09/18   2041   PHI  7.496*  7.421  7.373   PCO2I  31.0*  32.4*  36.1   PO2I  59*  65*  41*   HCO3I  24.0  20.9*  20.8*   FIO2I  90  100  100        Liver Enzymes Protein, total   Date Value Ref Range Status   06/10/2018 4.7 (L) 6.4 - 8.2 g/dL Final     Albumin   Date Value Ref Range Status   06/10/2018 1.4 (L) 3.4 - 5.0 g/dL Final     Globulin   Date Value Ref Range Status   06/10/2018 3.3 2.0 - 4.0 g/dL Final     A-G Ratio   Date Value Ref Range Status   06/10/2018 0.4 (L) 0.8 - 1.7   Final     AST (SGOT)   Date Value Ref Range Status   06/10/2018 805 (H) 15 - 37 U/L Final     Alk.  phosphatase   Date Value Ref Range Status   06/10/2018 57 45 - 117 U/L Final     Recent Labs      06/10/18   0528  06/09/18   0345  06/08/18   0500   TP  4.7*  5.5*  5.7*   ALB  1.4*  1.8*  2.1*   GLOB  3.3  3.7  3.6   AGRAT  0.4*  0.5*  0.6*   SGOT  805*  16  24   AP  57  70  81        Cardiac Enzymes No results found for: CPK, CK, CKMMB, CKMB, RCK3, CKMBT, CKNDX, CKND1, FABIAN, TROPT, TROIQ, KAYLAN, TROPT, TNIPOC, BNP, BNPP     BNP No results found for: BNP, BNPP, XBNPT     Coagulation Recent Labs      06/10/18   0528  06/09/18   1423  06/09/18   0345   PTP  18.0*   --    --    INR  1.6*   --    --    APTT  32.1  104.1*  25.9         Thyroid  No results found for: T4, T3U, TSH, TSHEXT, TSHEXT       Lipid Panel No results found for: CHOL, CHOLPOCT, CHOLX, CHLST, CHOLV, 644876, HDL, LDL, LDLC, DLDLP, 439195, VLDLC, VLDL, TGLX, TRIGL, TRIGP, TGLPOCT, CHHD, CHHDX       Urinalysis Lab Results   Component Value Date/Time    Color DARK YELLOW 06/04/2018 08:42 AM    Appearance CLOUDY 06/04/2018 08:42 AM    Specific gravity 1.028 06/04/2018 08:42 AM    pH (UA) 5.0 06/04/2018 08:42 AM    Protein 30 (A) 06/04/2018 08:42 AM    Glucose NEGATIVE  06/04/2018 08:42 AM    Ketone TRACE (A) 06/04/2018 08:42 AM    Bilirubin SMALL (A) 06/04/2018 08:42 AM    Urobilinogen 1.0 06/04/2018 08:42 AM    Nitrites NEGATIVE  06/04/2018 08:42 AM    Leukocyte Esterase NEGATIVE  06/04/2018 08:42 AM    Epithelial cells FEW 06/04/2018 08:42 AM    Bacteria 1+ (A) 06/04/2018 08:42 AM    WBC 0 to 3 06/04/2018 08:42 AM    RBC 0 to 3 06/04/2018 08:42 AM        XR (Most Recent). CXR reviewed by me and compared with previous CXR   Results from Hospital Encounter encounter on 06/04/18   XR CHEST PORT   Narrative A portable AP radiograph of the chest was obtained at 0735 hours:  History:  Hypoxia. Comparison:  Multiple prior studies most recent being 0207 hours same day. FINDINGS:     Lines and tubes: ET tube tip is 3.5 cm above the tan. NG/OG tube extends into  the stomach beyond the margin of the image. Right-sided subclavian line tip is  near the cavoatrial junction. Heart and mediastinum: Unremarkable. Lungs and pleura: Increasing consolidations are seen in the right perihilar and  particularly on the left basilar regions since the earlier study. No  pneumothorax. Aorta: Unremarkable. Bones: Within normal limits for age. Other: None         Impression IMPRESSION:    Worsening consolidations since the earlier study particularly left base. Lines and tubes as described without pneumothorax.          CT (Most Recent)   Results from Hospital Encounter encounter on 06/04/18   CT ABD PELV W CONT   Narrative EXAM: CT of the Abdomen and Pelvis    INDICATION: Abdominal pain with abdominal distention, status post hysterectomy  and appendectomy    COMPARISON: None. TECHNIQUE: Axial CT imaging of the abdomen and pelvis was performed with  intravenous contrast. Multiplanar reformats were generated. Dose reduction  techniques used: Automated exposure control, adjustment of the mAs and/or kVp  according to patient's size, and iterative reconstruction techniques. _______________    FINDINGS:    LOWER CHEST: Minimal bibasilar atelectasis without significant pleural or  pericardial effusion coronary artery atherosclerotic calcifications are  present. Edward Keel LIVER, BILIARY: Hepatomegaly measuring 20.2 cm with mild diffuse parenchymal low  attenuation/steatosis. There are 2 separate rounded low attenuating lesions in  the left lobe of liver measuring 1.2 cm and 0.9 cm, measuring water attenuation  Hounsfield units. No enhancing hepatic mass. No biliary dilation. Distended  gallbladder/gallbladder hydrops which may be secondary to nothing by mouth  status. PANCREAS: Normal.    SPLEEN: Normal.    ADRENALS: Normal.    KIDNEYS: The kidneys are iso-attenuating without evidence of hydronephrosis,  nephrolithiasis or masses. No perinephric fluid collections. No hydroureter. LYMPH NODES: No enlarged lymph nodes. Bilateral pelvic lymphadenectomy surgical  clips are present. GASTROINTESTINAL TRACT:   -Nasogastric tube is present with the tip projecting towards the distal stomach  with moderate mid to proximal air-fluid level.  -Numerous abnormal edematous thick-walled small bowel loops in the left abdomen  and bilateral pelvis, with definite areas of lamellar edematous transformation  notably in the right lower quadrant. No renetta pneumatosis intestinalis. -Stool is present in the cecum and right colon to the hepatic flexure with  circumferential air, with mildly prominent but not discretely thick-walled  appearance. Bowel gas is present in the transverse colon with decompressed  splenic flexure. Decompressed descending colon.  Edematous thick-walled sigmoid  colon which may be secondary to decompression and diverticulosis. PELVIC ORGANS: Postsurgical changes of hysterectomy and bilateral nephrectomy  with a few tiny locules of gas far lateral right inferior pelvis. Decompressed bladder with Fry catheter in place. VASCULATURE: Normal caliber aorta without evidence of atherosclerotic disease. Patent celiac and SMA. Patent WILLIE. Patent bilateral iliofemoral vessels. Ovoid  infrarenal IVC. OTHER: Small volume of low attenuating abdominal and pelvic ascites,, with  mesenteric stranding. There is a very small amount of interloop mesenteric  fluid. No significant pneumatosis. Mild diffuse soft tissue anasarca  There is a small amount of subcutaneous emphysema lateral left upper abdominal  the soft tissues and left groin. Midline incision with no fluid collections    BONES: Age-indeterminate moderate to severe L1 vertebral body compression  fracture deformity with minimal dorsal retropulsion of the posterior superior  corner, producing mild central stenosis. Multilevel degenerative disc disease  and spondylosis with vacuum disc phenomena at several levels in the lumbar  spine. .    _______________         Impression IMPRESSION:    1. Postsurgical hysterectomy, bilateral oophorectomy, pelvic lymphadenectomy and  a mastectomy surgical changes. Small volume of ascites in the abdomen and  pelvis, with a few tiny locules of gas in the right hemipelvis would be in  keeping with recent postsurgical etiology. 2. Abnormal edematous thick-walled small bowel loops in the left abdomen and  pelvis, with TRAM lamellar edematous configuration, induration. No findings of  pneumatosis. The overall appearance is nonspecific. Diagnostic considerations  include infectious etiology, nonspecific edema which may be unresolved  postsurgical etiology.  Ischemia is also included in the differential diagnostic  consideration, however, there are no findings of pneumatosis, portal venous gas. 3. Stool in the right colon extending to the hepatic flexure with surrounding  gas, foci of pneumatosis could be obscured. No associated bowel wall thickening. 4. Satisfactory position of nasogastric tube. 5. Hepatomegaly, steatosis with 2 rounded low-density lesions, potentially  cysts. 6. Bibasilar atelectasis. -The findings of this exam were discussed extensively with Dr. Lilliana Elizabeth on  06/04/2018, prior to dictation. Imaging:  I have personally reviewed the patients radiographs and have reviewed the reports:                Josué Chamberlain M.D.   Pulmonary Critical Care & Sleep Medicine

## 2018-06-10 NOTE — PROGRESS NOTES
Problem: Pressure Injury - Risk of  Goal: *Prevention of pressure injury  Document Oscar Scale and appropriate interventions in the flowsheet.    Outcome: Progressing Towards Goal  Pressure Injury Interventions:  Sensory Interventions: Assess changes in LOC, Assess need for specialty bed    Moisture Interventions: Absorbent underpads    Activity Interventions: Pressure redistribution bed/mattress(bed type)    Mobility Interventions: Float heels    Nutrition Interventions: Document food/fluid/supplement intake    Friction and Shear Interventions: Apply protective barrier, creams and emollients, Lift sheet, Minimize layers

## 2018-06-10 NOTE — PROGRESS NOTES
1945- Report and care received, assessment completed per flow sheet. Intubated, unresponsive, slight cough to deep suctioning, no corneal reflex. ETT secured, SpO2 in the 90's. IVF/drips infusing via IV pumps without difficulty. 2035- Received telephone call from Migel Christianson MD updated, ABG orders received. 2110-  updated regarding ABG results and status, orders received for 1 liter normal saline bolus and to titrate FiO2 in light of SpO2 being 100%. 2345-  in to see. Reassessment completed. Pupils now reactive, cough stronger with suctioning, furrows brows when suctioned, nods head yes when repeatedly asked if she can hear RN, occasionally stacking breaths, no extremity movement noted. HR now down in the 1teens, levophed decreased, normal saline bolus started per orders, SpO2 98%, FiO2 decreased. Rest of assessment without change.

## 2018-06-10 NOTE — PROGRESS NOTES
Nephrology Progress note    Subjective:     Wilver Singh is a 75 y.o. female with PMH DM, HTN, clear cell ovarian ca s/p debulking who presented with abdominal pain and possible sepsis/ischemic colitis. Pt underwent laparotomy/peritoneal lavage/bx, noted to have decreased UOP and increasing Cr to 2.1 today from baseline 0.6. CT neg for mass or hydro. Pt given lasix yesterday, still with high O2 requirements on vent. Unable to provide further history. - Pt 6/8 but unfortunately decompensated, ? Aspiration,  back on vent was hypoxic despite  Fi02 of 100%, , Hypotensive on IV pressors, Acidotic and on HC03 drip, Urine output decreased markedly. Underwent Bronchoscopy 6/9  Patient seems to be making a remarkable turnaround overnight, now more responsive, requiring less pressor and Oxygenation improving though still on FiO2 90%    Admit Date: 6/4/2018  Allergy:  Allergies   Allergen Reactions    Hydrocodone Other (comments)     Breaks into cold sweat    Metformin Other (comments)     Breaks out into cold sweat. Can Take Glucophage brand name med        Objective:     Visit Vitals    /68    Pulse (!) 120    Temp 98.1 °F (36.7 °C)    Resp (!) 31    Ht 5' 1\" (1.549 m)    Wt 89.1 kg (196 lb 6.9 oz)    SpO2 93%    BMI 37.12 kg/m2         Intake/Output Summary (Last 24 hours) at 06/10/18 0955  Last data filed at 06/10/18 0616   Gross per 24 hour   Intake          3876.73 ml   Output               23 ml   Net          3853.73 ml       Physical Exam:     General: On Vent, more responsive   HENT: Atraumatic and normocephalic. ET tube in place   Eyes: Sclera anicteric   Neck: No JVD   Cardiovascular: tachy S1 & S2, tachy   Pulmonary/Chest Wall: Decreased breath sounds bilaterally   Abdominal: Soft, obese   Musculoskeletal: trace edema   Neurological: Opens eyes in response to name. Moves extremities.        Data Review:      Lab Results   Component Value Date/Time    WBC 9.1 06/09/2018 03:45 AM    RBC 5.32 (H) 06/09/2018 03:45 AM    HCT 37.1 06/09/2018 03:45 AM    MCV 69.7 (L) 06/09/2018 03:45 AM    MCH 21.8 (L) 06/09/2018 03:45 AM    MCHC 31.3 06/09/2018 03:45 AM    RDW 19.3 (H) 06/09/2018 03:45 AM      Lab Results   Component Value Date    IRON 8 (L) 06/07/2018   No components found for: FERRITIN  No components found for: PTHINT  Urinalysis  No results found for: UGLU         Impression:     -JSES- likely ATN,  S Cr was down to 1.2 on 6/8 => 1.84 6/9 => 3.09 today,  oliguric following Hypotension and resp failure. - Septic Shock  -Resp Failure,  re-intubated 6/9,  back on vent. Suspect Aspiration, ? PE  - Severe Metabolic acidosis, improving. Lactate 4.7  -Ovarian ca s/p debulking then laparoscopy w/lavage  -KLEBSIELLA OXYTOCA sepsis (PD fluid)  -DM  -HTN  -Anemia  - Elevated transminasis    Plan:   Continue Supportive care: Vent, IV pressors, IV Antibiotics  Continue IV Fluid. Monitor I/O  Monitor chemistry  Given improvement in hemodynamic profile, will consider support with HD. Will monitor progress over the next 24 to 48 hrs. No urgent indication for initiating Acute HD today  We will follow closely and discuss with family.      Elizabeth Romero MD MPH [facility]  [phone]

## 2018-06-11 LAB
ALBUMIN SERPL-MCNC: 1.4 G/DL (ref 3.4–5)
ALBUMIN/GLOB SERPL: 0.4 {RATIO} (ref 0.8–1.7)
ALP SERPL-CCNC: 85 U/L (ref 45–117)
ALT SERPL-CCNC: 494 U/L (ref 13–56)
ANION GAP SERPL CALC-SCNC: 14 MMOL/L (ref 3–18)
APTT PPP: 75.7 SEC (ref 23–36.4)
ARTERIAL PATENCY WRIST A: YES
ARTERIAL PATENCY WRIST A: YES
AST SERPL-CCNC: 653 U/L (ref 15–37)
BACTERIA SPEC CULT: NORMAL
BACTERIA SPEC CULT: NORMAL
BASE EXCESS BLD CALC-SCNC: 0 MMOL/L
BASE EXCESS BLD CALC-SCNC: 1 MMOL/L
BDY SITE: ABNORMAL
BDY SITE: ABNORMAL
BILIRUB SERPL-MCNC: 0.4 MG/DL (ref 0.2–1)
BODY TEMPERATURE: 97.3
BODY TEMPERATURE: 97.4
BUN SERPL-MCNC: 56 MG/DL (ref 7–18)
BUN/CREAT SERPL: 17 (ref 12–20)
CALCIUM SERPL-MCNC: 7.7 MG/DL (ref 8.5–10.1)
CHLORIDE SERPL-SCNC: 99 MMOL/L (ref 100–108)
CO2 SERPL-SCNC: 25 MMOL/L (ref 21–32)
CREAT SERPL-MCNC: 3.21 MG/DL (ref 0.6–1.3)
DATE LAST DOSE: NORMAL
ERYTHROCYTE [DISTWIDTH] IN BLOOD BY AUTOMATED COUNT: 18.8 % (ref 11.6–14.5)
GAS FLOW.O2 O2 DELIVERY SYS: ABNORMAL L/MIN
GAS FLOW.O2 O2 DELIVERY SYS: ABNORMAL L/MIN
GAS FLOW.O2 SETTING OXYMISER: 14 BPM
GAS FLOW.O2 SETTING OXYMISER: 14 BPM
GLOBULIN SER CALC-MCNC: 3.9 G/DL (ref 2–4)
GLUCOSE BLD STRIP.AUTO-MCNC: 183 MG/DL (ref 70–110)
GLUCOSE BLD STRIP.AUTO-MCNC: 185 MG/DL (ref 70–110)
GLUCOSE BLD STRIP.AUTO-MCNC: 190 MG/DL (ref 70–110)
GLUCOSE BLD STRIP.AUTO-MCNC: 204 MG/DL (ref 70–110)
GLUCOSE SERPL-MCNC: 166 MG/DL (ref 74–99)
GRAM STN SPEC: NORMAL
GRAM STN SPEC: NORMAL
HCO3 BLD-SCNC: 23.8 MMOL/L (ref 22–26)
HCO3 BLD-SCNC: 24.7 MMOL/L (ref 22–26)
HCT VFR BLD AUTO: 26.3 % (ref 35–45)
HGB BLD-MCNC: 8.4 G/DL (ref 12–16)
INSPIRATION.DURATION SETTING TIME VENT: 0.68 SEC
MAGNESIUM SERPL-MCNC: 2.2 MG/DL (ref 1.6–2.6)
MCH RBC QN AUTO: 21.5 PG (ref 24–34)
MCHC RBC AUTO-ENTMCNC: 31.9 G/DL (ref 31–37)
MCV RBC AUTO: 67.4 FL (ref 74–97)
O2/TOTAL GAS SETTING VFR VENT: 1 %
O2/TOTAL GAS SETTING VFR VENT: 100 %
PCO2 BLD: 32.9 MMHG (ref 35–45)
PCO2 BLD: 34 MMHG (ref 35–45)
PEEP RESPIRATORY: 10 CMH2O
PEEP RESPIRATORY: 10 CMH2O
PH BLD: 7.45 [PH] (ref 7.35–7.45)
PH BLD: 7.48 [PH] (ref 7.35–7.45)
PIP ISTAT,IPIP: 25
PLATELET # BLD AUTO: 203 K/UL (ref 135–420)
PO2 BLD: 47 MMHG (ref 80–100)
PO2 BLD: 53 MMHG (ref 80–100)
POTASSIUM SERPL-SCNC: 4 MMOL/L (ref 3.5–5.5)
PROT SERPL-MCNC: 5.3 G/DL (ref 6.4–8.2)
RBC # BLD AUTO: 3.9 M/UL (ref 4.2–5.3)
REPORTED DOSE,DOSE: NORMAL UNITS
REPORTED DOSE/TIME,TMG: NORMAL
SAO2 % BLD: 86 % (ref 92–97)
SAO2 % BLD: 90 % (ref 92–97)
SERVICE CMNT-IMP: ABNORMAL
SERVICE CMNT-IMP: ABNORMAL
SERVICE CMNT-IMP: NORMAL
SERVICE CMNT-IMP: NORMAL
SODIUM SERPL-SCNC: 138 MMOL/L (ref 136–145)
SPECIMEN TYPE: ABNORMAL
SPECIMEN TYPE: ABNORMAL
TOTAL RESP. RATE, ITRR: 26
TOTAL RESP. RATE, ITRR: 38
VANCOMYCIN TROUGH SERPL-MCNC: 16.9 UG/ML (ref 10–20)
VENTILATION MODE VENT: ABNORMAL
VENTILATION MODE VENT: ABNORMAL
VOLUME CONTROL IVLC: YES
VT SETTING VENT: 400 ML
VT SETTING VENT: 400 ML
WBC # BLD AUTO: 23.3 K/UL (ref 4.6–13.2)

## 2018-06-11 PROCEDURE — C9113 INJ PANTOPRAZOLE SODIUM, VIA: HCPCS | Performed by: INTERNAL MEDICINE

## 2018-06-11 PROCEDURE — 74011250636 HC RX REV CODE- 250/636: Performed by: OBSTETRICS & GYNECOLOGY

## 2018-06-11 PROCEDURE — 82803 BLOOD GASES ANY COMBINATION: CPT

## 2018-06-11 PROCEDURE — 74011250636 HC RX REV CODE- 250/636: Performed by: INTERNAL MEDICINE

## 2018-06-11 PROCEDURE — 36600 WITHDRAWAL OF ARTERIAL BLOOD: CPT

## 2018-06-11 PROCEDURE — 77010033678 HC OXYGEN DAILY

## 2018-06-11 PROCEDURE — 80053 COMPREHEN METABOLIC PANEL: CPT | Performed by: OBSTETRICS & GYNECOLOGY

## 2018-06-11 PROCEDURE — 83735 ASSAY OF MAGNESIUM: CPT | Performed by: OBSTETRICS & GYNECOLOGY

## 2018-06-11 PROCEDURE — 74011000250 HC RX REV CODE- 250: Performed by: INTERNAL MEDICINE

## 2018-06-11 PROCEDURE — 82962 GLUCOSE BLOOD TEST: CPT

## 2018-06-11 PROCEDURE — 85027 COMPLETE CBC AUTOMATED: CPT | Performed by: OBSTETRICS & GYNECOLOGY

## 2018-06-11 PROCEDURE — 74011250636 HC RX REV CODE- 250/636

## 2018-06-11 PROCEDURE — 80202 ASSAY OF VANCOMYCIN: CPT | Performed by: INTERNAL MEDICINE

## 2018-06-11 PROCEDURE — 85730 THROMBOPLASTIN TIME PARTIAL: CPT | Performed by: OBSTETRICS & GYNECOLOGY

## 2018-06-11 PROCEDURE — 94640 AIRWAY INHALATION TREATMENT: CPT

## 2018-06-11 PROCEDURE — 36415 COLL VENOUS BLD VENIPUNCTURE: CPT | Performed by: OBSTETRICS & GYNECOLOGY

## 2018-06-11 PROCEDURE — 94003 VENT MGMT INPAT SUBQ DAY: CPT

## 2018-06-11 PROCEDURE — 74011636637 HC RX REV CODE- 636/637: Performed by: INTERNAL MEDICINE

## 2018-06-11 PROCEDURE — 74011000258 HC RX REV CODE- 258: Performed by: OBSTETRICS & GYNECOLOGY

## 2018-06-11 PROCEDURE — 65270000029 HC RM PRIVATE

## 2018-06-11 PROCEDURE — 74011000258 HC RX REV CODE- 258: Performed by: INTERNAL MEDICINE

## 2018-06-11 RX ORDER — FUROSEMIDE 10 MG/ML
40 INJECTION INTRAMUSCULAR; INTRAVENOUS ONCE
Status: COMPLETED | OUTPATIENT
Start: 2018-06-11 | End: 2018-06-11

## 2018-06-11 RX ORDER — HYDROCORTISONE SODIUM SUCCINATE 100 MG/2ML
50 INJECTION, POWDER, FOR SOLUTION INTRAMUSCULAR; INTRAVENOUS EVERY 8 HOURS
Status: DISCONTINUED | OUTPATIENT
Start: 2018-06-11 | End: 2018-06-12

## 2018-06-11 RX ORDER — FUROSEMIDE 10 MG/ML
40 INJECTION INTRAMUSCULAR; INTRAVENOUS ONCE
Status: ACTIVE | OUTPATIENT
Start: 2018-06-11 | End: 2018-06-12

## 2018-06-11 RX ORDER — FUROSEMIDE 10 MG/ML
INJECTION INTRAMUSCULAR; INTRAVENOUS
Status: COMPLETED
Start: 2018-06-11 | End: 2018-06-11

## 2018-06-11 RX ADMIN — HYDROCORTISONE SODIUM SUCCINATE 50 MG: 100 INJECTION, POWDER, FOR SOLUTION INTRAMUSCULAR; INTRAVENOUS at 23:46

## 2018-06-11 RX ADMIN — INSULIN LISPRO 2 UNITS: 100 INJECTION, SOLUTION INTRAVENOUS; SUBCUTANEOUS at 07:11

## 2018-06-11 RX ADMIN — HYDROCORTISONE SODIUM SUCCINATE 50 MG: 100 INJECTION, POWDER, FOR SOLUTION INTRAMUSCULAR; INTRAVENOUS at 15:14

## 2018-06-11 RX ADMIN — INSULIN LISPRO 4 UNITS: 100 INJECTION, SOLUTION INTRAVENOUS; SUBCUTANEOUS at 23:46

## 2018-06-11 RX ADMIN — IPRATROPIUM BROMIDE 0.5 MG: 0.5 SOLUTION RESPIRATORY (INHALATION) at 20:19

## 2018-06-11 RX ADMIN — MIDAZOLAM HYDROCHLORIDE 2 MG: 1 INJECTION, SOLUTION INTRAMUSCULAR; INTRAVENOUS at 15:17

## 2018-06-11 RX ADMIN — INSULIN LISPRO 2 UNITS: 100 INJECTION, SOLUTION INTRAVENOUS; SUBCUTANEOUS at 01:03

## 2018-06-11 RX ADMIN — LEVOFLOXACIN 500 MG: 5 INJECTION, SOLUTION INTRAVENOUS at 20:10

## 2018-06-11 RX ADMIN — PIPERACILLIN SODIUM,TAZOBACTAM SODIUM 2.25 G: 2; .25 INJECTION, POWDER, FOR SOLUTION INTRAVENOUS at 20:10

## 2018-06-11 RX ADMIN — MIDAZOLAM HYDROCHLORIDE 2 MG: 1 INJECTION, SOLUTION INTRAMUSCULAR; INTRAVENOUS at 13:09

## 2018-06-11 RX ADMIN — DEXTROSE MONOHYDRATE AND SODIUM CHLORIDE 75 ML/HR: 5; .9 INJECTION, SOLUTION INTRAVENOUS at 13:07

## 2018-06-11 RX ADMIN — HYDROCORTISONE SODIUM SUCCINATE 50 MG: 100 INJECTION, POWDER, FOR SOLUTION INTRAMUSCULAR; INTRAVENOUS at 03:40

## 2018-06-11 RX ADMIN — MIDAZOLAM HYDROCHLORIDE 2 MG: 1 INJECTION, SOLUTION INTRAMUSCULAR; INTRAVENOUS at 07:58

## 2018-06-11 RX ADMIN — SODIUM CHLORIDE 40 MG: 9 INJECTION INTRAMUSCULAR; INTRAVENOUS; SUBCUTANEOUS at 09:55

## 2018-06-11 RX ADMIN — IPRATROPIUM BROMIDE 0.5 MG: 0.5 SOLUTION RESPIRATORY (INHALATION) at 13:06

## 2018-06-11 RX ADMIN — HEPARIN SODIUM 19 UNITS/KG/HR: 10000 INJECTION, SOLUTION INTRAVENOUS at 03:42

## 2018-06-11 RX ADMIN — HYDROCORTISONE SODIUM SUCCINATE 50 MG: 100 INJECTION, POWDER, FOR SOLUTION INTRAMUSCULAR; INTRAVENOUS at 08:21

## 2018-06-11 RX ADMIN — MIDAZOLAM HYDROCHLORIDE 2 MG: 1 INJECTION, SOLUTION INTRAMUSCULAR; INTRAVENOUS at 04:42

## 2018-06-11 RX ADMIN — IPRATROPIUM BROMIDE 0.5 MG: 0.5 SOLUTION RESPIRATORY (INHALATION) at 07:38

## 2018-06-11 RX ADMIN — INSULIN LISPRO 2 UNITS: 100 INJECTION, SOLUTION INTRAVENOUS; SUBCUTANEOUS at 12:00

## 2018-06-11 RX ADMIN — MIDAZOLAM HYDROCHLORIDE 2 MG: 1 INJECTION, SOLUTION INTRAMUSCULAR; INTRAVENOUS at 21:34

## 2018-06-11 RX ADMIN — Medication 25 MCG/HR: at 01:28

## 2018-06-11 RX ADMIN — MIDAZOLAM HYDROCHLORIDE 2 MG: 1 INJECTION, SOLUTION INTRAMUSCULAR; INTRAVENOUS at 18:41

## 2018-06-11 RX ADMIN — FUROSEMIDE 40 MG: 10 INJECTION, SOLUTION INTRAVENOUS at 12:00

## 2018-06-11 RX ADMIN — MIDAZOLAM HYDROCHLORIDE 2 MG: 1 INJECTION, SOLUTION INTRAMUSCULAR; INTRAVENOUS at 23:46

## 2018-06-11 RX ADMIN — FENTANYL CITRATE 25 MCG: 50 INJECTION, SOLUTION INTRAMUSCULAR; INTRAVENOUS at 07:04

## 2018-06-11 RX ADMIN — IPRATROPIUM BROMIDE 0.5 MG: 0.5 SOLUTION RESPIRATORY (INHALATION) at 02:58

## 2018-06-11 RX ADMIN — PIPERACILLIN SODIUM,TAZOBACTAM SODIUM 2.25 G: 2; .25 INJECTION, POWDER, FOR SOLUTION INTRAVENOUS at 09:59

## 2018-06-11 RX ADMIN — FUROSEMIDE 40 MG: 10 INJECTION, SOLUTION INTRAMUSCULAR; INTRAVENOUS at 22:09

## 2018-06-11 RX ADMIN — SODIUM CHLORIDE 1000 MG: 900 INJECTION, SOLUTION INTRAVENOUS at 13:04

## 2018-06-11 RX ADMIN — FENTANYL CITRATE 25 MCG: 50 INJECTION, SOLUTION INTRAMUSCULAR; INTRAVENOUS at 20:10

## 2018-06-11 RX ADMIN — PIPERACILLIN SODIUM,TAZOBACTAM SODIUM 2.25 G: 2; .25 INJECTION, POWDER, FOR SOLUTION INTRAVENOUS at 03:40

## 2018-06-11 NOTE — PROGRESS NOTES
Patient visited with pt after IDR's remains on vent no family present at that time,cm will follow up tomorrow.

## 2018-06-11 NOTE — PROGRESS NOTES
0800  Bedside  shift change report given to Jennifer Enriquez RN (oncoming nurse) by Alfonzo Daniels (offgoing nurse). Report included the following information SBAR, Kardex, ED Summary, Procedure Summary, Intake/Output, MAR, Accordion, Recent Results, Med Rec Status, Cardiac Rhythm ST and Alarm Parameters . No fever. Off levophed. CVP 8-9. On heparin drip. Rate verified with offgoing Nurse,Hb stable. No active external bleeding. Fentanyl PCA also infusing without incident and rate verified. tachypenia on vent. Still on 100% fio2. Leg edema +. Awake but restless on vent. Versed given at this time, noted effective, patient less restless. The patient is critically ill and can not provide additional history due to Ventilated. 1000 Vanco held, trough not avail at this time, DR Justin Linder made aware       1200- Reassessment- no changes    1245 bedside report given to oncoming Nurse Brad Quintero, in SBAR format, Report included recent results, Kardex, Accordion and summary report. Patient currently resting, Calm appears in no distress, family at bedside. Vanco reschedueled per pharmacy dosing 1300.

## 2018-06-11 NOTE — DIABETES MGMT
GLYCEMIC CONTROL PROGRESS NOTE:    -discussed in rounds, known h/o T2DM HbA1C within recommended range for age + comorbids on oral home regimen  -BG out of target range ICU: 140-180 mg/dL, D5 IVF   -TDD = 12 units - Humalog Normal Insulin Sensitivity Corrective Coverage  -pt transferred back to ICU, NPO intubated  -24 hour BG trending down, IV steroids to decrease today  -monitor BG trends overnight, pt may benefit from conservative dose of Lantus       Recent Glucose Results:   Lab Results   Component Value Date/Time     (H) 06/11/2018 04:51 AM    GLUCPOC 185 (H) 06/11/2018 05:37 AM    GLUCPOC 181 (H) 06/10/2018 11:11 PM    GLUCPOC 183 (H) 06/10/2018 09:28 PM         Dennise Gurrola RN, MS  Glycemic Control Team  Pager [phone] (M-TH 8:30-5P)  *After Hours pager [phone]

## 2018-06-11 NOTE — PROGRESS NOTES
Summary: Pt stable through night. More alert, able to follow simple commands. RASS -1 -0. Nods and shakes head appropriately. Course breathe sounds with moderate amount of yellow sputum through ET. Midline incision with dermabond CDI, lap sites CDI. Electrolytes replaced per protocol. Urine output better with Lasix. No acute changes over night. Bedside and Verbal shift change report given to MIRIAM Leslie RN (oncoming nurse) by Stevo Camacho RN   (offgoing nurse). Report included the following information SBAR, Kardex and MAR.

## 2018-06-11 NOTE — PROGRESS NOTES
[facility]                  Pulmonary, Critical Care, and Sleep Medicine     Name: Conchita Caldwell MRN: [id number]   : 1950 Hospital: [facility] [place]    Date: 2018        [facility] Note                                                IMPRESSION:   · Severe sepsis due to peritonitis. · Klebsiella oxytoca peritonitis. S/p laparotomy and peritoneal lavage: Klebsiella positive. Ruled out ischemic bowel. · S/p bronchoscopy 18: no aspiration noted. Cx NGTD. · VDRF due to sepsis, reintubated on 18  · S/p Shock likely due to sepsis and obstructive due to presumed PE. Off vasopressors. · Acute b/l peroneal DVT  · Presumed PE with severe hypoxia and high A-a gradient, elevated RVSP 34 mm Hg. · Anemia, no active external bleeding. · JESS  · Metabolic ad lactic acidosis, improving. · Elevated LFT, likely shock liver. · Lactic acidosis due to hypotension, hypoperfusion, sepsis, abnormal LFT etc. Improving. · Foreign body on peritoneal biopsy, per path report. · Code status: DNR. · Very poor overall prognosis. RECOMMENDATIONS:   Respiratory:  Presumed PE with 100% fio2 and 10 peep requirement. C/w heparin drip. Monitor any bleedign or complications. No tPA given due to risk of bleeding with recent two abd surgeries. Ventilator bundle & Sedation protocol followed. Daily sedation holiday, assessment for readiness for SBT and then re-titrate if required. Chlorhexidine mouth washes. Keep SPO2 >=92%. HOB 30 degree elevation all the time. Aggressive pulmonary toileting. Incentive spirometry when appropriate. Aspiration precautions. CVS: tapered off levophed. CVP 8-9. Echo ok with mildly elevated RVSP but normal RV systolic function. Leg edema due to b/l DVT and fluid resuscitation. On heparin drip. ID:  C/w vanco, levaquin and zosyn for kelbsiella oxytoca. Off vasopressors. Taper off hydrocortisone, reduce to 50 mg q8 hrs today.    Sepsis bundle and protocol followed. Follow serial lactic acid, frequent BMP check, fluid resuscitation. Follow cultures. Deescalate antibiotic when appropriate. Repeat CT abd/pelvis/chest when fio2 reduced enough to be transported on portable ventilator and when it is safer to transport. Will call ID consult. Hematology/Oncology: monitor H&H. Renal: per nephrologist. Off bicarb drip. Monitor UOP, slightly improved with lasix 40 mg x1 today by nephro. GI/: NPO  Endocrine: Maintain blood glucose 140-180. Humalog sub cut. Taper off steroids. Neurology: agitated due to peritonitis and spepsis. Pain/Sedation: fentanyl drip. Versed drip. Skin/Wound: local surgical site care. Electrolytes: Replace electrolytes per ICU electrolyte replacement protocol. Nutrition: NPO for now. Prophylaxis: DVT Rx with heparin drip. GI Prophylaxis with protonix. Restraints: none  Lines/Tubes:   ETT: 6/8/18. OGT: 6/8/18. Central line: right subclavina 6/4/18 (site examined, no erythema, induration, discharge or sign of infection. Dressing intact. Medically necessary, will remove it when not needed. Central line bundle followed). Fry: 6/4/18 (Medically necessary for strict input/output monitoring in critically ill patient, will remove it when not needed. Fry bundle followed). Very poor overall prognosis with MODS/MOF discussed with pt's brother at bedside, answered all questions to his satisfaction. Will defer respective systems problem management to primary and other respective consultant and follow patient in ICU with primary and other medical team.  Further recommendations will be based on the patient's response to recommended treatment and results of the investigation ordered. Quality Care: PPI, DVT prophylaxis, HOB elevated, Infection control all reviewed and addressed.     Care of plan d/w RN, RT, PT/OT, ST, pharmacist, palliative care team, MDR, family- brother at bedside (answered all questions to satisfaction). High complexity decision making was performed during the evaluation of this patient at high risk for decompensation with multiple organ involvement. Total critical care time spent rendering care exclusive of procedures: 37 minutes. Subjective/History of Present Illness:     Katherine Llanes has been seen and evaluated in ICU room 109. Patient is a 75 y.o. female admitted abd pain after ovarian cancer debulking surgery, s/p laparoscopy and peritoneal lavage 6/5/18, ruled out ischemic bowel, Klebsielle peritoneal cx positive, JESS, peroneal DVT, severe hypoxic respiratory failure with presumed PE, on ventilator, shock likely septic vs obstructive. 6/11/18:  No fever. Off levophed. CVP 8-9. On heparin drip. Hb stable. No active external bleeding. tachypenia on vent. Still on 100% fio2. Leg edema +. Awake but restless on vent. The patient is critically ill and can not provide additional history due to Ventilated. Review of Systems:  Review of systems not obtained due to patient factors. Allergies   Allergen Reactions    Hydrocodone Other (comments)     Breaks into cold sweat    Metformin Other (comments)     Breaks out into cold sweat. Can Take Glucophage brand name med      Past Medical History:   Diagnosis Date    Diabetes ([facility] [address]     many years type 2    Hypertension     many years      Past Surgical History:   Procedure Laterality Date    HX OOPHORECTOMY  05/29/2018    HX TONSILLECTOMY      as a child       Social History   Substance Use Topics    Smoking status: Never Smoker    Smokeless tobacco: Never Used    Alcohol use No      History reviewed. No pertinent family history.      Current Facility-Administered Medications   Medication Dose Route Frequency    furosemide (LASIX) injection 40 mg  40 mg IntraVENous ONCE    hydrocortisone Sod Succ (PF) (SOLU-CORTEF) injection 50 mg  50 mg IntraVENous Q8H    levoFLOXacin (LEVAQUIN) 500 mg in D5W IVPB  500 mg IntraVENous Q48H    dextrose 5% and 0.9% NaCl infusion  75 mL/hr IntraVENous CONTINUOUS    fentaNYL (PF) 900 mcg/30 ml infusion soln  25-50 mcg/hr IntraVENous TITRATE    midazolam in normal saline (VERSED) 1 mg/mL infusion  2-6 mg/hr IntraVENous TITRATE    NOREPINephrine (LEVOPHED) 8 mg in 5% dextrose 250mL infusion  2-30 mcg/min IntraVENous TITRATE    Vancomycin - Pharmacy to dose  1 Each Other Rx Dosing/Monitoring    vancomycin (VANCOCIN) 1,000 mg in 0.9% sodium chloride (MBP/ADV) 250 mL adv  1,000 mg IntraVENous Q24H    piperacillin-tazobactam (ZOSYN) 2.25 g in 0.9% sodium chloride (MBP/ADV) 50 mL MBP  2.25 g IntraVENous Q6H    heparin 25,000 units in D5W 250 ml infusion  18-36 Units/kg/hr IntraVENous TITRATE    ipratropium (ATROVENT) 0.02 % nebulizer solution 0.5 mg  0.5 mg Nebulization Q6H RT    insulin lispro (HUMALOG) injection   SubCUTAneous Q6H    pantoprazole (PROTONIX) 40 mg in sodium chloride 0.9% 10 mL injection  40 mg IntraVENous DAILY         Objective:   Vital Signs:    Visit Vitals    /87    Pulse (!) 105    Temp 97.4 °F (36.3 °C)    Resp (!) 36    Ht 5' 1\" (1.549 m)    Wt 89.1 kg (196 lb 6.9 oz)    SpO2 90%    BMI 37.12 kg/m2       O2 Device: Endotracheal tube   O2 Flow Rate (L/min): 50 l/min   Temp (24hrs), Av.7 °F (36.5 °C), Min:97.2 °F (36.2 °C), Max:98.3 °F (36.8 °C)       Intake/Output:   Last shift:      701 - 1900  In: -   Out: 550 [Urine:550]    Last 3 shifts: 1901 -  07  In: 4753.6 [I.V.:4663.6]  Out: 1413 [Urine:1073]      Intake/Output Summary (Last 24 hours) at 18 1157  Last data filed at 18 1130   Gross per 24 hour   Intake          2546.22 ml   Output             1765 ml   Net           781.22 ml       Last 3 Recorded Weights in this Encounter    18 0705 18 1352   Weight: 83.9 kg (185 lb) 89.1 kg (196 lb 6.9 oz)       Ventilator Settings:  Mode Rate Tidal Volume Pressure FiO2 PEEP   Assist control   400 ml 100 % 10 cm H20     Peak airway pressure: 26 cm H2O    Plateau pressure:     Tidal volume:    Minute ventilation: 15.4 l/min   SPO2 90%       Physical Exam:     General/Neurology: drowsy, arousable, but restless. Moving all 4 limbs spontaneously. Head:   Normocephalic, without obvious abnormality, atraumatic. Eye:   no scleral icterus, no pallor, no cyanosis. Throat:  No oral thrush. ETT and OGT. Neck:   Symmetric. No lymphadenopathy. Trachea midline  Lung: Moderate air entry bilateral equal. Decreased AE bibasilar. No rales. No rhonchi. No wheezing. No stridors. Heart:   Regular rate & rhythm. S1 S2 present. No murmur. No JVD. Abdomen:  Soft. Obese. +BS. Midline lower abd surgical site without induration/discharge. Extremities:  Trace to 1 + b/l pitting pedal edema. No cyanosis. No clubbing. Pulses: 2+ and symmetric in DP. Capillary refill: normal  Lymphatic:  No cervical or supraclavicular palpable lymphadenopathy. Skin:   No rashes or lesions.        Data:       Recent Results (from the past 24 hour(s))   GLUCOSE, POC    Collection Time: 06/10/18 11:59 AM   Result Value Ref Range    Glucose (POC) 217 (H) 70 - 110 mg/dL   PTT    Collection Time: 06/10/18 12:22 PM   Result Value Ref Range    aPTT 145.7 (HH) 23.0 - 36.4 SEC   MAGNESIUM    Collection Time: 06/10/18  3:00 PM   Result Value Ref Range    Magnesium 2.1 1.6 - 2.6 mg/dL   POTASSIUM    Collection Time: 06/10/18  3:00 PM   Result Value Ref Range    Potassium 3.9 3.5 - 5.5 mmol/L   CALCIUM, IONIZED    Collection Time: 06/10/18  3:00 PM   Result Value Ref Range    Ionized Calcium 1.01 (L) 1.12 - 1.32 MMOL/L   HGB & HCT    Collection Time: 06/10/18  5:58 PM   Result Value Ref Range    HGB 8.2 (L) 12.0 - 16.0 g/dL    HCT 24.9 (L) 35.0 - 45.0 %   GLUCOSE, POC    Collection Time: 06/10/18  6:10 PM   Result Value Ref Range    Glucose (POC) 182 (H) 70 - 110 mg/dL   PTT    Collection Time: 06/10/18  8:01 PM   Result Value Ref Range    aPTT 107.4 (H) 23.0 - 36.4 SEC   GLUCOSE, POC    Collection Time: 06/10/18  9:28 PM   Result Value Ref Range    Glucose (POC) 183 (H) 70 - 110 mg/dL   GLUCOSE, POC    Collection Time: 06/10/18 11:11 PM   Result Value Ref Range    Glucose (POC) 181 (H) 70 - 110 mg/dL   MAGNESIUM    Collection Time: 06/11/18  4:51 AM   Result Value Ref Range    Magnesium 2.2 1.6 - 2.6 mg/dL   METABOLIC PANEL, COMPREHENSIVE    Collection Time: 06/11/18  4:51 AM   Result Value Ref Range    Sodium 138 136 - 145 mmol/L    Potassium 4.0 3.5 - 5.5 mmol/L    Chloride 99 (L) 100 - 108 mmol/L    CO2 25 21 - 32 mmol/L    Anion gap 14 3.0 - 18 mmol/L    Glucose 166 (H) 74 - 99 mg/dL    BUN 56 (H) 7.0 - 18 MG/DL    Creatinine 3.21 (H) 0.6 - 1.3 MG/DL    BUN/Creatinine ratio 17 12 - 20      GFR est AA 17 (L) >60 ml/min/1.73m2    GFR est non-AA 14 (L) >60 ml/min/1.73m2    Calcium 7.7 (L) 8.5 - 10.1 MG/DL    Bilirubin, total 0.4 0.2 - 1.0 MG/DL    ALT (SGPT) 494 (H) 13 - 56 U/L    AST (SGOT) 653 (H) 15 - 37 U/L    Alk.  phosphatase 85 45 - 117 U/L    Protein, total 5.3 (L) 6.4 - 8.2 g/dL    Albumin 1.4 (L) 3.4 - 5.0 g/dL    Globulin 3.9 2.0 - 4.0 g/dL    A-G Ratio 0.4 (L) 0.8 - 1.7     CBC W/O DIFF    Collection Time: 06/11/18  4:51 AM   Result Value Ref Range    WBC 23.3 (H) 4.6 - 13.2 K/uL    RBC 3.90 (L) 4.20 - 5.30 M/uL    HGB 8.4 (L) 12.0 - 16.0 g/dL    HCT 26.3 (L) 35.0 - 45.0 %    MCV 67.4 (L) 74.0 - 97.0 FL    MCH 21.5 (L) 24.0 - 34.0 PG    MCHC 31.9 31.0 - 37.0 g/dL    RDW 18.8 (H) 11.6 - 14.5 %    PLATELET 790 034 - 531 K/uL   PTT    Collection Time: 06/11/18  4:51 AM   Result Value Ref Range    aPTT 75.7 (H) 23.0 - 36.4 SEC   POC G3    Collection Time: 06/11/18  5:02 AM   Result Value Ref Range    Device: VENT      FIO2 (POC) 100 %    pH (POC) 7.482 (H) 7.35 - 7.45      pCO2 (POC) 32.9 (L) 35.0 - 45.0 MMHG    pO2 (POC) 53 (L) 80 - 100 MMHG    HCO3 (POC) 24.7 22 - 26 MMOL/L    sO2 (POC) 90 (L) 92 - 97 %    Base excess (POC) 1 mmol/L    Mode ASSIST CONTROL      Tidal volume 400 ml    Set Rate 14 bpm    PEEP/CPAP (POC) 10 cmH2O    PIP (POC) 25      Allens test (POC) YES      Inspiratory Time 0.68 sec    Total resp.  rate 26      Site RIGHT RADIAL      Patient temp. 97.3      Specimen type (POC) ARTERIAL      Performed by Yonis Burk    GLUCOSE, POC    Collection Time: 06/11/18  5:37 AM   Result Value Ref Range    Glucose (POC) 185 (H) 70 - 110 mg/dL   GLUCOSE, POC    Collection Time: 06/11/18 11:25 AM   Result Value Ref Range    Glucose (POC) 190 (H) 70 - 110 mg/dL         Chemistry Recent Labs      06/11/18   0451  06/10/18   1500  06/10/18   0528  06/09/18   0345  06/08/18   2355  06/08/18   1656   GLU  166*   --   192*  157*   --    --    NA  138   --   137  137   --    --    K  4.0  3.9  3.8  4.4  4.0  3.8   CL  99*   --   100  103   --    --    CO2  25   --   25  19*   --    --    BUN  56*   --   42*  27*   --    --    CREA  3.21*   --   3.09*  1.84*   --    --    CA  7.7*   --   7.1*  8.3*   --    --    MG  2.2  2.1  1.7  2.2  2.1  1.8   PHOS   --    --   3.6   --   2.9  2.2*   AGAP  14   --   12  15   --    --    BUCR  17   --   14  15   --    --    AP  85   --   57  70   --    --    TP  5.3*   --   4.7*  5.5*   --    --    ALB  1.4*   --   1.4*  1.8*   --    --    GLOB  3.9   --   3.3  3.7   --    --    AGRAT  0.4*   --   0.4*  0.5*   --    --         Lactic Acid Lactic acid   Date Value Ref Range Status   06/10/2018 4.7 (HH) 0.4 - 2.0 MMOL/L Final     Comment:     CALLED TO AND CORRECTLY REPEATED BY:  SOBEIDA ROSALES RN ICU TO  AT 0615 ON [id number]       Recent Labs      06/10/18   0528  06/09/18   0535   LAC  4.7*  5.2*        Liver Enzymes Protein, total   Date Value Ref Range Status   06/11/2018 5.3 (L) 6.4 - 8.2 g/dL Final     Albumin   Date Value Ref Range Status   06/11/2018 1.4 (L) 3.4 - 5.0 g/dL Final     Globulin   Date Value Ref Range Status   06/11/2018 3.9 2.0 - 4.0 g/dL Final     A-G Ratio   Date Value Ref Range Status   06/11/2018 0.4 (L) 0.8 - 1.7   Final     AST (SGOT)   Date Value Ref Range Status   06/11/2018 653 (H) 15 - 37 U/L Final     Alk. phosphatase   Date Value Ref Range Status   06/11/2018 85 45 - 117 U/L Final     Recent Labs      06/11/18   0451  06/10/18   0528  06/09/18   0345   TP  5.3*  4.7*  5.5*   ALB  1.4*  1.4*  1.8*   GLOB  3.9  3.3  3.7   AGRAT  0.4*  0.4*  0.5*   SGOT  653*  805*  16   AP  85  57  70        CBC w/Diff Recent Labs      06/11/18   0451  06/10/18   1758  06/10/18   1043  06/10/18   0910   06/09/18   0006   WBC  23.3*   --   28.5*  26.7*   < >  18.2*   RBC  3.90*   --   3.75*  3.62*   < >  5.05   HGB  8.4*  8.2*  8.2*  7.9*   < >  11.1*   HCT  26.3*  24.9*  25.5*  24.6*   < >  34.0*   PLT  203   --   223  226   < >  356   GRANS   --    --   60   --    --   77*   LYMPH   --    --   16*   --    --   16*   EOS   --    --   0   --    --   0    < > = values in this interval not displayed.         Cardiac Enzymes No results found for: CPK, CK, CKMMB, CKMB, RCK3, CKMBT, CKNDX, CKND1, FABIAN, TROPT, TROIQ, KAYLAN, TROPT, TNIPOC, BNP, BNPP     BNP No results found for: BNP, BNPP, XBNPT     Coagulation Recent Labs      06/11/18   0451  06/10/18   2001  06/10/18   1222  06/10/18   0528   PTP   --    --    --   18.0*   INR   --    --    --   1.6*   APTT  75.7*  107.4*  145.7*  32.1         Thyroid  No results found for: T4, T3U, TSH, TSHEXT    No results found for: T4     Urinalysis Lab Results   Component Value Date/Time    Color DARK YELLOW 06/04/2018 08:42 AM    Appearance CLOUDY 06/04/2018 08:42 AM    Specific gravity 1.028 06/04/2018 08:42 AM    pH (UA) 5.0 06/04/2018 08:42 AM    Protein 30 (A) 06/04/2018 08:42 AM    Glucose NEGATIVE  06/04/2018 08:42 AM    Ketone TRACE (A) 06/04/2018 08:42 AM    Bilirubin SMALL (A) 06/04/2018 08:42 AM    Urobilinogen 1.0 06/04/2018 08:42 AM    Nitrites NEGATIVE  06/04/2018 08:42 AM    Leukocyte Esterase NEGATIVE  06/04/2018 08:42 AM    Epithelial cells FEW 06/04/2018 08:42 AM    Bacteria 1+ (A) 06/04/2018 08:42 AM    WBC 0 to 3 06/04/2018 08:42 AM    RBC 0 to 3 06/04/2018 08:42 AM        Micro  Recent Labs      06/09/18   1423  06/09/18   1223  06/09/18   1024   CULT  NO GROWTH 2 DAYS  NO GROWTH 2 DAYS  RARE NORMAL RESPIRATORY JOSETTE  PENDING     Recent Labs      06/09/18   1423  06/09/18   1223  06/09/18   1024   CULT  NO GROWTH 2 DAYS  NO GROWTH 2 DAYS  RARE NORMAL RESPIRATORY JOSETTE  PENDING        ABG Recent Labs      06/11/18   0502  06/10/18   0516  06/09/18   2051   PHI  7.482*  7.496*  7.421   PCO2I  32.9*  31.0*  32.4*   PO2I  53*  59*  65*   HCO3I  24.7  24.0  20.9*   FIO2I  100  90  100          Echo 6/9/18:  Left ventricle: Systolic function was normal. Ejection fraction was estimated   in the range of 65 % to 70 %. No obvious  wall motion abnormalities identified in the views obtained. Wall thickness   was mildly increased. Doppler parameters  were consistent with abnormal left ventricular relaxation (grade 1 diastolic   dysfunction). Right ventricle: The size was normal. Systolic function was normal.  Mitral valve: There was mild annular calcification. Tricuspid valve: Pulmonary artery systolic pressure was mildly increased. Pulmonary artery systolic pressure: 34 mmHg. PVL LE 6/6/18: acute b/l peroneal DVT. CT abd pelvis 6/4/18:  1. Postsurgical hysterectomy, bilateral oophorectomy, pelvic lymphadenectomy and  a mastectomy surgical changes. Small volume of ascites in the abdomen and  pelvis, with a few tiny locules of gas in the right hemipelvis would be in  keeping with recent postsurgical etiology. 2. Abnormal edematous thick-walled small bowel loops in the left abdomen and  pelvis, with TRAM lamellar edematous configuration, induration. No findings of  pneumatosis. The overall appearance is nonspecific. Diagnostic considerations  include infectious etiology, nonspecific edema which may be unresolved  postsurgical etiology.  Ischemia is also included in the differential diagnostic  consideration, however, there are no findings of pneumatosis, portal venous gas. 3. Stool in the right colon extending to the hepatic flexure with surrounding  gas, foci of pneumatosis could be obscured. No associated bowel wall thickening. 4. Satisfactory position of nasogastric tube. 5. Hepatomegaly, steatosis with 2 rounded low-density lesions, potentially  cysts. 6. Bibasilar atelectasis. CXR reviewed by me:  CXR 6/10/18:  1. Endotracheal tube, nasogastric tube, and right subclavian central venous  catheter as above. 2. Low lung volumes, with patchy asymmetric opacities as described, potentially  reflecting atelectasis, with or without associated airspace disease.     Chadd Wesley MD  6/11/2018

## 2018-06-11 NOTE — PROGRESS NOTES
Pharmacy Dosing Services: Vancomycin    Consult for Vancomycin Dosing by Pharmacy by Dr. Reese Miguel provided for this 75y.o. year old female , for indication of sepsis. Day of Therapy 3 (restarted on 6/9/18)   Pt previous received Vancomycin 6/4/18 - 6/6/18    Scr = 3.21 (increased from 1.84 on 6/9/18)   CrCl = 17 ml/min     Vancomycin Level = 16.9 (6/11/18 at 10:15) - obtained after 2 doses administered due to increase in Scr. Level is currently within therapeutic range:  15 - 20     Order entered for Vancomycin 1000 mg IV once, scheduled for 6/11/18 at ~ 13:00. Subsequent dosing to be determined based on renal function monitoring and/or future levels. Ht Readings from Last 1 Encounters:   06/04/18 154.9 cm (61\")        Wt Readings from Last 1 Encounters:   06/04/18 89.1 kg (196 lb 6.9 oz)      Previous Regimen Vancomycin 1000 mg IV q24h   Other Current Antibiotics Zosyn 2.25 grams IV q8h  Levofloxacin 500 mg IV q48h   Significant Cultures New blood cultures (6/9/18) pending  Peritoneal fluid culture (6/5/18): Klebsiella oxytoca   Serum Creatinine Lab Results   Component Value Date/Time    Creatinine 3.21 (H) 06/11/2018 04:51 AM      Creatinine Clearance Estimated Creatinine Clearance: 17 mL/min (based on Cr of 3.21). BUN Lab Results   Component Value Date/Time    BUN 56 (H) 06/11/2018 04:51 AM      WBC Lab Results   Component Value Date/Time    WBC 23.3 (H) 06/11/2018 04:51 AM      H/H Lab Results   Component Value Date/Time    HGB 8.4 (L) 06/11/2018 04:51 AM      Platelets Lab Results   Component Value Date/Time    PLATELET 608 56/81/7655 04:51 AM      Temp 97.4 °F (36.3 °C)     Pharmacy to follow daily and will make changes to dose and/or frequency based on clinical status.   Pharmacist Yudith Lisa, [facility]

## 2018-06-11 NOTE — ACP (ADVANCE CARE PLANNING)
Patient is on vent. Brother was present but left to get lunch. Patient was able to nod her head and communicate some. Patient is a DNR. Brother states that she has completed an Advance Medical Directive in the past.  I have asked him to bring in a copy. Patient never . No children. Parents . 7 brothers and patient. She lives with this brother. Other siblings are out of the area. Patient is a member of [employer]. [address] [facility], M.Div.   Board Certified   [phone] - Office

## 2018-06-11 NOTE — PROGRESS NOTES
Pharmacist Renal Dosing Progress Note for Zosyn    The following medication: Zosyn was automatically dose-adjusted per THE [facility] P&T Committee Protocol, with respect to renal function. Consult provided for this   75 y.o. , female , for the indication of intra-abdominal infection. Scr = 3.21   CrCl = 17 ml/min     Dose adjusted to:  Zosyn 2.25 grams IV q8h    Pt Weight:   Wt Readings from Last 1 Encounters:   06/04/18 89.1 kg (196 lb 6.9 oz)     Previous Regimen   Zosyn 2.25 grams IV q6h   Serum Creatinine Lab Results   Component Value Date/Time    Creatinine 3.21 (H) 06/11/2018 04:51 AM       Creatinine Clearance Estimated Creatinine Clearance: 17 mL/min (based on Cr of 3.21). BUN Lab Results   Component Value Date/Time    BUN 56 (H) 06/11/2018 04:51 AM         Pharmacy to continue to monitor patient daily. Will make dosage adjustments based upon changing renal function.   Signed Jennie Butler information:   [phone]

## 2018-06-11 NOTE — PROGRESS NOTES
More responsive this am. Opening eyes to voice. AF, Less tachy, Normotensive now off pressors, O2 sats 94% on FiO2 of 100%  UOP greatly improved. Responds to voice  Lungs clear  Abdomen distended, appropriately tender, hypoBS  Ext warm, 2+ edema     Discussed status with family present. Respiratory failure, Sepsis, ARF, DVT suspect PE  Will continue current support including ventilation, abx, hep gtt and fentanyl.     Diane Odell MD

## 2018-06-11 NOTE — PROGRESS NOTES
Speech Therapy Note:    Could not progress with ST treatment because patient :     [ ] was unresponsive. [X] deferred due to: on ventilator 2/2 change in status   [ ] was off the unit. [ ] on hold. [ ] NPO for procedure    ST to D/C accordingly. Please re-order speech consult following extubation, prior to initiating PO diet.      Stephany Silverman M.S., CF-SLP  Speech Language Pathologist

## 2018-06-11 NOTE — PROGRESS NOTES
Hospitalist Progress Note    Patient: Isael Mccormack MRN: [id number]  CSN: [id number]    YOB: 1950  Age: 75 y.o.   Sex: female    DOA: 6/4/2018 LOS:  LOS: 7 days          Chief Complaint:    sepsis      Assessment/Plan     Sepsis upon admission due to peritonitis  BL DVT likely POA  Presumed PE  Ac resp failure and hypotension  Oliguria  Ovarian ca s/p recent debulking surgery    Very ill  Remains on vent  Crit care managing vent and critical illness, s/p bronch  S/p laparotomy per gen surgery  Cultures identified and appropriate IV abx in place\    Steroids IV, heparin gtt, IV antibiotics, PPI IV    Fry in place    Poor prog  DNR    Disposition :  Patient Active Problem List   Diagnosis Code    Ovarian ca ([facility] C56.9    Severe obesity (BMI 35.0-39.9) ([facility] E66.01    Abdominal pain R10.9    Sepsis ([address] A41.9    Oliguria R34    Acute respiratory failure ([address] J96.00    JESS (acute kidney injury) ([address] K06.0    Metabolic acidosis X36.8    Acute deep vein thrombosis (DVT) of lower extremity ([address] I82.409       Subjective:  No events overnight      Review of systems:    Not able to give due to sedated state      Vital signs/Intake and Output:  Visit Vitals    /89    Pulse (!) 103    Temp 97.4 °F (36.3 °C)    Resp (!) 38    Ht 5' 1\" (1.549 m)    Wt 89.1 kg (196 lb 6.9 oz)    SpO2 92%    BMI 37.12 kg/m2     Current Shift:  06/11 0701 - 06/11 1900  In: 225 [I.V.:225]  Out: 550 [Urine:550]  Last three shifts:  06/09 1901 - 06/11 0700  In: 4753.6 [I.V.:4663.6]  Out: 1413 [Urine:1073]    Exam:    General: sedate elderly WF on vent  Head/Neck: NCAT, supple, No masses, No lymphadenopathy  CVS:Regular rate and rhythm, no M/R/G, S1/S2 heard, no thrill  Lungs:Clear to auscultation bilaterally, no wheezes, rhonchi, or rales  Abdomen: Soft, Nontender, mod, incision CDI , diminishedl Bowel sounds, No hepatomegaly  Extremities: No C/C/E, pulses palpable 2+  Skin:normal texture and turgor, no rashes, no lesions  Psych:appropriate                Labs: Results:       Chemistry Recent Labs      06/11/18   0451  06/10/18   1500  06/10/18   0528  06/09/18   0345   GLU  166*   --   192*  157*   NA  138   --   137  137   K  4.0  3.9  3.8  4.4   CL  99*   --   100  103   CO2  25   --   25  19*   BUN  56*   --   42*  27*   CREA  3.21*   --   3.09*  1.84*   CA  7.7*   --   7.1*  8.3*   AGAP  14   --   12  15   BUCR  17   --   14  15   AP  85   --   57  70   TP  5.3*   --   4.7*  5.5*   ALB  1.4*   --   1.4*  1.8*   GLOB  3.9   --   3.3  3.7   AGRAT  0.4*   --   0.4*  0.5*      CBC w/Diff Recent Labs      06/11/18   0451  06/10/18   1758  06/10/18   1043  06/10/18   0910   06/09/18   0006   WBC  23.3*   --   28.5*  26.7*   < >  18.2*   RBC  3.90*   --   3.75*  3.62*   < >  5.05   HGB  8.4*  8.2*  8.2*  7.9*   < >  11.1*   HCT  26.3*  24.9*  25.5*  24.6*   < >  34.0*   PLT  203   --   223  226   < >  356   GRANS   --    --   60   --    --   77*   LYMPH   --    --   16*   --    --   16*   EOS   --    --   0   --    --   0    < > = values in this interval not displayed. Cardiac Enzymes No results for input(s): CPK, CKND1, FABIAN in the last 72 hours. No lab exists for component: CKRMB, TROIP   Coagulation Recent Labs      06/11/18   0451  06/10/18   2001   06/10/18   0528   PTP   --    --    --   18.0*   INR   --    --    --   1.6*   APTT  75.7*  107.4*   < >  32.1    < > = values in this interval not displayed. Lipid Panel No results found for: CHOL, CHOLPOCT, CHOLX, CHLST, CHOLV, 896754, HDL, LDL, LDLC, DLDLP, 997024, VLDLC, VLDL, TGLX, TRIGL, TRIGP, TGLPOCT, CHHD, CHHDX   BNP No results for input(s): BNPP in the last 72 hours.    Liver Enzymes Recent Labs      06/11/18   0451   TP  5.3*   ALB  1.4*   AP  85   SGOT  653*      Thyroid Studies No results found for: T4, T3U, TSH, TSHEXT     Procedures/imaging: see electronic medical records for all procedures/Xrays and details which were not copied into this note but were reviewed prior to creation of Concha Louise MD

## 2018-06-11 NOTE — PROGRESS NOTES
NUTRITION FOLLOW-UP    RECOMMENDATIONS/PLAN:   - initiation of enteral nutrition when clinically feasible  - monitor weight/fluid status  -monitor labs, OGT output, skin integrity, lytes, bowel function    NUTRITION ASSESSMENT:   Client Update: 75 yrs old Female with respiratory failure, s/p intubation with extubation on 6/7, reintubated on 6/8. 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 Per MD note yesterday possible sepsis d/t peritonitis. Pt with ogt to suction, total output was 340 yesterday. Hold feeds for now per discussion in rounds. FOOD/NUTRITION INTAKE   Diet Order:  npo  Supplements: n/a   Food Allergies: NKFA  Average PO Intake:      No data found. Pertinent Medications:  [x] Reviewed; lasix, lispro, protonix  Electrolyte Replacement Protocol: []K []Mg []PO4  Insulin:  []SSI  [x]Pre-meal   []Basal    []Drip  []None  Cultural/Latter day Food Preferences: None Identified    BIOCHEMICAL DATA & MEDICAL TESTS  Pertinent Labs:  [x] Reviewed  ANTHROPOMETRICS  Height: 5' 1\" (154.9 cm)       Weight: 89.1 kg (196 lb 6.9 oz)         BMI: 37.1 kg/m^2 obese (30%-39.9% BMI)   Adm Weight: 196 lbs                Weight change: n/a  Adjusted Body Weight: 58.1    NUTRITION-FOCUSED PHYSICAL ASSESSMENT  Skin: no pressure area per documentation  GI: BM 6/8    NUTRITION PRESCRIPTION  Calories: 979-1246 kcal/day based on 11-14kcal/kg  Protein: 71-89 g/day based on 0.8-1.0 g/kg  CHO: 122-156 g/day based on 50% of total energy  YJQYO: 178-5214 TC/MVF based on 1 kcal/ml       NUTRITION DIAGNOSES:   1. Predicted suboptimal related to NPO status as evidenced by pt intubated, npo at this time    NUTRITION INTERVENTIONS:   INTERVENTIONS:        GOALS:  1. Other: advance diet as soon as clinically feasible 1.  Initiation of enteral nutrition within the next 72 hours     LEARNING NEEDS (Diet, Supplementation, Food/Nutrient-Drug Interaction):   [x] None Identified   [] Education provided/documented      Identified and patient: [] Declined   [] Was not appropriate/indicated        NUTRITION MONITORING /EVALUATION:   Follow PO intake  Monitor wt  Monitor renal labs, electrolytes, fluid status  Monitor for additional supplement needs     Previous Recommendations Implemented: yes        Previous Goals Met:  yes      [x] Participated in Interdisciplinary Rounds    [x] Interdisciplinary Care Plan Reviewed  DISCHARGE NUTRITION RECOMMENDATIONS ADDRESSED:       [x] To be determined closer to discharge    NUTRITION RISK:           [x] At risk                        [] Not currently at risk        Will follow-up per policy.   Bao Chance 1

## 2018-06-11 NOTE — PROGRESS NOTES
OT note: Pt has had decrease in medical status from prior session and has been intubated and sedated. Will sign off of OT orders. Please re-order if pt medically appropriate.  Thank you Prudence Belle Mina OTR/L

## 2018-06-12 ENCOUNTER — APPOINTMENT (OUTPATIENT)
Dept: GENERAL RADIOLOGY | Age: 68
DRG: 853 | End: 2018-06-12
Attending: INTERNAL MEDICINE
Payer: MEDICARE

## 2018-06-12 LAB
ALBUMIN SERPL-MCNC: 1.4 G/DL (ref 3.4–5)
ALBUMIN/GLOB SERPL: 0.5 {RATIO} (ref 0.8–1.7)
ALP SERPL-CCNC: 97 U/L (ref 45–117)
ALT SERPL-CCNC: 525 U/L (ref 13–56)
ANION GAP SERPL CALC-SCNC: 15 MMOL/L (ref 3–18)
APTT PPP: 93.8 SEC (ref 23–36.4)
ARTERIAL PATENCY WRIST A: YES
ARTERIAL PATENCY WRIST A: YES
AST SERPL-CCNC: 509 U/L (ref 15–37)
BASE EXCESS BLD CALC-SCNC: 0 MMOL/L
BASE EXCESS BLD CALC-SCNC: 0 MMOL/L
BDY SITE: ABNORMAL
BDY SITE: ABNORMAL
BILIRUB SERPL-MCNC: 0.4 MG/DL (ref 0.2–1)
BODY TEMPERATURE: 97.5
BODY TEMPERATURE: 98
BUN SERPL-MCNC: 60 MG/DL (ref 7–18)
BUN/CREAT SERPL: 22 (ref 12–20)
CA-I SERPL-SCNC: 0.96 MMOL/L (ref 1.12–1.32)
CALCIUM SERPL-MCNC: 7 MG/DL (ref 8.5–10.1)
CHLORIDE SERPL-SCNC: 104 MMOL/L (ref 100–108)
CO2 SERPL-SCNC: 24 MMOL/L (ref 21–32)
CREAT SERPL-MCNC: 2.69 MG/DL (ref 0.6–1.3)
ERYTHROCYTE [DISTWIDTH] IN BLOOD BY AUTOMATED COUNT: 18.5 % (ref 11.6–14.5)
GAS FLOW.O2 O2 DELIVERY SYS: ABNORMAL L/MIN
GAS FLOW.O2 O2 DELIVERY SYS: ABNORMAL L/MIN
GAS FLOW.O2 SETTING OXYMISER: 14 BPM
GAS FLOW.O2 SETTING OXYMISER: 14 BPM
GLOBULIN SER CALC-MCNC: 3.1 G/DL (ref 2–4)
GLUCOSE BLD STRIP.AUTO-MCNC: 164 MG/DL (ref 70–110)
GLUCOSE BLD STRIP.AUTO-MCNC: 173 MG/DL (ref 70–110)
GLUCOSE BLD STRIP.AUTO-MCNC: 187 MG/DL (ref 70–110)
GLUCOSE BLD STRIP.AUTO-MCNC: 205 MG/DL (ref 70–110)
GLUCOSE SERPL-MCNC: 187 MG/DL (ref 74–99)
HCO3 BLD-SCNC: 23.7 MMOL/L (ref 22–26)
HCO3 BLD-SCNC: 24.1 MMOL/L (ref 22–26)
HCT VFR BLD AUTO: 24.8 % (ref 35–45)
HGB BLD-MCNC: 8 G/DL (ref 12–16)
MAGNESIUM SERPL-MCNC: 2 MG/DL (ref 1.6–2.6)
MCH RBC QN AUTO: 21.4 PG (ref 24–34)
MCHC RBC AUTO-ENTMCNC: 32.3 G/DL (ref 31–37)
MCV RBC AUTO: 66.5 FL (ref 74–97)
O2/TOTAL GAS SETTING VFR VENT: 0.75 %
O2/TOTAL GAS SETTING VFR VENT: 1 %
PCO2 BLD: 33 MMHG (ref 35–45)
PCO2 BLD: 33.5 MMHG (ref 35–45)
PEEP RESPIRATORY: 14 CMH2O
PEEP RESPIRATORY: 14 CMH2O
PH BLD: 7.46 [PH] (ref 7.35–7.45)
PH BLD: 7.46 [PH] (ref 7.35–7.45)
PLATELET # BLD AUTO: 190 K/UL (ref 135–420)
PO2 BLD: 63 MMHG (ref 80–100)
PO2 BLD: 94 MMHG (ref 80–100)
POTASSIUM SERPL-SCNC: 3.4 MMOL/L (ref 3.5–5.5)
POTASSIUM SERPL-SCNC: 3.5 MMOL/L (ref 3.5–5.5)
PROT SERPL-MCNC: 4.5 G/DL (ref 6.4–8.2)
RBC # BLD AUTO: 3.73 M/UL (ref 4.2–5.3)
SAO2 % BLD: 94 % (ref 92–97)
SAO2 % BLD: 98 % (ref 92–97)
SERVICE CMNT-IMP: ABNORMAL
SERVICE CMNT-IMP: ABNORMAL
SODIUM SERPL-SCNC: 143 MMOL/L (ref 136–145)
SPECIMEN TYPE: ABNORMAL
SPECIMEN TYPE: ABNORMAL
TOTAL RESP. RATE, ITRR: 28
TOTAL RESP. RATE, ITRR: 30
VANCOMYCIN SERPL-MCNC: 20.7 UG/ML (ref 5–40)
VENTILATION MODE VENT: ABNORMAL
VENTILATION MODE VENT: ABNORMAL
VT SETTING VENT: 400 ML
VT SETTING VENT: 415 ML
WBC # BLD AUTO: 22.4 K/UL (ref 4.6–13.2)

## 2018-06-12 PROCEDURE — C9113 INJ PANTOPRAZOLE SODIUM, VIA: HCPCS | Performed by: INTERNAL MEDICINE

## 2018-06-12 PROCEDURE — 36600 WITHDRAWAL OF ARTERIAL BLOOD: CPT

## 2018-06-12 PROCEDURE — 74011250636 HC RX REV CODE- 250/636: Performed by: INTERNAL MEDICINE

## 2018-06-12 PROCEDURE — 82803 BLOOD GASES ANY COMBINATION: CPT

## 2018-06-12 PROCEDURE — 80202 ASSAY OF VANCOMYCIN: CPT | Performed by: INTERNAL MEDICINE

## 2018-06-12 PROCEDURE — 74011250636 HC RX REV CODE- 250/636: Performed by: OBSTETRICS & GYNECOLOGY

## 2018-06-12 PROCEDURE — 94640 AIRWAY INHALATION TREATMENT: CPT

## 2018-06-12 PROCEDURE — 74011250636 HC RX REV CODE- 250/636: Performed by: HOSPITALIST

## 2018-06-12 PROCEDURE — 85027 COMPLETE CBC AUTOMATED: CPT | Performed by: OBSTETRICS & GYNECOLOGY

## 2018-06-12 PROCEDURE — 82962 GLUCOSE BLOOD TEST: CPT

## 2018-06-12 PROCEDURE — 74011000250 HC RX REV CODE- 250: Performed by: INTERNAL MEDICINE

## 2018-06-12 PROCEDURE — 82330 ASSAY OF CALCIUM: CPT | Performed by: SURGERY

## 2018-06-12 PROCEDURE — 74011000258 HC RX REV CODE- 258: Performed by: INTERNAL MEDICINE

## 2018-06-12 PROCEDURE — 83735 ASSAY OF MAGNESIUM: CPT | Performed by: OBSTETRICS & GYNECOLOGY

## 2018-06-12 PROCEDURE — 71045 X-RAY EXAM CHEST 1 VIEW: CPT

## 2018-06-12 PROCEDURE — 85730 THROMBOPLASTIN TIME PARTIAL: CPT | Performed by: OBSTETRICS & GYNECOLOGY

## 2018-06-12 PROCEDURE — 77010033678 HC OXYGEN DAILY

## 2018-06-12 PROCEDURE — 65270000029 HC RM PRIVATE

## 2018-06-12 PROCEDURE — 80053 COMPREHEN METABOLIC PANEL: CPT | Performed by: OBSTETRICS & GYNECOLOGY

## 2018-06-12 PROCEDURE — 74011636637 HC RX REV CODE- 636/637: Performed by: INTERNAL MEDICINE

## 2018-06-12 PROCEDURE — 94003 VENT MGMT INPAT SUBQ DAY: CPT

## 2018-06-12 PROCEDURE — 74011000258 HC RX REV CODE- 258: Performed by: OBSTETRICS & GYNECOLOGY

## 2018-06-12 RX ORDER — HYDROCORTISONE SODIUM SUCCINATE 100 MG/2ML
50 INJECTION, POWDER, FOR SOLUTION INTRAMUSCULAR; INTRAVENOUS EVERY 12 HOURS
Status: DISCONTINUED | OUTPATIENT
Start: 2018-06-12 | End: 2018-06-13

## 2018-06-12 RX ORDER — POTASSIUM CHLORIDE 7.45 MG/ML
10 INJECTION INTRAVENOUS
Status: COMPLETED | OUTPATIENT
Start: 2018-06-12 | End: 2018-06-13

## 2018-06-12 RX ORDER — PIPERACILLIN SODIUM, TAZOBACTAM SODIUM 2; .25 G/10ML; G/10ML
INJECTION, POWDER, LYOPHILIZED, FOR SOLUTION INTRAVENOUS
Status: DISPENSED
Start: 2018-06-12 | End: 2018-06-13

## 2018-06-12 RX ORDER — FUROSEMIDE 10 MG/ML
40 INJECTION INTRAMUSCULAR; INTRAVENOUS DAILY
Status: DISCONTINUED | OUTPATIENT
Start: 2018-06-12 | End: 2018-06-17

## 2018-06-12 RX ADMIN — FENTANYL CITRATE 25 MCG: 50 INJECTION, SOLUTION INTRAMUSCULAR; INTRAVENOUS at 04:56

## 2018-06-12 RX ADMIN — HYDROCORTISONE SODIUM SUCCINATE 50 MG: 100 INJECTION, POWDER, FOR SOLUTION INTRAMUSCULAR; INTRAVENOUS at 21:19

## 2018-06-12 RX ADMIN — IPRATROPIUM BROMIDE 0.5 MG: 0.5 SOLUTION RESPIRATORY (INHALATION) at 19:18

## 2018-06-12 RX ADMIN — PIPERACILLIN SODIUM,TAZOBACTAM SODIUM 2.25 G: 2; .25 INJECTION, POWDER, FOR SOLUTION INTRAVENOUS at 12:08

## 2018-06-12 RX ADMIN — FUROSEMIDE 40 MG: 10 INJECTION, SOLUTION INTRAMUSCULAR; INTRAVENOUS at 08:30

## 2018-06-12 RX ADMIN — INSULIN LISPRO 2 UNITS: 100 INJECTION, SOLUTION INTRAVENOUS; SUBCUTANEOUS at 18:00

## 2018-06-12 RX ADMIN — INSULIN LISPRO 2 UNITS: 100 INJECTION, SOLUTION INTRAVENOUS; SUBCUTANEOUS at 06:33

## 2018-06-12 RX ADMIN — MIDAZOLAM HYDROCHLORIDE 2 MG: 1 INJECTION, SOLUTION INTRAMUSCULAR; INTRAVENOUS at 13:45

## 2018-06-12 RX ADMIN — PIPERACILLIN SODIUM,TAZOBACTAM SODIUM 2.25 G: 2; .25 INJECTION, POWDER, FOR SOLUTION INTRAVENOUS at 21:19

## 2018-06-12 RX ADMIN — FENTANYL CITRATE 25 MCG: 50 INJECTION, SOLUTION INTRAMUSCULAR; INTRAVENOUS at 01:21

## 2018-06-12 RX ADMIN — Medication 25 MCG/HR: at 12:03

## 2018-06-12 RX ADMIN — POTASSIUM CHLORIDE 10 MEQ: 10 INJECTION, SOLUTION INTRAVENOUS at 06:33

## 2018-06-12 RX ADMIN — POTASSIUM CHLORIDE 10 MEQ: 10 INJECTION, SOLUTION INTRAVENOUS at 09:00

## 2018-06-12 RX ADMIN — INSULIN LISPRO 2 UNITS: 100 INJECTION, SOLUTION INTRAVENOUS; SUBCUTANEOUS at 12:00

## 2018-06-12 RX ADMIN — DEXTROSE MONOHYDRATE AND SODIUM CHLORIDE 75 ML/HR: 5; .9 INJECTION, SOLUTION INTRAVENOUS at 02:18

## 2018-06-12 RX ADMIN — MIDAZOLAM HYDROCHLORIDE 2 MG: 1 INJECTION, SOLUTION INTRAMUSCULAR; INTRAVENOUS at 02:16

## 2018-06-12 RX ADMIN — POTASSIUM CHLORIDE 10 MEQ: 10 INJECTION, SOLUTION INTRAVENOUS at 22:37

## 2018-06-12 RX ADMIN — IPRATROPIUM BROMIDE 0.5 MG: 0.5 SOLUTION RESPIRATORY (INHALATION) at 03:54

## 2018-06-12 RX ADMIN — IPRATROPIUM BROMIDE 0.5 MG: 0.5 SOLUTION RESPIRATORY (INHALATION) at 14:40

## 2018-06-12 RX ADMIN — POTASSIUM CHLORIDE 10 MEQ: 10 INJECTION, SOLUTION INTRAVENOUS at 20:13

## 2018-06-12 RX ADMIN — CALCIUM GLUCONATE 4 G: 94 INJECTION, SOLUTION INTRAVENOUS at 07:24

## 2018-06-12 RX ADMIN — PIPERACILLIN SODIUM,TAZOBACTAM SODIUM 2.25 G: 2; .25 INJECTION, POWDER, FOR SOLUTION INTRAVENOUS at 04:57

## 2018-06-12 RX ADMIN — HYDROCORTISONE SODIUM SUCCINATE 50 MG: 100 INJECTION, POWDER, FOR SOLUTION INTRAMUSCULAR; INTRAVENOUS at 09:49

## 2018-06-12 RX ADMIN — INSULIN LISPRO 4 UNITS: 100 INJECTION, SOLUTION INTRAVENOUS; SUBCUTANEOUS at 23:38

## 2018-06-12 RX ADMIN — HEPARIN SODIUM 19 UNITS/KG/HR: 10000 INJECTION, SOLUTION INTRAVENOUS at 10:53

## 2018-06-12 RX ADMIN — MIDAZOLAM HYDROCHLORIDE 2 MG: 1 INJECTION, SOLUTION INTRAMUSCULAR; INTRAVENOUS at 04:56

## 2018-06-12 RX ADMIN — POTASSIUM CHLORIDE 10 MEQ: 10 INJECTION, SOLUTION INTRAVENOUS at 08:00

## 2018-06-12 RX ADMIN — IPRATROPIUM BROMIDE 0.5 MG: 0.5 SOLUTION RESPIRATORY (INHALATION) at 07:29

## 2018-06-12 RX ADMIN — SODIUM CHLORIDE 40 MG: 9 INJECTION INTRAMUSCULAR; INTRAVENOUS; SUBCUTANEOUS at 09:49

## 2018-06-12 RX ADMIN — MIDAZOLAM HYDROCHLORIDE 2 MG: 1 INJECTION, SOLUTION INTRAMUSCULAR; INTRAVENOUS at 09:50

## 2018-06-12 NOTE — PROGRESS NOTES
Nephrology Progress note    Subjective:     Paco Murrell is a 75 y.o. female with PMH DM, HTN, clear cell ovarian ca s/p debulking who presented with abdominal pain and possible sepsis/ischemic colitis. Pt underwent laparotomy/peritoneal lavage/bx, noted to have decreased UOP and increasing Cr to 2.1 today from baseline 0.6. CT neg for mass or hydro. Pt given lasix yesterday, still with high O2 requirements on vent. Unable to provide further history. On 6/8 Pt decompensated, ? Aspiration,  back on vent was hypoxic despite  Fi02 of 100%, , Hypotensive on IV pressors, Acidotic and on HC03 drip, Urine output decreased markedly over the weekend. Underwent Bronchoscopy 6/9. Patient seems to be making a remarkable turnaround overnight, now more responsive, requiring less pressor and Oxygenation improving though still on FiO2 90%. UOP picking up with 2.4L yesterday. Cr down to 2.6    Admit Date: 6/4/2018  Allergy:  Allergies   Allergen Reactions    Hydrocodone Other (comments)     Breaks into cold sweat    Metformin Other (comments)     Breaks out into cold sweat. Can Take Glucophage brand name med        Objective:     Visit Vitals    /81    Pulse 91    Temp 97.6 °F (36.4 °C)    Resp 27    Ht 5' 1\" (1.549 m)    Wt 89.1 kg (196 lb 6.9 oz)    SpO2 99%    BMI 37.12 kg/m2         Intake/Output Summary (Last 24 hours) at 06/12/18 0827  Last data filed at 06/12/18 0745   Gross per 24 hour   Intake             1334 ml   Output             2650 ml   Net            -1316 ml       Physical Exam:     General: On Vent   HENT: Atraumatic and normocephalic.  ET tube in place   Eyes: Sclera anicteric   Neck: No JVD or mass   Cardiovascular: tachy S1 & S2, tachy   Pulmonary/Chest Wall: Decreased breath sounds bilaterally   Abdominal: Soft, obese   Musculoskeletal: trace edema LEs   Neurological: Opens eyes in response to name       Data Review:      Lab Results   Component Value Date/Time    WBC 22.4 (H) 06/12/2018 04:25 AM    RBC 3.73 (L) 06/12/2018 04:25 AM    HCT 24.8 (L) 06/12/2018 04:25 AM    MCV 66.5 (L) 06/12/2018 04:25 AM    MCH 21.4 (L) 06/12/2018 04:25 AM    MCHC 32.3 06/12/2018 04:25 AM    RDW 18.5 (H) 06/12/2018 04:25 AM      Lab Results   Component Value Date    IRON 8 (L) 06/07/2018   No components found for: FERRITIN  No components found for: PTHINT  Urinalysis  No results found for: UGLU         Impression:     -JESS- likely ATN,  S Cr was down to 1.2 on 6/8 => 1.84 6/9 => 3.09=>3.2, oliguric following Hypotension and resp failure. UOP inproving and cr down further today  -Septic Shock  -Resp Failure,  re-intubated 6/9,  back on vent. Suspect Aspiration, ? PE  - Severe Metabolic acidosis, improving. Lactate 4.7  -Ovarian ca s/p debulking then laparoscopy w/lavage  -KLEBSIELLA OXYTOCA sepsis (PD fluid)  -DM  -h.o.  HTN  -Anemia  -Elevated transminasis    Plan:   Continue Supportive care: Vent, IV pressors, IV Antibiotics  Monitor I/O  Monitor chemistry  IV lasix 40 mg daily, discussed with intensivist  No indication for HD today  We will follow closely     MD Denver Chinchilla  [phone]

## 2018-06-12 NOTE — PROGRESS NOTES
Hospitalist Progress Note    Patient: Razia Strauss MRN: [id number]  CSN: [id number]    YOB: 1950  Age: 75 y.o. Sex: female    DOA: 6/4/2018 LOS:  LOS: 8 days          Chief Complaint:      Ac resp failure          Assessment/Plan     Ac resp failure-worsening resp status on vent last night, requiring inc PEEP and continued vent support  Sepsis and shock due to peritonitis presumed  S/p laparotomy  Probable PE with LE DVT  Ovarian cancer with recent debulking surgery  Oliguric JESS  Anemia  Hypoalbuminemia    Continue IV abx at guidance of ID  Lasix IV daily-dec UOP thru night-better after lasix  Vent mgmt per crit care    DNR status  Heparin gtt to be continued  Pressor support     Critical illness, hospitalists following along with multiple specialists, uncertain whether she will survive this very severe illness and continued worsening respiratory status      Disposition :  Patient Active Problem List   Diagnosis Code    Ovarian ca ([facility] C56.9    Severe obesity (BMI 35.0-39.9) ([facility]) E66.01    Abdominal pain R10.9    Sepsis ([facility] A41.9    Oliguria R34    Acute respiratory failure ([facility] [facility] J96.00    JESS (acute kidney injury) ([facility] I15.3    Metabolic acidosis O21.3    Acute deep vein thrombosis (DVT) of lower extremity ([facility]) I82.409       Subjective:  Events overnight included worsening resp status, and increased vent setting requirements      Review of systems:  She cannot give ROS due to intubation      Vital signs/Intake and Output:  Visit Vitals    /85 (BP Patient Position: At rest)    Pulse 85    Temp 96.9 °F (36.1 °C)    Resp 28    Ht 5' 1\" (1.549 m)    Wt 89.1 kg (196 lb 6.9 oz)    SpO2 100%    BMI 37.12 kg/m2     Current Shift:  06/12 0701 - 06/12 1900  In: 150 [I.V.:150]  Out: 1200 [Urine:1200]  Last three shifts:  06/10 1901 - 06/12 0700  In: 2540.2 [I.V.:2540.2]  Out: 3400 [Urine:3350]    Exam:    General: elderly WF on vent, NAD  CVS:Regular rate and rhythm, no M/R/G, S1/S2 heard, no thrill  Lungs:Clear to auscultation bilaterally, no wheezes, rhonchi, or rales  Abdomen: surgical incision CDI, mod distention, diminished BS, no mass  Extremities: 1 plus edema LE  Neuro:sedate on vent                Labs: Results:       Chemistry Recent Labs      06/12/18   0425  06/11/18   0451  06/10/18   1500  06/10/18   0528   GLU  187*  166*   --   192*   NA  143  138   --   137   K  3.4*  4.0  3.9  3.8   CL  104  99*   --   100   CO2  24  25   --   25   BUN  60*  56*   --   42*   CREA  2.69*  3.21*   --   3.09*   CA  7.0*  7.7*   --   7.1*   AGAP  15  14   --   12   BUCR  22*  17   --   14   AP  97  85   --   57   TP  4.5*  5.3*   --   4.7*   ALB  1.4*  1.4*   --   1.4*   GLOB  3.1  3.9   --   3.3   AGRAT  0.5*  0.4*   --   0.4*      CBC w/Diff Recent Labs      06/12/18   0425  06/11/18   0451  06/10/18   1758  06/10/18   1043   WBC  22.4*  23.3*   --   28.5*   RBC  3.73*  3.90*   --   3.75*   HGB  8.0*  8.4*  8.2*  8.2*   HCT  24.8*  26.3*  24.9*  25.5*   PLT  190  203   --   223   GRANS   --    --    --   60   LYMPH   --    --    --   16*   EOS   --    --    --   0      Cardiac Enzymes No results for input(s): CPK, CKND1, FABIAN in the last 72 hours. No lab exists for component: CKRMB, TROIP   Coagulation Recent Labs      06/12/18   0425  06/11/18   0451   06/10/18   0528   PTP   --    --    --   18.0*   INR   --    --    --   1.6*   APTT  93.8*  75.7*   < >  32.1    < > = values in this interval not displayed. Lipid Panel No results found for: CHOL, CHOLPOCT, CHOLX, CHLST, CHOLV, 493532, HDL, LDL, LDLC, DLDLP, 533742, VLDLC, VLDL, TGLX, TRIGL, TRIGP, TGLPOCT, CHHD, CHHDX   BNP No results for input(s): BNPP in the last 72 hours.    Liver Enzymes Recent Labs      06/12/18   0425   TP  4.5*   ALB  1.4*   AP  97   SGOT  509*      Thyroid Studies No results found for: T4, T3U, TSH, TSHEXT     Procedures/imaging: see electronic medical records for all procedures/Xrays and details which were not copied into this note but were reviewed prior to creation of Delio Salazar MD

## 2018-06-12 NOTE — PROGRESS NOTES
0715 Bedside shift change report given to Jennifer Enriquez RN (oncoming nurse) by Prema cancino. Report included the following information SBAR, Kardex, ED Summary, Procedure Summary, Intake/Output, MAR, Accordion, Recent Results, Med Rec Status, Cardiac Rhythm NSR and Alarm Parameters . Assumed care of patient at this time, Patient resting in bed, follows commands, Nods head appropriately, Cont on heparin gtt, and fentanyl pca without incident, and rate verified Patient denies any pain at this time. Ac resp failure-worsening resp status on vent last night, requiring inc PEEP to 14 from 10  and continued vent support. Sepsis and shock due to peritonitis presumed,S/p laparotomy,Probable PE with LE DVT,Ovarian cancer with recent debulking surgery. Oliguric JESS  Anemia,Hypoalbuminemia. Continues on IV abx at guidance of ID  Lasix IV daily-dec UOP thru night-better after lasix  Vent mgmt per crit care continues ,DNR status    0800 Assessment completed    1200 Reassessment,No change     1600 Reassessment, No  change    1900 Bedside shift report given to Molina Guy, Patient more alert, watching tv, holding her brothers hand, denies pain by nodding head no.

## 2018-06-12 NOTE — PROGRESS NOTES
2210  CTB for desaturation and increasing resp rate. ABG with PAO2 47,  PEEP up to 14, lasix to be given, Versed given. Will monitor.     2300  O2 SATS 93%

## 2018-06-12 NOTE — PROGRESS NOTES
2135 PRN Versed given due to patient becoming more tachypneic. Pt turned and repositioned in bed for comfort. Suctioned. Moderate thin yellow secretions noted. 2140 Pt O2 sat dropping. Range 85-88%. RT paged to further assess. 2145 Will obtain ABGs. Intensivist paged. O2 saturations remain 84-87%. FiO2 already 100%. 2200 Dr. Alcala Model informed of patient vital signs, increased respiratory efforts above vent, course wet lung fields, and ABG results. Lasix ordered. Also order obtained to increase PEEP initially to 12 and then slowly to 14 if needed. RT informed. 2230 PEEP 14. FiO2 100%. Pt O2 now remaining above 90%. Generally in range of 91-94%. Respirations now decreased to 28. FLACC 0.     2300 No change. O2 saturation now 94%. Resting comfortably. 0300 No longer wet lung fields, diminished. O2 saturation 95%. FLACC 0. Summary:  More alert, able to follow simple commands. RASS -1 -0. Nods and shakes head appropriately. Episode of oxygen desaturation, course breathe sounds with moderate amount of yellow sputum through ET, and respiratons >40. Intensivist made aware of situation. Lasix administered and vent settings adjusted. Pt remained stable after these measures. Midline incision with dermabond CDI, lap sites CDI. Electrolytes replaced per protocol. PTT therapeutic. Repeat lab set for tomorrow AM.     Bedside and Verbal shift change report given to MIRIAM Enriquez, [facility] nurse) by Johnny Calderon RN   (offgoing nurse). Report included the following information SBAR, Kardex and MAR.

## 2018-06-12 NOTE — DIABETES MGMT
GLYCEMIC CONTROL PROGRESS NOTE:    -discussed in rounds, known h/o T2DM HbA1C within recommended range for age + comorbids on oral home regimen  -BG out of target range ICU: 140-180 mg/dL, D5 IVF   -TDD = 10 units - Humalog Normal Insulin Sensitivity Corrective Coverage  -NPO intubated, TF on hold r/t residual  -IV steroids 50 mg IV q 8 hours, hyperglycemia  -24 hour BG trending up, steroids will d/c tomorrow  -monitor BG trends overnight, pt may benefit from conservative dose of Lantus       Recent Glucose Results:   Lab Results   Component Value Date/Time     (H) 06/12/2018 04:25 AM    GLUCPOC 187 (H) 06/12/2018 05:52 AM    GLUCPOC 204 (H) 06/11/2018 11:28 PM    GLUCPOC 183 (H) 06/11/2018 05:47 PM         Dennise Gurrola RN, MS  Glycemic Control Team  Pager [phone] (M-TH 8:30-5P)  *After Hours pager [phone]

## 2018-06-12 NOTE — PROGRESS NOTES
Pharmacy Dosing Services: Vancomycin     Consult for Vancomycin Dosing by Pharmacy by Dr. Tammy Wray provided for this 75y.o. year old female , for indication of sepsis. Day of Therapy 4 (restarted on 6/9/18)   Pt previous received Vancomycin 6/4/18 - 6/6/18     Scr = 2.69   CrCl = 20.3 ml/min     Pt received Vancomycin 1000 mg IV once, administered 6/11/18 at 14:04. Ordered Vancomycin Random level for 6/12/18 at 19:00. Subsequent dosing to be determined based on results of random level. Pharmacy to follow daily and will make changes to dose and/or frequency based on clinical status.   Pharmacist Nick Wright

## 2018-06-12 NOTE — PROGRESS NOTES
Attended IDR's at this time pt remains on vent, too ill to consider LTACH at this time cm will cont to review and remain available.

## 2018-06-12 NOTE — PROGRESS NOTES
[facility] [place] [place] [facility]                  Pulmonary, Critical Care, and Sleep Medicine     Name: Miriam Sierra MRN: [id number]   : 1950 Hospital: [facility] [place]    Date: 2018        [facility] Note                                                IMPRESSION:   · Severe sepsis due to peritonitis. · Klebsiella oxytoca peritonitis. S/p laparotomy and peritoneal lavage: Klebsiella positive. Ruled out ischemic bowel. · S/p bronchoscopy 18: no aspiration noted. Cx NGTD. · VDRF due to sepsis, reintubated on 18  · S/p Shock likely due to sepsis and obstructive due to presumed PE. Off vasopressors. · Acute b/l peroneal DVT  · Presumed PE with severe hypoxia and high A-a gradient, elevated RVSP 34 mm Hg. · Anemia, no active external bleeding. · JESS  · Metabolic ad lactic acidosis, improving. · Elevated LFT, likely shock liver. · Lactic acidosis due to hypotension, hypoperfusion, sepsis, abnormal LFT etc. Improving. · Foreign body on peritoneal biopsy, per path report. · Anasarca due to fluid resuscitation, JESS, hypoalbuminemia and sequale of sepsis. · Code status: DNR. · Very poor overall prognosis. RECOMMENDATIONS:   Respiratory:  Presumed PE with 100% fio2 and 14 PEEP requirement now. Worsened hypoxia overnight, required to increase PEEP and use lasix. C/w heparin drip. Monitor any bleedign or complications. No tPA given due to risk of bleeding with recent two abd surgeries. Ventilator bundle & Sedation protocol followed. Daily sedation holiday, assessment for readiness for SBT and then re-titrate if required. Chlorhexidine mouth washes. Keep SPO2 >=92%. HOB 30 degree elevation all the time. Aggressive pulmonary toileting. Incentive spirometry when appropriate. Aspiration precautions. CVS: off levophed since couple of days. CVP 8-9. Echo ok with mildly elevated RVSP but normal RV systolic function.  Anasarca and pedema due to b/l DVT and fluid resuscitation. On heparin drip. ID:    Bronch cx normal robin. Peritoneal cx Klebsiella Oxytoca resistant to ampicillin. Doubt pneumonia. D/w ID Dr. Juliano Ann on phone, reviewed all cx and clinical data: d/c vanco and levaquin. Zosyn (start 6/4/18) for 2 weeks. Off vasopressor since last couple days, but persistent leucocytosis could be due to steroids/stress. Taper off hydrocortisone, reduce to 50 mg q12 hrs today. Repeat CT abd/pelvis/chest when fio2 reduced enough to be transported on portable ventilator and when it is safer to transport. Hematology/Oncology: monitor H&H. Renal: d/w nephrologist. Off bicarb drip. Monitor UOP, slightly improved with lasix 40 mg x1 today by nephro. GI/: NPO  Endocrine: Maintain blood glucose 140-180. Humalog sub cut. Taper off steroids. Neurology: agitated due to peritonitis and spepsis. Pain/Sedation: fentanyl drip. Versed drip. Skin/Wound: local surgical site care. Electrolytes: Replace electrolytes per ICU electrolyte replacement protocol. Nutrition: NPO for now. Prophylaxis: DVT Rx with heparin drip. GI Prophylaxis with protonix. Restraints: none  Lines/Tubes:   ETT: 6/8/18. OGT: 6/8/18. Central line: right subclavina 6/4/18 (site examined, no erythema, induration, discharge or sign of infection. Dressing intact. Medically necessary, will remove it when not needed. Central line bundle followed). Fry: 6/4/18 (Medically necessary for strict input/output monitoring in critically ill patient, will remove it when not needed. Fry bundle followed). Very poor overall prognosis with MODS/MOF discussed with pt's brother at bedside, answered all questions to his satisfaction.      Will defer respective systems problem management to primary and other respective consultant and follow patient in ICU with primary and other medical team.  Further recommendations will be based on the patient's response to recommended treatment and results of the investigation ordered. Quality Care: PPI, DVT prophylaxis, HOB elevated, Infection control all reviewed and addressed. Care of plan d/w RN, RT, pharmacist, palliative care team, MDR, family- brother at bedside (answered all questions to satisfaction). D/w nephrologist.     High complexity decision making was performed during the evaluation of this patient at high risk for decompensation with multiple organ involvement. Total critical care time spent rendering care exclusive of procedures: 38 minutes. Subjective/History of Present Illness:     Patient is a 75 y.o. female admitted abd pain after ovarian cancer debulking surgery, s/p laparoscopy and peritoneal lavage 6/5/18, ruled out ischemic bowel, Klebsielle peritoneal cx positive, JESS, peroneal DVT, severe hypoxic respiratory failure with presumed PE, on ventilator, shock likely septic vs obstructive. 6/12/18:  Overnight, had hypoxia and rhonchi/crackles on lung exam, improved after lasix 40 iv x1 and increasing PEEP, with improved po2 on repeat ABG. No fever. CVP 8-9. On heparin drip. Hb stable. No active external bleeding. tachypenia on vent improved. Peep increased to 14 and on 100% fio2. Arm and leg edema +. Drowsy and sleepy, arousable but restless on vent. No fever  BP ok      Allergies   Allergen Reactions    Hydrocodone Other (comments)     Breaks into cold sweat    Metformin Other (comments)     Breaks out into cold sweat. Can Take Glucophage brand name med      Past Medical History:   Diagnosis Date    Diabetes ([facility] [address]     many years type 2    Hypertension     many years      Past Surgical History:   Procedure Laterality Date    HX OOPHORECTOMY  05/29/2018    HX TONSILLECTOMY      as a child       Social History   Substance Use Topics    Smoking status: Never Smoker    Smokeless tobacco: Never Used    Alcohol use No      History reviewed. No pertinent family history.      Current Facility-Administered Medications Medication Dose Route Frequency    calcium gluconate 4 g in 0.9% sodium chloride 100 mL IVPB  4 g IntraVENous ONCE    furosemide (LASIX) injection 40 mg  40 mg IntraVENous DAILY    hydrocortisone Sod Succ (PF) (SOLU-CORTEF) injection 50 mg  50 mg IntraVENous Q8H    piperacillin-tazobactam (ZOSYN) 2.25 g in 0.9% sodium chloride (MBP/ADV) 50 mL MBP  2.25 g IntraVENous Q8H    levoFLOXacin (LEVAQUIN) 500 mg in D5W IVPB  500 mg IntraVENous Q48H    dextrose 5% and 0.9% NaCl infusion  75 mL/hr IntraVENous CONTINUOUS    fentaNYL (PF) 900 mcg/30 ml infusion soln  25-50 mcg/hr IntraVENous TITRATE    midazolam in normal saline (VERSED) 1 mg/mL infusion  2-6 mg/hr IntraVENous TITRATE    NOREPINephrine (LEVOPHED) 8 mg in 5% dextrose 250mL infusion  2-30 mcg/min IntraVENous TITRATE    Vancomycin - Pharmacy to dose  1 Each Other Rx Dosing/Monitoring    heparin 25,000 units in D5W 250 ml infusion  18-36 Units/kg/hr IntraVENous TITRATE    ipratropium (ATROVENT) 0.02 % nebulizer solution 0.5 mg  0.5 mg Nebulization Q6H RT    insulin lispro (HUMALOG) injection   SubCUTAneous Q6H    pantoprazole (PROTONIX) 40 mg in sodium chloride 0.9% 10 mL injection  40 mg IntraVENous DAILY         Objective:   Vital Signs:    Visit Vitals    BP (!) 123/103    Pulse 87    Temp 97.6 °F (36.4 °C)    Resp 19    Ht 5' 1\" (1.549 m)    Wt 89.1 kg (196 lb 6.9 oz)    SpO2 98%    BMI 37.12 kg/m2       O2 Device: Endotracheal tube   O2 Flow Rate (L/min): 50 l/min   Temp (24hrs), Av.3 °F (36.3 °C), Min:96.2 °F (35.7 °C), Max:97.6 °F (36.4 °C)       Intake/Output:   Last shift:       07 - 1900  In: 75 [I.V.:75]  Out: 700 [Urine:700]    Last 3 shifts: 06/10 190 -  0700  In: 2540.2 [I.V.:2540.2]  Out: 3400 [Urine:3350]      Intake/Output Summary (Last 24 hours) at 18 1117  Last data filed at 18 1000   Gross per 24 hour   Intake             1259 ml   Output             3150 ml   Net            -1891 ml Last 3 Recorded Weights in this Encounter    06/04/18 0705 06/04/18 1352   Weight: 83.9 kg (185 lb) 89.1 kg (196 lb 6.9 oz)       Ventilator Settings:  Mode Rate Tidal Volume Pressure FiO2 PEEP   Assist control   400 ml    100 % (Simultaneous filing. User may not have seen previous data.) 14 cm H20     Peak airway pressure: 26 cm H2O    Plateau pressure:     Tidal volume:    Minute ventilation: 11.1 l/min   SPO2 90%       Physical Exam:     General/Neurology: drowsy, arousable, but restless. Moving all 4 limbs spontaneously. Head:   Normocephalic, without obvious abnormality, atraumatic. Eye:   no scleral icterus, no pallor, no cyanosis. Throat:  No oral thrush. ETT and OGT. Neck:   Symmetric. No lymphadenopathy. Trachea midline  Lung: Moderate air entry bilateral equal. Decreased AE bibasilar. No rales. No rhonchi. No wheezing. No stridors. Heart:   Regular rate & rhythm. S1 S2 present. No murmur. No JVD. Abdomen:  Soft. Obese. +BS. Midline lower abd surgical site without induration/discharge. Extremities:  Trace to 1 + b/l pitting pedal and hand edema. No cyanosis. No clubbing. Pulses: 2+ and symmetric in DP. Capillary refill: normal  Lymphatic:  No cervical or supraclavicular palpable lymphadenopathy. Skin:   No rashes or lesions.        Data:       Recent Results (from the past 24 hour(s))   GLUCOSE, POC    Collection Time: 06/11/18 11:25 AM   Result Value Ref Range    Glucose (POC) 190 (H) 70 - 110 mg/dL   GLUCOSE, POC    Collection Time: 06/11/18  5:47 PM   Result Value Ref Range    Glucose (POC) 183 (H) 70 - 110 mg/dL   POC G3    Collection Time: 06/11/18 10:03 PM   Result Value Ref Range    Device: VENT      FIO2 (POC) 1.0 %    pH (POC) 7.450 7.35 - 7.45      pCO2 (POC) 34.0 (L) 35.0 - 45.0 MMHG    pO2 (POC) 47 (LL) 80 - 100 MMHG    HCO3 (POC) 23.8 22 - 26 MMOL/L    sO2 (POC) 86 (L) 92 - 97 %    Base excess (POC) 0 mmol/L    Mode ASSIST CONTROL      Tidal volume 400 ml    Set Rate 14 bpm    PEEP/CPAP (POC) 10 cmH2O    Allens test (POC) YES      Total resp. rate 38      Site LEFT RADIAL      Patient temp. 97.4      Specimen type (POC) ARTERIAL      Performed by Lary Snider     Volume control YES     GLUCOSE, POC    Collection Time: 06/11/18 11:28 PM   Result Value Ref Range    Glucose (POC) 204 (H) 70 - 110 mg/dL   MAGNESIUM    Collection Time: 06/12/18  4:25 AM   Result Value Ref Range    Magnesium 2.0 1.6 - 2.6 mg/dL   METABOLIC PANEL, COMPREHENSIVE    Collection Time: 06/12/18  4:25 AM   Result Value Ref Range    Sodium 143 136 - 145 mmol/L    Potassium 3.4 (L) 3.5 - 5.5 mmol/L    Chloride 104 100 - 108 mmol/L    CO2 24 21 - 32 mmol/L    Anion gap 15 3.0 - 18 mmol/L    Glucose 187 (H) 74 - 99 mg/dL    BUN 60 (H) 7.0 - 18 MG/DL    Creatinine 2.69 (H) 0.6 - 1.3 MG/DL    BUN/Creatinine ratio 22 (H) 12 - 20      GFR est AA 21 (L) >60 ml/min/1.73m2    GFR est non-AA 18 (L) >60 ml/min/1.73m2    Calcium 7.0 (L) 8.5 - 10.1 MG/DL    Bilirubin, total 0.4 0.2 - 1.0 MG/DL    ALT (SGPT) 525 (H) 13 - 56 U/L    AST (SGOT) 509 (H) 15 - 37 U/L    Alk.  phosphatase 97 45 - 117 U/L    Protein, total 4.5 (L) 6.4 - 8.2 g/dL    Albumin 1.4 (L) 3.4 - 5.0 g/dL    Globulin 3.1 2.0 - 4.0 g/dL    A-G Ratio 0.5 (L) 0.8 - 1.7     CBC W/O DIFF    Collection Time: 06/12/18  4:25 AM   Result Value Ref Range    WBC 22.4 (H) 4.6 - 13.2 K/uL    RBC 3.73 (L) 4.20 - 5.30 M/uL    HGB 8.0 (L) 12.0 - 16.0 g/dL    HCT 24.8 (L) 35.0 - 45.0 %    MCV 66.5 (L) 74.0 - 97.0 FL    MCH 21.4 (L) 24.0 - 34.0 PG    MCHC 32.3 31.0 - 37.0 g/dL    RDW 18.5 (H) 11.6 - 14.5 %    PLATELET 304 232 - 875 K/uL   PTT    Collection Time: 06/12/18  4:25 AM   Result Value Ref Range    aPTT 93.8 (H) 23.0 - 36.4 SEC   CALCIUM, IONIZED    Collection Time: 06/12/18  4:25 AM   Result Value Ref Range    Ionized Calcium 0.96 (L) 1.12 - 1.32 MMOL/L   POC G3    Collection Time: 06/12/18  5:25 AM   Result Value Ref Range    Device: VENT      FIO2 (POC) 1.0 %    pH (POC) 7.463 (H) 7.35 - 7.45      pCO2 (POC) 33.0 (L) 35.0 - 45.0 MMHG    pO2 (POC) 63 (L) 80 - 100 MMHG    HCO3 (POC) 23.7 22 - 26 MMOL/L    sO2 (POC) 94 92 - 97 %    Base excess (POC) 0 mmol/L    Mode ASSIST CONTROL      Tidal volume 415 ml    Set Rate 14 bpm    PEEP/CPAP (POC) 14 cmH2O    Allens test (POC) YES      Total resp. rate 28      Site LEFT RADIAL      Patient temp. 97.5      Specimen type (POC) ARTERIAL      Performed by Raymond Kerr    GLUCOSE, POC    Collection Time: 06/12/18  5:52 AM   Result Value Ref Range    Glucose (POC) 187 (H) 70 - 110 mg/dL         Chemistry Recent Labs      06/12/18   0425  06/11/18   0451  06/10/18   1500  06/10/18   0528   GLU  187*  166*   --   192*   NA  143  138   --   137   K  3.4*  4.0  3.9  3.8   CL  104  99*   --   100   CO2  24  25   --   25   BUN  60*  56*   --   42*   CREA  2.69*  3.21*   --   3.09*   CA  7.0*  7.7*   --   7.1*   MG  2.0  2.2  2.1  1.7   PHOS   --    --    --   3.6   AGAP  15  14   --   12   BUCR  22*  17   --   14   AP  97  85   --   57   TP  4.5*  5.3*   --   4.7*   ALB  1.4*  1.4*   --   1.4*   GLOB  3.1  3.9   --   3.3   AGRAT  0.5*  0.4*   --   0.4*        Lactic Acid Lactic acid   Date Value Ref Range Status   06/10/2018 4.7 (HH) 0.4 - 2.0 MMOL/L Final     Comment:     CALLED TO AND CORRECTLY REPEATED BY:  SOBEIDA ROSALES RN ICU TO  AT 0615 ON [id number]       Recent Labs      06/10/18   0528   LAC  4.7*        Liver Enzymes Protein, total   Date Value Ref Range Status   06/12/2018 4.5 (L) 6.4 - 8.2 g/dL Final     Albumin   Date Value Ref Range Status   06/12/2018 1.4 (L) 3.4 - 5.0 g/dL Final     Globulin   Date Value Ref Range Status   06/12/2018 3.1 2.0 - 4.0 g/dL Final     A-G Ratio   Date Value Ref Range Status   06/12/2018 0.5 (L) 0.8 - 1.7   Final     AST (SGOT)   Date Value Ref Range Status   06/12/2018 509 (H) 15 - 37 U/L Final     Alk.  phosphatase   Date Value Ref Range Status   06/12/2018 97 45 - 117 U/L Final     Recent Labs 06/12/18   0425  06/11/18   0451  06/10/18   0528   TP  4.5*  5.3*  4.7*   ALB  1.4*  1.4*  1.4*   GLOB  3.1  3.9  3.3   AGRAT  0.5*  0.4*  0.4*   SGOT  509*  653*  805*   AP  97  85  57        CBC w/Diff Recent Labs      06/12/18   0425  06/11/18   0451  06/10/18   1758  06/10/18   1043   WBC  22.4*  23.3*   --   28.5*   RBC  3.73*  3.90*   --   3.75*   HGB  8.0*  8.4*  8.2*  8.2*   HCT  24.8*  26.3*  24.9*  25.5*   PLT  190  203   --   223   GRANS   --    --    --   60   LYMPH   --    --    --   16*   EOS   --    --    --   0        Cardiac Enzymes No results found for: CPK, CK, CKMMB, CKMB, RCK3, CKMBT, CKNDX, CKND1, FABIAN, TROPT, TROIQ, KAYLAN, TROPT, TNIPOC, BNP, BNPP     BNP No results found for: BNP, BNPP, XBNPT     Coagulation Recent Labs      06/12/18   0425  06/11/18   0451  06/10/18   2001   06/10/18   0528   PTP   --    --    --    --   18.0*   INR   --    --    --    --   1.6*   APTT  93.8*  75.7*  107.4*   < >  32.1    < > = values in this interval not displayed.          Thyroid  No results found for: T4, T3U, TSH, TSHEXT, TSHEXT    No results found for: T4     Urinalysis Lab Results   Component Value Date/Time    Color DARK YELLOW 06/04/2018 08:42 AM    Appearance CLOUDY 06/04/2018 08:42 AM    Specific gravity 1.028 06/04/2018 08:42 AM    pH (UA) 5.0 06/04/2018 08:42 AM    Protein 30 (A) 06/04/2018 08:42 AM    Glucose NEGATIVE  06/04/2018 08:42 AM    Ketone TRACE (A) 06/04/2018 08:42 AM    Bilirubin SMALL (A) 06/04/2018 08:42 AM    Urobilinogen 1.0 06/04/2018 08:42 AM    Nitrites NEGATIVE  06/04/2018 08:42 AM    Leukocyte Esterase NEGATIVE  06/04/2018 08:42 AM    Epithelial cells FEW 06/04/2018 08:42 AM    Bacteria 1+ (A) 06/04/2018 08:42 AM    WBC 0 to 3 06/04/2018 08:42 AM    RBC 0 to 3 06/04/2018 08:42 AM        Micro  Recent Labs      06/09/18   1423  06/09/18   1223   CULT  NO GROWTH 3 DAYS  NO GROWTH 3 DAYS     Recent Labs      06/09/18   1423  06/09/18   1223   CULT  NO GROWTH 3 DAYS  NO GROWTH 3 DAYS        ABG Recent Labs      06/12/18   0525  06/11/18   2203  06/11/18   0502   PHI  7.463*  7.450  7.482*   PCO2I  33.0*  34.0*  32.9*   PO2I  63*  47*  53*   HCO3I  23.7  23.8  24.7   FIO2I  1.0  1.0  100          Echo 6/9/18:  Left ventricle: Systolic function was normal. Ejection fraction was estimated   in the range of 65 % to 70 %. No obvious  wall motion abnormalities identified in the views obtained. Wall thickness   was mildly increased. Doppler parameters  were consistent with abnormal left ventricular relaxation (grade 1 diastolic   dysfunction). Right ventricle: The size was normal. Systolic function was normal.  Mitral valve: There was mild annular calcification. Tricuspid valve: Pulmonary artery systolic pressure was mildly increased. Pulmonary artery systolic pressure: 34 mmHg. PVL LE 6/6/18: acute b/l peroneal DVT. CT abd pelvis 6/4/18:  1. Postsurgical hysterectomy, bilateral oophorectomy, pelvic lymphadenectomy and  a mastectomy surgical changes. Small volume of ascites in the abdomen and  pelvis, with a few tiny locules of gas in the right hemipelvis would be in  keeping with recent postsurgical etiology. 2. Abnormal edematous thick-walled small bowel loops in the left abdomen and  pelvis, with TRAM lamellar edematous configuration, induration. No findings of  pneumatosis. The overall appearance is nonspecific. Diagnostic considerations  include infectious etiology, nonspecific edema which may be unresolved  postsurgical etiology. Ischemia is also included in the differential diagnostic  consideration, however, there are no findings of pneumatosis, portal venous gas. 3. Stool in the right colon extending to the hepatic flexure with surrounding  gas, foci of pneumatosis could be obscured. No associated bowel wall thickening. 4. Satisfactory position of nasogastric tube. 5. Hepatomegaly, steatosis with 2 rounded low-density lesions, potentially  cysts.   6. Bibasilar atelectasis. CXR reviewed by me:  CXR 6/12/18:  1. Endotracheal tube, visualized nasogastric tube, and right subclavian central  venous catheter has above. 2. Rotated projection of the chest with mild interval increase in perihilar  predominant interstitial opacities, potentially reflecting worsening  interstitial edema, with unchanged basilar atelectasis/airspace disease.     Steven Camacho MD  6/12/2018

## 2018-06-12 NOTE — PROGRESS NOTES
Reviewed overnight events with attending nurse, Melinda Gomez. Patient responsive and nods/shakes head to questions appropriately. Denies pain. O2 sats improved today but still requiring high FiO2 and PEEP  Remains afebrile, on abx, WBC still elevated. Creatinine trending down. Abdomen soft, nontender, hypo BS. Incision c/d/i  Continue current management with vent, abx, and Hep gtt. Appreciate Pulmonary and Medicine management.   Mina Franco MD

## 2018-06-13 ENCOUNTER — APPOINTMENT (OUTPATIENT)
Dept: GENERAL RADIOLOGY | Age: 68
DRG: 853 | End: 2018-06-13
Attending: INTERNAL MEDICINE
Payer: MEDICARE

## 2018-06-13 LAB
ALBUMIN SERPL-MCNC: 1.4 G/DL (ref 3.4–5)
ALBUMIN/GLOB SERPL: 0.4 {RATIO} (ref 0.8–1.7)
ALP SERPL-CCNC: 111 U/L (ref 45–117)
ALT SERPL-CCNC: 490 U/L (ref 13–56)
ANION GAP SERPL CALC-SCNC: 14 MMOL/L (ref 3–18)
APTT PPP: 95.3 SEC (ref 23–36.4)
AST SERPL-CCNC: 348 U/L (ref 15–37)
BILIRUB SERPL-MCNC: 0.4 MG/DL (ref 0.2–1)
BUN SERPL-MCNC: 64 MG/DL (ref 7–18)
BUN/CREAT SERPL: 27 (ref 12–20)
CA-I SERPL-SCNC: 1.05 MMOL/L (ref 1.12–1.32)
CA-I SERPL-SCNC: 1.1 MMOL/L (ref 1.12–1.32)
CA-I SERPL-SCNC: 1.11 MMOL/L (ref 1.12–1.32)
CALCIUM SERPL-MCNC: 7.4 MG/DL (ref 8.5–10.1)
CHLORIDE SERPL-SCNC: 106 MMOL/L (ref 100–108)
CO2 SERPL-SCNC: 24 MMOL/L (ref 21–32)
CREAT SERPL-MCNC: 2.41 MG/DL (ref 0.6–1.3)
ERYTHROCYTE [DISTWIDTH] IN BLOOD BY AUTOMATED COUNT: 18.9 % (ref 11.6–14.5)
GLOBULIN SER CALC-MCNC: 3.7 G/DL (ref 2–4)
GLUCOSE BLD STRIP.AUTO-MCNC: 150 MG/DL (ref 70–110)
GLUCOSE BLD STRIP.AUTO-MCNC: 159 MG/DL (ref 70–110)
GLUCOSE BLD STRIP.AUTO-MCNC: 160 MG/DL (ref 70–110)
GLUCOSE BLD STRIP.AUTO-MCNC: 175 MG/DL (ref 70–110)
GLUCOSE BLD STRIP.AUTO-MCNC: 190 MG/DL (ref 70–110)
GLUCOSE SERPL-MCNC: 169 MG/DL (ref 74–99)
HCT VFR BLD AUTO: 23.6 % (ref 35–45)
HGB BLD-MCNC: 7.6 G/DL (ref 12–16)
MAGNESIUM SERPL-MCNC: 2 MG/DL (ref 1.6–2.6)
MCH RBC QN AUTO: 21.6 PG (ref 24–34)
MCHC RBC AUTO-ENTMCNC: 32.2 G/DL (ref 31–37)
MCV RBC AUTO: 67 FL (ref 74–97)
PHOSPHATE SERPL-MCNC: 4.7 MG/DL (ref 2.5–4.9)
PLATELET # BLD AUTO: 167 K/UL (ref 135–420)
POTASSIUM SERPL-SCNC: 3.1 MMOL/L (ref 3.5–5.5)
POTASSIUM SERPL-SCNC: 3.4 MMOL/L (ref 3.5–5.5)
POTASSIUM SERPL-SCNC: 3.5 MMOL/L (ref 3.5–5.5)
PROT SERPL-MCNC: 5.1 G/DL (ref 6.4–8.2)
RBC # BLD AUTO: 3.52 M/UL (ref 4.2–5.3)
SODIUM SERPL-SCNC: 144 MMOL/L (ref 136–145)
WBC # BLD AUTO: 19.6 K/UL (ref 4.6–13.2)

## 2018-06-13 PROCEDURE — 85027 COMPLETE CBC AUTOMATED: CPT | Performed by: OBSTETRICS & GYNECOLOGY

## 2018-06-13 PROCEDURE — 94003 VENT MGMT INPAT SUBQ DAY: CPT

## 2018-06-13 PROCEDURE — 65270000029 HC RM PRIVATE

## 2018-06-13 PROCEDURE — 83735 ASSAY OF MAGNESIUM: CPT | Performed by: OBSTETRICS & GYNECOLOGY

## 2018-06-13 PROCEDURE — 30233K1 TRANSFUSION OF NONAUTOLOGOUS FROZEN PLASMA INTO PERIPHERAL VEIN, PERCUTANEOUS APPROACH: ICD-10-PCS | Performed by: OBSTETRICS & GYNECOLOGY

## 2018-06-13 PROCEDURE — 74011250636 HC RX REV CODE- 250/636: Performed by: HOSPITALIST

## 2018-06-13 PROCEDURE — 82330 ASSAY OF CALCIUM: CPT | Performed by: INTERNAL MEDICINE

## 2018-06-13 PROCEDURE — 74011250637 HC RX REV CODE- 250/637: Performed by: INTERNAL MEDICINE

## 2018-06-13 PROCEDURE — 86920 COMPATIBILITY TEST SPIN: CPT | Performed by: OBSTETRICS & GYNECOLOGY

## 2018-06-13 PROCEDURE — 77010033678 HC OXYGEN DAILY

## 2018-06-13 PROCEDURE — 30233L1 TRANSFUSION OF NONAUTOLOGOUS FRESH PLASMA INTO PERIPHERAL VEIN, PERCUTANEOUS APPROACH: ICD-10-PCS | Performed by: OBSTETRICS & GYNECOLOGY

## 2018-06-13 PROCEDURE — 77030019938 HC TBNG IV PCA ICUM -A

## 2018-06-13 PROCEDURE — 84100 ASSAY OF PHOSPHORUS: CPT | Performed by: OBSTETRICS & GYNECOLOGY

## 2018-06-13 PROCEDURE — 85730 THROMBOPLASTIN TIME PARTIAL: CPT | Performed by: OBSTETRICS & GYNECOLOGY

## 2018-06-13 PROCEDURE — 74011250636 HC RX REV CODE- 250/636: Performed by: INTERNAL MEDICINE

## 2018-06-13 PROCEDURE — 82962 GLUCOSE BLOOD TEST: CPT

## 2018-06-13 PROCEDURE — 86900 BLOOD TYPING SEROLOGIC ABO: CPT | Performed by: OBSTETRICS & GYNECOLOGY

## 2018-06-13 PROCEDURE — 74011000258 HC RX REV CODE- 258: Performed by: INTERNAL MEDICINE

## 2018-06-13 PROCEDURE — 74011250636 HC RX REV CODE- 250/636: Performed by: OBSTETRICS & GYNECOLOGY

## 2018-06-13 PROCEDURE — 36430 TRANSFUSION BLD/BLD COMPNT: CPT

## 2018-06-13 PROCEDURE — 84132 ASSAY OF SERUM POTASSIUM: CPT | Performed by: OBSTETRICS & GYNECOLOGY

## 2018-06-13 PROCEDURE — 71045 X-RAY EXAM CHEST 1 VIEW: CPT

## 2018-06-13 PROCEDURE — 74011000250 HC RX REV CODE- 250: Performed by: INTERNAL MEDICINE

## 2018-06-13 PROCEDURE — P9016 RBC LEUKOCYTES REDUCED: HCPCS | Performed by: OBSTETRICS & GYNECOLOGY

## 2018-06-13 PROCEDURE — 30233N1 TRANSFUSION OF NONAUTOLOGOUS RED BLOOD CELLS INTO PERIPHERAL VEIN, PERCUTANEOUS APPROACH: ICD-10-PCS | Performed by: OBSTETRICS & GYNECOLOGY

## 2018-06-13 PROCEDURE — 84132 ASSAY OF SERUM POTASSIUM: CPT | Performed by: INTERNAL MEDICINE

## 2018-06-13 PROCEDURE — 74011000258 HC RX REV CODE- 258: Performed by: OBSTETRICS & GYNECOLOGY

## 2018-06-13 PROCEDURE — C9113 INJ PANTOPRAZOLE SODIUM, VIA: HCPCS | Performed by: INTERNAL MEDICINE

## 2018-06-13 PROCEDURE — 94640 AIRWAY INHALATION TREATMENT: CPT

## 2018-06-13 PROCEDURE — 74011636637 HC RX REV CODE- 636/637: Performed by: INTERNAL MEDICINE

## 2018-06-13 RX ORDER — POTASSIUM CHLORIDE 20MEQ/15ML
20 LIQUID (ML) ORAL
Status: COMPLETED | OUTPATIENT
Start: 2018-06-13 | End: 2018-06-13

## 2018-06-13 RX ORDER — POTASSIUM CHLORIDE 20MEQ/15ML
20 LIQUID (ML) ORAL ONCE
Status: COMPLETED | OUTPATIENT
Start: 2018-06-13 | End: 2018-06-13

## 2018-06-13 RX ORDER — FUROSEMIDE 10 MG/ML
40 INJECTION INTRAMUSCULAR; INTRAVENOUS ONCE
Status: COMPLETED | OUTPATIENT
Start: 2018-06-13 | End: 2018-06-13

## 2018-06-13 RX ORDER — POTASSIUM CHLORIDE 7.45 MG/ML
10 INJECTION INTRAVENOUS
Status: DISCONTINUED | OUTPATIENT
Start: 2018-06-13 | End: 2018-06-13

## 2018-06-13 RX ORDER — SODIUM CHLORIDE 9 MG/ML
250 INJECTION, SOLUTION INTRAVENOUS AS NEEDED
Status: DISCONTINUED | OUTPATIENT
Start: 2018-06-13 | End: 2018-06-19

## 2018-06-13 RX ORDER — POTASSIUM CHLORIDE 20MEQ/15ML
40 LIQUID (ML) ORAL
Status: COMPLETED | OUTPATIENT
Start: 2018-06-14 | End: 2018-06-13

## 2018-06-13 RX ORDER — AMOXICILLIN 250 MG
1 CAPSULE ORAL 2 TIMES DAILY
Status: DISCONTINUED | OUTPATIENT
Start: 2018-06-13 | End: 2018-06-15

## 2018-06-13 RX ORDER — IPRATROPIUM BROMIDE 0.5 MG/2.5ML
0.5 SOLUTION RESPIRATORY (INHALATION)
Status: DISCONTINUED | OUTPATIENT
Start: 2018-06-13 | End: 2018-07-11 | Stop reason: HOSPADM

## 2018-06-13 RX ADMIN — FUROSEMIDE 40 MG: 10 INJECTION, SOLUTION INTRAMUSCULAR; INTRAVENOUS at 08:41

## 2018-06-13 RX ADMIN — POTASSIUM CHLORIDE 40 MEQ: 20 SOLUTION ORAL at 23:41

## 2018-06-13 RX ADMIN — SODIUM CHLORIDE 1000 MG: 900 INJECTION, SOLUTION INTRAVENOUS at 02:31

## 2018-06-13 RX ADMIN — INSULIN LISPRO 2 UNITS: 100 INJECTION, SOLUTION INTRAVENOUS; SUBCUTANEOUS at 18:41

## 2018-06-13 RX ADMIN — SODIUM CHLORIDE 40 MG: 9 INJECTION INTRAMUSCULAR; INTRAVENOUS; SUBCUTANEOUS at 08:37

## 2018-06-13 RX ADMIN — POTASSIUM CHLORIDE 20 MEQ: 20 SOLUTION ORAL at 17:03

## 2018-06-13 RX ADMIN — POTASSIUM CHLORIDE 10 MEQ: 10 INJECTION, SOLUTION INTRAVENOUS at 09:12

## 2018-06-13 RX ADMIN — IPRATROPIUM BROMIDE 0.5 MG: 0.5 SOLUTION RESPIRATORY (INHALATION) at 01:29

## 2018-06-13 RX ADMIN — IPRATROPIUM BROMIDE 0.5 MG: 0.5 SOLUTION RESPIRATORY (INHALATION) at 07:46

## 2018-06-13 RX ADMIN — FUROSEMIDE 40 MG: 10 INJECTION, SOLUTION INTRAMUSCULAR; INTRAVENOUS at 17:03

## 2018-06-13 RX ADMIN — HYDROCODONE BITARTRATE AND ACETAMINOPHEN 2 TABLET: 10; 325 TABLET ORAL at 23:40

## 2018-06-13 RX ADMIN — HEPARIN SODIUM 19 UNITS/KG/HR: 10000 INJECTION, SOLUTION INTRAVENOUS at 03:22

## 2018-06-13 RX ADMIN — PIPERACILLIN SODIUM,TAZOBACTAM SODIUM 2.25 G: 2; .25 INJECTION, POWDER, FOR SOLUTION INTRAVENOUS at 04:45

## 2018-06-13 RX ADMIN — INSULIN LISPRO 2 UNITS: 100 INJECTION, SOLUTION INTRAVENOUS; SUBCUTANEOUS at 23:41

## 2018-06-13 RX ADMIN — HEPARIN SODIUM 19 UNITS/KG/HR: 10000 INJECTION, SOLUTION INTRAVENOUS at 19:18

## 2018-06-13 RX ADMIN — CALCIUM GLUCONATE 2 G: 94 INJECTION, SOLUTION INTRAVENOUS at 16:38

## 2018-06-13 RX ADMIN — INSULIN LISPRO 2 UNITS: 100 INJECTION, SOLUTION INTRAVENOUS; SUBCUTANEOUS at 06:01

## 2018-06-13 RX ADMIN — FENTANYL CITRATE 25 MCG: 50 INJECTION, SOLUTION INTRAMUSCULAR; INTRAVENOUS at 22:26

## 2018-06-13 RX ADMIN — HYDROCORTISONE SODIUM SUCCINATE 50 MG: 100 INJECTION, POWDER, FOR SOLUTION INTRAMUSCULAR; INTRAVENOUS at 08:40

## 2018-06-13 RX ADMIN — FENTANYL CITRATE 25 MCG: 50 INJECTION, SOLUTION INTRAMUSCULAR; INTRAVENOUS at 18:09

## 2018-06-13 RX ADMIN — POTASSIUM CHLORIDE 20 MEQ: 20 SOLUTION ORAL at 10:12

## 2018-06-13 RX ADMIN — PIPERACILLIN SODIUM,TAZOBACTAM SODIUM 2.25 G: 2; .25 INJECTION, POWDER, FOR SOLUTION INTRAVENOUS at 23:46

## 2018-06-13 RX ADMIN — POTASSIUM CHLORIDE 10 MEQ: 10 INJECTION, SOLUTION INTRAVENOUS at 06:02

## 2018-06-13 RX ADMIN — DOCUSATE SODIUM AND SENNOSIDES 1 TABLET: 8.6; 5 TABLET, FILM COATED ORAL at 20:21

## 2018-06-13 RX ADMIN — INSULIN LISPRO 2 UNITS: 100 INJECTION, SOLUTION INTRAVENOUS; SUBCUTANEOUS at 14:07

## 2018-06-13 RX ADMIN — PIPERACILLIN SODIUM,TAZOBACTAM SODIUM 2.25 G: 2; .25 INJECTION, POWDER, FOR SOLUTION INTRAVENOUS at 18:11

## 2018-06-13 RX ADMIN — DOCUSATE SODIUM AND SENNOSIDES 1 TABLET: 8.6; 5 TABLET, FILM COATED ORAL at 11:50

## 2018-06-13 RX ADMIN — CALCIUM GLUCONATE 2 G: 94 INJECTION, SOLUTION INTRAVENOUS at 07:28

## 2018-06-13 RX ADMIN — Medication 25 MCG/HR: at 05:29

## 2018-06-13 RX ADMIN — PIPERACILLIN SODIUM,TAZOBACTAM SODIUM 2.25 G: 2; .25 INJECTION, POWDER, FOR SOLUTION INTRAVENOUS at 11:40

## 2018-06-13 NOTE — PROGRESS NOTES
Hospitalist Progress Note    Patient: Adiel Nur MRN: [id number]  CSN: [id number]    YOB: 1950  Age: 75 y.o. Sex: female    DOA: 6/4/2018 LOS:  LOS: 9 days          Chief Complaint:sepsis          Assessment/Plan       Disposition :  Patient Active Problem List   Diagnosis Code    Ovarian ca ([facility] [address] C56.9    Severe obesity (BMI 35.0-39.9) ([facility]) E66.01    Abdominal pain R10.9    Sepsis ([facility] [address] A41.9    Oliguria R34    Acute respiratory failure ([address] J96.00    JESS (acute kidney injury) ([facility] [address] V83.5    Metabolic acidosis Z33.8    Acute deep vein thrombosis (DVT) of lower extremity ([facility]) I82.409     Ac resp failure-worsening resp status on vent last night, requiring inc PEEP and continued vent support  Sepsis and shock due to peritonitis presumed  S/p laparotomy  Probable PE with LE DVT  Ovarian cancer with recent debulking surgery  Oliguric JESS  Anemia  Hypoalbuminemia    Remains critically ill in the icu. Will continue to follow along. IV abx. Vent mgmt per Pulmonology  Heparin infusion. Follow hgb levels. Wbc downward trending. Subjective:        Review of systems:    Constitutional: denies fevers, chills, myalgias  Respiratory: denies SOB, cough  Cardiovascular: denies chest pain, palpitations  Gastrointestinal: denies nausea, vomiting, diarrhea      Vital signs/Intake and Output:  Visit Vitals    /79    Pulse 88    Temp 97.4 °F (36.3 °C)    Resp 20    Ht 5' 1\" (1.549 m)    Wt 89.1 kg (196 lb 6.9 oz)    SpO2 100%    BMI 37.12 kg/m2     Current Shift:  06/13 0701 - 06/13 1900  In: -   Out: 950 [Urine:950]  Last three shifts:  06/11 1901 - 06/13 0700  In: 4321.9 [I.V.:4321.9]  Out: 4300 [Urine:4300]    Exam:    General: nad  Head/Neck: mmm  CVS:s1s2   Lungs:ctab/l  Abdomen: Soft, bs+  Extremities: Edema.                 Labs: Results:       Chemistry Recent Labs      06/13/18   1220  06/13/18   0425  06/12/18   1800  06/12/18   0425  06/11/18   0451   GLU   -- 169*   --   187*  166*   NA   --   144   --   143  138   K  3.5  3.4*  3.5  3.4*  4.0   CL   --   106   --   104  99*   CO2   --   24   --   24  25   BUN   --   64*   --   60*  56*   CREA   --   2.41*   --   2.69*  3.21*   CA   --   7.4*   --   7.0*  7.7*   AGAP   --   14   --   15  14   BUCR   --   27*   --   22*  17   AP   --   111   --   97  85   TP   --   5.1*   --   4.5*  5.3*   ALB   --   1.4*   --   1.4*  1.4*   GLOB   --   3.7   --   3.1  3.9   AGRAT   --   0.4*   --   0.5*  0.4*      CBC w/Diff Recent Labs      06/13/18 0425 06/12/18 0425 06/11/18   0451   WBC  19.6*  22.4*  23.3*   RBC  3.52*  3.73*  3.90*   HGB  7.6*  8.0*  8.4*   HCT  23.6*  24.8*  26.3*   PLT  167  190  203      Cardiac Enzymes No results for input(s): CPK, CKND1, FABIAN in the last 72 hours. No lab exists for component: CKRMB, TROIP   Coagulation Recent Labs      06/13/18 0425 06/12/18 0425   APTT  95.3*  93.8*       Lipid Panel No results found for: CHOL, CHOLPOCT, CHOLX, CHLST, CHOLV, 866532, HDL, LDL, LDLC, DLDLP, 009653, VLDLC, VLDL, TGLX, TRIGL, TRIGP, TGLPOCT, CHHD, CHHDX   BNP No results for input(s): BNPP in the last 72 hours.    Liver Enzymes Recent Labs      06/13/18 0425   TP  5.1*   ALB  1.4*   AP  111   SGOT  348*      Thyroid Studies No results found for: T4, T3U, TSH, TSHEXT     Procedures/imaging: see electronic medical records for all procedures/Xrays and details which were not copied into this note but were reviewed prior to creation of Robyn Salazar MD

## 2018-06-13 NOTE — PROGRESS NOTES
[facility]                  Pulmonary, Critical Care, and Sleep Medicine     Name: August Curran MRN: [id number]   : 1950 Hospital: [facility] [place]    Date: 2018        [facility] Note                                                IMPRESSION:   · Severe sepsis due to Klebsiella Oxytoca peritonitis. · Klebsiella oxytoca peritonitis. S/p laparotomy and peritoneal lavage: Klebsiella positive. Ruled out ischemic bowel. · S/p bronchoscopy 18: no aspiration noted. Cx NGTD. · VDRF due to sepsis, reintubated on 18  · S/p Shock likely due to sepsis and obstructive due to presumed PE. Off vasopressors. · Acute b/l peroneal DVT  · Presumed PE with severe hypoxia and high A-a gradient, elevated RVSP 34 mm Hg. · Anemia, no active external bleeding. · Thrombocytopenia   · JESS  · Metabolic ad lactic acidosis, improving. · Elevated LFT, likely shock liver. · Lactic acidosis due to hypotension, hypoperfusion, sepsis, abnormal LFT etc. Improving. · Foreign body on peritoneal biopsy, per path report. · Anasarca due to fluid resuscitation, JESS, hypoalbuminemia and sequale of sepsis. · Code status: DNR. · Very poor overall prognosis. RECOMMENDATIONS:   Respiratory:  Presumed PE. PF ratio improved after diuresing. Fio2 reduced to 70% but PEEP still 14. C/w titrate fio2 to <60 % first before lowering PEEP. On lasix 40 daily. Extra 40 iv with K 20 after PRBC transfusion. Keep I<0. Diurese as tolerated. C/w heparin drip. Monitor any bleedign or complications. Platelets slightly lower, monitor closely. No tPA given due to risk of bleeding with recent two abd surgeries. Not a candidate for SBT eval yet. Ventilator bundle & Sedation protocol followed. Chlorhexidine mouth washes. Keep SPO2 >=92%. HOB 30 degree elevation all the time. Aggressive pulmonary toileting. Aspiration precautions. CVS: off levophed since couple of days.  Echo ok with mildly elevated RVSP but normal RV systolic function. Anasarca and pedema due to b/l DVT and fluid resuscitation. On heparin drip and lasix. ID:    Bronch cx normal robin. Peritoneal cx Klebsiella Oxytoca resistant to ampicillin. Doubt pneumonia. D/w ID Dr. Bala Castellanos on phone 6/12/18: reviewed all cx and clinical data: d/c vanco and levaquin. Zosyn (start 6/4/18) for 2 weeks. Off vasopressor since last couple days, but persistent leucocytosis could be due to steroids/stress. Improving wbc. Taper off hydrocortisone, d/c today. Repeat CT abd/pelvis/chest when fio2 reduced enough to be transported on portable ventilator and when it is safer to transport. Hematology/Oncology: monitor H&H. 1 PRBC today. Monitor platelets on heparin, slightly lower today. Renal: d/w nephrologist. Creat and UOP improving with improved fio2 requirement. Lasix plan as above. GI/: start trickle feed. Last BM on 6/11/18. Treat constipation with senokote s and colace. Endocrine: Maintain blood glucose 140-180. Humalog sub cut. Neurology: agitated due to peritonitis and spepsis. Pain/Sedation: fentanyl drip at 25. Skin/Wound: local surgical site care. Electrolytes: Replace electrolytes per ICU electrolyte replacement protocol. Nutrition: start trickle feed. Prophylaxis: DVT Rx with heparin drip. GI Prophylaxis with protonix. Restraints: none  Lines/Tubes:   ETT: 6/8/18. OGT: 6/8/18. Central line: right subclavina 6/4/18 (site examined, no erythema, induration, discharge or sign of infection. Dressing intact. Medically necessary, will remove it when not needed. Central line bundle followed). Fry: 6/4/18 (Medically necessary for strict input/output monitoring in critically ill patient, will remove it when not needed. Fry bundle followed). Very poor overall prognosis with MODS/MOF discussed with pt's brother at bedside, answered all questions to his satisfaction.      Will defer respective systems problem management to primary and other respective consultant and follow patient in ICU with primary and other medical team.  Further recommendations will be based on the patient's response to recommended treatment and results of the investigation ordered. Quality Care: PPI, DVT prophylaxis, HOB elevated, Infection control all reviewed and addressed. Care of plan d/w RN, RT, pharmacist, palliative care team, MDR, family- brother at bedside (answered all questions to satisfaction). D/w nephrologist.     High complexity decision making was performed during the evaluation of this patient at high risk for decompensation with multiple organ involvement. Total critical care time spent rendering care exclusive of procedures: 37 minutes. Subjective/History of Present Illness:     Patient is a 75 y.o. female admitted abd pain after ovarian cancer debulking surgery, s/p laparoscopy and peritoneal lavage 6/5/18, ruled out ischemic bowel, Klebsielle peritoneal cx positive, JESS, peroneal DVT, severe hypoxic respiratory failure with presumed PE, on ventilator, shock likely septic vs obstructive. 6/13/18:  Electrolytes being replaced, change to OGT replacement rather than iv  Edema improving  I/o improved with lasix yesterday  I/o -1100  fio2 reduced to 70%, still on peep 14  No fever   BP stable  More awake and following commands, pain in abd only 1. Only on fentanyl 25. No BM since 6/11  Start trickle feed  Will receive 1 prbc today  Platelets slight drop  On heparin drip. Hb stable. No active external bleeding. Arm and leg edema improving         Allergies   Allergen Reactions    Hydrocodone Other (comments)     Breaks into cold sweat    Metformin Other (comments)     Breaks out into cold sweat.  Can Take Glucophage brand name med      Past Medical History:   Diagnosis Date    Diabetes ([facility] [address]     many years type 2    Hypertension     many years      Past Surgical History:   Procedure Laterality Date    HX OOPHORECTOMY  2018    HX TONSILLECTOMY      as a child       Social History   Substance Use Topics    Smoking status: Never Smoker    Smokeless tobacco: Never Used    Alcohol use No      History reviewed. No pertinent family history.      Current Facility-Administered Medications   Medication Dose Route Frequency    furosemide (LASIX) injection 40 mg  40 mg IntraVENous DAILY    hydrocortisone Sod Succ (PF) (SOLU-CORTEF) injection 50 mg  50 mg IntraVENous Q12H    piperacillin-tazobactam (ZOSYN) 2.25 g in 0.9% sodium chloride (MBP/ADV) 50 mL MBP  2.25 g IntraVENous Q8H    levoFLOXacin (LEVAQUIN) 500 mg in D5W IVPB  500 mg IntraVENous Q48H    dextrose 5% and 0.9% NaCl infusion  75 mL/hr IntraVENous CONTINUOUS    fentaNYL (PF) 900 mcg/30 ml infusion soln  25-50 mcg/hr IntraVENous TITRATE    midazolam in normal saline (VERSED) 1 mg/mL infusion  2-6 mg/hr IntraVENous TITRATE    NOREPINephrine (LEVOPHED) 8 mg in 5% dextrose 250mL infusion  2-30 mcg/min IntraVENous TITRATE    Vancomycin - Pharmacy to dose  1 Each Other Rx Dosing/Monitoring    heparin 25,000 units in D5W 250 ml infusion  18-36 Units/kg/hr IntraVENous TITRATE    ipratropium (ATROVENT) 0.02 % nebulizer solution 0.5 mg  0.5 mg Nebulization Q6H RT    insulin lispro (HUMALOG) injection   SubCUTAneous Q6H    pantoprazole (PROTONIX) 40 mg in sodium chloride 0.9% 10 mL injection  40 mg IntraVENous DAILY         Objective:   Vital Signs:    Visit Vitals    /79    Pulse 84    Temp 98.1 °F (36.7 °C)    Resp 23    Ht 5' 1\" (1.549 m)    Wt 89.1 kg (196 lb 6.9 oz)    SpO2 100%    BMI 37.12 kg/m2       O2 Device: Endotracheal tube   O2 Flow Rate (L/min): 50 l/min   Temp (24hrs), Av.4 °F (36.3 °C), Min:96.9 °F (36.1 °C), Max:98.1 °F (36.7 °C)       Intake/Output:   Last shift:       07 -  1900  In: -   Out: 250 [Urine:250]    Last 3 shifts: 1901 -  0700  In: 4321.9 [I.V.:4321.9]  Out: 0179 [PIPDY:3615]      Intake/Output Summary (Last 24 hours) at 06/13/18 1043  Last data filed at 06/13/18 0748   Gross per 24 hour   Intake          2912.86 ml   Output             1850 ml   Net          1062.86 ml       Last 3 Recorded Weights in this Encounter    06/04/18 0705 06/04/18 1352   Weight: 83.9 kg (185 lb) 89.1 kg (196 lb 6.9 oz)       Ventilator Settings:  Mode Rate Tidal Volume Pressure FiO2 PEEP   Assist control   400 ml    70 % 14 cm H20     Peak airway pressure: 23 cm H2O    Plateau pressure:     Tidal volume:    Minute ventilation: 10.7 l/min   SPO2 90%       Physical Exam:     General/Neurology: awake, following all commands. Moving all 4 limbs spontaneously. Head:   Normocephalic, without obvious abnormality, atraumatic. Eye:   no scleral icterus, no pallor, no cyanosis. Throat:  No oral thrush. ETT and OGT. Neck:   Symmetric. No lymphadenopathy. Trachea midline  Lung: Moderate air entry bilateral equal. Decreased AE bibasilar. No rales. No rhonchi. No wheezing. No stridors. Heart:   Regular rate & rhythm. S1 S2 present. No murmur. No JVD. Abdomen:  Soft. Obese. +BS. Midline lower abd surgical site without induration/discharge. Extremities:  1 + b/l pitting pedal and hand edema. No cyanosis. No clubbing. Pulses: 2+ and symmetric in DP. Capillary refill: normal  Lymphatic:  No cervical or supraclavicular palpable lymphadenopathy. Skin:   No rashes or lesions.        Data:       Recent Results (from the past 24 hour(s))   GLUCOSE, POC    Collection Time: 06/12/18 12:00 PM   Result Value Ref Range    Glucose (POC) 164 (H) 70 - 110 mg/dL   GLUCOSE, POC    Collection Time: 06/12/18  5:57 PM   Result Value Ref Range    Glucose (POC) 173 (H) 70 - 110 mg/dL   POTASSIUM    Collection Time: 06/12/18  6:00 PM   Result Value Ref Range    Potassium 3.5 3.5 - 5.5 mmol/L   VANCOMYCIN, RANDOM    Collection Time: 06/12/18  6:00 PM   Result Value Ref Range    Vancomycin, random 20.7 5.0 - 40.0 UG/ML POC G3    Collection Time: 06/12/18 11:24 PM   Result Value Ref Range    Device: VENT      FIO2 (POC) 0.75 %    pH (POC) 7.463 (H) 7.35 - 7.45      pCO2 (POC) 33.5 (L) 35.0 - 45.0 MMHG    pO2 (POC) 94 80 - 100 MMHG    HCO3 (POC) 24.1 22 - 26 MMOL/L    sO2 (POC) 98 (H) 92 - 97 %    Base excess (POC) 0 mmol/L    Mode ASSIST CONTROL      Tidal volume 400 ml    Set Rate 14 bpm    PEEP/CPAP (POC) 14 cmH2O    Allens test (POC) YES      Total resp. rate 30      Site LEFT RADIAL      Patient temp. 98.0      Specimen type (POC) ARTERIAL      Performed by Jeral Cheadle    GLUCOSE, POC    Collection Time: 06/12/18 11:29 PM   Result Value Ref Range    Glucose (POC) 205 (H) 70 - 110 mg/dL   MAGNESIUM    Collection Time: 06/13/18  4:25 AM   Result Value Ref Range    Magnesium 2.0 1.6 - 2.6 mg/dL   METABOLIC PANEL, COMPREHENSIVE    Collection Time: 06/13/18  4:25 AM   Result Value Ref Range    Sodium 144 136 - 145 mmol/L    Potassium 3.4 (L) 3.5 - 5.5 mmol/L    Chloride 106 100 - 108 mmol/L    CO2 24 21 - 32 mmol/L    Anion gap 14 3.0 - 18 mmol/L    Glucose 169 (H) 74 - 99 mg/dL    BUN 64 (H) 7.0 - 18 MG/DL    Creatinine 2.41 (H) 0.6 - 1.3 MG/DL    BUN/Creatinine ratio 27 (H) 12 - 20      GFR est AA 24 (L) >60 ml/min/1.73m2    GFR est non-AA 20 (L) >60 ml/min/1.73m2    Calcium 7.4 (L) 8.5 - 10.1 MG/DL    Bilirubin, total 0.4 0.2 - 1.0 MG/DL    ALT (SGPT) 490 (H) 13 - 56 U/L    AST (SGOT) 348 (H) 15 - 37 U/L    Alk.  phosphatase 111 45 - 117 U/L    Protein, total 5.1 (L) 6.4 - 8.2 g/dL    Albumin 1.4 (L) 3.4 - 5.0 g/dL    Globulin 3.7 2.0 - 4.0 g/dL    A-G Ratio 0.4 (L) 0.8 - 1.7     PTT    Collection Time: 06/13/18  4:25 AM   Result Value Ref Range    aPTT 95.3 (H) 23.0 - 36.4 SEC   CBC W/O DIFF    Collection Time: 06/13/18  4:25 AM   Result Value Ref Range    WBC 19.6 (H) 4.6 - 13.2 K/uL    RBC 3.52 (L) 4.20 - 5.30 M/uL    HGB 7.6 (L) 12.0 - 16.0 g/dL    HCT 23.6 (L) 35.0 - 45.0 %    MCV 67.0 (L) 74.0 - 97.0 FL    MCH 21.6 (L) 24.0 - 34.0 PG    MCHC 32.2 31.0 - 37.0 g/dL    RDW 18.9 (H) 11.6 - 14.5 %    PLATELET 066 180 - 355 K/uL   CALCIUM, IONIZED    Collection Time: 06/13/18  4:25 AM   Result Value Ref Range    Ionized Calcium 1.05 (L) 1.12 - 1.32 MMOL/L   PHOSPHORUS    Collection Time: 06/13/18  4:25 AM   Result Value Ref Range    Phosphorus 4.7 2.5 - 4.9 MG/DL   GLUCOSE, POC    Collection Time: 06/13/18  5:27 AM   Result Value Ref Range    Glucose (POC) 190 (H) 70 - 110 mg/dL   TYPE + CROSSMATCH    Collection Time: 06/13/18  8:30 AM   Result Value Ref Range    Crossmatch Expiration 06/16/2018     ABO/Rh(D) PENDING     Antibody screen PENDING          Chemistry Recent Labs      06/13/18   0425  06/12/18   1800  06/12/18   0425  06/11/18   0451   GLU  169*   --   187*  166*   NA  144   --   143  138   K  3.4*  3.5  3.4*  4.0   CL  106   --   104  99*   CO2  24   --   24  25   BUN  64*   --   60*  56*   CREA  2.41*   --   2.69*  3.21*   CA  7.4*   --   7.0*  7.7*   MG  2.0   --   2.0  2.2   PHOS  4.7   --    --    --    AGAP  14   --   15  14   BUCR  27*   --   22*  17   AP  111   --   97  85   TP  5.1*   --   4.5*  5.3*   ALB  1.4*   --   1.4*  1.4*   GLOB  3.7   --   3.1  3.9   AGRAT  0.4*   --   0.5*  0.4*        Lactic Acid Lactic acid   Date Value Ref Range Status   06/10/2018 4.7 (HH) 0.4 - 2.0 MMOL/L Final     Comment:     CALLED TO AND CORRECTLY REPEATED BY:  SOBEIDA ROSALES RN ICU TO  AT 0615 ON [id number]       No results for input(s): LAC in the last 72 hours. Liver Enzymes Protein, total   Date Value Ref Range Status   06/13/2018 5.1 (L) 6.4 - 8.2 g/dL Final     Albumin   Date Value Ref Range Status   06/13/2018 1.4 (L) 3.4 - 5.0 g/dL Final     Globulin   Date Value Ref Range Status   06/13/2018 3.7 2.0 - 4.0 g/dL Final     A-G Ratio   Date Value Ref Range Status   06/13/2018 0.4 (L) 0.8 - 1.7   Final     AST (SGOT)   Date Value Ref Range Status   06/13/2018 348 (H) 15 - 37 U/L Final     Alk.  phosphatase   Date Value Ref Range Status   06/13/2018 111 45 - 117 U/L Final     Recent Labs      06/13/18 0425 06/12/18 0425 06/11/18   0451   TP  5.1*  4.5*  5.3*   ALB  1.4*  1.4*  1.4*   GLOB  3.7  3.1  3.9   AGRAT  0.4*  0.5*  0.4*   SGOT  348*  509*  653*   AP  111  97  85        CBC w/Diff Recent Labs      06/13/18   0425  06/12/18   0425  06/11/18   0451   06/10/18   1043   WBC  19.6*  22.4*  23.3*   --   28.5*   RBC  3.52*  3.73*  3.90*   --   3.75*   HGB  7.6*  8.0*  8.4*   < >  8.2*   HCT  23.6*  24.8*  26.3*   < >  25.5*   PLT  167  190  203   --   223   GRANS   --    --    --    --   60   LYMPH   --    --    --    --   16*   EOS   --    --    --    --   0    < > = values in this interval not displayed. Cardiac Enzymes No results found for: CPK, CK, CKMMB, CKMB, RCK3, CKMBT, CKNDX, CKND1, FABIAN, TROPT, TROIQ, KAYLAN, TROPT, TNIPOC, BNP, BNPP     BNP No results found for: BNP, BNPP, XBNPT     Coagulation Recent Labs      06/13/18 0425 06/12/18 0425 06/11/18 0451   APTT  95.3*  93.8*  75.7*         Thyroid  No results found for: T4, T3U, TSH, TSHEXT, TSHEXT    No results found for: T4     Urinalysis Lab Results   Component Value Date/Time    Color DARK YELLOW 06/04/2018 08:42 AM    Appearance CLOUDY 06/04/2018 08:42 AM    Specific gravity 1.028 06/04/2018 08:42 AM    pH (UA) 5.0 06/04/2018 08:42 AM    Protein 30 (A) 06/04/2018 08:42 AM    Glucose NEGATIVE  06/04/2018 08:42 AM    Ketone TRACE (A) 06/04/2018 08:42 AM    Bilirubin SMALL (A) 06/04/2018 08:42 AM    Urobilinogen 1.0 06/04/2018 08:42 AM    Nitrites NEGATIVE  06/04/2018 08:42 AM    Leukocyte Esterase NEGATIVE  06/04/2018 08:42 AM    Epithelial cells FEW 06/04/2018 08:42 AM    Bacteria 1+ (A) 06/04/2018 08:42 AM    WBC 0 to 3 06/04/2018 08:42 AM    RBC 0 to 3 06/04/2018 08:42 AM        Micro  No results for input(s): SDES, CULT in the last 72 hours. No results for input(s): CULT in the last 72 hours.      ABG Recent Labs      06/12/18   2324  06/12/18   0525 06/11/18   2203   PHI  7.463*  7.463*  7.450   PCO2I  33.5*  33.0*  34.0*   PO2I  94  63*  47*   HCO3I  24.1  23.7  23.8   FIO2I  0.75  1.0  1.0          Echo 6/9/18:  Left ventricle: Systolic function was normal. Ejection fraction was estimated   in the range of 65 % to 70 %. No obvious  wall motion abnormalities identified in the views obtained. Wall thickness   was mildly increased. Doppler parameters  were consistent with abnormal left ventricular relaxation (grade 1 diastolic   dysfunction). Right ventricle: The size was normal. Systolic function was normal.  Mitral valve: There was mild annular calcification. Tricuspid valve: Pulmonary artery systolic pressure was mildly increased. Pulmonary artery systolic pressure: 34 mmHg. PVL LE 6/6/18: acute b/l peroneal DVT. CT abd pelvis 6/4/18:  1. Postsurgical hysterectomy, bilateral oophorectomy, pelvic lymphadenectomy and  a mastectomy surgical changes. Small volume of ascites in the abdomen and  pelvis, with a few tiny locules of gas in the right hemipelvis would be in  keeping with recent postsurgical etiology. 2. Abnormal edematous thick-walled small bowel loops in the left abdomen and  pelvis, with TRAM lamellar edematous configuration, induration. No findings of  pneumatosis. The overall appearance is nonspecific. Diagnostic considerations  include infectious etiology, nonspecific edema which may be unresolved  postsurgical etiology. Ischemia is also included in the differential diagnostic  consideration, however, there are no findings of pneumatosis, portal venous gas. 3. Stool in the right colon extending to the hepatic flexure with surrounding  gas, foci of pneumatosis could be obscured. No associated bowel wall thickening. 4. Satisfactory position of nasogastric tube. 5. Hepatomegaly, steatosis with 2 rounded low-density lesions, potentially  cysts. 6. Bibasilar atelectasis.       CXR reviewed by me:  CXR 6/13/18:  Diffuse bilateral increased interstitial markings and bilateral infiltrates  similar to previous. Endotracheal tube, right central line and gastric tubes in place. No significant interval change. Tomeka Lux MD  6/13/2018

## 2018-06-13 NOTE — PROGRESS NOTES
NUTRITION FOLLOW-UP    RECOMMENDATIONS/PLAN:   - will order trophic feeds as discussed in rounds. Pt was previously on nepro, tolerating feeds at 30ml/hr. Will order Uehling@27 bards for 24 hours (This will provide: 396kcal, 18g protein, 160ml H2O, 37g CHO) . If tolerates recc advance 10ml/[id number] as tolerated until goal of 30ml/hr is reached. This will provide 1188kcal, 53g Protein, 479ml free water, 110g CHO. - monitor bowel function, initiaition of lolis colace today    NUTRITION ASSESSMENT:   Client Update: 75 yrs old Female with acute respiratory failure, ischemia colitis, sepsis, JESS, metabolic acidosis. 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 Pt was extubated, and 1 day later reintubated. Thought to have peritonitis. Pt is s/p laparotomy and peritoneal lavage with klebsiella positive. Feeds were help secondary to high GI output, however discussion in rounds today revealed pt was eating prior to intubation (this could be what caused the high output). Per discussion in rounds today okay to restart trophic feeds. FOOD/NUTRITION INTAKE   Diet Order:  NPO  Supplements: n/a  Food Allergies: NKFA  Average PO Intake:      No data found.      Pertinent Medications:  [x] Reviewed; lasix, lispro, protonix, pericolace  Electrolyte Replacement Protocol: []K []Mg []PO4  Insulin:  []SSI  [x]Pre-meal   []Basal    []Drip  []None  Cultural/Pentec[employer]alism Food Preferences: None Identified    BIOCHEMICAL DATA & MEDICAL TESTS  Pertinent Labs:  [x] Reviewed; K-3.4  ANTHROPOMETRICS  Height: 5' 1\" (154.9 cm)       Weight: 89.1 kg (196 lb 6.9 oz)         BMI: 37.1 kg/m^2 obese (30%-39.9% BMI)   Adm Weight: 196 lbs                Weight change: n/a  Adjusted Body Weight: 58.1    NUTRITION-FOCUSED PHYSICAL ASSESSMENT  Skin: no pressure injury per documenation    GI: BM 6/11 per discussion in rounds    NUTRITION PRESCRIPTION  Calories: 979-1246 kcal/day based on 11-14kcal/kg  Protein: 71-89 g/day based on 0.8-1.0 g/kg  CHO: 122-156 g/day based on 50% of total energy  Fluid: 979-1246 ml/day based on 1 kcal/ml      NUTRITION DIAGNOSES:   1. Inadequate energy/protein intake related to NPO status as pt NPO x 3 days. NUTRITION INTERVENTIONS:   INTERVENTIONS:        GOALS:  1. Enteral nutrition 1. Initiation and tolerate enteral nutrition of [email or web address] throughout the next 24 hours     LEARNING NEEDS (Diet, Supplementation, Food/Nutrient-Drug Interaction):   [x] None Identified   [] Education provided/documented      Identified and patient: [] Declined   [] Was not appropriate/indicated        NUTRITION MONITORING /EVALUATION:   Adjust EN/PN as appropriate  Monitor wt  Monitor renal labs, electrolytes, fluid status  Monitor for additional supplement needs     Previous Recommendations Implemented: yes        Previous Goals Met:  yes       [x] Participated in Interdisciplinary Rounds    [x] Interdisciplinary Care Plan Reviewed  DISCHARGE NUTRITION RECOMMENDATIONS ADDRESSED:        [x] To be determined closer to discharge    NUTRITION RISK:           [x] At risk                        [] Not currently at risk        Will follow-up per policy.   Bao Brown 1

## 2018-06-13 NOTE — PROGRESS NOTES
0700- Bedside and Verbal shift change report given to Ivy Mcdaniel (oncoming nurse) by Miles Poole RN (offgoing nurse). Report included the following information SBAR, Kardex, MAR and Cardiac Rhythm Sinus tach. 0800- Initial assessment completed. 1200- No changes to original assessment, patient down to 65% on Fio2.   1330- Patient changed after having a bowel movment. 1355- Patient currently transfusing 1 PRBC. Patient also indicating that she is having a harder time breathing after RT decreased her Fio2 to 65%, down from 70%. RT notified. 1400- Patient had another bowel movement, a small rash noted on her perineal area. 1500- Lasix and Potassium held until blood transfusion is completed. 1600 - Assessment completed, no changes. Patient appears to be breathing easier, and is nodding her head indicating that she is comfortable. Patient is also reporting that she is having stomach pain that is rated at a 3 out of 10.   1600- Patient started on Nepro 1.8 izzy NG tube feeding at 10ml/hr  1900- Patient stating that her pain is now a 0 out of 10 after Fentanyl administration, see MAR.  1900- Bedside and Verbal shift change report given to Ivy Mcdaniel (oncoming nurse) by Miles Poole RN (offgoing nurse). Report included the following information SBAR, Kardex, MAR and Cardiac Rhythm Sinus Tach.

## 2018-06-13 NOTE — PROGRESS NOTES
Reviewed overnight events with attending nurses, Reid Baez and Tiana Davis. Patient alert and appropriately responsive. Denies pain. Multiple BMs today. TF started today. No complaints now. AF, VSS O2 sats remain 100% with weaning FiO2, now at 65%   WBC trending down slowly. Hgb 7.6 and 1 unit pRBC ordered. Creatinine trending down. Abdomen soft, nontender, NABS. Incision c/d/i  Continue current management with vent, abx, and Hep gtt. Continue to wean FiO2. Will order PT and OT. Appreciate Pulmonary and Medicine management.   Maggi Mo MD

## 2018-06-13 NOTE — PROGRESS NOTES
Bedside and Verbal shift change report received from Essence Prince RN (offgoing nurse). Report included the following information SBAR, Kardex, Intake/Output, MAR and Recent Results. Alarm parameters reviewed, on and audible Appropriate for patient clinical condition      Shift assessment completed see EMR    2345 Reassessment completed, see EMR    0425 PTT 95.3 therapeutic, no change to heparin drip. 0430 Reassessment completed, see EMR    0530 Fentanyl , 15 ml wasted with [place] Rant[place] RN and line was changed. Bedside and Verbal shift change report given to BERE Pollack RN (oncoming nurse)  Report included the following information SBAR, Kardex, Intake/Output, MAR and Recent Results. Verified heparin and fentanyl drip.     0745 Dr. Alvaro Moore called for update on patient status overnight. Hgb 7.6, Hct 23.6, Bun 64 and Cr 2.41. Vital sign was stable [id number]'s and Spo2 95 -100 and Rt was able to wean FiO2 to 75%. New order given for 1 unit RBC.

## 2018-06-13 NOTE — PROGRESS NOTES
Problem: Pressure Injury - Risk of  Goal: *Prevention of pressure injury  Document Oscar Scale and appropriate interventions in the flowsheet.    Outcome: Progressing Towards Goal  Pressure Injury Interventions:  Sensory Interventions: Assess changes in LOC, Minimize linen layers, Keep linens dry and wrinkle-free    Moisture Interventions: Apply protective barrier, creams and emollients, Moisture barrier    Activity Interventions: PT/OT evaluation    Mobility Interventions: Pressure redistribution bed/mattress (bed type)    Nutrition Interventions: Discuss nutritional consult with provider    Friction and Shear Interventions: HOB 30 degrees or less, Apply protective barrier, creams and emollients

## 2018-06-13 NOTE — INTERDISCIPLINARY ROUNDS
Patient : Jolanta Tolentino Age: 89 year old Sex: female   MRN: [id number] Encounter Date: 3/23/2018    83905/A    History     Chief Complaint   Patient presents with   • Fall   • Altered Mental Status   • Facial Droop     HPI     3/23/2018 HPI and ROS  Limited due aphasia.  8:30 PM Jolanta Tolentino is a 89 year old female who presents to the ED via EMS from [facility] for a fall that began today at 2 PM. Per EMS, the pt was found face forward on the ground. She was felt to be initially speaking normally, until EMS rolled her over to her back and began having aphasia. EMS states that they then noted that the pt had left-sided facial droop and L arm drift. The pt's down time is unknown. There are no further complaints or modifying factors at this time.  The pateint is able to tell me that she fell at 2 pm and had been unable to get up.  She denies any pain or injury.    LWKT: 3/23/18 at 2 PM    PCP: Timothy Smyth MD      Allergies   Allergen Reactions   • Bactrim      Dizziness and nausea but no rash   • Glyburide DIARRHEA   • Lidocaine Viscous Other (See Comments)     States became \"Disoriented'\" by a mix of maalox and lidocaine.     • Lisinopril      Cough     • Penicillins RASH     fever       Prior to Admission Medications    ALLOPURINOL (ZYLOPRIM) 100 MG TABLET    Take 100 mg by mouth daily.    ASPIRIN 81 MG EC TABLET    Take 1 tablet by mouth nightly.    ATORVASTATIN (LIPITOR) 40 MG TABLET    TAKE 1 TABLET DAILY    CHOLECALCIFEROL (VITAMIN D) 2000 UNITS CAP    Take 1 capsule by mouth daily.    CYANOCOBALAMIN 1000 MCG TABLET    Take 1 tablet by mouth daily.    FREESTYLE LITE TEST STRIP    FOR 2 TIMES DAILY TESTING    LANCETS (FREESTYLE) MISC    USE TWICE DAILY FOR TESTING    LEVOTHYROXINE (SYNTHROID, LEVOTHROID) 75 MCG TABLET    TAKE 1 TABLET 6 DAYS PER WEEK AND NONE ON SUNDAY OR AS DIRECTED BY PHYSICIAN    METFORMIN (GLUCOPHAGE) 500 MG TABLET    Take 1 tablet by mouth 2 times  Yaniv Odom Registered Nurse Addendum NURSING Interdisciplinary Rounds Date of Service: 18 [address] Rounds   Name: Paco Murrell MRN: [id number]   : 1950 Hospital: [facility] [place]   Date: 2018  Rhea Lan  Diagnosis:         ·   Patient Active Problem List ·   Diagnosis · Code ·    · Ovarian ca ([address] · C56.9 ·    · Severe obesity (BMI 35.0-39.9) (HCC) · E66.01 ·    · Abdominal pain · R10.9 ·    · Sepsis ([address] · A41.9 ·    · Ischemic colitis ([address] · K55.9 ·    · Oliguria · R34 ·    · Acute respiratory failure ([address] · J96.00 ·    · JESS (acute kidney injury) ([facility] [address] · X23.1 ·    · Metabolic acidosis · Q36.9 ·   Severe sepsis with lactic acidosis ? source of sepsis  · ARDS   · Hypotension ? Related to pain meds ? Due to sepsis  · Worsening urine out put -- Possible ARF due to sepsis and hypotension  · Metabolic acidsis with lactate elevation ? Related to sepsis   · Bilateral peroneal DVT <18> Unknown if presented on admission   PLAN:Per DR Kayden Meyer  ---- --theraputic heparin per protocol for dvt  --  Monitor CVP maintaining around 9   -- Laproscopy failed to show ischmic bowel but improvement in lactate and wbc noted today am  --hold feeds NG output 400   -- Glucose and electrolyte protocol    CVS:Echo reviewed EF 65% RV is ok not dilated still tachycardic ?  Due to pain or undergoing sepsis, Dc albutero and start atrovent  RS:ARDs vent protocol, Taper FIO2 as tolerated   -- Aspiration precaution  -- -- HOB elevated  ID:On vanco start date 6/3, Zosyn 6/3,   ENDO:SSI monitor blood sugars  GI:Start feeds On PPI  RENAL: Nephro consult , iv fluids monitor  CNS:Mentation is ok for now on and off drowsy due to sedation  HEMATOLOGY: Monitor hb get PT and INR on heparin drip   MUSCULOSKELETAL:no acute issued  · PAIN AND SEDATION: Fentanyl drip and prn versed if needed   · Skin/Wound: Surgical wound looks clean   · Electrolytes: Replace electrolytes per ICU electrolyte daily (with meals).    METOPROLOL TARTRATE (LOPRESSOR) 25 MG TABLET    Take 1 tablet by mouth 2 times daily.    PANTOPRAZOLE (PROTONIX) 40 MG TABLET    Take 1 tablet by mouth daily.    POLYETHYLENE GLYCOL (MIRALAX) POWDER    Take 17 g by mouth daily as needed. Indications: Constipation      Past Medical History:   Diagnosis Date   • Abscess of buttock 09/13/10   • Anal and rectal polyp 11/09/1993    tubular adenoma   • Anemia    • Chronic or unspecified duodenal ulcer with hemorrhage, perforation, and obstruction 01/01/1995   • Coronary artery disease    • Degenerative joint disease    • Fracture     Left wrist \"many years ago\"   • Gout, unspecified 04/01/2006   • Helicobacter pylori (H. pylori) 05/15/1995    positive   • Hiatal Hernia 04/07/2005   • Hyperlipidemia     elev LDL   • Hypertension    • Ischemic heart disease     CABG 1999; 2001 stent to rca; 05 ptca stent to 3rd cflx;    • Kidney Stone 01/01/1982   • Myocardial infarction    • SALMONELLA ENTERITIDIS 01/01/1995   • Thyroid condition    • Type II or unspecified type diabetes mellitus without mention of complication, not stated as uncontrolled     non compliant on either metformin or actos b/c she wants to be diet controlled and not take meds.    • Urinary incontinence    • Urinary tract infection    • UTI (recurrent) 4/16/2009    CT urogram normal, chronic, asymptomatic- not treated       Past Surgical History:   Procedure Laterality Date   • ANGIOPLASTY  12/2015   • BIOPSY OF BREAST, INCISIONAL  06/26/2008    left- exc of duct- benign papilloma   • CABG, VEIN, THREE  01/01/1999    CABG, 3 veins   • CARDIAC SURGERY     • COLONOSCOPY DIAGNOSTIC  08/21/2001    Unremarkable colonscopy.   • COLONOSCOPY W BIOPSY  11/09/1993    Rectal polyp: tubular adenoma.  No procedure report in chart.   • CORONARY ART DIL,ONE VESSEL  01/01/2001    with stent   • CORONARY ART DIL,ONE VESSEL  01/01/2005   • DEXA BONE DENSITY AXIAL SKELETON  01/01/2005    T scores: -0.3,  replacement protocol. · IVF: D5 1/2 ns   · Nutrition: Start feeds for now  · Prophylaxis: DVT Prophylaxis with scd,. GI Prophylaxis. · Restraints: none  · PT/OT eval and treat. OOB when appropriate. · Lines/Tubes: Subclavian line 6/4, Fry 6/4, Vent 6/5  ADVANCE DIRECTIVE:Full code  Quality Care: PPI, DVT prophylaxis, HOB elevated, Infection control all reviewed and addressed. Events and notes from last 24 hours reviewed.  Care plan discussed in IDR rounds         -1.1, -0.0   • ESOPHAGOGASTRODUODENOSCOPY TRANSORAL FLEX W/BX SINGLE OR MULT  04/07/2005    Hiatus hernia.  Erosive gastroduodenitis.  No bx report in chart.   • FLEXIBLE SIGMOIDOSCOPY DX  01/01/2004    normal   • FLEXIBLE SIGMOIDOSCOPY DX  04/20/2007    Unremarkable and nomral sigmoidoscopy.   • HYSTERECTOMY     • LAPAROSCOPY, CHOLECYSTECTOMY  01/01/1975    Cholecystectomy, laparoscopic   • PTCA     • REMOVAL GALLBLADDER     • TOTAL ABDOM HYSTERECTOMY  01/01/1967    Total Abd Hyst w BSO   • US CAROTID COMPLETE BILAT  01/01/2004    normal       Family History   Problem Relation Age of Onset   • Heart Father      MI   • Heart disease Father    • Heart Mother    • Heart disease Mother    • Hypertension Mother    • Cancer Sister    • Cancer Sister      breast   • Cancer Brother      lung   • Respiratory Brother      emphysema   • Diabetes Sister      x2   • Heart Sister    • Hypertension Other      whole family       Social History   Substance Use Topics   • Smoking status: Never Smoker   • Smokeless tobacco: Never Used   • Alcohol use No       Review of Systems   Reason unable to perform ROS: aphasia.   Neurological: Positive for facial asymmetry (left-sided facial droop).        Positive for L arm drift       Physical Exam     ED Triage Vitals   ED Triage Vitals Group      Temp 03/23/18 2121 97.8 °F (36.6 °C)      Pulse 03/23/18 2100 78      Resp 03/23/18 2100 17      BP 03/23/18 2100 168/72      SpO2 03/23/18 2100 99 %      EtCO2 mmHg --       Height --       Weight 03/23/18 2121 138 lb 11.2 oz (62.9 kg)      Weight Scale Used 03/23/18 2121 ED Actual       Physical Exam   Constitutional: She appears well-developed and well-nourished. No distress.   HENT:   Head: Normocephalic and atraumatic.   Right Ear: External ear normal.   Left Ear: External ear normal.   Mouth/Throat: Oropharynx is clear and moist.   Eyes: Pupils are equal, round, and reactive to light.   Neck: Normal range of motion. Neck supple.    Cardiovascular: Normal rate, regular rhythm and normal heart sounds.    Pulmonary/Chest: Effort normal and breath sounds normal.   Abdominal: Soft. Bowel sounds are normal. There is no tenderness. There is no rebound and no guarding.   Musculoskeletal: Normal range of motion.   Neurological: She is alert.   L facial droop.  Mild left-sided neglect.  LUE is contracted.   LLE is flaccid.    Intact sensations in all 4 extremity.   Nl strength in RUE and RLE.    Skin: Skin is warm and dry.   Nursing note and vitals reviewed.      ED Course     Procedures    Lab Results     Results for orders placed or performed during the hospital encounter of 03/23/18   CBC & Auto Differential   Result Value Ref Range    WBC 10.4 4.2 - 11.0 K/mcL    RBC 4.64 4.00 - 5.20 mil/mcL    HGB 12.2 12.0 - 15.5 g/dL    HCT 37.6 36.0 - 46.5 %    MCV 81.0 78.0 - 100.0 fl    MCH 26.3 26.0 - 34.0 pg    MCHC 32.4 32.0 - 36.5 g/dL    RDW-CV 14.4 11.0 - 15.0 %     140 - 450 K/mcL    Percent NRBC 0 0 %    DIFF TYPE AUTOMATED DIFFERENTIAL     Neutrophil 89 %    LYMPH 6 %    MONO 5 %    EOSIN 0 %    BASO 0 %    Absolute Neutrophil 9.2 (H) 1.8 - 7.7 K/mcL    Absolute Lymph 0.6 (L) 1.0 - 4.0 K/mcL    Absolute Mono 0.5 0.3 - 0.9 K/mcL    Absolute Eos 0.0 (L) 0.1 - 0.5 K/mcL    Absolute Baso 0.0 0.0 - 0.3 K/mcL   Prothrombin Time   Result Value Ref Range    PROTIME 10.4 9.7 - 11.8 sec    INR 1.0    Partial Thromboplastin Time   Result Value Ref Range    PTT 26 22 - 30 sec   Hepatic Function Panel   Result Value Ref Range    Albumin 3.5 (L) 3.6 - 5.1 g/dL    TOTAL BILIRUBIN 0.4 0.2 - 1.0 mg/dL    DIRECT BILIRUBIN 0.1 0.0 - 0.2 mg/dL    ALK PHOSPHATASE 87 45 - 117 Units/L    ALT/SGPT 18 <79 Units/L    AST/SGOT 26 <38 Units/L    TOTAL PROTEIN 6.7 6.4 - 8.2 g/dL   Troponin I Ultra Sensitive   Result Value Ref Range    TROPONIN I <0.02 <0.05 ng/mL   Creatine Kinase   Result Value Ref Range     (H) 26 - 192 Units/L   Chem 8 Panel - Point of Care    Result Value Ref Range    Sodium  135 - 145 mmol/L    Potassium POC 3.9 3.4 - 5.1 mmol/L    Chloride POC 99 98 - 107 mmol/L    CALCIUM IONIZED-POC 1.15 1.15 - 1.29 mmol/L    CO2 Total 25 (H) 19 - 24 mmol/L    GLUCOSE  (H) 65 - 99 mg/dL    BUN POC 17 6 - 20 mg/dL    HEMATOCRIT POC 38.0 36.0 - 46.5 %    Hemoglobin POC 12.9 12.0 - 15.5 g/dL    ANION GAP POC 18 mmol/L    Creatinine POC 0.90 0.51 - 0.95 mg/dL    Estimated GFR  (POC) 64     Estimated GFR Non- (POC) 56    Save for Possible XMatch   Result Value Ref Range    CROSSMATCH  2018    Troponin I - Point of Care   Result Value Ref Range    Troponin I POC <0.10 <0.10 ng/mL       EKG Results     EKG Interpretation  Rate: 75  Rhythm: sinus rhythm with occasional PVC.  Abnormality: Nonspecific ST and T wave abnormality. When compared with EKG of 17, PVC are now present.     EKG interpreted by ED physician    Radiology Results     Imaging Results          CT Angio Head and Neck Level 1 ([facility] [place], [place], [facility], [place], [place]) (Final result)  Result time 18 23:15:52    Final result                 Impression:    IMPRESSION:    CTA Head:   1. Large vessel occlusion of the right A2 segment.  2. Intracranial atherosclerotic disease, as described above.  Moderately  high-grade stenosis of left P1/P2 posterior cerebral artery segment.    CTA Neck:  1. Less than 30% stenosis of proximal left internal carotid artery.  2. Multiple pulmonary nodules, as described above, measuring up to 4 mm. CT  follow-up as per the Fleischner Society criteria.      ----------------------------------  Fleischner Society Guidelines 2017    Solitary or Multiple Solid <6 mm    Low Risk:  No routine follow-up.    High Risk: Optional 12 month CT (suspicious morphology and/or upper lobe  location).      NOTES   - Does not apply to patients <34 yo, lung cancer screening CT, patients  with immunosuppression, or  patients with known primary cancer.   - Measurements refer to mean axial diameter, rounded to the nearest  millimeter   - Subsolid nodule categories refer to mean diameter of the entire nodule,  including both ground-glass and solid components    Radiology. 2017 Feb 23:[id number]  ----------------------------------    Findings were reported to Dr. Shane Doty in the emergency department at  approximately 9:45PM.     //Location Code: [facility]               Narrative:    CT ANGIOGRAM HEAD AND NECK W CONTRAST LEVEL 1    CLINICAL INFORMATION:   89 years-old Female, presenting history of NEURO  DEFICIT(S), SUBACUTE     COMPARISON:  Multiple prior CT head examinations, most recently December 8, 2017. MR brain dated August 15, 2016. CT angiogram head and neck dated  December 18, 2013. CT cervical spine dated May 6, 2017.    TECHNIQUE: CTA of the intracranial and extracranial circulation was  acquired after administration of 75 cc of Isovue-370 intravenously.   Subsequently, venous phase CT head was performed with standard technique.   MIP and 3D reconstructions are rendered and archived to PACS.      Determination of the degree of stenosis in the internal carotid arteries is  obtained using measurements of distal internal carotid diameter as the  denominator for stenosis measurement.  The method utilized is similar to  that utilized in the [employer] Symptomatic Carotid Endarterectomy  Trial (NASCET).  If the degree of stenosis is greater than 30%, the actual  percentage stenosis is given in the body of the report.    FINDINGS:    CT HEAD WITH CONTRAST:    No pathologic intracranial enhancement.    CTA NECK:  Arch:  The aortic arch is patent without dissection or aneurysm.       Great Vessels:  The great vessels demonstrate standard anatomic branching  pattern. Atherosclerotic calcifications of the transverse portion of the  aortic arch, as well as the origins of multiple branch vessels, without  definite hemodynamically  significant stenosis.  Patent innominate and  subclavian vessels.  Right Common Carotid: Right common carotid artery is patent. Marked  tortuosity of the right common carotid artery, with retroesophageal course   of the right common carotid artery at the level of C6.  Right Internal Carotid:  Patent.    Right External Carotid:  Patent.      Left Common Carotid:  Marked tortuosity of the left common carotid artery.     Left Internal Carotid:  Moderate sized atherosclerotic calcification at the  origin of the left internal carotid artery, resulting in less than 30%  stenosis by NASCET criteria.    Left External Carotid:  Patent.      Right Vertebral (V1-V3):  Nondominant vertebral artery.  Patent origin and  cervical segment.  Left Vertebral (V1-V3):  Dominant vertebral artery.  Patent origin and  cervical segment.    Lung apices:  Multiple small nodular densities within the visualized  portions of the lungs are predominantly peribronchovascular distribution.  These, notably a nodule in the medial left upper lobe (series 4, image 122)  present on the 2017 examination. Additional representative nodules include  a 4 mm nodule in the left upper lobe (series 4, image 129), and a 3 mm  nodule in the right upper lobe (series 4, image 109). Mild bilateral apical  pleural thickening, unchanged since 2017.  Osseous Structures:  Probable hemangioma of the C6 vertebral body,  unchanged from prior examinations. Degenerative changes of the cervical  spine, with multilevel disc space height losses, greatest at C5-C6.  Additional Findings:  Small amount of air density in the right internal  jugular vein (series 4, image 225)..    CTA HEAD:    ANTERIOR CIRCULATION:   Right ICA:  Patent. Atherosclerotic calcifications of the cavernous and  supraclinoid segments, without significant stenosis.  Right MCA:  Patent.    Right OARCIO:  Diminutive A1 segment, a normal anatomic variant. Absent flow  within the distal portion of the right A2  segment, new from 2013.  Oligemia  throughout the right anterior cerebral artery territory.    Left ICA:  Patent. Atherosclerotic calcifications of the cavernous and  supraclinoid segments, without significant stenosis.  Left MCA:  Patent.    Left ORACIO:  Small focal irregularity of the left A4 segment suggestive of  atherosclerotic disease.    POSTERIOR CIRCULATION:   Right Vertebral (V4):  Patent.  Left Vertebral (V4):  Patent.  Basilar artery:  Patent.  Right PCA:  Patent.  Left PCA:  Irregularity of the left P2 segment, suggestive of  atherosclerotic disease.  Moderate-high-grade stenosis at the P1/P2  junction.  PICA/AICA/SCA:  Bilateral PICA are patent. Bilateral PICA are  patent. Bilateral SCA are patent..     COLLATERAL CIRCULATION:  Anterior communicator:  Patent.  Right Posterior communicator:  Not well visualized, may be markedly  diminutive or absent.  Left Posterior communicator:  Not well visualized, may be markedly  diminutive or absent.    The dural venous sinuses are suboptimally evaluated due to predominantly  arterial-phase bolus timing.                    Preliminary result                 Impression:      CTA Head:   1. Occlusion of the right A2 segment.  2. Intracranial atherosclerotic disease, as described above.    CTA Neck:  1. Stenosis of the proximal left internal carotid artery estimated at  50-60%.  2. Multiple pulmonary nodules, as described above, measuring up to 4 mm.   CT  follow-up as per the Fleischner Society criteria.      ----------------------------------  Fleischner Society Guidelines 2017    Solitary or Multiple Solid <6 mm    Low Risk:  No routine follow-up.    High Risk: Optional 12 month CT (suspicious morphology and/or upper lobe  location).      NOTES   - Does not apply to patients <36 yo, lung cancer screening CT, patients  with immunosuppression, or patients with known primary cancer.   - Measurements refer to mean axial diameter, rounded to the nearest  millimeter   -  Subsolid nodule categories refer to mean diameter of the entire nodule,  including both ground-glass and solid components    Radiology. 2017 Feb 23:[id number]  ----------------------------------    Findings were reported to Dr. Shane Doty in the emergency department at  approximately 9:45PM.     //Location Code: [facility]               Narrative:    CT ANGIOGRAM HEAD AND NECK W CONTRAST LEVEL 1    CLINICAL INFORMATION:   89 years-old Female, presenting history of NEURO  DEFICIT(S), SUBACUTE     COMPARISON:  Multiple prior CT head examinations, most recently December 8, 2017. MR brain dated August 15, 2016. CT angiogram head and neck dated  December 18, 2013. CT cervical spine dated May 6, 2017.    TECHNIQUE: CTA of the intracranial and extracranial circulation was  acquired after administration of 75 cc of Isovue-370 intravenously.   Subsequently, venous phase CT head was performed with standard technique.   MIP and 3D reconstructions are rendered and archived to PACS.      Determination of the degree of stenosis in the internal carotid arteries   is  obtained using measurements of distal internal carotid diameter as the  denominator for stenosis measurement.  The method utilized is similar to  that utilized in the [employer] Symptomatic Carotid Endarterectomy  Trial (NASCET).  If the degree of stenosis is greater than 30%, the actual  percentage stenosis is given in the body of the report.    FINDINGS:    CT HEAD WITH CONTRAST:    No pathologic intracranial enhancement.    CTA NECK:  Arch:  The aortic arch is patent without dissection or aneurysm.       Great Vessels:  The great vessels demonstrate standard anatomic branching  pattern. Atherosclerotic calcifications of the transverse portion of the  aortic arch, as well as the origins of multiple branch vessels, without  definite hemodynamically significant stenosis.  Patent innominate and  subclavian vessels.  Right Common Carotid: Right common carotid artery is  patent. Marked  tortuosity of the right common carotid artery, with intraesophageal course     of the right common carotid artery at the level of C6.  Right Internal Carotid:  Patent.    Right External Carotid:  Patent.      Left Common Carotid:  Marked tortuosity of the left common carotid artery.       Left Internal Carotid:  Moderate sized atherosclerotic calcification at   the  origin of the left internal carotid artery, with an estimated 50-60%  stenosis of the artery at this level.  Left External Carotid:  Patent.      Right Vertebral (V1-V3):  Nondominant vertebral artery.  Patent origin and  cervical segment.  Left Vertebral (V1-V3):  Dominant vertebral artery.  Patent origin and  cervical segment.    Lung apices:  Multiple small nodular densities within the visualized  portions of the lungs are predominantly peribronchovascular distribution.  These, notably a nodule in the medial left upper lobe (series 4, image   122)  present on the 2017 examination. Additional representative nodules include  a 4 mm nodule in the left upper lobe (series 4, image 129), and a 3 mm  nodule in the right upper lobe (series 4, image 109). Mild bilateral   apical  pleural thickening, unchanged since 2017.  Osseous Structures:  Probable hemangioma of the C6 vertebral body,  unchanged from prior examinations. Degenerative changes of the cervical  spine, with multilevel disc space height losses, greatest at C5-C6.  Additional Findings:  Small amount of air density in the right internal  jugular vein (series 4, image 225)..    CTA HEAD:    ANTERIOR CIRCULATION:   Right ICA:  Patent. Atherosclerotic calcifications of the cavernous and  supraclinoid segments, without significant stenosis.  Right MCA:  Patent.    Right ORACIO:  Diminutive A1 segment, a normal anatomic variant. Absent flow  within the distal portion of the right A2 segment, new from 2013.    Left ICA:  Patent. Atherosclerotic calcifications of the cavernous  and  supraclinoid segments, without significant stenosis.  Left MCA:  Patent.    Left ORACIO:  Small focal irregularity of the left A4 segment suggestive of  atherosclerotic disease.    POSTERIOR CIRCULATION:   Right Vertebral (V4):  Patent.  Left Vertebral (V4):  Patent.  Basilar artery:  Patent.  Right PCA:  Patent.  Left PCA:  Irregularity of the left P2 segment, suggestive of  atherosclerotic disease.  PICA/AICA/SCA:  Bilateral PICA are patent. Bilateral PICA are patent.  Bilateral SCA are patent..     COLLATERAL CIRCULATION:  Anterior communicator:  Patent.  Right Posterior communicator:  Not well visualized, may be markedly  diminutive or absent.  Left Posterior communicator:  Not well visualized, may be markedly  diminutive or absent.    The major dural venous sinuses appear appropriately opacified.                                 CT Head Level 1 (Final result)  Result time 03/23/18 21:10:02    Final result                 Impression:    IMPRESSION:      1. No acute intracranial abnormalities, specifically no acute intracranial  hemorrhage.  If there is concern for underlying acute ischemia, MRI could  be considered.  2. Probable chronic microvascular ischemic changes of aging.    Findings were called immediately to MASSIMO FREED MD on 3/23/2018 8:42 PM.      //Location Code: [facility]               Narrative:    CT HEAD WITHOUT CONTRAST - LEVEL 1    CLINICAL INFORMATION:  Acute Stroke Alert presenting with  ACUTE NEURO  DEFICIT (LAST KNOWN WELL < 10H), STROKE SUSPECTED, last known well, 2 pm,  left facial droop, left arm ad leg weakness, apahsia.    COMPARISON:  CT head examinations, including December 8, 2017, October 26, 2017, March 14, 2017.    TECHNIQUE:  Routine noncontrast head CT spanning cranial vertex through  foramen magnum.   Coronal and sagittal reformats were performed.    FINDINGS:      No acute intracranial hemorrhage. Previously noted blood products have  resolved.    No evidence of regional  hypodensity to suggest acute or evolving vascular  insult in a major vascular territory.  MRI is more sensitive for detection  of acute ischemia.     No evidence of hyperdense intravascular thrombosis.    No mass effect, midline shift, or pathologic extra-axial fluid collection  identified.  Moderate prominence of the ventricles and sulci.  Moderate  nonspecific patchy supratentorial white matter hypoattenuation.  Visualized  calvarium, skull base, and mastoid air cells are unremarkable. Dependent  mucosal thickening in the left maxillary sinus. Mild mucosal thickening in  the ethmoid air cells.                    Preliminary result                 Impression:          1. No acute intracranial abnormalities, specifically no acute intracranial  hemorrhage.  If there is concern for underlying acute ischemia, MRI could  be considered.  2. Probable chronic microvascular ischemic changes of aging.    Findings were called immediately to MASSIMO FREED MD on 3/23/2018 8:42 PM.      //Location Code: [facility]               Narrative:    CT HEAD WITHOUT CONTRAST - LEVEL 1    CLINICAL INFORMATION:  Acute Stroke Alert presenting with  ACUTE NEURO  DEFICIT (LAST KNOWN WELL < 10H), STROKE SUSPECTED, last known well, 2 pm,  left facial droop, left arm ad leg weakness, apahsia.    COMPARISON:  CT head examinations, including December 8, 2017, October 26, 2017, March 14, 2017.    TECHNIQUE:  Routine noncontrast head CT spanning cranial vertex through  foramen magnum.   Coronal and sagittal reformats were performed.    FINDINGS:      No acute intracranial hemorrhage. Previously noted blood products have  resolved.    No evidence of regional hypodensity to suggest acute or evolving vascular  insult in a major vascular territory.  MRI is more sensitive for detection  of acute ischemia.     No evidence of hyperdense intravascular thrombosis.    No mass effect, midline shift, or pathologic extra-axial fluid collection  identified.  Moderate  prominence of the ventricles and sulci.  Moderate  nonspecific patchy supratentorial white matter hypoattenuation.    Visualized  calvarium, skull base, and mastoid air cells are unremarkable. Dependent  mucosal thickening in the left maxillary sinus. Mild mucosal thickening in  the ethmoid air cells.                                  ED Medication Orders     Start Ordered     Status Ordering Provider    03/23/18 2230 03/23/18 2030  sodium chloride (PF) 0.9 % injection 2 mL  (Capped IV)  2 times per day      Acknowledged MASSIMO FREED    03/23/18 2147 03/23/18 2146  aspirin suppository 300 mg  ONCE      Last MAR action:  Given MASSIMO FREED    03/23/18 2145 03/23/18 2145    ONCE      Discontinued MASSIMO FREED    03/23/18 2029 03/23/18 2030  sodium chloride (PF) 0.9 % injection 2 mL  (Capped IV)  PRN      Acknowledged MASSIMO FREED          [facility]  Vitals  Vitals:    03/23/18 2121 03/23/18 2130 03/23/18 2200 03/23/18 2230   BP:  (!) 137/91 143/65 133/74   Pulse: 80 77 80 82   Resp: 18 18 24 22   Temp: 97.8 °F (36.6 °C)      TempSrc: Oral      SpO2: 96% 94% 94% 95%   Weight: 62.9 kg          ED Course    8:30 PM  Initial encounter:Patient greeted on arrival on ambulance cart, brief exam confirmed she was controling her airway, and has left sided neuro deficits - direct to CT, and will re-assess after.  Level 1 CT head and CT angiogram head and  Neck ordered.    8:49 PM - I spoke with radiology, who informed me of the pt's negative head CT scan.      8:50 PM - Per RN, staffs at pt's facility was called by family to check on pt, as they were not able to get a hold of her. The staffs found pt face down on the floor and called EMS. The pt told EMS and staffs that she fell at 2 PM. When EMS rolled pt over onto her back, she began having aphasia. The pt's down time it unknown.     9:01 PM -  I performed the secondary assessment and evaluation of the pt. The pt presents to the ED for a fall that began tody at 2 PM. On exam,  the pt had L facial droop and mild left-sided neglect. LUE is contracted and LLE is flaccid. We discussed the plan for labs, EKG, negative head CT scan, and that we are awaiting CTA to assess for possible intervention. The pt and family understand and agree with the plan of care. All questions were addressed.    9:06 PM - I spoke with Dr. Roque regarding the patient's presentation, and the ED work up. I informed him of the pt's negative head CT scan and physical exam. We discussed the plan for no further intervention Dr. Roque agrees with the plan and will further view the pt's CTA.     9:17 PM - Per stroke RN, the pt has an NIH score of 11. She failed her dysphagia test.     9:42 PM - I spoke with the radiologist, who informed me of the pt's CTA, showing an acute occlusion at the right A2 segment of the ORACIO.    9:43 PM - I spoke with Dr. Roque and informed him of the pt's CTA. He informed me that there would be no further intervention and advised that I administer a rectal ASA.     9:49 PM - I rechecked the pt. Family at bedside. The pt is laying comfortably in her bed. I updated the pt on her CTA results and discussed the plan for further ED workup and administering ASA. The pt and family understands and agrees with the plan of care. All questions were addressed.    10:30 PM - I spoke with Dr. Mon regarding the patient's presentation, and the ED work up. I discussed the pt's physical exam finding, unremarkable head CT, and her CTA showing an acute occlusion at the right A2 segment of the ORACIO. I discussed the plan for further ED workup. Dr. Mon agrees with the plan.    [facility]  Critical Care time spent on this patient outside of billable procedures:  47 minutes.    Clinical Impression:  ED Diagnosis        Final diagnosis    Cerebrovascular accident (CVA), unspecified mechanism (CMS/[facility])               Pt to be admitted to Dr. Mon in guarded condition.       I have reviewed the information  recorded by the scribe for accuracy and agree with its contents.    ____________________________________________________________________    Deny Hayes acting as a scribe for Dr. Shane Doty  Dictation # [id number]  Scribe: Deny Doty MD  03/23/18 9447

## 2018-06-13 NOTE — PROGRESS NOTES
Pharmacy Dosing Services: Zosyn     The following medication: Zosyn 2.25 gm IV q8h was automatically dose-adjusted to Zosyn 2.25 gm IV q6h per THE [facility] P&T Committee Protocol, with respect to renal function ( Discussed with Dr. Sanjiv Rush during ICU rounds )       Pt Weight:   Wt Readings from Last 1 Encounters:   06/04/18 89.1 kg (196 lb 6.9 oz)     Serum Creatinine Lab Results   Component Value Date/Time    Creatinine 2.41 (H) 06/13/2018 04:25 AM       Creatinine Clearance Estimated Creatinine Clearance: 22.7 mL/min (based on Cr of 2.41). BUN Lab Results   Component Value Date/Time    BUN 64 (H) 06/13/2018 04:25 AM         Pharmacy to continue to monitor patient daily. Will make dosage adjustments based upon changing renal function. Signed Bianca Dueñas.  Contact information: [phone]

## 2018-06-13 NOTE — DIABETES MGMT
GLYCEMIC CONTROL PROGRESS NOTE:    -discussed in rounds, known h/o T2DM HbA1C within recommended range for age + comorbids on oral home regimen  -BG out of target range ICU: 140-180 mg/dL, D5 IVF   -TDD = 10 units - Humalog Normal Insulin Sensitivity Corrective Coverage  -NPO intubated, TF to restart today, D5 IVF  -IV steroids decreased to Q12 hours, hyperglycemia  -24 hour BG down  -monitor BG trends overnight, pt may benefit from conservative dose of Lantus & scheduled dose of insulin, once TF resumes  Recent Glucose Results:   Lab Results   Component Value Date/Time     (H) 06/13/2018 04:25 AM    GLUCPOC 190 (H) 06/13/2018 05:27 AM    GLUCPOC 205 (H) 06/12/2018 11:29 PM    GLUCPOC 173 (H) 06/12/2018 05:57 PM         Terrie Marion RN, MS  Glycemic Control Team  Pager [phone] (M-TH 8:30-5P)  *After Hours pager [phone]

## 2018-06-13 NOTE — PROGRESS NOTES
Nephrology Progress note    Subjective:     Aleyda Felix is a 75 y.o. female with PMH DM, HTN, clear cell ovarian ca s/p debulking who presented with abdominal pain and possible sepsis/ischemic colitis. Pt underwent laparotomy/peritoneal lavage/bx, noted to have decreased UOP and increasing Cr to 2.1 today from baseline 0.6. CT neg for mass or hydro. Pt given lasix yesterday, still with high O2 requirements on vent. Unable to provide further history. On 6/8 Pt decompensated, ? Aspiration,  back on vent was hypoxic despite  Fi02 of 100%, , Hypotensive on IV pressors, Acidotic and on HC03 drip, Urine output decreased markedly over the weekend. Underwent Bronchoscopy 6/9. Pt requires less FiO2 now. UOP picking up with 2.4L yesterday. Cr down to 2.4 today. Admit Date: 6/4/2018  Allergy:  Allergies   Allergen Reactions    Hydrocodone Other (comments)     Breaks into cold sweat    Metformin Other (comments)     Breaks out into cold sweat. Can Take Glucophage brand name med        Objective:     Visit Vitals    /79    Pulse 84    Temp 98.1 °F (36.7 °C)    Resp 23    Ht 5' 1\" (1.549 m)    Wt 89.1 kg (196 lb 6.9 oz)    SpO2 100%    BMI 37.12 kg/m2         Intake/Output Summary (Last 24 hours) at 06/13/18 0804  Last data filed at 06/13/18 0748   Gross per 24 hour   Intake          2916.51 ml   Output             2350 ml   Net           566.51 ml       Physical Exam:     General: On Vent   HENT: Atraumatic and normocephalic.  ET tube in place   Eyes: Sclera anicteric   Neck: No JVD or mass   Cardiovascular: Normal S1 S2   Pulmonary/Chest Wall: Decreased breath sounds bilaterally   Abdominal: Soft, obese, NABS   Musculoskeletal: trace edema LEs   Neurological: Opens eyes in response to name       Data Review:      Lab Results   Component Value Date/Time    WBC 19.6 (H) 06/13/2018 04:25 AM    RBC 3.52 (L) 06/13/2018 04:25 AM    HCT 23.6 (L) 06/13/2018 04:25 AM    MCV 67.0 (L) 06/13/2018 04:25 AM    MCH 21.6 (L) 06/13/2018 04:25 AM    MCHC 32.2 06/13/2018 04:25 AM    RDW 18.9 (H) 06/13/2018 04:25 AM      Lab Results   Component Value Date    IRON 8 (L) 06/07/2018   No components found for: FERRITIN  No components found for: PTHINT  Urinalysis  No results found for: UGLU         Impression:     -JESS- likely ATN,  S Cr was down to 1.2 on 6/8 => 1.84 6/9 => 3.09=>3.2, oliguric following hypotension and resp failure. UOP inproving to 2400cc/24hrs and cr down further today to 2.4  -Septic Shock/hypotension, BPs normalized  -Resp Failure,  re-intubated 6/9,  back on vent. Suspect Aspiration, ? PE  -Severe Metabolic acidosis, improving. Lactate 4.7  -Ovarian ca s/p debulking then laparoscopy with lavage  -KLEBSIELLA OXYTOCA sepsis (PD fluid)  -DM  -h.o. HTN  -Anemia  -Elevated liver enzymes    Plan:     Continue Supportive care: Vent, IV pressors, IV Antibiotics.   Weaning vent as tolerated  Monitor I/O and chemistry, H&H  Would cont IV lasix 40 mg daily  No indication for HD   Vanc dosing per pharmacist  Will follow closely     MD Denver Chinchilla  [phone]

## 2018-06-14 ENCOUNTER — APPOINTMENT (OUTPATIENT)
Dept: GENERAL RADIOLOGY | Age: 68
DRG: 853 | End: 2018-06-14
Attending: INTERNAL MEDICINE
Payer: MEDICARE

## 2018-06-14 LAB
ALBUMIN SERPL-MCNC: 1.4 G/DL (ref 3.4–5)
ALBUMIN/GLOB SERPL: 0.4 {RATIO} (ref 0.8–1.7)
ALP SERPL-CCNC: 115 U/L (ref 45–117)
ALT SERPL-CCNC: 327 U/L (ref 13–56)
ANION GAP SERPL CALC-SCNC: 13 MMOL/L (ref 3–18)
APTT PPP: 75.1 SEC (ref 23–36.4)
ARTERIAL PATENCY WRIST A: YES
AST SERPL-CCNC: 126 U/L (ref 15–37)
BASE DEFICIT BLD-SCNC: 1 MMOL/L
BDY SITE: ABNORMAL
BILIRUB SERPL-MCNC: 0.5 MG/DL (ref 0.2–1)
BODY TEMPERATURE: 98.6
BUN SERPL-MCNC: 59 MG/DL (ref 7–18)
BUN/CREAT SERPL: 28 (ref 12–20)
CA-I SERPL-SCNC: 1.08 MMOL/L (ref 1.12–1.32)
CA-I SERPL-SCNC: 1.1 MMOL/L (ref 1.12–1.32)
CA-I SERPL-SCNC: 1.19 MMOL/L (ref 1.12–1.32)
CALCIUM SERPL-MCNC: 7.5 MG/DL (ref 8.5–10.1)
CHLORIDE SERPL-SCNC: 110 MMOL/L (ref 100–108)
CO2 SERPL-SCNC: 25 MMOL/L (ref 21–32)
CREAT SERPL-MCNC: 2.1 MG/DL (ref 0.6–1.3)
ERYTHROCYTE [DISTWIDTH] IN BLOOD BY AUTOMATED COUNT: 19.4 % (ref 11.6–14.5)
GAS FLOW.O2 O2 DELIVERY SYS: ABNORMAL L/MIN
GAS FLOW.O2 SETTING OXYMISER: 14 BPM
GLOBULIN SER CALC-MCNC: 3.5 G/DL (ref 2–4)
GLUCOSE BLD STRIP.AUTO-MCNC: 189 MG/DL (ref 70–110)
GLUCOSE BLD STRIP.AUTO-MCNC: 197 MG/DL (ref 70–110)
GLUCOSE BLD STRIP.AUTO-MCNC: 197 MG/DL (ref 70–110)
GLUCOSE BLD STRIP.AUTO-MCNC: 212 MG/DL (ref 70–110)
GLUCOSE SERPL-MCNC: 175 MG/DL (ref 74–99)
HCO3 BLD-SCNC: 22.7 MMOL/L (ref 22–26)
HCT VFR BLD AUTO: 26.4 % (ref 35–45)
HGB BLD-MCNC: 8.5 G/DL (ref 12–16)
INSPIRATION.DURATION SETTING TIME VENT: 0.9 SEC
MAGNESIUM SERPL-MCNC: 1.7 MG/DL (ref 1.6–2.6)
MAGNESIUM SERPL-MCNC: 2 MG/DL (ref 1.6–2.6)
MCH RBC QN AUTO: 22.1 PG (ref 24–34)
MCHC RBC AUTO-ENTMCNC: 32.2 G/DL (ref 31–37)
MCV RBC AUTO: 68.6 FL (ref 74–97)
O2/TOTAL GAS SETTING VFR VENT: 50 %
PCO2 BLD: 32.1 MMHG (ref 35–45)
PEEP RESPIRATORY: 12 CMH2O
PH BLD: 7.46 [PH] (ref 7.35–7.45)
PHOSPHATE SERPL-MCNC: 3.6 MG/DL (ref 2.5–4.9)
PLATELET # BLD AUTO: 149 K/UL (ref 135–420)
PO2 BLD: 103 MMHG (ref 80–100)
POTASSIUM SERPL-SCNC: 3.2 MMOL/L (ref 3.5–5.5)
POTASSIUM SERPL-SCNC: 3.3 MMOL/L (ref 3.5–5.5)
POTASSIUM SERPL-SCNC: 3.9 MMOL/L (ref 3.5–5.5)
PROT SERPL-MCNC: 4.9 G/DL (ref 6.4–8.2)
RBC # BLD AUTO: 3.85 M/UL (ref 4.2–5.3)
SAO2 % BLD: 98 % (ref 92–97)
SERVICE CMNT-IMP: ABNORMAL
SODIUM SERPL-SCNC: 148 MMOL/L (ref 136–145)
SPECIMEN TYPE: ABNORMAL
TOTAL RESP. RATE, ITRR: 19
VENTILATION MODE VENT: ABNORMAL
VT SETTING VENT: 400 ML
WBC # BLD AUTO: 17.5 K/UL (ref 4.6–13.2)

## 2018-06-14 PROCEDURE — 94003 VENT MGMT INPAT SUBQ DAY: CPT

## 2018-06-14 PROCEDURE — 74011000258 HC RX REV CODE- 258: Performed by: INTERNAL MEDICINE

## 2018-06-14 PROCEDURE — 74011250637 HC RX REV CODE- 250/637: Performed by: OBSTETRICS & GYNECOLOGY

## 2018-06-14 PROCEDURE — 74011000250 HC RX REV CODE- 250: Performed by: INTERNAL MEDICINE

## 2018-06-14 PROCEDURE — 74011250637 HC RX REV CODE- 250/637: Performed by: INTERNAL MEDICINE

## 2018-06-14 PROCEDURE — 82330 ASSAY OF CALCIUM: CPT | Performed by: INTERNAL MEDICINE

## 2018-06-14 PROCEDURE — 65270000029 HC RM PRIVATE

## 2018-06-14 PROCEDURE — 84132 ASSAY OF SERUM POTASSIUM: CPT | Performed by: INTERNAL MEDICINE

## 2018-06-14 PROCEDURE — 77010033678 HC OXYGEN DAILY

## 2018-06-14 PROCEDURE — 74011250636 HC RX REV CODE- 250/636: Performed by: INTERNAL MEDICINE

## 2018-06-14 PROCEDURE — 83735 ASSAY OF MAGNESIUM: CPT | Performed by: OBSTETRICS & GYNECOLOGY

## 2018-06-14 PROCEDURE — 71045 X-RAY EXAM CHEST 1 VIEW: CPT

## 2018-06-14 PROCEDURE — 74011636637 HC RX REV CODE- 636/637: Performed by: INTERNAL MEDICINE

## 2018-06-14 PROCEDURE — 36600 WITHDRAWAL OF ARTERIAL BLOOD: CPT

## 2018-06-14 PROCEDURE — 36592 COLLECT BLOOD FROM PICC: CPT

## 2018-06-14 PROCEDURE — 97163 PT EVAL HIGH COMPLEX 45 MIN: CPT

## 2018-06-14 PROCEDURE — 83735 ASSAY OF MAGNESIUM: CPT | Performed by: INTERNAL MEDICINE

## 2018-06-14 PROCEDURE — 85027 COMPLETE CBC AUTOMATED: CPT | Performed by: OBSTETRICS & GYNECOLOGY

## 2018-06-14 PROCEDURE — 82330 ASSAY OF CALCIUM: CPT | Performed by: OBSTETRICS & GYNECOLOGY

## 2018-06-14 PROCEDURE — 97167 OT EVAL HIGH COMPLEX 60 MIN: CPT

## 2018-06-14 PROCEDURE — 84132 ASSAY OF SERUM POTASSIUM: CPT | Performed by: OBSTETRICS & GYNECOLOGY

## 2018-06-14 PROCEDURE — 74011250636 HC RX REV CODE- 250/636: Performed by: HOSPITALIST

## 2018-06-14 PROCEDURE — C9113 INJ PANTOPRAZOLE SODIUM, VIA: HCPCS | Performed by: INTERNAL MEDICINE

## 2018-06-14 PROCEDURE — 97530 THERAPEUTIC ACTIVITIES: CPT

## 2018-06-14 PROCEDURE — 84100 ASSAY OF PHOSPHORUS: CPT | Performed by: OBSTETRICS & GYNECOLOGY

## 2018-06-14 PROCEDURE — 85730 THROMBOPLASTIN TIME PARTIAL: CPT | Performed by: OBSTETRICS & GYNECOLOGY

## 2018-06-14 PROCEDURE — 82962 GLUCOSE BLOOD TEST: CPT

## 2018-06-14 PROCEDURE — 82803 BLOOD GASES ANY COMBINATION: CPT

## 2018-06-14 RX ORDER — FUROSEMIDE 10 MG/ML
40 INJECTION INTRAMUSCULAR; INTRAVENOUS ONCE
Status: COMPLETED | OUTPATIENT
Start: 2018-06-14 | End: 2018-06-14

## 2018-06-14 RX ORDER — MAGNESIUM SULFATE 1 G/100ML
1 INJECTION INTRAVENOUS ONCE
Status: COMPLETED | OUTPATIENT
Start: 2018-06-14 | End: 2018-06-15

## 2018-06-14 RX ORDER — LEVOFLOXACIN 750 MG/1
750 TABLET ORAL EVERY 24 HOURS
Status: DISCONTINUED | OUTPATIENT
Start: 2018-06-14 | End: 2018-06-15

## 2018-06-14 RX ORDER — POTASSIUM CHLORIDE 20MEQ/15ML
30 LIQUID (ML) ORAL ONCE
Status: COMPLETED | OUTPATIENT
Start: 2018-06-14 | End: 2018-06-14

## 2018-06-14 RX ORDER — MAGNESIUM SULFATE 1 G/100ML
1 INJECTION INTRAVENOUS ONCE
Status: DISCONTINUED | OUTPATIENT
Start: 2018-06-14 | End: 2018-06-14

## 2018-06-14 RX ORDER — POTASSIUM CHLORIDE 20MEQ/15ML
40 LIQUID (ML) ORAL
Status: COMPLETED | OUTPATIENT
Start: 2018-06-14 | End: 2018-06-14

## 2018-06-14 RX ORDER — DEXTROSE MONOHYDRATE 50 MG/ML
50 INJECTION, SOLUTION INTRAVENOUS CONTINUOUS
Status: DISCONTINUED | OUTPATIENT
Start: 2018-06-14 | End: 2018-06-19

## 2018-06-14 RX ADMIN — INSULIN LISPRO 4 UNITS: 100 INJECTION, SOLUTION INTRAVENOUS; SUBCUTANEOUS at 18:00

## 2018-06-14 RX ADMIN — MAGNESIUM SULFATE HEPTAHYDRATE 1 G: 1 INJECTION, SOLUTION INTRAVENOUS at 07:15

## 2018-06-14 RX ADMIN — PIPERACILLIN SODIUM,TAZOBACTAM SODIUM 2.25 G: 2; .25 INJECTION, POWDER, FOR SOLUTION INTRAVENOUS at 18:37

## 2018-06-14 RX ADMIN — PIPERACILLIN SODIUM,TAZOBACTAM SODIUM 2.25 G: 2; .25 INJECTION, POWDER, FOR SOLUTION INTRAVENOUS at 13:15

## 2018-06-14 RX ADMIN — POTASSIUM CHLORIDE 30 MEQ: 20 SOLUTION ORAL at 06:17

## 2018-06-14 RX ADMIN — HEPARIN SODIUM 19 UNITS/KG/HR: 10000 INJECTION, SOLUTION INTRAVENOUS at 07:17

## 2018-06-14 RX ADMIN — PIPERACILLIN SODIUM,TAZOBACTAM SODIUM 2.25 G: 2; .25 INJECTION, POWDER, FOR SOLUTION INTRAVENOUS at 06:17

## 2018-06-14 RX ADMIN — FENTANYL CITRATE 25 MCG: 50 INJECTION, SOLUTION INTRAMUSCULAR; INTRAVENOUS at 14:53

## 2018-06-14 RX ADMIN — FUROSEMIDE 40 MG: 10 INJECTION, SOLUTION INTRAMUSCULAR; INTRAVENOUS at 10:48

## 2018-06-14 RX ADMIN — INSULIN LISPRO 2 UNITS: 100 INJECTION, SOLUTION INTRAVENOUS; SUBCUTANEOUS at 23:15

## 2018-06-14 RX ADMIN — HYDROCODONE BITARTRATE AND ACETAMINOPHEN 2 TABLET: 10; 325 TABLET ORAL at 22:56

## 2018-06-14 RX ADMIN — PIPERACILLIN SODIUM,TAZOBACTAM SODIUM 2.25 G: 2; .25 INJECTION, POWDER, FOR SOLUTION INTRAVENOUS at 23:14

## 2018-06-14 RX ADMIN — Medication 25 MCG/HR: at 15:03

## 2018-06-14 RX ADMIN — CALCIUM GLUCONATE 2 G: 94 INJECTION, SOLUTION INTRAVENOUS at 16:30

## 2018-06-14 RX ADMIN — FUROSEMIDE 40 MG: 10 INJECTION, SOLUTION INTRAMUSCULAR; INTRAVENOUS at 13:32

## 2018-06-14 RX ADMIN — DOCUSATE SODIUM AND SENNOSIDES 1 TABLET: 8.6; 5 TABLET, FILM COATED ORAL at 20:00

## 2018-06-14 RX ADMIN — SODIUM CHLORIDE 40 MG: 9 INJECTION INTRAMUSCULAR; INTRAVENOUS; SUBCUTANEOUS at 09:00

## 2018-06-14 RX ADMIN — INSULIN LISPRO 2 UNITS: 100 INJECTION, SOLUTION INTRAVENOUS; SUBCUTANEOUS at 06:18

## 2018-06-14 RX ADMIN — POTASSIUM CHLORIDE 40 MEQ: 20 SOLUTION ORAL at 16:44

## 2018-06-14 RX ADMIN — FENTANYL CITRATE 25 MCG: 50 INJECTION, SOLUTION INTRAMUSCULAR; INTRAVENOUS at 17:50

## 2018-06-14 RX ADMIN — DEXTROSE MONOHYDRATE 75 ML/HR: 5 INJECTION, SOLUTION INTRAVENOUS at 11:27

## 2018-06-14 RX ADMIN — LEVOFLOXACIN 750 MG: 750 TABLET, FILM COATED ORAL at 20:00

## 2018-06-14 RX ADMIN — INSULIN LISPRO 2 UNITS: 100 INJECTION, SOLUTION INTRAVENOUS; SUBCUTANEOUS at 12:00

## 2018-06-14 RX ADMIN — FENTANYL CITRATE 25 MCG: 50 INJECTION, SOLUTION INTRAMUSCULAR; INTRAVENOUS at 21:46

## 2018-06-14 NOTE — PROGRESS NOTES
EMR entered and reviewed by this nurse  for the purpose of chart review in the course of functions and responsibilities related to performance improvement.

## 2018-06-14 NOTE — PROGRESS NOTES
Nephrology Progress note    Subjective:     Maryam Onofre is a 75 y.o. female with PMH DM, HTN, clear cell ovarian ca s/p debulking who presented with abdominal pain and possible sepsis/ischemic colitis. Pt underwent laparotomy/peritoneal lavage/bx, noted to have decreased UOP and increasing Cr to 2.1 today from baseline 0.6. CT neg for mass or hydro. Pt given lasix yesterday, still with high O2 requirements on vent. Unable to provide further history. On 6/8 Pt decompensated, ? Aspiration,  back on vent was hypoxic despite  Fi02 of 100%, , Hypotensive on IV pressors, Acidotic and on HC03 drip, Urine output decreased markedly over the weekend. Underwent Bronchoscopy 6/9. Pt requires less FiO2 now. UOP picking up     Admit Date: 6/4/2018  Allergy:  Allergies   Allergen Reactions    Hydrocodone Other (comments)     Breaks into cold sweat    Metformin Other (comments)     Breaks out into cold sweat. Can Take Glucophage brand name med        Objective:     Visit Vitals    /86    Pulse 94    Temp 98.2 °F (36.8 °C)    Resp 21    Ht 5' 1\" (1.549 m)    Wt 89.1 kg (196 lb 6.9 oz)    SpO2 100%    BMI 37.12 kg/m2         Intake/Output Summary (Last 24 hours) at 06/14/18 1052  Last data filed at 06/14/18 0650   Gross per 24 hour   Intake          2970.51 ml   Output             3351 ml   Net          -380.49 ml       Physical Exam:     General: On Vent   HENT: Atraumatic and normocephalic.  ET tube in place   Eyes: Sclera anicteric   Neck: No JVD or mass   Cardiovascular: Normal S1 S2   Pulmonary/Chest Wall: Decreased breath sounds bilaterally   Abdominal: Soft, obese, NABS   Musculoskeletal: trace edema LEs   Neurological: Opens eyes in response to name       Data Review:      Lab Results   Component Value Date/Time    WBC 17.5 (H) 06/14/2018 05:00 AM    RBC 3.85 (L) 06/14/2018 05:00 AM    HCT 26.4 (L) 06/14/2018 05:00 AM    MCV 68.6 (L) 06/14/2018 05:00 AM    MCH 22.1 (L) 06/14/2018 05:00 AM    MCHC 32.2 06/14/2018 05:00 AM    RDW 19.4 (H) 06/14/2018 05:00 AM      Lab Results   Component Value Date    IRON 8 (L) 06/07/2018   No components found for: FERRITIN  No components found for: PTHINT  Urinalysis  No results found for: UGLU         Impression:     -JESS- likely ATN,  S Cr was down to 1.2 on 6/8 => 1.84 6/9 => 3.09=>3.2=>2.1, oliguric following hypotension and resp failure. UOP inproving   -Septic Shock/hypotension, BPs normalized  -Resp Failure,  re-intubated 6/9,  back on vent. Suspect Aspiration, ? PE  -Severe Metabolic acidosis, improving. Lactate 4.7  -Ovarian ca s/p debulking then laparoscopy with lavage  -KLEBSIELLA OXYTOCA sepsis (PD fluid)  -DM  -h.o. HTN  -Anemia  -Elevated liver enzymes    Plan:     Continue Supportive care: Vent, IV pressors, IV Antibiotics.   Weaning vent as tolerated  Monitor I/O and chemistry, H&H  Would cont IV lasix 40 mg daily  No indication for HD   Vanc dosing per pharmacist  Will follow closely     MD Denver Alexander  [phone]

## 2018-06-14 NOTE — PROGRESS NOTES
[facility]                  Pulmonary, Critical Care, and Sleep Medicine     Name: Shital Bobo MRN: [id number]   : 1950 Hospital: [facility] [place]    Date: 2018        [facility] Note                                                IMPRESSION:   · Severe sepsis due to Klebsiella Oxytoca peritonitis. · Klebsiella oxytoca peritonitis. S/p laparotomy and peritoneal lavage: Klebsiella positive. Ruled out ischemic bowel. · S/p bronchoscopy 18: no aspiration noted. Cx NGTD. · VDRF due to sepsis, reintubated on 18  · S/p Shock likely due to sepsis and obstructive due to presumed PE. Off vasopressors. · Acute b/l peroneal DVT  · Presumed PE with severe hypoxia and high A-a gradient, elevated RVSP 34 mm Hg. · Anemia, no active external bleeding. · Thrombocytopenia   · JESS  · Hypernatremia. · Metabolic ad lactic acidosis, improving. · Elevated LFT, likely shock liver. · Lactic acidosis due to hypotension, hypoperfusion, sepsis, abnormal LFT etc. Improving. · Foreign body on peritoneal biopsy, per path report. · Anasarca due to fluid resuscitation, JESS, hypoalbuminemia and sequale of sepsis. · Code status: DNR. · Very poor overall prognosis. RECOMMENDATIONS:   Respiratory:  Presumed PE. PF ratio and cxr improving with diuresing. Fio2 reduced to 50% but PEEP reduced to 12. C/w titrate fio2 to 40 % first before lowering further PEEP. On lasix 40 daily. Extra 40 iv today afternoon at 2 pm.   Keep I<0. Diuresing well with stable creat. C/w heparin drip. Monitor any bleedign or complications. Platelets slightly lower, monitor closely. Not a candidate for SBT eval yet. Ventilator bundle & Sedation protocol followed. Chlorhexidine mouth washes. Keep SPO2 >=92%. HOB 30 degree elevation all the time. Aggressive pulmonary toileting. Aspiration precautions. CVS: Echo ok with mildly elevated RVSP but normal RV systolic function. Anasarca and pedema due to b/l DVT and fluid resuscitation. On heparin drip and lasix. ID:    Bronch cx normal robin. Peritoneal cx Klebsiella Oxytoca resistant to ampicillin. Doubt pneumonia. D/w ID Dr. Scott Fernandez on phone 6/12/18: reviewed all cx and clinical data: d/c vanco and levaquin. Zosyn (start 6/4/18) for 2 weeks. Stop date placed. Off vasopressor since last few days. Leucocytosis due to steroids/stress/peritonitis. Improving wbc. Off hydrocortisone since 6/13/18. Repeat CT abd/pelvis/chest when fio2 reduced enough to be transported on portable ventilator and when it is safer to transport. Hematology/Oncology: H&H improved after 1 PRBC on 6/13/18. Monitor platelets on heparin, slightly lower today. Renal: per nephrologist. Creat and UOP improving with improved fio2 requirement. Lasix plan as above. Add free water with TF due to hypernatremia. GI/: tolerating tube feed, advance as tolerated. BM with senokote colace use. LFT improving. Endocrine: Maintain blood glucose 140-180. Humalog sub cut. Neurology: alert awake on minimal fentanyl on vent, following all commands. Pain/Sedation: fentanyl drip at 25. Skin/Wound: local surgical site care. Mild clear serous oozing at one surgical site, no surrounding erythema or induration. No foul smell. Doubt infection. Likely due to anasarca. Surgeon aware. Electrolytes: Replace electrolytes per ICU electrolyte replacement protocol. Nutrition: see above  Prophylaxis: DVT Rx with heparin drip. GI Prophylaxis with protonix. Restraints: none  Lines/Tubes:   ETT: 6/8/18. OGT: 6/8/18. Central line: right subclavina 6/4/18 (site examined, no erythema, induration, discharge or sign of infection. Dressing intact. Medically necessary, will remove it when not needed. Central line bundle followed). Fry: 6/4/18 (Medically necessary for strict input/output monitoring in critically ill patient, will remove it when not needed.  Fry bundle followed). Very poor overall prognosis with MODS/MOF discussed with pt's brother at bedside, answered all questions to his satisfaction. Will defer respective systems problem management to primary and other respective consultant and follow patient in ICU with primary and other medical team.  Further recommendations will be based on the patient's response to recommended treatment and results of the investigation ordered. Quality Care: PPI, DVT prophylaxis, HOB elevated, Infection control all reviewed and addressed. Care of plan d/w RN, RT, pharmacist, palliative care team, MDR, family- brother at bedside (answered all questions to satisfaction). High complexity decision making was performed during the evaluation of this patient at high risk for decompensation with multiple organ involvement. Total critical care time spent rendering care exclusive of procedures: 35 minutes. Subjective/History of Present Illness:     Patient is a 75 y.o. female admitted abd pain after ovarian cancer debulking surgery, s/p laparoscopy and peritoneal lavage 6/5/18, ruled out ischemic bowel, Klebsielle peritoneal cx positive, JESS, peroneal DVT, severe hypoxic respiratory failure with presumed PE, on ventilator, shock likely septic vs obstructive. 6/14/18:  Electrolytes being replaced  Edema improving  Diuresing well on lasix  fio2 decreased to 50% and peep decreased to 12  cxr slightly better  Had BM today  Awake on ventilator  No fever chills  No abd pain  On heparin drip protocol  Platelets slightly dropping   Wbc improving  Na increasing, add free water   Creat stable  LFT improving  Blood cx negative  Sputum bronch cx negative  Received 1 prbs on 6/13, Hb improved  Tolerating TF, increase the feed      Allergies   Allergen Reactions    Hydrocodone Other (comments)     Breaks into cold sweat    Metformin Other (comments)     Breaks out into cold sweat.  Can Take Glucophage brand name med      [facility]:   Diagnosis Date    Diabetes ([facility] [address]     many years type 2    Hypertension     many years      Past Surgical History:   Procedure Laterality Date    HX OOPHORECTOMY  2018    HX TONSILLECTOMY      as a child       Social History   Substance Use Topics    Smoking status: Never Smoker    Smokeless tobacco: Never Used    Alcohol use No      History reviewed. No pertinent family history.      Current Facility-Administered Medications   Medication Dose Route Frequency    senna-docusate (PERICOLACE) 8.6-50 mg per tablet 1 Tab  1 Tab Oral BID    piperacillin-tazobactam (ZOSYN) 2.25 g in 0.9% sodium chloride (MBP/ADV) 50 mL MBP  2.25 g IntraVENous Q6H    furosemide (LASIX) injection 40 mg  40 mg IntraVENous DAILY    dextrose 5% and 0.9% NaCl infusion  75 mL/hr IntraVENous CONTINUOUS    fentaNYL (PF) 900 mcg/30 ml infusion soln  25-50 mcg/hr IntraVENous TITRATE    midazolam in normal saline (VERSED) 1 mg/mL infusion  2-6 mg/hr IntraVENous TITRATE    NOREPINephrine (LEVOPHED) 8 mg in 5% dextrose 250mL infusion  2-30 mcg/min IntraVENous TITRATE    heparin 25,000 units in D5W 250 ml infusion  18-36 Units/kg/hr IntraVENous TITRATE    insulin lispro (HUMALOG) injection   SubCUTAneous Q6H    pantoprazole (PROTONIX) 40 mg in sodium chloride 0.9% 10 mL injection  40 mg IntraVENous DAILY         Objective:   Vital Signs:    Visit Vitals    /86    Pulse 94    Temp 98.2 °F (36.8 °C)    Resp 21    Ht 5' 1\" (1.549 m)    Wt 89.1 kg (196 lb 6.9 oz)    SpO2 100%    BMI 37.12 kg/m2       O2 Device: Endotracheal tube   O2 Flow Rate (L/min): 50 l/min   Temp (24hrs), Av.9 °F (36.6 °C), Min:97.2 °F (36.2 °C), Max:98.2 °F (36.8 °C)       Intake/Output:   Last shift:           Last 3 shifts:  1901 -  0700  In: 4306.1 [I.V.:3729.1]  Out: 4601 [Urine:4600]      Intake/Output Summary (Last 24 hours) at 18 1106  Last data filed at 18 0650   Gross per 24 hour   Intake          2970.51 ml   Output             3351 ml   Net          -380.49 ml       Last 3 Recorded Weights in this Encounter    06/04/18 0705 06/04/18 1352   Weight: 83.9 kg (185 lb) 89.1 kg (196 lb 6.9 oz)       Ventilator Settings:  Mode Rate Tidal Volume Pressure FiO2 PEEP   Assist control, VC+   400 ml    50 % (decreased) 12 cm H20 (reduced per order)     Peak airway pressure: 19 cm H2O    Plateau pressure:     Tidal volume:    Minute ventilation: 9.97 l/min   SPO2 96%       Physical Exam:     General/Neurology: awake, following all commands. Moving all 4 limbs spontaneously. Head:   Normocephalic, without obvious abnormality, atraumatic. Eye:   no scleral icterus, no pallor, no cyanosis. Throat:  No oral thrush. ETT and OGT. Neck:   Symmetric. No lymphadenopathy. Trachea midline  Lung: Moderate air entry bilateral equal. Decreased AE bibasilar. No rales. No rhonchi. No wheezing. No stridors. Heart:   Regular rate & rhythm. S1 S2 present. No murmur. No JVD. Abdomen:  Soft. Obese. +BS. Midline lower abd surgical site without induration or erythema. Mild serous drainage at one surgical site. Extremities:  1 + b/l pitting pedal and hand edema, slightly improved. No cyanosis. No clubbing. Pulses: 2+ and symmetric in DP. Capillary refill: normal  Lymphatic:  No cervical or supraclavicular palpable lymphadenopathy. Skin:   No rashes or lesions.        Data:       Recent Results (from the past 24 hour(s))   GLUCOSE, POC    Collection Time: 06/13/18 12:19 PM   Result Value Ref Range    Glucose (POC) 160 (H) 70 - 110 mg/dL   CALCIUM, IONIZED    Collection Time: 06/13/18 12:20 PM   Result Value Ref Range    Ionized Calcium 1.10 (L) 1.12 - 1.32 MMOL/L   POTASSIUM    Collection Time: 06/13/18 12:20 PM   Result Value Ref Range    Potassium 3.5 3.5 - 5.5 mmol/L   GLUCOSE, POC    Collection Time: 06/13/18  2:02 PM   Result Value Ref Range    Glucose (POC) 175 (H) 70 - 110 mg/dL   GLUCOSE, POC    Collection Time: 06/13/18  5:44 PM   Result Value Ref Range    Glucose (POC) 159 (H) 70 - 110 mg/dL   CALCIUM, IONIZED    Collection Time: 06/13/18 10:05 PM   Result Value Ref Range    Ionized Calcium 1.11 (L) 1.12 - 1.32 MMOL/L   POTASSIUM    Collection Time: 06/13/18 10:05 PM   Result Value Ref Range    Potassium 3.1 (L) 3.5 - 5.5 mmol/L   GLUCOSE, POC    Collection Time: 06/13/18 11:18 PM   Result Value Ref Range    Glucose (POC) 150 (H) 70 - 110 mg/dL   MAGNESIUM    Collection Time: 06/14/18  5:00 AM   Result Value Ref Range    Magnesium 1.7 1.6 - 2.6 mg/dL   METABOLIC PANEL, COMPREHENSIVE    Collection Time: 06/14/18  5:00 AM   Result Value Ref Range    Sodium 148 (H) 136 - 145 mmol/L    Potassium 3.3 (L) 3.5 - 5.5 mmol/L    Chloride 110 (H) 100 - 108 mmol/L    CO2 25 21 - 32 mmol/L    Anion gap 13 3.0 - 18 mmol/L    Glucose 175 (H) 74 - 99 mg/dL    BUN 59 (H) 7.0 - 18 MG/DL    Creatinine 2.10 (H) 0.6 - 1.3 MG/DL    BUN/Creatinine ratio 28 (H) 12 - 20      GFR est AA 28 (L) >60 ml/min/1.73m2    GFR est non-AA 23 (L) >60 ml/min/1.73m2    Calcium 7.5 (L) 8.5 - 10.1 MG/DL    Bilirubin, total 0.5 0.2 - 1.0 MG/DL    ALT (SGPT) 327 (H) 13 - 56 U/L    AST (SGOT) 126 (H) 15 - 37 U/L    Alk.  phosphatase 115 45 - 117 U/L    Protein, total 4.9 (L) 6.4 - 8.2 g/dL    Albumin 1.4 (L) 3.4 - 5.0 g/dL    Globulin 3.5 2.0 - 4.0 g/dL    A-G Ratio 0.4 (L) 0.8 - 1.7     CBC W/O DIFF    Collection Time: 06/14/18  5:00 AM   Result Value Ref Range    WBC 17.5 (H) 4.6 - 13.2 K/uL    RBC 3.85 (L) 4.20 - 5.30 M/uL    HGB 8.5 (L) 12.0 - 16.0 g/dL    HCT 26.4 (L) 35.0 - 45.0 %    MCV 68.6 (L) 74.0 - 97.0 FL    MCH 22.1 (L) 24.0 - 34.0 PG    MCHC 32.2 31.0 - 37.0 g/dL    RDW 19.4 (H) 11.6 - 14.5 %    PLATELET 668 241 - 634 K/uL   PHOSPHORUS    Collection Time: 06/14/18  5:00 AM   Result Value Ref Range    Phosphorus 3.6 2.5 - 4.9 MG/DL   PTT    Collection Time: 06/14/18  5:00 AM   Result Value Ref Range    aPTT 75.1 (H) 23.0 - 36.4 SEC   CALCIUM, IONIZED    Collection Time: 06/14/18  5:00 AM   Result Value Ref Range    Ionized Calcium 1.08 (L) 1.12 - 1.32 MMOL/L   GLUCOSE, POC    Collection Time: 06/14/18  5:45 AM   Result Value Ref Range    Glucose (POC) 189 (H) 70 - 110 mg/dL         Chemistry Recent Labs      06/14/18   0500  06/13/18   2205  06/13/18   1220  06/13/18   0425   06/12/18   0425   GLU  175*   --    --   169*   --   187*   NA  148*   --    --   144   --   143   K  3.3*  3.1*  3.5  3.4*   < >  3.4*   CL  110*   --    --   106   --   104   CO2  25   --    --   24   --   24   BUN  59*   --    --   64*   --   60*   CREA  2.10*   --    --   2.41*   --   2.69*   CA  7.5*   --    --   7.4*   --   7.0*   MG  1.7   --    --   2.0   --   2.0   PHOS  3.6   --    --   4.7   --    --    AGAP  13   --    --   14   --   15   BUCR  28*   --    --   27*   --   22*   AP  115   --    --   111   --   97   TP  4.9*   --    --   5.1*   --   4.5*   ALB  1.4*   --    --   1.4*   --   1.4*   GLOB  3.5   --    --   3.7   --   3.1   AGRAT  0.4*   --    --   0.4*   --   0.5*    < > = values in this interval not displayed. Lactic Acid Lactic acid   Date Value Ref Range Status   06/10/2018 4.7 (HH) 0.4 - 2.0 MMOL/L Final     Comment:     CALLED TO AND CORRECTLY REPEATED BY:  SOBEIDA ROSALES RN ICU TO  AT [address] [place] [address] [phone]       No results for input(s): LAC in the last 72 hours. Liver Enzymes Protein, total   Date Value Ref Range Status   06/14/2018 4.9 (L) 6.4 - 8.2 g/dL Final     Albumin   Date Value Ref Range Status   06/14/2018 1.4 (L) 3.4 - 5.0 g/dL Final     Globulin   Date Value Ref Range Status   06/14/2018 3.5 2.0 - 4.0 g/dL Final     A-G Ratio   Date Value Ref Range Status   06/14/2018 0.4 (L) 0.8 - 1.7   Final     AST (SGOT)   Date Value Ref Range Status   06/14/2018 126 (H) 15 - 37 U/L Final     Alk.  phosphatase   Date Value Ref Range Status   06/14/2018 115 45 - 117 U/L Final     Recent Labs      06/14/18   0500  06/13/18   0425  06/12/18   0425   TP  4.9*  5.1*  4.5*   ALB 1.4*  1.4*  1.4*   GLOB  3.5  3.7  3.1   AGRAT  0.4*  0.4*  0.5*   SGOT  126*  348*  509*   AP  115  111  97        CBC w/Diff Recent Labs      06/14/18   0500  06/13/18   0425  06/12/18   0425   WBC  17.5*  19.6*  22.4*   RBC  3.85*  3.52*  3.73*   HGB  8.5*  7.6*  8.0*   HCT  26.4*  23.6*  24.8*   PLT  149  167  190        Cardiac Enzymes No results found for: CPK, CK, CKMMB, CKMB, RCK3, CKMBT, CKNDX, CKND1, AFBIAN, TROPT, TROIQ, KAYLAN, TROPT, TNIPOC, BNP, BNPP     BNP No results found for: BNP, BNPP, XBNPT     Coagulation Recent Labs      06/14/18   0500  06/13/18 0425  06/12/18 0425   APTT  75.1*  95.3*  93.8*         Thyroid  No results found for: T4, T3U, TSH, TSHEXT, TSHEXT    No results found for: T4     Urinalysis Lab Results   Component Value Date/Time    Color DARK YELLOW 06/04/2018 08:42 AM    Appearance CLOUDY 06/04/2018 08:42 AM    Specific gravity 1.028 06/04/2018 08:42 AM    pH (UA) 5.0 06/04/2018 08:42 AM    Protein 30 (A) 06/04/2018 08:42 AM    Glucose NEGATIVE  06/04/2018 08:42 AM    Ketone TRACE (A) 06/04/2018 08:42 AM    Bilirubin SMALL (A) 06/04/2018 08:42 AM    Urobilinogen 1.0 06/04/2018 08:42 AM    Nitrites NEGATIVE  06/04/2018 08:42 AM    Leukocyte Esterase NEGATIVE  06/04/2018 08:42 AM    Epithelial cells FEW 06/04/2018 08:42 AM    Bacteria 1+ (A) 06/04/2018 08:42 AM    WBC 0 to 3 06/04/2018 08:42 AM    RBC 0 to 3 06/04/2018 08:42 AM        Micro  No results for input(s): SDES, CULT in the last 72 hours. No results for input(s): CULT in the last 72 hours. ABG Recent Labs      06/12/18   2324  06/12/18   0525  06/11/18   2203   PHI  7.463*  7.463*  7.450   PCO2I  33.5*  33.0*  34.0*   PO2I  94  63*  47*   HCO3I  24.1  23.7  23.8   FIO2I  0.75  1.0  1.0          Echo 6/9/18:  Left ventricle: Systolic function was normal. Ejection fraction was estimated   in the range of 65 % to 70 %. No obvious  wall motion abnormalities identified in the views obtained.  Wall thickness   was mildly increased. Doppler parameters  were consistent with abnormal left ventricular relaxation (grade 1 diastolic   dysfunction). Right ventricle: The size was normal. Systolic function was normal.  Mitral valve: There was mild annular calcification. Tricuspid valve: Pulmonary artery systolic pressure was mildly increased. Pulmonary artery systolic pressure: 34 mmHg. PVL LE 6/6/18: acute b/l peroneal DVT. CT abd pelvis 6/4/18:  1. Postsurgical hysterectomy, bilateral oophorectomy, pelvic lymphadenectomy and  a mastectomy surgical changes. Small volume of ascites in the abdomen and  pelvis, with a few tiny locules of gas in the right hemipelvis would be in  keeping with recent postsurgical etiology. 2. Abnormal edematous thick-walled small bowel loops in the left abdomen and  pelvis, with TRAM lamellar edematous configuration, induration. No findings of  pneumatosis. The overall appearance is nonspecific. Diagnostic considerations  include infectious etiology, nonspecific edema which may be unresolved  postsurgical etiology. Ischemia is also included in the differential diagnostic  consideration, however, there are no findings of pneumatosis, portal venous gas. 3. Stool in the right colon extending to the hepatic flexure with surrounding  gas, foci of pneumatosis could be obscured. No associated bowel wall thickening. 4. Satisfactory position of nasogastric tube. 5. Hepatomegaly, steatosis with 2 rounded low-density lesions, potentially  cysts. 6. Bibasilar atelectasis. CXR reviewed by me:  CXR 6/14/18:  1. Endotracheal tube, right subclavian central venous catheter, and visualized  nasogastric tube as above. 2. Similar appearance to low lung volumes, perihilar interstitial edema and/or  atelectasis, with improved aeration of the left upper lung.      Sapna Lyle MD  6/14/2018

## 2018-06-14 NOTE — PROGRESS NOTES
Problem: Self Care Deficits Care Plan (Adult)  Goal: *Acute Goals and Plan of Care (Insert Text)  Occupational Therapy Goals  Initiated 6/14/2018 within 7 day(s). 1.  Patient will visually attend to activity x 5 minutes to increase participation in bed level exercise  2. Patient will follow simple 1 step instruction 50% or more of time to increase participation in ADL  3. Patient will transfer supine to sit with max A to prepare for EOB level activity   4. Patient will tolerate sitting on side of bed x 10 minutes with minimal support to increase tolerance for EOB ADL   Occupational Therapy EVALUATION    Patient: Miriam Sierra (11 y.o. female)  Date: 6/14/2018  Primary Diagnosis: Abdominal pain  Abdominal pain  Abdominal Pain  aspiration  Procedure(s) (LRB):  BRONCHOSCOPY (N/A) 5 Days Post-Op   Precautions:   Fall    ASSESSMENT :  Based on the objective data described below, the patient presents with decreased level of alertness and decreased attention span for participation in functional activity appearing very drowsy and remains on ventilator. Pt able to open eyes to voice for very short timeframes (<1minute). Pt incontinent of bowel and requiring total assist with bed mobility, lolis hygiene and bed linen change. Tolerated bed in chair position briefly for B UE PROM exercises and then indicating to lower HOB. Pt able to activate call light once button placed in R hand. Will continue therapy on trial basis to see if pt is able to tolerate same.   Cont OT  Patients rehabilitation potential is considered to be Guarded  Factors which may influence rehabilitation potential include:   []             None noted  []             Mental ability/status  []             Medical condition  []             Home/family situation and support systems  []             Safety awareness  []             Pain tolerance/management  []             Other:        PLAN :  Recommendations and Planned Interventions:  [] Self Care Training                  []        Therapeutic Activities  []               Functional Mobility Training    []        Cognitive Retraining  []               Therapeutic Exercises           []        Endurance Activities  []               Balance Training                   []        Neuromuscular Re-Education  []               Visual/Perceptual Training     []   Home Safety Training  []               Patient Education                 []        Family Training/Education  []               Other (comment):    Frequency/Duration: Patient will be followed by occupational therapy 3 day trial  Discharge Recommendations: To Be Determined  Further Equipment Recommendations for Discharge: N/A     SUBJECTIVE:   Patient ventilated  OBJECTIVE DATA SUMMARY:     Past Medical History:   Diagnosis Date    Diabetes ([facility] [address]     many years type 2    Hypertension     many years     Past Surgical History:   Procedure Laterality Date    HX OOPHORECTOMY  05/29/2018    HX TONSILLECTOMY      as a child      Barriers to Learning/Limitations: None  Compensate with: visual, verbal, tactile, kinesthetic cues/model  Prior Level of Function/Home Situation:   Home Situation  Home Environment: Private residence  # Steps to Enter: 4  One/Two Story Residence: One story  Living Alone: No  Support Systems: Family member(s)  Patient Expects to be Discharged to[de-identified] Unknown  Current DME Used/Available at Home: Commode, bedside, Shower chair, Walker, rolling, Wheelchair  Tub or Shower Type: Shower  []  Right hand dominant   []  Left hand dominant  Cognitive/Behavioral Status:  Neurologic State: Drowsy; Eyes open to voice;Sleeping     Cognition: Decreased attention/concentration;Decreased command following     Edema: L UE and kd LE  Balance:   Unable to assess   Strength:  Decreased strength B UE  Range of Motion:  PROM: Generally decreased, functional  Functional Mobility and Transfers for ADLs:  Bed Mobility:  Rolling:  Total assistance;Assist x2  ADL Assessment:  Feeding: Other (comment) (NPO)  Oral Facial Hygiene/Grooming: Total assistance  Bathing: Total assistance  Upper Body Dressing: Total assistance  Lower Body Dressing: Total assistance  Toileting: Total assistance    Pain:  Pre-treatment: 5 out of 10 abdomen  Post-treatment: 5 out of 10 abdomen    Activity Tolerance:   poor  Please refer to the flowsheet for vital signs taken during this treatment. After treatment:   [] Patient left in no apparent distress sitting up in chair  [x] Patient left in no apparent distress in bed  [] Call bell left within reach  [x] Nursing notified  [] Caregiver present  [] Bed alarm activated    COMMUNICATION/EDUCATION:    [] Home safety education was provided and the patient/caregiver indicated understanding. [] Patient/family have participated as able in goal setting and plan of care. [] Patient/family agree to work toward stated goals and plan of care. [] Patient understands intent and goals of therapy, but is neutral about his/her participation. [x] Patient is unable to participate in goal setting and plan of care. Thank you for this referral.  German Ortez, OTR/L  Time Calculation: 28 mins    G-Codes (GP)  Self Care   Current  CN= 100%   Goal  CM= 80-99%  The severity rating is based on the professional judgement & direct observation of Level of Assistance required for Functional Mobility and ADLs. Eval Complexity: History: HIGH Complexity : Extensive review of history including physical, cognitive and psychosocial history ; Examination: HIGH Complexity : 5 or more performance deficits relating to physical, cognitive , or psychosocial skils that result in activity limitations and / or participation restrictions; Decision Making:HIGH Complexity : Patient presents with comorbidities that affect occupational performance.  Signifigant modification of tasks or assistance (eg, physical or verbal) with assessment (s) is necessary to enable patient to complete evaluation

## 2018-06-14 NOTE — WOUND CARE
Patient seen during hospital wide pressure injury prevalence. Skin intact patient repositions self in bed. Spoke with patient concerning pressure relieving strategies, pt vented however alert and nods head as she understands. Wound care will continue to monitor during admission.

## 2018-06-14 NOTE — PROGRESS NOTES
Bedside and Verbal shift change report received from BERE Martínez (offgoing nurse). Report included the following information SBAR, Kardex, Intake/Output, MAR and Recent Results. 2015 Shift assessment completed, see EMR.     0015 Reassessment completed, see EMR    0445 Reassessment completed, see EMR    0500 PTT 75.1 and therapeutic no change to heparin drip    Bedside and Verbal shift change report given to MIRIAM Martines RN (oncoming nurse) Report included the following information SBAR, Kardex, Intake/Output, MAR and Recent Results.

## 2018-06-14 NOTE — PROGRESS NOTES
POD#9 status post DIAGNOSTIC LAPAROSCOPY CONVERTED TO LAPAROTOMY, PERITONEAL BIOPSIES, PERITONEAL LAVAGE  Patient alert and appropriately responsive. Denies pain. Multiple BMs today. Tolerated TF. No complaints now. AF, VSS O2 sats remain 100% with weaning FiO2, now at 40%   WBC trending down. Hgb 8.5 after 1 unit pRBC ordered. Creatinine trending down. Abdomen soft, nontender, NABS. Incision draining serous fluid, no odor. Minimal erythema. I will re-evaluate in am. If not improved will open portion of wound and drain seroma and pack. Continue current management with vent, abx, and Hep gtt. Continue to wean FiO2. Appreciate Pulmonary and Medicine management.   Huong Lopez MD

## 2018-06-14 NOTE — PROGRESS NOTES
conducted a Follow up consultation and Spiritual Assessment for Allen Lee, who is a 75 y.o.,female. Patient remains on vent but was more awake today. Brother at bedside most of the time. The  provided the following Interventions:  Continued the relationship of care and support. Listened empathically. Offered prayer and assurance of continued prayer on patients behalf. Chart reviewed. The following outcomes were achieved:  Patient was reviewed in ICU Interdisciplinary Rounds. Assessment:  There are no further spiritual or Nondenominational issues which require Spiritual Care Services intervention at this time. Plan:  Chaplains will continue to follow and will provide pastoral care as needed or requested.  recommends bedside caregivers page the  on duty if patient shows signs of acute spiritual or emotional distress. [address], [place].    Board Certified   [phone] - Office

## 2018-06-14 NOTE — PROGRESS NOTES
0700 Bedside shift change report given to Jennifer Enriquez RN (oncoming nurse) by Luisito Vegas (offgoing nurse). Report included the following information SBAR, Kardex, ED Summary, Procedure Summary, Intake/Output, MAR, Accordion, Recent Results, Med Rec Status, Cardiac Rhythm NSR and Alarm Parameters .        0800 Assessment completed per flow sheets     1200 Reassessment, F102 at 45% patient tolerating well, Sats  100.    1600 Reassessment no change     1915 Shift report given in SBAR format to Luisito Vegas

## 2018-06-14 NOTE — PROGRESS NOTES
Hospitalist Progress Note    Patient: Paco Murrell MRN: [id number]  CSN: [id number]    YOB: 1950  Age: 75 y.o. Sex: female    DOA: 6/4/2018 LOS:  LOS: 10 days          Chief Complaint:     Ac resp failure      Assessment/Plan   Ac resp failure-requiring vent support-PEEP elevated still  Sepsis and shock due to klebsiella peritonitis   S/p laparotomy  Probable PE with LE DVT  Ovarian cancer with recent debulking surgery  Oliguric JESS  Anemia  Hypoalbuminemia    CR has improved  Wbc has dropped  Low K-replete per protocol  El;evated na-IVF adjustments  Heparin gtt  Tube feed support  As has UOP  Weaning pressors but still requiring support  IV zosyn    No indication for dialysis  Vent mgmt and ssupport  DNR status  tennuous situation still with resp requirements    Prognosis guarded    Disposition :  Patient Active Problem List   Diagnosis Code    Ovarian ca ([facility] [address] C56.9    Severe obesity (BMI 35.0-39.9) ([facility]) E66.01    Abdominal pain R10.9    Sepsis ([facility] [address] A41.9    Oliguria R34    Acute respiratory failure ([facility] [address] J96.00    JESS (acute kidney injury) ([facility] [address] I15.6    Metabolic acidosis O17.1    Acute deep vein thrombosis (DVT) of lower extremity ([facility]) I82.409       Subjective:    No new events overnight    Review of systems:    She is sedate and unable to give ROS      Vital signs/Intake and Output:  Visit Vitals    /71    Pulse 93    Temp 98.2 °F (36.8 °C)    Resp 20    Ht 5' 1\" (1.549 m)    Wt 89.1 kg (196 lb 6.9 oz)    SpO2 100%    BMI 37.12 kg/m2     Current Shift:     Last three shifts:  06/12 1901 - 06/14 0700  In: 4306.1 [I.V.:3729.1]  Out: 4601 [Urine:4600]    Exam:    General: elderly debilitated WF intubated on vent, NAD  Head/Neck: NCAT, supple, No masses, No lymphadenopathy  CVS:Regular rate and rhythm, no M/R/G, S1/S2 heard, no thrill  Lungs:Clear to auscultation bilaterally, no wheezes, rhonchi, or rales  Abdomen: Soft, Nontender, No distention, diminished Bowel sounds, No hepatomegaly  Extremities: 1 plus edema  Neuro:sedated                Labs: Results:       Chemistry Recent Labs      06/14/18   0500  06/13/18   2205  06/13/18   1220  06/13/18   0425   06/12/18 0425   GLU  175*   --    --   169*   --   187*   NA  148*   --    --   144   --   143   K  3.3*  3.1*  3.5  3.4*   < >  3.4*   CL  110*   --    --   106   --   104   CO2  25   --    --   24   --   24   BUN  59*   --    --   64*   --   60*   CREA  2.10*   --    --   2.41*   --   2.69*   CA  7.5*   --    --   7.4*   --   7.0*   AGAP  13   --    --   14   --   15   BUCR  28*   --    --   27*   --   22*   AP  115   --    --   111   --   97   TP  4.9*   --    --   5.1*   --   4.5*   ALB  1.4*   --    --   1.4*   --   1.4*   GLOB  3.5   --    --   3.7   --   3.1   AGRAT  0.4*   --    --   0.4*   --   0.5*    < > = values in this interval not displayed. CBC w/Diff Recent Labs      06/14/18   0500 06/13/18 0425 06/12/18 0425   WBC  17.5*  19.6*  22.4*   RBC  3.85*  3.52*  3.73*   HGB  8.5*  7.6*  8.0*   HCT  26.4*  23.6*  24.8*   PLT  149  167  190      Cardiac Enzymes No results for input(s): CPK, CKND1, FABIAN in the last 72 hours. No lab exists for component: CKRMB, TROIP   Coagulation Recent Labs      06/14/18   0500 06/13/18 0425   APTT  75.1*  95.3*       Lipid Panel No results found for: CHOL, CHOLPOCT, CHOLX, CHLST, CHOLV, 821926, HDL, LDL, LDLC, DLDLP, 987896, VLDLC, VLDL, TGLX, TRIGL, TRIGP, TGLPOCT, CHHD, CHHDX   BNP No results for input(s): BNPP in the last 72 hours.    Liver Enzymes Recent Labs      06/14/18   0500   TP  4.9*   ALB  1.4*   AP  115   SGOT  126*      Thyroid Studies No results found for: T4, T3U, TSH, TSHEXT     Procedures/imaging: see electronic medical records for all procedures/Xrays and details which were not copied into this note but were reviewed prior to creation of Cely Siegel MD

## 2018-06-14 NOTE — PROGRESS NOTES
NUTRITION FOLLOW-UP    RECOMMENDATIONS/PLAN:   - Recc increasing tube feeds to 30ml/hr to provide 1188kcal, 53g Protein, 479ml free water, 110g CHO  -Recc starting at 10ml/hr and increasing by 5ml Q6 until goal rate met  -Will defer Free H20 to MD  Monitor labs/lytes, tube feed tolerance, skin integrity, wt, fluid status, BM  NUTRITION ASSESSMENT:   Client Update: 75 yrs old Female with acute respiratory failure, ischemia colitis, sepsis, JESS, metabolic acidosis. 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 Pt was extubated, and 1 day later reintubated. Thought to have peritonitis. Pt is s/p laparotomy and peritoneal lavage with klebsiella positive. FOOD/NUTRITION INTAKE   Diet Order:  [email or web address]  provides: 396kcal, 18g protein, 160ml H2O, 37g CHO   Food Allergies: NKFA  Average PO Intake:      No data found. Pertinent Medications:  [x] Reviewed; lasix, lispro, protonix, pericolace  Electrolyte Replacement Protocol: []K []Mg []PO4  Insulin:  []SSI  [x]Pre-meal   []Basal    []Drip  []None  Cultural/Restoration Food Preferences: None Identified    BIOCHEMICAL DATA & MEDICAL TESTS  Pertinent Labs:  [x] Reviewed; Na-148 K-3.3 Glucose-175 Ca-7.5   ANTHROPOMETRICS  Height: 5' 1\" (154.9 cm)       Weight: 89.1 kg (196 lb 6.9 oz)         BMI: 37.1 kg/m^2 obese (30%-39.9% BMI)   Adm Weight: 196 lbs                Weight change: 0lbs  Adjusted Body Weight: 58.1kg     NUTRITION-FOCUSED PHYSICAL ASSESSMENT  Skin: No PU      GI: +BM 6/14/18    NUTRITION PRESCRIPTION  Calories: 979-1246 kcal/day based on 11-14kcal/kg  Protein: 71-89 g/day based on 0.8-1.0 g/kg  CHO: 122-156 g/day based on 50% of total energy  Fluid: 979-1246 ml/day based on 1 kcal/ml l      NUTRITION DIAGNOSES:   1.  Inadequate energy/protein intake related to NPO status as pt NPO x 3 days.     NUTRITION INTERVENTIONS:   INTERVENTIONS: GOALS:  1. Enteral nutrition 1. Initiation and tolerate enteral nutrition of Mario [email or web address] throughout the next 24 hours         LEARNING NEEDS (Diet, Supplementation, Food/Nutrient-Drug Interaction):   [x] None Identified   [] Education provided/documented      Identified and patient: [] Declined   [] Was not appropriate/indicated        NUTRITION MONITORING /EVALUATION:   Adjust EN/PN as appropriate  Monitor wt  Monitor renal labs, electrolytes, fluid status     Previous Recommendations Implemented: yes        Previous Goals Met:  yes -      [x] Participated in Interdisciplinary Rounds    [x] Interdisciplinary Care Plan Reviewed  DISCHARGE NUTRITION RECOMMENDATIONS ADDRESSED:        [x] To be determined closer to discharge    NUTRITION RISK:           [x] At risk                        [] Not currently at risk        Will follow-up per policy.   Bao Sevilla 1

## 2018-06-15 ENCOUNTER — ANESTHESIA (OUTPATIENT)
Dept: SURGERY | Age: 68
DRG: 853 | End: 2018-06-15
Payer: MEDICARE

## 2018-06-15 ENCOUNTER — APPOINTMENT (OUTPATIENT)
Dept: GENERAL RADIOLOGY | Age: 68
DRG: 853 | End: 2018-06-15
Attending: INTERNAL MEDICINE
Payer: MEDICARE

## 2018-06-15 ENCOUNTER — ANESTHESIA EVENT (OUTPATIENT)
Dept: SURGERY | Age: 68
DRG: 853 | End: 2018-06-15
Payer: MEDICARE

## 2018-06-15 LAB
ALBUMIN SERPL-MCNC: 1.2 G/DL (ref 3.4–5)
ALBUMIN SERPL-MCNC: 1.4 G/DL (ref 3.4–5)
ALBUMIN/GLOB SERPL: 0.4 {RATIO} (ref 0.8–1.7)
ALBUMIN/GLOB SERPL: 0.4 {RATIO} (ref 0.8–1.7)
ALP SERPL-CCNC: 105 U/L (ref 45–117)
ALP SERPL-CCNC: 83 U/L (ref 45–117)
ALT SERPL-CCNC: 137 U/L (ref 13–56)
ALT SERPL-CCNC: 223 U/L (ref 13–56)
ANION GAP SERPL CALC-SCNC: 11 MMOL/L (ref 3–18)
ANION GAP SERPL CALC-SCNC: 12 MMOL/L (ref 3–18)
APTT PPP: 116.8 SEC (ref 23–36.4)
APTT PPP: 24.5 SEC (ref 23–36.4)
ARTERIAL PATENCY WRIST A: YES
AST SERPL-CCNC: 34 U/L (ref 15–37)
AST SERPL-CCNC: 55 U/L (ref 15–37)
BACTERIA SPEC CULT: NORMAL
BACTERIA SPEC CULT: NORMAL
BASE EXCESS BLD CALC-SCNC: 0 MMOL/L
BASOPHILS # BLD: 0.2 K/UL (ref 0–0.1)
BASOPHILS NFR BLD: 1 % (ref 0–3)
BDY SITE: ABNORMAL
BILIRUB SERPL-MCNC: 0.5 MG/DL (ref 0.2–1)
BILIRUB SERPL-MCNC: 0.5 MG/DL (ref 0.2–1)
BODY TEMPERATURE: 98
BUN SERPL-MCNC: 43 MG/DL (ref 7–18)
BUN SERPL-MCNC: 46 MG/DL (ref 7–18)
BUN/CREAT SERPL: 25 (ref 12–20)
BUN/CREAT SERPL: 29 (ref 12–20)
CALCIUM SERPL-MCNC: 6.9 MG/DL (ref 8.5–10.1)
CALCIUM SERPL-MCNC: 7.8 MG/DL (ref 8.5–10.1)
CHLORIDE SERPL-SCNC: 113 MMOL/L (ref 100–108)
CHLORIDE SERPL-SCNC: 114 MMOL/L (ref 100–108)
CO2 SERPL-SCNC: 22 MMOL/L (ref 21–32)
CO2 SERPL-SCNC: 25 MMOL/L (ref 21–32)
CREAT SERPL-MCNC: 1.61 MG/DL (ref 0.6–1.3)
CREAT SERPL-MCNC: 1.69 MG/DL (ref 0.6–1.3)
DIFFERENTIAL METHOD BLD: ABNORMAL
EOSINOPHIL # BLD: 0 K/UL (ref 0–0.4)
EOSINOPHIL NFR BLD: 0 % (ref 0–5)
ERYTHROCYTE [DISTWIDTH] IN BLOOD BY AUTOMATED COUNT: 19.6 % (ref 11.6–14.5)
ERYTHROCYTE [DISTWIDTH] IN BLOOD BY AUTOMATED COUNT: 21.5 % (ref 11.6–14.5)
GAS FLOW.O2 O2 DELIVERY SYS: ABNORMAL L/MIN
GAS FLOW.O2 SETTING OXYMISER: 14 BPM
GLOBULIN SER CALC-MCNC: 3.4 G/DL (ref 2–4)
GLOBULIN SER CALC-MCNC: 3.9 G/DL (ref 2–4)
GLUCOSE BLD STRIP.AUTO-MCNC: 182 MG/DL (ref 70–110)
GLUCOSE BLD STRIP.AUTO-MCNC: 190 MG/DL (ref 70–110)
GLUCOSE BLD STRIP.AUTO-MCNC: 251 MG/DL (ref 70–110)
GLUCOSE SERPL-MCNC: 184 MG/DL (ref 74–99)
GLUCOSE SERPL-MCNC: 207 MG/DL (ref 74–99)
HCO3 BLD-SCNC: 24.3 MMOL/L (ref 22–26)
HCT VFR BLD AUTO: 27.2 % (ref 35–45)
HCT VFR BLD AUTO: 41.5 % (ref 35–45)
HGB BLD-MCNC: 13.3 G/DL (ref 12–16)
HGB BLD-MCNC: 8.6 G/DL (ref 12–16)
INR PPP: 1.2 (ref 0.8–1.2)
INSPIRATION.DURATION SETTING TIME VENT: 0.9 SEC
LYMPHOCYTES # BLD: 3.8 K/UL (ref 0.8–3.5)
LYMPHOCYTES NFR BLD: 20 % (ref 20–51)
MAGNESIUM SERPL-MCNC: 1.7 MG/DL (ref 1.6–2.6)
MAGNESIUM SERPL-MCNC: 1.9 MG/DL (ref 1.6–2.6)
MCH RBC QN AUTO: 21.8 PG (ref 24–34)
MCH RBC QN AUTO: 23.8 PG (ref 24–34)
MCHC RBC AUTO-ENTMCNC: 31.6 G/DL (ref 31–37)
MCHC RBC AUTO-ENTMCNC: 32 G/DL (ref 31–37)
MCV RBC AUTO: 69 FL (ref 74–97)
MCV RBC AUTO: 74.1 FL (ref 74–97)
METAMYELOCYTES NFR BLD MANUAL: 1 %
MONOCYTES # BLD: 0.8 K/UL (ref 0–1)
MONOCYTES NFR BLD: 4 % (ref 2–9)
NEUTS BAND NFR BLD MANUAL: 40 % (ref 0–5)
NEUTS SEG # BLD: 6.5 K/UL (ref 1.8–8)
NEUTS SEG NFR BLD: 34 % (ref 42–75)
O2/TOTAL GAS SETTING VFR VENT: 40 %
PCO2 BLD: 35 MMHG (ref 35–45)
PEEP RESPIRATORY: 12 CMH2O
PH BLD: 7.45 [PH] (ref 7.35–7.45)
PLATELET # BLD AUTO: 163 K/UL (ref 135–420)
PLATELET # BLD AUTO: 205 K/UL (ref 135–420)
PLATELET COMMENTS,PCOM: ABNORMAL
PO2 BLD: 107 MMHG (ref 80–100)
POTASSIUM SERPL-SCNC: 3.5 MMOL/L (ref 3.5–5.5)
POTASSIUM SERPL-SCNC: 4.5 MMOL/L (ref 3.5–5.5)
PROT SERPL-MCNC: 4.6 G/DL (ref 6.4–8.2)
PROT SERPL-MCNC: 5.3 G/DL (ref 6.4–8.2)
PROTHROMBIN TIME: 14.8 SEC (ref 11.5–15.2)
RBC # BLD AUTO: 3.94 M/UL (ref 4.2–5.3)
RBC # BLD AUTO: 5.6 M/UL (ref 4.2–5.3)
RBC MORPH BLD: ABNORMAL
RBC MORPH BLD: ABNORMAL
SAO2 % BLD: 99 % (ref 92–97)
SERVICE CMNT-IMP: ABNORMAL
SERVICE CMNT-IMP: NORMAL
SERVICE CMNT-IMP: NORMAL
SODIUM SERPL-SCNC: 148 MMOL/L (ref 136–145)
SODIUM SERPL-SCNC: 149 MMOL/L (ref 136–145)
SPECIMEN TYPE: ABNORMAL
TOTAL RESP. RATE, ITRR: 17
VENTILATION MODE VENT: ABNORMAL
VOLUME CONTROL PLUS IVLCP: YES
VT SETTING VENT: 400 ML
WBC # BLD AUTO: 11.3 K/UL (ref 4.6–13.2)
WBC # BLD AUTO: 19.2 K/UL (ref 4.6–13.2)

## 2018-06-15 PROCEDURE — 77030032490 HC SLV COMPR SCD KNE COVD -B: Performed by: OBSTETRICS & GYNECOLOGY

## 2018-06-15 PROCEDURE — 76060000041 HC ANESTHESIA 5 TO 5.5 HR: Performed by: OBSTETRICS & GYNECOLOGY

## 2018-06-15 PROCEDURE — 3E1M38Z IRRIGATION OF PERITONEAL CAVITY USING IRRIGATING SUBSTANCE, PERCUTANEOUS APPROACH: ICD-10-PCS | Performed by: OBSTETRICS & GYNECOLOGY

## 2018-06-15 PROCEDURE — 71045 X-RAY EXAM CHEST 1 VIEW: CPT

## 2018-06-15 PROCEDURE — 77030003029 HC SUT VCRL J&J -B: Performed by: OBSTETRICS & GYNECOLOGY

## 2018-06-15 PROCEDURE — 74011250636 HC RX REV CODE- 250/636: Performed by: OBSTETRICS & GYNECOLOGY

## 2018-06-15 PROCEDURE — 36600 WITHDRAWAL OF ARTERIAL BLOOD: CPT

## 2018-06-15 PROCEDURE — 77010033678 HC OXYGEN DAILY

## 2018-06-15 PROCEDURE — 74011250636 HC RX REV CODE- 250/636

## 2018-06-15 PROCEDURE — 77030002933 HC SUT MCRYL J&J -A: Performed by: OBSTETRICS & GYNECOLOGY

## 2018-06-15 PROCEDURE — 74011250636 HC RX REV CODE- 250/636: Performed by: INTERNAL MEDICINE

## 2018-06-15 PROCEDURE — 36591 DRAW BLOOD OFF VENOUS DEVICE: CPT

## 2018-06-15 PROCEDURE — 77030018832 HC SOL IRR H20 ICUM -A: Performed by: OBSTETRICS & GYNECOLOGY

## 2018-06-15 PROCEDURE — 85610 PROTHROMBIN TIME: CPT | Performed by: OBSTETRICS & GYNECOLOGY

## 2018-06-15 PROCEDURE — P9016 RBC LEUKOCYTES REDUCED: HCPCS | Performed by: OBSTETRICS & GYNECOLOGY

## 2018-06-15 PROCEDURE — 76010000137 HC OR TIME 5 TO 5.5 HR: Performed by: OBSTETRICS & GYNECOLOGY

## 2018-06-15 PROCEDURE — 77030009980 HC RELD STPLR TR J&J -B: Performed by: OBSTETRICS & GYNECOLOGY

## 2018-06-15 PROCEDURE — 0DB80ZZ EXCISION OF SMALL INTESTINE, OPEN APPROACH: ICD-10-PCS | Performed by: OBSTETRICS & GYNECOLOGY

## 2018-06-15 PROCEDURE — 0D1B0Z4 BYPASS ILEUM TO CUTANEOUS, OPEN APPROACH: ICD-10-PCS | Performed by: OBSTETRICS & GYNECOLOGY

## 2018-06-15 PROCEDURE — 74011000250 HC RX REV CODE- 250

## 2018-06-15 PROCEDURE — 74011250636 HC RX REV CODE- 250/636: Performed by: HOSPITALIST

## 2018-06-15 PROCEDURE — 77030011264 HC ELECTRD BLD EXT COVD -A: Performed by: OBSTETRICS & GYNECOLOGY

## 2018-06-15 PROCEDURE — 77030018798 HC PMP KT ENTRL FED COVD -A

## 2018-06-15 PROCEDURE — 85730 THROMBOPLASTIN TIME PARTIAL: CPT | Performed by: OBSTETRICS & GYNECOLOGY

## 2018-06-15 PROCEDURE — 77030012422 HC DRN WND COVD -A: Performed by: OBSTETRICS & GYNECOLOGY

## 2018-06-15 PROCEDURE — 77030010541: Performed by: OBSTETRICS & GYNECOLOGY

## 2018-06-15 PROCEDURE — 77030019952 HC CANSTR VAC ASST KCON -B

## 2018-06-15 PROCEDURE — 82962 GLUCOSE BLOOD TEST: CPT

## 2018-06-15 PROCEDURE — 03HY32Z INSERTION OF MONITORING DEVICE INTO UPPER ARTERY, PERCUTANEOUS APPROACH: ICD-10-PCS | Performed by: SPECIALIST

## 2018-06-15 PROCEDURE — 74011000250 HC RX REV CODE- 250: Performed by: OBSTETRICS & GYNECOLOGY

## 2018-06-15 PROCEDURE — 74011000250 HC RX REV CODE- 250: Performed by: INTERNAL MEDICINE

## 2018-06-15 PROCEDURE — 80053 COMPREHEN METABOLIC PANEL: CPT | Performed by: OBSTETRICS & GYNECOLOGY

## 2018-06-15 PROCEDURE — 77030018836 HC SOL IRR NACL ICUM -A: Performed by: OBSTETRICS & GYNECOLOGY

## 2018-06-15 PROCEDURE — 77030012880 HC ROD LP OST BMS -A: Performed by: OBSTETRICS & GYNECOLOGY

## 2018-06-15 PROCEDURE — 85730 THROMBOPLASTIN TIME PARTIAL: CPT | Performed by: INTERNAL MEDICINE

## 2018-06-15 PROCEDURE — 0W9G00Z DRAINAGE OF PERITONEAL CAVITY WITH DRAINAGE DEVICE, OPEN APPROACH: ICD-10-PCS | Performed by: OBSTETRICS & GYNECOLOGY

## 2018-06-15 PROCEDURE — 77030039266 HC ADH SKN EXOFIN S2SG -A: Performed by: OBSTETRICS & GYNECOLOGY

## 2018-06-15 PROCEDURE — 77030038020 HC MANFLD NEPTUNE STRY -B: Performed by: OBSTETRICS & GYNECOLOGY

## 2018-06-15 PROCEDURE — 83735 ASSAY OF MAGNESIUM: CPT | Performed by: OBSTETRICS & GYNECOLOGY

## 2018-06-15 PROCEDURE — 82803 BLOOD GASES ANY COMBINATION: CPT

## 2018-06-15 PROCEDURE — 77030031139 HC SUT VCRL2 J&J -A: Performed by: OBSTETRICS & GYNECOLOGY

## 2018-06-15 PROCEDURE — 84295 ASSAY OF SERUM SODIUM: CPT

## 2018-06-15 PROCEDURE — 77030002916 HC SUT ETHLN J&J -A: Performed by: OBSTETRICS & GYNECOLOGY

## 2018-06-15 PROCEDURE — 74011636637 HC RX REV CODE- 636/637: Performed by: INTERNAL MEDICINE

## 2018-06-15 PROCEDURE — 77030018521 HC STPLR ENDOSCOPIC J&J -C: Performed by: OBSTETRICS & GYNECOLOGY

## 2018-06-15 PROCEDURE — 74011250637 HC RX REV CODE- 250/637: Performed by: INTERNAL MEDICINE

## 2018-06-15 PROCEDURE — 77030013079 HC BLNKT BAIR HGGR 3M -A: Performed by: SPECIALIST

## 2018-06-15 PROCEDURE — 77030032490 HC SLV COMPR SCD KNE COVD -B

## 2018-06-15 PROCEDURE — C1765 ADHESION BARRIER: HCPCS | Performed by: OBSTETRICS & GYNECOLOGY

## 2018-06-15 PROCEDURE — 77030002966 HC SUT PDS J&J -A: Performed by: OBSTETRICS & GYNECOLOGY

## 2018-06-15 PROCEDURE — 77030002996 HC SUT SLK J&J -A: Performed by: OBSTETRICS & GYNECOLOGY

## 2018-06-15 PROCEDURE — 85027 COMPLETE CBC AUTOMATED: CPT | Performed by: OBSTETRICS & GYNECOLOGY

## 2018-06-15 PROCEDURE — 65270000029 HC RM PRIVATE

## 2018-06-15 PROCEDURE — 94003 VENT MGMT INPAT SUBQ DAY: CPT

## 2018-06-15 PROCEDURE — C9113 INJ PANTOPRAZOLE SODIUM, VIA: HCPCS | Performed by: INTERNAL MEDICINE

## 2018-06-15 PROCEDURE — 77030010512 HC APPL CLP LIG J&J -C: Performed by: OBSTETRICS & GYNECOLOGY

## 2018-06-15 PROCEDURE — 77030012407 HC DRN WND BARD -B: Performed by: OBSTETRICS & GYNECOLOGY

## 2018-06-15 PROCEDURE — 85025 COMPLETE CBC W/AUTO DIFF WBC: CPT | Performed by: OBSTETRICS & GYNECOLOGY

## 2018-06-15 PROCEDURE — 0DN80ZZ RELEASE SMALL INTESTINE, OPEN APPROACH: ICD-10-PCS | Performed by: OBSTETRICS & GYNECOLOGY

## 2018-06-15 PROCEDURE — 74011000258 HC RX REV CODE- 258: Performed by: INTERNAL MEDICINE

## 2018-06-15 PROCEDURE — 77030018673: Performed by: OBSTETRICS & GYNECOLOGY

## 2018-06-15 PROCEDURE — 77030012406 HC DRN WND PENRS BARD -A: Performed by: OBSTETRICS & GYNECOLOGY

## 2018-06-15 PROCEDURE — 77030034849: Performed by: OBSTETRICS & GYNECOLOGY

## 2018-06-15 PROCEDURE — 77030013567 HC DRN WND RESERV BARD -A: Performed by: OBSTETRICS & GYNECOLOGY

## 2018-06-15 RX ORDER — MIDAZOLAM HYDROCHLORIDE 1 MG/ML
INJECTION, SOLUTION INTRAMUSCULAR; INTRAVENOUS AS NEEDED
Status: DISCONTINUED | OUTPATIENT
Start: 2018-06-15 | End: 2018-06-15 | Stop reason: HOSPADM

## 2018-06-15 RX ORDER — HYDROMORPHONE HYDROCHLORIDE 2 MG/ML
INJECTION, SOLUTION INTRAMUSCULAR; INTRAVENOUS; SUBCUTANEOUS AS NEEDED
Status: DISCONTINUED | OUTPATIENT
Start: 2018-06-15 | End: 2018-06-15 | Stop reason: HOSPADM

## 2018-06-15 RX ORDER — METRONIDAZOLE 500 MG/100ML
500 INJECTION, SOLUTION INTRAVENOUS EVERY 8 HOURS
Status: DISCONTINUED | OUTPATIENT
Start: 2018-06-15 | End: 2018-07-02

## 2018-06-15 RX ORDER — LEVOFLOXACIN 5 MG/ML
750 INJECTION, SOLUTION INTRAVENOUS EVERY 24 HOURS
Status: DISCONTINUED | OUTPATIENT
Start: 2018-06-15 | End: 2018-06-17

## 2018-06-15 RX ORDER — HEPARIN SODIUM 1000 [USP'U]/ML
80 INJECTION, SOLUTION INTRAVENOUS; SUBCUTANEOUS ONCE
Status: COMPLETED | OUTPATIENT
Start: 2018-06-15 | End: 2018-06-15

## 2018-06-15 RX ORDER — ONDANSETRON 2 MG/ML
INJECTION INTRAMUSCULAR; INTRAVENOUS AS NEEDED
Status: DISCONTINUED | OUTPATIENT
Start: 2018-06-15 | End: 2018-06-15 | Stop reason: HOSPADM

## 2018-06-15 RX ORDER — SODIUM CHLORIDE, SODIUM LACTATE, POTASSIUM CHLORIDE, CALCIUM CHLORIDE 600; 310; 30; 20 MG/100ML; MG/100ML; MG/100ML; MG/100ML
INJECTION, SOLUTION INTRAVENOUS
Status: DISCONTINUED | OUTPATIENT
Start: 2018-06-15 | End: 2018-06-15 | Stop reason: HOSPADM

## 2018-06-15 RX ORDER — PHENYLEPHRINE HCL IN 0.9% NACL 1 MG/10 ML
SYRINGE (ML) INTRAVENOUS AS NEEDED
Status: DISCONTINUED | OUTPATIENT
Start: 2018-06-15 | End: 2018-06-15 | Stop reason: HOSPADM

## 2018-06-15 RX ORDER — SODIUM CHLORIDE 9 MG/ML
250 INJECTION, SOLUTION INTRAVENOUS AS NEEDED
Status: DISCONTINUED | OUTPATIENT
Start: 2018-06-15 | End: 2018-06-19

## 2018-06-15 RX ORDER — POTASSIUM CHLORIDE 20MEQ/15ML
20 LIQUID (ML) ORAL
Status: COMPLETED | OUTPATIENT
Start: 2018-06-15 | End: 2018-06-15

## 2018-06-15 RX ORDER — PHENYLEPHRINE HYDROCHLORIDE 10 MG/ML
INJECTION INTRAVENOUS
Status: DISCONTINUED | OUTPATIENT
Start: 2018-06-15 | End: 2018-06-15 | Stop reason: HOSPADM

## 2018-06-15 RX ORDER — FENTANYL CITRATE 50 UG/ML
INJECTION, SOLUTION INTRAMUSCULAR; INTRAVENOUS AS NEEDED
Status: DISCONTINUED | OUTPATIENT
Start: 2018-06-15 | End: 2018-06-15 | Stop reason: HOSPADM

## 2018-06-15 RX ORDER — SODIUM CHLORIDE 9 MG/ML
INJECTION, SOLUTION INTRAVENOUS
Status: DISCONTINUED | OUTPATIENT
Start: 2018-06-15 | End: 2018-06-15 | Stop reason: HOSPADM

## 2018-06-15 RX ORDER — HEPARIN SODIUM 1000 [USP'U]/ML
INJECTION, SOLUTION INTRAVENOUS; SUBCUTANEOUS
Status: COMPLETED
Start: 2018-06-15 | End: 2018-06-15

## 2018-06-15 RX ORDER — ROCURONIUM BROMIDE 10 MG/ML
INJECTION, SOLUTION INTRAVENOUS AS NEEDED
Status: DISCONTINUED | OUTPATIENT
Start: 2018-06-15 | End: 2018-06-15 | Stop reason: HOSPADM

## 2018-06-15 RX ORDER — MAGNESIUM SULFATE 1 G/100ML
1 INJECTION INTRAVENOUS ONCE
Status: COMPLETED | OUTPATIENT
Start: 2018-06-15 | End: 2018-06-15

## 2018-06-15 RX ADMIN — Medication 200 MCG: at 16:14

## 2018-06-15 RX ADMIN — SODIUM CHLORIDE: 9 INJECTION, SOLUTION INTRAVENOUS at 14:13

## 2018-06-15 RX ADMIN — FENTANYL CITRATE 25 MCG: 50 INJECTION, SOLUTION INTRAMUSCULAR; INTRAVENOUS at 12:07

## 2018-06-15 RX ADMIN — Medication 100 MCG: at 16:23

## 2018-06-15 RX ADMIN — ROCURONIUM BROMIDE 20 MG: 10 INJECTION, SOLUTION INTRAVENOUS at 15:44

## 2018-06-15 RX ADMIN — Medication 100 MCG: at 14:24

## 2018-06-15 RX ADMIN — SODIUM CHLORIDE, SODIUM LACTATE, POTASSIUM CHLORIDE, CALCIUM CHLORIDE: 600; 310; 30; 20 INJECTION, SOLUTION INTRAVENOUS at 14:45

## 2018-06-15 RX ADMIN — MIDAZOLAM HYDROCHLORIDE 4 MG: 1 INJECTION, SOLUTION INTRAMUSCULAR; INTRAVENOUS at 13:59

## 2018-06-15 RX ADMIN — METRONIDAZOLE 500 MG: 500 INJECTION, SOLUTION INTRAVENOUS at 09:49

## 2018-06-15 RX ADMIN — FENTANYL CITRATE 100 MCG: 50 INJECTION, SOLUTION INTRAMUSCULAR; INTRAVENOUS at 14:20

## 2018-06-15 RX ADMIN — Medication 100 MCG: at 14:48

## 2018-06-15 RX ADMIN — DOCUSATE SODIUM AND SENNOSIDES 1 TABLET: 8.6; 5 TABLET, FILM COATED ORAL at 08:16

## 2018-06-15 RX ADMIN — SODIUM CHLORIDE, SODIUM LACTATE, POTASSIUM CHLORIDE, CALCIUM CHLORIDE: 600; 310; 30; 20 INJECTION, SOLUTION INTRAVENOUS at 16:00

## 2018-06-15 RX ADMIN — HEPARIN SODIUM 19 UNITS/KG/HR: 10000 INJECTION, SOLUTION INTRAVENOUS at 00:10

## 2018-06-15 RX ADMIN — Medication 100 MCG: at 14:16

## 2018-06-15 RX ADMIN — PHENYLEPHRINE HYDROCHLORIDE: 10 INJECTION INTRAVENOUS at 15:45

## 2018-06-15 RX ADMIN — ROCURONIUM BROMIDE 20 MG: 10 INJECTION, SOLUTION INTRAVENOUS at 15:16

## 2018-06-15 RX ADMIN — FENTANYL CITRATE 25 MCG: 50 INJECTION, SOLUTION INTRAMUSCULAR; INTRAVENOUS at 03:51

## 2018-06-15 RX ADMIN — METRONIDAZOLE 500 MG: 500 INJECTION, SOLUTION INTRAVENOUS at 19:29

## 2018-06-15 RX ADMIN — PIPERACILLIN SODIUM,TAZOBACTAM SODIUM 2.25 G: 2; .25 INJECTION, POWDER, FOR SOLUTION INTRAVENOUS at 19:29

## 2018-06-15 RX ADMIN — HYDROCODONE BITARTRATE AND ACETAMINOPHEN 2 TABLET: 10; 325 TABLET ORAL at 08:16

## 2018-06-15 RX ADMIN — HEPARIN SODIUM 7290 UNITS: 1000 INJECTION, SOLUTION INTRAVENOUS; SUBCUTANEOUS at 21:18

## 2018-06-15 RX ADMIN — PHENYLEPHRINE HYDROCHLORIDE 40 MCG/KG/MIN: 10 INJECTION INTRAVENOUS at 14:53

## 2018-06-15 RX ADMIN — HYDROMORPHONE HYDROCHLORIDE 1 MG: 2 INJECTION, SOLUTION INTRAMUSCULAR; INTRAVENOUS; SUBCUTANEOUS at 18:00

## 2018-06-15 RX ADMIN — MIDAZOLAM HYDROCHLORIDE 2 MG: 1 INJECTION, SOLUTION INTRAMUSCULAR; INTRAVENOUS at 10:58

## 2018-06-15 RX ADMIN — Medication 100 MCG: at 14:32

## 2018-06-15 RX ADMIN — SODIUM CHLORIDE, SODIUM LACTATE, POTASSIUM CHLORIDE, CALCIUM CHLORIDE: 600; 310; 30; 20 INJECTION, SOLUTION INTRAVENOUS at 17:57

## 2018-06-15 RX ADMIN — LEVOFLOXACIN 750 MG: 5 INJECTION, SOLUTION INTRAVENOUS at 20:48

## 2018-06-15 RX ADMIN — INSULIN LISPRO 2 UNITS: 100 INJECTION, SOLUTION INTRAVENOUS; SUBCUTANEOUS at 05:34

## 2018-06-15 RX ADMIN — Medication 25 MCG/HR: at 08:10

## 2018-06-15 RX ADMIN — INSULIN LISPRO 6 UNITS: 100 INJECTION, SOLUTION INTRAVENOUS; SUBCUTANEOUS at 23:51

## 2018-06-15 RX ADMIN — FENTANYL CITRATE 25 MCG: 50 INJECTION, SOLUTION INTRAMUSCULAR; INTRAVENOUS at 08:16

## 2018-06-15 RX ADMIN — Medication 100 MCG: at 14:42

## 2018-06-15 RX ADMIN — HYDROMORPHONE HYDROCHLORIDE 1 MG: 2 INJECTION, SOLUTION INTRAMUSCULAR; INTRAVENOUS; SUBCUTANEOUS at 18:07

## 2018-06-15 RX ADMIN — DEXTROSE MONOHYDRATE 75 ML/HR: 5 INJECTION, SOLUTION INTRAVENOUS at 19:35

## 2018-06-15 RX ADMIN — HYDROCODONE BITARTRATE AND ACETAMINOPHEN 2 TABLET: 10; 325 TABLET ORAL at 04:41

## 2018-06-15 RX ADMIN — SODIUM CHLORIDE 40 MG: 9 INJECTION INTRAMUSCULAR; INTRAVENOUS; SUBCUTANEOUS at 08:13

## 2018-06-15 RX ADMIN — ONDANSETRON 4 MG: 2 INJECTION INTRAMUSCULAR; INTRAVENOUS at 14:15

## 2018-06-15 RX ADMIN — DEXTROSE MONOHYDRATE 75 ML/HR: 5 INJECTION, SOLUTION INTRAVENOUS at 06:50

## 2018-06-15 RX ADMIN — ROCURONIUM BROMIDE 10 MG: 10 INJECTION, SOLUTION INTRAVENOUS at 16:21

## 2018-06-15 RX ADMIN — FUROSEMIDE 40 MG: 10 INJECTION, SOLUTION INTRAMUSCULAR; INTRAVENOUS at 08:16

## 2018-06-15 RX ADMIN — FENTANYL CITRATE 100 MCG: 50 INJECTION, SOLUTION INTRAMUSCULAR; INTRAVENOUS at 15:01

## 2018-06-15 RX ADMIN — Medication 100 MCG: at 14:09

## 2018-06-15 RX ADMIN — MAGNESIUM SULFATE HEPTAHYDRATE 1 G: 1 INJECTION, SOLUTION INTRAVENOUS at 22:45

## 2018-06-15 RX ADMIN — PIPERACILLIN SODIUM,TAZOBACTAM SODIUM 2.25 G: 2; .25 INJECTION, POWDER, FOR SOLUTION INTRAVENOUS at 23:06

## 2018-06-15 RX ADMIN — PIPERACILLIN SODIUM,TAZOBACTAM SODIUM 2.25 G: 2; .25 INJECTION, POWDER, FOR SOLUTION INTRAVENOUS at 12:11

## 2018-06-15 RX ADMIN — Medication 50 MCG/HR: at 20:40

## 2018-06-15 RX ADMIN — PIPERACILLIN SODIUM,TAZOBACTAM SODIUM 2.25 G: 2; .25 INJECTION, POWDER, FOR SOLUTION INTRAVENOUS at 06:50

## 2018-06-15 RX ADMIN — POTASSIUM CHLORIDE 20 MEQ: 20 SOLUTION ORAL at 07:23

## 2018-06-15 RX ADMIN — ROCURONIUM BROMIDE 50 MG: 10 INJECTION, SOLUTION INTRAVENOUS at 14:03

## 2018-06-15 NOTE — PROGRESS NOTES
Bedside and Verbal shift change report received from Ingrid Ludwig RN (offgoing nurse). Report included the following information SBAR, Kardex, Intake/Output, MAR and Recent Results. 2000 Shift assessment completed, see EMR. Patient on ventilator FiO2 40%, PEEP 12, Rate 14, , Mode A/C V/C +. Patient  eyes round reactive to light at 3 mm. Patient alert and oriented, able to express needs,  follows commands. Lung sound clear on auscultation. Patient has active bowel sound. Midline incision has some drainage noted. 2210 Dressing to abdomen soiled, dressing was changed, gauze and ABD pad was applied to area and secured with tape. 0015 Reassessment completed, see EMR    0445 Reassessment completed, see EMR    0515 Dressing to abdomen soiled, dressing was changed, gauze and ABD pad was applied to area and secured with tape. 0530 CHG bath given, when patient was turned yellowish drainage was pouring from her abdominal incision. The dressing that was recently changed was also saturated. 5412 Notified Dr. Larry Flores of patient's large amount of drainage from her abdominal incision. Also updated her on patient's Hgb 8.6 Hct 27.2 Bun 46 and Cr 1.16 and patient increased need for pain medication overnight. No new orders at this time, Dr. Larry Flores voiced that she will be in to see patient this morning. Bedside and Verbal shift change report given to ILANA Jules RN (oncoming nurse) Report included the following information SBAR, Kardex, Intake/Output, MAR and Recent Results.

## 2018-06-15 NOTE — PROGRESS NOTES
NUTRITION FOLLOW-UP    RECOMMENDATIONS/PLAN:   - Restart tube feeds when clinically able  Monitor labs/lytes, tube feed tolerance, skin integrity, wt, fluid status, BM    NUTRITION ASSESSMENT:   Client Update: 75 yrs old Female with acute respiratory failure, ischemia colitis, sepsis, JESS, metabolic acidosis. 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 Pt was extubated, and 1 day later reintubated. Thought to have peritonitis. Pt is s/p laparotomy and peritoneal lavage with klebsiella positive. Pt returning back to OR today. Tube feeds on hold      FOOD/NUTRITION INTAKE   Diet Order:  [email or web address] to provide 1188kcal, 53g Protein, 479ml free water, 110g CHO    Food Allergies: NKFA  Average PO Intake:      No data found. Pertinent Medications:  [x] Reviewed; lasix, lispro, protonix, pericolace  Electrolyte Replacement Protocol: []K []Mg []PO4  Insulin:  []SSI  []Pre-meal   []Basal    []Drip  []None  Cultural/Protestant Food Preferences: None Identified    BIOCHEMICAL DATA & MEDICAL TESTS  Pertinent Labs:  [x] Reviewed; Na-149 Ca-7.8   ANTHROPOMETRICS  Height: 5' 1\" (154.9 cm)       Weight: 89.1 kg (196 lb 6.9 oz)         BMI: 37.1 kg/m^2 obese (30%-39.9% BMI)   Adm Weight: 196 lbs                Weight change: 0lbs  Adjusted Body Weight: 58.1kg     NUTRITION-FOCUSED PHYSICAL ASSESSMENT  Skin: No PU      GI: +BM 6/14/18    NUTRITION PRESCRIPTION  Calories: 979-1246 kcal/day based on 11-14kcal/kg  Protein: 71-89 g/day based on 0.8-1.0 g/kg  CHO: 122-156 g/day based on 50% of total energy  Fluid: 979-1246 ml/day based on 1 kcal/ml     NUTRITION DIAGNOSES:   1. Inadequate energy/protein intake related to NPO status as pt NPO x 3 days.      NUTRITION INTERVENTIONS:   INTERVENTIONS:                                                                                                                                                                         GOALS:  1. Enteral nutrition 1. Initiation and tolerate enteral nutrition of [email or web address] throughout the next 72 hours       LEARNING NEEDS (Diet, Supplementation, Food/Nutrient-Drug Interaction):   [x] None Identified   [] Education provided/documented      Identified and patient: [] Declined   [] Was not appropriate/indicated        NUTRITION MONITORING /EVALUATION:   Adjust EN/PN as appropriate  Monitor wt  Monitor renal labs, electrolytes, fluid status  Monitor for additional supplement needs     Previous Recommendations Implemented: no        Previous Goals Met:  no -pt tube feeds were dropped back due to residuals       [x] Participated in Interdisciplinary Rounds    [x] Interdisciplinary Care Plan Reviewed  DISCHARGE NUTRITION RECOMMENDATIONS ADDRESSED:        [x] To be determined closer to discharge    NUTRITION RISK:           [x] At risk                        [] Not currently at risk        Will follow-up per policy.   Bao Casey 1

## 2018-06-15 NOTE — WOUND CARE
Wound Care Progress Note     New consult placed by Dr. Lottie Myers for abdominal wound    Assessment:   Pt with mechanical ventilation, nods appropriately to questions. Diet: TF  Patient reports no pain. Bilateral heel, buttocks, and sacral skin intact and without erythema. Generalized edema and blanching erythema to lower legs and feet. 1. Surgical wound, midline abdomen (wound location & etiology) surgery scheduled for this afternoon with wound vac placement. Wound Recommendations:    Wound vac placement post-operatively    Skin Care & Pressure Relief Recommendations:  Minimize layers of linen/pads under patient to optimize support surface. Turn/reposition approximately every 2 hours and offload heels pillows or Prevalon boots.   Manage incontinence / promote continence; Proshield to buttocks and sacrum daily and as needed with incontinence care    Discussed above plan with Dr. Lottie Myers                Transition of Care: Plan to follow weekly and per product recommendations while admitted to hospital.

## 2018-06-15 NOTE — PROGRESS NOTES
Attempted to see pt for PT session, pt undergoing surgical procedure. Will follow up tomorrow for PT session.

## 2018-06-15 NOTE — PROGRESS NOTES
Nephrology Progress note    Subjective:     Chantal Parent is a 75 y.o. female with PMH DM, HTN, clear cell ovarian ca s/p debulking who presented with abdominal pain and possible sepsis/ischemic colitis. Pt underwent laparotomy/peritoneal lavage/bx, noted to have decreased UOP and increasing Cr to 2.1 today from baseline 0.6. CT neg for mass or hydro. Pt given lasix yesterday, still with high O2 requirements on vent. Unable to provide further history. On 6/8 Pt decompensated, ? Aspiration,  back on vent was hypoxic despite  Fi02 of 100%, , Hypotensive on IV pressors, Acidotic and on HC03 drip, Urine output decreased markedly over the weekend. Underwent Bronchoscopy 6/9. Pt requires less FiO2 now. UOP picking up. Wound has been draining yellow-brown fluid    Admit Date: 6/4/2018  Allergy:  Allergies   Allergen Reactions    Hydrocodone Other (comments)     Breaks into cold sweat    Metformin Other (comments)     Breaks out into cold sweat. Can Take Glucophage brand name med        Objective:     Visit Vitals    /81    Pulse (!) 115    Temp 98.4 °F (36.9 °C)    Resp 26    Ht 5' 1\" (1.549 m)    Wt 89.1 kg (196 lb 6.9 oz)    SpO2 100%    BMI 37.12 kg/m2         Intake/Output Summary (Last 24 hours) at 06/15/18 0937  Last data filed at 06/15/18 0630   Gross per 24 hour   Intake          2698.12 ml   Output             4375 ml   Net         -1676.88 ml       Physical Exam:     General: On Vent   HENT: Atraumatic and normocephalic.  ET tube in place   Eyes: Sclera anicteric   Neck: No JVD or mass   Cardiovascular: Normal S1 S2   Pulmonary/Chest Wall: Decreased breath sounds bilaterally   Abdominal: Soft, obese, NABS   Musculoskeletal: trace edema LEs   Neurological: Opens eyes in response to name       Data Review:      Lab Results   Component Value Date/Time    WBC 11.3 06/15/2018 04:45 AM    RBC 3.94 (L) 06/15/2018 04:45 AM    HCT 27.2 (L) 06/15/2018 04:45 AM    MCV 69.0 (L) 06/15/2018 04:45 AM    MCH 21.8 (L) 06/15/2018 04:45 AM    MCHC 31.6 06/15/2018 04:45 AM    RDW 19.6 (H) 06/15/2018 04:45 AM      Lab Results   Component Value Date    IRON 8 (L) 06/07/2018   No components found for: FERRITIN  No components found for: PTHINT  Urinalysis  No results found for: UGLU         Impression:     -JESS- likely ATN,  S Cr was down to 1.2 on 6/8 => 1.84 6/9 => 3.0=>1.6, oliguric following hypotension and resp failure. UOP inproving   -Septic Shock/hypotension, BPs normalized  -Resp Failure,  re-intubated 6/9,  back on vent. Suspect Aspiration, ? PE  -Severe Metabolic acidosis, improving. Lactate 4.7  -Ovarian ca s/p debulking then laparoscopy with lavage  -KLEBSIELLA OXYTOCA sepsis (PD fluid)  -DM  -h.o. HTN  -Anemia  -Elevated liver enzymes    Plan:     Continue Supportive care: Vent, IV pressors, IV Antibiotics.   Weaning vent as tolerated  Monitor I/O and chemistry, H&H  Would cont IV lasix 40 mg daily  No indication for HD   Vanc dosing per pharmacist  Will follow closely  For exlporatory laparotomy today    MD Denver Jamison  [phone]

## 2018-06-15 NOTE — PROGRESS NOTES
POD#10 status post DIAGNOSTIC LAPAROSCOPY CONVERTED TO LAPAROTOMY, PERITONEAL BIOPSIES, PERITONEAL LAVAGE  Patient alert and appropriately responsive. Denies pain. Wound is draining copious amount of yellow brown fluid. Fluid has no odor. No excoriation of skin surrounding site of drainage. Fluid does not look like TF. Suspect draining wound seroma versus dehiscence versus fistula. Recommend complete evaluation in the OR. Discussed with patient and attending nurse. Consented patient for Wound exploration, possible exploratory laparotomy, and wound vac placement. Consulted wound care RN, April, to see patient now and be available for assistance in the OR with wound vac placement. Called and discussed everything with brother, Nany Whitlock, who also gave consent for the procedure.      Mina Franco MD

## 2018-06-15 NOTE — PROGRESS NOTES
Pt remains in ICU on vent, scheduled for procedure in OR today,cm will follow thru with case on Monday.

## 2018-06-15 NOTE — PROGRESS NOTES
Problem: Mobility Impaired (Adult and Pediatric)  Goal: *Acute Goals and Plan of Care (Insert Text)  Physical Therapy Goals  Initiated 6/15/2018 and to be accomplished within 3-7 day(s)  1. Patient will move from supine to sit and sit to supine  in bed with maximal assistance. 2.  Patient will transfer from bed to chair and chair to bed with maximal assistance using the least restrictive device. 3.  Patient will perform sit to stand with maximal assistance. 4.  Patient will ambulate with maximal assistance for 5 feet with the least restrictive device. physical Therapy EVALUATION    Patient: Salma Gomez (44 y.o. female)  Date: 6/14/18  Primary Diagnosis: Abdominal pain  Abdominal pain  Abdominal Pain  aspiration  POSTOP WOUND EXPLORATION  Procedure(s) (LRB):  WOUND EXPLORATION, EXPLORATORY LAPAROTOMY AND WOUND VAC PLACEMENT (N/A) Day of Surgery   Precautions:   Fall    ASSESSMENT :  Based on the objective data described below, the patient presents with generalized weakness, decreased command following, total assistance required for all mobility. Pt unable to maintain alertness to participate in EOB mobility. Will place pt on 3 day trial to assess appropriateness for skilled PT services and ability to progress towards goals. Patient will benefit from skilled intervention to address the above impairments.   Patients rehabilitation potential is considered to be Guarded  Factors which may influence rehabilitation potential include:   []         None noted  [x]         Mental ability/status  [x]         Medical condition  [x]         Home/family situation and support systems  [x]         Safety awareness  [x]         Pain tolerance/management  []         Other:      PLAN :  Recommendations and Planned Interventions:  [x]           Bed Mobility Training             [x]    Neuromuscular Re-Education  [x]           Transfer Training                   []    Orthotic/Prosthetic Training  [x]           Gait Training                          []    Modalities  [x]           Therapeutic Exercises          []    Edema Management/Control  [x]           Therapeutic Activities            [x]    Patient and Family Training/Education  []           Other (comment):    Frequency/Duration: Patient will be followed by physical therapy 1-2 times per day to address goals. Discharge Recommendations: To Be Determined  Further Equipment Recommendations for Discharge: TBD     SUBJECTIVE:   Patient stated: none    OBJECTIVE DATA SUMMARY:     Past Medical History:   Diagnosis Date    Diabetes ([facility] [address]     many years type 2    Hypertension     many years     Past Surgical History:   Procedure Laterality Date    HX OOPHORECTOMY  05/29/2018    HX TONSILLECTOMY      as a child      Barriers to Learning/Limitations: yes;  altered mental status (i.e.Sedation, Confusion)  Compensate with: N/A  Prior Level of Function/Home Situation: Independent amb   Home Situation  Home Environment: Private residence  # Steps to Enter: 4  One/Two Story Residence: One story  Living Alone: No  Support Systems: Family member(s)  Patient Expects to be Discharged to[de-identified] Unknown  Current DME Used/Available at Home: Commode, bedside, Shower chair, Walker, rolling, Wheelchair  Tub or Shower Type: Shower  Critical Behavior:  Neurologic State: Alert  Orientation Level: Oriented X4  Cognition: Follows commands  Psychosocial  Patient Behaviors: Anxious; Cooperative  Purposeful Interaction: Yes  Pt Identified Daily Priority: Clinical issues (comment); Spiritual (comment)  Caritas Process: Nurture loving kindness  Caring Interventions: Reassure; Therapeutic modalities  Reassure: Therapeutic listening  Therapeutic Modalities: Intentional therapeutic touch  Skin Condition/Temp: Flaky; Warm  Skin Integrity: Incision (comment)  Skin Integumentary  Skin Color: Pale  Skin Condition/Temp: Flaky; Warm  Skin Integrity: Incision (comment)  Turgor: Non-tenting  Hair Growth: Present  Varicosities: Absent  Strength:    Grossly impaired  Tone & Sensation:   Grossly impaired  Range Of Motion:  Grossly impaired  Functional Mobility:  Bed Mobility:  Total A for rolling  Pain:  Pain Scale 1: FLACC  Pain Intensity 1: 9  Pain Location 1: Abdomen  Pain Orientation 1: Anterior  Pain Description 1: Aching  Pain Intervention(s) 1: Medication (see MAR)  Activity Tolerance:   Poor  Please refer to the flowsheet for vital signs taken during this treatment. After treatment:   []         Patient left in no apparent distress sitting up in chair  [x]         Patient left in no apparent distress in bed  [x]         Call bell left within reach  [x]         Nursing notified  []         Caregiver present  []         Bed alarm activated    COMMUNICATION/EDUCATION:   [x]         Fall prevention education was provided and the patient/caregiver indicated understanding. [x]         Patient/family have participated as able in goal setting and plan of care. [x]         Patient/family agree to work toward stated goals and plan of care. []         Patient understands intent and goals of therapy, but is neutral about his/her participation. []         Patient is unable to participate in goal setting and plan of care. Thank you for this referral.  Antonio Richards Complexity: History: HIGH Complexity :3+ comorbidities / personal factors will impact the outcome/ POC Exam:HIGH Complexity : 4+ Standardized tests and measures addressing body structure, function, activity limitation and / or participation in recreation  Presentation: HIGH Complexity : Unstable and unpredictable characteristics  Clinical Decision Making:High Complexity required continual cues for participation, totalA for mobility' Overall Complexity:HIGH  Mobility  Current  CM= 80-99%   Goal  CK= 40-59%. The severity rating is based on the Level of Assistance required for Functional Mobility and ADLs.

## 2018-06-15 NOTE — ANESTHESIA PROCEDURE NOTES
Arterial Line Placement    Start time: 6/15/2018 3:01 PM  End time: 6/15/2018 3:22 PM  Performed by: Saroj Vasquez by: Etelvina Turner     Pre-Procedure  Indications:  Arterial pressure monitoring and blood sampling  Preanesthetic Checklist: patient being monitored and patient being monitored  Preanesthetic Checklist comment:  Done under GA    Procedure:   Prep:  Chlorhexidine and alcohol  Seldinger Technique?: Yes    Orientation:  Right  Location:  Radial artery  Catheter size:  20 G  Number of attempts:  1  Cont Cardiac Output Sensor: No      Assessment:   Post-procedure:  Line secured and sterile dressing applied  Patient Tolerance:  Patient tolerated the procedure well with no immediate complications  Comment:   Placed with direct ultrasound guidance (out-of-plane). After placement, image capture in-plane.

## 2018-06-15 NOTE — ANESTHESIA PROCEDURE NOTES
Arterial Line Placement    Performed by: Dwayne Ablerto  Authorized by: Dwayne Alberto     Pre-Procedure

## 2018-06-15 NOTE — ANESTHESIA POSTPROCEDURE EVALUATION
Post-Anesthesia Evaluation and Assessment    Cardiovascular Function/Vital Signs  Visit Vitals    /76    Pulse (!) 112    Temp 36.5 °C (97.7 °F)    Resp 22    Ht 5' 1\" (1.549 m)    Wt 91.1 kg (200 lb 13.4 oz)    SpO2 95%    BMI 37.95 kg/m2       Patient is status post Procedure(s):  WOUND EXPLORATION, EXPLORATORY LAPAROTOMY, ILLEOSTMOY, LYSES OF ADHESIONS AND WOUND VAC PLACEMENT. Nausea/Vomiting: Controlled. Postoperative hydration reviewed and adequate. Neurological Status: At baseline. Complications related to anesthesia: None    Post-anesthesia assessment completed. No concerns.       Signed By: Christophe Cullen MD    Yeimy 15, 2018

## 2018-06-15 NOTE — BRIEF OP NOTE
BRIEF OPERATIVE NOTE    Date of Procedure: 6/15/2018   Preoperative Diagnosis: DRAINAGE FROM ABDOMINAL INCISION, SUSPECT DEHISCENCE  Postoperative Diagnosis: SIGMOID DIVERTICULITIS WITH SUSPECTED PERFORATION, PERITONITIS  Procedure(s):  WOUND EXPLORATION, EXPLORATORY LAPAROTOMY, LYSIS OF ADHESIONS, ILLEOSTOMY AND WOUND VAC PLACEMENT  Surgeon(s) and Role:     * Junito Geren MD - Primary     * Kassy Mesa DO - Assisting         Surgical Assistant: Luciano Kim and Mickeal Closs [id number] [facility]    Surgical Staff:  Circ-1: Corby Byrne RN  Circ-2: Isiah Harmon RN  Circ-Relief: Sung Renner RN; [facility] Sanjiv  Scrub Tech-1: Kala Orellana  Scrub Tech-Relief: Eleno Arce  Surg Asst-1: Geovanna Bryant  Surg Asst-Relief: Mickeal Closs  Event Time In   Incision Start 1423   Incision Close [address]     Anesthesia: General   Anesthesiologist: Jaime Ambrosio MD  CRNA: Seda Alarcon CRNA; Yara Alvarez CRNA  CRNA Relief: Elroy Mott CRNA  Estimated Blood Loss: 500cc  Transfusion: 2 units pRBCs  Specimens:   ID Type Source Tests Collected by Time Destination   1 :     Junito Green MD 6/15/2018 1434 Microbiology      Findings: Copious yellow brown watery drainage from wound. 4 liters of bloody ascites with inflammatory exudate evacuated. Bile stained exudate present on the visceral and parietal peritoneum in the vicinity of the sigmoid colon consistent with recent perforation. Sigmoid diverticula. Indurated inflammed sigmoid colon consistent with diverticulitis. No evidence of residual malignancy.    Complications: none  Implants: * No implants in log *

## 2018-06-15 NOTE — PROGRESS NOTES
[facility]                  Pulmonary, Critical Care, and Sleep Medicine     Name: Miriam Sierra MRN: [id number]   : 1950 Hospital: [facility] [place]    Date: 6/15/2018        [facility] Note                                                IMPRESSION:   · Severe sepsis due to Klebsiella Oxytoca peritonitis. · Klebsiella oxytoca peritonitis. S/p laparotomy and peritoneal lavage: Klebsiella positive. Ruled out ischemic bowel in laparotomy. 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 · S/p bronchoscopy 18: no aspiration noted. Cx NGTD. · VDRF due to sepsis/PE/?ARDS, reintubated on 18  · S/p Shock likely due to sepsis and obstructive due to presumed PE. Shock resolved. Off vasopressors. · Acute b/l peroneal DVT  · Presumed PE with severe hypoxia and high A-a gradient, elevated RVSP 34 mm Hg. · Anemia, no active external bleeding. · Thrombocytopenia, improving   · JESS, improving  · Hypernatremia. · Metabolic ad lactic acidosis, resolved. · Elevated LFT, due to shock liver, improving  · Foreign body on peritoneal biopsy, per path report. · Anasarca due to fluid resuscitation, JESS, hypoalbuminemia and sequale of sepsis. · Code status: DNR. · Very poor overall prognosis. RECOMMENDATIONS:   Respiratory:  Presumed PE (high A-a gradient, preston FiO2 requirement with peroneal DVT)  PF ratio and cxr improving with diuresing. Fio2 reduced to 40% and PEEP reduced to 10. Slowly titrate fio2 and peep. On lasix 40 daily. Extra 40 iv PRN. Hold of PRN dose today as patient is going for laparotomy. Keep I<0. Diuresing well with improving renal function. On heparin drip. Hold for surgery today. Platelets were slightly trending down, but now improving. Not a candidate for SBT eval yet. Ventilator bundle & Sedation protocol followed. Chlorhexidine mouth washes. Keep SPO2 >=92%.  HOB 30 degree elevation all the time. Aggressive pulmonary toileting. Aspiration precautions. CVS: Echo ok with mildly elevated RVSP but normal RV systolic function. Pedal edema due to b/l DVT and fluid resuscitation/anasarca. On heparin drip and lasix. ID:    Bronch cx normal robin. Peritoneal cx Klebsiella Oxytoca resistant to ampicillin. Doubt pneumonia. Leucocytosis improving. New RLQ tenderness with stool discharge from previous surgical wound, could have peritoneal abscess vs enterocutaneous fistula. Going to have laparotomy today. Peritoneal cx will be sent again, follow. C/w zosyn. levaquin added. Add flagyl as well. Will need to d/w ID after surgery for Abx. Repeat CT abd/pelvis/chest when fio2 reduced enough to be transported on portable ventilator and when it is safer to transport. Hematology/Oncology: H&H stable after 1 PRBC on 6/13/18. Monitor platelets on heparin, stable now. Renal: d/w nephrologist. Creat and UOP improving with improved fio2 requirement. Lasix plan as above. Free water was added through tube feed, but now tube feed on hold. Follow Na. GI/: holding tube feed for surgery and possible fistula. LFT improving. Endocrine: Maintain blood glucose 140-180. Humalog sub cut. Neurology: alert awake on minimal fentanyl on vent, following all commands. Pain/Sedation: fentanyl drip at 25. Skin/Wound: local surgical site care. Electrolytes: Replace electrolytes per ICU electrolyte replacement protocol. K being replaced. Nutrition: see above  Prophylaxis: DVT Rx with heparin drip-held for surgery. GI Prophylaxis with protonix. Restraints: none  Lines/Tubes:   ETT: 6/8/18. OGT: 6/8/18. Central line: right subclavina 6/4/18 (site examined, no erythema, induration, discharge or sign of infection. Dressing intact. Medically necessary, will remove it when not needed. Central line bundle followed).    Fry: 6/4/18 (Medically necessary for strict input/output monitoring in critically ill patient, will remove it when not needed. Fry bundle followed). Very poor overall prognosis with MODS/MOF discussed with pt's brother at bedside, answered all questions to his satisfaction. Will defer respective systems problem management to primary and other respective consultant and follow patient in ICU with primary and other medical team.  Further recommendations will be based on the patient's response to recommended treatment and results of the investigation ordered. Quality Care: PPI, DVT prophylaxis, HOB elevated, Infection control all reviewed and addressed. Care of plan d/w RN, RT, pharmacist, palliative care team, MDR, family- brother at bedside (answered all questions to satisfaction). High complexity decision making was performed during the evaluation of this patient at high risk for decompensation with multiple organ involvement. Total critical care time spent rendering care exclusive of procedures: 39 minutes. Subjective/History of Present Illness:     Patient is a 75 y.o. 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 6/15/18: Worsening discharge from the lower part of anterior abd surgical site with now \"stool\" appearing content. Pt Alert and awake, responsive appropriately on low dose fentanyl. Nodding appropriately. C/o RLQ pain 9-10/10. Persistent edema  fio2 reduced to 40 % and peep reduced to 10  cxr improving  Renal function, UOP, improving with lasix and diuresing well. I/o -1751. Wbc improving. On fever  BP stable. Not on vasopressor. Sodium slightly increased. K+ being replaced. LFT improving.    Tolerated TF, but held now for laparotomy today. On heparin drip      Allergies   Allergen Reactions    Hydrocodone Other (comments)     Breaks into cold sweat    Metformin Other (comments)     Breaks out into cold sweat. Can Take Glucophage brand name med      Past Medical History:   Diagnosis Date    Diabetes ([address]     many years type 2    Hypertension     many years      Past Surgical History:   Procedure Laterality Date    HX OOPHORECTOMY  2018    HX TONSILLECTOMY      as a child       Social History   Substance Use Topics    Smoking status: Never Smoker    Smokeless tobacco: Never Used    Alcohol use No      History reviewed. No pertinent family history.      Current Facility-Administered Medications   Medication Dose Route Frequency    dextrose 5% infusion  75 mL/hr IntraVENous CONTINUOUS    levoFLOXacin (LEVAQUIN) tablet 750 mg  750 mg Oral Q24H    senna-docusate (PERICOLACE) 8.6-50 mg per tablet 1 Tab  1 Tab Oral BID    piperacillin-tazobactam (ZOSYN) 2.25 g in 0.9% sodium chloride (MBP/ADV) 50 mL MBP  2.25 g IntraVENous Q6H    furosemide (LASIX) injection 40 mg  40 mg IntraVENous DAILY    fentaNYL (PF) 900 mcg/30 ml infusion soln  25-50 mcg/hr IntraVENous TITRATE    midazolam in normal saline (VERSED) 1 mg/mL infusion  2-6 mg/hr IntraVENous TITRATE    NOREPINephrine (LEVOPHED) 8 mg in 5% dextrose 250mL infusion  2-30 mcg/min IntraVENous TITRATE    heparin 25,000 units in D5W 250 ml infusion  18-36 Units/kg/hr IntraVENous TITRATE    insulin lispro (HUMALOG) injection   SubCUTAneous Q6H    pantoprazole (PROTONIX) 40 mg in sodium chloride 0.9% 10 mL injection  40 mg IntraVENous DAILY         Objective:   Vital Signs:    Visit Vitals    /81    Pulse (!) 115    Temp 98.4 °F (36.9 °C)    Resp 26    Ht 5' 1\" (1.549 m)    Wt 89.1 kg (196 lb 6.9 oz)    SpO2 100%    BMI 37.12 kg/m2       O2 Device: Endotracheal tube   O2 Flow Rate (L/min): 50 l/min   Temp (24hrs), Av.2 °F (36.8 °C), Min:98 °F (36.7 °C), Max:98.4 °F (36.9 °C)       Intake/Output:   Last shift:           Last 3 shifts: 06/13 1901 - 06/15 0700  In: 4340.2 [I.V.:3646.2]  Out: 6462 [Urine:6175]      Intake/Output Summary (Last 24 hours) at 06/15/18 0912  Last data filed at 06/15/18 0630   Gross per 24 hour   Intake          2698.12 ml   Output             4375 ml   Net         -1676.88 ml       Last 3 Recorded Weights in this Encounter    06/04/18 0705 06/04/18 1352   Weight: 83.9 kg (185 lb) 89.1 kg (196 lb 6.9 oz)       Ventilator Settings:  Mode Rate Tidal Volume Pressure FiO2 PEEP   Assist control   400 ml    40 % 10 cm H20     Peak airway pressure: 16 cm H2O    Plateau pressure:     Tidal volume:    Minute ventilation: 11.7 l/min   SPO2 98%       Physical Exam:     General/Neurology: awake, following all commands. Moving all 4 limbs spontaneously. Head:   Normocephalic, without obvious abnormality, atraumatic. Eye:   no scleral icterus, no pallor, no cyanosis. Throat:  No oral thrush. ETT and OGT. Neck:   Symmetric. No lymphadenopathy. Trachea midline  Lung: Moderate air entry bilateral equal. Decreased AE bibasilar. No rhonchi. No wheezing. Heart:   Regular rate & rhythm. S1 S2 present. No murmur. No JVD. Abdomen:  Soft. Obese. +BS. Midline lower abd surgical site without induration/erythema but lower part of incision with serous discharge with some \"stool\" appearing content in the discharge. RLQ tenderness. Extremities:  No cyanosis. No clubbing. 1 + b/l pitting pedal and hand edema. Anasarca. Pulses: 2+ and symmetric in DP. Capillary refill: normal  Lymphatic:  No cervical palpable lymphadenopathy.    Skin:   As above      Data:       Recent Results (from the past 24 hour(s))   POC G3    Collection Time: 06/14/18 11:23 AM   Result Value Ref Range    Device: VENT      FIO2 (POC) 50 %    pH (POC) 7.456 (H) 7.35 - 7.45      pCO2 (POC) 32.1 (L) 35.0 - 45.0 MMHG    pO2 (POC) 103 (H) 80 - 100 MMHG    HCO3 (POC) 22.7 22 - 26 MMOL/L    sO2 (POC) 98 (H) 92 - 97 %    Base deficit (POC) 1 mmol/L    Mode ASSIST CONTROL      Tidal volume 400 ml    Set Rate 14 bpm    PEEP/CPAP (POC) 12 cmH2O    Allens test (POC) YES      Inspiratory Time 0.9 sec    Total resp. rate 19      Site LEFT RADIAL      Patient temp.  98.6      Specimen type (POC) ARTERIAL      Performed by Ricky Miner    GLUCOSE, POC    Collection Time: 06/14/18 12:05 PM   Result Value Ref Range    Glucose (POC) 197 (H) 70 - 110 mg/dL   CALCIUM, IONIZED    Collection Time: 06/14/18  1:10 PM   Result Value Ref Range    Ionized Calcium 1.10 (L) 1.12 - 1.32 MMOL/L   POTASSIUM    Collection Time: 06/14/18  1:10 PM   Result Value Ref Range    Potassium 3.2 (L) 3.5 - 5.5 mmol/L   MAGNESIUM    Collection Time: 06/14/18  1:10 PM   Result Value Ref Range    Magnesium 2.0 1.6 - 2.6 mg/dL   GLUCOSE, POC    Collection Time: 06/14/18  5:22 PM   Result Value Ref Range    Glucose (POC) 212 (H) 70 - 110 mg/dL   POTASSIUM    Collection Time: 06/14/18  7:00 PM   Result Value Ref Range    Potassium 3.9 3.5 - 5.5 mmol/L   CALCIUM, IONIZED    Collection Time: 06/14/18  7:00 PM   Result Value Ref Range    Ionized Calcium 1.19 1.12 - 1.32 MMOL/L   GLUCOSE, POC    Collection Time: 06/14/18 11:09 PM   Result Value Ref Range    Glucose (POC) 197 (H) 70 - 110 mg/dL   MAGNESIUM    Collection Time: 06/15/18  4:45 AM   Result Value Ref Range    Magnesium 1.9 1.6 - 2.6 mg/dL   METABOLIC PANEL, COMPREHENSIVE    Collection Time: 06/15/18  4:45 AM   Result Value Ref Range    Sodium 149 (H) 136 - 145 mmol/L    Potassium 3.5 3.5 - 5.5 mmol/L    Chloride 113 (H) 100 - 108 mmol/L    CO2 25 21 - 32 mmol/L    Anion gap 11 3.0 - 18 mmol/L    Glucose 184 (H) 74 - 99 mg/dL    BUN 46 (H) 7.0 - 18 MG/DL    Creatinine 1.61 (H) 0.6 - 1.3 MG/DL    BUN/Creatinine ratio 29 (H) 12 - 20      GFR est AA 39 (L) >60 ml/min/1.73m2    GFR est non-AA 32 (L) >60 ml/min/1.73m2    Calcium 7.8 (L) 8.5 - 10.1 MG/DL    Bilirubin, total 0.5 0.2 - 1.0 MG/DL    ALT (SGPT) 223 (H) 13 - 56 U/L    AST (SGOT) 55 (H) 15 - 37 U/L    Alk. phosphatase 105 45 - 117 U/L    Protein, total 5.3 (L) 6.4 - 8.2 g/dL    Albumin 1.4 (L) 3.4 - 5.0 g/dL    Globulin 3.9 2.0 - 4.0 g/dL    A-G Ratio 0.4 (L) 0.8 - 1.7     CBC W/O DIFF    Collection Time: 06/15/18  4:45 AM   Result Value Ref Range    WBC 11.3 4.6 - 13.2 K/uL    RBC 3.94 (L) 4.20 - 5.30 M/uL    HGB 8.6 (L) 12.0 - 16.0 g/dL    HCT 27.2 (L) 35.0 - 45.0 %    MCV 69.0 (L) 74.0 - 97.0 FL    MCH 21.8 (L) 24.0 - 34.0 PG    MCHC 31.6 31.0 - 37.0 g/dL    RDW 19.6 (H) 11.6 - 14.5 %    PLATELET 738 757 - 695 K/uL   PTT    Collection Time: 06/15/18  4:45 AM   Result Value Ref Range    aPTT 116.8 (H) 23.0 - 36.4 SEC   GLUCOSE, POC    Collection Time: 06/15/18  5:16 AM   Result Value Ref Range    Glucose (POC) 190 (H) 70 - 110 mg/dL   POC G3    Collection Time: 06/15/18  5:18 AM   Result Value Ref Range    Device: VENT      FIO2 (POC) 40 %    pH (POC) 7.449 7.35 - 7.45      pCO2 (POC) 35.0 35.0 - 45.0 MMHG    pO2 (POC) 107 (H) 80 - 100 MMHG    HCO3 (POC) 24.3 22 - 26 MMOL/L    sO2 (POC) 99 (H) 92 - 97 %    Base excess (POC) 0 mmol/L    Mode ASSIST CONTROL      Tidal volume 400 ml    Set Rate 14 bpm    PEEP/CPAP (POC) 12 cmH2O    Allens test (POC) YES      Inspiratory Time 0.9 sec    Total resp. rate 17      Site LEFT RADIAL      Patient temp.  98.0      Specimen type (POC) ARTERIAL      Performed by Taco Summers     Volume control plus YES           Chemistry Recent Labs      06/15/18   0445  06/14/18   1900  06/14/18   1310  06/14/18   0500   06/13/18   0425   GLU  184*   --    --   175*   --   169*   NA  149*   --    --   148*   --   144   K  3.5  3.9  3.2*  3.3*   < >  3.4*   CL  113*   --    --   110*   --   106   CO2  25   --    --   25   --   24   BUN  46*   --    --   59*   --   64*   CREA  1.61*   --    --   2.10*   --   2.41*   CA  7.8*   --    --   7.5*   --   7.4*   MG  1.9   --   2.0  1.7   --   2.0   PHOS   -- --    --   3.6   --   4.7   AGAP  11   --    --   13   --   14   BUCR  29*   --    --   28*   --   27*   AP  105   --    --   115   --   111   TP  5.3*   --    --   4.9*   --   5.1*   ALB  1.4*   --    --   1.4*   --   1.4*   GLOB  3.9   --    --   3.5   --   3.7   AGRAT  0.4*   --    --   0.4*   --   0.4*    < > = values in this interval not displayed. Lactic Acid Lactic acid   Date Value Ref Range Status   06/10/2018 4.7 (HH) 0.4 - 2.0 MMOL/L Final     Comment:     CALLED TO AND CORRECTLY REPEATED BY:  SOBEIDA ROSALES RN ICU TO  AT [address] [place] [address] [id number]       No results for input(s): LAC in the last 72 hours. Liver Enzymes Protein, total   Date Value Ref Range Status   06/15/2018 5.3 (L) 6.4 - 8.2 g/dL Final     Albumin   Date Value Ref Range Status   06/15/2018 1.4 (L) 3.4 - 5.0 g/dL Final     Globulin   Date Value Ref Range Status   06/15/2018 3.9 2.0 - 4.0 g/dL Final     A-G Ratio   Date Value Ref Range Status   06/15/2018 0.4 (L) 0.8 - 1.7   Final     AST (SGOT)   Date Value Ref Range Status   06/15/2018 55 (H) 15 - 37 U/L Final     Alk.  phosphatase   Date Value Ref Range Status   06/15/2018 105 45 - 117 U/L Final     Recent Labs      06/15/18   0445  06/14/18   0500  06/13/18   0425   TP  5.3*  4.9*  5.1*   ALB  1.4*  1.4*  1.4*   GLOB  3.9  3.5  3.7   AGRAT  0.4*  0.4*  0.4*   SGOT  55*  126*  348*   AP  105  115  111        CBC w/Diff Recent Labs      06/15/18   0445  06/14/18   0500  06/13/18   0425   WBC  11.3  17.5*  19.6*   RBC  3.94*  3.85*  3.52*   HGB  8.6*  8.5*  7.6*   HCT  27.2*  26.4*  23.6*   PLT  163  149  167        Cardiac Enzymes No results found for: CPK, CK, CKMMB, CKMB, RCK3, CKMBT, CKNDX, CKND1, FABIAN, TROPT, TROIQ, KAYLAN, TROPT, TNIPOC, BNP, BNPP     BNP No results found for: BNP, BNPP, XBNPT     Coagulation Recent Labs      06/15/18   0445  06/14/18   0500  06/13/18   0425   APTT  116.8*  75.1*  95.3*         Thyroid  No results found for: T4, T3U, TSH, TSHEXT, TSHEXT    No results found for: T4     Urinalysis Lab Results   Component Value Date/Time    Color DARK YELLOW 06/04/2018 08:42 AM    Appearance CLOUDY 06/04/2018 08:42 AM    Specific gravity 1.028 06/04/2018 08:42 AM    pH (UA) 5.0 06/04/2018 08:42 AM    Protein 30 (A) 06/04/2018 08:42 AM    Glucose NEGATIVE  06/04/2018 08:42 AM    Ketone TRACE (A) 06/04/2018 08:42 AM    Bilirubin SMALL (A) 06/04/2018 08:42 AM    Urobilinogen 1.0 06/04/2018 08:42 AM    Nitrites NEGATIVE  06/04/2018 08:42 AM    Leukocyte Esterase NEGATIVE  06/04/2018 08:42 AM    Epithelial cells FEW 06/04/2018 08:42 AM    Bacteria 1+ (A) 06/04/2018 08:42 AM    WBC 0 to 3 06/04/2018 08:42 AM    RBC 0 to 3 06/04/2018 08:42 AM        Micro  No results for input(s): SDES, CULT in the last 72 hours. No results for input(s): CULT in the last 72 hours. ABG Recent Labs      06/15/18   0518  06/14/18   1123  06/12/18   2324   PHI  7.449  7.456*  7.463*   PCO2I  35.0  32.1*  33.5*   PO2I  107*  103*  94   HCO3I  24.3  22.7  24.1   FIO2I  40  50  0.75          Echo 6/9/18:  Left ventricle: Systolic function was normal. Ejection fraction was estimated   in the range of 65 % to 70 %. No obvious  wall motion abnormalities identified in the views obtained. Wall thickness   was mildly increased. Doppler parameters  were consistent with abnormal left ventricular relaxation (grade 1 diastolic   dysfunction). Right ventricle: The size was normal. Systolic function was normal.  Mitral valve: There was mild annular calcification. Tricuspid valve: Pulmonary artery systolic pressure was mildly increased. Pulmonary artery systolic pressure: 34 mmHg. PVL LE 6/6/18: acute b/l peroneal DVT. CT abd pelvis 6/4/18:  1. Postsurgical hysterectomy, bilateral oophorectomy, pelvic lymphadenectomy and  a mastectomy surgical changes.  Small volume of ascites in the abdomen and  pelvis, with a few tiny locules of gas in the right hemipelvis would be in  keeping with recent postsurgical etiology. 2. Abnormal edematous thick-walled small bowel loops in the left abdomen and  pelvis, with TRAM lamellar edematous configuration, induration. No findings of  pneumatosis. The overall appearance is nonspecific. Diagnostic considerations  include infectious etiology, nonspecific edema which may be unresolved  postsurgical etiology. Ischemia is also included in the differential diagnostic  consideration, however, there are no findings of pneumatosis, portal venous gas. 3. Stool in the right colon extending to the hepatic flexure with surrounding  gas, foci of pneumatosis could be obscured. No associated bowel wall thickening. 4. Satisfactory position of nasogastric tube. 5. Hepatomegaly, steatosis with 2 rounded low-density lesions, potentially  cysts. 6. Bibasilar atelectasis. CXR reviewed by me:  CXR 6/15/18:  Lines and tubes as described without pneumothorax. Overall improving lung infiltrates/atelectasis compared to the previous study.     Brad Long MD  6/15/2018

## 2018-06-15 NOTE — PROGRESS NOTES
Hospitalist Progress Note    Patient: [place] MRN: [id number]  CSN: [id number]    YOB: 1950  Age: 75 y.o.   Sex: female    DOA: 6/4/2018 LOS:  LOS: 11 days          Chief Complaint:    sepsis      Assessment/Plan   New issue with incision draining brown fluid lower abdomen-for laparotomy today      Ac resp failure-requiring vent support-support has been weaned  Sepsis and shock due to klebsiella peritonitis -pressors weaned  S/p laparotomy for peritonitis  Probable PE with LE DVT-on heparin gtt  Ovarian cancer with recent debulking surgery  Oliguric JESS-improved urine output and CR  Anemia  Hypoalbuminemia  Leukocytosis improved to nl today    Despite numbers looking better, fluid drainage concerning and will go to OR for drainage and exploration  Continue IV abx  Vent support  Pain control-fentanyl  Wean pressors as tolerated  Heparin gtt on hold magdaleno surgery    Disposition :  Patient Active Problem List   Diagnosis Code    Ovarian ca ([facility] C56.9    Severe obesity (BMI 35.0-39.9) ([facility]) E66.01    Abdominal pain R10.9    Sepsis ([address] A41.9    Oliguria R34    Acute respiratory failure ([address] J96.00    JESS (acute kidney injury) ([address] G40.3    Metabolic acidosis J65.7    Acute deep vein thrombosis (DVT) of lower extremity ([address] I82.409       Subjective:    draiange from lower abd incision  She states pain \"ok\"  Gives thumbs up to feeling a bit better    Review of systems:    Limited by vent status      Vital signs/Intake and Output:  Visit Vitals    /77    Pulse (!) 101    Temp 98.2 °F (36.8 °C)    Resp 23    Ht 5' 1\" (1.549 m)    Wt 89.1 kg (196 lb 6.9 oz)    SpO2 100%    BMI 37.12 kg/m2     Current Shift:  06/15 0701 - 06/15 1900  In: -   Out: 850 [Urine:850]  Last three shifts:  06/13 1901 - 06/15 0700  In: 4340.2 [I.V.:3646.2]  Out: 6175 [Urine:6175]    Exam:    General: wf, elderly, on vent, awake  Head/Neck: NCAT, supple, No masses, No lymphadenopathy  CVS:Regular rate and rhythm, no M/R/G, S1/S2 heard, no thrill  Lungs:Clear to auscultation bilaterally, no wheezes, rhonchi, or rales  Abdomen: distended, lower incision draining brown liquid, diminished BS  Extremities: 1 plus edema  Skin:normal texture and turgor, no rashes, no lesions  Neuro:grossly normal , follows commands                  Labs: Results:       Chemistry Recent Labs      06/15/18   0445 06/14/18   1900  06/14/18   1310  06/14/18   0500   06/13/18   0425   GLU  184*   --    --   175*   --   169*   NA  149*   --    --   148*   --   144   K  3.5  3.9  3.2*  3.3*   < >  3.4*   CL  113*   --    --   110*   --   106   CO2  25   --    --   25   --   24   BUN  46*   --    --   59*   --   64*   CREA  1.61*   --    --   2.10*   --   2.41*   CA  7.8*   --    --   7.5*   --   7.4*   AGAP  11   --    --   13   --   14   BUCR  29*   --    --   28*   --   27*   AP  105   --    --   115   --   111   TP  5.3*   --    --   4.9*   --   5.1*   ALB  1.4*   --    --   1.4*   --   1.4*   GLOB  3.9   --    --   3.5   --   3.7   AGRAT  0.4*   --    --   0.4*   --   0.4*    < > = values in this interval not displayed. CBC w/Diff Recent Labs      06/15/18   0445  06/14/18   0500  06/13/18   0425   WBC  11.3  17.5*  19.6*   RBC  3.94*  3.85*  3.52*   HGB  8.6*  8.5*  7.6*   HCT  27.2*  26.4*  23.6*   PLT  163  149  167      Cardiac Enzymes No results for input(s): CPK, CKND1, FABIAN in the last 72 hours. No lab exists for component: CKRMB, TROIP   Coagulation Recent Labs      06/15/18   0445  06/14/18   0500   APTT  116.8*  75.1*       Lipid Panel No results found for: CHOL, CHOLPOCT, CHOLX, CHLST, CHOLV, 466165, HDL, LDL, LDLC, DLDLP, 439473, VLDLC, VLDL, TGLX, TRIGL, TRIGP, TGLPOCT, CHHD, CHHDX   BNP No results for input(s): BNPP in the last 72 hours.    Liver Enzymes Recent Labs      06/15/18   0445   TP  5.3*   ALB  1.4*   AP  105   SGOT  55*      Thyroid Studies No results found for: T4, T3U, TSH, TSHEXT Procedures/imaging: see electronic medical records for all procedures/Xrays and details which were not copied into this note but were reviewed prior to creation of Delio Salazar MD

## 2018-06-15 NOTE — PROGRESS NOTES
0800   Report received from Sissy Mcneill RN, offgoing nurse, at pt bedside using [employer]. All questions answered and all clinicals reviewed. Fentanyl PCA and all alarm parameters verified. Heparin drip verified with PTT pending. Pt has large amount of brown drainage from abdominal wound. Dr. Sudheer Chavez has already been notified. 0930  Dr. Sudheer Chavez in to see patient. Consent for ex-lap signed by patient who nods appropriately that she understands reason for surgery and risks and benefits. Pts brother contacted and verbal consent received. 1130  Multidisciplinary rounds with critical care team to discuss plan of care with orders received. 1300  Pt transported to OR with anesthesia staff in bed on monitor. 65  Pt is still in OR for procedure. Family awaits in surgical waiting area. 1900  Pt back from OR with anesthesia at bedside with report. Oncoming nurse, Ozzie Dean, at bedside also for report. Pt CVP and ART line transducing. Report given to Gillian at bedside using SBAR format. Pt VSS, on ventilator at prior settings. Await orders from Dr. Sudheer Chavez.

## 2018-06-15 NOTE — ANESTHESIA PREPROCEDURE EVALUATION
Anesthetic History   No history of anesthetic complications            Review of Systems / Medical History  Patient summary reviewed, nursing notes reviewed and pertinent labs reviewed    Pulmonary  Within defined limits                 Neuro/Psych   Within defined limits           Cardiovascular    Hypertension: well controlled              Exercise tolerance: >4 METS  Comments: ekg has changes but this is an emergency. GI/Hepatic/Renal         Renal disease: ARF    Pertinent negatives: No GERD   Endo/Other    Diabetes: well controlled    Morbid obesity and anemia  Pertinent negatives: No hypothyroidism and hyperthyroidism   Other Findings   Comments: Ischemic colitis, decreased urine output, elevated wbc         Physical Exam    Airway  Mallampati: III  TM Distance: < 4 cm  Neck ROM: normal range of motion   Mouth opening: Normal  Intubated   Cardiovascular    Rhythm: regular  Rate: abnormal         Dental    Dentition: Caps/crowns     Pulmonary  Breath sounds clear to auscultation               Abdominal  GI exam deferred       Other Findings            Anesthetic Plan    ASA: 4, emergent  Anesthesia type: general          Induction: Intravenous  Anesthetic plan and risks discussed with: Patient      Underwent gen anesthesia last week. Off Nor-epi x 4 days.

## 2018-06-16 ENCOUNTER — APPOINTMENT (OUTPATIENT)
Dept: GENERAL RADIOLOGY | Age: 68
DRG: 853 | End: 2018-06-16
Attending: OBSTETRICS & GYNECOLOGY
Payer: MEDICARE

## 2018-06-16 PROBLEM — E88.09 HYPOALBUMINEMIA: Status: ACTIVE | Noted: 2018-06-16

## 2018-06-16 PROBLEM — Z93.2 S/P ILEOSTOMY (HCC): Status: ACTIVE | Noted: 2018-06-16

## 2018-06-16 PROBLEM — K65.9 PERITONITIS (HCC): Status: ACTIVE | Noted: 2018-06-16

## 2018-06-16 PROBLEM — Z98.890 S/P EXPLORATORY LAPAROTOMY: Status: ACTIVE | Noted: 2018-06-16

## 2018-06-16 LAB
ANION GAP SERPL CALC-SCNC: 15 MMOL/L (ref 3–18)
APTT PPP: 103.1 SEC (ref 23–36.4)
APTT PPP: 111.2 SEC (ref 23–36.4)
APTT PPP: >180 SEC (ref 23–36.4)
ARTERIAL PATENCY WRIST A: NO
BASE DEFICIT BLD-SCNC: 5 MMOL/L
BDY SITE: ABNORMAL
BODY TEMPERATURE: 97.7
BUN BLD-MCNC: 37 MG/DL (ref 7–18)
BUN SERPL-MCNC: 46 MG/DL (ref 7–18)
BUN/CREAT SERPL: 24 (ref 12–20)
CA-I SERPL-SCNC: 0.97 MMOL/L (ref 1.12–1.32)
CA-I SERPL-SCNC: 1.03 MMOL/L (ref 1.12–1.32)
CALCIUM SERPL-MCNC: 6.8 MG/DL (ref 8.5–10.1)
CHLORIDE BLD-SCNC: 118 MMOL/L (ref 100–108)
CHLORIDE SERPL-SCNC: 113 MMOL/L (ref 100–108)
CO2 SERPL-SCNC: 18 MMOL/L (ref 21–32)
CREAT SERPL-MCNC: 1.92 MG/DL (ref 0.6–1.3)
ERYTHROCYTE [DISTWIDTH] IN BLOOD BY AUTOMATED COUNT: 21.2 % (ref 11.6–14.5)
GAS FLOW.O2 O2 DELIVERY SYS: ABNORMAL L/MIN
GAS FLOW.O2 SETTING OXYMISER: 14 BPM
GLUCOSE BLD STRIP.AUTO-MCNC: 159 MG/DL (ref 74–106)
GLUCOSE BLD STRIP.AUTO-MCNC: 185 MG/DL (ref 70–110)
GLUCOSE BLD STRIP.AUTO-MCNC: 191 MG/DL (ref 70–110)
GLUCOSE BLD STRIP.AUTO-MCNC: 209 MG/DL (ref 70–110)
GLUCOSE SERPL-MCNC: 205 MG/DL (ref 74–99)
HCO3 BLD-SCNC: 19.4 MMOL/L (ref 22–26)
HCT VFR BLD AUTO: 28.7 % (ref 35–45)
HCT VFR BLD AUTO: 36.7 % (ref 35–45)
HCT VFR BLD CALC: 37 % (ref 36–49)
HGB BLD-MCNC: 12 G/DL (ref 12–16)
HGB BLD-MCNC: 12.6 G/DL (ref 12–16)
HGB BLD-MCNC: 9.5 G/DL (ref 12–16)
INSPIRATION.DURATION SETTING TIME VENT: 0.9 SEC
MAGNESIUM SERPL-MCNC: 1.9 MG/DL (ref 1.6–2.6)
MAGNESIUM SERPL-MCNC: 2.2 MG/DL (ref 1.6–2.6)
MCH RBC QN AUTO: 23.8 PG (ref 24–34)
MCHC RBC AUTO-ENTMCNC: 32.7 G/DL (ref 31–37)
MCV RBC AUTO: 72.8 FL (ref 74–97)
O2/TOTAL GAS SETTING VFR VENT: 70 %
PCO2 BLD: 28.7 MMHG (ref 35–45)
PEEP RESPIRATORY: 10 CMH2O
PH BLD: 7.44 [PH] (ref 7.35–7.45)
PHOSPHATE SERPL-MCNC: 4.2 MG/DL (ref 2.5–4.9)
PLATELET # BLD AUTO: 168 K/UL (ref 135–420)
PO2 BLD: 120 MMHG (ref 80–100)
POTASSIUM BLD-SCNC: 3.9 MMOL/L (ref 3.5–5.5)
POTASSIUM SERPL-SCNC: 4.1 MMOL/L (ref 3.5–5.5)
RBC # BLD AUTO: 5.04 M/UL (ref 4.2–5.3)
SAO2 % BLD: 99 % (ref 92–97)
SERVICE CMNT-IMP: ABNORMAL
SODIUM BLD-SCNC: 148 MMOL/L (ref 136–145)
SODIUM SERPL-SCNC: 146 MMOL/L (ref 136–145)
SPECIMEN TYPE: ABNORMAL
TOTAL RESP. RATE, ITRR: 25
VENTILATION MODE VENT: ABNORMAL
VT SETTING VENT: 400 ML
WBC # BLD AUTO: 20.5 K/UL (ref 4.6–13.2)

## 2018-06-16 PROCEDURE — 36600 WITHDRAWAL OF ARTERIAL BLOOD: CPT

## 2018-06-16 PROCEDURE — P9047 ALBUMIN (HUMAN), 25%, 50ML: HCPCS | Performed by: INTERNAL MEDICINE

## 2018-06-16 PROCEDURE — 74011636637 HC RX REV CODE- 636/637: Performed by: INTERNAL MEDICINE

## 2018-06-16 PROCEDURE — 94003 VENT MGMT INPAT SUBQ DAY: CPT

## 2018-06-16 PROCEDURE — 82803 BLOOD GASES ANY COMBINATION: CPT

## 2018-06-16 PROCEDURE — 82330 ASSAY OF CALCIUM: CPT | Performed by: INTERNAL MEDICINE

## 2018-06-16 PROCEDURE — 74011000258 HC RX REV CODE- 258: Performed by: INTERNAL MEDICINE

## 2018-06-16 PROCEDURE — 85730 THROMBOPLASTIN TIME PARTIAL: CPT | Performed by: OBSTETRICS & GYNECOLOGY

## 2018-06-16 PROCEDURE — 65270000029 HC RM PRIVATE

## 2018-06-16 PROCEDURE — 80048 BASIC METABOLIC PNL TOTAL CA: CPT | Performed by: OBSTETRICS & GYNECOLOGY

## 2018-06-16 PROCEDURE — 71045 X-RAY EXAM CHEST 1 VIEW: CPT

## 2018-06-16 PROCEDURE — 74011250636 HC RX REV CODE- 250/636: Performed by: INTERNAL MEDICINE

## 2018-06-16 PROCEDURE — 85730 THROMBOPLASTIN TIME PARTIAL: CPT | Performed by: INTERNAL MEDICINE

## 2018-06-16 PROCEDURE — 82330 ASSAY OF CALCIUM: CPT | Performed by: OBSTETRICS & GYNECOLOGY

## 2018-06-16 PROCEDURE — 83735 ASSAY OF MAGNESIUM: CPT | Performed by: OBSTETRICS & GYNECOLOGY

## 2018-06-16 PROCEDURE — C9113 INJ PANTOPRAZOLE SODIUM, VIA: HCPCS | Performed by: INTERNAL MEDICINE

## 2018-06-16 PROCEDURE — 85027 COMPLETE CBC AUTOMATED: CPT | Performed by: OBSTETRICS & GYNECOLOGY

## 2018-06-16 PROCEDURE — 77010033678 HC OXYGEN DAILY

## 2018-06-16 PROCEDURE — 74011000250 HC RX REV CODE- 250: Performed by: INTERNAL MEDICINE

## 2018-06-16 PROCEDURE — 74011250636 HC RX REV CODE- 250/636: Performed by: HOSPITALIST

## 2018-06-16 PROCEDURE — 83735 ASSAY OF MAGNESIUM: CPT | Performed by: INTERNAL MEDICINE

## 2018-06-16 PROCEDURE — 82962 GLUCOSE BLOOD TEST: CPT

## 2018-06-16 PROCEDURE — 84100 ASSAY OF PHOSPHORUS: CPT | Performed by: OBSTETRICS & GYNECOLOGY

## 2018-06-16 PROCEDURE — 74011250636 HC RX REV CODE- 250/636: Performed by: OBSTETRICS & GYNECOLOGY

## 2018-06-16 PROCEDURE — 85018 HEMOGLOBIN: CPT | Performed by: INTERNAL MEDICINE

## 2018-06-16 RX ORDER — MAGNESIUM SULFATE 1 G/100ML
1 INJECTION INTRAVENOUS ONCE
Status: COMPLETED | OUTPATIENT
Start: 2018-06-16 | End: 2018-06-22

## 2018-06-16 RX ORDER — ALBUMIN HUMAN 250 G/1000ML
12.5 SOLUTION INTRAVENOUS EVERY 6 HOURS
Status: DISCONTINUED | OUTPATIENT
Start: 2018-06-16 | End: 2018-06-19

## 2018-06-16 RX ADMIN — CALCIUM GLUCONATE 2 G: 94 INJECTION, SOLUTION INTRAVENOUS at 20:01

## 2018-06-16 RX ADMIN — SODIUM CHLORIDE 40 MG: 9 INJECTION INTRAMUSCULAR; INTRAVENOUS; SUBCUTANEOUS at 08:38

## 2018-06-16 RX ADMIN — CALCIUM GLUCONATE 2 G: 94 INJECTION, SOLUTION INTRAVENOUS at 08:38

## 2018-06-16 RX ADMIN — FUROSEMIDE 40 MG: 10 INJECTION, SOLUTION INTRAMUSCULAR; INTRAVENOUS at 08:39

## 2018-06-16 RX ADMIN — INSULIN LISPRO 2 UNITS: 100 INJECTION, SOLUTION INTRAVENOUS; SUBCUTANEOUS at 05:49

## 2018-06-16 RX ADMIN — METRONIDAZOLE 500 MG: 500 INJECTION, SOLUTION INTRAVENOUS at 02:27

## 2018-06-16 RX ADMIN — PIPERACILLIN SODIUM,TAZOBACTAM SODIUM 2.25 G: 2; .25 INJECTION, POWDER, FOR SOLUTION INTRAVENOUS at 12:05

## 2018-06-16 RX ADMIN — MAGNESIUM SULFATE HEPTAHYDRATE 1 G: 1 INJECTION, SOLUTION INTRAVENOUS at 12:06

## 2018-06-16 RX ADMIN — PIPERACILLIN SODIUM,TAZOBACTAM SODIUM 2.25 G: 2; .25 INJECTION, POWDER, FOR SOLUTION INTRAVENOUS at 05:24

## 2018-06-16 RX ADMIN — FENTANYL CITRATE 25 MCG: 50 INJECTION, SOLUTION INTRAMUSCULAR; INTRAVENOUS at 04:29

## 2018-06-16 RX ADMIN — PIPERACILLIN SODIUM,TAZOBACTAM SODIUM 2.25 G: 2; .25 INJECTION, POWDER, FOR SOLUTION INTRAVENOUS at 17:23

## 2018-06-16 RX ADMIN — Medication 125 MCG/HR: at 16:38

## 2018-06-16 RX ADMIN — HEPARIN SODIUM 16 UNITS/KG/HR: 10000 INJECTION, SOLUTION INTRAVENOUS at 16:29

## 2018-06-16 RX ADMIN — Medication 125 MCG/HR: at 19:36

## 2018-06-16 RX ADMIN — Medication 125 MCG/HR: at 23:50

## 2018-06-16 RX ADMIN — DEXTROSE MONOHYDRATE 75 ML/HR: 5 INJECTION, SOLUTION INTRAVENOUS at 02:31

## 2018-06-16 RX ADMIN — CALCIUM GLUCONATE 2 G: 94 INJECTION, SOLUTION INTRAVENOUS at 06:54

## 2018-06-16 RX ADMIN — INSULIN LISPRO 4 UNITS: 100 INJECTION, SOLUTION INTRAVENOUS; SUBCUTANEOUS at 18:00

## 2018-06-16 RX ADMIN — METRONIDAZOLE 500 MG: 500 INJECTION, SOLUTION INTRAVENOUS at 17:23

## 2018-06-16 RX ADMIN — METRONIDAZOLE 500 MG: 500 INJECTION, SOLUTION INTRAVENOUS at 10:22

## 2018-06-16 RX ADMIN — FENTANYL CITRATE 25 MCG: 50 INJECTION, SOLUTION INTRAMUSCULAR; INTRAVENOUS at 10:36

## 2018-06-16 RX ADMIN — HEPARIN SODIUM 16 UNITS/KG/HR: 10000 INJECTION, SOLUTION INTRAVENOUS at 14:44

## 2018-06-16 RX ADMIN — LEVOFLOXACIN 750 MG: 5 INJECTION, SOLUTION INTRAVENOUS at 21:26

## 2018-06-16 RX ADMIN — ALBUMIN (HUMAN) 12.5 G: 0.25 INJECTION, SOLUTION INTRAVENOUS at 12:06

## 2018-06-16 RX ADMIN — ALBUMIN (HUMAN) 12.5 G: 0.25 INJECTION, SOLUTION INTRAVENOUS at 17:23

## 2018-06-16 RX ADMIN — HEPARIN SODIUM 21 UNITS/KG/HR: 10000 INJECTION, SOLUTION INTRAVENOUS at 02:25

## 2018-06-16 RX ADMIN — Medication 50 MCG/HR: at 02:51

## 2018-06-16 NOTE — PROGRESS NOTES
Nephrology Progress note    Subjective:     Ania Fonseca is a 75 y.o. female with PMH DM, HTN, clear cell ovarian ca s/p debulking who presented with abdominal pain and possible sepsis/ischemic colitis. Pt underwent laparotomy/peritoneal lavage/bx, noted to have decreased UOP and increasing Cr to 2.1 today from baseline 0.6. CT neg for mass or hydro. Pt given lasix yesterday, still with high O2 requirements on vent. Unable to provide further history. On 6/8 Pt decompensated, ? Aspiration,  back on vent was hypoxic despite  Fi02 of 100%, , Hypotensive on IV pressors, Acidotic and on HC03 drip, Urine output decreased markedly over the weekend. Underwent Bronchoscopy 6/9. Pt requires less FiO2 now. UOP picking up. Wound has been draining yellow-brown fluid  - S/P Exploratory laparotomy, Lysis of adhesions, Ileostomy and wound VAC placement on 6/16. Pt on vent    Admit Date: 6/4/2018  Allergy:  Allergies   Allergen Reactions    Hydrocodone Other (comments)     Breaks into cold sweat    Metformin Other (comments)     Breaks out into cold sweat. Can Take Glucophage brand name med        Objective:     Visit Vitals    /90    Pulse (!) 117    Temp 98.3 °F (36.8 °C)    Resp 25    Ht 5' 1\" (1.549 m)    Wt 91.1 kg (200 lb 13.4 oz)    SpO2 97%    BMI 37.95 kg/m2         Intake/Output Summary (Last 24 hours) at 06/16/18 1450  Last data filed at 06/16/18 0800   Gross per 24 hour   Intake          5332.05 ml   Output             1820 ml   Net          3512.05 ml       Physical Exam:     General: On Vent   HENT: Atraumatic and normocephalic.  ET tube in place   Eyes: Sclera anicteric   Neck: No JVD or mass   Cardiovascular: Normal S1 S2   Pulmonary/Chest Wall: Decreased breath sounds bilaterally   Abdominal: Soft, obese, NABS   Musculoskeletal: ++ edema LEs   Neurological: Opens eyes in response to name       Data Review:      Lab Results   Component Value Date/Time    WBC 20.5 (H) 06/16/2018 03:45 AM    RBC 5.04 06/16/2018 03:45 AM    HCT 36.7 06/16/2018 03:45 AM    MCV 72.8 (L) 06/16/2018 03:45 AM    MCH 23.8 (L) 06/16/2018 03:45 AM    MCHC 32.7 06/16/2018 03:45 AM    RDW 21.2 (H) 06/16/2018 03:45 AM      Lab Results   Component Value Date    IRON 8 (L) 06/07/2018   No components found for: FERRITIN  No components found for: PTHINT  Urinalysis  No results found for: UGLU         Impression:     -JESS- likely ATN,  S Cr was down to 1.2,  now trending up slowly at 1.9 , Non-oliguric   -Septic Shock/hypotension, BPs normalized  -Resp Failure,  re-intubated 6/9,  back on vent. Suspect Aspiration, ? PE. Now extubated   -Severe Metabolic acidosis, improving. Lactate 4.7  -Ovarian ca s/p debulking then laparoscopy with lavage  -KLEBSIELLA OXYTOCA sepsis (PD fluid)  -DM  -h.o. HTN  -Anemia  -Elevated liver enzymes  -S/P Exploratory laparotomy, Lysis of adhesions, Ileostomy and wound VAC placement on 6/16        Plan:     Would cont IV lasix 40 mg  Monitor I/O and chemistry, H&H   Continue Supportive care: Vent, IV Antibiotics.     No indication for HD   Vanc dosing per pharmacist  Will follow closely      MD Denver Laurent  [phone]

## 2018-06-16 NOTE — PROGRESS NOTES
[facility]                  Pulmonary, Critical Care, and Sleep Medicine     Name: Mattie Horvath MRN: [id number]   : 1950 Hospital: [facility] [place]    Date: 2018        [facility] Note                                              Subjective/History of Present Illness:     Patient is a 75 y.o. 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 18:  Patient on vent, sedated  She had worsening discharge from the lower part of anterior abd surgical site with \"stool\" like fluid - underwent exploratory laparotomy yesterday evening, findings of sigmoid diverticulitis with suspected perforation, underwent ileostomy and wound vac placement. Afebrile; sinus tach; BP stable  UOP 200cc overnight; BRANDON drains 425 cc-serosangunineous  Heparin drip for DVT and suspected PEs. Vent - fio2 45%; peep 10    Surgeries  18 - Laparoscopy converted to laparotomy, total abdominal hysterectomy, bilateral salpingo-oophorectomy, omentectomy, bilateral pelvic and periaortic lymphadenectomy and radical tumor debulking to R0. Path - ADENOCARCINOMA OF LEFT OVARY.     18 - Diagnostic laparoscopy converted to laparotomy, peritoneal biopsies, aerobic and anaerobic cultures of peritoneal fluid and peritoneal lavage. 6/15/18 - Wound exploration. Exploratory laparotomy. Lysis of adhesions. Ileostomy and wound VAC placement. Allergies   Allergen Reactions    Hydrocodone Other (comments)     Breaks into cold sweat    Metformin Other (comments)     Breaks out into cold sweat.  Can Take Glucophage brand name med      [facility] Medical History:   Diagnosis Date    Diabetes ([facility] [address]     many years type 2    Hypertension     many years      Past Surgical History:   Procedure Laterality Date    HX OOPHORECTOMY  2018    HX TONSILLECTOMY      as a child       Social History   Substance Use Topics    Smoking status: Never Smoker    Smokeless tobacco: Never Used    Alcohol use No        Current Facility-Administered Medications   Medication Dose Route Frequency    calcium gluconate 2 g in 0.9% sodium chloride 100 mL IVPB  2 g IntraVENous Q1H    metroNIDAZOLE (FLAGYL) IVPB premix 500 mg  500 mg IntraVENous Q8H    levoFLOXacin (LEVAQUIN) 750 mg in D5W IVPB  750 mg IntraVENous Q24H    dextrose 5% infusion  75 mL/hr IntraVENous CONTINUOUS    piperacillin-tazobactam (ZOSYN) 2.25 g in 0.9% sodium chloride (MBP/ADV) 50 mL MBP  2.25 g IntraVENous Q6H    furosemide (LASIX) injection 40 mg  40 mg IntraVENous DAILY    fentaNYL (PF) 900 mcg/30 ml infusion soln  25-50 mcg/hr IntraVENous TITRATE    midazolam in normal saline (VERSED) 1 mg/mL infusion  2-6 mg/hr IntraVENous TITRATE    NOREPINephrine (LEVOPHED) 8 mg in 5% dextrose 250mL infusion  2-30 mcg/min IntraVENous TITRATE    heparin 25,000 units in D5W 250 ml infusion  18-36 Units/kg/hr IntraVENous TITRATE    insulin lispro (HUMALOG) injection   SubCUTAneous Q6H    pantoprazole (PROTONIX) 40 mg in sodium chloride 0.9% 10 mL injection  40 mg IntraVENous DAILY         Objective:   Vital Signs:    Visit Vitals    /82    Pulse (!) 120    Temp 97.7 °F (36.5 °C)    Resp 23    Ht 5' 1\" (1.549 m)    Wt 91.1 kg (200 lb 13.4 oz)    SpO2 97%    BMI 37.95 kg/m2       O2 Device: Ventilator, Endotracheal tube   O2 Flow Rate (L/min): 50 l/min   Temp (24hrs), Av.8 °F (36.6 °C), Min:97.4 °F (36.3 °C), Max:98.2 °F (36.8 °C)       Intake/Output:   Last shift:       07 - 1900  In: -   Out: 160 [Drains:160]    Last 3 shifts: 1901 -  0700  In: 6971.8 [I.V.:6134.8]  Out: 3675 [Urine:2650; Drains:425]      Intake/Output Summary (Last 24 hours) at 06/16/18 0951  Last data filed at 06/16/18 0713   Gross per 24 hour   Intake          5353.37 ml   Output             2535 ml   Net          2818.37 ml       ABG:    Lab Results   Component Value Date/Time    PHI 7.437 06/16/2018 04:55 AM    PCO2I 28.7 (L) 06/16/2018 04:55 AM    PO2I 120 (H) 06/16/2018 04:55 AM    HCO3I 19.4 (L) 06/16/2018 04:55 AM    FIO2I 70 06/16/2018 04:55 AM     Ventilator Mode: Assist control, VC+  Respiratory Rate  Back-Up Rate: 14  Insp Time (sec): 0.9 sec  Insp Flow (l/min): 65 l/min  I:E Ratio: :1:2.3  Ventilator Volumes  Vt Set (ml): 400 ml  Vt Exhaled (Machine Breath) (ml): 451 ml  Vt Spont (ml): 388 ml  Ve Observed (l/min): 9.54 l/min  Ventilator Pressures  PIP Observed (cm H2O): 19 cm H2O  Plateau Pressure (cm H2O): 19 cm H2O  MAP (cm H2O): 14  PEEP/VENT (cm H2O): 10 cm H20      Physical Exam:     General/Neurology: intubated, sedated, comfortable. Head:   Normocephalic, without obvious abnormality, atraumatic. Eye:   no scleral icterus, no pallor, no cyanosis. Pupils not dilated. Throat:  ETT and OGT. Neck:   Symmetric. No lymphadenopathy. Trachea midline  Lung: Moderate air entry bilateral equal. Decreased breath sounds bases. No rhonchi. No wheezing. Heart:   Regular rate & rhythm. S1 S2 present. No murmur. No JVD. Abdomen:  Soft. Obese. Sluggish BS. Midline lower abd surgical site - open wound with wound vac. BRANDON drains-serosangunineous. RT abd wall Ileostomy. No abd distension or guarding. Extremities:  No cyanosis. No clubbing. Bilateral UE and LE edema. Anasarca. Pulses: Palpable radials and DPs bilateral.   Lymphatic:  No cervical or supraclav lymphadenopathy.    Skin:   No rash      Data:       Recent Results (from the past 24 hour(s))   GLUCOSE, POC    Collection Time: 06/15/18 11:48 AM   Result Value Ref Range    Glucose (POC) 182 (H) 70 - 110 mg/dL   CBC WITH AUTOMATED DIFF Collection Time: 06/15/18  8:50 PM   Result Value Ref Range    WBC 19.2 (H) 4.6 - 13.2 K/uL    RBC 5.60 (H) 4.20 - 5.30 M/uL    HGB 13.3 12.0 - 16.0 g/dL    HCT 41.5 35.0 - 45.0 %    MCV 74.1 74.0 - 97.0 FL    MCH 23.8 (L) 24.0 - 34.0 PG    MCHC 32.0 31.0 - 37.0 g/dL    RDW 21.5 (H) 11.6 - 14.5 %    PLATELET 121 158 - 259 K/uL    NEUTROPHILS 34 (L) 42 - 75 %    BAND NEUTROPHILS 40 (H) 0 - 5 %    LYMPHOCYTES 20 20 - 51 %    MONOCYTES 4 2 - 9 %    EOSINOPHILS 0 0 - 5 %    BASOPHILS 1 0 - 3 %    METAMYELOCYTES 1 (H) 0 %    ABS. NEUTROPHILS 6.5 1.8 - 8.0 K/UL    ABS. LYMPHOCYTES 3.8 (H) 0.8 - 3.5 K/UL    ABS. MONOCYTES 0.8 0 - 1.0 K/UL    ABS. EOSINOPHILS 0.0 0.0 - 0.4 K/UL    ABS. BASOPHILS 0.2 (H) 0.0 - 0.1 K/UL    PLATELET COMMENTS LARGE PLATELETS      RBC COMMENTS ANISOCYTOSIS  2+        RBC COMMENTS        POLYCHROMASIA  SLIGHT  TARGET CELLS  OCCASSIONAL  OVALOCYTES  1+      DF MANUAL     METABOLIC PANEL, COMPREHENSIVE    Collection Time: 06/15/18  8:50 PM   Result Value Ref Range    Sodium 148 (H) 136 - 145 mmol/L    Potassium 4.5 3.5 - 5.5 mmol/L    Chloride 114 (H) 100 - 108 mmol/L    CO2 22 21 - 32 mmol/L    Anion gap 12 3.0 - 18 mmol/L    Glucose 207 (H) 74 - 99 mg/dL    BUN 43 (H) 7.0 - 18 MG/DL    Creatinine 1.69 (H) 0.6 - 1.3 MG/DL    BUN/Creatinine ratio 25 (H) 12 - 20      GFR est AA 36 (L) >60 ml/min/1.73m2    GFR est non-AA 30 (L) >60 ml/min/1.73m2    Calcium 6.9 (L) 8.5 - 10.1 MG/DL    Bilirubin, total 0.5 0.2 - 1.0 MG/DL    ALT (SGPT) 137 (H) 13 - 56 U/L    AST (SGOT) 34 15 - 37 U/L    Alk.  phosphatase 83 45 - 117 U/L    Protein, total 4.6 (L) 6.4 - 8.2 g/dL    Albumin 1.2 (L) 3.4 - 5.0 g/dL    Globulin 3.4 2.0 - 4.0 g/dL    A-G Ratio 0.4 (L) 0.8 - 1.7     PROTHROMBIN TIME + INR    Collection Time: 06/15/18  8:50 PM   Result Value Ref Range    Prothrombin time 14.8 11.5 - 15.2 sec    INR 1.2 0.8 - 1.2     PTT    Collection Time: 06/15/18  8:50 PM   Result Value Ref Range    aPTT 24.5 23.0 - 36.4 SEC MAGNESIUM    Collection Time: 06/15/18  8:50 PM   Result Value Ref Range    Magnesium 1.7 1.6 - 2.6 mg/dL   GLUCOSE, POC    Collection Time: 06/15/18 11:26 PM   Result Value Ref Range    Glucose (POC) 251 (H) 70 - 110 mg/dL   MAGNESIUM    Collection Time: 06/16/18  3:45 AM   Result Value Ref Range    Magnesium 1.9 1.6 - 2.6 mg/dL   PTT    Collection Time: 06/16/18  3:45 AM   Result Value Ref Range    aPTT >180.0 (HH) 23.0 - 36.4 SEC   CBC W/O DIFF    Collection Time: 06/16/18  3:45 AM   Result Value Ref Range    WBC 20.5 (H) 4.6 - 13.2 K/uL    RBC 5.04 4.20 - 5.30 M/uL    HGB 12.0 12.0 - 16.0 g/dL    HCT 36.7 35.0 - 45.0 %    MCV 72.8 (L) 74.0 - 97.0 FL    MCH 23.8 (L) 24.0 - 34.0 PG    MCHC 32.7 31.0 - 37.0 g/dL    RDW 21.2 (H) 11.6 - 14.5 %    PLATELET 442 054 - 519 K/uL   METABOLIC PANEL, BASIC    Collection Time: 06/16/18  3:45 AM   Result Value Ref Range    Sodium 146 (H) 136 - 145 mmol/L    Potassium 4.1 3.5 - 5.5 mmol/L    Chloride 113 (H) 100 - 108 mmol/L    CO2 18 (L) 21 - 32 mmol/L    Anion gap 15 3.0 - 18 mmol/L    Glucose 205 (H) 74 - 99 mg/dL    BUN 46 (H) 7.0 - 18 MG/DL    Creatinine 1.92 (H) 0.6 - 1.3 MG/DL    BUN/Creatinine ratio 24 (H) 12 - 20      GFR est AA 31 (L) >60 ml/min/1.73m2    GFR est non-AA 26 (L) >60 ml/min/1.73m2    Calcium 6.8 (L) 8.5 - 10.1 MG/DL   PHOSPHORUS    Collection Time: 06/16/18  3:45 AM   Result Value Ref Range    Phosphorus 4.2 2.5 - 4.9 MG/DL   POC G3    Collection Time: 06/16/18  4:55 AM   Result Value Ref Range    Device: VENT      FIO2 (POC) 70 %    pH (POC) 7.437 7.35 - 7.45      pCO2 (POC) 28.7 (L) 35.0 - 45.0 MMHG    pO2 (POC) 120 (H) 80 - 100 MMHG    HCO3 (POC) 19.4 (L) 22 - 26 MMOL/L    sO2 (POC) 99 (H) 92 - 97 %    Base deficit (POC) 5 mmol/L    Mode ASSIST CONTROL      Tidal volume 400 ml    Set Rate 14 bpm    PEEP/CPAP (POC) 10 cmH2O    Allens test (POC) NO      Inspiratory Time 0.9 sec    Total resp.  rate 25      Site DRAWN FROM ARTERIAL LINE      Patient temp. 97.7      Specimen type (POC) ARTERIAL      Performed by Beatriz Hall    GLUCOSE, POC    Collection Time: 06/16/18  5:38 AM   Result Value Ref Range    Glucose (POC) 191 (H) 70 - 110 mg/dL   CALCIUM, IONIZED    Collection Time: 06/16/18  5:40 AM   Result Value Ref Range    Ionized Calcium 0.97 (L) 1.12 - 1.32 MMOL/L         Chemistry Recent Labs      06/16/18   0345  06/15/18   2050  06/15/18   0445   06/14/18   0500   GLU  205*  207*  184*   --   175*   NA  146*  148*  149*   --   148*   K  4.1  4.5  3.5   < >  3.3*   CL  113*  114*  113*   --   110*   CO2  18*  22  25   --   25   BUN  46*  43*  46*   --   59*   CREA  1.92*  1.69*  1.61*   --   2.10*   CA  6.8*  6.9*  7.8*   --   7.5*   MG  1.9  1.7  1.9   < >  1.7   PHOS  4.2   --    --    --   3.6   AGAP  15  12  11   --   13   BUCR  24*  25*  29*   --   28*   AP   --   83  105   --   115   TP   --   4.6*  5.3*   --   4.9*   ALB   --   1.2*  1.4*   --   1.4*   GLOB   --   3.4  3.9   --   3.5   AGRAT   --   0.4*  0.4*   --   0.4*    < > = values in this interval not displayed. Lactic Acid Lactic acid   Date Value Ref Range Status   06/10/2018 4.7 (HH) 0.4 - 2.0 MMOL/L Final     Comment:     CALLED TO AND CORRECTLY REPEATED BY:  SOBEIDA ROSALES RN ICU TO  AT [address] [place] [address] [id number]       No results for input(s): LAC in the last 72 hours. Liver Enzymes Protein, total   Date Value Ref Range Status   06/15/2018 4.6 (L) 6.4 - 8.2 g/dL Final     Albumin   Date Value Ref Range Status   06/15/2018 1.2 (L) 3.4 - 5.0 g/dL Final     Globulin   Date Value Ref Range Status   06/15/2018 3.4 2.0 - 4.0 g/dL Final     A-G Ratio   Date Value Ref Range Status   06/15/2018 0.4 (L) 0.8 - 1.7   Final     AST (SGOT)   Date Value Ref Range Status   06/15/2018 34 15 - 37 U/L Final     Alk.  phosphatase   Date Value Ref Range Status   06/15/2018 83 45 - 117 U/L Final     Recent Labs      06/15/18   2050  06/15/18   0445  06/14/18   0500   TP  4.6*  5.3*  4.9*   ALB  1.2*  1.4*  1.4* GLOB  3.4  3.9  3.5   AGRAT  0.4*  0.4*  0.4*   SGOT  34  55*  126*   AP  83  105  115        CBC w/Diff Recent Labs      06/16/18   0345  06/15/18   2050  06/15/18   0445   WBC  20.5*  19.2*  11.3   RBC  5.04  5.60*  3.94*   HGB  12.0  13.3  8.6*   HCT  36.7  41.5  27.2*   PLT  168  205  163   GRANS   --   34*   --    LYMPH   --   20   --    EOS   --   0   --         Cardiac Enzymes No results found for: CPK, CK, CKMMB, CKMB, RCK3, CKMBT, CKNDX, CKND1, FABIAN, TROPT, TROIQ, KAYLAN, TROPT, TNIPOC, BNP, BNPP     BNP No results found for: BNP, BNPP, XBNPT     Coagulation Recent Labs      06/16/18   0345  06/15/18   2050  06/15/18   0445   PTP   --   14.8   --    INR   --   1.2   --    APTT  >180.0*  24.5  116.8*         Thyroid  No results found for: T4, T3U, TSH, TSHEXT, TSHEXT    No results found for: T4     Urinalysis Lab Results   Component Value Date/Time    Color DARK YELLOW 06/04/2018 08:42 AM    Appearance CLOUDY 06/04/2018 08:42 AM    Specific gravity 1.028 06/04/2018 08:42 AM    pH (UA) 5.0 06/04/2018 08:42 AM    Protein 30 (A) 06/04/2018 08:42 AM    Glucose NEGATIVE  06/04/2018 08:42 AM    Ketone TRACE (A) 06/04/2018 08:42 AM    Bilirubin SMALL (A) 06/04/2018 08:42 AM    Urobilinogen 1.0 06/04/2018 08:42 AM    Nitrites NEGATIVE  06/04/2018 08:42 AM    Leukocyte Esterase NEGATIVE  06/04/2018 08:42 AM    Epithelial cells FEW 06/04/2018 08:42 AM    Bacteria 1+ (A) 06/04/2018 08:42 AM    WBC 0 to 3 06/04/2018 08:42 AM    RBC 0 to 3 06/04/2018 08:42 AM        Micro  No results for input(s): SDES, CULT in the last 72 hours. No results for input(s): CULT in the last 72 hours.      ABG Recent Labs      06/16/18   0455  06/15/18   0518  06/14/18   1123   PHI  7.437  7.449  7.456*   PCO2I  28.7*  35.0  32.1*   PO2I  120*  107*  103*   HCO3I  19.4*  24.3  22.7   FIO2I  70  40  50        All Micro Results     Procedure Component Value Units Date/Time    CULTURE, BLOOD [[id number]] Collected:  06/09/18 1423    Order Status: Completed Specimen:  Whole Blood from Blood Updated:  06/15/18 0230     Special Requests: NO SPECIAL REQUESTS        Culture result: NO GROWTH 6 DAYS       CULTURE, BLOOD [[id number]] Collected:  06/09/18 1223    Order Status:  Completed Specimen:  Whole Blood from Blood Updated:  06/15/18 0230     Special Requests: left hand     Culture result: NO GROWTH 6 DAYS       AFB CULTURE + SMEAR W/RFLX PCR AND ID [[id number]] Collected:  06/09/18 1024    Order Status:  Completed Specimen:  Respiratory sample Updated:  06/12/18 2106     Source BRONCHIAL LAVAGE        AFB Specimen processing Concentration     Acid Fast Smear NEGATIVE          (NOTE)  Performed At: [address] [id number]  Lizzy Briscoe MD UN:[id number]          Acid Fast Culture PENDING    CULTURE, FUNGUS [[id number]] Collected:  06/09/18 1024    Order Status:  Completed Specimen:  Bronchial lavage Updated:  06/11/18 1230     Special Requests: NO SPECIAL REQUESTS        FUNGUS SMEAR NO FUNGAL ELEMENTS SEEN        Culture result: NO FUNGUS ISOLATED 2 DAYS       CULTURE, SPUTUM/BRONCH/OTH [[id number]] Collected:  06/09/18 1024    Order Status:  Completed Specimen:  Sputum from Bronchial lavage Updated:  06/11/18 1058     Special Requests: NO SPECIAL REQUESTS        GRAM STAIN FEW WBC'S         FEW GRAM NEGATIVE RODS        Culture result:         RARE NORMAL RESPIRATORY JOSETTE    CULTURE, ANAEROBIC [[id number]] Collected:  06/05/18 0220    Order Status:  Completed Specimen:  Peritoneal Fluid Updated:  06/11/18 0734     Special Requests: NO SPECIAL REQUESTS        Culture result:         NO ANAEROBES ISOLATED 5 DAYS    CULTURE, BLOOD [[id number]] Collected:  06/04/18 0740    Order Status:  Completed Specimen:  Blood from Blood Updated:  06/10/18 0655     Special Requests: NO SPECIAL REQUESTS        Culture result: NO GROWTH 6 DAYS       CULTURE, BLOOD [[id number]] Collected:  06/04/18 0740    Order Status:  Completed Specimen:  Blood from Blood Updated:  06/10/18 0655     Special Requests: NO SPECIAL REQUESTS        Culture result: NO GROWTH 6 DAYS       CULTURE, BODY FLUID [place] STAIN [[id number]]  (Abnormal)  (Susceptibility) Collected:  06/05/18 0220    Order Status:  Completed Specimen:  Peritoneal Fluid Updated:  06/08/18 0936     Special Requests: NO SPECIAL REQUESTS        GRAM STAIN MANY WBC'S         NO ORGANISMS SEEN        Culture result:         RARE KLEBSIELLA OXYTOCA (A)            CALLED TO AND CORRECTLY REPEATED BY:  ANGEL LUIS DE LOS SANTOS RN ICU ON 6/7/2018 1032 TO 5087      CULTURE, URINE [[id number]] Collected:  06/05/18 1245    Order Status:  Completed Specimen:  Urine from Fry Specimen Updated:  06/07/18 1103     Special Requests: NO SPECIAL REQUESTS        Culture result: NO GROWTH 2 DAYS       C. DIFFICILE/EPI PCR [[id number]] Collected:  06/05/18 0930    Order Status:  Canceled Specimen:  Stool         Echo 6/9/18:  Left ventricle: Systolic function was normal. Ejection fraction was estimated   in the range of 65 % to 70 %. No obvious  wall motion abnormalities identified in the views obtained. Wall thickness   was mildly increased. Doppler parameters  were consistent with abnormal left ventricular relaxation (grade 1 diastolic   dysfunction). Right ventricle: The size was normal. Systolic function was normal.  Mitral valve: There was mild annular calcification. Tricuspid valve: Pulmonary artery systolic pressure was mildly increased. Pulmonary artery systolic pressure: 34 mmHg. PVL LE 6/6/18: acute b/l peroneal DVT. CT abd pelvis 6/4/18:  1. Postsurgical hysterectomy, bilateral oophorectomy, pelvic lymphadenectomy and  a mastectomy surgical changes. Small volume of ascites in the abdomen and  pelvis, with a few tiny locules of gas in the right hemipelvis would be in  keeping with recent postsurgical etiology.   2. Abnormal edematous thick-walled small bowel loops in the left abdomen and  pelvis, with TRAM lamellar edematous configuration, induration. No findings of  pneumatosis. The overall appearance is nonspecific. Diagnostic considerations  include infectious etiology, nonspecific edema which may be unresolved  postsurgical etiology. Ischemia is also included in the differential diagnostic  consideration, however, there are no findings of pneumatosis, portal venous gas. 3. Stool in the right colon extending to the hepatic flexure with surrounding  gas, foci of pneumatosis could be obscured. No associated bowel wall thickening. 4. Satisfactory position of nasogastric tube. 5. Hepatomegaly, steatosis with 2 rounded low-density lesions, potentially  cysts. 6. Bibasilar atelectasis. CXR reviewed by me:  CXR 6/16/18: IMPRESSION:  1.  Stable indistinct interstitial thickening with additional perihilar opacity  which may represent edema on background interstitial lung disease or pneumonia. Clinical correlation recommended. 2. Interval slight retraction of the NG/OG tube with side port near the GE  junction. Advancement of this tube by a few centimeters is recommended. Other  support lines and tubes are unchanged and satisfactory position.         IMPRESSION:   · Severe sepsis due to Klebsiella Oxytoca peritonitis. · Klebsiella oxytoca peritonitis. S/p laparotomy and peritoneal lavage: Klebsiella positive. Ruled out ischemic bowel in laparotomy. 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 · S/p bronchoscopy 6/9/18: no aspiration noted. Cx NGTD. · VDRF due to sepsis/PE/?ARDS, reintubated on 6/8/18  · S/p Shock likely due to sepsis and obstructive due to presumed PE. Shock resolved. Off vasopressors. · Acute b/l peroneal DVT  · Presumed PE with severe hypoxia and high A-a gradient, elevated RVSP 34 mm Hg. · Anemia, no active external bleeding. · Thrombocytopenia, improving   · JESS, improving  · Hypernatremia.    · Metabolic ad lactic acidosis, resolved. · Elevated LFT, due to shock liver, improving  · Foreign body on peritoneal biopsy, per path report. · Anasarca due to fluid resuscitation, JESS, hypoalbuminemia and sequale of sepsis. · Code status: DNR. · Very poor overall prognosis. RECOMMENDATIONS:   Respiratory:  Presumed PEs; continue anticoagulation - watch for bleeding and anemia; follow protocol for adjustments and labs. Continue vent support. Wean Peep for O2 sats >92%. Not stable for weaning today. Albumin / lasix combination. CXR stable. Ventilator bundle & Sedation protocol followed. Fentanyl drip; prn versed; RASS -1 to -2. Chlorhexidine mouth washes. Keep SPO2 >=92%. HOB 30 degree elevation all the time. Aggressive pulmonary toileting. Aspiration precautions. CVS: Echo ok with mildly elevated RVSP but normal RV systolic function. Pedal edema due to b/l DVT and fluid resuscitation/anasarca. On heparin drip and lasix. ID:    Bronch cx normal robin. Peritoneal cx Klebsiella Oxytoca resistant to ampicillin. Doubt pneumonia. Leucocytosis-follow. Afebrile. Ab -  zosyn. Levaquin, flagyl. Defer follow up Abd imaging to Surgery team.   Hematology/Oncology: H&H stable after 1 PRBC on 6/13/18. Monitor platelets on heparin, stable now. Check H/H q12. On heparin drip for DVT and suspected PEs. Renal: Nephrologist on case; D5W for hypernatremia - NA better; On lasix daily; watch creat and UOP. GI/: holding tube feed - post op state; d/w surgery about TPN vs enteral feeding in next few days; LFT improving. Endocrine: Maintain blood glucose 140-180. Humalog sub cut. Neurology: alert awake on minimal fentanyl on vent, following all commands. Pain/Sedation: fentanyl drip  Skin/Wound: local surgical site care. Electrolytes: Replace electrolytes per ICU electrolyte replacement protocol. Calcium being replaced. Nutrition: see above  Prophylaxis: DVT Rx with heparin drip-held for surgery.  GI Prophylaxis with protonix. Restraints: none  Lines/Tubes:   ETT: 6/8/18. OGT: 6/8/18. Central line: right subclavian 6/4/18 (site examined, no erythema, induration, discharge or sign of infection. Plan for picc line Monday. Dressing intact. Medically necessary, will remove it when not needed. Central line bundle followed). Fry: 6/4/18 (Medically necessary for strict input/output monitoring in critically ill patient, will remove it when not needed. Fry bundle followed). Very poor overall prognosis with MODS/MOF discussed with pt's brother at bedside, answered all questions to his satisfaction. Will defer respective systems problem management to primary and other respective consultant and follow patient in ICU with primary and other medical team.  Further recommendations will be based on the patient's response to recommended treatment and results of the investigation ordered. Quality Care: PPI, DVT prophylaxis, HOB elevated, Infection control all reviewed and addressed. Code status - DNR    Care of plan d/w RN, RT  Updated patient brothers at bedside. High complexity decision making was performed during the evaluation of this patient at high risk for decompensation with multiple organ involvement. Total critical care time spent rendering care exclusive of procedures: 45 minutes.          Jermaine Dacosta MD  6/16/2018

## 2018-06-16 NOTE — PROGRESS NOTES
Problem: Mobility Impaired (Adult and Pediatric)  Goal: *Acute Goals and Plan of Care (Insert Text)  Physical Therapy Goals  Initiated 6/15/2018 and to be accomplished within 3-7 day(s)  1. Patient will move from supine to sit and sit to supine  in bed with maximal assistance. 2.  Patient will transfer from bed to chair and chair to bed with maximal assistance using the least restrictive device. 3.  Patient will perform sit to stand with maximal assistance. 4.  Patient will ambulate with maximal assistance for 5 feet with the least restrictive device. Outcome: Not Progressing Towards Goal  PT session held due to:  []  Nausea/vomiting  [x]  Surgeon's request   []  Extreme Pain  []  Dialysis treatment in progress. Will f/u tomorrow, if cleared to resume. Thank you.   Justina Sher, PTA

## 2018-06-16 NOTE — PROGRESS NOTES
1940: Pt arrived to ICU. Bedside report received from JAGDISH Hoyt and ILANA Youngblood, [place]. Surgeon(s):Dr. Forman and Procedure(s):WOUND EXPLORATION, EXPLORATORY LAPAROTOMY, LYSIS OF ADHESIONS, ILLEOSTOMY AND WOUND VAC PLACEMENT,    Confirmed with allergies and dressings discussed. Abdomen dressing is clean and intact, wound vac in place. BRANDON x 2 to bilateral sides of abdomen; draining and charged. Anesthesia type, drugs, patient history, complications, estimated blood loss and replacements (2 units PRBC), vital signs, intake and output, and last pain medication, lines, reversal medications and temperature were reviewed. Pt lethargic, sedated from surgery. Verified IV gtts and antibiotics to be given. ICU monitoring initiated. See doc flow sheet for detailed physical assessment. Family  Updated. 2000: Assessment complete, see flow sheets. [id number]: Discussed with Dr. Radha Colón pt's plan of care. Lab work to ordered, drawn, and sent to lab. Pending results. Per Dr. Radha Colón, pt not to receive anything through NG tube. Medications modified and/or discontinued. (See MAR). 2040: Fentanyl PCA started and verified with Ginger Palomo, [place]. Heparin gtt restarted at previous rate, pending PTT for any changes. See MAR.     2112: PTT result 24.5 SEC, per protocol orders placed for 80 units/kg as bolus and increase rate, see MAR. Verified with Ginger Palomo, [place].    0000: Reassessment. Pt more alert at this time. Nods head appropriately. 0100: RT at the bedside. Vent settings changed. 0400: Reassessment, no changes. 0128: Critical PTT called from Ottoniel Jaime in lab, notified Dr. Chilango Love. Follow orders from protocol, see MAR.     0730: Bedside and Verbal shift change report given to ILANA Nicole RN (oncoming nurse) by Isamar Dean RN (offgoing nurse).  Report included the following information SBAR, Intake/Output, MAR, Recent Results and Cardiac Rhythm ST.

## 2018-06-16 NOTE — PROGRESS NOTES
0720  Received report from ILANA Woods, offgoing RN, at bedside using [employer]. All questions answered and clinicals reviewed. CVP and Kay transducing. Alarm parameters verified. Pt states pain 9/10. Fentanyl PCA verified at 50mcg/hr. 56  Dr Genevieve Faithr in to see patient at bedside. Pts son also present. Orders received to increase Fentanyl PCA and parameters adjusted. Pt increased to 125 mcg/hr. Pt alert and oriented for Dr. Tanner Luevano assessment. BRANDON drains x 2 with serous fluid suctioning. Wound vac in place suctioning lt brown clear fluid in small amouts. Dr. Genevieve Candelario updated pts son, Araceli Lang, at length at bedside. Pt nods head \"yes\" to questions regarding her feeling better.

## 2018-06-16 NOTE — PROGRESS NOTES
Problem: Falls - Risk of  Goal: *Absence of Falls  Document Goyo Fall Risk and appropriate interventions in the flowsheet.    Outcome: Progressing Towards Goal  Fall Risk Interventions:  Mobility Interventions: PT Consult for mobility concerns, Bed/chair exit alarm, Communicate number of staff needed for ambulation/transfer    Mentation Interventions: Adequate sleep, hydration, pain control, Toileting rounds, Update white board, More frequent rounding, Evaluate medications/consider consulting pharmacy    Medication Interventions: Evaluate medications/consider consulting pharmacy    Elimination Interventions: Call light in reach, Patient to call for help with toileting needs, Toilet paper/wipes in reach, Toileting schedule/hourly rounds

## 2018-06-16 NOTE — PROGRESS NOTES
Hospitalist Progress Note    Patient: Ania Fonseca MRN: [id number]  CSN: [id number]    YOB: 1950  Age: 75 y.o.   Sex: female    DOA: 6/4/2018 LOS:  LOS: 12 days          Chief Complaint:    sepsis      Assessment/Plan     Sepsis due to peritonitis  S/p lapartomy prior for peritonitis  S/p ovarian ca debulking surgery  6/15-s/p exp lap surgery for intraabdominla fluid collection/wound vac and ileostomy placed  Acute DVT and presumed PE  Ovarian ca  JESS  Hypoalbuminemia    Pressors off  IVF, IV fentanyl, continue IV zosyn    Drains as per surgfery  NG tube to suction  Ileostomy bag management  Wound vac    Vent management-10 PEEP    Remains in guarded condition  ICU care    DNR status  Disposition :  Patient Active Problem List   Diagnosis Code    Ovarian ca ([facility] C56.9    Severe obesity (BMI 35.0-39.9) ([facility]) E66.01    Abdominal pain R10.9    Sepsis ([facility] A41.9    Oliguria R34    Acute respiratory failure ([facility]) J96.00    JESS (acute kidney injury) ([facility] [address] [facility] O89.8    Metabolic acidosis Q34.9    Acute deep vein thrombosis (DVT) of lower extremity ([facility]) I82.409    S/P ileostomy ([facility] [address] [facility] Z93.2    Hypoalbuminemia E88.09    Peritonitis ([facility]) K65.9       Subjective:        No new events  Nursing at bedside-UOP good overnight  Off pressors  On IV heparin, IV abx and fentanyl  She is awake  No new events noted      Vital signs/Intake and Output:  Visit Vitals    /82    Pulse (!) 120    Temp 97.7 °F (36.5 °C)    Resp 23    Ht 5' 1\" (1.549 m)    Wt 91.1 kg (200 lb 13.4 oz)    SpO2 97%    BMI 37.95 kg/m2     Current Shift:  06/16 0701 - 06/16 1900  In: -   Out: 160 [Drains:160]  Last three shifts:  06/14 1901 - 06/16 0700  In: 6971.8 [I.V.:6134.8]  Out: 9213 [Urine:2650; Drains:425]    Exam:    General: elderly WF on vent, awake, NAD  Head/Neck: NCAT, supple, No masses, No lymphadenopathy  CVS:tachy regular, no M/R/G, S1/S2 heard, no thrill  Lungs:Clear to auscultation bilaterally, no wheezes, rhonchi, or rales  Abdomen: wound vac in midline incision, drain, ileostomy bag  Extremities: 1 plus edema  Neuro:grossly normal , follows commands  Psych:appropriate                Labs: Results:       Chemistry Recent Labs      06/16/18   0345  06/15/18   2050  06/15/18   0445   06/14/18   0500   GLU  205*  207*  184*   --   175*   NA  146*  148*  149*   --   148*   K  4.1  4.5  3.5   < >  3.3*   CL  113*  114*  113*   --   110*   CO2  18*  22  25   --   25   BUN  46*  43*  46*   --   59*   CREA  1.92*  1.69*  1.61*   --   2.10*   CA  6.8*  6.9*  7.8*   --   7.5*   AGAP  15  12  11   --   13   BUCR  24*  25*  29*   --   28*   AP   --   83  105   --   115   TP   --   4.6*  5.3*   --   4.9*   ALB   --   1.2*  1.4*   --   1.4*   GLOB   --   3.4  3.9   --   3.5   AGRAT   --   0.4*  0.4*   --   0.4*    < > = values in this interval not displayed. CBC w/Diff Recent Labs      06/16/18   0345  06/15/18   2050  06/15/18   0445   WBC  20.5*  19.2*  11.3   RBC  5.04  5.60*  3.94*   HGB  12.0  13.3  8.6*   HCT  36.7  41.5  27.2*   PLT  168  205  163   GRANS   --   34*   --    LYMPH   --   20   --    EOS   --   0   --       Cardiac Enzymes No results for input(s): CPK, CKND1, FABIAN in the last 72 hours. No lab exists for component: CKRMB, TROIP   Coagulation Recent Labs      06/16/18   0345  06/15/18   2050   PTP   --   14.8   INR   --   1.2   APTT  >180.0*  24.5       Lipid Panel No results found for: CHOL, CHOLPOCT, CHOLX, CHLST, CHOLV, 567814, HDL, LDL, LDLC, DLDLP, 388344, VLDLC, VLDL, TGLX, TRIGL, TRIGP, TGLPOCT, CHHD, CHHDX   BNP No results for input(s): BNPP in the last 72 hours.    Liver Enzymes Recent Labs      06/15/18   2050   TP  4.6*   ALB  1.2*   AP  83   SGOT  34      Thyroid Studies No results found for: T4, T3U, TSH, TSHEXT, TSHEXT     Procedures/imaging: see electronic medical records for all procedures/Xrays and details which were not copied into this note but were reviewed prior to creation of Jodie Granado MD

## 2018-06-16 NOTE — OP NOTES
[address] REPORT    Jenny Robertson  MR#: [id number]  : 1950  ACCOUNT #: [de-identified]   DATE OF SERVICE: 06/15/2018    PREOPERATIVE DIAGNOSES:   1. Drainage from abdominal incision. 2.  Suspect dehiscence or fistula. 3.  Postoperative day 10 status post laparotomy  4. Stage IC Clear Cell Ovarian Adenocarcinoma    POSTOPERATIVE DIAGNOSES:  1. Sigmoid diverticulitis with suspected perforation. 2.  Peritonitis. 3.  Postoperative day 10 status post laparotomy  4. Stage IC Clear Cell Ovarian Adenocarcinoma    PROCEDURES PERFORMED:  1. Wound exploration. 2.  Exploratory laparotomy. 3.  Lysis of adhesions. 4.  Ileostomy and wound VAC placement dictated separately by Dr. Doherty Stains:  Elsy Perry. MD Dasia    CO-SURGEON:  Nelly Perdue DO    ASSISTANT:    1. Gabby Tenorio SA   2. Moe Nichole SA.    ANESTHESIA:  General.    ANESTHESIOLOGIST:  Delfina Garces MD    CRNAs:  John Cason CRNA; Dandy Dempsey CRNA; Jessy Briscoe CRNA.    ESTIMATED BLOOD LOSS:  500 mL. TRANSFUSION:  Packed red blood cells 2 units. SPECIMENS REMOVED:  Wound cultures for Gram stain, aerobic and anaerobic. FINDINGS:  Copious yellow-brown watery drainage from the vertical midline incision. Four liters of bloody ascites with inflammatory exudate. Bile stained exudate present on the visceral and parietal peritoneum in the vicinity of the sigmoid colon consistent with recent perforation. Sigmoid diverticula. Indurated inflamed sigmoid colon consistent with diverticulitis. No evidence of residual malignancy. COMPLICATIONS:  None. IMPLANTS:    1. 10 Surinamese flat BRANDON drain placed intraperitoneal in the left lolis-colic gutter and exiting the LLQ. 2. 19 kelley drain placed intraperitoneal in the pelvic cul de sac and exiting the RLQ.     INDICATIONS: Kindra Nathan is a 50-year-old  female postoperative day #10 status post diagnostic laparoscopy converted to laparotomy, peritoneal biopsies and peritoneal lavage for suspected abdominal sepsis. Patient has been in critical care since surgery. She had a remarkable clinical recovery from sepsis and was progressing well. She was weaning from the ventilator and tolerating tube feeding with several days of bowel movements and no nausea or vomiting. Yesterday, patient began having a minimal amount of serous drainage from the midportion of the wound. There was no wound erythema or skin irritation and the drainage was odorless. The wound was reevaluated this morning. The drainage had increased significantly and had a yellow-brown color, still odorless. I was concerned about a draining wound seroma versus wound dehiscence versus fistula and counseled the patient regarding thorough evaluation in the operating room. I consented the patient for wound exploration, possible exploratory laparotomy and wound VAC placement. Patient desired to proceed. DESCRIPTION OF PROCEDURE:  After the patient was accurately identified, consent was verified signed on the chart, and all questions were answered. The patient was taken to the operating room on the ventilator with IV running. She was placed in the supine position. She underwent dosing of general anesthesia. The Exofin was removed from the incision and the abdomen was prepped with ChloraPrep and sterile drapes were applied. The incision was sharply opened around the area of drainage. There was copious yellow-brown drainage. The entire wound was sharply opened. Yellow-brown Inflammatory exudate was present on the majority of the wound. Fluid was noted to be coming through the fascial closure. There was no fascial necrosis. The fascial sutures were removed to perform an exploratory laparotomy. Extremely inflamed small bowel and colon were identified adherent immediately under the incision.  There were inflammatory loop to loop adhesions of the bowel and inflammatory adhesions of the bowel to the abdominal and pelvic wall. The adhesions were mostly bluntly reduced and sharp lysis of adhesions was performed in some locations with Bovie cautery and Metzenbaum scissors. 4 liters of bloody ascites was evacuated. Once the adhesions were reduced the entire bowel was examined. The small bowel was ran from the ileocecal valve to the ligament of Treitz. Most of the small bowel was clean with moderate induration with the exception of the portion that was directly below the incision. There was bile stained inflammatory exudate in the right lower quadrant and pelvis around the area of the sigmoid colon. The sigmoid colon was very indurated and inflamed with diverticula present. The appendix and cecum were examined, there was no perforation identified. The small bowel was examined thoroughly and no evidence of perforation was identified. There was bile stained exudate on an area of the transverse colon. There was also bile stained exudate in the pelvis. This was highly suspicious for a recent perforation. There was no active leakage of stool or air from any portion of the bowel. After completely examining the abdomen and not identifying a clear site of the perforation, I consulted colorectal surgeon, Dr. Fred Salcido, for her opinion regarding intraoperative management of this patient. Dr. Josh Jerez scrubbed in to evaluate the patient and agreed that there had likely been a perforation, most likely from the diverticular diseased sigmoid colon. She also did not identify an active area of perforation, but was highly suspicious that the location was in the sigmoid colon where diverticulitis was present. Due to my concern for the area in the transverse colon that may have also been a site of perforation, the decision was made to proceed with a distal ileostomy for bowel diversion. Dr. Josh Jerez performed the ileostomy which is dictated separately.   The abdomen and pelvis were then copiously irrigated with saline and Bacitracin. Drains were placed in the left pericolic gutter, and the pelvic cul-de-sac exiting through the right and left lower quadrants. All instrument and sponge counts were correct. The abdominal wall was closed in a bulk fashion using interrupted #1 PDS sutures in a figure-of-eight fashion. The suprafascial tissue was copiously irrigated, a wound VAC was placed. The ileostomy was matured and appliance was placed. The patient was awakened from anesthesia, left intubated and transferred back to the intensive care unit. All sharp, instrument, sponge counts were correct. There were no complications with the procedure.       Sydni Suero MD DMD / MN  D: 06/15/2018 20:24     T: 06/16/2018 00:15  JOB #: [id number]

## 2018-06-17 ENCOUNTER — APPOINTMENT (OUTPATIENT)
Dept: GENERAL RADIOLOGY | Age: 68
DRG: 853 | End: 2018-06-17
Attending: OBSTETRICS & GYNECOLOGY
Payer: MEDICARE

## 2018-06-17 ENCOUNTER — APPOINTMENT (OUTPATIENT)
Dept: GENERAL RADIOLOGY | Age: 68
DRG: 853 | End: 2018-06-17
Attending: INTERNAL MEDICINE
Payer: MEDICARE

## 2018-06-17 LAB
ABO + RH BLD: NORMAL
ALBUMIN SERPL-MCNC: 1.5 G/DL (ref 3.4–5)
ALBUMIN/GLOB SERPL: 0.5 {RATIO} (ref 0.8–1.7)
ALP SERPL-CCNC: 51 U/L (ref 45–117)
ALT SERPL-CCNC: 60 U/L (ref 13–56)
ANION GAP SERPL CALC-SCNC: 11 MMOL/L (ref 3–18)
ANION GAP SERPL CALC-SCNC: 13 MMOL/L (ref 3–18)
APTT PPP: 100.6 SEC (ref 23–36.4)
APTT PPP: 76 SEC (ref 23–36.4)
ARTERIAL PATENCY WRIST A: ABNORMAL
ARTERIAL PATENCY WRIST A: ABNORMAL
ARTERIAL PATENCY WRIST A: NO
AST SERPL-CCNC: 15 U/L (ref 15–37)
BASE DEFICIT BLD-SCNC: 3 MMOL/L
BASE DEFICIT BLD-SCNC: 4 MMOL/L
BASE DEFICIT BLD-SCNC: 5 MMOL/L
BASOPHILS # BLD: 0 K/UL (ref 0–0.1)
BASOPHILS NFR BLD: 0 % (ref 0–3)
BDY SITE: ABNORMAL
BILIRUB SERPL-MCNC: 0.4 MG/DL (ref 0.2–1)
BLD PROD TYP BPU: NORMAL
BLOOD GROUP ANTIBODIES SERPL: NORMAL
BODY TEMPERATURE: 98.4
BPU ID: NORMAL
BUN SERPL-MCNC: 52 MG/DL (ref 7–18)
BUN SERPL-MCNC: 54 MG/DL (ref 7–18)
BUN/CREAT SERPL: 24 (ref 12–20)
BUN/CREAT SERPL: 25 (ref 12–20)
CA-I SERPL-SCNC: 1.07 MMOL/L (ref 1.12–1.32)
CALCIUM SERPL-MCNC: 7.2 MG/DL (ref 8.5–10.1)
CALCIUM SERPL-MCNC: 7.3 MG/DL (ref 8.5–10.1)
CALLED TO:,BCALL1: NORMAL
CALLED TO:,BCALL2: NORMAL
CHLORIDE SERPL-SCNC: 110 MMOL/L (ref 100–108)
CHLORIDE SERPL-SCNC: 110 MMOL/L (ref 100–108)
CO2 SERPL-SCNC: 22 MMOL/L (ref 21–32)
CO2 SERPL-SCNC: 23 MMOL/L (ref 21–32)
CREAT SERPL-MCNC: 2.12 MG/DL (ref 0.6–1.3)
CREAT SERPL-MCNC: 2.24 MG/DL (ref 0.6–1.3)
CROSSMATCH RESULT,%XM: NORMAL
DIFFERENTIAL METHOD BLD: ABNORMAL
EOSINOPHIL # BLD: 0 K/UL (ref 0–0.4)
EOSINOPHIL NFR BLD: 0 % (ref 0–5)
ERYTHROCYTE [DISTWIDTH] IN BLOOD BY AUTOMATED COUNT: 21.9 % (ref 11.6–14.5)
GAS FLOW.O2 O2 DELIVERY SYS: ABNORMAL L/MIN
GAS FLOW.O2 SETTING OXYMISER: 14 BPM
GAS FLOW.O2 SETTING OXYMISER: 35 L/M
GAS FLOW.O2 SETTING OXYMISER: 4 L/M
GLOBULIN SER CALC-MCNC: 3.1 G/DL (ref 2–4)
GLUCOSE BLD STRIP.AUTO-MCNC: 173 MG/DL (ref 70–110)
GLUCOSE BLD STRIP.AUTO-MCNC: 175 MG/DL (ref 70–110)
GLUCOSE BLD STRIP.AUTO-MCNC: 200 MG/DL (ref 70–110)
GLUCOSE SERPL-MCNC: 159 MG/DL (ref 74–99)
GLUCOSE SERPL-MCNC: 162 MG/DL (ref 74–99)
HCO3 BLD-SCNC: 19.3 MMOL/L (ref 22–26)
HCO3 BLD-SCNC: 20.4 MMOL/L (ref 22–26)
HCO3 BLD-SCNC: 20.6 MMOL/L (ref 22–26)
HCT VFR BLD AUTO: 24.3 % (ref 35–45)
HCT VFR BLD AUTO: 25.5 % (ref 35–45)
HCT VFR BLD AUTO: 25.5 % (ref 35–45)
HGB BLD-MCNC: 8 G/DL (ref 12–16)
HGB BLD-MCNC: 8.3 G/DL (ref 12–16)
HGB BLD-MCNC: 8.3 G/DL (ref 12–16)
INR PPP: 1.3 (ref 0.8–1.2)
INSPIRATION.DURATION SETTING TIME VENT: 1 SEC
LACTATE SERPL-SCNC: 2.3 MMOL/L (ref 0.4–2)
LYMPHOCYTES # BLD: 2.8 K/UL (ref 0.8–3.5)
LYMPHOCYTES NFR BLD: 14 % (ref 20–51)
MAGNESIUM SERPL-MCNC: 2 MG/DL (ref 1.6–2.6)
MAGNESIUM SERPL-MCNC: 2 MG/DL (ref 1.6–2.6)
MCH RBC QN AUTO: 23.6 PG (ref 24–34)
MCHC RBC AUTO-ENTMCNC: 32.5 G/DL (ref 31–37)
MCV RBC AUTO: 72.4 FL (ref 74–97)
METAMYELOCYTES NFR BLD MANUAL: 2 %
MONOCYTES # BLD: 0.6 K/UL (ref 0–1)
MONOCYTES NFR BLD: 3 % (ref 2–9)
MYELOCYTES NFR BLD MANUAL: 1 %
NEUTS BAND NFR BLD MANUAL: 7 % (ref 0–5)
NEUTS SEG # BLD: 14.6 K/UL (ref 1.8–8)
NEUTS SEG NFR BLD: 73 % (ref 42–75)
O2/TOTAL GAS SETTING VFR VENT: 35 %
O2/TOTAL GAS SETTING VFR VENT: 36 %
O2/TOTAL GAS SETTING VFR VENT: 40 %
PCO2 BLD: 26 MMHG (ref 35–45)
PCO2 BLD: 27.8 MMHG (ref 35–45)
PCO2 BLD: 32.8 MMHG (ref 35–45)
PEEP RESPIRATORY: 10 CMH2O
PH BLD: 7.41 [PH] (ref 7.35–7.45)
PH BLD: 7.45 [PH] (ref 7.35–7.45)
PH BLD: 7.5 [PH] (ref 7.35–7.45)
PLATELET # BLD AUTO: 141 K/UL (ref 135–420)
PLATELET COMMENTS,PCOM: ABNORMAL
PO2 BLD: 123 MMHG (ref 80–100)
PO2 BLD: 61 MMHG (ref 80–100)
PO2 BLD: 64 MMHG (ref 80–100)
POTASSIUM SERPL-SCNC: 3.3 MMOL/L (ref 3.5–5.5)
POTASSIUM SERPL-SCNC: 3.5 MMOL/L (ref 3.5–5.5)
PROT SERPL-MCNC: 4.6 G/DL (ref 6.4–8.2)
PROTHROMBIN TIME: 15.8 SEC (ref 11.5–15.2)
RBC # BLD AUTO: 3.52 M/UL (ref 4.2–5.3)
RBC MORPH BLD: ABNORMAL
SAO2 % BLD: 93 % (ref 92–97)
SAO2 % BLD: 94 % (ref 92–97)
SAO2 % BLD: 99 % (ref 92–97)
SERVICE CMNT-IMP: ABNORMAL
SODIUM SERPL-SCNC: 144 MMOL/L (ref 136–145)
SODIUM SERPL-SCNC: 145 MMOL/L (ref 136–145)
SPECIMEN EXP DATE BLD: NORMAL
SPECIMEN TYPE: ABNORMAL
STATUS OF UNIT,%ST: NORMAL
TOTAL RESP. RATE, ITRR: 16
TOTAL RESP. RATE, ITRR: 20
TOTAL RESP. RATE, ITRR: 32
UNIT DIVISION, %UDIV: 0
VENTILATION MODE VENT: ABNORMAL
VOLUME CONTROL PLUS IVLCP: YES
VT SETTING VENT: 400 ML
WBC # BLD AUTO: 20 K/UL (ref 4.6–13.2)
WBC MORPH BLD: ABNORMAL

## 2018-06-17 PROCEDURE — 82330 ASSAY OF CALCIUM: CPT | Performed by: INTERNAL MEDICINE

## 2018-06-17 PROCEDURE — 77010033678 HC OXYGEN DAILY

## 2018-06-17 PROCEDURE — 74011636637 HC RX REV CODE- 636/637: Performed by: INTERNAL MEDICINE

## 2018-06-17 PROCEDURE — 71045 X-RAY EXAM CHEST 1 VIEW: CPT

## 2018-06-17 PROCEDURE — 80048 BASIC METABOLIC PNL TOTAL CA: CPT | Performed by: INTERNAL MEDICINE

## 2018-06-17 PROCEDURE — 85610 PROTHROMBIN TIME: CPT | Performed by: OBSTETRICS & GYNECOLOGY

## 2018-06-17 PROCEDURE — C9113 INJ PANTOPRAZOLE SODIUM, VIA: HCPCS | Performed by: INTERNAL MEDICINE

## 2018-06-17 PROCEDURE — 83605 ASSAY OF LACTIC ACID: CPT | Performed by: OBSTETRICS & GYNECOLOGY

## 2018-06-17 PROCEDURE — 85018 HEMOGLOBIN: CPT | Performed by: OBSTETRICS & GYNECOLOGY

## 2018-06-17 PROCEDURE — 74011000258 HC RX REV CODE- 258: Performed by: INTERNAL MEDICINE

## 2018-06-17 PROCEDURE — 85730 THROMBOPLASTIN TIME PARTIAL: CPT | Performed by: INTERNAL MEDICINE

## 2018-06-17 PROCEDURE — 94003 VENT MGMT INPAT SUBQ DAY: CPT

## 2018-06-17 PROCEDURE — 85025 COMPLETE CBC W/AUTO DIFF WBC: CPT | Performed by: OBSTETRICS & GYNECOLOGY

## 2018-06-17 PROCEDURE — 83735 ASSAY OF MAGNESIUM: CPT | Performed by: OBSTETRICS & GYNECOLOGY

## 2018-06-17 PROCEDURE — 74011250636 HC RX REV CODE- 250/636: Performed by: OBSTETRICS & GYNECOLOGY

## 2018-06-17 PROCEDURE — 80053 COMPREHEN METABOLIC PANEL: CPT | Performed by: OBSTETRICS & GYNECOLOGY

## 2018-06-17 PROCEDURE — 77010033711 HC HIGH FLOW OXYGEN

## 2018-06-17 PROCEDURE — 83735 ASSAY OF MAGNESIUM: CPT | Performed by: INTERNAL MEDICINE

## 2018-06-17 PROCEDURE — 74011000250 HC RX REV CODE- 250: Performed by: INTERNAL MEDICINE

## 2018-06-17 PROCEDURE — 65270000029 HC RM PRIVATE

## 2018-06-17 PROCEDURE — 82803 BLOOD GASES ANY COMBINATION: CPT

## 2018-06-17 PROCEDURE — 74011250636 HC RX REV CODE- 250/636: Performed by: HOSPITALIST

## 2018-06-17 PROCEDURE — 74011250636 HC RX REV CODE- 250/636: Performed by: INTERNAL MEDICINE

## 2018-06-17 PROCEDURE — P9047 ALBUMIN (HUMAN), 25%, 50ML: HCPCS | Performed by: INTERNAL MEDICINE

## 2018-06-17 PROCEDURE — 82962 GLUCOSE BLOOD TEST: CPT

## 2018-06-17 RX ORDER — POTASSIUM CHLORIDE 7.45 MG/ML
10 INJECTION INTRAVENOUS
Status: DISCONTINUED | OUTPATIENT
Start: 2018-06-17 | End: 2018-06-17

## 2018-06-17 RX ORDER — POTASSIUM CHLORIDE 7.45 MG/ML
10 INJECTION INTRAVENOUS
Status: DISCONTINUED | OUTPATIENT
Start: 2018-06-17 | End: 2018-06-17 | Stop reason: SDUPTHER

## 2018-06-17 RX ORDER — LEVOFLOXACIN 5 MG/ML
750 INJECTION, SOLUTION INTRAVENOUS
Status: DISCONTINUED | OUTPATIENT
Start: 2018-06-18 | End: 2018-06-28 | Stop reason: DRUGHIGH

## 2018-06-17 RX ORDER — POTASSIUM CHLORIDE 14.9 MG/ML
20 INJECTION INTRAVENOUS ONCE
Status: COMPLETED | OUTPATIENT
Start: 2018-06-17 | End: 2018-06-22

## 2018-06-17 RX ORDER — POTASSIUM CHLORIDE 7.45 MG/ML
10 INJECTION INTRAVENOUS ONCE
Status: COMPLETED | OUTPATIENT
Start: 2018-06-17 | End: 2018-06-22

## 2018-06-17 RX ORDER — FUROSEMIDE 10 MG/ML
20 INJECTION INTRAMUSCULAR; INTRAVENOUS
Status: COMPLETED | OUTPATIENT
Start: 2018-06-17 | End: 2018-06-17

## 2018-06-17 RX ORDER — POTASSIUM CHLORIDE 14.9 MG/ML
10 INJECTION INTRAVENOUS
Status: COMPLETED | OUTPATIENT
Start: 2018-06-17 | End: 2018-06-22

## 2018-06-17 RX ADMIN — Medication 75 MCG/HR: at 19:36

## 2018-06-17 RX ADMIN — PIPERACILLIN SODIUM,TAZOBACTAM SODIUM 2.25 G: 2; .25 INJECTION, POWDER, FOR SOLUTION INTRAVENOUS at 17:56

## 2018-06-17 RX ADMIN — Medication 20 MEQ: at 18:39

## 2018-06-17 RX ADMIN — ALBUMIN (HUMAN) 12.5 G: 0.25 INJECTION, SOLUTION INTRAVENOUS at 17:57

## 2018-06-17 RX ADMIN — INSULIN LISPRO 2 UNITS: 100 INJECTION, SOLUTION INTRAVENOUS; SUBCUTANEOUS at 05:30

## 2018-06-17 RX ADMIN — CALCIUM GLUCONATE 2 G: 94 INJECTION, SOLUTION INTRAVENOUS at 06:12

## 2018-06-17 RX ADMIN — FUROSEMIDE 20 MG: 10 INJECTION, SOLUTION INTRAMUSCULAR; INTRAVENOUS at 07:14

## 2018-06-17 RX ADMIN — METRONIDAZOLE 500 MG: 500 INJECTION, SOLUTION INTRAVENOUS at 18:39

## 2018-06-17 RX ADMIN — ALBUMIN (HUMAN) 12.5 G: 0.25 INJECTION, SOLUTION INTRAVENOUS at 08:00

## 2018-06-17 RX ADMIN — Medication 20 MEQ: at 09:58

## 2018-06-17 RX ADMIN — Medication 75 MCG/HR: at 12:37

## 2018-06-17 RX ADMIN — PIPERACILLIN SODIUM,TAZOBACTAM SODIUM 2.25 G: 2; .25 INJECTION, POWDER, FOR SOLUTION INTRAVENOUS at 12:27

## 2018-06-17 RX ADMIN — HEPARIN SODIUM 16 UNITS/KG/HR: 10000 INJECTION, SOLUTION INTRAVENOUS at 07:55

## 2018-06-17 RX ADMIN — HEPARIN SODIUM 16 UNITS/KG/HR: 10000 INJECTION, SOLUTION INTRAVENOUS at 09:17

## 2018-06-17 RX ADMIN — ALBUMIN (HUMAN) 12.5 G: 0.25 INJECTION, SOLUTION INTRAVENOUS at 12:27

## 2018-06-17 RX ADMIN — HEPARIN SODIUM 16 UNITS/KG/HR: 10000 INJECTION, SOLUTION INTRAVENOUS at 19:36

## 2018-06-17 RX ADMIN — CALCIUM GLUCONATE 2 G: 94 INJECTION, SOLUTION INTRAVENOUS at 17:56

## 2018-06-17 RX ADMIN — METRONIDAZOLE 500 MG: 500 INJECTION, SOLUTION INTRAVENOUS at 02:00

## 2018-06-17 RX ADMIN — FUROSEMIDE 40 MG: 10 INJECTION, SOLUTION INTRAMUSCULAR; INTRAVENOUS at 08:39

## 2018-06-17 RX ADMIN — PIPERACILLIN SODIUM,TAZOBACTAM SODIUM 2.25 G: 2; .25 INJECTION, POWDER, FOR SOLUTION INTRAVENOUS at 05:30

## 2018-06-17 RX ADMIN — METRONIDAZOLE 500 MG: 500 INJECTION, SOLUTION INTRAVENOUS at 09:14

## 2018-06-17 RX ADMIN — SODIUM CHLORIDE 40 MG: 9 INJECTION INTRAMUSCULAR; INTRAVENOUS; SUBCUTANEOUS at 08:39

## 2018-06-17 RX ADMIN — INSULIN LISPRO 4 UNITS: 100 INJECTION, SOLUTION INTRAVENOUS; SUBCUTANEOUS at 00:50

## 2018-06-17 RX ADMIN — PIPERACILLIN SODIUM,TAZOBACTAM SODIUM 2.25 G: 2; .25 INJECTION, POWDER, FOR SOLUTION INTRAVENOUS at 00:13

## 2018-06-17 RX ADMIN — POTASSIUM CHLORIDE 10 MEQ: 10 INJECTION, SOLUTION INTRAVENOUS at 22:11

## 2018-06-17 RX ADMIN — ALBUMIN (HUMAN) 12.5 G: 0.25 INJECTION, SOLUTION INTRAVENOUS at 00:13

## 2018-06-17 NOTE — PROGRESS NOTES
Pharmacy Dosing Services:   Levofloxacin Dosing    Indication:   Intra-abdominal Infection    Previous Regimen Levofloxacin 750 mg IV q24hrs   Serum Creatinine Lab Results   Component Value Date/Time    Creatinine 2.24 (H) 06/17/2018 08:10 AM      Creatinine Clearance Estimated Creatinine Clearance: 24.7 mL/min (based on Cr of 2.24). BUN Lab Results   Component Value Date/Time    BUN 54 (H) 06/17/2018 08:10 AM    BUN, POC 37 (H) 06/15/2018 05:14 PM         Dose administration notes:   Changed to Levofloxacin 750 mg IV q48hrs per renal dosing protocol. Plan:  Continue to monitor     Kamron South Senior  [phone]

## 2018-06-17 NOTE — PROGRESS NOTES
Received call from attending nurse, Lc Mena who reported patient self-extubated just now. Patient was suctioned with only white frothy sputum. 4Lnc applied. Fentanyl drip held. Lasix given. CXR and ABG ordered. Patient seems alert and oriented per nurse. Patient only said \"Help Me\"  I called brother, Makenna Le, and made him aware of events and asked him to please come to the hospital to be with Aislinn Irene for orientation, support and comfort. He is on his way.   Diane Odell MD

## 2018-06-17 NOTE — PROGRESS NOTES
Hospitalist Progress Note    Patient: Lily Pope MRN: [id number]  CSN: [id number]    YOB: 1950  Age: 75 y.o.   Sex: female    DOA: 6/4/2018 LOS:  LOS: 13 days          Chief Complaint:    sepsis      Assessment/Plan        Sepsis due to peritonitis  Ac resp failure with sepsis-self extubated last night-appears stable on NC 02 this am  S/p lapartomy on 6/5 prior for peritonitis  S/p ovarian ca debulking surgery prior to admission-cancer left ovary  6/15-s/p exp lap surgery for intraabdominal fluid collection/wound vac and ileostomy placed  Acute DVT and presumed PE-on heparin gtt  JESS  Hypoalbuminemia    Supportive ICU care  IV antibiotics  Wound and ostomy care  Fry catheter for strict I/O's in severe illness  02 via NC, nebs as needed    IV albumin  lyte repeltion protocol, Calcium replacement  IV antibiotics  Gentle IVF    NG tube is out    Full code  Remains with guarded prognosis      Disposition :  Patient Active Problem List   Diagnosis Code    Ovarian ca ([facility] C56.9    Severe obesity (BMI 35.0-39.9) ([facility]) E66.01    Abdominal pain R10.9    Sepsis ([facility] A41.9    Oliguria R34    Acute respiratory failure ([facility]) J96.00    JESS (acute kidney injury) ([facility] M46.8    Metabolic acidosis C16.2    Acute deep vein thrombosis (DVT) of lower extremity ([facility]) I82.409    S/P ileostomy ([facility] Z93.2    Hypoalbuminemia E88.09    Peritonitis ([facility] K65.9       Subjective:    See below    Review of systems:    She denies pain but is sleepy and weak this am, hard to keep eyes open    Self extubated last night      Vital signs/Intake and Output:  Visit Vitals    /66 (BP 1 Location: Left arm, BP Patient Position: At rest)    Pulse 92    Temp 97.9 °F (36.6 °C)    Resp 14    Ht 5' 1\" (1.549 m)    Wt 91.1 kg (200 lb 13.4 oz)    SpO2 98%    BMI 37.95 kg/m2     Current Shift:  06/17 0701 - 06/17 1900  In: 2272.8 [I.V.:2272.8]  Out: -   Last three shifts:  06/15 1901 - 06/17 0700  In: 4550.9 [I.V.:4550.9]  Out: 2930 [Urine:1170; Drains:1315]    Exam:    General: frail elderly WF, awakens to voice, sleepy, NAD  Head/Neck: NCAT, supple, No masses, No lymphadenopathy  CVS:Regular rate and rhythm, no M/R/G, S1/S2 heard, no thrill  Lungs:Clear to auscultation bilaterally, no wheezes, rhonchi, or rales  Abdomen: wound vac, ostomy bag, diminished BS, drain in place  Extremities: edema 2 plus  Neuro:does folow basic comands                Labs: Results:       Chemistry Recent Labs      06/17/18   0810  06/16/18   0345  06/15/18   2050  06/15/18   0445   GLU  162*  205*  207*  184*   NA  144  146*  148*  149*   K  3.5  4.1  4.5  3.5   CL  110*  113*  114*  113*   CO2  23  18*  22  25   BUN  54*  46*  43*  46*   CREA  2.24*  1.92*  1.69*  1.61*   CA  7.2*  6.8*  6.9*  7.8*   AGAP  11  15  12  11   BUCR  24*  24*  25*  29*   AP  51   --   83  105   TP  4.6*   --   4.6*  5.3*   ALB  1.5*   --   1.2*  1.4*   GLOB  3.1   --   3.4  3.9   AGRAT  0.5*   --   0.4*  0.4*      CBC w/Diff Recent Labs      06/17/18   0810  06/17/18   0407  06/16/18   1800  06/16/18   0345  06/15/18   2050   WBC  20.0*   --    --   20.5*  19.2*   RBC  3.52*   --    --   5.04  5.60*   HGB  8.3*  8.3*  9.5*  12.0  13.3   HCT  25.5*  25.5*  28.7*  36.7  41.5   PLT  141   --    --   168  205   GRANS  73   --    --    --   34*   LYMPH  14*   --    --    --   20   EOS  0   --    --    --   0      Cardiac Enzymes No results for input(s): CPK, CKND1, FABIAN in the last 72 hours. No lab exists for component: CKRMB, TROIP   Coagulation Recent Labs      06/17/18   0810  06/17/18   0407  06/16/18   2035   06/15/18   2050   PTP  15.8*   --    --    --   14.8   INR  1.3*   --    --    --   1.2   APTT   --   100.6*  103.1*   < >  24.5    < > = values in this interval not displayed.        Lipid Panel No results found for: CHOL, CHOLPOCT, CHOLX, CHLST, CHOLV, 140659, HDL, LDL, LDLC, DLDLP, 073245, VLDLC, VLDL, TGLX, TRIGL, TRIGP, TGLPOCT, CHHD, CHHDX   BNP No results for input(s): BNPP in the last 72 hours.    Liver Enzymes Recent Labs      06/17/18   0810   TP  4.6*   ALB  1.5*   AP  51   SGOT  15      Thyroid Studies No results found for: T4, T3U, TSH, TSHEXT     Procedures/imaging: see electronic medical records for all procedures/Xrays and details which were not copied into this note but were reviewed prior to creation of Hoang Malave MD

## 2018-06-17 NOTE — PROGRESS NOTES
Problem: Mobility Impaired (Adult and Pediatric)  Goal: *Acute Goals and Plan of Care (Insert Text)  Physical Therapy Goals  Initiated 6/15/2018 and to be accomplished within 3-7 day(s)  1. Patient will move from supine to sit and sit to supine  in bed with maximal assistance. 2.  Patient will transfer from bed to chair and chair to bed with maximal assistance using the least restrictive device. 3.  Patient will perform sit to stand with maximal assistance. 4.  Patient will ambulate with maximal assistance for 5 feet with the least restrictive device. Outcome: Not Progressing Towards Goal  PT session held due to:  [x]  Upon attempt to start session pt closes eye and stops responding to this writer or her nurse. (Ignoring)  [x]  RN Communication/ suggestion  []  Extreme Pain  []  Dialysis treatment in progress. Will attempt to resume tomorrow. Thank you.   Timoteo Chahal, PTA

## 2018-06-17 NOTE — PROGRESS NOTES
POD#1 s/p WOUND EXPLORATION, EXPLORATORY LAPAROTOMY, LYSIS OF ADHESIONS, ILLEOSTOMY AND WOUND VAC PLACEMENT  Alert. Complaining of pain at incision. Denies nausea.   AF,VSS on vent with FiO2 of 40%, PEEP 10  Abdomen: soft, appropriately tender, nondistended, BS present  Wound: vac in place, wound edges clean without necrosis or erythema, 100cc in vac since OR  Drains: serous drainage,  cc each  per shift  Ileostomy: serosanguinous drainage, no air or stool yet  Ext: 2+ edema  Plan: Continue abx for peritonitis, diverticulitis with perforation  Bowel rest with NGT  Continue heparin gtt for DVT/PE  Wean vent  Fluid management and electrolyte placement  Supportive care

## 2018-06-17 NOTE — PROGRESS NOTES
0730  Received report from Calvin Hernandez RN, at bedside using [employer]. All questions answered and clinical information reviewed. VSS. Pt is extubated. Heparin drip and fentanyl PCA verified with offgoing RN. Alarm parameters verified. 0800  Pts brother in to see patient. Dr. Emiliana Forte in to see patient and update family. 0830  Pt placed on high flow O2. Chest percussion in bed. Pt awake and alert and states pain is controlled. 1100  Dr. Ceballos Doctor in to see patient. Pt awake and alert and nodding appropriately to questions. Pt not able to verbalize but coughs and clears her throat of secretions. Rhonchi in bilateral upper and lower lung fields. Pt states pain 9/10, however, breathing shallow on 35% High flow O2. Dr. Ceballos Doctor updated family on pts status. Will continue to monitor. Assessment completed. 1930  Pt continues to be without respiratory distress. Diuresed over 2000 cc clear yellow urine today. Heparin and Fentanyl drips verified at shift change with Eva Russ RN. Report given at bedside using SBAR format. Pt resting comfortably in NAD with VSS.

## 2018-06-17 NOTE — PROGRESS NOTES
ABG'S WERE DRAWN AS REQUESTED BY DR. Abbey Glynn. NO PANIC VALUES PRESENT. RESULTS SHOWN TO Nesha Nails RN, ICU.

## 2018-06-17 NOTE — PROGRESS NOTES
Report given to oncoming RN, Neyda Hutchins, at bedside using SBAR format. All questions answered and clinicals reviewed. Fentanyl PCA, Heparin drip, BRANDON drains, ileostomy, wound vac, VSS, Bita and CVP and alarm parameters verified.

## 2018-06-17 NOTE — PROGRESS NOTES
Nephrology Progress note    Subjective:     August Curran is a 75 y.o. female with PMH DM, HTN, clear cell ovarian ca s/p debulking who presented with abdominal pain and possible sepsis/ischemic colitis. Pt underwent laparotomy/peritoneal lavage/bx, noted to have decreased UOP and increasing Cr to 2.1 today from baseline 0.6. CT neg for mass or hydro. Pt given lasix yesterday, still with high O2 requirements on vent. Unable to provide further history. On 6/8 Pt decompensated, ? Aspiration,  back on vent was hypoxic despite  Fi02 of 100%, , Hypotensive on IV pressors, Acidotic and on HC03 drip, Urine output decreased markedly over the weekend. Underwent Bronchoscopy 6/9. Pt requires less FiO2 now. UOP picking up. Wound has been draining yellow-brown fluid  - S/P Exploratory laparotomy, Lysis of adhesions, Ileostomy and wound VAC placement on 6/16.     - Pt self-extubated this am, doing ok. Got a dose of Lasix. Currently not in distress    Admit Date: 6/4/2018  Allergy:  Allergies   Allergen Reactions    Hydrocodone Other (comments)     Breaks into cold sweat    Metformin Other (comments)     Breaks out into cold sweat. Can Take Glucophage brand name med        Objective:     Visit Vitals    /66 (BP 1 Location: Left arm, BP Patient Position: At rest)    Pulse 92    Temp 97.9 °F (36.6 °C)    Resp 14    Ht 5' 1\" (1.549 m)    Wt 91.1 kg (200 lb 13.4 oz)    SpO2 98%    BMI 37.95 kg/m2         Intake/Output Summary (Last 24 hours) at 06/17/18 0944  Last data filed at 06/17/18 0800   Gross per 24 hour   Intake          4611.65 ml   Output             1910 ml   Net          2701.65 ml       Physical Exam:     General: Not in pain   HENT: Atraumatic and normocephalic.  ET tube in place   Eyes: Sclera anicteric   Neck: No JVD or mass   Cardiovascular: Normal S1 S2   Pulmonary/Chest Wall: Decreased breath sounds bilaterally   Abdominal: Soft, obese, NABS   Musculoskeletal: ++ edema LEs   Neurological: Opens eyes in response to name       Data Review:      Lab Results   Component Value Date/Time    WBC 20.0 (H) 06/17/2018 08:10 AM    RBC 3.52 (L) 06/17/2018 08:10 AM    HCT 25.5 (L) 06/17/2018 08:10 AM    MCV 72.4 (L) 06/17/2018 08:10 AM    MCH 23.6 (L) 06/17/2018 08:10 AM    MCHC 32.5 06/17/2018 08:10 AM    RDW 21.9 (H) 06/17/2018 08:10 AM      Lab Results   Component Value Date    IRON 8 (L) 06/07/2018   No components found for: FERRITIN  No components found for: PTHINT  Urinalysis  No results found for: UGLU         Impression:     -JESS- likely ATN,  S Cr was down to 1.2,  now trending up slowly at 2.2 , Non-oliguric   -Septic Shock/hypotension, BPs normalized  -Resp Failure,  Was re-intubated 6/9,  Self extubated   -Severe Metabolic acidosis, improving. Lactate 4.7  -Ovarian ca s/p debulking then laparoscopy with lavage  -KLEBSIELLA OXYTOCA sepsis (PD fluid)  -DM  -h.o. HTN  -Anemia  -Elevated liver enzymes  -S/P Exploratory laparotomy, Lysis of adhesions, Ileostomy and wound VAC placement on 6/16        Plan:     Scheduled Lasix on hold for now due to Cr trending up, to be given as needed   Monitor I/O and chemistry, H&H   Antibiotics.     No indication for HD   Vanc dosing per pharmacist  Will follow closely      MD Denver Dennis  [phone]

## 2018-06-17 NOTE — PROGRESS NOTES
3008- Received report from [facility]. Assumed care of patient. 2000-  Assessment complete. Pumps cleared. Drains emptied. Mouth care performed. Respiratory in to assess patient as well. Patient resting in bed. 2030- labs drawn and sent for analysis. 2120- Results reviewed. .1. Therapeutic range. No indication for heparin gtt rate change. 2345- Patient awake in bed. Appears to be in pain. Patient confirms she is in pain by nodding head when questioned. 0000- New vial of fentanyl inserted and rate changed to 150 mcg.    0400- Reassessment complete. No changes. 8707- Was informed that patient extubated self. Responded to room as patient was being suctioned with Vargas. [facility] with Dr. Emiliana Forte. Stat ABG, Chest Xray, 20 ml Lasix, and 2L NC ordered. 4993-  RT placed patient on NC. Increased to 4L. Patient O2 sats at 96%. 202 [facility] with Dr. Ceballos Doctor. Fentanyl restarted at 25 mcg.    0705-  Patient's brother arrived and is at bedside.

## 2018-06-17 NOTE — PROGRESS NOTES
[facility]                  Pulmonary, Critical Care, and Sleep Medicine     Name: Maryam Onofre MRN: [id number]   : 1950 Hospital: [facility] [place]    Date: 2018        PCCM Note                                              Subjective/History of Present Illness:     Patient is a 75 y.o. 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 S/p exploratory laparotomy 6/15/18 - findings of sigmoid diverticulitis with suspected perforation, underwent ileostomy and wound vac placement. 18:  Patient self extubated this am - breathing appears stable; oxygenation 95% on 4 lits nc o2; post-extubation CXR shows mild increase in atelectasis  Patient sleepy, arousable, nods head, weak, on fentanyl drip 75 mg/hr for post-op abdominal and pelvic pains  Sinus tach on tele; BP stable-not on pressors; Afebrile     cc overnight; BRANDON drains 345 cc overnight - serous  Heparin drip for DVT and suspected PEs. Surgeries  18 - Laparoscopy converted to laparotomy, total abdominal hysterectomy, bilateral salpingo-oophorectomy, omentectomy, bilateral pelvic and periaortic lymphadenectomy and radical tumor debulking to R0. Path - ADENOCARCINOMA OF LEFT OVARY.     18 - Diagnostic laparoscopy converted to laparotomy, peritoneal biopsies, aerobic and anaerobic cultures of peritoneal fluid and peritoneal lavage. 6/15/18 - Wound exploration. Exploratory laparotomy. Lysis of adhesions. Ileostomy and wound VAC placement.       Allergies   Allergen Reactions    Hydrocodone Other (comments)     Breaks into cold sweat  Metformin Other (comments)     Breaks out into cold sweat.  Can Take Glucophage brand name med      Past Medical History:   Diagnosis Date    Diabetes ([facility] [address]     many years type 2    Hypertension     many years      Past Surgical History:   Procedure Laterality Date    HX OOPHORECTOMY  2018    HX TONSILLECTOMY      as a child       Social History   Substance Use Topics    Smoking status: Never Smoker    Smokeless tobacco: Never Used    Alcohol use No        Current Facility-Administered Medications   Medication Dose Route Frequency    albumin human 25% (BUMINATE) solution 12.5 g  12.5 g IntraVENous Q6H    metroNIDAZOLE (FLAGYL) IVPB premix 500 mg  500 mg IntraVENous Q8H    levoFLOXacin (LEVAQUIN) 750 mg in D5W IVPB  750 mg IntraVENous Q24H    dextrose 5% infusion  75 mL/hr IntraVENous CONTINUOUS    piperacillin-tazobactam (ZOSYN) 2.25 g in 0.9% sodium chloride (MBP/ADV) 50 mL MBP  2.25 g IntraVENous Q6H    furosemide (LASIX) injection 40 mg  40 mg IntraVENous DAILY    fentaNYL (PF) 900 mcg/30 ml infusion soln  0-200 mcg/hr IntraVENous TITRATE    heparin 25,000 units in D5W 250 ml infusion  18-36 Units/kg/hr IntraVENous TITRATE    insulin lispro (HUMALOG) injection   SubCUTAneous Q6H    pantoprazole (PROTONIX) 40 mg in sodium chloride 0.9% 10 mL injection  40 mg IntraVENous DAILY         Objective:   Vital Signs:    Visit Vitals    /66    Pulse 92    Temp 97.8 °F (36.6 °C)    Resp 14    Ht 5' 1\" (1.549 m)    Wt 91.1 kg (200 lb 13.4 oz)    SpO2 96%    BMI 37.95 kg/m2       O2 Device: Ventilator   O2 Flow Rate (L/min): 50 l/min   Temp (24hrs), Av.2 °F (36.8 °C), Min:97.8 °F (36.6 °C), Max:98.4 °F (36.9 °C)       Intake/Output:   Last shift:           Last 3 shifts: 06/15 1901 -  0700  In: 4550.9 [I.V.:4550.9]  Out: 2930 [Urine:1170; Drains:1315]      Intake/Output Summary (Last 24 hours) at 18 0826  Last data filed at 18 6732   Gross per 24 hour   Intake 2338.89 ml   Output             1910 ml   Net           428.89 ml       ABG:  Post-self extubation  Lab Results   Component Value Date/Time    PHI 7.450 06/17/2018 06:42 AM    PCO2I 27.8 (L) 06/17/2018 06:42 AM    PO2I 64 (L) 06/17/2018 06:42 AM    HCO3I 19.3 (L) 06/17/2018 06:42 AM    FIO2I 36 06/17/2018 06:42 AM       Physical Exam:     General/Neurology: weak, lethargic, sleepy, weakly moving extremities and nodding head; looks comfortable. Head:   Normocephalic, without obvious abnormality, atraumatic. Eye:   no scleral icterus, no pallor, no cyanosis. Pupils not dilated. Neck:   Symmetric. No lymphadenopathy. Trachea midline  Lung: Moderate air entry bilateral equal. Decreased breath sounds bases. No rhonchi. No wheezing. Heart:   Regular rate & rhythm. S1 S2 present. No murmur. No JVD. Abdomen:  Soft. Obese. Sluggish BS. Midline lower abd surgical site - open wound with wound vac. BRANDON drains-serous. RT abd wall Ileostomy. No abd distension or guarding. Extremities:  No cyanosis. No clubbing. Bilateral UE and LE edema. Anasarca. Pulses: Palpable radials and DPs bilateral.   Lymphatic:  No cervical or supraclav lymphadenopathy.    Skin:   No rash      Data:       Recent Results (from the past 12 hour(s))   GLUCOSE, POC    Collection Time: 06/17/18 12:12 AM   Result Value Ref Range    Glucose (POC) 200 (H) 70 - 110 mg/dL   MAGNESIUM    Collection Time: 06/17/18  4:07 AM   Result Value Ref Range    Magnesium 2.0 1.6 - 2.6 mg/dL   HGB & HCT    Collection Time: 06/17/18  4:07 AM   Result Value Ref Range    HGB 8.3 (L) 12.0 - 16.0 g/dL    HCT 25.5 (L) 35.0 - 45.0 %   PTT    Collection Time: 06/17/18  4:07 AM   Result Value Ref Range    aPTT 100.6 (H) 23.0 - 36.4 SEC   POC G3    Collection Time: 06/17/18  4:45 AM   Result Value Ref Range    Device: VENT      FIO2 (POC) 40 %    pH (POC) 7.406 7.35 - 7.45      pCO2 (POC) 32.8 (L) 35.0 - 45.0 MMHG    pO2 (POC) 123 (H) 80 - 100 MMHG    HCO3 (POC) 20.6 (L) 22 - 26 MMOL/L    sO2 (POC) 99 (H) 92 - 97 %    Base deficit (POC) 4 mmol/L    Mode ASSIST CONTROL      Tidal volume 400 ml    Set Rate 14 bpm    PEEP/CPAP (POC) 10 cmH2O    Allens test (POC) NO      Inspiratory Time 1.0 sec    Total resp. rate 16      Site DRAWN FROM ARTERIAL LINE      Patient temp. 98.4      Specimen type (POC) ARTERIAL      Performed by Rhona Le     Volume control plus YES     GLUCOSE, POC    Collection Time: 06/17/18  5:25 AM   Result Value Ref Range    Glucose (POC) 175 (H) 70 - 110 mg/dL   POC G3    Collection Time: 06/17/18  6:42 AM   Result Value Ref Range    Device: NASAL CANNULA      Flow rate (POC) 4.0 L/M    FIO2 (POC) 36 %    pH (POC) 7.450 7.35 - 7.45      pCO2 (POC) 27.8 (L) 35.0 - 45.0 MMHG    pO2 (POC) 64 (L) 80 - 100 MMHG    HCO3 (POC) 19.3 (L) 22 - 26 MMOL/L    sO2 (POC) 93 92 - 97 %    Base deficit (POC) 5 mmol/L    Allens test (POC) N/A      Total resp. rate 32      Site DRAWN FROM ARTERIAL LINE      Specimen type (POC) ARTERIAL      Performed by Erica Wilson    CBC WITH AUTOMATED DIFF    Collection Time: 06/17/18  8:10 AM   Result Value Ref Range    WBC 20.0 (H) 4.6 - 13.2 K/uL    RBC 3.52 (L) 4.20 - 5.30 M/uL    HGB 8.3 (L) 12.0 - 16.0 g/dL    HCT 25.5 (L) 35.0 - 45.0 %    MCV 72.4 (L) 74.0 - 97.0 FL    MCH 23.6 (L) 24.0 - 34.0 PG    MCHC 32.5 31.0 - 37.0 g/dL    RDW 21.9 (H) 11.6 - 14.5 %    PLATELET 451 591 - 695 K/uL    NEUTROPHILS PENDING %    LYMPHOCYTES PENDING %    MONOCYTES PENDING %    EOSINOPHILS PENDING %    BASOPHILS PENDING %    ABS. NEUTROPHILS PENDING K/UL    ABS. LYMPHOCYTES PENDING K/UL    ABS. MONOCYTES PENDING K/UL    ABS. EOSINOPHILS PENDING K/UL    ABS.  BASOPHILS PENDING K/UL    DF PENDING    METABOLIC PANEL, COMPREHENSIVE    Collection Time: 06/17/18  8:10 AM   Result Value Ref Range    Sodium 144 136 - 145 mmol/L    Potassium 3.5 3.5 - 5.5 mmol/L    Chloride 110 (H) 100 - 108 mmol/L    CO2 23 21 - 32 mmol/L    Anion gap 11 3.0 - 18 mmol/L Glucose 162 (H) 74 - 99 mg/dL    BUN 54 (H) 7.0 - 18 MG/DL    Creatinine 2.24 (H) 0.6 - 1.3 MG/DL    BUN/Creatinine ratio 24 (H) 12 - 20      GFR est AA 26 (L) >60 ml/min/1.73m2    GFR est non-AA 22 (L) >60 ml/min/1.73m2    Calcium 7.2 (L) 8.5 - 10.1 MG/DL    Bilirubin, total 0.4 0.2 - 1.0 MG/DL    ALT (SGPT) 60 (H) 13 - 56 U/L    AST (SGOT) 15 15 - 37 U/L    Alk. phosphatase 51 45 - 117 U/L    Protein, total 4.6 (L) 6.4 - 8.2 g/dL    Albumin 1.5 (L) 3.4 - 5.0 g/dL    Globulin 3.1 2.0 - 4.0 g/dL    A-G Ratio 0.5 (L) 0.8 - 1.7     PROTHROMBIN TIME + INR    Collection Time: 06/17/18  8:10 AM   Result Value Ref Range    Prothrombin time 15.8 (H) 11.5 - 15.2 sec    INR 1.3 (H) 0.8 - 1.2             Chemistry Recent Labs      06/17/18   0407  06/16/18   1655  06/16/18   0345  06/15/18   2050  06/15/18   0445   GLU   --    --   205*  207*  184*   NA   --    --   146*  148*  149*   K   --    --   4.1  4.5  3.5   CL   --    --   113*  114*  113*   CO2   --    --   18*  22  25   BUN   --    --   46*  43*  46*   CREA   --    --   1.92*  1.69*  1.61*   CA   --    --   6.8*  6.9*  7.8*   MG  2.0  2.2  1.9  1.7  1.9   PHOS   --    --   4.2   --    --    AGAP   --    --   15  12  11   BUCR   --    --   24*  25*  29*   AP   --    --    --   83  105   TP   --    --    --   4.6*  5.3*   ALB   --    --    --   1.2*  1.4*   GLOB   --    --    --   3.4  3.9   AGRAT   --    --    --   0.4*  0.4*        Lactic Acid Lactic acid   Date Value Ref Range Status   06/10/2018 4.7 (HH) 0.4 - 2.0 MMOL/L Final     Comment:     CALLED TO AND CORRECTLY REPEATED BY:  SOBEIDA ROSALES RN ICU TO  AT 0615 ON [id number]       No results for input(s): LAC in the last 72 hours.      Liver Enzymes Protein, total   Date Value Ref Range Status   06/15/2018 4.6 (L) 6.4 - 8.2 g/dL Final     Albumin   Date Value Ref Range Status   06/15/2018 1.2 (L) 3.4 - 5.0 g/dL Final     Globulin   Date Value Ref Range Status   06/15/2018 3.4 2.0 - 4.0 g/dL Final     A-G Ratio Date Value Ref Range Status   06/15/2018 0.4 (L) 0.8 - 1.7   Final     AST (SGOT)   Date Value Ref Range Status   06/15/2018 34 15 - 37 U/L Final     Alk. phosphatase   Date Value Ref Range Status   06/15/2018 83 45 - 117 U/L Final     Recent Labs      06/15/18   2050  06/15/18   0445   TP  4.6*  5.3*   ALB  1.2*  1.4*   GLOB  3.4  3.9   AGRAT  0.4*  0.4*   SGOT  34  55*   AP  83  105        CBC w/Diff Recent Labs      06/17/18   0407  06/16/18   1800  06/16/18   0345  06/15/18   2050  06/15/18   0445   WBC   --    --   20.5*  19.2*  11.3   RBC   --    --   5.04  5.60*  3.94*   HGB  8.3*  9.5*  12.0  13.3  8.6*   HCT  25.5*  28.7*  36.7  41.5  27.2*   PLT   --    --   168  205  163   GRANS   --    --    --   34*   --    LYMPH   --    --    --   20   --    EOS   --    --    --   0   --         Cardiac Enzymes No results found for: CPK, CK, CKMMB, CKMB, RCK3, CKMBT, CKNDX, CKND1, FABIAN, TROPT, TROIQ, KAYLAN, TROPT, TNIPOC, BNP, BNPP     BNP No results found for: BNP, BNPP, XBNPT     Coagulation Recent Labs      06/17/18   0407  06/16/18 2035 06/16/18   1224   06/15/18   2050   PTP   --    --    --    --   14.8   INR   --    --    --    --   1.2   APTT  100.6*  103.1*  111.2*   < >  24.5    < > = values in this interval not displayed.          Thyroid  No results found for: T4, T3U, TSH, TSHEXT, TSHEXT    No results found for: T4     Urinalysis Lab Results   Component Value Date/Time    Color DARK YELLOW 06/04/2018 08:42 AM    Appearance CLOUDY 06/04/2018 08:42 AM    Specific gravity 1.028 06/04/2018 08:42 AM    pH (UA) 5.0 06/04/2018 08:42 AM    Protein 30 (A) 06/04/2018 08:42 AM    Glucose NEGATIVE  06/04/2018 08:42 AM    Ketone TRACE (A) 06/04/2018 08:42 AM    Bilirubin SMALL (A) 06/04/2018 08:42 AM    Urobilinogen 1.0 06/04/2018 08:42 AM    Nitrites NEGATIVE  06/04/2018 08:42 AM    Leukocyte Esterase NEGATIVE  06/04/2018 08:42 AM    Epithelial cells FEW 06/04/2018 08:42 AM    Bacteria 1+ (A) 06/04/2018 08:42 AM WBC 0 to 3 06/04/2018 08:42 AM    RBC 0 to 3 06/04/2018 08:42 AM        Micro  No results for input(s): SDES, CULT in the last 72 hours. No results for input(s): CULT in the last 72 hours.      ABG Recent Labs      06/17/18   0642  06/17/18   0445  06/16/18   0455   PHI  7.450  7.406  7.437   PCO2I  27.8*  32.8*  28.7*   PO2I  64*  123*  120*   HCO3I  19.3*  20.6*  19.4*   FIO2I  36  40  70        All Micro Results     Procedure Component Value Units Date/Time    CULTURE, BLOOD [[id number]] Collected:  06/09/18 1423    Order Status:  Completed Specimen:  Whole Blood from Blood Updated:  06/15/18 0230     Special Requests: NO SPECIAL REQUESTS        Culture result: NO GROWTH 6 DAYS       CULTURE, BLOOD [[id number]] Collected:  06/09/18 1223    Order Status:  Completed Specimen:  Whole Blood from Blood Updated:  06/15/18 0230     Special Requests: left hand     Culture result: NO GROWTH 6 DAYS       AFB CULTURE + SMEAR W/RFLX PCR AND ID [[id number]] Collected:  06/09/18 1024    Order Status:  Completed Specimen:  Respiratory sample Updated:  06/12/18 2106     Source BRONCHIAL LAVAGE        AFB Specimen processing Concentration     Acid Fast Smear NEGATIVE          (NOTE)  Performed At: [address] [id number]  Cara Landry MD FF:[id number]          Acid Fast Culture PENDING    CULTURE, FUNGUS [[id number]] Collected:  06/09/18 1024    Order Status:  Completed Specimen:  Bronchial lavage Updated:  06/11/18 1230     Special Requests: NO SPECIAL REQUESTS        FUNGUS SMEAR NO FUNGAL ELEMENTS SEEN        Culture result: NO FUNGUS ISOLATED 2 DAYS       CULTURE, SPUTUM/BRONCH/OTH [[id number]] Collected:  06/09/18 1024    Order Status:  Completed Specimen:  Sputum from Bronchial lavage Updated:  06/11/18 1058     Special Requests: NO SPECIAL REQUESTS        GRAM STAIN FEW WBC'S         FEW GRAM NEGATIVE RODS        Culture result:         RARE NORMAL RESPIRATORY JOSETTE    CULTURE, ANAEROBIC [[id number]] Collected:  06/05/18 0220    Order Status:  Completed Specimen:  Peritoneal Fluid Updated:  06/11/18 0734     Special Requests: NO SPECIAL REQUESTS        Culture result:         NO ANAEROBES ISOLATED 5 DAYS    CULTURE, BLOOD [[id number]] Collected:  06/04/18 0740    Order Status:  Completed Specimen:  Blood from Blood Updated:  06/10/18 0655     Special Requests: NO SPECIAL REQUESTS        Culture result: NO GROWTH 6 DAYS       CULTURE, BLOOD [[id number]] Collected:  06/04/18 0740    Order Status:  Completed Specimen:  Blood from Blood Updated:  06/10/18 0655     Special Requests: NO SPECIAL REQUESTS        Culture result: NO GROWTH 6 DAYS       CULTURE, BODY FLUID Jeanne Bracket STAIN [[id number]]  (Abnormal)  (Susceptibility) Collected:  06/05/18 0220    Order Status:  Completed Specimen:  Peritoneal Fluid Updated:  06/08/18 0936     Special Requests: NO SPECIAL REQUESTS        GRAM STAIN MANY WBC'S         NO ORGANISMS SEEN        Culture result:         RARE KLEBSIELLA OXYTOCA (A)            CALLED TO AND CORRECTLY REPEATED BY:  ANGEL LUIS DE LOS SANTOS RN ICU ON 6/7/2018 1032 TO 5087      CULTURE, URINE [[id number]] Collected:  06/05/18 1245    Order Status:  Completed Specimen:  Urine from Fry Specimen Updated:  06/07/18 1103     Special Requests: NO SPECIAL REQUESTS        Culture result: NO GROWTH 2 DAYS       C. DIFFICILE/EPI PCR [[id number]] Collected:  06/05/18 0930    Order Status:  Canceled Specimen:  Stool         Echo 6/9/18:  Left ventricle: Systolic function was normal. Ejection fraction was estimated   in the range of 65 % to 70 %. No obvious  wall motion abnormalities identified in the views obtained. Wall thickness   was mildly increased. Doppler parameters  were consistent with abnormal left ventricular relaxation (grade 1 diastolic   dysfunction). Right ventricle: The size was normal. Systolic function was normal.  Mitral valve: There was mild annular calcification.   Tricuspid valve: Pulmonary artery systolic pressure was mildly increased. Pulmonary artery systolic pressure: 34 mmHg. PVL LE 6/6/18: acute b/l peroneal DVT. CT abd pelvis 6/4/18:  1. Postsurgical hysterectomy, bilateral oophorectomy, pelvic lymphadenectomy and  a mastectomy surgical changes. Small volume of ascites in the abdomen and  pelvis, with a few tiny locules of gas in the right hemipelvis would be in  keeping with recent postsurgical etiology. 2. Abnormal edematous thick-walled small bowel loops in the left abdomen and  pelvis, with TRAM lamellar edematous configuration, induration. No findings of  pneumatosis. The overall appearance is nonspecific. Diagnostic considerations  include infectious etiology, nonspecific edema which may be unresolved  postsurgical etiology. Ischemia is also included in the differential diagnostic  consideration, however, there are no findings of pneumatosis, portal venous gas. 3. Stool in the right colon extending to the hepatic flexure with surrounding  gas, foci of pneumatosis could be obscured. No associated bowel wall thickening. 4. Satisfactory position of nasogastric tube. 5. Hepatomegaly, steatosis with 2 rounded low-density lesions, potentially  cysts. 6. Bibasilar atelectasis. CXR reviewed by me:  CXR 6/17/18:  Comparison: 0531 hours. Support lines and tubes:    > Right subclavian central line unchanged. > Endotracheal tube and enteric sump to both discontinued. Since being extubated, patient has developed marked worsening of pulmonary edema. Lung volumes have diminished. No change in heart size. IMPRESSION: Since extubation, decreased lung volumes with marked increase in pulmonary  edema. IMPRESSION:   · Severe sepsis due to Klebsiella Oxytoca peritonitis. · Klebsiella oxytoca peritonitis. S/p laparotomy and peritoneal lavage: Klebsiella positive. Ruled out ischemic bowel in laparotomy.    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 · S/p bronchoscopy 6/9/18: no aspiration noted. Cx NGTD. · VDRF due to sepsis/PE/?ARDS, reintubated on 6/8/18  · S/p Shock likely due to sepsis and obstructive due to presumed PE. Shock resolved. Off vasopressors. · Acute b/l peroneal DVT  · Presumed PE with severe hypoxia and high A-a gradient, elevated RVSP 34 mm Hg. · Anemia, no active external bleeding. · Thrombocytopenia, improving   · JESS, improving  · Hypernatremia. · Metabolic ad lactic acidosis, resolved. · Elevated LFT, due to shock liver, improving  · Foreign body on peritoneal biopsy, per path report. · Anasarca due to fluid resuscitation, JESS, hypoalbuminemia and sequale of sepsis. · Code status: DNR. · Very poor overall prognosis. RECOMMENDATIONS:   Respiratory:  Self extubated this am; resp status seems stable; given 20 mg lasix; post extubation CXR shows worsening atelectasis; would start HF oxygen therapy for peep effect and decrease risks for re-intubation. Presumed PEs; continue anticoagulation - watch for bleeding and anemia; follow protocol for adjustments and labs. Keep SPO2 >=92%. HOB 30 degree elevation all the time. Aggressive pulmonary toileting. Aspiration precautions. CVS: Echo ok with mildly elevated RVSP but normal RV systolic function. Pedal edema due to b/l DVT and fluid resuscitation/anasarca. ID:    Bronch cx normal robin. Peritoneal cx Klebsiella Oxytoca resistant to ampicillin. Doubt pneumonia. Leucocytosis-follow. Afebrile. Ab -  Zosyn, levaquin, flagyl. Defer follow up Abd imaging to Surgery team.   Hematology/Oncology: Monitor Hb and platelets while on heparin; check H/H q12; transfuse to keep Hb>8 gm/dl; on heparin drip for DVT and suspected PEs. Renal: Nephrologist on case; D5W for hypernatremia - NA better; would hold lasix due to worsening creatinine; UOP seems to be good.    GI/: holding tube feed - post op state; d/w surgery about TPN vs enteral feeding in next few days - will need picc line; LFT improved. Endocrine: Maintain blood glucose 140-180. Humalog sub cut. Neurology: wean fentanyl drip - balance post-op pains with excessive sedation  Pain/Sedation: fentanyl drip - decreased 60 mg/hr  Skin/Wound: local surgical site care. Electrolytes: Replace electrolytes per ICU electrolyte replacement protocol. Calcium and Kcl being replaced. Nutrition: see above  Prophylaxis: DVT and GI Prophylaxis (heparin /protonix)  Restraints: none  Lines/Tubes:   ETT: 6/8/18-came out 6/17/18  Central line: right subclavian 6/4/18 (site examined, no erythema, induration, discharge or sign of infection. Plan for picc line Monday. Dressing intact. Medically necessary, will remove it when not needed. Central line bundle followed). Fry: 6/4/18 (Medically necessary for strict input/output monitoring in critically ill patient, will remove it when not needed. Fry bundle followed). Will defer respective systems problem management to primary and other respective consultant and follow patient in ICU with primary and other medical team.  Further recommendations will be based on the patient's response to recommended treatment and results of the investigation ordered. Quality Care: PPI, DVT prophylaxis, HOB elevated, Infection control all reviewed and addressed. Code status - Updated Brother Kole Law; he would like Full Code status for now, while he discusses with his 3 brother; patient has no children    Care of plan d/w RN, RT  Updated patient brother at bedside. High complexity decision making was performed during the evaluation of this patient at high risk for decompensation with multiple organ involvement. Total critical care time spent rendering care exclusive of procedures: 42 minutes.          Ivan Barrera MD  6/17/2018

## 2018-06-18 ENCOUNTER — APPOINTMENT (OUTPATIENT)
Dept: GENERAL RADIOLOGY | Age: 68
DRG: 853 | End: 2018-06-18
Attending: INTERNAL MEDICINE
Payer: MEDICARE

## 2018-06-18 LAB
ANION GAP SERPL CALC-SCNC: 13 MMOL/L (ref 3–18)
APTT PPP: 71.5 SEC (ref 23–36.4)
APTT PPP: 98 SEC (ref 23–36.4)
ARTERIAL PATENCY WRIST A: ABNORMAL
BASE DEFICIT BLD-SCNC: 4 MMOL/L
BDY SITE: ABNORMAL
BODY TEMPERATURE: 98.6
BUN SERPL-MCNC: 50 MG/DL (ref 7–18)
BUN/CREAT SERPL: 25 (ref 12–20)
CA-I SERPL-SCNC: 1.08 MMOL/L (ref 1.12–1.32)
CA-I SERPL-SCNC: 1.08 MMOL/L (ref 1.12–1.32)
CALCIUM SERPL-MCNC: 7.2 MG/DL (ref 8.5–10.1)
CHLORIDE SERPL-SCNC: 111 MMOL/L (ref 100–108)
CO2 SERPL-SCNC: 22 MMOL/L (ref 21–32)
CREAT SERPL-MCNC: 2.04 MG/DL (ref 0.6–1.3)
ERYTHROCYTE [DISTWIDTH] IN BLOOD BY AUTOMATED COUNT: 22.7 % (ref 11.6–14.5)
GAS FLOW.O2 O2 DELIVERY SYS: ABNORMAL L/MIN
GAS FLOW.O2 SETTING OXYMISER: 35 L/M
GLUCOSE BLD STRIP.AUTO-MCNC: 139 MG/DL (ref 70–110)
GLUCOSE BLD STRIP.AUTO-MCNC: 149 MG/DL (ref 70–110)
GLUCOSE BLD STRIP.AUTO-MCNC: 149 MG/DL (ref 70–110)
GLUCOSE BLD STRIP.AUTO-MCNC: 184 MG/DL (ref 70–110)
GLUCOSE SERPL-MCNC: 151 MG/DL (ref 74–99)
HCO3 BLD-SCNC: 19.4 MMOL/L (ref 22–26)
HCT VFR BLD AUTO: 24.1 % (ref 35–45)
HCT VFR BLD AUTO: 25.5 % (ref 35–45)
HGB BLD-MCNC: 7.9 G/DL (ref 12–16)
HGB BLD-MCNC: 8.3 G/DL (ref 12–16)
MAGNESIUM SERPL-MCNC: 1.9 MG/DL (ref 1.6–2.6)
MCH RBC QN AUTO: 23.8 PG (ref 24–34)
MCHC RBC AUTO-ENTMCNC: 32.5 G/DL (ref 31–37)
MCV RBC AUTO: 73.1 FL (ref 74–97)
O2/TOTAL GAS SETTING VFR VENT: 45 %
PCO2 BLD: 24.9 MMHG (ref 35–45)
PH BLD: 7.5 [PH] (ref 7.35–7.45)
PHOSPHATE SERPL-MCNC: 4.4 MG/DL (ref 2.5–4.9)
PHOSPHATE SERPL-MCNC: 4.5 MG/DL (ref 2.5–4.9)
PLATELET # BLD AUTO: 136 K/UL (ref 135–420)
PO2 BLD: 80 MMHG (ref 80–100)
POTASSIUM SERPL-SCNC: 3.1 MMOL/L (ref 3.5–5.5)
POTASSIUM SERPL-SCNC: 3.3 MMOL/L (ref 3.5–5.5)
RBC # BLD AUTO: 3.49 M/UL (ref 4.2–5.3)
SAO2 % BLD: 97 % (ref 92–97)
SERVICE CMNT-IMP: ABNORMAL
SODIUM SERPL-SCNC: 146 MMOL/L (ref 136–145)
SPECIMEN TYPE: ABNORMAL
TOTAL RESP. RATE, ITRR: 18
WBC # BLD AUTO: 17.1 K/UL (ref 4.6–13.2)

## 2018-06-18 PROCEDURE — 85730 THROMBOPLASTIN TIME PARTIAL: CPT | Performed by: INTERNAL MEDICINE

## 2018-06-18 PROCEDURE — 82803 BLOOD GASES ANY COMBINATION: CPT

## 2018-06-18 PROCEDURE — 85027 COMPLETE CBC AUTOMATED: CPT | Performed by: HOSPITALIST

## 2018-06-18 PROCEDURE — 74011000258 HC RX REV CODE- 258: Performed by: OBSTETRICS & GYNECOLOGY

## 2018-06-18 PROCEDURE — 77030011256 HC DRSG MEPILEX <16IN NO BORD MOLN -A

## 2018-06-18 PROCEDURE — P9047 ALBUMIN (HUMAN), 25%, 50ML: HCPCS | Performed by: INTERNAL MEDICINE

## 2018-06-18 PROCEDURE — C9113 INJ PANTOPRAZOLE SODIUM, VIA: HCPCS | Performed by: INTERNAL MEDICINE

## 2018-06-18 PROCEDURE — 84100 ASSAY OF PHOSPHORUS: CPT | Performed by: HOSPITALIST

## 2018-06-18 PROCEDURE — 74011250636 HC RX REV CODE- 250/636: Performed by: INTERNAL MEDICINE

## 2018-06-18 PROCEDURE — 86900 BLOOD TYPING SEROLOGIC ABO: CPT | Performed by: OBSTETRICS & GYNECOLOGY

## 2018-06-18 PROCEDURE — 65270000029 HC RM PRIVATE

## 2018-06-18 PROCEDURE — 36600 WITHDRAWAL OF ARTERIAL BLOOD: CPT

## 2018-06-18 PROCEDURE — 84100 ASSAY OF PHOSPHORUS: CPT | Performed by: INTERNAL MEDICINE

## 2018-06-18 PROCEDURE — 82962 GLUCOSE BLOOD TEST: CPT

## 2018-06-18 PROCEDURE — 74011250636 HC RX REV CODE- 250/636: Performed by: OBSTETRICS & GYNECOLOGY

## 2018-06-18 PROCEDURE — 74011636637 HC RX REV CODE- 636/637: Performed by: INTERNAL MEDICINE

## 2018-06-18 PROCEDURE — 82330 ASSAY OF CALCIUM: CPT | Performed by: INTERNAL MEDICINE

## 2018-06-18 PROCEDURE — P9016 RBC LEUKOCYTES REDUCED: HCPCS | Performed by: OBSTETRICS & GYNECOLOGY

## 2018-06-18 PROCEDURE — 82330 ASSAY OF CALCIUM: CPT | Performed by: HOSPITALIST

## 2018-06-18 PROCEDURE — 74011000258 HC RX REV CODE- 258: Performed by: INTERNAL MEDICINE

## 2018-06-18 PROCEDURE — 85018 HEMOGLOBIN: CPT | Performed by: INTERNAL MEDICINE

## 2018-06-18 PROCEDURE — 77030010541

## 2018-06-18 PROCEDURE — 84132 ASSAY OF SERUM POTASSIUM: CPT | Performed by: HOSPITALIST

## 2018-06-18 PROCEDURE — 77010033711 HC HIGH FLOW OXYGEN

## 2018-06-18 PROCEDURE — 71045 X-RAY EXAM CHEST 1 VIEW: CPT

## 2018-06-18 PROCEDURE — 74011000250 HC RX REV CODE- 250: Performed by: INTERNAL MEDICINE

## 2018-06-18 PROCEDURE — 86920 COMPATIBILITY TEST SPIN: CPT | Performed by: OBSTETRICS & GYNECOLOGY

## 2018-06-18 PROCEDURE — 36430 TRANSFUSION BLD/BLD COMPNT: CPT

## 2018-06-18 PROCEDURE — 74011250636 HC RX REV CODE- 250/636: Performed by: HOSPITALIST

## 2018-06-18 PROCEDURE — 83735 ASSAY OF MAGNESIUM: CPT | Performed by: HOSPITALIST

## 2018-06-18 RX ORDER — FUROSEMIDE 10 MG/ML
20 INJECTION INTRAMUSCULAR; INTRAVENOUS DAILY
Status: DISCONTINUED | OUTPATIENT
Start: 2018-06-18 | End: 2018-06-18

## 2018-06-18 RX ORDER — FUROSEMIDE 10 MG/ML
40 INJECTION INTRAMUSCULAR; INTRAVENOUS ONCE
Status: COMPLETED | OUTPATIENT
Start: 2018-06-18 | End: 2018-06-18

## 2018-06-18 RX ORDER — POTASSIUM CHLORIDE 14.9 MG/ML
20 INJECTION INTRAVENOUS
Status: COMPLETED | OUTPATIENT
Start: 2018-06-18 | End: 2018-06-18

## 2018-06-18 RX ORDER — POTASSIUM CHLORIDE 7.45 MG/ML
10 INJECTION INTRAVENOUS
Status: DISCONTINUED | OUTPATIENT
Start: 2018-06-18 | End: 2018-06-18 | Stop reason: RX

## 2018-06-18 RX ORDER — FUROSEMIDE 10 MG/ML
20 INJECTION INTRAMUSCULAR; INTRAVENOUS 2 TIMES DAILY
Status: DISCONTINUED | OUTPATIENT
Start: 2018-06-18 | End: 2018-06-20

## 2018-06-18 RX ORDER — DEXTROSE 50 % IN WATER (D50W) INTRAVENOUS SYRINGE
25-50 AS NEEDED
Status: DISCONTINUED | OUTPATIENT
Start: 2018-06-18 | End: 2018-06-18 | Stop reason: SDUPTHER

## 2018-06-18 RX ORDER — SODIUM CHLORIDE 9 MG/ML
250 INJECTION, SOLUTION INTRAVENOUS AS NEEDED
Status: DISCONTINUED | OUTPATIENT
Start: 2018-06-18 | End: 2018-06-19

## 2018-06-18 RX ORDER — HEPARIN SODIUM 1000 [USP'U]/ML
40 INJECTION, SOLUTION INTRAVENOUS; SUBCUTANEOUS ONCE
Status: COMPLETED | OUTPATIENT
Start: 2018-06-18 | End: 2018-06-18

## 2018-06-18 RX ORDER — MAGNESIUM SULFATE 100 %
16 CRYSTALS MISCELLANEOUS AS NEEDED
Status: DISCONTINUED | OUTPATIENT
Start: 2018-06-18 | End: 2018-06-18 | Stop reason: SDUPTHER

## 2018-06-18 RX ORDER — POTASSIUM CHLORIDE 7.45 MG/ML
10 INJECTION INTRAVENOUS
Status: COMPLETED | OUTPATIENT
Start: 2018-06-18 | End: 2018-06-22

## 2018-06-18 RX ADMIN — CALCIUM GLUCONATE 2 G: 94 INJECTION, SOLUTION INTRAVENOUS at 08:28

## 2018-06-18 RX ADMIN — POTASSIUM CHLORIDE 10 MEQ: 10 INJECTION, SOLUTION INTRAVENOUS at 08:31

## 2018-06-18 RX ADMIN — METRONIDAZOLE 500 MG: 500 INJECTION, SOLUTION INTRAVENOUS at 10:10

## 2018-06-18 RX ADMIN — INSULIN LISPRO 2 UNITS: 100 INJECTION, SOLUTION INTRAVENOUS; SUBCUTANEOUS at 12:00

## 2018-06-18 RX ADMIN — HEPARIN SODIUM 4250 UNITS: 1000 INJECTION, SOLUTION INTRAVENOUS; SUBCUTANEOUS at 06:54

## 2018-06-18 RX ADMIN — ALBUMIN (HUMAN) 12.5 G: 0.25 INJECTION, SOLUTION INTRAVENOUS at 20:09

## 2018-06-18 RX ADMIN — FUROSEMIDE 40 MG: 10 INJECTION, SOLUTION INTRAVENOUS at 11:00

## 2018-06-18 RX ADMIN — CALCIUM GLUCONATE 2 G: 94 INJECTION, SOLUTION INTRAVENOUS at 20:10

## 2018-06-18 RX ADMIN — LEVOFLOXACIN 750 MG: 5 INJECTION, SOLUTION INTRAVENOUS at 20:44

## 2018-06-18 RX ADMIN — Medication 75 MCG/HR: at 14:00

## 2018-06-18 RX ADMIN — FUROSEMIDE 20 MG: 10 INJECTION, SOLUTION INTRAMUSCULAR; INTRAVENOUS at 22:04

## 2018-06-18 RX ADMIN — PIPERACILLIN SODIUM,TAZOBACTAM SODIUM 2.25 G: 2; .25 INJECTION, POWDER, FOR SOLUTION INTRAVENOUS at 01:18

## 2018-06-18 RX ADMIN — DEXTROSE MONOHYDRATE 75 ML/HR: 5 INJECTION, SOLUTION INTRAVENOUS at 01:18

## 2018-06-18 RX ADMIN — Medication 75 MCG/HR: at 07:23

## 2018-06-18 RX ADMIN — HEPARIN SODIUM 16 UNITS/KG/HR: 10000 INJECTION, SOLUTION INTRAVENOUS at 01:18

## 2018-06-18 RX ADMIN — POTASSIUM CHLORIDE 20 MEQ: 10 INJECTION, SOLUTION INTRAVENOUS at 18:00

## 2018-06-18 RX ADMIN — POTASSIUM CHLORIDE 10 MEQ: 10 INJECTION, SOLUTION INTRAVENOUS at 08:28

## 2018-06-18 RX ADMIN — Medication 20 MEQ: at 19:01

## 2018-06-18 RX ADMIN — METRONIDAZOLE 500 MG: 500 INJECTION, SOLUTION INTRAVENOUS at 03:00

## 2018-06-18 RX ADMIN — POTASSIUM CHLORIDE: 2 INJECTION, SOLUTION, CONCENTRATE INTRAVENOUS at 17:41

## 2018-06-18 RX ADMIN — SODIUM CHLORIDE 40 MG: 9 INJECTION INTRAMUSCULAR; INTRAVENOUS; SUBCUTANEOUS at 10:27

## 2018-06-18 RX ADMIN — PIPERACILLIN SODIUM,TAZOBACTAM SODIUM 2.25 G: 2; .25 INJECTION, POWDER, FOR SOLUTION INTRAVENOUS at 17:26

## 2018-06-18 RX ADMIN — PIPERACILLIN SODIUM,TAZOBACTAM SODIUM 2.25 G: 2; .25 INJECTION, POWDER, FOR SOLUTION INTRAVENOUS at 06:41

## 2018-06-18 RX ADMIN — Medication 75 MCG/HR: at 21:42

## 2018-06-18 RX ADMIN — PIPERACILLIN SODIUM,TAZOBACTAM SODIUM 2.25 G: 2; .25 INJECTION, POWDER, FOR SOLUTION INTRAVENOUS at 12:13

## 2018-06-18 RX ADMIN — POTASSIUM CHLORIDE 10 MEQ: 10 INJECTION, SOLUTION INTRAVENOUS at 10:05

## 2018-06-18 RX ADMIN — ALBUMIN (HUMAN) 12.5 G: 0.25 INJECTION, SOLUTION INTRAVENOUS at 12:13

## 2018-06-18 RX ADMIN — METRONIDAZOLE 500 MG: 500 INJECTION, SOLUTION INTRAVENOUS at 17:51

## 2018-06-18 RX ADMIN — ALBUMIN (HUMAN) 12.5 G: 0.25 INJECTION, SOLUTION INTRAVENOUS at 01:17

## 2018-06-18 RX ADMIN — Medication 20 MEQ: at 20:44

## 2018-06-18 RX ADMIN — ALBUMIN (HUMAN) 12.5 G: 0.25 INJECTION, SOLUTION INTRAVENOUS at 07:29

## 2018-06-18 NOTE — PROGRESS NOTES
2315 Pt with AMS, pulling off HFNC. O2 saturations 75-88%. Attempting to pull triple lumen cath. Attempted to reorient patient and continue HFNC therapy. Unable to reorient. Pt became agitated and mildly aggressive, pulled apart high flow tubing, attempting to pinch and hit. Screaming \"I can't take this anymore. \"     2449 Continues to pull at nasal cannula and central line. Hospitalist paged. 0000 Pt now in bilateral wrist restraints. Resting calmly. VSS. FLACC 0.     0300 Ileostomy bag changed, site care provided. Sanguinous drainge noted in bad. Stoma red and inflamed. 0500 No change. Bathed. Resting. VSS. O2 95%. 2950 Adjusted hep drip per protocol, see MAR. Summary: Some AMS and attempted to remove oxygen support and central line. Pt remains afrebile through shift. On continuous pca for pain control. FLACC generally 0 unless turning patient. Remains on high flow nasal cannula, tolerating well. Active bowel sounds. Midline wound vac in place with no air leak. Healing lap sites clean, dry, intact. Ileostomy with blood drainage, some now brown stool liquid. Right abdominal BRANDON with minimal output. Left abdominal BRANDON with moderate serous drainage. Electrolytes replaced per protocol. Receiving albumin, awaiting labs this am.    Bedside and Verbal shift change report given to ILANA Silveira  (oncoming nurse) by Antoinette Raygoza RN   (offgoing nurse). Report included the following information SBAR, Kardex and MAR.

## 2018-06-18 NOTE — PROGRESS NOTES
Admit date: 6/4/2018        ASSESSMENT/PLAN  ASSESSMENT/PLAN  Maryam Onofre is a 75 y. 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 Will need adjuvant chemotherapy to complete primary therapy. GI - Peritonitis secondary to diverticulitis with perforation diverted with distal ileostomy. Post operative ileus persists. Will continue bowel rest. TPN ordered. NGT was removed by patient. Okay to leave out. Will need NGT replaced if has large volume emesis. FEN - electrolyte replacement protocol ordered. TPN  ID - sepsis secondary to peritonitis. On Flagyl, Zosyn and Levaquin. Drains with serous drainage. WBC remains elevated. Patient remains afebrile. Renal - JESS, most likely ATN. Improving. Heme - Acute blood loss anemia, lolis-operative loss exacerbated by heparin gtt. Tranfusion pRBCs ordered now for Hgb 7.9  DVT/PE: on Heparin gtt  CV - stable, off pressors  Pulm - Respiratory failure requiring high flow O2 NC and ABG stable. Psych - Delerium   Disp - condition improving, continue ICU care  Code Status - Full Code      SUBJECTIVE  Hostile today. Denies pain. Denies nausea. No flatus yet.        OBJECTIVE  Physical Exam:  Patient Vitals for the past 24 hrs:   BP Temp Pulse Resp SpO2 Weight   06/18/18 1614 - - - - - 102.8 kg (226 lb 10.1 oz)   06/18/18 1600 - 98.1 °F (36.7 °C) - - - -   06/18/18 1100 - 97.6 °F (36.4 °C) - - - -   06/18/18 0810 - - - - 96 % -   06/18/18 0800 118/75 98.2 °F (36.8 °C) 82 18 94 % -   06/18/18 0700 122/76 98 °F (36.7 °C) 84 19 96 % -   06/18/18 0600 130/76 - 80 17 95 % -   06/18/18 0530 - - 86 17 - -   06/18/18 0506 - - - - 94 % -   06/18/18 0500 128/76 - 93 16 - -   06/18/18 0430 - - 96 19 - -   06/18/18 0400 125/67 - 88 20 95 % -   06/18/18 0330 - - 88 19 96 % -   06/18/18 0300 130/73 98.1 °F (36.7 °C) 88 16 96 % -   06/18/18 0200 120/80 - 90 18 - - 06/18/18 0130 - - 91 19 - -   06/18/18 0100 125/72 - 90 20 93 % -   06/18/18 0045 - - 93 18 - -   06/18/18 0030 - - 97 20 - -   06/18/18 0015 - - 87 16 96 % -   06/18/18 0008 - - - - 90 % -   06/18/18 0003 - - 86 18 93 % -   06/18/18 0000 115/70 97.9 °F (36.6 °C) 90 13 95 % -   06/17/18 2345 - - 88 16 94 % -   06/17/18 2330 - - 94 16 95 % -   06/17/18 2329 - - 92 19 94 % -   06/17/18 2328 - - 97 15 95 % -   06/17/18 2327 - - 95 21 96 % -   06/17/18 2326 - - 93 19 95 % -   06/17/18 2315 - - 99 18 (!) 79 % -   06/17/18 2314 - - 97 20 (!) 78 % -   06/17/18 2313 - - (!) 102 24 (!) 83 % -   06/17/18 2312 - - 94 25 (!) 85 % -   06/17/18 2300 120/90 - 88 20 95 % -   06/17/18 2245 - - 89 19 94 % -   06/17/18 2230 - - 92 16 94 % -   06/17/18 2215 - - 90 17 94 % -   06/17/18 2200 124/68 - 93 18 94 % -   06/17/18 2145 - - 92 21 95 % -   06/17/18 2130 - - 92 22 94 % -   06/17/18 2115 - - 94 16 94 % -   06/17/18 2111 - - - - 95 % -   06/17/18 2100 126/71 - 92 20 95 % -   06/17/18 2045 - - 95 14 95 % -   06/17/18 2030 - - 89 16 95 % -   06/17/18 2015 - - 93 15 95 % -   06/17/18 2000 122/75 - 86 20 95 % -   06/17/18 1945 - - 95 17 96 % -   06/17/18 1930 122/83 98.1 °F (36.7 °C) 95 21 90 % -   06/17/18 1915 - - 87 19 95 % -   06/17/18 1900 122/83 - 92 19 96 % -         Intake/Output Summary (Last 24 hours) at 06/18/18 1859  Last data filed at 06/18/18 1614   Gross per 24 hour   Intake          2840.03 ml   Output             4670 ml   Net         -1829.97 ml      General - alert, agitated, does not appear to be oriented  HEENT - high flow nc in place  Heart - tachy  Lungs - clear to auscultation  Abdomen - appropriately, tender, nondistended, hypo bowel sounds. Drain output serosanguinous. Incision - wound edges are without erythema or necrosis. Wound vac in place.    Extremities - 2+ edema      Labs  CBC  Recent Labs      06/18/18   1600  06/18/18   0516  06/17/18   1430  06/17/18   0810   06/16/18   0345  06/15/18   2050   WBC --   17.1*   --   20.0*   --   20.5*  19.2*   HCT  24.1*  25.5*  24.3*  25.5*   < >  36.7  41.5   MCV   --   73.1*   --   72.4*   --   72.8*  74.1   BANDS   --    --    --   7*   --    --   40*   MONOS   --    --    --   3   --    --   4   EOS   --    --    --   0   --    --   0   BASOS   --    --    --   0   --    --   1    < > = values in this interval not displayed.         CMP  Recent Labs      06/18/18   0516  06/17/18   1430  06/17/18   0810   NA  146*  145  144   CO2  22  22  23   BUN  50*  52*  54*        Lab Results   Component Value Date/Time    Glucose 151 (H) 06/18/2018 05:16 AM    Glucose (POC) 149 (H) 06/18/2018 05:53 PM    Glucose,  (H) 06/15/2018 05:14 PM          Current Hospital Medications    Current Facility-Administered Medications:     furosemide (LASIX) injection 20 mg, 20 mg, IntraVENous, BID, Shantal Braxton MD    TPN ADULT - CENTRAL, , IntraVENous, CONTINUOUS, Shantal Braxton MD, Last Rate: 60 mL/hr at 06/18/18 1741    insulin regular (Ct Cool R, HUMULIN R) injection, , SubCUTAneous, Q6H, Shantal Braxton MD    calcium gluconate 2 g in 0.9% sodium chloride 100 mL IVPB, 2 g, IntraVENous, ONCE, Shantal Braxton MD    potassium chloride 20 mEq in 100 ml IVPB, 20 mEq, IntraVENous, Q2H, Shantal Braxton MD    levoFLOXacin (LEVAQUIN) 750 mg in D5W IVPB, 750 mg, IntraVENous, Q48H, Pietro Forman MD    albumin human 25% (BUMINATE) solution 12.5 g, 12.5 g, IntraVENous, Q6H, Shantal Braxton MD, Last Rate: 60 mL/hr at 06/17/18 0800, 12.5 g at 06/18/18 1213    metroNIDAZOLE (FLAGYL) IVPB premix 500 mg, 500 mg, IntraVENous, Q8H, Juan Pablo Dumont MD, Last Rate: 100 mL/hr at 06/18/18 1751, 500 mg at 06/18/18 1751    0.9% sodium chloride infusion 250 mL, 250 mL, IntraVENous, PRN, Rayshawn Lombardo CRNA    0.9% sodium chloride infusion 250 mL, 250 mL, IntraVENous, PRN, Huong Lopez MD    ipratropium (ATROVENT) 0.02 % nebulizer solution 0.5 mg, 0.5 mg, Nebulization, Q4H PRN, Chadd Wesley MD    piperacillin-tazobactam (ZOSYN) 2.25 g in 0.9% sodium chloride (MBP/ADV) 50 mL MBP, 2.25 g, IntraVENous, Q6H, Pietro Forman MD, Last Rate: 100 mL/hr at 06/18/18 1726, 2.25 g at 06/18/18 1726    fentaNYL (PF) 900 mcg/30 ml infusion soln, 0-200 mcg/hr, IntraVENous, TITRATE, Sarah Madrid MD, Last Rate: 2.5 mL/hr at 06/18/18 1400, 75 mcg/hr at 06/18/18 1400    naloxone (NARCAN) injection 0.4 mg, 0.4 mg, IntraVENous, EVERY 2 MINUTES AS NEEDED, Keely Auguste MD    heparin 25,000 units in D5W 250 ml infusion, 18-36 Units/kg/hr, IntraVENous, TITRATE, Christen Abraham MD, Last Rate: 16 mL/hr at 06/18/18 0724, 18 Units/kg/hr at 06/18/18 0724    ondansetron (ZOFRAN) injection 4 mg, 4 mg, IntraVENous, Q6H PRN, Frieda Mancilla MD, 4 mg at 06/08/18 2152    oxymetazoline (AFRIN) 0.05 % nasal spray 2 Spray, 2 Spray, Both Nostrils, BID PRN, Eusebio Dobbins MD    alum-mag hydroxide-simeth (MYLANTA) oral suspension 30 mL, 30 mL, Oral, Q4H PRN, Christen Abraham MD, 30 mL at 06/07/18 2225    HYDROcodone-acetaminophen (NORCO)  mg tablet 2 Tab, 2 Tab, Oral, Q4H PRN, Gabriele Alva MD, 2 Tab at 06/15/18 0816    fentaNYL citrate (PF) injection 25 mcg, 25 mcg, IntraVENous, Q3H PRN, Christen Abraham MD, 25 mcg at 06/16/18 1036    fluticasone (FLONASE) 50 mcg/actuation nasal spray 2 Spray, 2 Spray, Both Nostrils, DAILY PRN, Sarah Madrid MD    ELECTROLYTE REPLACEMENT PROTOCOL-POTASSIUM Renal Dosing, 1 Each, Other, PRN, Christen Lee MD    ELECTROLYTE REPLACEMENT PROTOCOL-MAGNESIUM, 1 Each, Other, PRN, Christen Lee MD    ELECTROLYTE REPLACEMENT PROTOCOL-PHOSPHORUS Renal Dosing, 1 Each, Other, PRN, Christen Lee MD    ELECTROLYTE REPLACEMENT PROTOCOL-CALCIUM, 1 Each, Other, PRN, Christen Lee MD    sodium chloride (NS) flush 5-10 mL, 5-10 mL, IntraVENous, PRN, Juliet Horner MD    glucose chewable tablet 16 g, 4 Tab, Oral, PRN, Gabriele Alva MD    glucagon (GLUCAGEN) injection 1 mg, 1 mg, IntraMUSCular, PRN, Christen Bui MD    dextrose (D50W) injection syrg 12.5-25 g, 25-50 mL, IntraVENous, PRN, Christen Bui MD    pantoprazole (PROTONIX) 40 mg in sodium chloride 0.9% 10 mL injection, 40 mg, IntraVENous, DAILY, López Bowman MD, 40 mg at 06/18/18 1027      Blaire Yeung MD

## 2018-06-18 NOTE — PROGRESS NOTES
Admit date: 6/4/2018        ASSESSMENT/PLAN  Razia Strauss is a 75 y. 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 Will need adjuvant chemotherapy to complete primary therapy. GI - Peritonitis secondary to diverticulitis with perforation diverted with distal ileostomy. Post operative ileus persists. Will continue bowel rest. Agree with starting TPN. NGT was removed by patient. Okay to leave out. Will need NGT replaced if has large volume emesis. FEN - electrolyte replacement protocol ordered. Receiving IV albumin. TPN to start tomorrow per Pulmonary. ID - sepsis secondary to peritonitis. On Flagyl, Zosyn and Levaquin. Drains with serous drainage. WBC remains elevated. Patient remains afebrile. Renal - JESS, most likely ATN. Appreciate renal recs. Heme - Acute blood loss anemia, lolis-operative loss exacerbated by heparin gtt. Will continue to monitor and transfuse as needed. DVT/PE: on Heparin gtt  CV - stable, off pressors  Pulm - Respiratory failure requiring ventilator support, self extubated this am. On high flow O2 NC and ABG stable. Discussed with Pulmonary and would try BIPAP or re-intubate if needed.    Disp - condition guarded, continue ICU care  Code Status - Full Code      SUBJECTIVE  Complains of pain, denies nausea      OBJECTIVE  Physical Exam:  Patient Vitals for the past 24 hrs:   BP Temp Pulse Resp SpO2 Weight   06/18/18 0000 115/70 - 90 13 - -   06/17/18 2345 - - 88 16 94 % -   06/17/18 2330 - - 94 16 95 % -   06/17/18 2329 - - 92 19 94 % -   06/17/18 2328 - - 97 15 95 % -   06/17/18 2327 - - 95 21 96 % -   06/17/18 2326 - - 93 19 95 % -   06/17/18 2315 - - 99 18 (!) 79 % -   06/17/18 2314 - - 97 20 (!) 78 % -   06/17/18 2313 - - (!) 102 24 (!) 83 % -   06/17/18 2312 - - 94 25 (!) 85 % -   06/17/18 2300 120/90 - 88 20 95 % -   06/17/18 2245 - - 89 19 94 % - 06/17/18 2230 - - 92 16 94 % -   06/17/18 2215 - - 90 17 94 % -   06/17/18 2200 124/68 - 93 18 94 % -   06/17/18 2145 - - 92 21 95 % -   06/17/18 2130 - - 92 22 94 % -   06/17/18 2115 - - 94 16 94 % -   06/17/18 2111 - - - - 95 % -   06/17/18 2100 126/71 - 92 20 95 % -   06/17/18 2045 - - 95 14 95 % -   06/17/18 2030 - - 89 16 95 % -   06/17/18 2015 - - 93 15 95 % -   06/17/18 2000 122/75 - 86 20 95 % -   06/17/18 1945 - - 95 17 96 % -   06/17/18 1930 - - 95 21 90 % -   06/17/18 1915 - - 87 19 95 % -   06/17/18 1900 122/83 - 92 19 96 % -   06/17/18 1848 - 97.7 °F (36.5 °C) - - - -   06/17/18 1800 114/72 - 92 16 94 % -   06/17/18 1700 116/86 - 95 20 96 % -   06/17/18 1600 123/70 - 92 14 96 % 106.3 kg (234 lb 5.6 oz)   06/17/18 1500 123/68 - 92 19 93 % -   06/17/18 1400 119/70 - 97 19 93 % -   06/17/18 1300 120/66 - 94 20 94 % -   06/17/18 1200 113/64 97.8 °F (36.6 °C) 96 22 93 % -   06/17/18 1100 113/62 - 92 17 94 % -   06/17/18 1000 120/68 - 90 13 95 % -   06/17/18 0900 120/67 - 94 15 97 % -   06/17/18 0849 - - - - 98 % -   06/17/18 0800 121/66 97.9 °F (36.6 °C) 92 14 96 % -   06/17/18 0714 128/76 - 91 - - -   06/17/18 0700 128/76 - 87 19 93 % -   06/17/18 0600 131/67 - 83 11 98 % -   06/17/18 0500 133/79 - 88 14 98 % -   06/17/18 0445 - - 88 16 98 % -   06/17/18 0400 109/69 97.8 °F (36.6 °C) 93 18 100 % -   06/17/18 0300 115/82 - (!) 108 15 99 % -   06/17/18 0200 133/83 - 94 18 100 % -   06/17/18 0127 - - (!) 106 18 99 % -   06/17/18 0100 120/71 - 93 12 99 % -         Intake/Output Summary (Last 24 hours) at 06/18/18 0003  Last data filed at 06/17/18 1600   Gross per 24 hour   Intake          4527.49 ml   Output             3715 ml   Net           812.49 ml        General - alert, trying to rest but in pain, does not appear to be oriented  HEENT - high flow nc in place  Heart - tachy  Lungs - clear to auscultation  Abdomen - tender, nondistended, hypo bowel sounds. Drain output serosanguinous.   Incision - wound edges are without erythema or necrosis. Wound vac in place. Extremities - 2+ edema    Labs  CBC  Recent Labs      06/17/18   1430  06/17/18   0810  06/17/18   0407   06/16/18   0345  06/15/18   2050   WBC   --   20.0*   --    --   20.5*  19.2*   HCT  24.3*  25.5*  25.5*   < >  36.7  41.5   MCV   --   72.4*   --    --   72.8*  74.1   BANDS   --   7*   --    --    --   40*   MONOS   --   3   --    --    --   4   EOS   --   0   --    --    --   0   BASOS   --   0   --    --    --   1    < > = values in this interval not displayed.         CMP  Recent Labs      06/17/18   1430  06/17/18   0810  06/16/18   0345   NA  145  144  146*   CO2  22  23  18*   BUN  52*  54*  46*        Lab Results   Component Value Date/Time    Glucose 159 (H) 06/17/2018 02:30 PM    Glucose (POC) 175 (H) 06/17/2018 05:25 AM    Glucose,  (H) 06/15/2018 05:14 PM          Current Hospital Medications    Current Facility-Administered Medications:     levoFLOXacin (LEVAQUIN) 750 mg in D5W IVPB, 750 mg, IntraVENous, Q48H, Pietro Forman MD    albumin human 25% (BUMINATE) solution 12.5 g, 12.5 g, IntraVENous, Q6H, Noreen Ríos MD, Last Rate: 60 mL/hr at 06/17/18 0800, 12.5 g at 06/17/18 1757    metroNIDAZOLE (FLAGYL) IVPB premix 500 mg, 500 mg, IntraVENous, Q8H, Cullen Walters MD, Last Rate: 100 mL/hr at 06/17/18 1839, 500 mg at 06/17/18 1839    0.9% sodium chloride infusion 250 mL, 250 mL, IntraVENous, PRN, Chiqui Kim CRNA    dextrose 5% infusion, 75 mL/hr, IntraVENous, CONTINUOUS, Khadijah Joy MD, Last Rate: 75 mL/hr at 06/16/18 0231, 75 mL/hr at 06/16/18 0231    0.9% sodium chloride infusion 250 mL, 250 mL, IntraVENous, PRN, Ruben Lanza MD    ipratropium (ATROVENT) 0.02 % nebulizer solution 0.5 mg, 0.5 mg, Nebulization, Q4H PRN, Cullen Walters MD    piperacillin-tazobactam (ZOSYN) 2.25 g in 0.9% sodium chloride (MBP/ADV) 50 mL MBP, 2.25 g, IntraVENous, Q6H, Cullen Walters MD, Last Rate: 100 mL/hr at 06/17/18 1756, 2.25 g at 06/17/18 1756    fentaNYL (PF) 900 mcg/30 ml infusion soln, 0-200 mcg/hr, IntraVENous, TITRATE, Tasneem Easley MD, Last Rate: 2.5 mL/hr at 06/17/18 1936, 75 mcg/hr at 06/17/18 1936    naloxone (NARCAN) injection 0.4 mg, 0.4 mg, IntraVENous, EVERY 2 MINUTES AS NEEDED, Sumaya Gambino MD    heparin 25,000 units in D5W 250 ml infusion, 18-36 Units/kg/hr, IntraVENous, TITRATE, Christen Buenrostro MD, Last Rate: 14.3 mL/hr at 06/17/18 1936, 16 Units/kg/hr at 06/17/18 1936    ondansetron [facility]) injection 4 mg, 4 mg, IntraVENous, Q6H PRN, Tanmay Suero MD, 4 mg at 06/08/18 2152    oxymetazoline (AFRIN) 0.05 % nasal spray 2 Spray, 2 Spray, Both Nostrils, BID PRN, Helena Burden MD    alum-mag hydroxide-simeth (MYLANTA) oral suspension 30 mL, 30 mL, Oral, Q4H PRN, Christen Buenrostro MD, 30 mL at 06/07/18 2225    HYDROcodone-acetaminophen (NORCO)  mg tablet 2 Tab, 2 Tab, Oral, Q4H PRN, Shyann Chacon MD, 2 Tab at 06/15/18 0816    fentaNYL citrate (PF) injection 25 mcg, 25 mcg, IntraVENous, Q3H PRN, Christen Buenrostro MD, 25 mcg at 06/16/18 1036    fluticasone (FLONASE) 50 mcg/actuation nasal spray 2 Spray, 2 Spray, Both Nostrils, DAILY PRN, Tasneem Easley MD    ELECTROLYTE REPLACEMENT PROTOCOL-POTASSIUM Renal Dosing, 1 Each, Other, PRN, Christen Nicole MD    ELECTROLYTE REPLACEMENT PROTOCOL-MAGNESIUM, 1 Each, Other, PRN, Christen Nicole MD    ELECTROLYTE REPLACEMENT PROTOCOL-PHOSPHORUS Renal Dosing, 1 Each, Other, PRN, Christen Nicole MD    ELECTROLYTE REPLACEMENT PROTOCOL-CALCIUM, 1 Each, Other, PRN, Christen Nicole MD    sodium chloride (NS) flush 5-10 mL, 5-10 mL, IntraVENous, PRN, Josephine Amaya MD    insulin lispro (HUMALOG) injection, , SubCUTAneous, Q6H, Christen Buenrostro MD, Stopped at 06/17/18 1200    glucose chewable tablet 16 g, 4 Tab, Oral, PRN, Christen Nicole MD    glucagon (GLUCAGEN) injection 1 mg, 1 mg, IntraMUSCular, PRN, Christen Nicole MD    dextrose (D50W) injection syrg 12.5-25 g, 25-50 mL, IntraVENous, PRN, Christen Armenta MD    pantoprazole (PROTONIX) 40 mg in sodium chloride 0.9% 10 mL injection, 40 mg, IntraVENous, DAILY, Mamadou Dempsey MD, 40 mg at 06/17/18 1963      Lady Addie MD

## 2018-06-18 NOTE — PROGRESS NOTES
[facility]                  Pulmonary, Critical Care, and Sleep Medicine     Name: Conchita Caldwell MRN: [id number]   : 1950 Hospital: [facility] [place]    Date: 2018        PCCM Note                                              Subjective/History of Present Illness:     Patient is a 75 y.o. 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 S/p exploratory laparotomy 6/15/18 - findings of sigmoid diverticulitis with suspected perforation, underwent ileostomy and wound vac placement. 18:  Patient self extubated 6/17 am  Overall, awake, confused; breathing is ok  On HF nc o2 - since, post-extubation CXR shows mild increase in atelectasis  Tele-sinus rhythm; BP stable-not on pressors; Afebrile    UOP 4.4 lits yesterday (1.6 lits overnight); BRANDON drain 230 cc overnight - sero-sanguineous    Heparin drip for DVT and suspected PEs. Surgeries  18 - Laparoscopy converted to laparotomy, total abdominal hysterectomy, bilateral salpingo-oophorectomy, omentectomy, bilateral pelvic and periaortic lymphadenectomy and radical tumor debulking to R0. Path - ADENOCARCINOMA OF LEFT OVARY.     18 - Diagnostic laparoscopy converted to laparotomy, peritoneal biopsies, aerobic and anaerobic cultures of peritoneal fluid and peritoneal lavage. 6/15/18 - Wound exploration. Exploratory laparotomy. Lysis of adhesions. Ileostomy and wound VAC placement. Allergies   Allergen Reactions    Hydrocodone Other (comments)     Breaks into cold sweat    Metformin Other (comments)     Breaks out into cold sweat.  Can Take Glucophage brand name med      Past Medical History:   Diagnosis Date    Diabetes ([address]     many years type 2    Hypertension     many years      Past Surgical History:   Procedure Laterality Date    HX OOPHORECTOMY  2018    HX TONSILLECTOMY      as a child       Social History   Substance Use Topics    Smoking status: Never Smoker    Smokeless tobacco: Never Used    Alcohol use No        Current Facility-Administered Medications   Medication Dose Route Frequency    furosemide (LASIX) injection 20 mg  20 mg IntraVENous BID    furosemide (LASIX) injection 40 mg  40 mg IntraVENous ONCE    levoFLOXacin (LEVAQUIN) 750 mg in D5W IVPB  750 mg IntraVENous Q48H    albumin human 25% (BUMINATE) solution 12.5 g  12.5 g IntraVENous Q6H    metroNIDAZOLE (FLAGYL) IVPB premix 500 mg  500 mg IntraVENous Q8H    dextrose 5% infusion  75 mL/hr IntraVENous CONTINUOUS    piperacillin-tazobactam (ZOSYN) 2.25 g in 0.9% sodium chloride (MBP/ADV) 50 mL MBP  2.25 g IntraVENous Q6H    fentaNYL (PF) 900 mcg/30 ml infusion soln  0-200 mcg/hr IntraVENous TITRATE    heparin 25,000 units in D5W 250 ml infusion  18-36 Units/kg/hr IntraVENous TITRATE    insulin lispro (HUMALOG) injection   SubCUTAneous Q6H    pantoprazole (PROTONIX) 40 mg in sodium chloride 0.9% 10 mL injection  40 mg IntraVENous DAILY         Objective:   Vital Signs:    Visit Vitals    /75    Pulse 82    Temp 98 °F (36.7 °C)    Resp 18    Ht 5' 1\" (1.549 m)    Wt 106.3 kg (234 lb 5.6 oz)    SpO2 96%    BMI 44.28 kg/m2       O2 Device: Hi flow nasal cannula   O2 Flow Rate (L/min): 35 l/min   Temp (24hrs), Av.9 °F (36.6 °C), Min:97.7 °F (36.5 °C), Max:98.1 °F (36.7 °C)       Intake/Output:   Last shift:           Last 3 shifts:  1901 -  0700  In: 4527.5 [I.V.:4527.5]  Out: 6525 [Urine:5050; Drains:845]      Intake/Output Summary (Last 24 hours) at 18 1000  Last data filed at 18 0655   Gross per 24 hour   Intake 932.73 ml   Output             5105 ml   Net         -4172.27 ml       ABG:  Post-self extubation  Lab Results   Component Value Date/Time    PHI 7.501 (H) 06/17/2018 12:18 PM    PCO2I 26.0 (L) 06/17/2018 12:18 PM    PO2I 61 (L) 06/17/2018 12:18 PM    HCO3I 20.4 (L) 06/17/2018 12:18 PM    FIO2I 35 06/17/2018 12:18 PM       Physical Exam:     General/Neurology: weak, awake, confused, weakly moving extremities    Head:   Normocephalic, without obvious abnormality, atraumatic. Eye:   no scleral icterus, no pallor, no cyanosis. Pupils not dilated. Neck:   Symmetric. No lymphadenopathy. Trachea midline  Lung: Moderate air entry bilateral equal. Decreased breath sounds bases. Not in distress. No rhonchi. No wheezing. Heart:   Regular rate & rhythm. S1 S2 present. No murmur. No JVD. Abdomen:  Soft. Obese. Sluggish BS. Midline lower abd surgical site - open wound with wound vac. BRANDON drain, RT abd wall Ileostomy. No abd distension or guarding. Extremities:  No cyanosis. No clubbing. Bilateral UE and LE edema. Anasarca. Pulses: Palpable radials and DPs bilateral.   Lymphatic:  No cervical or supraclav lymphadenopathy.    Skin:   No rash      Data:       Recent Results (from the past 12 hour(s))   GLUCOSE, POC    Collection Time: 06/18/18 12:41 AM   Result Value Ref Range    Glucose (POC) 139 (H) 70 - 356 mg/dL   METABOLIC PANEL, BASIC    Collection Time: 06/18/18  5:16 AM   Result Value Ref Range    Sodium 146 (H) 136 - 145 mmol/L    Potassium 3.3 (L) 3.5 - 5.5 mmol/L    Chloride 111 (H) 100 - 108 mmol/L    CO2 22 21 - 32 mmol/L    Anion gap 13 3.0 - 18 mmol/L    Glucose 151 (H) 74 - 99 mg/dL    BUN 50 (H) 7.0 - 18 MG/DL    Creatinine 2.04 (H) 0.6 - 1.3 MG/DL    BUN/Creatinine ratio 25 (H) 12 - 20      GFR est AA 29 (L) >60 ml/min/1.73m2    GFR est non-AA 24 (L) >60 ml/min/1.73m2    Calcium 7.2 (L) 8.5 - 10.1 MG/DL   CALCIUM, IONIZED    Collection Time: 06/18/18  5:16 AM   Result Value Ref Range    Ionized Calcium 1.08 (L) 1.12 - 1.32 MMOL/L   CBC W/O DIFF    Collection Time: 06/18/18  5:16 AM   Result Value Ref Range    WBC 17.1 (H) 4.6 - 13.2 K/uL    RBC 3.49 (L) 4.20 - 5.30 M/uL    HGB 8.3 (L) 12.0 - 16.0 g/dL    HCT 25.5 (L) 35.0 - 45.0 %    MCV 73.1 (L) 74.0 - 97.0 FL    MCH 23.8 (L) 24.0 - 34.0 PG    MCHC 32.5 31.0 - 37.0 g/dL    RDW 22.7 (H) 11.6 - 14.5 %    PLATELET 735 620 - 002 K/uL   MAGNESIUM    Collection Time: 06/18/18  5:16 AM   Result Value Ref Range    Magnesium 1.9 1.6 - 2.6 mg/dL   PTT    Collection Time: 06/18/18  5:16 AM   Result Value Ref Range    aPTT 71.5 (H) 23.0 - 36.4 SEC   GLUCOSE, POC    Collection Time: 06/18/18  5:30 AM   Result Value Ref Range    Glucose (POC) 149 (H) 70 - 110 mg/dL           Chemistry Recent Labs      06/18/18   0516  06/17/18   1430  06/17/18   0810  06/17/18   0407   06/16/18   0345  06/15/18   2050   GLU  151*  159*  162*   --    --   205*  207*   NA  146*  145  144   --    --   146*  148*   K  3.3*  3.3*  3.5   --    --   4.1  4.5   CL  111*  110*  110*   --    --   113*  114*   CO2  22  22  23   --    --   18*  22   BUN  50*  52*  54*   --    --   46*  43*   CREA  2.04*  2.12*  2.24*   --    --   1.92*  1.69*   CA  7.2*  7.3*  7.2*   --    --   6.8*  6.9*   MG  1.9  2.0   --   2.0   < >  1.9  1.7   PHOS   --    --    --    --    --   4.2   --    AGAP  13  13  11   --    --   15  12   BUCR  25*  25*  24*   --    --   24*  25*   AP   --    --   51   --    --    --   83   TP   --    --   4.6*   --    --    --   4.6*   ALB   --    --   1.5*   --    --    --   1.2*   GLOB   --    --   3.1   --    --    --   3.4   AGRAT   --    --   0.5*   --    --    --   0.4*    < > = values in this interval not displayed.         Lactic Acid Lactic acid   Date Value Ref Range Status   06/17/2018 2.3 (HH) 0.4 - 2.0 MMOL/L Final     Comment:     CALLED TO AND CORRECTLY REPEATED BY:  Rick Amin RN ICU ON 6/17/2018 [id number] TO [id number]       Recent Labs      06/17/18   0810   LAC  2.3* Liver Enzymes Protein, total   Date Value Ref Range Status   06/17/2018 4.6 (L) 6.4 - 8.2 g/dL Final     Albumin   Date Value Ref Range Status   06/17/2018 1.5 (L) 3.4 - 5.0 g/dL Final     Globulin   Date Value Ref Range Status   06/17/2018 3.1 2.0 - 4.0 g/dL Final     A-G Ratio   Date Value Ref Range Status   06/17/2018 0.5 (L) 0.8 - 1.7   Final     AST (SGOT)   Date Value Ref Range Status   06/17/2018 15 15 - 37 U/L Final     Alk. phosphatase   Date Value Ref Range Status   06/17/2018 51 45 - 117 U/L Final     Recent Labs      06/17/18   0810  06/15/18   2050   TP  4.6*  4.6*   ALB  1.5*  1.2*   GLOB  3.1  3.4   AGRAT  0.5*  0.4*   SGOT  15  34   AP  51  83        CBC w/Diff Recent Labs      06/18/18   0516  06/17/18   1430  06/17/18   0810   06/16/18   0345  06/15/18   2050   WBC  17.1*   --   20.0*   --   20.5*  19.2*   RBC  3.49*   --   3.52*   --   5.04  5.60*   HGB  8.3*  8.0*  8.3*   < >  12.0  13.3   HCT  25.5*  24.3*  25.5*   < >  36.7  41.5   PLT  136   --   141   --   168  205   GRANS   --    --   73   --    --   34*   LYMPH   --    --   14*   --    --   20   EOS   --    --   0   --    --   0    < > = values in this interval not displayed. Cardiac Enzymes No results found for: CPK, CK, CKMMB, CKMB, RCK3, CKMBT, CKNDX, CKND1, FABIAN, TROPT, TROIQ, KAYLAN, TROPT, TNIPOC, BNP, BNPP     BNP No results found for: BNP, BNPP, XBNPT     Coagulation Recent Labs      06/18/18   0516  06/17/18   1430  06/17/18   0810  06/17/18   0407   06/15/18   2050   PTP   --    --   15.8*   --    --   14.8   INR   --    --   1.3*   --    --   1.2   APTT  71.5*  76.0*   --   100.6*   < >  24.5    < > = values in this interval not displayed.          Thyroid  No results found for: T4, T3U, TSH, TSHEXT, TSHEXT    No results found for: T4     Urinalysis Lab Results   Component Value Date/Time    Color DARK YELLOW 06/04/2018 08:42 AM    Appearance CLOUDY 06/04/2018 08:42 AM    Specific gravity 1.028 06/04/2018 08:42 AM    pH (UA) 5.0 06/04/2018 08:42 AM    Protein 30 (A) 06/04/2018 08:42 AM    Glucose NEGATIVE  06/04/2018 08:42 AM    Ketone TRACE (A) 06/04/2018 08:42 AM    Bilirubin SMALL (A) 06/04/2018 08:42 AM    Urobilinogen 1.0 06/04/2018 08:42 AM    Nitrites NEGATIVE  06/04/2018 08:42 AM    Leukocyte Esterase NEGATIVE  06/04/2018 08:42 AM    Epithelial cells FEW 06/04/2018 08:42 AM    Bacteria 1+ (A) 06/04/2018 08:42 AM    WBC 0 to 3 06/04/2018 08:42 AM    RBC 0 to 3 06/04/2018 08:42 AM        Micro  No results for input(s): SDES, CULT in the last 72 hours. No results for input(s): CULT in the last 72 hours.      ABG Recent Labs      06/17/18   1218  06/17/18   0642  06/17/18   0445   PHI  7.501*  7.450  7.406   PCO2I  26.0*  27.8*  32.8*   PO2I  61*  64*  123*   HCO3I  20.4*  19.3*  20.6*   FIO2I  35  36  40        All Micro Results     Procedure Component Value Units Date/Time    CULTURE, BLOOD [[id number]] Collected:  06/09/18 1423    Order Status:  Completed Specimen:  Whole Blood from Blood Updated:  06/15/18 0230     Special Requests: NO SPECIAL REQUESTS        Culture result: NO GROWTH 6 DAYS       CULTURE, BLOOD [[id number]] Collected:  06/09/18 1223    Order Status:  Completed Specimen:  Whole Blood from Blood Updated:  06/15/18 0230     Special Requests: left hand     Culture result: NO GROWTH 6 DAYS       AFB CULTURE + SMEAR W/RFLX PCR AND ID [[id number]] Collected:  06/09/18 1024    Order Status:  Completed Specimen:  Respiratory sample Updated:  06/12/18 2106     Source BRONCHIAL LAVAGE        AFB Specimen processing Concentration     Acid Fast Smear NEGATIVE          (NOTE)  Performed At: [address] [id number]  Stanislav Jerome MD [place]:[id number]          Acid Fast Culture PENDING    Aileen Cervantes [[id number]] Collected:  06/09/18 1024    Order Status:  Completed Specimen:  Bronchial lavage Updated:  06/11/18 1230     Special Requests: NO SPECIAL REQUESTS        FUNGUS SMEAR NO FUNGAL ELEMENTS SEEN        Culture result: NO FUNGUS ISOLATED 2 DAYS       CULTURE, SPUTUM/BRONCH/OTH [[id number]] Collected:  06/09/18 1024    Order Status:  Completed Specimen:  Sputum from Bronchial lavage Updated:  06/11/18 1058     Special Requests: NO SPECIAL REQUESTS        GRAM STAIN FEW WBC'S         FEW GRAM NEGATIVE RODS        Culture result:         RARE NORMAL RESPIRATORY JOSETTE    CULTURE, ANAEROBIC [[id number]] Collected:  06/05/18 0220    Order Status:  Completed Specimen:  Peritoneal Fluid Updated:  06/11/18 0734     Special Requests: NO SPECIAL REQUESTS        Culture result:         NO ANAEROBES ISOLATED 5 DAYS    CULTURE, BLOOD [[id number]] Collected:  06/04/18 0740    Order Status:  Completed Specimen:  Blood from Blood Updated:  06/10/18 0655     Special Requests: NO SPECIAL REQUESTS        Culture result: NO GROWTH 6 DAYS       CULTURE, BLOOD [[id number]] Collected:  06/04/18 0740    Order Status:  Completed Specimen:  Blood from Blood Updated:  06/10/18 0655     Special Requests: NO SPECIAL REQUESTS        Culture result: NO GROWTH 6 DAYS       CULTURE, BODY FLUID [place] STAIN [[id number]]  (Abnormal)  (Susceptibility) Collected:  06/05/18 0220    Order Status:  Completed Specimen:  Peritoneal Fluid Updated:  06/08/18 0936     Special Requests: NO SPECIAL REQUESTS        GRAM STAIN MANY WBC'S         NO ORGANISMS SEEN        Culture result:         RARE KLEBSIELLA OXYTOCA (A)            CALLED TO AND CORRECTLY REPEATED BY:  ANGEL LUIS DE LOS SANTOS RN ICU ON 6/7/2018 1032 TO 5087      CULTURE, URINE [[id number]] Collected:  06/05/18 1245    Order Status:  Completed Specimen:  Urine from Fry Specimen Updated:  06/07/18 1103     Special Requests: NO SPECIAL REQUESTS        Culture result: NO GROWTH 2 DAYS       C. DIFFICILE/EPI PCR [[id number]] Collected:  06/05/18 0930    Order Status:  Canceled Specimen:  Stool         Echo 6/9/18:  Left ventricle: Systolic function was normal. Ejection fraction was estimated   in the range of 65 % to 70 %. No obvious  wall motion abnormalities identified in the views obtained. Wall thickness   was mildly increased. Doppler parameters  were consistent with abnormal left ventricular relaxation (grade 1 diastolic   dysfunction). Right ventricle: The size was normal. Systolic function was normal.  Mitral valve: There was mild annular calcification. Tricuspid valve: Pulmonary artery systolic pressure was mildly increased. Pulmonary artery systolic pressure: 34 mmHg. PVL LE 6/6/18: acute b/l peroneal DVT. CT abd pelvis 6/4/18:  1. Postsurgical hysterectomy, bilateral oophorectomy, pelvic lymphadenectomy and  a mastectomy surgical changes. Small volume of ascites in the abdomen and  pelvis, with a few tiny locules of gas in the right hemipelvis would be in  keeping with recent postsurgical etiology. 2. Abnormal edematous thick-walled small bowel loops in the left abdomen and  pelvis, with TRAM lamellar edematous configuration, induration. No findings of  pneumatosis. The overall appearance is nonspecific. Diagnostic considerations  include infectious etiology, nonspecific edema which may be unresolved  postsurgical etiology. Ischemia is also included in the differential diagnostic  consideration, however, there are no findings of pneumatosis, portal venous gas. 3. Stool in the right colon extending to the hepatic flexure with surrounding  gas, foci of pneumatosis could be obscured. No associated bowel wall thickening. 4. Satisfactory position of nasogastric tube. 5. Hepatomegaly, steatosis with 2 rounded low-density lesions, potentially  cysts. 6. Bibasilar atelectasis. CXR reviewed by me:  CXR 6/18/18:  Venous catheter right subclavian central venous catheter tip projects at the distal SVC without change. IMPRESSION:   Interval progression of CHF/interstitial and probable alveolar edema. Interval developing small right pleural effusion.        IMPRESSION: · Severe sepsis due to Klebsiella Oxytoca peritonitis. · Klebsiella oxytoca peritonitis. S/p laparotomy and peritoneal lavage: Klebsiella positive. Ruled out ischemic bowel in laparotomy. 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 · S/p bronchoscopy 6/9/18: no aspiration noted. Cx NGTD. · VDRF due to sepsis/PE/?ARDS, reintubated on 6/8/18  · S/p Shock likely due to sepsis and obstructive due to presumed PE. Shock resolved. Off vasopressors. · Acute b/l peroneal DVT  · Presumed PE with severe hypoxia and high A-a gradient, elevated RVSP 34 mm Hg. · Anemia, no active external bleeding. · Thrombocytopenia, improving   · JESS, improving  · Hypernatremia. · Metabolic ad lactic acidosis, resolved. · Elevated LFT, due to shock liver, improving  · Foreign body on peritoneal biopsy, per path report. · Anasarca due to fluid resuscitation, JESS, hypoalbuminemia and sequale of sepsis. · Code status: DNR. · Very poor overall prognosis. RECOMMENDATIONS:   Respiratory:  CHR shows worsening CHF; check ABG; resp status seems stable overall; give 40 mg lasix today am; continue HF oxygen therapy for peep effect and decrease risks for re-intubation. Presumed PEs; continue anticoagulation - watch for bleeding and anemia; follow protocol for adjustments and labs. Keep SPO2 >=92%. HOB 30 degree elevation all the time. Aggressive pulmonary toileting. Aspiration precautions. CVS: Echo ok with mildly elevated RVSP but normal RV systolic function. ID:    Bronch cx normal robin. Peritoneal cx Klebsiella Oxytoca resistant to ampicillin. Doubt pneumonia. Leucocytosis-follow. Afebrile. Ab -  Zosyn, levaquin, flagyl. Defer follow up Abd imaging to Surgery team.   Hematology/Oncology: Monitor Hb and platelets while on heparin; check H/H q12; transfuse to keep Hb>8 gm/dl; on heparin drip for DVT and suspected PEs.    Renal: Nephrologist on case; D5W for hypernatremia 5/hr - Na stable  Overall fluid positive by 20 kg since admission by weight; lasix bid with albumin from today evening. GI/: holding tube feed - post op state; Dr Sudheer Chavez ok with starting TPN today - will d/w team during rounds; Picc Line has been ordered; LFT improved. Endocrine: Maintain blood glucose 140-180. Humalog sub cut. Neurology: wean fentanyl drip - balance post-op pains with excessive sedation  Pain/Sedation: fentanyl drip - management per Dr Sudheer Chavez  Skin/Wound: local surgical site care. Electrolytes: Replace electrolytes per ICU electrolyte replacement protocol. Calcium and Kcl being replaced today. Nutrition: see above  Prophylaxis: DVT and GI Prophylaxis (heparin /protonix)  Restraints: none  Lines/Tubes:   ETT: 6/8/18-came out 6/17/18  Central line: right subclavian 6/4/18 (site examined, no erythema, induration, discharge or sign of infection. Plan for picc line Monday. Central line bundle followed). Fry: 6/4/18 (Medically necessary for strict input/output monitoring in critically ill patient, will remove it when not needed. Fry bundle followed). Will defer respective systems problem management to primary and other respective consultant and follow patient in ICU with primary and other medical team.  Further recommendations will be based on the patient's response to recommended treatment and results of the investigation ordered. Quality Care: PPI, DVT prophylaxis, HOB elevated, Infection control all reviewed and addressed. Code status - Updated Brother Yash Benavides on 6/17; he would like Full Code status for now, while he discusses with his 3 brother; patient has no children    Care of plan d/w RN, RT  Updated patient brother Yash Benavides at bedside. High complexity decision making was performed during the evaluation of this patient at high risk for decompensation with multiple organ involvement. Total critical care time spent rendering care exclusive of procedures: 39 minutes. Madina Shi MD  6/18/2018

## 2018-06-18 NOTE — PROGRESS NOTES
Problem: Mobility Impaired (Adult and Pediatric)  Goal: *Acute Goals and Plan of Care (Insert Text)  Physical Therapy Goals  Initiated 6/15/2018 and to be accomplished within 3-7 day(s)  1. Patient will move from supine to sit and sit to supine  in bed with maximal assistance. 2.  Patient will transfer from bed to chair and chair to bed with maximal assistance using the least restrictive device. 3.  Patient will perform sit to stand with maximal assistance. 4.  Patient will ambulate with maximal assistance for 5 feet with the least restrictive device. Attempted PT session however pt did sleeping and pt did not acknowledge this PT. Nurse Priscilla aware and pt brother in room upon attempt. Brother requests PT to return later to try. Will return as pt is willing/able to participate.

## 2018-06-18 NOTE — PROGRESS NOTES
Hospitalist Progress Note    Patient: Hamzah Quintero MRN: [id number]  CSN: [id number]    YOB: 1950  Age: 75 y.o. Sex: female    DOA: 6/4/2018 LOS:  LOS: 14 days                Assessment/Plan     Patient Active Problem List   Diagnosis Code    Ovarian ca ([email or web address] C56.9    Severe obesity (BMI 35.0-39.9) ([facility]) E66.01    Abdominal pain R10.9    Sepsis ([facility] A41.9    Oliguria R34    Acute [facility] ([facility] J96.00    JESS (acute kidney injury) ([email or web address] W16.3    Metabolic acidosis F42.2    Acute deep vein thrombosis (DVT) of lower extremity ([facility]) I82.409    S/P ileostomy ([email or web address] Z93.2    Hypoalbuminemia E88.09    Peritonitis ([email or web address] K65.9            74 yo female with history of clear cell ovarian ca, s/p HEATH, BSO debulking surgery. Admitted for abdominal pain. S/p laparoscopy and peritoneal lavage 6/5/2018. Patient on fentanyl drip, not responding. CRITICAL CARE PLAN    Resp -   Acute resp failure with hypoxia - self extubated 06/17/2018. On high flow oxygen  S/p bronchoscopy 06/09/2018, no aspiration, resp cultures no growth to date. Suspected PE    ID -   Severe Sepsis - secondary to peritonitis. Peritoneal fluid growing klebsiella. ANTIBIOTICS zosyn, levaquin, flagyl. Trend temps, wbc, LA improving. CVS - Monitor HD. Shock - sepsis vs secondary to suspected PE. Now off pressors. Anasarca - on lasix and albumin    Heme/onc - Follow H&H, plts. INR. Acute bilateral peroneal DVT - on heparin drip  Anemia -       Renal - Trend BUN, Cr, follow I/O, romero in place. Check and replace Mg, K, phos. JESS - nephrology following, creatine improving. Metabolic acidosis - resolved    Endocrine -  Follow FSG    Neuro/ Pain/ Sedation - Fentanyl gtt, wean off drip. GI - tube feeding/TPN per primary  Shock liver resolved      Prophylaxis - DVT: heparin drip, GI: protonix    Long discussion with brother at bedside.     45 minutes of critical care time spent in the direct evaluation and treatment of this high risk patient. The reason for providing this level of medical care for this critically ill patient was due a critical illness that impaired one or more vital organ systems such that there was a high probability of imminent or life threatening deterioration in the patients condition. This care involved high complexity decision making to assess, manipulate, and support vital system functions, to treat this degreee vital organ system failure and to prevent further life threatening deterioration of the patients condition. Physical Exam:  General: As above    HEENT: NC, Atraumatic. PERRLA, anicteric sclerae. Lungs: CTA Bilaterally. No Wheezing/Rhonchi/Rales. Heart:  Regular  rhythm,  No murmur, No Rubs, No Gallops  Abdomen: Soft, Non distended, Non tender. decreased Bowel sounds, ileostomy. Extremities: Edema bilaterally upper and lower ext. Vital signs/Intake and Output:  Visit Vitals    /75    Pulse 82    Temp 98 °F (36.7 °C)    Resp 18    Ht 5' 1\" (1.549 m)    Wt 106.3 kg (234 lb 5.6 oz)    SpO2 96%    BMI 44.28 kg/m2     Current Shift:     Last three shifts:  06/16 1901 - 06/18 0700  In: 4527.5 [I.V.:4527.5]  Out: 6525 [Urine:5050; Drains:845]            Labs: Results:       Chemistry Recent Labs      06/18/18   0516  06/17/18   1430  06/17/18   0810   06/15/18   2050   GLU  151*  159*  162*   < >  207*   NA  146*  145  144   < >  148*   K  3.3*  3.3*  3.5   < >  4.5   CL  111*  110*  110*   < >  114*   CO2  22  22  23   < >  22   BUN  50*  52*  54*   < >  43*   CREA  2.04*  2.12*  2.24*   < >  1.69*   CA  7.2*  7.3*  7.2*   < >  6.9*   AGAP  13  13  11   < >  12   BUCR  25*  25*  24*   < >  25*   AP   --    --   51   --   83   TP   --    --   4.6*   --   4.6*   ALB   --    --   1.5*   --   1.2*   GLOB   --    --   3.1   --   3.4   AGRAT   --    --   0.5*   --   0.4*    < > = values in this interval not displayed.       CBC w/Diff Recent Labs 06/18/18   0516  06/17/18   1430  06/17/18   0810   06/16/18   0345  06/15/18   2050   WBC  17.1*   --   20.0*   --   20.5*  19.2*   RBC  3.49*   --   3.52*   --   5.04  5.60*   HGB  8.3*  8.0*  8.3*   < >  12.0  13.3   HCT  25.5*  24.3*  25.5*   < >  36.7  41.5   PLT  136   --   141   --   168  205   GRANS   --    --   73   --    --   34*   LYMPH   --    --   14*   --    --   20   EOS   --    --   0   --    --   0    < > = values in this interval not displayed. Cardiac Enzymes No results for input(s): CPK, CKND1, FABIAN in the last 72 hours. No lab exists for component: CKRMB, TROIP   Coagulation Recent Labs      06/18/18   0516 06/17/18   1430  06/17/18   0810   06/15/18   2050   PTP   --    --   15.8*   --   14.8   INR   --    --   1.3*   --   1.2   APTT  71.5*  76.0*   --    < >  24.5    < > = values in this interval not displayed. Lipid Panel No results found for: CHOL, CHOLPOCT, CHOLX, CHLST, CHOLV, 163514, HDL, LDL, LDLC, DLDLP, 759163, VLDLC, VLDL, TGLX, TRIGL, TRIGP, TGLPOCT, CHHD, CHHDX   BNP No results for input(s): BNPP in the last 72 hours.    Liver Enzymes Recent Labs      06/17/18 0810   TP  4.6*   ALB  1.5*   AP  51   SGOT  15      Thyroid Studies No results found for: T4, T3U, TSH, TSHEXT     Procedures/imaging: see electronic medical records for all procedures/Xrays and details which were not copied into this note but were reviewed prior to creation of Plan

## 2018-06-18 NOTE — PROGRESS NOTES
NUTRITION FOLLOW-UP    RECOMMENDATIONS/PLAN:   - TPN Recc 85g AA 28%,  155g CHO 43%, 40g Lipids 29% total 1227kcal   Recc starting 1/2 strength and titrate to goal  Order daily phos labs  Monitor labs/lytes, diet adv, skin integrity, wt, fluid status, BM    NUTRITION ASSESSMENT:   Client Update: 75 yrs old Female with acute respiratory failure, ischemia colitis, sepsis, JESS, metabolic acidosis. 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 Pt was extubated, and 1 day later reintubated. Thought to have peritonitis. Pt is s/p laparotomy and peritoneal lavage with klebsiella positive. 6/15/18 s/p exp lap surgery for intraabdominal fluid collection/wound vac and ileostomy placed. Pt self extubated 6/17/18. Per MD post operative ileus persists. TPN ordered. FOOD/NUTRITION INTAKE   Diet Order:  NPO   Food Allergies: NKFA  Average PO Intake:      No data found. Pertinent Medications:  [x] Reviewed; calicum gluconate, dextrose 5%, heparin, protonix, zosyn, KCl,    Electrolyte Replacement Protocol: [x]K [x]Mg [x]PO4  Insulin:  [x]SSI  []Pre-meal   []Basal    []Drip  []None  Cultural/Religion Food Preferences: None Identified    BIOCHEMICAL DATA & MEDICAL TESTS  Pertinent Labs:  [x] Reviewed; Na-146, K-3.3 Ca-7.2   ANTHROPOMETRICS  Height: 5' 1\" (154.9 cm)       Weight: 106.3 kg (234 lb 5.6 oz)         BMI: 44.3 kg/m^2 morbidly obese (Greater than or = to 40% BMI)   Adm Weight: 196 lbs                Weight change: +38lbs since admission  Adjusted Body Weight: 58.1kg     NUTRITION-FOCUSED PHYSICAL ASSESSMENT  Skin: No PU      GI: +BM 6/16/18    NUTRITION PRESCRIPTION  Calories: 979-1246 kcal/day based on 11-14kcal/kg  Protein: 95 g/day based on 2.0g/kg of IBW  CHO: 122-156 g/day based on 50% of total energy  Fluid: 979-1246 ml/day based on 1 kcal/ml     NUTRITION DIAGNOSES:   1.  Inadequate oral intake related to diet order as evidence by NPO diet     NUTRITION INTERVENTIONS:   INTERVENTIONS:        GOALS:  1. TPN:TPN Recc 85g AA 28%,  155g CHO 43%, 40g Lipids 29% total 1227kcal 1. Start TPN by next review 3 days     LEARNING NEEDS (Diet, Supplementation, Food/Nutrient-Drug Interaction):   [x] None Identified   [] Education provided/documented      Identified and patient: [] Declined   [] Was not appropriate/indicated        NUTRITION MONITORING /EVALUATION:   Monitor wt  Monitor renal labs, electrolytes, fluid status     Previous Recommendations Implemented: no        Previous Goals Met:  no -      [x] Participated in Interdisciplinary Rounds    [x] Interdisciplinary Care Plan Reviewed  DISCHARGE NUTRITION RECOMMENDATIONS ADDRESSED:      [x] To be determined closer to discharge    NUTRITION RISK:           [x] At risk                        [] Not currently at risk        Will follow-up per policy.   Bao Harvey 1

## 2018-06-18 NOTE — PROGRESS NOTES
Consult for TPN Dosing per Pharmacy by Dr. Farrah Agarwal provided for this 75 y.o. female, for indication of ileus  Day of Therapy 1  Dosing weight ~ 62 kg   Labs:  BMP BMP:   Lab Results   Component Value Date/Time     (H) 06/18/2018 05:16 AM    K 3.3 (L) 06/18/2018 05:16 AM     (H) 06/18/2018 05:16 AM    CO2 22 06/18/2018 05:16 AM    AGAP 13 06/18/2018 05:16 AM     (H) 06/18/2018 05:16 AM    BUN 50 (H) 06/18/2018 05:16 AM    CREA 2.04 (H) 06/18/2018 05:16 AM    GFRAA 29 (L) 06/18/2018 05:16 AM    GFRNA 24 (L) 06/18/2018 05:16 AM          CMP CMP:   Lab Results   Component Value Date/Time     (H) 06/18/2018 05:16 AM    K 3.3 (L) 06/18/2018 05:16 AM     (H) 06/18/2018 05:16 AM    CO2 22 06/18/2018 05:16 AM    AGAP 13 06/18/2018 05:16 AM     (H) 06/18/2018 05:16 AM    BUN 50 (H) 06/18/2018 05:16 AM    CREA 2.04 (H) 06/18/2018 05:16 AM    GFRAA 29 (L) 06/18/2018 05:16 AM    GFRNA 24 (L) 06/18/2018 05:16 AM    CA 7.2 (L) 06/18/2018 05:16 AM    MG 1.9 06/18/2018 05:16 AM    PHOS 4.5 06/18/2018 05:16 AM         Ca/ Phos Lab Results   Component Value Date/Time    Calcium 7.2 (L) 06/18/2018 05:16 AM    Phosphorus 4.5 06/18/2018 05:16 AM       Mag    Lab Results   Component Value Date/Time    Magnesium 1.9 06/18/2018 05:16 AM      Tg No components found for: TGL   Albumin Lab Results   Component Value Date/Time    Protein, total 4.6 (L) 06/17/2018 08:10 AM    Albumin 1.5 (L) 06/17/2018 08:10 AM         Kcal requirements:  25 kcal/kg ~ 1500 kcal//day     Goal Macronutrients:  Protein  ~1.2 g/kg/day             75 g/day = 300 kcal  Lipids    ~24% of total kcal      40 g/day = 360 kcal  Dextrose remainder of total kcal  247 g/day= 840 kcal ( Note: adjusted to 200 gm dextrose ( 680 kcal )   (discussed macronutrient recommendations with dietary )     TPN  to be initiated at ½ goal macronutrients and titrated to goal per patient tolerance. Electrolytes to be monitored and adjusted daily. MD to replete electrolytes acutely outside of TPN as needed. Additional recommendations/Orders:   1. KCl 10 mEq/100  ml x 3 runs ordered per MD for K level of 3.3 this am   2. Corrective insulin coverage q6h while on TPN. 3. Change/Decrease IVF per MD   4. BMP,  Mag, Phos ordered daily  5. Triglycerides, Prealbumin, CMP ordered upon initiation and weekly ( bag #1 to contain no lipids pending TG level in am )    Pharmacy to follow daily and will make changes based on labs/clinical status.

## 2018-06-18 NOTE — PROGRESS NOTES
0730  Received report from Ute Osorio RN, at bedside using [employer]. All questions and clinical information reviewed. Monitor alarms verified. ART and CVP zero'd and calibrated. Pt awake, however, chooses not to answer questions at this time. 0800  Pts brother, Jenn Rosenbaum, at bedside. Restraints discontinued as he will monitor patient. Pt speaks to her brother. No complaints at this time. 1600  Pt becoming combative, attempting to hit the RN at the bedside and pulling at lines and tubes. She removed pulse ox and is waving arms. Pt re-restrained with soft wrist restraints after order obtained. 1700  Dr. Buitrago Spotted in. Ileostomy pouch emptied for 210 cc dark brownish burgundy thick fluid. Fentanyl infusing at 75 mcg/hr. Pt appears comfortable, awake, however, refuses to comment on pain. Pt says, \"leave me alone! \" when asked any questions at this time. 36  Dr. Willard Pock in to see patient and update family. Orders received. Pt somewhat confused. 1930  Report given to Jaime Guzman RN, at bedside using SBAR format. All questions answered and clinicals reviewed.

## 2018-06-18 NOTE — PROGRESS NOTES
Nephrology Progress note    Subjective:     Allen Lee is a 75 y.o. female with PMH DM, HTN, clear cell ovarian ca s/p debulking who presented with abdominal pain and possible sepsis/ischemic colitis. Pt underwent laparotomy/peritoneal lavage/bx, noted to have decreased UOP and increasing Cr to 2.1 today from baseline 0.6. CT neg for mass or hydro. Pt given lasix yesterday, still with high O2 requirements on vent. Unable to provide further history. On 6/8 Pt decompensated, ? Aspiration,  back on vent was hypoxic despite  Fi02 of 100%, , Hypotensive on IV pressors, Acidotic and on HC03 drip, Urine output decreased markedly over the weekend. Underwent Bronchoscopy 6/9. Pt requires less FiO2 now. UOP picking up. Wound has been draining yellow-brown fluid  - S/P Exploratory laparotomy, Lysis of adhesions, Ileostomy and wound VAC placement on 6/16.     - Pt self-extubated yesterday, doing ok. On NC O2.  Currently not in distress    Admit Date: 6/4/2018  Allergy:  Allergies   Allergen Reactions    Hydrocodone Other (comments)     Breaks into cold sweat    Metformin Other (comments)     Breaks out into cold sweat. Can Take Glucophage brand name med        Objective:     Visit Vitals    /75    Pulse 82    Temp 98 °F (36.7 °C)    Resp 18    Ht 5' 1\" (1.549 m)    Wt 106.3 kg (234 lb 5.6 oz)    SpO2 96%    BMI 44.28 kg/m2         Intake/Output Summary (Last 24 hours) at 06/18/18 1049  Last data filed at 06/18/18 0655   Gross per 24 hour   Intake           932.73 ml   Output             5105 ml   Net         -4172.27 ml       Physical Exam:     General: Off vent, on NC O2   HENT: Atraumatic and normocephalic.    Eyes: Sclera anicteric   Neck: No JVD or mass   Cardiovascular: Normal S1 S2   Pulmonary/Chest Wall: Decreased breath sounds bilaterally   Abdominal: Soft, obese, NABS   Musculoskeletal: ++ edema LEs   Neurological: Awake but confused       Data Review:      Lab Results   Component Value Date/Time WBC 17.1 (H) 06/18/2018 05:16 AM    RBC 3.49 (L) 06/18/2018 05:16 AM    HCT 25.5 (L) 06/18/2018 05:16 AM    MCV 73.1 (L) 06/18/2018 05:16 AM    MCH 23.8 (L) 06/18/2018 05:16 AM    MCHC 32.5 06/18/2018 05:16 AM    RDW 22.7 (H) 06/18/2018 05:16 AM      Lab Results   Component Value Date    IRON 8 (L) 06/07/2018   No components found for: FERRITIN  No components found for: PTHINT  Urinalysis  No results found for: UGLU         Impression:     -JESS- likely ATN,  S Cr improving, 2.0 today with good diuresis  -Septic Shock/hypotension, BPs normalized  -Resp Failure,  Was re-intubated 6/9,  Self extubated   -Severe Metabolic acidosis, improving.   -Ovarian ca s/p debulking then laparoscopy with lavage  -KLEBSIELLA OXYTOCA sepsis (PD fluid)  -DM  -h.o. HTN  -Anemia  -Elevated liver enzymes  -S/P Exploratory laparotomy, Lysis of adhesions, Ileostomy and wound VAC placement on 6/16  - Leukocytosis  - Hypernatremia, mild  - Hypokalemia        Plan:     Continue Lasix + IV albumin to promote diuresis  Monitor I/O and chemistry, H&H   Antibiotics.     No indication for HD, JESS improving  Vanc dosing per pharmacist  Will follow closely      MD Denver Landa  [phone]

## 2018-06-19 ENCOUNTER — APPOINTMENT (OUTPATIENT)
Dept: INTERVENTIONAL RADIOLOGY/VASCULAR | Age: 68
DRG: 853 | End: 2018-06-19
Attending: INTERNAL MEDICINE
Payer: MEDICARE

## 2018-06-19 LAB
ALBUMIN SERPL-MCNC: 2 G/DL (ref 3.4–5)
ANION GAP SERPL CALC-SCNC: 15 MMOL/L (ref 3–18)
APTT PPP: 112.9 SEC (ref 23–36.4)
APTT PPP: 55.3 SEC (ref 23–36.4)
BASOPHILS # BLD: 0 K/UL (ref 0–0.06)
BASOPHILS NFR BLD: 0 % (ref 0–2)
BUN SERPL-MCNC: 47 MG/DL (ref 7–18)
BUN/CREAT SERPL: 27 (ref 12–20)
CA-I SERPL-SCNC: 1.04 MMOL/L (ref 1.12–1.32)
CA-I SERPL-SCNC: 1.1 MMOL/L (ref 1.12–1.32)
CALCIUM SERPL-MCNC: 7.5 MG/DL (ref 8.5–10.1)
CHLORIDE SERPL-SCNC: 113 MMOL/L (ref 100–108)
CO2 SERPL-SCNC: 21 MMOL/L (ref 21–32)
CREAT SERPL-MCNC: 1.74 MG/DL (ref 0.6–1.3)
DIFFERENTIAL METHOD BLD: ABNORMAL
EOSINOPHIL # BLD: 0.1 K/UL (ref 0–0.4)
EOSINOPHIL NFR BLD: 1 % (ref 0–5)
ERYTHROCYTE [DISTWIDTH] IN BLOOD BY AUTOMATED COUNT: 23.8 % (ref 11.6–14.5)
GLUCOSE BLD STRIP.AUTO-MCNC: 158 MG/DL (ref 70–110)
GLUCOSE BLD STRIP.AUTO-MCNC: 178 MG/DL (ref 70–110)
GLUCOSE BLD STRIP.AUTO-MCNC: 180 MG/DL (ref 70–110)
GLUCOSE BLD STRIP.AUTO-MCNC: 200 MG/DL (ref 70–110)
GLUCOSE SERPL-MCNC: 152 MG/DL (ref 74–99)
HCT VFR BLD AUTO: 19.7 % (ref 35–45)
HCT VFR BLD AUTO: 24.5 % (ref 35–45)
HCT VFR BLD AUTO: 25.6 % (ref 35–45)
HGB BLD-MCNC: 6.6 G/DL (ref 12–16)
HGB BLD-MCNC: 8.1 G/DL (ref 12–16)
HGB BLD-MCNC: 8.4 G/DL (ref 12–16)
LYMPHOCYTES # BLD: 1.7 K/UL (ref 0.9–3.6)
LYMPHOCYTES NFR BLD: 9 % (ref 21–52)
MAGNESIUM SERPL-MCNC: 1.9 MG/DL (ref 1.6–2.6)
MCH RBC QN AUTO: 24.8 PG (ref 24–34)
MCHC RBC AUTO-ENTMCNC: 32.8 G/DL (ref 31–37)
MCV RBC AUTO: 75.5 FL (ref 74–97)
MONOCYTES # BLD: 0.9 K/UL (ref 0.05–1.2)
MONOCYTES NFR BLD: 5 % (ref 3–10)
NEUTS SEG # BLD: 15.3 K/UL (ref 1.8–8)
NEUTS SEG NFR BLD: 85 % (ref 40–73)
PHOSPHATE SERPL-MCNC: 3.7 MG/DL (ref 2.5–4.9)
PLATELET # BLD AUTO: 157 K/UL (ref 135–420)
POTASSIUM SERPL-SCNC: 3.9 MMOL/L (ref 3.5–5.5)
RBC # BLD AUTO: 3.39 M/UL (ref 4.2–5.3)
SODIUM SERPL-SCNC: 149 MMOL/L (ref 136–145)
TRIGL SERPL-MCNC: 64 MG/DL (ref ?–150)
WBC # BLD AUTO: 18 K/UL (ref 4.6–13.2)

## 2018-06-19 PROCEDURE — 36592 COLLECT BLOOD FROM PICC: CPT

## 2018-06-19 PROCEDURE — 36600 WITHDRAWAL OF ARTERIAL BLOOD: CPT

## 2018-06-19 PROCEDURE — 74011636637 HC RX REV CODE- 636/637: Performed by: INTERNAL MEDICINE

## 2018-06-19 PROCEDURE — P9047 ALBUMIN (HUMAN), 25%, 50ML: HCPCS | Performed by: INTERNAL MEDICINE

## 2018-06-19 PROCEDURE — 84478 ASSAY OF TRIGLYCERIDES: CPT | Performed by: OBSTETRICS & GYNECOLOGY

## 2018-06-19 PROCEDURE — 74011250636 HC RX REV CODE- 250/636: Performed by: OBSTETRICS & GYNECOLOGY

## 2018-06-19 PROCEDURE — 77010033711 HC HIGH FLOW OXYGEN

## 2018-06-19 PROCEDURE — 74011000258 HC RX REV CODE- 258: Performed by: INTERNAL MEDICINE

## 2018-06-19 PROCEDURE — 85730 THROMBOPLASTIN TIME PARTIAL: CPT | Performed by: INTERNAL MEDICINE

## 2018-06-19 PROCEDURE — 85025 COMPLETE CBC W/AUTO DIFF WBC: CPT | Performed by: OBSTETRICS & GYNECOLOGY

## 2018-06-19 PROCEDURE — 74011250636 HC RX REV CODE- 250/636: Performed by: INTERNAL MEDICINE

## 2018-06-19 PROCEDURE — 84134 ASSAY OF PREALBUMIN: CPT | Performed by: OBSTETRICS & GYNECOLOGY

## 2018-06-19 PROCEDURE — 80069 RENAL FUNCTION PANEL: CPT | Performed by: OBSTETRICS & GYNECOLOGY

## 2018-06-19 PROCEDURE — 74011000250 HC RX REV CODE- 250: Performed by: RADIOLOGY

## 2018-06-19 PROCEDURE — 3E0436Z INTRODUCTION OF NUTRITIONAL SUBSTANCE INTO CENTRAL VEIN, PERCUTANEOUS APPROACH: ICD-10-PCS | Performed by: OBSTETRICS & GYNECOLOGY

## 2018-06-19 PROCEDURE — 85730 THROMBOPLASTIN TIME PARTIAL: CPT | Performed by: OBSTETRICS & GYNECOLOGY

## 2018-06-19 PROCEDURE — 74011000258 HC RX REV CODE- 258: Performed by: OBSTETRICS & GYNECOLOGY

## 2018-06-19 PROCEDURE — 92610 EVALUATE SWALLOWING FUNCTION: CPT

## 2018-06-19 PROCEDURE — 65270000029 HC RM PRIVATE

## 2018-06-19 PROCEDURE — 76937 US GUIDE VASCULAR ACCESS: CPT

## 2018-06-19 PROCEDURE — 74011250636 HC RX REV CODE- 250/636: Performed by: RADIOLOGY

## 2018-06-19 PROCEDURE — 82330 ASSAY OF CALCIUM: CPT | Performed by: INTERNAL MEDICINE

## 2018-06-19 PROCEDURE — 82962 GLUCOSE BLOOD TEST: CPT

## 2018-06-19 PROCEDURE — 02HV33Z INSERTION OF INFUSION DEVICE INTO SUPERIOR VENA CAVA, PERCUTANEOUS APPROACH: ICD-10-PCS | Performed by: RADIOLOGY

## 2018-06-19 PROCEDURE — 92526 ORAL FUNCTION THERAPY: CPT

## 2018-06-19 PROCEDURE — 74011000250 HC RX REV CODE- 250: Performed by: INTERNAL MEDICINE

## 2018-06-19 PROCEDURE — 83735 ASSAY OF MAGNESIUM: CPT | Performed by: OBSTETRICS & GYNECOLOGY

## 2018-06-19 PROCEDURE — 77030037878 HC DRSG MEPILEX >48IN BORD MOLN -B

## 2018-06-19 PROCEDURE — C9113 INJ PANTOPRAZOLE SODIUM, VIA: HCPCS | Performed by: INTERNAL MEDICINE

## 2018-06-19 PROCEDURE — 85018 HEMOGLOBIN: CPT | Performed by: OBSTETRICS & GYNECOLOGY

## 2018-06-19 RX ORDER — HEPARIN SODIUM (PORCINE) LOCK FLUSH IV SOLN 100 UNIT/ML 100 UNIT/ML
500 SOLUTION INTRAVENOUS
Status: COMPLETED | OUTPATIENT
Start: 2018-06-19 | End: 2018-06-19

## 2018-06-19 RX ORDER — LIDOCAINE HYDROCHLORIDE 10 MG/ML
1-20 INJECTION INFILTRATION; PERINEURAL ONCE
Status: COMPLETED | OUTPATIENT
Start: 2018-06-19 | End: 2018-06-19

## 2018-06-19 RX ORDER — HEPARIN SODIUM 1000 [USP'U]/ML
40 INJECTION, SOLUTION INTRAVENOUS; SUBCUTANEOUS
Status: COMPLETED | OUTPATIENT
Start: 2018-06-19 | End: 2018-06-19

## 2018-06-19 RX ORDER — HEPARIN SODIUM 200 [USP'U]/100ML
500 INJECTION, SOLUTION INTRAVENOUS
Status: COMPLETED | OUTPATIENT
Start: 2018-06-19 | End: 2018-06-22

## 2018-06-19 RX ORDER — SODIUM CHLORIDE 9 MG/ML
250 INJECTION, SOLUTION INTRAVENOUS AS NEEDED
Status: DISCONTINUED | OUTPATIENT
Start: 2018-06-19 | End: 2018-06-22

## 2018-06-19 RX ORDER — ALBUMIN HUMAN 250 G/1000ML
12.5 SOLUTION INTRAVENOUS EVERY 12 HOURS
Status: DISCONTINUED | OUTPATIENT
Start: 2018-06-19 | End: 2018-06-25

## 2018-06-19 RX ADMIN — METRONIDAZOLE 500 MG: 500 INJECTION, SOLUTION INTRAVENOUS at 17:07

## 2018-06-19 RX ADMIN — Medication 75 MCG/HR: at 07:41

## 2018-06-19 RX ADMIN — METRONIDAZOLE 500 MG: 500 INJECTION, SOLUTION INTRAVENOUS at 02:12

## 2018-06-19 RX ADMIN — METRONIDAZOLE 500 MG: 500 INJECTION, SOLUTION INTRAVENOUS at 09:39

## 2018-06-19 RX ADMIN — POTASSIUM CHLORIDE: 2 INJECTION, SOLUTION, CONCENTRATE INTRAVENOUS at 07:39

## 2018-06-19 RX ADMIN — ALBUMIN (HUMAN) 12.5 G: 0.25 INJECTION, SOLUTION INTRAVENOUS at 06:30

## 2018-06-19 RX ADMIN — PIPERACILLIN SODIUM,TAZOBACTAM SODIUM 2.25 G: 2; .25 INJECTION, POWDER, FOR SOLUTION INTRAVENOUS at 17:47

## 2018-06-19 RX ADMIN — HUMAN INSULIN 4 UNITS: 100 INJECTION, SOLUTION SUBCUTANEOUS at 00:48

## 2018-06-19 RX ADMIN — PIPERACILLIN SODIUM,TAZOBACTAM SODIUM 2.25 G: 2; .25 INJECTION, POWDER, FOR SOLUTION INTRAVENOUS at 11:54

## 2018-06-19 RX ADMIN — LIDOCAINE HYDROCHLORIDE 3 ML: 10 INJECTION, SOLUTION INFILTRATION; PERINEURAL at 14:12

## 2018-06-19 RX ADMIN — FUROSEMIDE 20 MG: 10 INJECTION, SOLUTION INTRAMUSCULAR; INTRAVENOUS at 20:20

## 2018-06-19 RX ADMIN — HEPARIN SODIUM (PORCINE) LOCK FLUSH IV SOLN 100 UNIT/ML 500 UNITS: 100 SOLUTION at 14:17

## 2018-06-19 RX ADMIN — HUMAN INSULIN 2 UNITS: 100 INJECTION, SOLUTION SUBCUTANEOUS at 12:00

## 2018-06-19 RX ADMIN — PIPERACILLIN SODIUM,TAZOBACTAM SODIUM 2.25 G: 2; .25 INJECTION, POWDER, FOR SOLUTION INTRAVENOUS at 00:49

## 2018-06-19 RX ADMIN — ALBUMIN (HUMAN) 12.5 G: 0.25 INJECTION, SOLUTION INTRAVENOUS at 20:20

## 2018-06-19 RX ADMIN — Medication 1000 UNITS: at 14:17

## 2018-06-19 RX ADMIN — FUROSEMIDE 20 MG: 10 INJECTION, SOLUTION INTRAMUSCULAR; INTRAVENOUS at 09:38

## 2018-06-19 RX ADMIN — POTASSIUM CHLORIDE: 2 INJECTION, SOLUTION, CONCENTRATE INTRAVENOUS at 15:21

## 2018-06-19 RX ADMIN — HEPARIN SODIUM 4040 UNITS: 1000 INJECTION, SOLUTION INTRAVENOUS; SUBCUTANEOUS at 23:12

## 2018-06-19 RX ADMIN — HUMAN INSULIN 2 UNITS: 100 INJECTION, SOLUTION SUBCUTANEOUS at 06:29

## 2018-06-19 RX ADMIN — CALCIUM GLUCONATE 2 G: 94 INJECTION, SOLUTION INTRAVENOUS at 10:00

## 2018-06-19 RX ADMIN — HEPARIN SODIUM 14 UNITS/KG/HR: 10000 INJECTION, SOLUTION INTRAVENOUS at 15:10

## 2018-06-19 RX ADMIN — ALBUMIN (HUMAN) 12.5 G: 0.25 INJECTION, SOLUTION INTRAVENOUS at 00:49

## 2018-06-19 RX ADMIN — SODIUM CHLORIDE 40 MG: 9 INJECTION INTRAMUSCULAR; INTRAVENOUS; SUBCUTANEOUS at 09:38

## 2018-06-19 RX ADMIN — PIPERACILLIN SODIUM,TAZOBACTAM SODIUM 2.25 G: 2; .25 INJECTION, POWDER, FOR SOLUTION INTRAVENOUS at 06:28

## 2018-06-19 RX ADMIN — Medication 50 MCG/HR: at 19:45

## 2018-06-19 NOTE — PROGRESS NOTES
Pt not in room. Currently having procedure. No reported surgical issues.     Lionel Stanford DO  6/19/2018  2:07 PM

## 2018-06-19 NOTE — PROCEDURES
Vascular & Interventional Radiology Brief Procedure Note    Interventional Radiologist: Roni Curry MD    Pre-operative Diagnosis:  Long term venous access     Post-operative Diagnosis: Same as pre-op dx    Procedure(s) Performed:  Ultrasound/fluoroscopic guided PICC line placement    Anesthesia:  Local      Findings:  Successful R arm dual lumen 5F PICC line placement. Ready for immediate use.       Complications: None    Estimated Blood Loss:  minimal    Tubes and Drains: as above    Specimens: None    Condition: Stable       Roni Curry MD  [facility]  Vascular & Interventional Radiology  6/19/2018

## 2018-06-19 NOTE — PROGRESS NOTES
Hospitalist Progress Note    Patient: Darren Mcallister MRN: [id number]  CSN: [id number]    YOB: 1950  Age: 75 y.o. Sex: female    DOA: 6/4/2018 LOS:  LOS: 15 days                Assessment/Plan     Patient Active Problem List   Diagnosis Code    Ovarian ca ([facility] C56.9    Severe obesity (BMI 35.0-39.9) ([facility]) E66.01    Abdominal pain R10.9    [facility] A41.9    Oliguria R34    Acute respiratory failure [facility] J96.00    JESS (acute kidney injury) ([facility] C51.0    Metabolic acidosis D10.5    Acute deep vein thrombosis (DVT) of lower extremity ([facility]) I82.409    S/P ileostomy ([facility] Z93.2    Hypoalbuminemia E88.09    Peritonitis ([facility] K65.9            74 yo female with history of clear cell ovarian ca, s/p HEATH, BSO debulking surgery. Admitted for abdominal pain. S/p laparoscopy and peritoneal lavage 6/5/2018. Patient is awake alert, answering to questions. CRITICAL CARE PLAN    Resp -   Acute resp failure with hypoxia - self extubated 06/17/2018. On oxygen by NC  S/p bronchoscopy 06/09/2018, no aspiration, resp cultures no growth to date. Suspected PE    ID -   Severe Sepsis - secondary to peritonitis. Peritoneal fluid growing klebsiella oxytoca. ANTIBIOTICS zosyn, levaquin, flagyl. Trend temps, wbc, LA improving. CVS - Monitor HD. Shock - sepsis vs secondary to suspected PE. Now off pressors. Anasarca - on lasix and albumin    Heme/onc - Follow H&H, plts. INR. Acute bilateral peroneal DVT - on heparin drip  Anemia -       Renal - Trend BUN, Cr, follow I/O, romero in place. Check and replace Mg, K, phos. JESS - nephrology following, creatine improving. Metabolic acidosis - resolved    Endocrine -  Follow FSG    Neuro/ Pain/ Sedation - Fentanyl gtt, wean off drip. Patient more alert after decreasing fentanyl gtt    GI - onTPN   Shock liver resolved      Prophylaxis - DVT: heparin drip, GI: protonix    Long discussion with brother at bedside.                Physical Exam:  General: As above    HEENT: NC, Atraumatic. PERRLA, anicteric sclerae. Lungs: CTA Bilaterally. No Wheezing/Rhonchi/Rales. Heart:  Regular  rhythm,  No murmur, No Rubs, No Gallops  Abdomen: Soft, Non distended, Non tender. decreased Bowel sounds, ileostomy. Extremities: Edema bilaterally upper and lower ext. Vital signs/Intake and Output:  Visit Vitals    /84    Pulse 97    Temp 98.2 °F (36.8 °C)    Resp 23    Ht 5' 1\" (1.549 m)    Wt 102.8 kg (226 lb 10.1 oz)    SpO2 99%    BMI 42.82 kg/m2     Current Shift:  06/19 0701 - 06/19 1900  In: -   Out: 6745 [Urine:900; Drains:70]  Last three shifts:  06/17 1901 - 06/19 0700  In: 4559.7 [I.V.:4559.7]  Out: 8420 [Urine:7215; Drains:850]            Labs: Results:       Chemistry Recent Labs      06/19/18 0448 06/18/18   1600  06/18/18   0516  06/17/18   1430  06/17/18   0810   GLU  152*   --   151*  159*  162*   NA  149*   --   146*  145  144   K  3.9  3.1*  3.3*  3.3*  3.5   CL  113*   --   111*  110*  110*   CO2  21   --   22  22  23   BUN  47*   --   50*  52*  54*   CREA  1.74*   --   2.04*  2.12*  2.24*   CA  7.5*   --   7.2*  7.3*  7.2*   AGAP  15   --   13  13  11   BUCR  27*   --   25*  25*  24*   AP   --    --    --    --   51   TP   --    --    --    --   4.6*   ALB  2.0*   --    --    --   1.5*   GLOB   --    --    --    --   3.1   AGRAT   --    --    --    --   0.5*      CBC w/Diff Recent Labs      06/19/18 0448 06/18/18   1600  06/18/18   0516   06/17/18   0810   WBC  18.0*   --   17.1*   --   20.0*   RBC  3.39*   --   3.49*   --   3.52*   HGB  8.4*  7.9*  8.3*   < >  8.3*   HCT  25.6*  24.1*  25.5*   < >  25.5*   PLT  157   --   136   --   141   GRANS  85*   --    --    --   73   LYMPH  9*   --    --    --   14*   EOS  1   --    --    --   0    < > = values in this interval not displayed. Cardiac Enzymes No results for input(s): CPK, CKND1, FABIAN in the last 72 hours.     No lab exists for component: Marina Perez Coagulation Recent Labs      06/19/18   0530  06/18/18   1600   06/17/18   0810   PTP   --    --    --   15.8*   INR   --    --    --   1.3*   APTT  112.9*  98.0*   < >   --     < > = values in this interval not displayed. Lipid Panel Lab Results   Component Value Date/Time    Triglyceride 64 06/19/2018 04:48 AM      BNP No results for input(s): BNPP in the last 72 hours.    Liver Enzymes Recent Labs      06/19/18   0448  06/17/18   0810   TP   --   4.6*   ALB  2.0*  1.5*   AP   --   51   SGOT   --   15      Thyroid Studies No results found for: T4, T3U, TSH, TSHEXT, TSHEXT     Procedures/imaging: see electronic medical records for all procedures/Xrays and details which were not copied into this note but were reviewed prior to creation of Plan

## 2018-06-19 NOTE — PROGRESS NOTES
[facility]                  Pulmonary, Critical Care, and Sleep Medicine     Name: Katherine Llanes MRN: [id number]   : 1950 Hospital: [facility] [place]    Date: 2018        PCCM Note                                              Subjective/History of Present Illness:     Patient is a 75 y.o. 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 S/p exploratory laparotomy 6/15/18 - findings of sigmoid diverticulitis with suspected perforation, underwent ileostomy and wound vac placement. 18:  Patient self extubated  am  Patient sleepy this am; fentanyl drip back to 75/hr  On HF nc o2 - since, post-extubation CXR shows mild increase in atelectasis  Tele-sinus rhythm; BP stable-not on pressors; Afebrile    UOP 5.6 lits yesterday (1.6 lits overnight); BRANDON drain 230 cc overnight - sero-sanguineous    Heparin drip for DVT and suspected PEs. Surgeries  18 - Laparoscopy converted to laparotomy, total abdominal hysterectomy, bilateral salpingo-oophorectomy, omentectomy, bilateral pelvic and periaortic lymphadenectomy and radical tumor debulking to R0. Path - ADENOCARCINOMA OF LEFT OVARY.     18 - Diagnostic laparoscopy converted to laparotomy, peritoneal biopsies, aerobic and anaerobic cultures of peritoneal fluid and peritoneal lavage. 6/15/18 - Wound exploration. Exploratory laparotomy. Lysis of adhesions. Ileostomy and wound VAC placement. Allergies   Allergen Reactions    Hydrocodone Other (comments)     Breaks into cold sweat    Metformin Other (comments)     Breaks out into cold sweat. Can Take Glucophage brand name med      Past Medical History:   Diagnosis Date    Diabetes ([address]     many years type 2    Hypertension     many years      Past Surgical History:   Procedure Laterality Date    HX OOPHORECTOMY  2018    HX TONSILLECTOMY      as a child       Social History   Substance Use Topics    Smoking status: Never Smoker    Smokeless tobacco: Never Used    Alcohol use No        Current Facility-Administered Medications   Medication Dose Route Frequency    calcium gluconate 2 g in 0.9% sodium chloride 100 mL IVPB  2 g IntraVENous ONCE    TPN ADULT - CENTRAL   IntraVENous CONTINUOUS    furosemide (LASIX) injection 20 mg  20 mg IntraVENous BID    TPN ADULT - CENTRAL   IntraVENous CONTINUOUS    insulin regular (NOVOLIN R, HUMULIN R) injection   SubCUTAneous Q6H    levoFLOXacin (LEVAQUIN) 750 mg in D5W IVPB  750 mg IntraVENous Q48H    albumin human 25% (BUMINATE) solution 12.5 g  12.5 g IntraVENous Q6H    metroNIDAZOLE (FLAGYL) IVPB premix 500 mg  500 mg IntraVENous Q8H    piperacillin-tazobactam (ZOSYN) 2.25 g in 0.9% sodium chloride (MBP/ADV) 50 mL MBP  2.25 g IntraVENous Q6H    fentaNYL (PF) 900 mcg/30 ml infusion soln  0-200 mcg/hr IntraVENous TITRATE    heparin 25,000 units in D5W 250 ml infusion  18-36 Units/kg/hr IntraVENous TITRATE    pantoprazole (PROTONIX) 40 mg in sodium chloride 0.9% 10 mL injection  40 mg IntraVENous DAILY         Objective:   Vital Signs:    Visit Vitals    /84    Pulse 82    Temp 98.2 °F (36.8 °C)    Resp 17    Ht 5' 1\" (1.549 m)    Wt 102.8 kg (226 lb 10.1 oz)    SpO2 99%    BMI 42.82 kg/m2       O2 Device: Nasal cannula   O2 Flow Rate (L/min): 3 l/min   Temp (24hrs), Av °F (36.7 °C), Min:97.8 °F (36.6 °C), Max:98.2 °F (36.8 °C)       Intake/Output:   Last shift:      701 - 1900  In: -   Out: 1050 [Urine:900; Drains:50]    Last 3 shifts: 1901 -  0700  In: 4559.7 [I.V.:4559.7]  Out: 8420 [Urine:7215; Drains:850]      Intake/Output Summary (Last 24 hours) at 06/19/18 1235  Last data filed at 06/19/18 1235   Gross per 24 hour   Intake          2979.05 ml   Output             6510 ml   Net         -3530.95 ml       ABG:  Post-self extubation  No results found for: PH, PHI, PCO2, PCO2I, PO2, PO2I, HCO3, HCO3I, FIO2, FIO2I    Physical Exam:     General/Neurology: weak, awake, confused, weakly moving extremities    Head:   Normocephalic, without obvious abnormality, atraumatic. Eye:   no scleral icterus, no pallor, no cyanosis. Pupils not dilated. Neck:   Symmetric. No lymphadenopathy. Trachea midline  Lung: Moderate air entry bilateral equal. Decreased breath sounds bases. Not in distress. No rhonchi. No wheezing. Heart:   Regular rate & rhythm. S1 S2 present. No murmur. No JVD. Abdomen:  Soft. Obese. Sluggish BS. Midline lower abd surgical site - open wound with wound vac. BRANDON drain, RT abd wall Ileostomy. No abd distension or guarding. Extremities:  No cyanosis. No clubbing. Bilateral UE and LE edema. Anasarca. Pulses: Palpable radials and DPs bilateral.   Lymphatic:  No cervical or supraclav lymphadenopathy.    Skin:   No rash      Data:       Recent Results (from the past 12 hour(s))   MAGNESIUM    Collection Time: 06/19/18  4:48 AM   Result Value Ref Range    Magnesium 1.9 1.6 - 2.6 mg/dL   RENAL FUNCTION PANEL    Collection Time: 06/19/18  4:48 AM   Result Value Ref Range    Sodium 149 (H) 136 - 145 mmol/L    Potassium 3.9 3.5 - 5.5 mmol/L    Chloride 113 (H) 100 - 108 mmol/L    CO2 21 21 - 32 mmol/L    Anion gap 15 3.0 - 18 mmol/L    Glucose 152 (H) 74 - 99 mg/dL    BUN 47 (H) 7.0 - 18 MG/DL    Creatinine 1.74 (H) 0.6 - 1.3 MG/DL    BUN/Creatinine ratio 27 (H) 12 - 20      GFR est AA 35 (L) >60 ml/min/1.73m2    GFR est non-AA 29 (L) >60 ml/min/1.73m2    Calcium 7.5 (L) 8.5 - 10.1 MG/DL    Phosphorus 3.7 2.5 - 4.9 MG/DL    Albumin 2.0 (L) 3.4 - 5.0 g/dL   TRIGLYCERIDE    Collection Time: 06/19/18 4:48 AM   Result Value Ref Range    Triglyceride 64 <150 MG/DL   CBC WITH AUTOMATED DIFF    Collection Time: 06/19/18  4:48 AM   Result Value Ref Range    WBC 18.0 (H) 4.6 - 13.2 K/uL    RBC 3.39 (L) 4.20 - 5.30 M/uL    HGB 8.4 (L) 12.0 - 16.0 g/dL    HCT 25.6 (L) 35.0 - 45.0 %    MCV 75.5 74.0 - 97.0 FL    MCH 24.8 24.0 - 34.0 PG    MCHC 32.8 31.0 - 37.0 g/dL    RDW 23.8 (H) 11.6 - 14.5 %    PLATELET 606 360 - 360 K/uL    NEUTROPHILS 85 (H) 40 - 73 %    LYMPHOCYTES 9 (L) 21 - 52 %    MONOCYTES 5 3 - 10 %    EOSINOPHILS 1 0 - 5 %    BASOPHILS 0 0 - 2 %    ABS. NEUTROPHILS 15.3 (H) 1.8 - 8.0 K/UL    ABS. LYMPHOCYTES 1.7 0.9 - 3.6 K/UL    ABS. MONOCYTES 0.9 0.05 - 1.2 K/UL    ABS. EOSINOPHILS 0.1 0.0 - 0.4 K/UL    ABS.  BASOPHILS 0.0 0.0 - 0.06 K/UL    DF AUTOMATED     CALCIUM, IONIZED    Collection Time: 06/19/18  4:48 AM   Result Value Ref Range    Ionized Calcium 1.04 (L) 1.12 - 1.32 MMOL/L   GLUCOSE, POC    Collection Time: 06/19/18  5:29 AM   Result Value Ref Range    Glucose (POC) 158 (H) 70 - 110 mg/dL   PTT    Collection Time: 06/19/18  5:30 AM   Result Value Ref Range    aPTT 112.9 (H) 23.0 - 36.4 SEC   GLUCOSE, POC    Collection Time: 06/19/18 12:06 PM   Result Value Ref Range    Glucose (POC) 180 (H) 70 - 110 mg/dL           Chemistry Recent Labs      06/19/18   0448  06/18/18   1600  06/18/18   0516  06/17/18   1430  06/17/18   0810   GLU  152*   --   151*  159*  162*   NA  149*   --   146*  145  144   K  3.9  3.1*  3.3*  3.3*  3.5   CL  113*   --   111*  110*  110*   CO2  21   --   22  22  23   BUN  47*   --   50*  52*  54*   CREA  1.74*   --   2.04*  2.12*  2.24*   CA  7.5*   --   7.2*  7.3*  7.2*   MG  1.9   --   1.9  2.0   --    PHOS  3.7  4.4  4.5   --    --    AGAP  15   --   13  13  11   BUCR  27*   --   25*  25*  24*   AP   --    --    --    --   51   TP   --    --    --    --   4.6*   ALB  2.0*   --    --    --   1.5*   GLOB   --    --    --    --   3.1   AGRAT   --    --    --    --   0.5* Lactic Acid Lactic acid   Date Value Ref Range Status   06/17/2018 2.3 (HH) 0.4 - 2.0 MMOL/L Final     Comment:     CALLED TO AND CORRECTLY REPEATED BY:  Tayler Flannery RN ICU ON 6/17/2018 0904 TO 5087       Recent Labs      06/17/18   0810   LAC  2.3*        Liver Enzymes Protein, total   Date Value Ref Range Status   06/17/2018 4.6 (L) 6.4 - 8.2 g/dL Final     Albumin   Date Value Ref Range Status   06/19/2018 2.0 (L) 3.4 - 5.0 g/dL Final     Globulin   Date Value Ref Range Status   06/17/2018 3.1 2.0 - 4.0 g/dL Final     A-G Ratio   Date Value Ref Range Status   06/17/2018 0.5 (L) 0.8 - 1.7   Final     AST (SGOT)   Date Value Ref Range Status   06/17/2018 15 15 - 37 U/L Final     Alk. phosphatase   Date Value Ref Range Status   06/17/2018 51 45 - 117 U/L Final     Recent Labs      06/19/18   0448  06/17/18   0810   TP   --   4.6*   ALB  2.0*  1.5*   GLOB   --   3.1   AGRAT   --   0.5*   SGOT   --   15   AP   --   51        CBC w/Diff Recent Labs      06/19/18   0448  06/18/18   1600  06/18/18   0516   06/17/18   0810   WBC  18.0*   --   17.1*   --   20.0*   RBC  3.39*   --   3.49*   --   3.52*   HGB  8.4*  7.9*  8.3*   < >  8.3*   HCT  25.6*  24.1*  25.5*   < >  25.5*   PLT  157   --   136   --   141   GRANS  85*   --    --    --   73   LYMPH  9*   --    --    --   14*   EOS  1   --    --    --   0    < > = values in this interval not displayed. Cardiac Enzymes No results found for: CPK, CK, CKMMB, CKMB, RCK3, CKMBT, CKNDX, CKND1, FABIAN, TROPT, TROIQ, KAYLAN, TROPT, TNIPOC, BNP, BNPP     BNP No results found for: BNP, BNPP, XBNPT     Coagulation Recent Labs      06/19/18   0530  06/18/18   1600  06/18/18   0516   06/17/18   0810   PTP   --    --    --    --   15.8*   INR   --    --    --    --   1.3*   APTT  112.9*  98.0*  71.5*   < >   --     < > = values in this interval not displayed.          Thyroid  No results found for: T4, T3U, TSH, TSHEXT, TSHEXT    No results found for: T4       ABG Recent Labs 06/18/18   1127  06/17/18   1218  06/17/18   0642   PHI  7.500*  7.501*  7.450   PCO2I  24.9*  26.0*  27.8*   PO2I  80  61*  64*   HCO3I  19.4*  20.4*  19.3*   FIO2I  45  35  36        All Micro Results     Procedure Component Value Units Date/Time    CULTURE, FUNGUS [[id number]] Collected:  06/09/18 1024    Order Status:  Completed Specimen:  Bronchial lavage Updated:  06/18/18 1329     Special Requests: NO SPECIAL REQUESTS        FUNGUS SMEAR NO FUNGAL ELEMENTS SEEN        Culture result: NO FUNGUS ISOLATED 9 DAYS       CULTURE, BLOOD [[id number]] Collected:  06/09/18 1423    Order Status:  Completed Specimen:  Whole Blood from Blood Updated:  06/15/18 0230     Special Requests: NO SPECIAL REQUESTS        Culture result: NO GROWTH 6 DAYS       CULTURE, BLOOD [[id number]] Collected:  06/09/18 1223    Order Status:  Completed Specimen:  Whole Blood from Blood Updated:  06/15/18 0230     Special Requests: left hand     Culture result: NO GROWTH 6 DAYS       AFB CULTURE + SMEAR W/RFLX PCR AND ID [[id number]] Collected:  06/09/18 1024    Order Status:  Completed Specimen:  Respiratory sample Updated:  06/12/18 2106     Source BRONCHIAL LAVAGE        AFB Specimen processing Concentration     Acid Fast Smear NEGATIVE          (NOTE)  Performed At: [address] [id number]  Dennie Mas MD YH:[id number]          Acid Fast Culture PENDING    CULTURE, SPUTUM/BRONCH/OTH [[id number]] Collected:  06/09/18 1024    Order Status:  Completed Specimen:  Sputum from Bronchial lavage Updated:  06/11/18 1058     Special Requests: NO SPECIAL REQUESTS        GRAM STAIN FEW WBC'S         FEW GRAM NEGATIVE RODS        Culture result:         RARE NORMAL RESPIRATORY JOSETTE    CULTURE, ANAEROBIC [[id number]] Collected:  06/05/18 0220    Order Status:  Completed Specimen:  Peritoneal Fluid Updated:  06/11/18 0734     Special Requests: NO SPECIAL REQUESTS        Culture result:         NO ANAEROBES ISOLATED 5 DAYS    CULTURE, BLOOD [[id number]] Collected:  06/04/18 0740    Order Status:  Completed Specimen:  Blood from Blood Updated:  06/10/18 0655     Special Requests: NO SPECIAL REQUESTS        Culture result: NO GROWTH 6 DAYS       CULTURE, BLOOD [[id number]] Collected:  06/04/18 0740    Order Status:  Completed Specimen:  Blood from Blood Updated:  06/10/18 0655     Special Requests: NO SPECIAL REQUESTS        Culture result: NO GROWTH 6 DAYS       CULTURE, BODY FLUID Dewane Peace STAIN [[id number]]  (Abnormal)  (Susceptibility) Collected:  06/05/18 0220    Order Status:  Completed Specimen:  Peritoneal Fluid Updated:  06/08/18 0936     Special Requests: NO SPECIAL REQUESTS        GRAM STAIN MANY WBC'S         NO ORGANISMS SEEN        Culture result:         RARE KLEBSIELLA OXYTOCA (A)            CALLED TO AND CORRECTLY REPEATED BY:  ANGEL LUIS DE LOS SANTOS RN ICU ON 6/7/2018 1032 TO 5087      CULTURE, URINE [[id number]] Collected:  06/05/18 1245    Order Status:  Completed Specimen:  Urine from Fry Specimen Updated:  06/07/18 1103     Special Requests: NO SPECIAL REQUESTS        Culture result: NO GROWTH 2 DAYS       C. DIFFICILE/EPI PCR [[id number]] Collected:  06/05/18 0930    Order Status:  Canceled Specimen:  Stool         Echo 6/9/18:  Left ventricle: Systolic function was normal. Ejection fraction was estimated   in the range of 65 % to 70 %. No obvious  wall motion abnormalities identified in the views obtained. Wall thickness   was mildly increased. Doppler parameters  were consistent with abnormal left ventricular relaxation (grade 1 diastolic   dysfunction). Right ventricle: The size was normal. Systolic function was normal.  Mitral valve: There was mild annular calcification. Tricuspid valve: Pulmonary artery systolic pressure was mildly increased. Pulmonary artery systolic pressure: 34 mmHg. PVL LE 6/6/18: acute b/l peroneal DVT. CT abd pelvis 6/4/18:  1.  Postsurgical hysterectomy, bilateral oophorectomy, pelvic lymphadenectomy and  a mastectomy surgical changes. Small volume of ascites in the abdomen and  pelvis, with a few tiny locules of gas in the right hemipelvis would be in  keeping with recent postsurgical etiology. 2. Abnormal edematous thick-walled small bowel loops in the left abdomen and  pelvis, with TRAM lamellar edematous configuration, induration. No findings of  pneumatosis. The overall appearance is nonspecific. Diagnostic considerations  include infectious etiology, nonspecific edema which may be unresolved  postsurgical etiology. Ischemia is also included in the differential diagnostic  consideration, however, there are no findings of pneumatosis, portal venous gas. 3. Stool in the right colon extending to the hepatic flexure with surrounding  gas, foci of pneumatosis could be obscured. No associated bowel wall thickening. 4. Satisfactory position of nasogastric tube. 5. Hepatomegaly, steatosis with 2 rounded low-density lesions, potentially  cysts. 6. Bibasilar atelectasis. CXR reviewed by me:  CXR 6/18/18:  Venous catheter right subclavian central venous catheter tip projects at the distal SVC without change. IMPRESSION:   Interval progression of CHF/interstitial and probable alveolar edema. Interval developing small right pleural effusion. IMPRESSION:   · Severe sepsis due to Klebsiella Oxytoca peritonitis. · Klebsiella oxytoca peritonitis. S/p laparotomy and peritoneal lavage: Klebsiella positive. Ruled out ischemic bowel in laparotomy. 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 · S/p bronchoscopy 6/9/18: no aspiration noted. Cx NGTD. · VDRF due to sepsis/PE/?ARDS, reintubated on 6/8/18  · S/p Shock likely due to sepsis and obstructive due to presumed PE. Shock resolved. Off vasopressors. · Acute b/l peroneal DVT  · Presumed PE with severe hypoxia and high A-a gradient, elevated RVSP 34 mm Hg.    · Anemia, no active external bleeding. · Thrombocytopenia, improving   · JESS, improving  · Hypernatremia. · Metabolic ad lactic acidosis, resolved. · Elevated LFT, due to shock liver, improving  · Foreign body on peritoneal biopsy, per path report. · Anasarca due to fluid resuscitation, JESS, hypoalbuminemia and sequale of sepsis. · Code status: DNR. · Very poor overall prognosis. RECOMMENDATIONS:   Respiratory:  Resp seem stable; ok to wean of HF nasal cannula O2; continue diuretic therapy  Presumed PEs; continue anticoagulation - watch for bleeding and anemia; follow protocol for adjustments and labs. Keep SPO2 >=92%. HOB 30 degree elevation all the time. Aggressive pulmonary toileting. Aspiration precautions. CVS: Echo ok with mildly elevated RVSP but normal RV systolic function. ID:    Bronch cx normal robin. Peritoneal cx Klebsiella Oxytoca resistant to ampicillin. Doubt pneumonia. Leucocytosis-stable; Afebrile. Ab -  Zosyn, levaquin, flagyl. Defer follow up Abd imaging to Surgery team.   Hematology/Oncology: Monitor Hb and platelets while on heparin; check H/H q12; transfuse to keep Hb>8 gm/dl; on heparin drip for DVT and suspected PEs. Renal: Nephrologist on case; Creatinine improving. Overall fluid positive by 20 kg since admission by weight; lasix bid with albumin - diuresing well with stable renal fn.   GI/: holding tube feed - post op state; Dr Gretchen Morales ok with TPN today  Endocrine: Maintain blood glucose 140-180. Humalog sub cut. Neurology: wean fentanyl drip - balance post-op pains with excessive sedation; d/w RN  Pain/Sedation: fentanyl drip - management per Dr Blas Dinero: local surgical site care. Electrolytes: Replace electrolytes per ICU electrolyte replacement protocol.    Nutrition: TPN started 6/18  Prophylaxis: DVT and GI Prophylaxis (heparin /protonix)  Restraints: none  Lines/Tubes:   ETT: 6/8/18-came out 6/17/18  Central line: right subclavian 6/4/18 (site examined, no erythema, induration, discharge or sign of infection. Plan for picc line today, and dc central line -d/w RN. Central line bundle followed). Fry: 6/4/18 (Medically necessary for strict input/output monitoring in critically ill patient, will remove it when not needed. Fry bundle followed). Will defer respective systems problem management to primary and other respective consultant and follow patient in ICU with primary and other medical team.  Further recommendations will be based on the patient's response to recommended treatment and results of the investigation ordered. Quality Care: PPI, DVT prophylaxis, HOB elevated, Infection control all reviewed and addressed. Code status - Updating patient Brother Vik Chou; Full Code status based on his decision, with his 3 brother; patient has no children    ADDENDUM: PICC Line medically necessary; CKD patient but currently no indications for dialysis    Care of plan d/w RN, RT    High complexity decision making was performed during the evaluation of this patient at high risk for decompensation with multiple organ involvement. Total critical care time spent rendering care exclusive of procedures: 36 minutes.          Maribel Morataya MD  6/19/2018

## 2018-06-19 NOTE — PROGRESS NOTES
Bedside shift change report given to Jennifer Enriquez RN(oncoming nurse) by Conner Moody (offgoing nurse). Report included the following information SBAR, Kardex, ED Summary, OR Summary, Procedure Summary, Intake/Output, MAR, Accordion, Recent Results, Med Rec Status, Cardiac Rhythm ST  and Alarm Parameters . PCA gtt, Heparin gtt and TPN verified. 0800 Assessment completed per flow sheet, pain managed well via pca     1100- Pain medication pca fentanyl decreased from 75 mcg/hr to 50 mcg/hr and pain is well controlled. 1200-Reassessment, no change   1415- Picc line placed inn Angio to right arm,pt tolerated procedure well, without incident. 1600-Reassessment, no change     1700- Lab recollect, Pt H&H stable, no RBCs transfusion required at this time H&H 8.1, 24. 5. Patient is alert, BP stable systolic in 397Y, spo2 87% maintaining sats 97% on 2 L VIA nc     1823-Patient noted agitated after repositioning pillows, patient went to bite at this writer's hand, brother [place] and Gloria Diez at bedside. Writer able to redirect Patient. 1845-Reassessment- patient resting,NAD,brother Brady Umanzor at bedside     1925 Bedside shift change report given to Binh Brunson RN(oncoming nurse) by August Hernandez (offgoing nurse). Report included the following information SBAR, Kardex, ED Summary, OR Summary, Intake/Output, MAR, Accordion, Recent Results, Med Rec Status, Cardiac Rhythm NSR and Alarm Parameters . PCA gtt, heparin gtt and TPN verified, Pain managed well with pca Fentanyl. Patient denies pain or distress.

## 2018-06-19 NOTE — PROGRESS NOTES
Problem: Mobility Impaired (Adult and Pediatric)  Goal: *Acute Goals and Plan of Care (Insert Text)  Physical Therapy Goals  Initiated 6/15/2018 and to be accomplished within 3-7 day(s)  1. Patient will move from supine to sit and sit to supine  in bed with maximal assistance. 2.  Patient will transfer from bed to chair and chair to bed with maximal assistance using the least restrictive device. 3.  Patient will perform sit to stand with maximal assistance. 4.  Patient will ambulate with maximal assistance for 5 feet with the least restrictive device. 6/19/2018  PT treatment attempted. Pt leaving unit for angio/PICC line at this time. Will f/u at later time as pt schedule allows. Thank you.    Pat Leung, PT

## 2018-06-19 NOTE — H&P
The patient is an appropriate candidate to undergo PICC. Pts GFR is low, but ordering MD jarrell, Dr. Oz Enriquez and Nathalie nonsitespecific PICC line placement. .    Patient assessed immediately prior to induction. Anesthesia plan as follows: Local/No Anesthesia. History and Physical update:  H&P was reviewed and the patient was examined. No changes have occurred in the patient's condition since the H&P was completed.     Markus Campos MD  Vascular & Interventional Radiology  [facility]  6/19/2018

## 2018-06-19 NOTE — PROGRESS NOTES
Nephrology Progress note    Subjective:     Maggie Urbina is a 75 y.o. female with PMH DM, HTN, clear cell ovarian ca s/p debulking who presented with abdominal pain and possible sepsis/ischemic colitis. Pt underwent laparotomy/peritoneal lavage/bx, noted to have decreased UOP and increasing Cr to 2.1 today from baseline 0.6. CT neg for mass or hydro. Pt given lasix yesterday, still with high O2 requirements on vent. Unable to provide further history. On 6/8 Pt decompensated, ? Aspiration,  back on vent was hypoxic despite  Fi02 of 100%, , Hypotensive on IV pressors, Acidotic and on HC03 drip, Urine output decreased markedly over the weekend. Underwent Bronchoscopy 6/9. Pt requires less FiO2 now. UOP picking up. Wound has been draining yellow-brown fluid  - S/P Exploratory laparotomy, Lysis of adhesions, Ileostomy and wound VAC placement on 6/16.   - Pt self-extubated a    Pt doing ok. On NC O2.  Currently not in distress or pain    Admit Date: 6/4/2018  Allergy:  Allergies   Allergen Reactions    Hydrocodone Other (comments)     Breaks into cold sweat    Metformin Other (comments)     Breaks out into cold sweat. Can Take Glucophage brand name med        Objective:     Visit Vitals    /84    Pulse 97    Temp 98.2 °F (36.8 °C)    Resp 23    Ht 5' 1\" (1.549 m)    Wt 102.8 kg (226 lb 10.1 oz)    SpO2 99%    BMI 42.82 kg/m2         Intake/Output Summary (Last 24 hours) at 06/19/18 1324  Last data filed at 06/19/18 1235   Gross per 24 hour   Intake          2979.05 ml   Output             6630 ml   Net         -3650.95 ml       Physical Exam:     General: Off vent, on NC O2   HENT: Atraumatic and normocephalic.    Eyes: Sclera anicteric   Neck: No JVD or mass   Cardiovascular: Normal S1 S2   Pulmonary/Chest Wall: Decreased breath sounds bilaterally   Abdominal: Soft, obese, NABS   Musculoskeletal: ++ edema LEs   Neurological: Awake but confused       Data Review:      Lab Results   Component Value Date/Time    WBC 18.0 (H) 06/19/2018 04:48 AM    RBC 3.39 (L) 06/19/2018 04:48 AM    HCT 25.6 (L) 06/19/2018 04:48 AM    MCV 75.5 06/19/2018 04:48 AM    MCH 24.8 06/19/2018 04:48 AM    MCHC 32.8 06/19/2018 04:48 AM    RDW 23.8 (H) 06/19/2018 04:48 AM      Lab Results   Component Value Date    IRON 8 (L) 06/07/2018   No components found for: FERRITIN  No components found for: PTHINT  Urinalysis  No results found for: UGLU         Impression:     -JESS- likely ATN,  S Cr improving, 1.7 today with good diuresis  -Septic Shock/hypotension, BPs normalized  -Resp Failure,  Was re-intubated 6/9,  Self extubated   -Severe Metabolic acidosis, improving.   -Ovarian ca s/p debulking then laparoscopy with lavage  -KLEBSIELLA OXYTOCA sepsis (Peritoneal fluid)  -DM  -h.o. HTN  -Anemia  -Elevated liver enzymes  -S/P Exploratory laparotomy, Lysis of adhesions, Ileostomy and wound VAC placement on 6/16  - Leukocytosis  - Hypernatremia, worse  - Hypokalemia, corrected        Plan:     Continue Lasix + IV albumin to promote diuresis  Replace electrolytes per protocol   Monitor I/O and chemistry, H&H   Antibiotics.     No indication for HD, JESS improving  Vanc dosing per pharmacist  Will follow closely      MD Denver Serrano  [phone]

## 2018-06-19 NOTE — PROGRESS NOTES
Pt self extubated yesterday. Has made a remarkable recovery. Awake, alert. Talking to me. Giving her brother attitude and being responsive, moving all 4  Anasarca  Wound vac in place  Ileostomy pink and patent with no gas in the bag - full of edema fluid. Continue ICU management - diuresis will help  Speech path for swallow study then feed ad john to regular as tolerated.     Elisa Mendez, DO  6/18/2018

## 2018-06-19 NOTE — PROGRESS NOTES
Consult for TPN Dosing per Pharmacy by Dr. Thomas Streeter provided for this 75 y.o. female, for indication of ileus  Day of Therapy 2  Dosing Weight ~ 62 kg   Labs:  BMP BMP:   Lab Results   Component Value Date/Time     (H) 06/19/2018 04:48 AM    K 3.9 06/19/2018 04:48 AM     (H) 06/19/2018 04:48 AM    CO2 21 06/19/2018 04:48 AM    AGAP 15 06/19/2018 04:48 AM     (H) 06/19/2018 04:48 AM    BUN 47 (H) 06/19/2018 04:48 AM    CREA 1.74 (H) 06/19/2018 04:48 AM    GFRAA 35 (L) 06/19/2018 04:48 AM    GFRNA 29 (L) 06/19/2018 04:48 AM          CMP CMP:   Lab Results   Component Value Date/Time     (H) 06/19/2018 04:48 AM    K 3.9 06/19/2018 04:48 AM     (H) 06/19/2018 04:48 AM    CO2 21 06/19/2018 04:48 AM    AGAP 15 06/19/2018 04:48 AM     (H) 06/19/2018 04:48 AM    BUN 47 (H) 06/19/2018 04:48 AM    CREA 1.74 (H) 06/19/2018 04:48 AM    GFRAA 35 (L) 06/19/2018 04:48 AM    GFRNA 29 (L) 06/19/2018 04:48 AM    CA 7.5 (L) 06/19/2018 04:48 AM    MG 1.9 06/19/2018 04:48 AM    PHOS 3.7 06/19/2018 04:48 AM    ALB 2.0 (L) 06/19/2018 04:48 AM         Ca/ Phos Lab Results   Component Value Date/Time    Calcium 7.5 (L) 06/19/2018 04:48 AM    Phosphorus 3.7 06/19/2018 04:48 AM       Mag    Lab Results   Component Value Date/Time    Magnesium 1.9 06/19/2018 04:48 AM      Tg No components found for: TGL   Albumin Lab Results   Component Value Date/Time    Protein, total 4.6 (L) 06/17/2018 08:10 AM    Albumin 2.0 (L) 06/19/2018 04:48 AM         Kcal requirements:  25 kcal/kg ~ 1500 kcal//day      Goal Macronutrients:  Protein  ~1.2 g/kg/day             75 g/day = 300 kcal  Lipids    ~24% of total kcal      40 g/day = 360 kcal  Dextrose remainder of total kcal  247 g/day= 840 kcal ( Note: adjusted to 200 gm dextrose ( 680 kcal )   (discussed macronutrient recommendations with dietary )      TPN to be increased to 3/4 goal macronutrients and titrated to goal per patient tolerance.  Electrolytes to be monitored and adjusted daily. MD to replete electrolytes acutely outside of TPN as needed. Additional recommendations/Orders:   1. Corrective insulin coverage with regular insulin q6h while on TPN. 2. Add lipids to TPN today ( TG level = 64 )  3. Increase Na Acetate to 20 mEq  4. Add KPhos 6 mmol  5. Increase Mag to 8 mEq  6. BMP, Mag, Phos ordered daily  7. Triglycerides, Prealbumin, CMP ordered upon initiation and weekly    Pharmacy to follow daily and will make changes based on labs/clinical status.

## 2018-06-19 NOTE — WOUND CARE
Wound care in to check on patient and vac placement. Wound vac seems to be functioning properly. Wound care to continue to be available.

## 2018-06-19 NOTE — OP NOTES
OPERATIVE NOTE    Patient: Gilberto Dinero MRN: [id number]  SSN: [id number]    YOB: 1950  Age: 75 y.o. Sex: female      Indications:  I was called urgently for an intra-operative colorectal specialty consult for a 75y.o. year-old female who presents with sepsis. She was positive for wound drainage so the primary surgeon Dr. Era Saha proceeded with exploratory laparotomy. Upon opening the patient, Dr. Era Saha believed there may be an abnormality of the colon and asked for consultation. Upon my arrival the patient had been opened and explored. I reviewed the chart and discussed the situation with the primary and my assessment was to proceed with surgery and scrubbed in for further evaluation and surgical treatment. Date of Procedure: 6/15/2018     Preoperative Diagnosis: POSTOP WOUND EXPLORATION    Postoperative Diagnosis: ABDOMINAL ABSCESS WITH SIGMOID DIVERTICULITIS,     Procedure:  LYSIS OF ADHESIONS (53070), ABDOMINAL ABSCESS WASHOUT (04490), COLORRHAPHY (98842), ENTERECTOMY WITH END-LOOP ILEOSTOMY (13736), AND WOUND VAC PLACEMENT (41548)    Surgeon(s): Surgeon(s) and Role:     * Ean Griffin MD - Primary     * Oralia Harris DO - Consulting Surgeon    Anesthesia: General     Procedure: The abdomen was explored. There was fibrinous exudate in the LUQ and fibrinous exudate with stool particles and staining in the pelvis from the sigmoid to the right pelvic sidewall. The entire sigmoid and upper rectum appeared tremendously inflamed, thickened with likely previous perforation but without renetta overt stool spilling upon inspection. It was consistent with diverticulitis. The small bowel was run from the ligament of treitz to the terminal ileum which required significant lysis of adhesions. No areas of concern were found.   Upon evaluation running the colon, there was an area of colon injury in the mid-transverse colon requiring simple closure using several interrupted figure of eight 3-0 vicryl. The abdomen was washed out copiously especially in the areas of abscess in the RUQ and stool in the sigmoid/pelvis. Due to renetta stool in the abdomen the case was fully contaminated and significant inflammation in the LLQ/pelvis, it was decided a diverting ileostomy was appropriate for safety and continue ICU management. A drain was placed in the pelvis exiting through the LLQ. A drain was also placed in the right colic gutter exiting through the RLQ due to the phlegmonous tissue. Drains were placed in those areas for continued drainage. A skin disk was removed in the right side in the rectus area and the fascia of the anterior and posterior rectus sheath was widened, splitting the rectus muscle. The terminal ileum approximately 40cm from the ICV was pulled through the abdominal wall with great difficulty. The length proximally and distally was once again identified to ensure no torsion of the small bowel occurred. The fascia was closed using intermittent figure of eigth #1 PDS suture after several sheets of seprafilm were used around the ileum/ostomy and midline. Gloves and instruments were changed and the wounds irrigated. The skin was not closed as the fascia and subcutaneous layers were affected with significant fibrinous exudate. A wound vac was placed with a silver sponge for infected tissues in her large midline wound ([id number]) for drainage since it was a Class 4 wound. The incisions were covered and maturation of the loop ileostomy was performed. A curved transverse enterotomy was performed. The distal mucous fistula was sutured to the dermis with 3-0 vicryl. The small bowel was compromised by edema from the anasarca, infectious process as well as tremendous abdominal wall fat. The small bowel was resected using a KEISHA 75 with a blue load. The stapled distal end was left below the skin. The proximal ostomy was brooked and sutured with 3-0 vicryl circumferentially.  The ostomy was digitized and was found to be patent. The abdomen skin was then cleaned and an ostomy appliance was placed. Sterile dressings were applied. The patient was transported to the ICU without complication for further observation and resuscitation. Sponge/ Needle/ Instrument count reported as correct. Estimated Blood Loss: Minimal    Specimens:   ID Type Source Tests Collected by Time Destination   1 :     Maggi Mo MD 6/15/2018 1434 Microbiology        Drains: (large)Claude-Simon drain(s) with closed bulb suction in the pelvis and right side    Implants: * No implants in log *    Complications: None; patient tolerated the procedure well.     Margot Rizvi DO  6/18/2018  10:55 PM

## 2018-06-19 NOTE — PROGRESS NOTES
Problem: Self Care Deficits Care Plan (Adult)  Goal: *Acute Goals and Plan of Care (Insert Text)  Occupational Therapy Goals  Initiated 6/14/2018 within 7 day(s). 1.  Patient will visually attend to activity x 5 minutes to increase participation in bed level exercise  2. Patient will follow simple 1 step instruction 50% or more of time to increase participation in ADL  3. Patient will transfer supine to sit with max A to prepare for EOB level activity   4. Patient will tolerate sitting on side of bed x 10 minutes with minimal support to increase tolerance for EOB ADL    Occupational Therapy Treatment Attempt    Chart reviewed. Attempted Occupational Therapy Treatment, however, patient unable to be seen due to:  []  Nausea/vomiting  []  Eating  []  Pain  []  Patient too lethargic  [x]  Off Unit for testing/procedure/PICC  []  Dialysis treatment in progress  []  Telemetry Results  []  Other:     Will f/u later as patient's schedule allows.    Thank you for this referral.  Kaylynn Murray, OTR/L

## 2018-06-19 NOTE — PROGRESS NOTES
1915-Bedside and Verbal shift change report given to Chuck Austin RN (oncoming nurse) by Chase Mendiola RN (offgoing nurse). Report included the following information SBAR, Kardex, Procedure Summary, Intake/Output and MAR Dual skin assessment completed. Midline incision wound vac in place. x2 hunter drains charged and in place. 2030-Assessment completed. Pt is drowsy but responds to stimulus at this time. Unable to perform bedside swallow eval at this time due to drowsiness and delayed response/voice quality. Pt remains restrained, loosened restraints to check circulation, pt immediately reached for HFNC and lines. Restraints returned on.   2100-Type and cross sent to lab and new blood band placed. 2200-Called lab and spoke to [facility] about blood products. Per , blood not ready at this time. 2330-Received call blood ready for  in lab. 2345-Initiated blood transfusion. Monitored pt per protocol. 0200-Blood transfusion completed. No transfusion reaction suspected. 0400-Assessment completed. No changes from previous assessment. 0600-PTT resulted in chart. Per protocol, Decreased rate by 2units/kg/hr, current rate- 14units/kg/hr. 0700-Bedside verbal report given to Leodan Law RN. Sbar, mar, labs, kardex and patient status reviewed. Relinquished care of patient.

## 2018-06-19 NOTE — PROGRESS NOTES
Problem: Dysphagia (Adult)  Goal: *Acute Goals and Plan of Care (Insert Text)  Recommendations:  Diet: NPO except ice chips  Meds: Per patient preference  Aspiration Precautions  Oral Care TID    Goals:  Patient will:  1. Tolerate PO trials with 0 s/s overt distress in 4/5 trials  2. Utilize compensatory swallow strategies/maneuvers (decrease bite/sip, size/rate, alt. liq/sol) with min cues in 4/5 trials  3. Perform oral-motor/laryngeal exercises to increase oropharyngeal swallow function with min cues  4. Complete an objective swallow study (i.e., MBSS) to assess swallow integrity, r/o aspiration, and determine of safest LRD, min A              Outcome: Progressing Towards Goal    Speech LAnguage Pathology bedside swallow   evaluation & TREATMENT     Patient: Asher London (31 y.o. female)  Date: 6/19/2018  Primary Diagnosis: Abdominal pain  Abdominal pain  Abdominal Pain  aspiration  POSTOP WOUND EXPLORATION  Procedure(s) (LRB):  WOUND EXPLORATION, EXPLORATORY LAPAROTOMY, ILLEOSTMOY, LYSES OF ADHESIONS AND WOUND VAC PLACEMENT (N/A) 4 Days Post-Op   Precautions: Aspiration  Fall  PLOF: Independent  ASSESSMENT :  Based on the objective data described below, the patient presents with mild oral and moderate-severe pharyngeal dysphagia. Pt A&Ox3 with brother and RN at bedside. Wrist restraints in place. Oral-motor exam revealed structures grossly intact for mastication and deglutition. Pt agitated during positioning. Pt accepted SLP-fed ice chips and thin liquid via cup sip; exhibited inconsistent and delayed throat clear. Puree trial; pt with immediate throat clear. Hyolaryngeal elevation decreased to palpation across all trials. Pt extremely impulsive, attempting to take cup from [address] [place] [address] DrNy Recommend pt continue NPO except ice chips/SMALL sips of water for comfort, strict aspiration precautions and requires assistance with all PO intake. Pt would benefit from MBSS when medically stable. D/w RNJennifer.      Skilled therapy initiated; Educated pt on aspiration precautions and importance of compensatory swallow techniques to decrease aspiration risk (decrease rate of intake & sip/bite size, upright @HOB for all po intake and ~30 minutes after po); Discussed recommendations with pt and brother. Pt repeatedly stated \"No, give me water now\" when importance of reduced rate, assistance and aspiration precautions were discussed. Brother verbalized comprehension, pt needs significant reinforcement. ST will follow as indicated. Patient will benefit from skilled intervention to address the above impairments. Patients rehabilitation potential is considered to be Fair  Factors which may influence rehabilitation potential include:   []            None noted  []            Mental ability/status  []            Medical condition  []            Home/family situation and support systems  [x]            Safety awareness  []            Pain tolerance/management  []            Other:      PLAN :  Recommendations and Planned Interventions:  NPO except ice chips/small sips of water  Frequency/Duration: Patient will be followed by speech-language pathology 1-2 times per day/4-7 days per week to address goals. Discharge Recommendations: To Be Determined     SUBJECTIVE:   Patient stated No, give me water now.     OBJECTIVE:     Past Medical History:   Diagnosis Date    Diabetes ([facility] [address]     many years type 2    Hypertension     many years     Past Surgical History:   Procedure Laterality Date    HX OOPHORECTOMY  05/29/2018    HX TONSILLECTOMY      as a child      Prior Level of Function/Home Situation: Independent  Home Situation  Home Environment: Private residence  # Steps to Enter: 4  One/Two Story Residence: One story  Living Alone: No  Support Systems: Family member(s)  Patient Expects to be Discharged to[de-identified] Unknown  Current DME Used/Available at Home: Commode, bedside, Shower chair, Walker, rolling, Wheelchair  Tub or Shower Type: Shower  Diet prior to admission: Regular/thin  Current Diet:  NPO   Cognitive and Communication Status:  Neurologic State: Alert, Agitated  Orientation Level: Oriented to person, Oriented to place  Cognition: Decreased command following, Impulsive, Poor safety awareness  Perception: Appears intact  Perseveration: Perseverates during conversation  Safety/Judgement: Awareness of environment, Lack of insight into deficits  Oral Assessment:  Oral Assessment  Labial: No impairment  Dentition: Natural;Intact  Oral Hygiene: Fair  Lingual: Decreased rate;Decreased strength  Velum: Unable to visualize  Mandible: No impairment  P.O. Trials:  Patient Position: HOB 60  Vocal quality prior to P.O.: No impairment  Consistency Presented: Thin liquid; Ice chips;Puree  How Presented: SLP-fed/presented;Cup/sip     Bolus Acceptance: No impairment  Bolus Formation/Control: No impairment     Propulsion: No impairment  Oral Residue: None  Initiation of Swallow: No impairment  Laryngeal Elevation: Decreased  Aspiration Signs/Symptoms: Clear throat  Pharyngeal Phase Characteristics: Suspected pharyngeal residue;Easily fatigued   Effective Modifications: Ice chips;Spoon;Small sips and bites  Cues for Modifications: Maximal       Oral Phase Severity: Mild  Pharyngeal Phase Severity : Moderate    GCODESwallowing:  Swallow Current Status CM= 80-99%   Swallow Goal Status CH= 0%    The severity rating is based on the following outcomes:  LEOPOLDO Noms Swallow Level 2    Clinical Judgement    PAIN:  Start of Eval/Tx: 0  End of Eval/Tx: 0     After treatment:   []            Patient left in no apparent distress sitting up in chair  [x]            Patient left in no apparent distress in bed  [x]            Call bell left within reach  [x]            Nursing notified  [x]            Family present  []            Caregiver present  []            Bed alarm activated    COMMUNICATION/EDUCATION:   [x]            Safe swallowing guidelines; compensatory techniques. [x]            Patient/family have participated as able in goal setting and plan of care. []            Patient/family agree to work toward stated goals and plan of care. [x]            Patient understands intent and goals of therapy; neutral about participation. []            Patient unable to participate in goal setting/plan of care; educ ongoing with interdisciplinary staff  []         Posted safety precautions in patient's room.     Thank you for this referral.  Yaya Wang, SLP  Time Calculation: 25 mins  Evaluation Time: 15 minutes   Treatment Time: 10 minutes

## 2018-06-19 NOTE — PROGRESS NOTES
Admit date: 6/4/2018        ASSESSMENT/PLAN  Marian Para a 75 y. 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 Will need adjuvant chemotherapy to complete primary therapy. GI - Peritonitis secondary to diverticulitis with perforation diverted with distal ileostomy. Post operative ileus resolving. Will continue bowel rest. Failed swallow study last pm. TPN ordered. Planning PICC line today. FEN - electrolyte replacement protocol ordered. TPN  ID - sepsis secondary to peritonitis. On Flagyl, Zosyn and Levaquin. Drains with serous drainage. WBC remains elevated. Patient remains afebrile. Renal - JESS, most likely ATN. Improving. Heme - Acute blood loss anemia, lolis-operative loss exacerbated by heparin gtt. Tranfusion pRBCs last pm but Hgb still low and patient having some VB and more sanguinous drainage. Will keep central line today for anticipated transfusion. Will repeat H/H at 1100. DVT/PE: on Heparin gtt  CV - stable, off pressors  Pulm - Respiratory failure, improving. On high flow O2 NC and ABG stable. Psych - Delerium, confusion with agitation. Disp - condition improving, continue ICU care  Code Status - Full Code      SUBJECTIVE  Alert. Denies pain. Denies nausea. Angry affect.  Did not pass swallow study last pm.      OBJECTIVE  Physical Exam:  Patient Vitals for the past 24 hrs:   BP Temp Pulse Resp SpO2 Weight   06/19/18 0525 - - - - 99 % -   06/19/18 0400 135/78 98.2 °F (36.8 °C) 91 21 100 % -   06/19/18 0300 136/78 - 91 21 100 % -   06/19/18 0220 - - - - 98 % -   06/19/18 0200 137/78 - 93 21 99 % -   06/19/18 0115 - - 89 21 99 % -   06/19/18 0100 142/85 97.9 °F (36.6 °C) 94 18 98 % -   06/19/18 0045 - 98 °F (36.7 °C) 90 21 98 % -   06/19/18 0030 - 97.8 °F (36.6 °C) 94 18 98 % -   06/19/18 0015 - 97.8 °F (36.6 °C) (!) 101 19 98 % -   06/19/18 0000 139/74 97.9 °F (36.6 °C) 93 22 98 % -   06/18/18 2345 - - 97 20 97 % -   06/18/18 2300 141/86 - (!) 105 28 98 % -   06/18/18 2204 (!) 140/91 - 90 - - -   06/18/18 2200 (!) 140/91 - 87 21 98 % -   06/18/18 2115 - - 93 22 99 % -   06/18/18 2100 139/88 - 97 23 96 % -   06/18/18 2045 - - 95 25 97 % -   06/18/18 2030 - - 86 21 96 % -   06/18/18 2015 - - 81 22 99 % -   06/18/18 2000 150/87 97.9 °F (36.6 °C) 90 19 99 % -   06/18/18 1957 - 97.9 °F (36.6 °C) - - - -   06/18/18 1945 - - 87 20 100 % -   06/18/18 1930 - - 99 20 99 % -   06/18/18 1915 - - 85 21 98 % -   06/18/18 1900 149/74 - 91 18 98 % -   06/18/18 1614 - - - - - 102.8 kg (226 lb 10.1 oz)   06/18/18 1600 - 98.1 °F (36.7 °C) - - - -   06/18/18 1100 - 97.6 °F (36.4 °C) - - - -   06/18/18 0810 - - - - 96 % -   06/18/18 0800 118/75 98.2 °F (36.8 °C) 82 18 94 % -         Intake/Output Summary (Last 24 hours) at 06/19/18 0742  Last data filed at 06/19/18 0659   Gross per 24 hour   Intake          4559.69 ml   Output             6460 ml   Net         -1900.31 ml      General - alert, agitated, angry affect, does not appear to be oriented  HEENT - high flow nc in place  Abdomen - appropriately tender, nondistended, hypo bowel sounds. Drain output serosanguinous, more sanguinous today. Some leakage around ostomy appliance. Incision - wound edges are without erythema or necrosis. Wound vac in place. Vagina - blood on pad no obvious bleeding. Extremities - 2+ edema    Labs  CBC  Recent Labs      06/19/18   0448  06/18/18   1600  06/18/18   0516   06/17/18   0810   WBC  18.0*   --   17.1*   --   20.0*   HCT  25.6*  24.1*  25.5*   < >  25.5*   MCV  75.5   --   73.1*   --   72.4*   BANDS   --    --    --    --   7*   MONOS  5   --    --    --   3   EOS  1   --    --    --   0   BASOS  0   --    --    --   0    < > = values in this interval not displayed.         CMP  Recent Labs      06/19/18   0448  06/18/18   0516  06/17/18   1430   NA  149*  146*  145   CO2  21  22  22   BUN  47* 50*  52*        Lab Results   Component Value Date/Time    Glucose 152 (H) 06/19/2018 04:48 AM    Glucose (POC) 158 (H) 06/19/2018 05:29 AM    Glucose,  (H) 06/15/2018 05:14 PM          Current Hospital Medications    Current Facility-Administered Medications:     furosemide (LASIX) injection 20 mg, 20 mg, IntraVENous, BID, Madina Shi MD, 20 mg at 06/18/18 2204    TPN ADULT - CENTRAL, , IntraVENous, CONTINUOUS, Madina Shi MD, Last Rate: 60 mL/hr at 06/19/18 0739    insulin regular (NOVOLIN R, HUMULIN R) injection, , SubCUTAneous, Q6H, Madina Shi MD, 2 Units at 06/19/18 0629    levoFLOXacin (LEVAQUIN) 750 mg in D5W IVPB, 750 mg, IntraVENous, Q48H, Dewayne Long MD, Last Rate: 100 mL/hr at 06/18/18 2044, 750 mg at 06/18/18 2044    albumin human 25% (BUMINATE) solution 12.5 g, 12.5 g, IntraVENous, Q6H, Madina Shi MD, Last Rate: 0 mL/hr at 06/19/18 0120, 12.5 g at 06/19/18 0630    metroNIDAZOLE (FLAGYL) IVPB premix 500 mg, 500 mg, IntraVENous, Q8H, Sindhu Ngo MD, Last Rate: 100 mL/hr at 06/19/18 0212, 500 mg at 06/19/18 0212    ipratropium (ATROVENT) 0.02 % nebulizer solution 0.5 mg, 0.5 mg, Nebulization, Q4H PRN, Sindhu Ngo MD    piperacillin-tazobactam (ZOSYN) 2.25 g in 0.9% sodium chloride (MBP/ADV) 50 mL MBP, 2.25 g, IntraVENous, Q6H, Pietro Forman MD, Last Rate: 100 mL/hr at 06/19/18 0628, 2.25 g at 06/19/18 8131    fentaNYL (PF) 900 mcg/30 ml infusion soln, 0-200 mcg/hr, IntraVENous, TITRATE, Dewayne Long MD, Last Rate: 2.5 mL/hr at 06/18/18 2142, 75 mcg/hr at 06/18/18 2142    naloxone (NARCAN) injection 0.4 mg, 0.4 mg, IntraVENous, EVERY 2 MINUTES AS NEEDED, Madina Shi MD    heparin 25,000 units in D5W 250 ml infusion, 18-36 Units/kg/hr, IntraVENous, TITRATE, Christen Mccoy MD, Last Rate: 12.5 mL/hr at 06/19/18 0739, 14 Units/kg/hr at 06/19/18 0739    ondansetron (ZOFRAN) injection 4 mg, 4 mg, IntraVENous, Q6H PRN, Ramona Houston MD, 4 mg at 06/08/18 2152    oxymetazoline (AFRIN) 0.05 % nasal spray 2 Spray, 2 Spray, Both Nostrils, BID PRN, Opal Keane MD    alum-mag hydroxide-[facility]) oral suspension 30 mL, 30 mL, Oral, Q4H PRN, Rutul A Fern Schirmer, MD, 30 mL at 06/07/18 2225    HYDROcodone-acetaminophen (NORCO)  mg tablet 2 Tab, 2 Tab, Oral, Q4H PRN, Sai Hernandez MD, 2 Tab at 06/15/18 0816    fentaNYL citrate (PF) injection 25 mcg, 25 mcg, IntraVENous, Q3H PRN, Rutul A Fern Schirmer, MD, 25 mcg at 06/16/18 1036    fluticasone (FLONASE) 50 mcg/actuation nasal spray 2 Spray, 2 Spray, Both Nostrils, DAILY PRN, Shanon Galvez MD    ELECTROLYTE REPLACEMENT PROTOCOL-POTASSIUM Renal Dosing, 1 Each, Other, PRN, Christen No MD    ELECTROLYTE REPLACEMENT PROTOCOL-MAGNESIUM, 1 Each, Other, PRN, Christen No MD    ELECTROLYTE REPLACEMENT PROTOCOL-PHOSPHORUS Renal Dosing, 1 Each, Other, PRN, Christen No MD    ELECTROLYTE REPLACEMENT PROTOCOL-CALCIUM, 1 Each, Other, PRN, Christen No MD    sodium chloride (NS) flush 5-10 mL, 5-10 mL, IntraVENous, PRN, Jamila Suero MD    glucose chewable tablet 16 g, 4 Tab, Oral, PRN, Christen No MD    glucagon (GLUCAGEN) injection 1 mg, 1 mg, IntraMUSCular, PRN, Christen No MD    dextrose (D50W) injection syrg 12.5-25 g, 25-50 mL, IntraVENous, PRN, Christen No MD    pantoprazole (PROTONIX) 40 mg in sodium chloride 0.9% 10 mL injection, 40 mg, IntraVENous, DAILY, Sai Hernandez MD, 40 mg at 06/18/18 1027      Shanon Galvez MD

## 2018-06-20 ENCOUNTER — APPOINTMENT (OUTPATIENT)
Dept: GENERAL RADIOLOGY | Age: 68
DRG: 853 | End: 2018-06-20
Attending: INTERNAL MEDICINE
Payer: MEDICARE

## 2018-06-20 LAB
ANION GAP SERPL CALC-SCNC: 17 MMOL/L (ref 3–18)
APTT PPP: 130.3 SEC (ref 23–36.4)
APTT PPP: 47.3 SEC (ref 23–36.4)
ARTERIAL PATENCY WRIST A: YES
BASE DEFICIT BLD-SCNC: 3 MMOL/L
BASOPHILS # BLD: 0 K/UL (ref 0–0.06)
BASOPHILS NFR BLD: 0 % (ref 0–2)
BDY SITE: ABNORMAL
BODY TEMPERATURE: 98.6
BUN SERPL-MCNC: 46 MG/DL (ref 7–18)
BUN/CREAT SERPL: 28 (ref 12–20)
CA-I SERPL-SCNC: 1.07 MMOL/L (ref 1.12–1.32)
CA-I SERPL-SCNC: 1.14 MMOL/L (ref 1.12–1.32)
CALCIUM SERPL-MCNC: 7.6 MG/DL (ref 8.5–10.1)
CHLORIDE SERPL-SCNC: 114 MMOL/L (ref 100–108)
CO2 SERPL-SCNC: 21 MMOL/L (ref 21–32)
CREAT SERPL-MCNC: 1.62 MG/DL (ref 0.6–1.3)
DIFFERENTIAL METHOD BLD: ABNORMAL
EOSINOPHIL # BLD: 0.2 K/UL (ref 0–0.4)
EOSINOPHIL NFR BLD: 1 % (ref 0–5)
ERYTHROCYTE [DISTWIDTH] IN BLOOD BY AUTOMATED COUNT: 23.2 % (ref 11.6–14.5)
GAS FLOW.O2 O2 DELIVERY SYS: ABNORMAL L/MIN
GAS FLOW.O2 SETTING OXYMISER: 2 L/M
GLUCOSE BLD STRIP.AUTO-MCNC: 158 MG/DL (ref 70–110)
GLUCOSE BLD STRIP.AUTO-MCNC: 183 MG/DL (ref 70–110)
GLUCOSE BLD STRIP.AUTO-MCNC: 186 MG/DL (ref 70–110)
GLUCOSE BLD STRIP.AUTO-MCNC: 197 MG/DL (ref 70–110)
GLUCOSE BLD STRIP.AUTO-MCNC: 206 MG/DL (ref 70–110)
GLUCOSE SERPL-MCNC: 188 MG/DL (ref 74–99)
HCO3 BLD-SCNC: 20.1 MMOL/L (ref 22–26)
HCT VFR BLD AUTO: 22.8 % (ref 35–45)
HCT VFR BLD AUTO: 23.9 % (ref 35–45)
HGB BLD-MCNC: 7.4 G/DL (ref 12–16)
HGB BLD-MCNC: 7.8 G/DL (ref 12–16)
LYMPHOCYTES # BLD: 1.7 K/UL (ref 0.9–3.6)
LYMPHOCYTES NFR BLD: 10 % (ref 21–52)
MAGNESIUM SERPL-MCNC: 1.9 MG/DL (ref 1.6–2.6)
MCH RBC QN AUTO: 24.6 PG (ref 24–34)
MCHC RBC AUTO-ENTMCNC: 32.6 G/DL (ref 31–37)
MCV RBC AUTO: 75.4 FL (ref 74–97)
MONOCYTES # BLD: 1.2 K/UL (ref 0.05–1.2)
MONOCYTES NFR BLD: 7 % (ref 3–10)
NEUTS SEG # BLD: 15.1 K/UL (ref 1.8–8)
NEUTS SEG NFR BLD: 82 % (ref 40–73)
O2/TOTAL GAS SETTING VFR VENT: 28 %
PCO2 BLD: 25.1 MMHG (ref 35–45)
PH BLD: 7.51 [PH] (ref 7.35–7.45)
PHOSPHATE SERPL-MCNC: 3.5 MG/DL (ref 2.5–4.9)
PLATELET # BLD AUTO: 136 K/UL (ref 135–420)
PO2 BLD: 65 MMHG (ref 80–100)
POTASSIUM SERPL-SCNC: 2.7 MMOL/L (ref 3.5–5.5)
POTASSIUM SERPL-SCNC: 2.9 MMOL/L (ref 3.5–5.5)
PREALB SERPL-MCNC: 10.7 MG/DL (ref 20–40)
RBC # BLD AUTO: 3.17 M/UL (ref 4.2–5.3)
SAO2 % BLD: 95 % (ref 92–97)
SERVICE CMNT-IMP: ABNORMAL
SODIUM SERPL-SCNC: 152 MMOL/L (ref 136–145)
SPECIMEN TYPE: ABNORMAL
TOTAL RESP. RATE, ITRR: 28
WBC # BLD AUTO: 18.2 K/UL (ref 4.6–13.2)

## 2018-06-20 PROCEDURE — 77030010522

## 2018-06-20 PROCEDURE — 83735 ASSAY OF MAGNESIUM: CPT | Performed by: OBSTETRICS & GYNECOLOGY

## 2018-06-20 PROCEDURE — 74011000258 HC RX REV CODE- 258: Performed by: OBSTETRICS & GYNECOLOGY

## 2018-06-20 PROCEDURE — 84100 ASSAY OF PHOSPHORUS: CPT | Performed by: OBSTETRICS & GYNECOLOGY

## 2018-06-20 PROCEDURE — 85025 COMPLETE CBC W/AUTO DIFF WBC: CPT | Performed by: OBSTETRICS & GYNECOLOGY

## 2018-06-20 PROCEDURE — P9047 ALBUMIN (HUMAN), 25%, 50ML: HCPCS | Performed by: INTERNAL MEDICINE

## 2018-06-20 PROCEDURE — 85730 THROMBOPLASTIN TIME PARTIAL: CPT | Performed by: OBSTETRICS & GYNECOLOGY

## 2018-06-20 PROCEDURE — 82330 ASSAY OF CALCIUM: CPT | Performed by: INTERNAL MEDICINE

## 2018-06-20 PROCEDURE — 74011250636 HC RX REV CODE- 250/636: Performed by: INTERNAL MEDICINE

## 2018-06-20 PROCEDURE — 82962 GLUCOSE BLOOD TEST: CPT

## 2018-06-20 PROCEDURE — 74011250636 HC RX REV CODE- 250/636: Performed by: OBSTETRICS & GYNECOLOGY

## 2018-06-20 PROCEDURE — 82803 BLOOD GASES ANY COMBINATION: CPT

## 2018-06-20 PROCEDURE — 36600 WITHDRAWAL OF ARTERIAL BLOOD: CPT

## 2018-06-20 PROCEDURE — 92526 ORAL FUNCTION THERAPY: CPT

## 2018-06-20 PROCEDURE — 74011636637 HC RX REV CODE- 636/637: Performed by: INTERNAL MEDICINE

## 2018-06-20 PROCEDURE — 85014 HEMATOCRIT: CPT | Performed by: INTERNAL MEDICINE

## 2018-06-20 PROCEDURE — 74011000250 HC RX REV CODE- 250: Performed by: INTERNAL MEDICINE

## 2018-06-20 PROCEDURE — 77030010541

## 2018-06-20 PROCEDURE — 65270000029 HC RM PRIVATE

## 2018-06-20 PROCEDURE — 77030027138 HC INCENT SPIROMETER -A

## 2018-06-20 PROCEDURE — 74011000258 HC RX REV CODE- 258: Performed by: INTERNAL MEDICINE

## 2018-06-20 PROCEDURE — 77030019938 HC TBNG IV PCA ICUM -A

## 2018-06-20 PROCEDURE — 77010033678 HC OXYGEN DAILY

## 2018-06-20 PROCEDURE — 71045 X-RAY EXAM CHEST 1 VIEW: CPT

## 2018-06-20 PROCEDURE — 36430 TRANSFUSION BLD/BLD COMPNT: CPT

## 2018-06-20 PROCEDURE — C9113 INJ PANTOPRAZOLE SODIUM, VIA: HCPCS | Performed by: INTERNAL MEDICINE

## 2018-06-20 PROCEDURE — P9016 RBC LEUKOCYTES REDUCED: HCPCS | Performed by: OBSTETRICS & GYNECOLOGY

## 2018-06-20 PROCEDURE — 97164 PT RE-EVAL EST PLAN CARE: CPT

## 2018-06-20 PROCEDURE — 85730 THROMBOPLASTIN TIME PARTIAL: CPT | Performed by: INTERNAL MEDICINE

## 2018-06-20 PROCEDURE — 84132 ASSAY OF SERUM POTASSIUM: CPT | Performed by: OBSTETRICS & GYNECOLOGY

## 2018-06-20 PROCEDURE — 77030019952 HC CANSTR VAC ASST KCON -B

## 2018-06-20 RX ORDER — POTASSIUM CHLORIDE 7.45 MG/ML
10 INJECTION INTRAVENOUS
Status: DISCONTINUED | OUTPATIENT
Start: 2018-06-20 | End: 2018-06-20 | Stop reason: CLARIF

## 2018-06-20 RX ORDER — POTASSIUM CHLORIDE 14.9 MG/ML
20 INJECTION INTRAVENOUS
Status: COMPLETED | OUTPATIENT
Start: 2018-06-20 | End: 2018-06-22

## 2018-06-20 RX ORDER — POTASSIUM CHLORIDE 14.9 MG/ML
10 INJECTION INTRAVENOUS
Status: DISCONTINUED | OUTPATIENT
Start: 2018-06-20 | End: 2018-06-20

## 2018-06-20 RX ORDER — HEPARIN SODIUM 1000 [USP'U]/ML
80 INJECTION, SOLUTION INTRAVENOUS; SUBCUTANEOUS ONCE
Status: COMPLETED | OUTPATIENT
Start: 2018-06-20 | End: 2018-06-20

## 2018-06-20 RX ORDER — POTASSIUM CHLORIDE 14.9 MG/ML
20 INJECTION INTRAVENOUS ONCE
Status: COMPLETED | OUTPATIENT
Start: 2018-06-20 | End: 2018-06-22

## 2018-06-20 RX ORDER — SODIUM CHLORIDE 9 MG/ML
250 INJECTION, SOLUTION INTRAVENOUS AS NEEDED
Status: DISCONTINUED | OUTPATIENT
Start: 2018-06-20 | End: 2018-06-22

## 2018-06-20 RX ADMIN — METRONIDAZOLE 500 MG: 500 INJECTION, SOLUTION INTRAVENOUS at 01:36

## 2018-06-20 RX ADMIN — HUMAN INSULIN 2 UNITS: 100 INJECTION, SOLUTION SUBCUTANEOUS at 12:00

## 2018-06-20 RX ADMIN — HUMAN INSULIN 4 UNITS: 100 INJECTION, SOLUTION SUBCUTANEOUS at 06:48

## 2018-06-20 RX ADMIN — METRONIDAZOLE 500 MG: 500 INJECTION, SOLUTION INTRAVENOUS at 17:28

## 2018-06-20 RX ADMIN — HUMAN INSULIN 2 UNITS: 100 INJECTION, SOLUTION SUBCUTANEOUS at 23:52

## 2018-06-20 RX ADMIN — HEPARIN SODIUM 14 UNITS/KG/HR: 10000 INJECTION, SOLUTION INTRAVENOUS at 17:23

## 2018-06-20 RX ADMIN — PIPERACILLIN SODIUM,TAZOBACTAM SODIUM 2.25 G: 2; .25 INJECTION, POWDER, FOR SOLUTION INTRAVENOUS at 06:32

## 2018-06-20 RX ADMIN — Medication 20 MEQ: at 20:44

## 2018-06-20 RX ADMIN — HEPARIN SODIUM 14 UNITS/KG/HR: 10000 INJECTION, SOLUTION INTRAVENOUS at 08:16

## 2018-06-20 RX ADMIN — Medication 20 MEQ: at 08:17

## 2018-06-20 RX ADMIN — PIPERACILLIN SODIUM,TAZOBACTAM SODIUM 2.25 G: 2; .25 INJECTION, POWDER, FOR SOLUTION INTRAVENOUS at 17:29

## 2018-06-20 RX ADMIN — ALBUMIN (HUMAN) 12.5 G: 0.25 INJECTION, SOLUTION INTRAVENOUS at 20:44

## 2018-06-20 RX ADMIN — PIPERACILLIN SODIUM,TAZOBACTAM SODIUM 2.25 G: 2; .25 INJECTION, POWDER, FOR SOLUTION INTRAVENOUS at 23:55

## 2018-06-20 RX ADMIN — SODIUM CHLORIDE 40 MG: 9 INJECTION INTRAMUSCULAR; INTRAVENOUS; SUBCUTANEOUS at 08:25

## 2018-06-20 RX ADMIN — METRONIDAZOLE 500 MG: 500 INJECTION, SOLUTION INTRAVENOUS at 10:20

## 2018-06-20 RX ADMIN — Medication 30 MCG/HR: at 13:51

## 2018-06-20 RX ADMIN — Medication 10 MEQ: at 07:00

## 2018-06-20 RX ADMIN — PIPERACILLIN SODIUM,TAZOBACTAM SODIUM 2.25 G: 2; .25 INJECTION, POWDER, FOR SOLUTION INTRAVENOUS at 11:27

## 2018-06-20 RX ADMIN — CALCIUM GLUCONATE 2 G: 94 INJECTION, SOLUTION INTRAVENOUS at 07:22

## 2018-06-20 RX ADMIN — HUMAN INSULIN 2 UNITS: 100 INJECTION, SOLUTION SUBCUTANEOUS at 00:35

## 2018-06-20 RX ADMIN — Medication 20 MEQ: at 23:00

## 2018-06-20 RX ADMIN — ALBUMIN (HUMAN) 12.5 G: 0.25 INJECTION, SOLUTION INTRAVENOUS at 08:25

## 2018-06-20 RX ADMIN — FUROSEMIDE 20 MG: 10 INJECTION, SOLUTION INTRAMUSCULAR; INTRAVENOUS at 08:25

## 2018-06-20 RX ADMIN — POTASSIUM CHLORIDE: 2 INJECTION, SOLUTION, CONCENTRATE INTRAVENOUS at 17:24

## 2018-06-20 RX ADMIN — PIPERACILLIN SODIUM,TAZOBACTAM SODIUM 2.25 G: 2; .25 INJECTION, POWDER, FOR SOLUTION INTRAVENOUS at 00:20

## 2018-06-20 RX ADMIN — HEPARIN SODIUM 8060 UNITS: 1000 INJECTION, SOLUTION INTRAVENOUS; SUBCUTANEOUS at 21:39

## 2018-06-20 NOTE — PROGRESS NOTES
Chart reviewed pt still requiring ICU level of care, pt has wound vac in place, cm informed during IDR's yesterday may return back to surgery cm will cont to review case and remain in contact with brother for care plans.

## 2018-06-20 NOTE — DIABETES MGMT
GLYCEMIC CONTROL PROGRESS NOTE:    -discussed in rounds, known h/o T2DM HbA1C within recommended range for age + comorbids on oral home regimen  -BG out of target range ICU: 140-180 mg/dL   -TDD = 8 units regular insulin, TPN  -24 hour BG up, pharmacy to add 10 units regular insulin to TPN    -Glucose Results:   Lab Results   Component Value Date/Time     (H) 06/20/2018 05:20 AM    GLUCPOC 206 (H) 06/20/2018 06:39 AM    GLUCPOC 183 (H) 06/20/2018 12:19 AM    GLUCPOC 178 (H) 06/19/2018 05:58 PM         Abigail Hutton RN, MS  Glycemic Control Team  Pager [phone] (M-TH 8:30-5P)  *After Hours pager [phone]

## 2018-06-20 NOTE — PROGRESS NOTES
Problem: Dysphagia (Adult)  Goal: *Acute Goals and Plan of Care (Insert Text)  Recommendations:  Diet: NPO except ice chips  Meds: Per patient preference  Aspiration Precautions  Oral Care TID    Goals:  Patient will:  1. Tolerate PO trials with 0 s/s overt distress in 4/5 trials  2. Utilize compensatory swallow strategies/maneuvers (decrease bite/sip, size/rate, alt. liq/sol) with min cues in 4/5 trials  3. Perform oral-motor/laryngeal exercises to increase oropharyngeal swallow function with min cues  4. Complete an objective swallow study (i.e., MBSS) to assess swallow integrity, r/o aspiration, and determine of safest LRD, min A              Outcome: Progressing Towards Goal  Speech language pathology dysphagia treatment    Patient: Gilberto Dinero (36 y.o. female)  Date: 6/20/2018  Diagnosis: Abdominal pain  Abdominal pain  Abdominal Pain  aspiration  POSTOP WOUND EXPLORATION Sepsis (HCC)  Procedure(s) (LRB):  WOUND EXPLORATION, EXPLORATORY LAPAROTOMY, ILLEOSTMOY, LYSES OF ADHESIONS AND WOUND VAC PLACEMENT (N/A) 5 Days Post-Op  Precautions: Aspiration Fall  PLOF: Independent     ASSESSMENT:  Followed up this day with dysphagia tx. Per RN, PO trials limited to ice chips at this time. Pt A&Ox4 with brother at bedside. Pt accepted SLP-fed ice chips; hyolaryngeal elevation decreased to palpation across all trials, decreased O2 sats on last of 7 trials. Pt impulsive. Recommend pt continue NPO except ice chips for comfort, strict aspiration precautions and requires assistance with all PO intake. Pt would benefit from MBSS when medically stable. Educated pt and brother on aspiration precautions and importance of compensatory swallow techniques to decrease aspiration risk (decrease rate of intake & sip/bite size, upright @HOB for all po intake and ~30 minutes after po); verbalized comprehension, both pt and brother require reinforcement. D/w RN, [address].    Progression toward goals:  []         Improving appropriately and progressing toward goals  [x]         Improving slowly and progressing toward goals  []         Not making progress toward goals and plan of care will be adjusted     PLAN:  Recommendations and Planned Interventions:  NPO except ice chips  Patient continues to benefit from skilled intervention to address the above impairments. Continue treatment per established plan of care. Discharge Recommendations: To Be Determined     SUBJECTIVE:   Patient stated More please. OBJECTIVE:   Cognitive and Communication Status:  Neurologic State: Alert  Orientation Level: Oriented X4  Cognition: Follows commands  Perception: Appears intact  Perseveration: No perseveration noted  Safety/Judgement: Decreased insight into deficits, Decreased awareness of need for safety  Dysphagia Treatment:  Oral Assessment:  Oral Assessment  Labial: No impairment  Dentition: Natural, Intact  Oral Hygiene: Fair  Lingual: Decreased rate, Decreased strength  Velum: Unable to visualize  Mandible: No impairment  P.O. Trials:   Patient Position: HOB 50   Vocal quality prior to P.O.: Low volume   Consistency Presented:  Thin liquid   How Presented: SLP-fed/presented, Spoon       Bolus Acceptance: No impairment   Bolus Formation/Control: No impairment       Propulsion: No impairment   Oral Residue: None   Initiation of Swallow: No impairment   Laryngeal Elevation: Decreased   Aspiration Signs/Symptoms: Decrease in O2 saturations   Pharyngeal Phase Characteristics: Suspected pharyngeal residue   Effective Modifications: Small sips and bites   Cues for Modifications: Maximal         Oral Phase Severity: No impairment   Pharyngeal Phase Severity : Moderate       PAIN:  Start of Tx: 0  End of Tx: 0     GCODESwallowing:  Swallow Current Status CN= 100%   Swallow Goal Status CI= 1-19%    The severity rating is based on the following outcomes:  LEOPOLDO Noms Swallow Level 1    Clinical Judgement    After treatment:   []              Patient left in no apparent distress sitting up in chair  [x]              Patient left in no apparent distress in bed  [x]              Call bell left within reach  [x]              Nursing notified  [x]              Family present  []              Caregiver present  []              Bed alarm activated      COMMUNICATION/EDUCATION:   [x] Safe swallowing guidelines; compensatory techniques  []        Patient unable to participate in education; education ongoing with staff  []  Posted safety precautions in patient's room.   [] Oral-motor/laryngeal strengthening exercises      KENNETH Soriano Chi  Time Calculation: 13 mins

## 2018-06-20 NOTE — PROGRESS NOTES
[facility]                  Pulmonary, Critical Care, and Sleep Medicine     Name: Lily Pope MRN: [id number]   : 1950 Hospital: [facility] [place]    Date: 2018        PCCM Note                                              Subjective/History of Present Illness:     Patient is a 75 y.o. 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 S/p exploratory laparotomy 6/15/18 - findings of sigmoid diverticulitis with suspected perforation, underwent ileostomy and wound vac placement. 18:  Patient self extubated 6 am  Patient more awake and alert; weak overall; fentanyl drip 50/hr  On 3 lits nc o2  Tele-sinus rhythm; BP stable-not on pressors; Afebrile    UOP 3.7 lits yesterday     Heparin drip for DVT and suspected PEs. Surgeries  18 - Laparoscopy converted to laparotomy, total abdominal hysterectomy, bilateral salpingo-oophorectomy, omentectomy, bilateral pelvic and periaortic lymphadenectomy and radical tumor debulking to R0. Path - ADENOCARCINOMA OF LEFT OVARY.     18 - Diagnostic laparoscopy converted to laparotomy, peritoneal biopsies, aerobic and anaerobic cultures of peritoneal fluid and peritoneal lavage. 6/15/18 - Wound exploration. Exploratory laparotomy. Lysis of adhesions. Ileostomy and wound VAC placement. Allergies   Allergen Reactions    Hydrocodone Other (comments)     Breaks into cold sweat    Metformin Other (comments)     Breaks out into cold sweat.  Can Take Glucophage brand name med      Past Medical History:   Diagnosis Date    Diabetes ([address]     many years type 2    Hypertension     many years      Past Surgical History:   Procedure Laterality Date    HX OOPHORECTOMY  2018    HX TONSILLECTOMY      as a child       Social History   Substance Use Topics    Smoking status: Never Smoker    Smokeless tobacco: Never Used    Alcohol use No        Current Facility-Administered Medications   Medication Dose Route Frequency    TPN ADULT - CENTRAL   IntraVENous CONTINUOUS    albumin human 25% (BUMINATE) solution 12.5 g  12.5 g IntraVENous Q12H    furosemide (LASIX) injection 20 mg  20 mg IntraVENous BID    insulin regular (NOVOLIN R, HUMULIN R) injection   SubCUTAneous Q6H    levoFLOXacin (LEVAQUIN) 750 mg in D5W IVPB  750 mg IntraVENous Q48H    metroNIDAZOLE (FLAGYL) IVPB premix 500 mg  500 mg IntraVENous Q8H    piperacillin-tazobactam (ZOSYN) 2.25 g in 0.9% sodium chloride (MBP/ADV) 50 mL MBP  2.25 g IntraVENous Q6H    fentaNYL (PF) 900 mcg/30 ml infusion soln  0-50 mcg/hr IntraVENous TITRATE    heparin 25,000 units in D5W 250 ml infusion  18-36 Units/kg/hr IntraVENous TITRATE    pantoprazole (PROTONIX) 40 mg in sodium chloride 0.9% 10 mL injection  40 mg IntraVENous DAILY         Objective:   Vital Signs:    Visit Vitals    /82    Pulse 90    Temp 98.3 °F (36.8 °C)    Resp 18    Ht 5' 1\" (1.549 m)    Wt 101 kg (222 lb 10.6 oz)    SpO2 97%    BMI 42.07 kg/m2       O2 Device: Nasal cannula   O2 Flow Rate (L/min): 5 l/min   Temp (24hrs), Av.2 °F (36.8 °C), Min:97.8 °F (36.6 °C), Max:98.4 °F (36.9 °C)       Intake/Output:   Last shift:       07 - 1900  In: 227.6 [I.V.:227.6]  Out: 190 [Drains:90]    Last 3 shifts: 1901 -  0700  In: 4143.7 [I.V.:3783.7]  Out: 5457 [Urine:5750; Drains:1010]      Intake/Output Summary (Last 24 hours) at 18 1135  Last data filed at 18 0800   Gross per 24 hour   Intake          2848. 85 ml   Output             5045 ml   Net         -2196.15 ml       ABG:  Post-self extubation  Lab Results   Component Value Date/Time    PHI 7.511 (H) 06/20/2018 07:09 AM    PCO2I 25.1 (L) 06/20/2018 07:09 AM    PO2I 65 (L) 06/20/2018 07:09 AM    HCO3I 20.1 (L) 06/20/2018 07:09 AM    FIO2I 28 06/20/2018 07:09 AM       Physical Exam:     General/Neurology: weak, awake, conor alert, weakly moving extremities    Head:   Normocephalic, without obvious abnormality, atraumatic. Eye:   no scleral icterus, no pallor, no cyanosis. Pupils not dilated. Neck:   Symmetric. No lymphadenopathy. Trachea midline  Lung: Moderate air entry bilateral equal. Decreased breath sounds bases. Not in distress. No rhonchi. No wheezing. Heart:   Regular rate & rhythm. S1 S2 present. No murmur. No JVD. Abdomen:  Soft. Obese. Sluggish BS. Midline lower abd surgical site - open wound with wound vac. BRANDON drain, RT abd wall Ileostomy. No abd distension or guarding. Extremities:  No cyanosis. No clubbing. Bilateral UE and LE edema. Anasarca. Pulses: Palpable radials and DPs bilateral.   Lymphatic:  No cervical or supraclav lymphadenopathy.    Skin:   No rash      Data:       Recent Results (from the past 12 hour(s))   GLUCOSE, POC    Collection Time: 06/20/18 12:19 AM   Result Value Ref Range    Glucose (POC) 183 (H) 70 - 110 mg/dL   MAGNESIUM    Collection Time: 06/20/18  5:20 AM   Result Value Ref Range    Magnesium 1.9 1.6 - 2.6 mg/dL   PHOSPHORUS    Collection Time: 06/20/18  5:20 AM   Result Value Ref Range    Phosphorus 3.5 2.5 - 4.9 MG/DL   METABOLIC PANEL, BASIC    Collection Time: 06/20/18  5:20 AM   Result Value Ref Range    Sodium 152 (H) 136 - 145 mmol/L    Potassium 2.7 (LL) 3.5 - 5.5 mmol/L    Chloride 114 (H) 100 - 108 mmol/L    CO2 21 21 - 32 mmol/L    Anion gap 17 3.0 - 18 mmol/L    Glucose 188 (H) 74 - 99 mg/dL    BUN 46 (H) 7.0 - 18 MG/DL    Creatinine 1.62 (H) 0.6 - 1.3 MG/DL    BUN/Creatinine ratio 28 (H) 12 - 20      GFR est AA 38 (L) >60 ml/min/1.73m2    GFR est non-AA 32 (L) >60 ml/min/1.73m2 Calcium 7.6 (L) 8.5 - 10.1 MG/DL   CBC WITH AUTOMATED DIFF    Collection Time: 06/20/18  5:20 AM   Result Value Ref Range    WBC 18.2 (H) 4.6 - 13.2 K/uL    RBC 3.17 (L) 4.20 - 5.30 M/uL    HGB 7.8 (L) 12.0 - 16.0 g/dL    HCT 23.9 (L) 35.0 - 45.0 %    MCV 75.4 74.0 - 97.0 FL    MCH 24.6 24.0 - 34.0 PG    MCHC 32.6 31.0 - 37.0 g/dL    RDW 23.2 (H) 11.6 - 14.5 %    PLATELET 535 521 - 304 K/uL    NEUTROPHILS 82 (H) 40 - 73 %    LYMPHOCYTES 10 (L) 21 - 52 %    MONOCYTES 7 3 - 10 %    EOSINOPHILS 1 0 - 5 %    BASOPHILS 0 0 - 2 %    ABS. NEUTROPHILS 15.1 (H) 1.8 - 8.0 K/UL    ABS. LYMPHOCYTES 1.7 0.9 - 3.6 K/UL    ABS. MONOCYTES 1.2 0.05 - 1.2 K/UL    ABS. EOSINOPHILS 0.2 0.0 - 0.4 K/UL    ABS. BASOPHILS 0.0 0.0 - 0.06 K/UL    DF AUTOMATED     CALCIUM, IONIZED    Collection Time: 06/20/18  5:20 AM   Result Value Ref Range    Ionized Calcium 1.07 (L) 1.12 - 1.32 MMOL/L   PTT    Collection Time: 06/20/18  5:20 AM   Result Value Ref Range    aPTT 130.3 (H) 23.0 - 36.4 SEC   GLUCOSE, POC    Collection Time: 06/20/18  6:39 AM   Result Value Ref Range    Glucose (POC) 206 (H) 70 - 110 mg/dL   POC G3    Collection Time: 06/20/18  7:09 AM   Result Value Ref Range    Device: NASAL CANNULA      Flow rate (POC) 2.0 L/M    FIO2 (POC) 28 %    pH (POC) 7.511 (H) 7.35 - 7.45      pCO2 (POC) 25.1 (L) 35.0 - 45.0 MMHG    pO2 (POC) 65 (L) 80 - 100 MMHG    HCO3 (POC) 20.1 (L) 22 - 26 MMOL/L    sO2 (POC) 95 92 - 97 %    Base deficit (POC) 3 mmol/L    Allens test (POC) YES      Total resp. rate 28      Site LEFT RADIAL      Patient temp.  98.6      Specimen type (POC) ARTERIAL      Performed by Mercedes Shore            Chemistry Recent Labs      06/20/18   0520  06/19/18   0448  06/18/18   1600  06/18/18   0516   GLU  188*  152*   --   151*   NA  152*  149*   --   146*   K  2.7*  3.9  3.1*  3.3*   CL  114*  113*   --   111*   CO2  21  21   --   22   BUN  46*  47*   --   50*   CREA  1.62*  1.74*   --   2.04*   CA  7.6*  7.5*   --   7.2*   MG 1.9  1.9   --   1.9   PHOS  3.5  3.7  4.4  4.5   AGAP  17  15   --   13   BUCR  28*  27*   --   25*   ALB   --   2.0*   --    --         Lactic Acid Lactic acid   Date Value Ref Range Status   06/17/2018 2.3 (HH) 0.4 - 2.0 MMOL/L Final     Comment:     CALLED TO AND CORRECTLY REPEATED BY:  Tenzin Smith RN ICU ON 6/17/2018 4985 TO 9621       No results for input(s): LAC in the last 72 hours. Liver Enzymes Protein, total   Date Value Ref Range Status   06/17/2018 4.6 (L) 6.4 - 8.2 g/dL Final     Albumin   Date Value Ref Range Status   06/19/2018 2.0 (L) 3.4 - 5.0 g/dL Final     Globulin   Date Value Ref Range Status   06/17/2018 3.1 2.0 - 4.0 g/dL Final     A-G Ratio   Date Value Ref Range Status   06/17/2018 0.5 (L) 0.8 - 1.7   Final     AST (SGOT)   Date Value Ref Range Status   06/17/2018 15 15 - 37 U/L Final     Alk. phosphatase   Date Value Ref Range Status   06/17/2018 51 45 - 117 U/L Final     Recent Labs      06/19/18   0448   ALB  2.0*        CBC w/Diff Recent Labs      06/20/18   0520  06/19/18   1800  06/19/18   1310  06/19/18   0448   06/18/18   0516   WBC  18.2*   --    --   18.0*   --   17.1*   RBC  3.17*   --    --   3.39*   --   3.49*   HGB  7.8*  8.1*  6.6*  8.4*   < >  8.3*   HCT  23.9*  24.5*  19.7*  25.6*   < >  25.5*   PLT  136   --    --   157   --   136   GRANS  82*   --    --   85*   --    --    LYMPH  10*   --    --   9*   --    --    EOS  1   --    --   1   --    --     < > = values in this interval not displayed.         Cardiac Enzymes No results found for: CPK, CK, CKMMB, CKMB, RCK3, CKMBT, CKNDX, CKND1, FABIAN, TROPT, TROIQ, KAYLAN, TROPT, TNIPOC, BNP, BNPP     BNP No results found for: BNP, BNPP, XBNPT     Coagulation Recent Labs      06/20/18   0520  06/19/18   2110  06/19/18   0530   APTT  130.3*  55.3*  112.9*         Thyroid  No results found for: T4, T3U, TSH, TSHEXT, TSHEXT    No results found for: T4       ABG Recent Labs      06/20/18   0709  06/18/18   1127  06/17/18   1218   PHI 7.511*  7.500*  7.501*   PCO2I  25.1*  24.9*  26.0*   PO2I  65*  80  61*   HCO3I  20.1*  19.4*  20.4*   FIO2I  28  45  35        All Micro Results     Procedure Component Value Units Date/Time    CULTURE, FUNGUS [[id number]] Collected:  06/09/18 1024    Order Status:  Completed Specimen:  Bronchial lavage Updated:  06/18/18 1329     Special Requests: NO SPECIAL REQUESTS        FUNGUS SMEAR NO FUNGAL ELEMENTS SEEN        Culture result: NO FUNGUS ISOLATED 9 DAYS       CULTURE, BLOOD [[id number]] Collected:  06/09/18 1423    Order Status:  Completed Specimen:  Whole Blood from Blood Updated:  06/15/18 0230     Special Requests: NO SPECIAL REQUESTS        Culture result: NO GROWTH 6 DAYS       CULTURE, BLOOD [[id number]] Collected:  06/09/18 1223    Order Status:  Completed Specimen:  Whole Blood from Blood Updated:  06/15/18 0230     Special Requests: left hand     Culture result: NO GROWTH 6 DAYS       AFB CULTURE + SMEAR W/RFLX PCR AND ID [[id number]] Collected:  06/09/18 1024    Order Status:  Completed Specimen:  Respiratory sample Updated:  06/12/18 2106     Source BRONCHIAL LAVAGE        AFB Specimen processing Concentration     Acid Fast Smear NEGATIVE          (NOTE)  Performed At: [address] [id number]  Ashley Galvez MD XO:[id number]          Acid Fast Culture PENDING    CULTURE, SPUTUM/BRONCH/OTH [[id number]] Collected:  06/09/18 1024    Order Status:  Completed Specimen:  Sputum from Bronchial lavage Updated:  06/11/18 1058     Special Requests: NO SPECIAL REQUESTS        GRAM STAIN FEW WBC'S         FEW GRAM NEGATIVE RODS        Culture result:         RARE NORMAL RESPIRATORY JOSETTE    CULTURE, ANAEROBIC [[id number]] Collected:  06/05/18 0220    Order Status:  Completed Specimen:  Peritoneal Fluid Updated:  06/11/18 0734     Special Requests: NO SPECIAL REQUESTS        Culture result:         NO ANAEROBES ISOLATED 5 DAYS    CULTURE, BLOOD [[id number]] Collected: 06/04/18 0740    Order Status:  Completed Specimen:  Blood from Blood Updated:  06/10/18 0655     Special Requests: NO SPECIAL REQUESTS        Culture result: NO GROWTH 6 DAYS       CULTURE, BLOOD [[id number]] Collected:  06/04/18 0740    Order Status:  Completed Specimen:  Blood from Blood Updated:  06/10/18 0655     Special Requests: NO SPECIAL REQUESTS        Culture result: NO GROWTH 6 DAYS       CULTURE, BODY FLUID Sherrill Rude STAIN [[id number]]  (Abnormal)  (Susceptibility) Collected:  06/05/18 0220    Order Status:  Completed Specimen:  Peritoneal Fluid Updated:  06/08/18 0936     Special Requests: NO SPECIAL REQUESTS        GRAM STAIN MANY WBC'S         NO ORGANISMS SEEN        Culture result:         RARE KLEBSIELLA OXYTOCA (A)            CALLED TO AND CORRECTLY REPEATED BY:  ANGEL LUIS DE LOS SANTOS RN ICU ON 6/7/2018 1032 TO 5087      CULTURE, URINE [[id number]] Collected:  06/05/18 1245    Order Status:  Completed Specimen:  Urine from Fry Specimen Updated:  06/07/18 1103     Special Requests: NO SPECIAL REQUESTS        Culture result: NO GROWTH 2 DAYS       C. DIFFICILE/EPI PCR [[id number]] Collected:  06/05/18 0930    Order Status:  Canceled Specimen:  Stool         Echo 6/9/18:  Left ventricle: Systolic function was normal. Ejection fraction was estimated   in the range of 65 % to 70 %. No obvious  wall motion abnormalities identified in the views obtained. Wall thickness   was mildly increased. Doppler parameters  were consistent with abnormal left ventricular relaxation (grade 1 diastolic   dysfunction). Right ventricle: The size was normal. Systolic function was normal.  Mitral valve: There was mild annular calcification. Tricuspid valve: Pulmonary artery systolic pressure was mildly increased. Pulmonary artery systolic pressure: 34 mmHg. PVL LE 6/6/18: acute b/l peroneal DVT. CT abd pelvis 6/4/18:  1.  Postsurgical hysterectomy, bilateral oophorectomy, pelvic lymphadenectomy and  a mastectomy surgical changes. Small volume of ascites in the abdomen and  pelvis, with a few tiny locules of gas in the right hemipelvis would be in  keeping with recent postsurgical etiology. 2. Abnormal edematous thick-walled small bowel loops in the left abdomen and  pelvis, with TRAM lamellar edematous configuration, induration. No findings of  pneumatosis. The overall appearance is nonspecific. Diagnostic considerations  include infectious etiology, nonspecific edema which may be unresolved  postsurgical etiology. Ischemia is also included in the differential diagnostic  consideration, however, there are no findings of pneumatosis, portal venous gas. 3. Stool in the right colon extending to the hepatic flexure with surrounding  gas, foci of pneumatosis could be obscured. No associated bowel wall thickening. 4. Satisfactory position of nasogastric tube. 5. Hepatomegaly, steatosis with 2 rounded low-density lesions, potentially  cysts. 6. Bibasilar atelectasis. CXR reviewed by me:  CXR 6/20/18: IMPRESSION:   1. Interval insertion of right arm PICC, in satisfactory position. 2. Improving chest showing decreasing pulmonary edema and decreasing right-sided  pleural effusion       IMPRESSION:   · Severe sepsis due to Klebsiella Oxytoca peritonitis. · Klebsiella oxytoca peritonitis. S/p laparotomy and peritoneal lavage: Klebsiella positive. Ruled out ischemic bowel in laparotomy. 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 · S/p bronchoscopy 6/9/18: no aspiration noted. Cx NGTD. · VDRF due to sepsis/PE/?ARDS, reintubated on 6/8/18  · S/p Shock likely due to sepsis and obstructive due to presumed PE. Shock resolved. Off vasopressors. · Acute b/l peroneal DVT  · Presumed PE with severe hypoxia and high A-a gradient, elevated RVSP 34 mm Hg. · Anemia, no active external bleeding. · Thrombocytopenia, improving   · JESS, improving  · Hypernatremia.    · Metabolic ad lactic acidosis, resolved. · Elevated LFT, due to shock liver, improving  · Foreign body on peritoneal biopsy, per path report. · Anasarca due to fluid resuscitation, JESS, hypoalbuminemia and sequale of sepsis. · Code status: DNR. · Very poor overall prognosis. RECOMMENDATIONS:   Respiratory:  Resp seem stable; weaning fio2-down to 3 lits nc o2; continue diuretic therapy  Presumed PEs; continue anticoagulation - watch for bleeding and anemia; follow protocol for adjustments and labs. Keep SPO2 >=92%. HOB 30 degree elevation all the time. Aggressive pulmonary toileting. Aspiration precautions. CVS: Echo ok with mildly elevated RVSP but normal RV systolic function. ID:    Bronch cx normal robin. Peritoneal cx Klebsiella Oxytoca resistant to ampicillin. Doubt pneumonia. Leucocytosis-stable; Afebrile. Ab -  Zosyn, levaquin, flagyl. Defer follow up Abd imaging to Surgery team.   Hematology/Oncology: Monitor Hb and platelets while on heparin; check H/H q12; transfuse to keep Hb>7 gm/dl; on heparin drip for DVT and suspected PEs. Renal: Nephrologist on case; Creatinine improving. Overall fluid positive by 20 kg since admission by weight; lasix bid with albumin - diuresing well with stable renal fn. Adjust electrolytes in TPN. GI/: TPN   Endocrine: Maintain blood glucose 140-180. Humalog sc. Neurology: wean fentanyl drip - balance post-op pains with excessive sedation; d/w RN - planned to decrease fentanyl drip further  Pain/Sedation: fentanyl drip - management per Dr Prudence Anguiano: local surgical site care. Electrolytes: Replace electrolytes per ICU electrolyte replacement protocol. Nutrition: TPN started 6/18  Prophylaxis: DVT and GI Prophylaxis (heparin /protonix)  Restraints: none  Lines/Tubes:   ETT: 6/8/18-came out 6/17/18  Central line: right subclavian 6/4/18 (site examined, no erythema, induration, discharge or sign of infection.  Picc line 6/19 - hold heparin drip and dc central line today-d/w RN. Central line bundle followed). Fry: 6/4/18 (Medically necessary for strict input/output monitoring in critically ill patient, will remove it when not needed. Fry bundle followed). Will defer respective systems problem management to primary and other respective consultant and follow patient in ICU with primary and other medical team.  Further recommendations will be based on the patient's response to recommended treatment and results of the investigation ordered. Quality Care: PPI, DVT prophylaxis, HOB elevated, Infection control all reviewed and addressed. Code status - Updating patient Brother Ja Valentine; Full Code status based on his decision, with his 3 brother; patient has no children    PICC Line/Fry cath medically necessary  PT and OT on case    Care of plan d/w RN, RT    High complexity decision making was performed during the evaluation of this patient at high risk for decompensation with multiple organ involvement. Total critical care time spent rendering care exclusive of procedures: 34 minutes.          Faustino Marsh MD  6/20/2018

## 2018-06-20 NOTE — PROGRESS NOTES
Admit date: 6/4/2018        ASSESSMENT/PLAN  Dana pride 75 y. 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 Will need adjuvant chemotherapy to complete primary therapy. GI - Peritonitis secondary to diverticulitis with perforation diverted with distal ileostomy. Post operative ileus resolved. TPN. Barium swallow ordered for am  FEN - electrolyte replacement protocol ordered. TPN  ID - sepsis secondary to peritonitis. On Flagyl, Zosyn and Levaquin. Drains with serous drainage. WBC remains elevated. Patient remains afebrile. Renal - JESS, resolving  Heme - Acute blood loss anemia, lolis-operative loss exacerbated by heparin gtt. Tranfusion pRBCs today. DVT/PE: on Heparin gtt  CV - stable, off pressors  Pulm - Respiratory failure, resolved. O2 NC and ABG stable. Psych - improved. Restraints no longer required. Disp - condition improving, continue ICU care  Code Status - Full Code      SUBJECTIVE  No complaints. Denies pain, denies nausea.       OBJECTIVE  Physical Exam:  Patient Vitals for the past 24 hrs:   BP Temp Pulse Resp SpO2 Weight   06/20/18 1600 161/77 98.6 °F (37 °C) 93 20 100 % 100.8 kg (222 lb 3.6 oz)   06/20/18 1500 147/79 - 88 24 100 % -   06/20/18 1400 143/78 - 91 25 100 % -   06/20/18 1300 142/74 - 91 22 100 % -   06/20/18 1200 133/86 - 84 22 - -   06/20/18 1100 143/71 98.3 °F (36.8 °C) 87 21 100 % -   06/20/18 1000 159/82 - 90 18 - -   06/20/18 0900 142/83 - 89 26 97 % -   06/20/18 0844 - 98.4 °F (36.9 °C) - - - -   06/20/18 0800 151/83 - 89 26 - -   06/20/18 0724 - - - - 95 % -   06/20/18 0700 (!) 140/94 - 90 28 - -   06/20/18 0600 - - (!) 108 30 - -   06/20/18 0500 159/90 - 96 26 98 % -   06/20/18 0400 162/85 - 91 15 97 % -   06/20/18 0300 153/81 - 97 27 97 % -   06/20/18 0200 155/89 - 97 21 97 % -   06/20/18 0100 144/81 - (!) 101 27 98 % -   06/20/18 0000 148/83 - (!) 102 26 99 % -   06/19/18 2345 - 97.8 °F (36.6 °C) - - - -   06/19/18 2300 147/81 - 99 24 98 % -   06/19/18 2200 (!) 130/92 - 98 19 98 % -   06/19/18 2100 144/85 - (!) 101 26 98 % -   06/19/18 2020 (!) 161/98 - (!) 103 - - -   06/19/18 2000 (!) 161/98 - (!) 102 29 97 % -         Intake/Output Summary (Last 24 hours) at 06/20/18 1922  Last data filed at 06/20/18 1808   Gross per 24 hour   Intake          2550.35 ml   Output             5910 ml   Net         -3359.65 ml      General - alert and oriented  HEENT - nc in place  Abdomen - appropriately tender, nondistended, hypo bowel sounds. Drain output serosanguinous, more sanguinous today. Good ostomy output  Incision - wound edges are without erythema or necrosis. Wound vac in place. Due to change on Friday. Vagina - blood on pad no obvious bleeding. Extremities - 2+ edema      Labs  CBC  Recent Labs      06/20/18   1700  06/20/18   0520  06/19/18   1800   06/19/18   0448   06/18/18   0516   WBC   --   18.2*   --    --   18.0*   --   17.1*   HCT  22.8*  23.9*  24.5*   < >  25.6*   < >  25.5*   MCV   --   75.4   --    --   75.5   --   73.1*   MONOS   --   7   --    --   5   --    --    EOS   --   1   --    --   1   --    --    BASOS   --   0   --    --   0   --    --     < > = values in this interval not displayed.         CMP  Recent Labs      06/20/18   0520  06/19/18   0448  06/18/18   0516   NA  152*  149*  146*   CO2  21  21  22   BUN  46*  47*  50*        Lab Results   Component Value Date/Time    Glucose 188 (H) 06/20/2018 05:20 AM    Glucose (POC) 197 (H) 06/20/2018 05:43 PM    Glucose,  (H) 06/15/2018 05:14 PM          Current Hospital Medications    Current Facility-Administered Medications:     TPN ADULT - CENTRAL, , IntraVENous, CONTINUOUS, Maribel Morataya MD, Last Rate: 60 mL/hr at 06/20/18 1724    potassium chloride 20 mEq in 100 ml IVPB, 20 mEq, IntraVENous, Q2H, Maribel Morataya MD    albumin human 25% (BUMINATE) solution 12.5 g, 12.5 g, IntraVENous, Q12H, Lyndon Tavares MD, 12.5 g at 06/20/18 0825    0.9% sodium chloride infusion 250 mL, 250 mL, IntraVENous, PRN, Antonio Clifford MD    insulin regular (Chepe Jaspreet R, HUMULIN R) injection, , SubCUTAneous, Q6H, Lyndon Tavares MD, Stopped at 06/20/18 1800    levoFLOXacin (LEVAQUIN) 750 mg in D5W IVPB, 750 mg, IntraVENous, Q48H, Antonio Clifford MD, Last Rate: 100 mL/hr at 06/18/18 2044, 750 mg at 06/18/18 2044    metroNIDAZOLE (FLAGYL) IVPB premix 500 mg, 500 mg, IntraVENous, Q8H, Tran Marie MD, Last Rate: 100 mL/hr at 06/20/18 1728, 500 mg at 06/20/18 1728    ipratropium (ATROVENT) 0.02 % nebulizer solution 0.5 mg, 0.5 mg, Nebulization, Q4H PRN, Tran Marie MD    piperacillin-tazobactam (ZOSYN) 2.25 g in 0.9% sodium chloride (MBP/ADV) 50 mL MBP, 2.25 g, IntraVENous, Q6H, Pietro Forman MD, Last Rate: 100 mL/hr at 06/20/18 1729, 2.25 g at 06/20/18 1729    fentaNYL (PF) 900 mcg/30 ml infusion soln, 0-50 mcg/hr, IntraVENous, TITRATE, Lyndon Tavares MD, Last Rate: 1 mL/hr at 06/20/18 1351, 30 mcg/hr at 06/20/18 1351    naloxone (NARCAN) injection 0.4 mg, 0.4 mg, IntraVENous, EVERY 2 MINUTES AS NEEDED, Lyndon Tavares MD    heparin 25,000 units in D5W 250 ml infusion, 18-36 Units/kg/hr, IntraVENous, TITRATE, Christen Odell MD, Last Rate: 12.5 mL/hr at 06/20/18 1723, 14 Units/kg/hr at 06/20/18 1723    ondansetron (ZOFRAN) injection 4 mg, 4 mg, IntraVENous, Q6H PRN, Arnulfo Coronel MD, 4 mg at 06/08/18 2152    oxymetazoline (AFRIN) 0.05 % nasal spray 2 Spray, 2 Spray, Both Nostrils, BID PRN, Lino Burns MD    alum-mag [facility]) oral suspension 30 mL, 30 mL, Oral, Q4H PRN, Christen Odell MD, 30 mL at 06/07/18 2225    fentaNYL citrate (PF) injection 25 mcg, 25 mcg, IntraVENous, Q3H PRN, Christen Odell MD, 25 mcg at 06/16/18 1036    fluticasone (FLONASE) 50 mcg/actuation nasal spray 2 Spray, 2 Spray, Both Nostrils, DAILY PRN, Juan Forbes MD    ELECTROLYTE REPLACEMENT PROTOCOL-POTASSIUM Renal Dosing, 1 Each, Other, PRN, Christen Sousa MD    ELECTROLYTE REPLACEMENT PROTOCOL-MAGNESIUM, 1 Each, Other, PRN, Christen Sousa MD    ELECTROLYTE REPLACEMENT PROTOCOL-PHOSPHORUS Renal Dosing, 1 Each, Other, PRN, Christen Sousa MD    ELECTROLYTE REPLACEMENT PROTOCOL-CALCIUM, 1 Each, Other, PRN, Christen Sousa MD    sodium chloride (NS) flush 5-10 mL, 5-10 mL, IntraVENous, PRN, Mariela Barbour MD    glucose chewable tablet 16 g, 4 Tab, Oral, PRN, Christen Sousa MD    glucagon (GLUCAGEN) injection 1 mg, 1 mg, IntraMUSCular, PRN, Christen Sousa MD    dextrose (D50W) injection syrg 12.5-25 g, 25-50 mL, IntraVENous, PRN, Christen Sousa MD    pantoprazole (PROTONIX) 40 mg in sodium chloride 0.9% 10 mL injection, 40 mg, IntraVENous, DAILY, Kristen Mejia MD, 40 mg at 06/20/18 Vikas Norris MD

## 2018-06-20 NOTE — PROGRESS NOTES
Nephrology Progress note    Subjective:     Liza Burris is a 75 y.o. female with PMH DM, HTN, clear cell ovarian ca s/p debulking who presented with abdominal pain and possible sepsis/ischemic colitis. Pt underwent laparotomy/peritoneal lavage/bx, noted to have decreased UOP and increasing Cr to 2.1 today from baseline 0.6. CT neg for mass or hydro. Pt given lasix yesterday, still with high O2 requirements on vent. Unable to provide further history. On 6/8 Pt decompensated, ? Aspiration,  back on vent was hypoxic despite  Fi02 of 100%, , Hypotensive on IV pressors, Acidotic and on HC03 drip, Urine output decreased markedly over the weekend. Underwent Bronchoscopy 6/9. Pt requires less FiO2 now. UOP picking up. Wound has been draining yellow-brown fluid  - S/P Exploratory laparotomy, Lysis of adhesions, Ileostomy and wound VAC placement on 6/16.   - Pt self-extubated a    Pt lethargic, but awake. Cr better, responding well to low dose diuretics, SNa up     Admit Date: 6/4/2018  Allergy:  Allergies   Allergen Reactions    Hydrocodone Other (comments)     Breaks into cold sweat    Metformin Other (comments)     Breaks out into cold sweat. Can Take Glucophage brand name med        Objective:     Visit Vitals    /74    Pulse 91    Temp 98.3 °F (36.8 °C)    Resp 22    Ht 5' 1\" (1.549 m)    Wt 101 kg (222 lb 10.6 oz)    SpO2 100%    BMI 42.07 kg/m2         Intake/Output Summary (Last 24 hours) at 06/20/18 1415  Last data filed at 06/20/18 1252   Gross per 24 hour   Intake          3324.87 ml   Output             5200 ml   Net         -1875.13 ml       Physical Exam:     General: Off vent, on NC O2   HENT: Atraumatic and normocephalic.    Eyes: Sclera anicteric   Neck: No JVD or mass   Cardiovascular: Normal S1 S2   Pulmonary/Chest Wall: Decreased breath sounds bilaterally   Abdominal: Soft, obese, NABS   Musculoskeletal: ++ edema LEs   Neurological: Awake alert       Data Review:      Lab Results Component Value Date/Time    WBC 18.2 (H) 06/20/2018 05:20 AM    RBC 3.17 (L) 06/20/2018 05:20 AM    HCT 23.9 (L) 06/20/2018 05:20 AM    MCV 75.4 06/20/2018 05:20 AM    MCH 24.6 06/20/2018 05:20 AM    MCHC 32.6 06/20/2018 05:20 AM    RDW 23.2 (H) 06/20/2018 05:20 AM      Lab Results   Component Value Date    IRON 8 (L) 06/07/2018   No components found for: FERRITIN  No components found for: PTHINT  Urinalysis  No results found for: UGLU         Impression:     -JESS- likely ATN,  S Cr improving, 1.7 today with good diuresis  -Septic Shock/hypotension, BPs normalized  -Resp Failure,  Was re-intubated 6/9,  Self extubated   -Severe Metabolic acidosis, improving.   -Ovarian ca s/p debulking then laparoscopy with lavage  -KLEBSIELLA OXYTOCA sepsis (Peritoneal fluid)  -DM  -h.o. HTN  -Anemia  -Elevated liver enzymes  -S/P Exploratory laparotomy, Lysis of adhesions, Ileostomy and wound VAC placement on 6/16  -Leukocytosis  -Hypernatremia, worse  -Hypokalemia,         Plan:     Hold Lasix for now due to worsening hypernatremia   If possible free water po  Replace electrolytes per protocol   Monitor I/O and chemistry, H&H   Antibiotics.     No indication for HD, JESS improving  Vanc dosing per pharmacist  Will follow closely        MD Denver Song  [phone]

## 2018-06-20 NOTE — PROGRESS NOTES
Bedside and Verbal shift change report received from Luna Francois RN (offgoing nurse). Report included the following information SBAR, Kardex, Intake/Output, MAR and Recent Results. 2015 Shift assessment completed, see EMR    2345 Reassessment completed, see EMR    The documentation for this period 4902-2696 is being entered following the guidelines as defined in the [facility] downtime policy by Trung Hutton RN.    2954 Reassessment completed, see EMR    0740 Critical potassium level of 2.7 was called in by lab. Will order 40 mEq potassium chloride per protocol.     0700 Lab was called concerning PTT that was drawn at 0520.  noted that it will be processed at this time. 0723 PTT was 130.3 heparin drip was decreased to 14 unit/kg/hr. Bedside and Verbal shift change report given to ILANA Vega RN (oncoming nurse) Report included the following information SBAR, Kardex, Intake/Output, MAR and Recent Results.

## 2018-06-20 NOTE — PROGRESS NOTES
Hospitalist Progress Note    Patient: Mirella Newman MRN: [id number]  CSN: [id number]    YOB: 1950  Age: 75 y.o. Sex: female    DOA: 6/4/2018 LOS:  LOS: 16 days                Assessment/Plan     Patient Active Problem List   Diagnosis Code    Ovarian ca ([facility] C56.9    Severe obesity (BMI 35.0-39.9) ([facility]) E66.01    Abdominal pain R10.9    Sepsis ([facility] A41.9    Oliguria R34    Acute respiratory failure ([facility] J96.00    JESS (acute kidney injury) ([facility] M50.1    Metabolic acidosis P85.2    Acute deep vein thrombosis (DVT) of lower extremity ([facility]) I82.409    S/P ileostomy ([facility] Z93.2    Hypoalbuminemia E88.09    Peritonitis ([facility] K65.9            76 yo female with history of clear cell ovarian ca, s/p HEATH, BSO debulking surgery. Admitted for abdominal pain. S/p laparoscopy and peritoneal lavage 6/5/2018. Patient is awake alert, answering to questions. Resp -   Acute resp failure with hypoxia - self extubated 06/17/2018. On oxygen by NC  S/p bronchoscopy 06/09/2018, no aspiration, resp cultures no growth to date. Suspected PE    ID -   Severe Sepsis - secondary to peritonitis. Peritoneal fluid growing klebsiella oxytoca. ANTIBIOTICS zosyn, levaquin, flagyl. CVS - Monitor HD. Shock - sepsis vs secondary to suspected PE. Now off pressors. Anasarca - on lasix and albumin    Heme/onc - Follow H&H, plts. .  Acute bilateral peroneal DVT - on heparin drip  Anemia -       Renal - Trend BUN, Cr, follow I/O, romero in place. Check and replace Mg, K, phos. JESS - nephrology following, creatine improving. Metabolic acidosis - resolved  Hypernatremia - pharmacy to correct with TPN    Endocrine -  Follow FSG    Neuro/ Pain/ Sedation - Fentanyl gtt, wean off drip. Patient more alert after decreasing fentanyl gtt    GI - onTPN   Shock liver resolved      Prophylaxis - DVT: heparin drip, GI: protonix    Long discussion with brother at bedside.                Physical Exam:  General: As above    HEENT: NC, Atraumatic. PERRLA, anicteric sclerae. Lungs: CTA Bilaterally. No Wheezing/Rhonchi/Rales. Heart:  Regular  rhythm,  No murmur, No Rubs, No Gallops  Abdomen: Soft, Non distended, Non tender. decreased Bowel sounds, ileostomy. Extremities: Edema bilaterally upper and lower ext. Vital signs/Intake and Output:  Visit Vitals    /77 (BP 1 Location: Left arm, BP Patient Position: At rest)    Pulse 93    Temp 98.3 °F (36.8 °C)    Resp 20    Ht 5' 1\" (1.549 m)    Wt 100.8 kg (222 lb 3.6 oz)    SpO2 100%    BMI 41.99 kg/m2     Current Shift:  06/20 0701 - 06/20 1900  In: 1398.1 [P.O.:450; I.V.:948.1]  Out: 1905 [Urine:1350; Drains:230]  Last three shifts:  06/18 1901 - 06/20 0700  In: 4143.7 [I.V.:3783.7]  Out: 0765 [Urine:5750; Drains:1010]            Labs: Results:       Chemistry Recent Labs      06/20/18   0520  06/19/18   0448  06/18/18   1600  06/18/18   0516   GLU  188*  152*   --   151*   NA  152*  149*   --   146*   K  2.7*  3.9  3.1*  3.3*   CL  114*  113*   --   111*   CO2  21  21   --   22   BUN  46*  47*   --   50*   CREA  1.62*  1.74*   --   2.04*   CA  7.6*  7.5*   --   7.2*   AGAP  17  15   --   13   BUCR  28*  27*   --   25*   ALB   --   2.0*   --    --       CBC w/Diff Recent Labs      06/20/18   0520  06/19/18   1800  06/19/18   1310  06/19/18   0448   06/18/18   0516   WBC  18.2*   --    --   18.0*   --   17.1*   RBC  3.17*   --    --   3.39*   --   3.49*   HGB  7.8*  8.1*  6.6*  8.4*   < >  8.3*   HCT  23.9*  24.5*  19.7*  25.6*   < >  25.5*   PLT  136   --    --   157   --   136   GRANS  82*   --    --   85*   --    --    LYMPH  10*   --    --   9*   --    --    EOS  1   --    --   1   --    --     < > = values in this interval not displayed. Cardiac Enzymes No results for input(s): CPK, CKND1, FABIAN in the last 72 hours.     No lab exists for component: CKRMB, TROIP   Coagulation Recent Labs      06/20/18   0520  06/19/18   2110   APTT  130.3*  55.3* Lipid Panel Lab Results   Component Value Date/Time    Triglyceride 64 06/19/2018 04:48 AM      BNP No results for input(s): BNPP in the last 72 hours.    Liver Enzymes Recent Labs      06/19/18   0448   ALB  2.0*      Thyroid Studies No results found for: T4, T3U, TSH, TSHEXT, TSHEXT     Procedures/imaging: see electronic medical records for all procedures/Xrays and details which were not copied into this note but were reviewed prior to creation of Plan

## 2018-06-20 NOTE — PROGRESS NOTES
8078  Report received from Cecilia Tejeda RN, offgoing nurse at bedside using [employer]. All questions answered and clinicals reviewed. Pt is alert, oriented and answering questions appropriately taking ice chips. Pt states no pain at present. Fentanyl PCA continuous, heparin drip and alarms verified by both RNs.    1000  Dr. Emiliana Forte in to see pt at bedside and update patient and her brother, Jamie Bundy. Orders received. Pt using IS for 350 ml and miguel angel well.    1500 Dr. Kassy Dunbar in to see patient. Sodium 151. Pt taking ice chips po. No nausea. [id number]  Dr. Tucker Clark in to see patient. PRBCs x 1 unit ordered. Pt to have fiberoptic swallowing eval tomorrow. Pt denies pain on Fentanyl at 30 mcg/hr. Heparin at 16u/kg/hr. 1930  Report given to Cecilia Tejeda RN at bedside using SBAR format.

## 2018-06-20 NOTE — PROGRESS NOTES
Problem: Mobility Impaired (Adult and Pediatric)  Goal: *Acute Goals and Plan of Care (Insert Text)  Physical Therapy Goals  Updated 6/20/2018 and to be accomplished within 7 day(s)  1. Patient will move from supine to sit and sit to supine in bed with maximal assistance. 2.  Patient will transfer from bed to chair and chair to bed with maximal assistance using the least restrictive device. 3.  Patient will perform sit to stand with maximal assistance. 4.  Patient will ambulate with maximal assistance for 5 feet with the least restrictive device. Outcome: Progressing Towards Goal  physical Therapy RE-EVALUATION and TREATMENT    Patient: Paco Murrell (67 y.o. female)  Date: 6/20/2018  Diagnosis: Abdominal pain  Abdominal pain  Abdominal Pain  aspiration  POSTOP WOUND EXPLORATION Sepsis ([address]  Procedure(s) (LRB):  WOUND EXPLORATION, EXPLORATORY LAPAROTOMY, ILLEOSTMOY, LYSES OF ADHESIONS AND WOUND VAC PLACEMENT (N/A) 5 Days Post-Op  Precautions: Fall    ASSESSMENT:  Pt seen in ICU. No longer on vent as self extubated previously. Pt alert, slow following commands, but cooperative after pt brother No Wu spoke with pt regarding PT participation and need for same in order to improve status. Pt with edema throughout extremities, abdominal wound vac in place, and with decreased overall ROM/motor performance and functional mobility. Pt requires total assist for rolling. Pt will most likely require SN[facility] or [facility] for f/u rehab upon discharge, given pt extensive medical decline and limited mobility at this time. Patient's progression toward goals since last assessment: as noted above. Note: extensive time spent speaking with pt brother No Wu regarding pt hospital course and PLOF. PLAN:  Goals have been updated based on progression since last assessment. Patient continues to benefit from skilled intervention to address the above impairments.   Continue to follow the patient 1-2 times per day/4-7 days per week to address goals. Planned Interventions:  [x]     Bed Mobility Training          []     Neuromuscular Re-Education  [x]     Transfer Training                []    Orthotic/Prosthetic Training  [x]     Gait Training                       []     Modalities  [x]     Therapeutic Exercises       []     Edema Management/Control  [x]     Therapeutic Activities         [x]     Patient and Family Training/Education  []     Other (comment):  Discharge Recommendations: Skilled Nursing Facility  Further Equipment Recommendations for Discharge: to be determined     SUBJECTIVE:   Patient stated Maude Brooks.     OBJECTIVE DATA SUMMARY:   Critical Behavior:  Neurologic State: Alert  Orientation Level: Oriented X4  Cognition: Follows commands  Safety/Judgement: Decreased insight into deficits, Decreased awareness of need for safety  Functional Mobility Training:  Bed Mobility:  Rolling: Total assistance  Therapeutic Exercises:   Gentle AAROM x 3-5 rep UE: shd elev., elbow flex/ext, wrist Flex/ext. And LE: Hip abd/add., AP  Pain:  Pain Scale 1: Numeric (0 - 10)  Pain Intensity 1: 0  Activity Tolerance:   Fair   Please refer to the flowsheet for vital signs taken during this treatment.   After treatment:   []  Patient left in no apparent distress sitting up in chair  [x]  Patient left in no apparent distress in bed  []  Call bell left within reach  [x]  Nursing notified-Priscilla  []  Caregiver present  []  Bed alarm activated    Jackelyn Sandhu PT   Time Calculation: 14 mins

## 2018-06-20 NOTE — PROGRESS NOTES
Consult for TPN Dosing per Pharmacy by Dr. Clinton Walker provided for this 75 y.o. female, for indication of Ileus  Day of Therapy:  3  Dosing Weight:  62.4 kg         Labs:  BMP BMP:   Lab Results   Component Value Date/Time     (H) 06/20/2018 05:20 AM    K 2.7 (LL) 06/20/2018 05:20 AM     (H) 06/20/2018 05:20 AM    CO2 21 06/20/2018 05:20 AM    AGAP 17 06/20/2018 05:20 AM     (H) 06/20/2018 05:20 AM    BUN 46 (H) 06/20/2018 05:20 AM    CREA 1.62 (H) 06/20/2018 05:20 AM    GFRAA 38 (L) 06/20/2018 05:20 AM    GFRNA 32 (L) 06/20/2018 05:20 AM          CMP CMP:   Lab Results   Component Value Date/Time     (H) 06/20/2018 05:20 AM    K 2.7 (LL) 06/20/2018 05:20 AM     (H) 06/20/2018 05:20 AM    CO2 21 06/20/2018 05:20 AM    AGAP 17 06/20/2018 05:20 AM     (H) 06/20/2018 05:20 AM    BUN 46 (H) 06/20/2018 05:20 AM    CREA 1.62 (H) 06/20/2018 05:20 AM    GFRAA 38 (L) 06/20/2018 05:20 AM    GFRNA 32 (L) 06/20/2018 05:20 AM    CA 7.6 (L) 06/20/2018 05:20 AM    MG 1.9 06/20/2018 05:20 AM    PHOS 3.5 06/20/2018 05:20 AM         Ca/ Phos Lab Results   Component Value Date/Time    Calcium 7.6 (L) 06/20/2018 05:20 AM    Phosphorus 3.5 06/20/2018 05:20 AM       Mag    Lab Results   Component Value Date/Time    Magnesium 1.9 06/20/2018 05:20 AM      Tg No components found for: TGL   Albumin Lab Results   Component Value Date/Time    Protein, total 4.6 (L) 06/17/2018 08:10 AM    Albumin 2.0 (L) 06/19/2018 04:48 AM            Kcal requirements:  21.5 kcal/kg  ( 1340 kcal/day - Recommendations per Dietary )    Macronutrients ( goal ):  Protein  1.2 g/kg/day             75 g/day = 300 kcal  Lipids    27% of total kcal         40 g/day = 360 kcal  Dextrose remainder of total kcal   200 g/day = 680 kcal      TPN  now at goal macronutrients. Electrolytes to be monitored and adjusted daily. MD to replete electrolytes acutely outside of TPN as needed. TPN to run at 60 ml/hr.   MD to add/adjust maintenance IVF as needed for fluid requirements. Noted Calcium and KCl boluses given. Additional recommendations/Orders:   1. Corrective insulin coverage q6h while on TPN. 2. Change/decrease IVF to while on TPN-MD to further adjust as needed  3. BMP, Mag, Phos ordered daily  4. Triglycerides, Prealbumin, CMP ordered upon initiation and weekly  5. Increased KCl to 30 mEq  6. Increased KAcetate to 40 mEq  7. Increased KPhos to 9 mmoles  8. Added Regular Insulin 10 units to TPN ( discussed in ICU rounds today )    Pharmacy to follow daily and will make changes based on labs/clinical status. Swetha Espinal.  [facility]   [phone]

## 2018-06-21 ENCOUNTER — APPOINTMENT (OUTPATIENT)
Dept: GENERAL RADIOLOGY | Age: 68
DRG: 853 | End: 2018-06-21
Attending: OBSTETRICS & GYNECOLOGY
Payer: MEDICARE

## 2018-06-21 LAB
ALBUMIN SERPL-MCNC: 2 G/DL (ref 3.4–5)
ALBUMIN/GLOB SERPL: 0.7 {RATIO} (ref 0.8–1.7)
ALP SERPL-CCNC: 58 U/L (ref 45–117)
ALT SERPL-CCNC: 24 U/L (ref 13–56)
ANION GAP SERPL CALC-SCNC: 13 MMOL/L (ref 3–18)
ANION GAP SERPL CALC-SCNC: 14 MMOL/L (ref 3–18)
APTT PPP: 115.9 SEC (ref 23–36.4)
APTT PPP: 91.4 SEC (ref 23–36.4)
AST SERPL-CCNC: 17 U/L (ref 15–37)
BASOPHILS # BLD: 0 K/UL (ref 0–0.06)
BASOPHILS # BLD: 0.2 K/UL (ref 0–0.06)
BASOPHILS NFR BLD: 0 % (ref 0–2)
BASOPHILS NFR BLD: 1 % (ref 0–2)
BILIRUB SERPL-MCNC: 0.5 MG/DL (ref 0.2–1)
BUN SERPL-MCNC: 41 MG/DL (ref 7–18)
BUN SERPL-MCNC: 41 MG/DL (ref 7–18)
BUN/CREAT SERPL: 28 (ref 12–20)
BUN/CREAT SERPL: 29 (ref 12–20)
CA-I SERPL-SCNC: 1.12 MMOL/L (ref 1.12–1.32)
CALCIUM SERPL-MCNC: 7.1 MG/DL (ref 8.5–10.1)
CALCIUM SERPL-MCNC: 7.5 MG/DL (ref 8.5–10.1)
CHLORIDE SERPL-SCNC: 115 MMOL/L (ref 100–108)
CHLORIDE SERPL-SCNC: 115 MMOL/L (ref 100–108)
CO2 SERPL-SCNC: 19 MMOL/L (ref 21–32)
CO2 SERPL-SCNC: 22 MMOL/L (ref 21–32)
CREAT SERPL-MCNC: 1.42 MG/DL (ref 0.6–1.3)
CREAT SERPL-MCNC: 1.45 MG/DL (ref 0.6–1.3)
DIFFERENTIAL METHOD BLD: ABNORMAL
DIFFERENTIAL METHOD BLD: ABNORMAL
EOSINOPHIL # BLD: 0 K/UL (ref 0–0.4)
EOSINOPHIL # BLD: 0 K/UL (ref 0–0.4)
EOSINOPHIL NFR BLD: 0 % (ref 0–5)
EOSINOPHIL NFR BLD: 0 % (ref 0–5)
ERYTHROCYTE [DISTWIDTH] IN BLOOD BY AUTOMATED COUNT: 23.2 % (ref 11.6–14.5)
ERYTHROCYTE [DISTWIDTH] IN BLOOD BY AUTOMATED COUNT: 24 % (ref 11.6–14.5)
GLOBULIN SER CALC-MCNC: 2.8 G/DL (ref 2–4)
GLUCOSE BLD STRIP.AUTO-MCNC: 180 MG/DL (ref 70–110)
GLUCOSE BLD STRIP.AUTO-MCNC: 188 MG/DL (ref 70–110)
GLUCOSE BLD STRIP.AUTO-MCNC: 229 MG/DL (ref 70–110)
GLUCOSE BLD STRIP.AUTO-MCNC: 249 MG/DL (ref 70–110)
GLUCOSE SERPL-MCNC: 180 MG/DL (ref 74–99)
GLUCOSE SERPL-MCNC: 283 MG/DL (ref 74–99)
HCT VFR BLD AUTO: 26 % (ref 35–45)
HCT VFR BLD AUTO: 31.7 % (ref 35–45)
HGB BLD-MCNC: 10.4 G/DL (ref 12–16)
HGB BLD-MCNC: 8.6 G/DL (ref 12–16)
INR PPP: 1.4 (ref 0.8–1.2)
LYMPHOCYTES # BLD: 1.6 K/UL (ref 0.9–3.6)
LYMPHOCYTES # BLD: 1.7 K/UL (ref 0.9–3.6)
LYMPHOCYTES NFR BLD: 5 % (ref 21–52)
LYMPHOCYTES NFR BLD: 8 % (ref 21–52)
MAGNESIUM SERPL-MCNC: 1.9 MG/DL (ref 1.6–2.6)
MCH RBC QN AUTO: 26 PG (ref 24–34)
MCH RBC QN AUTO: 26.2 PG (ref 24–34)
MCHC RBC AUTO-ENTMCNC: 32.8 G/DL (ref 31–37)
MCHC RBC AUTO-ENTMCNC: 33.1 G/DL (ref 31–37)
MCV RBC AUTO: 78.5 FL (ref 74–97)
MCV RBC AUTO: 79.8 FL (ref 74–97)
MONOCYTES # BLD: 1 K/UL (ref 0.05–1.2)
MONOCYTES # BLD: 1.4 K/UL (ref 0.05–1.2)
MONOCYTES NFR BLD: 4 % (ref 3–10)
MONOCYTES NFR BLD: 5 % (ref 3–10)
MYELOCYTES NFR BLD MANUAL: 2 %
NEUTS SEG # BLD: 16.5 K/UL (ref 1.8–8)
NEUTS SEG # BLD: 30.7 K/UL (ref 1.8–8)
NEUTS SEG NFR BLD: 84 % (ref 40–73)
NEUTS SEG NFR BLD: 91 % (ref 40–73)
PHOSPHATE SERPL-MCNC: 2.7 MG/DL (ref 2.5–4.9)
PLATELET # BLD AUTO: 118 K/UL (ref 135–420)
PLATELET # BLD AUTO: 197 K/UL (ref 135–420)
POTASSIUM SERPL-SCNC: 3.4 MMOL/L (ref 3.5–5.5)
POTASSIUM SERPL-SCNC: 3.9 MMOL/L (ref 3.5–5.5)
POTASSIUM SERPL-SCNC: 3.9 MMOL/L (ref 3.5–5.5)
PROT SERPL-MCNC: 4.8 G/DL (ref 6.4–8.2)
PROTHROMBIN TIME: 16.3 SEC (ref 11.5–15.2)
RBC # BLD AUTO: 3.31 M/UL (ref 4.2–5.3)
RBC # BLD AUTO: 3.97 M/UL (ref 4.2–5.3)
RBC MORPH BLD: ABNORMAL
SODIUM SERPL-SCNC: 148 MMOL/L (ref 136–145)
SODIUM SERPL-SCNC: 150 MMOL/L (ref 136–145)
WBC # BLD AUTO: 19.6 K/UL (ref 4.6–13.2)
WBC # BLD AUTO: 33.8 K/UL (ref 4.6–13.2)

## 2018-06-21 PROCEDURE — 77010033678 HC OXYGEN DAILY

## 2018-06-21 PROCEDURE — 74011000250 HC RX REV CODE- 250: Performed by: INTERNAL MEDICINE

## 2018-06-21 PROCEDURE — 84132 ASSAY OF SERUM POTASSIUM: CPT | Performed by: INTERNAL MEDICINE

## 2018-06-21 PROCEDURE — 97116 GAIT TRAINING THERAPY: CPT

## 2018-06-21 PROCEDURE — 85025 COMPLETE CBC W/AUTO DIFF WBC: CPT | Performed by: INTERNAL MEDICINE

## 2018-06-21 PROCEDURE — 93005 ELECTROCARDIOGRAM TRACING: CPT

## 2018-06-21 PROCEDURE — 65270000029 HC RM PRIVATE

## 2018-06-21 PROCEDURE — 74011250636 HC RX REV CODE- 250/636: Performed by: INTERNAL MEDICINE

## 2018-06-21 PROCEDURE — 74011250636 HC RX REV CODE- 250/636: Performed by: OBSTETRICS & GYNECOLOGY

## 2018-06-21 PROCEDURE — 80053 COMPREHEN METABOLIC PANEL: CPT | Performed by: OBSTETRICS & GYNECOLOGY

## 2018-06-21 PROCEDURE — 74011636637 HC RX REV CODE- 636/637: Performed by: INTERNAL MEDICINE

## 2018-06-21 PROCEDURE — 82330 ASSAY OF CALCIUM: CPT | Performed by: INTERNAL MEDICINE

## 2018-06-21 PROCEDURE — 74011250636 HC RX REV CODE- 250/636

## 2018-06-21 PROCEDURE — 74011000250 HC RX REV CODE- 250

## 2018-06-21 PROCEDURE — 82962 GLUCOSE BLOOD TEST: CPT

## 2018-06-21 PROCEDURE — 74011000258 HC RX REV CODE- 258: Performed by: OBSTETRICS & GYNECOLOGY

## 2018-06-21 PROCEDURE — C9113 INJ PANTOPRAZOLE SODIUM, VIA: HCPCS | Performed by: INTERNAL MEDICINE

## 2018-06-21 PROCEDURE — 83735 ASSAY OF MAGNESIUM: CPT | Performed by: INTERNAL MEDICINE

## 2018-06-21 PROCEDURE — 85730 THROMBOPLASTIN TIME PARTIAL: CPT | Performed by: OBSTETRICS & GYNECOLOGY

## 2018-06-21 PROCEDURE — 36592 COLLECT BLOOD FROM PICC: CPT

## 2018-06-21 PROCEDURE — P9047 ALBUMIN (HUMAN), 25%, 50ML: HCPCS | Performed by: INTERNAL MEDICINE

## 2018-06-21 PROCEDURE — 97535 SELF CARE MNGMENT TRAINING: CPT

## 2018-06-21 PROCEDURE — 84100 ASSAY OF PHOSPHORUS: CPT | Performed by: INTERNAL MEDICINE

## 2018-06-21 PROCEDURE — 97168 OT RE-EVAL EST PLAN CARE: CPT

## 2018-06-21 PROCEDURE — 77030010547 HC BG URIN W/UMETER -A

## 2018-06-21 PROCEDURE — 85610 PROTHROMBIN TIME: CPT | Performed by: OBSTETRICS & GYNECOLOGY

## 2018-06-21 PROCEDURE — 74011000258 HC RX REV CODE- 258: Performed by: INTERNAL MEDICINE

## 2018-06-21 RX ORDER — FUROSEMIDE 10 MG/ML
40 INJECTION INTRAMUSCULAR; INTRAVENOUS
Status: COMPLETED | OUTPATIENT
Start: 2018-06-21 | End: 2018-06-21

## 2018-06-21 RX ORDER — DILTIAZEM HYDROCHLORIDE 5 MG/ML
5 INJECTION INTRAVENOUS ONCE
Status: COMPLETED | OUTPATIENT
Start: 2018-06-21 | End: 2018-06-21

## 2018-06-21 RX ORDER — DILTIAZEM HYDROCHLORIDE 100 MG/1
INJECTION, POWDER, LYOPHILIZED, FOR SOLUTION INTRAVENOUS
Status: COMPLETED
Start: 2018-06-21 | End: 2018-06-21

## 2018-06-21 RX ORDER — POTASSIUM CHLORIDE 7.45 MG/ML
10 INJECTION INTRAVENOUS
Status: COMPLETED | OUTPATIENT
Start: 2018-06-21 | End: 2018-06-22

## 2018-06-21 RX ORDER — FUROSEMIDE 10 MG/ML
INJECTION INTRAMUSCULAR; INTRAVENOUS
Status: COMPLETED
Start: 2018-06-21 | End: 2018-06-21

## 2018-06-21 RX ADMIN — POTASSIUM CHLORIDE 10 MEQ: 10 INJECTION, SOLUTION INTRAVENOUS at 08:30

## 2018-06-21 RX ADMIN — POTASSIUM CHLORIDE: 2 INJECTION, SOLUTION, CONCENTRATE INTRAVENOUS at 16:04

## 2018-06-21 RX ADMIN — HUMAN INSULIN 4 UNITS: 100 INJECTION, SOLUTION SUBCUTANEOUS at 18:00

## 2018-06-21 RX ADMIN — HEPARIN SODIUM 16 UNITS/KG/HR: 10000 INJECTION, SOLUTION INTRAVENOUS at 07:17

## 2018-06-21 RX ADMIN — METRONIDAZOLE 500 MG: 500 INJECTION, SOLUTION INTRAVENOUS at 02:13

## 2018-06-21 RX ADMIN — ALBUMIN (HUMAN) 12.5 G: 0.25 INJECTION, SOLUTION INTRAVENOUS at 20:34

## 2018-06-21 RX ADMIN — LEVOFLOXACIN 750 MG: 5 INJECTION, SOLUTION INTRAVENOUS at 01:06

## 2018-06-21 RX ADMIN — FENTANYL CITRATE 25 MCG: 50 INJECTION, SOLUTION INTRAMUSCULAR; INTRAVENOUS at 11:10

## 2018-06-21 RX ADMIN — METRONIDAZOLE 500 MG: 500 INJECTION, SOLUTION INTRAVENOUS at 20:34

## 2018-06-21 RX ADMIN — SODIUM CHLORIDE 12.5 MG/HR: 900 INJECTION, SOLUTION INTRAVENOUS at 20:41

## 2018-06-21 RX ADMIN — FENTANYL CITRATE 25 MCG: 50 INJECTION, SOLUTION INTRAMUSCULAR; INTRAVENOUS at 06:38

## 2018-06-21 RX ADMIN — FENTANYL CITRATE 25 MCG: 50 INJECTION, SOLUTION INTRAMUSCULAR; INTRAVENOUS at 14:42

## 2018-06-21 RX ADMIN — HUMAN INSULIN 2 UNITS: 100 INJECTION, SOLUTION SUBCUTANEOUS at 05:39

## 2018-06-21 RX ADMIN — FENTANYL CITRATE 25 MCG: 50 INJECTION, SOLUTION INTRAMUSCULAR; INTRAVENOUS at 20:54

## 2018-06-21 RX ADMIN — SODIUM CHLORIDE 40 MG: 9 INJECTION INTRAMUSCULAR; INTRAVENOUS; SUBCUTANEOUS at 09:03

## 2018-06-21 RX ADMIN — HUMAN INSULIN 2 UNITS: 100 INJECTION, SOLUTION SUBCUTANEOUS at 12:00

## 2018-06-21 RX ADMIN — Medication 30 MCG/HR: at 07:18

## 2018-06-21 RX ADMIN — PIPERACILLIN SODIUM,TAZOBACTAM SODIUM 2.25 G: 2; .25 INJECTION, POWDER, FOR SOLUTION INTRAVENOUS at 12:00

## 2018-06-21 RX ADMIN — Medication 30 MCG/HR: at 16:24

## 2018-06-21 RX ADMIN — PIPERACILLIN SODIUM,TAZOBACTAM SODIUM 2.25 G: 2; .25 INJECTION, POWDER, FOR SOLUTION INTRAVENOUS at 17:42

## 2018-06-21 RX ADMIN — FUROSEMIDE 40 MG: 10 INJECTION INTRAMUSCULAR; INTRAVENOUS at 16:27

## 2018-06-21 RX ADMIN — ALBUMIN (HUMAN) 12.5 G: 0.25 INJECTION, SOLUTION INTRAVENOUS at 09:05

## 2018-06-21 RX ADMIN — FUROSEMIDE 40 MG: 10 INJECTION, SOLUTION INTRAMUSCULAR; INTRAVENOUS at 16:27

## 2018-06-21 RX ADMIN — POTASSIUM CHLORIDE 10 MEQ: 10 INJECTION, SOLUTION INTRAVENOUS at 10:25

## 2018-06-21 RX ADMIN — SODIUM CHLORIDE 5 MG/HR: 900 INJECTION, SOLUTION INTRAVENOUS at 16:51

## 2018-06-21 RX ADMIN — METRONIDAZOLE 500 MG: 500 INJECTION, SOLUTION INTRAVENOUS at 11:15

## 2018-06-21 RX ADMIN — POTASSIUM CHLORIDE 10 MEQ: 10 INJECTION, SOLUTION INTRAVENOUS at 09:38

## 2018-06-21 RX ADMIN — DILTIAZEM HYDROCHLORIDE: 100 INJECTION, POWDER, LYOPHILIZED, FOR SOLUTION INTRAVENOUS at 17:00

## 2018-06-21 RX ADMIN — PIPERACILLIN SODIUM,TAZOBACTAM SODIUM 2.25 G: 2; .25 INJECTION, POWDER, FOR SOLUTION INTRAVENOUS at 05:46

## 2018-06-21 RX ADMIN — DILTIAZEM HYDROCHLORIDE 5 MG: 5 INJECTION INTRAVENOUS at 16:49

## 2018-06-21 NOTE — PROGRESS NOTES
Consult for TPN Dosing per Pharmacy by Dr. Brianda Chilel provided for this 75 y.o. female, for indication of Ileus  Day of Therapy  04    TPN Dosing Wt:  62.4 kg    Labs:  BMP BMP:   Lab Results   Component Value Date/Time     (H) 06/21/2018 05:45 AM    K 3.4 (L) 06/21/2018 05:45 AM     (H) 06/21/2018 05:45 AM    CO2 22 06/21/2018 05:45 AM    AGAP 13 06/21/2018 05:45 AM     (H) 06/21/2018 05:45 AM    BUN 41 (H) 06/21/2018 05:45 AM    CREA 1.42 (H) 06/21/2018 05:45 AM    GFRAA 45 (L) 06/21/2018 05:45 AM    GFRNA 37 (L) 06/21/2018 05:45 AM          CMP CMP:   Lab Results   Component Value Date/Time     (H) 06/21/2018 05:45 AM    K 3.4 (L) 06/21/2018 05:45 AM     (H) 06/21/2018 05:45 AM    CO2 22 06/21/2018 05:45 AM    AGAP 13 06/21/2018 05:45 AM     (H) 06/21/2018 05:45 AM    BUN 41 (H) 06/21/2018 05:45 AM    CREA 1.42 (H) 06/21/2018 05:45 AM    GFRAA 45 (L) 06/21/2018 05:45 AM    GFRNA 37 (L) 06/21/2018 05:45 AM    CA 7.5 (L) 06/21/2018 05:45 AM    MG 1.9 06/21/2018 05:45 AM    PHOS 2.7 06/21/2018 05:45 AM         Ca/ Phos Lab Results   Component Value Date/Time    Calcium 7.5 (L) 06/21/2018 05:45 AM    Phosphorus 2.7 06/21/2018 05:45 AM       Mag    Lab Results   Component Value Date/Time    Magnesium 1.9 06/21/2018 05:45 AM      Tg No components found for: TGL   Albumin Lab Results   Component Value Date/Time    Protein, total 4.6 (L) 06/17/2018 08:10 AM    Albumin 2.0 (L) 06/19/2018 04:48 AM         GOAL:   Kcal requirements:    ~22 kcal/kg   Fluid requirements:    ~30 ml/kg   Macronutrients:   Protein     1.2 g/kg/day 300 kcal   Lipids      44 gm/day 396 kcal   Dextrose remainder of total kcal:  200 gm/day 680 kcal   Total Calories:    1376 kcal     Today's TPN formulation provides (100 % of Goal):   Calories:   1376 kcal   Fluid:    ~23 ml/kg   Protein:   1.2 gm/kg/day   Fat:    44 gm/day   CHO:    200 gm/kg/day   Sodium:   0 mEq   Potassium:   100 mEq   Calcium:   0 gm Magnesium:   ~1.5 gm   Thiamine:   0 mg   OAC:                          45    TPN to be initiated at 100% goal macronutrients and titrated to goal per patient tolerance. Electrolytes to be monitored and adjusted daily. MD to replete electrolytes acutely outside of TPN as needed. Additional recommendations/Orders:    1. Corrective insulin coverage q6h while on TPN. 2. Change/decrease IVF to while on TPN-MD to further adjust as needed  3. BMP, Ca, Mag, Phos ordered daily  4. Triglycerides, Prealbumin, CMP, INR ordered upon initiation and weekly  5. GIR: 2.3 mg/kg/min  6. Electrolyte adjustments for today:                 - KCl decreased from 30 mEq to 25 mEq    - KOAC increased from 40 mEq to 45 mEq    - KPO4 increased from 9 mmol to 20 mmol                    - Mg increased from 1 gm to 1.5 gm    - Reg Insulin added to TPN today: 10 units    Pharmacy to follow daily and will make changes based on labs/clinical status. Marlena Farmer, Pharm. D.   Clinical Pharmacist  [phone]

## 2018-06-21 NOTE — PROGRESS NOTES
Problem: Self Care Deficits Care Plan (Adult)  Goal: *Acute Goals and Plan of Care (Insert Text)  Occupational Therapy Goals  Initiated 6/14/2018 within 7 day(s). 1.  Patient will visually attend to activity x 5 minutes to increase participation in bed level exercise  2. Patient will follow simple 1 step instruction 50% or more of time to increase participation in ADL  3. Patient will transfer supine to sit with max A to prepare for EOB level activity   4. Patient will tolerate sitting on side of bed x 10 minutes with minimal support to increase tolerance for EOB ADL    Outcome: Not Met  Occupational Therapy RE-EVALUATION/discharge    Patient: Dariela Richmond (55 y.o. female)  Date: 6/21/2018  Primary Diagnosis: Abdominal pain  Abdominal pain  Abdominal Pain  aspiration  POSTOP WOUND EXPLORATION  Procedure(s) (LRB):  WOUND EXPLORATION, EXPLORATORY LAPAROTOMY, ILLEOSTMOY, LYSES OF ADHESIONS AND WOUND VAC PLACEMENT (N/A) 6 Days Post-Op   Precautions: Skin  Fall    ASSESSMENT AND RECOMMENDATIONS:  Patient was assessed initially on 06/14/18 with change in status and transferred to ICU intubated and sedated with OT signing off. Pt was again, evaluated on 06/18/18. Pt continues to require total assist for all ADLs and is resistive to movement and active participation. Attempted simple BUE arm raising with attempts at facial or hand hygiene w/ pt resistive. Pt not consistently following commands, flat affect, and poor participation. Attempted bed mob and sitting up w/ pt becoming TACHY up to 150's. Pt not appropriate for skilled OT at this time. Goals not reached. Would recommend Palliative Care for goals of care d/t CA and extensive surgery with prolonged hospital stay. Pt with no further OT/ADL concerns at this time d/t poor progress and poor participation in simple ADLs.     Education: Patient instructed on home safety, body mechanics for optimal respiratory effort, Energy Conservation/Work Simplification Techniques, adaptive strategies and adaptive dressing techniques including clothing modifications with patient unable to  verbalize understanding at this time. Discharge Recommendations: LTC/LTACH vs Hospice pending progress  Further Equipment Recommendations for Discharge: N/A      SUBJECTIVE:   Patient stated [minimal verbalization or nodding in response to OT questions].     OBJECTIVE DATA SUMMARY:     Past Medical History:   Diagnosis Date    Diabetes ([facility] [address]     many years type 2    Hypertension     many years     Past Surgical History:   Procedure Laterality Date    HX OOPHORECTOMY  05/29/2018    HX TONSILLECTOMY      as a child      Barriers to Learning/Limitations: yes;  cognitive and physical  Compensate with: visual, verbal, tactile, kinesthetic cues/model    Cognitive/Behavioral Status:  Neurologic State: Alert;Confused  Orientation Level: Oriented to person (minimal interaction & command following)  Cognition: Impaired decision making; No command following;Decreased attention/concentration  Safety/Judgement: Decreased awareness of environment;Decreased awareness of need for assistance;Decreased awareness of need for safety; Lack of insight into deficits    Skin: high risk; wound VAC, BRANDON drain R lower abdomen  Edema: severe BUE edema and overall edema d/t fluids required to maintain stability    Vision/Perceptual:     unable to fully assess d/t poor participation in assessment process      Coordination:  Coordination: Generally decreased, functional  Fine Motor Skills-Upper: Left Impaired;Right Impaired    Gross Motor Skills-Upper: Left Impaired;Right Impaired    Balance:  Sitting: Impaired    Strength:  Strength: Generally decreased, functional  Tone & Sensation:  Tone: Normal  Sensation: Intact  Range of Motion:  AROM: Generally decreased, functional  PROM: Generally decreased, functional    Functional Mobility and Transfers for ADLs:  Bed Mobility:  Rolling: Total assistance  Scooting:  Total assistance    ADL Assessment:  Feeding: Total assistance (NPO)    Oral Facial Hygiene/Grooming: Total assistance    Bathing: Total assistance    Upper Body Dressing: Total assistance    Lower Body Dressing: Total assistance    Toileting: Total assistance    ADL Intervention:  Grooming  Washing Face: Total assistance (dependent)  Washing Hands: Total assistance (dependent)    Upper Body [address]: Total assistance (dependent)    Cognitive Retraining  Orientation Retraining: Reorienting;Time;Situation;Place;Person  Problem Solving: Identifying the task; Identifying the problem;General alternative solution;Deductive reason  Executive Functions: Managing time;Regulating behavior; Executing cognitive plans  Organizing/Sequencing: Breaking task down;Prioritizing  Attention to Task: Distractibility; Single task  Maintains Attention For (Time): 30 seconds (<)  Following Commands: Other(comment) (no command following)  Safety/Judgement: Decreased awareness of environment;Decreased awareness of need for assistance;Decreased awareness of need for safety; Lack of insight into deficits  Cues: Tactile cues provided;Verbal cues provided;Visual cues provided    Therapeutic Exercise: Attempted BUE P/AAROM w/ pt resistive    Pain:  Pre-treatment: 0/10  Post-treatment: 5/10    Activity Tolerance:   Patient becomes TACHY up to 150's with attempts to move or HOB changes. Please refer to the flowsheet for vital signs taken during this treatment. After treatment:   []  Patient left in no apparent distress sitting up in chair  [x]  Patient left in no apparent distress in bed  [x]  Call bell left within reach  [x]  Nursing notified/Jennifer  []  Caregiver present  []  Bed alarm activated    COMMUNICATION/EDUCATION:   Communication/Collaboration:  [x]      Home safety education was provided and the patient/caregiver indicated understanding.   [x]      Patient/family have participated as able and agree with findings and recommendations. []      Patient is unable to participate in plan of care at this time.     Thank you for this referral.  Kaylynn Murray, OTR/L  Time Calculation: 24 mins

## 2018-06-21 NOTE — PROGRESS NOTES
[facility]                  Pulmonary, Critical Care, and Sleep Medicine     Name: Chantal Cruz MRN: [id number]   : 1950 Hospital: [facility] [place]    Date: 2018        PCCM Note                                              Subjective/History of Present Illness:     Patient is a 75 y.o. 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 S/p exploratory laparotomy 6/15/18 - findings of sigmoid diverticulitis with suspected perforation, underwent ileostomy and wound vac placement. 18:  Patient self extubated  am  Patient has abd pains this am - PCA fentanyl drip 25 mg/hr  On 3 lits nc o2  Tele-sinus tach 110s; BP stable-not on pressors; Afebrile    UOP 3.1 lits yesterday     Heparin drip for DVT and suspected PEs. Surgeries  18 - Laparoscopy converted to laparotomy, total abdominal hysterectomy, bilateral salpingo-oophorectomy, omentectomy, bilateral pelvic and periaortic lymphadenectomy and radical tumor debulking to R0. Path - ADENOCARCINOMA OF LEFT OVARY.     18 - Diagnostic laparoscopy converted to laparotomy, peritoneal biopsies, aerobic and anaerobic cultures of peritoneal fluid and peritoneal lavage. 6/15/18 - Wound exploration. Exploratory laparotomy. Lysis of adhesions. Ileostomy and wound VAC placement. Allergies   Allergen Reactions    Hydrocodone Other (comments)     Breaks into cold sweat    Metformin Other (comments)     Breaks out into cold sweat.  Can Take Glucophage brand name med      Past Medical History:   Diagnosis Date    Diabetes ([address]     many years type 2    Hypertension     many years      Past Surgical History:   Procedure Laterality Date    HX OOPHORECTOMY  2018    HX TONSILLECTOMY      as a child       Social History   Substance Use Topics    Smoking status: Never Smoker    Smokeless tobacco: Never Used    Alcohol use No        Current Facility-Administered Medications   Medication Dose Route Frequency    TPN ADULT - CENTRAL   IntraVENous CONTINUOUS    albumin human 25% (BUMINATE) solution 12.5 g  12.5 g IntraVENous Q12H    insulin regular (NOVOLIN R, HUMULIN R) injection   SubCUTAneous Q6H    levoFLOXacin (LEVAQUIN) 750 mg in D5W IVPB  750 mg IntraVENous Q48H    metroNIDAZOLE (FLAGYL) IVPB premix 500 mg  500 mg IntraVENous Q8H    piperacillin-tazobactam (ZOSYN) 2.25 g in 0.9% sodium chloride (MBP/ADV) 50 mL MBP  2.25 g IntraVENous Q6H    fentaNYL (PF) 900 mcg/30 ml infusion soln  0-50 mcg/hr IntraVENous TITRATE    heparin 25,000 units in D5W 250 ml infusion  18-36 Units/kg/hr IntraVENous TITRATE    pantoprazole (PROTONIX) 40 mg in sodium chloride 0.9% 10 mL injection  40 mg IntraVENous DAILY         Objective:   Vital Signs:    Visit Vitals    BP (!) 137/91    Pulse (!) 117    Temp 99.4 °F (37.4 °C)    Resp 27    Ht 5' 1\" (1.549 m)    Wt 100.8 kg (222 lb 3.6 oz)    SpO2 96%    BMI 41.99 kg/m2       O2 Device: Nasal cannula   O2 Flow Rate (L/min): 3 l/min   Temp (24hrs), Av.2 °F (36.8 °C), Min:97.5 °F (36.4 °C), Max:99.4 °F (37.4 °C)       Intake/Output:   Last shift:       07 -  190  In: 260 [I.V.:200]  Out: 825 [Urine:400; Drains:125]    Last 3 shifts: 1901 -  0700  In: 4348.6 [P.O.:450;  I.V.:3528.6]  Out: 7795 [Urine:4825; Drains:1495]      Intake/Output Summary (Last 24 hours) at 18 1125  Last data filed at 18 0930   Gross per 24 hour   Intake          3282.17 ml   Output             5620 ml   Net         -2337.83 ml       ABG:  Post-self extubation  No results found for: PH, PHI, PCO2, PCO2I, PO2, PO2I, HCO3, HCO3I, FIO2, FIO2I    Physical Exam:     General/Neurology: weak, awake, weakly moving extremities    Head:   Normocephalic, without obvious abnormality, atraumatic. Eye:   no scleral icterus, no pallor, no cyanosis. Pupils not dilated. Neck:   Symmetric. No lymphadenopathy. Trachea midline  Lung: Moderate air entry bilateral equal. Decreased breath sounds bases. Not in distress. No rhonchi. No wheezing. Heart:   Regular rate & rhythm. S1 S2 present. No murmur. No JVD. Abdomen:  Soft. Obese. Sluggish BS. Midline lower abd surgical site - open wound with wound vac. BRANDON drain, RT abd wall Ileostomy. No abd distension or guarding. Extremities:  No cyanosis. No clubbing. Bilateral UE and LE edema. Anasarca. Pulses: Palpable radials and DPs bilateral.   Lymphatic:  No cervical or supraclav lymphadenopathy.    Skin:   No rash      Data:       Recent Results (from the past 12 hour(s))   GLUCOSE, POC    Collection Time: 06/20/18 11:33 PM   Result Value Ref Range    Glucose (POC) 186 (H) 70 - 110 mg/dL   PTT    Collection Time: 06/21/18  3:35 AM   Result Value Ref Range    aPTT 115.9 (H) 23.0 - 36.4 SEC   GLUCOSE, POC    Collection Time: 06/21/18  5:28 AM   Result Value Ref Range    Glucose (POC) 180 (H) 70 - 110 mg/dL   MAGNESIUM    Collection Time: 06/21/18  5:45 AM   Result Value Ref Range    Magnesium 1.9 1.6 - 2.6 mg/dL   PHOSPHORUS    Collection Time: 06/21/18  5:45 AM   Result Value Ref Range    Phosphorus 2.7 2.5 - 4.9 MG/DL   CALCIUM, IONIZED    Collection Time: 06/21/18  5:45 AM   Result Value Ref Range    Ionized Calcium 1.12 1.12 - 1.32 MMOL/L   CBC WITH AUTOMATED DIFF    Collection Time: 06/21/18  5:45 AM   Result Value Ref Range    WBC 19.6 (H) 4.6 - 13.2 K/uL    RBC 3.31 (L) 4.20 - 5.30 M/uL    HGB 8.6 (L) 12.0 - 16.0 g/dL    HCT 26.0 (L) 35.0 - 45.0 %    MCV 78.5 74.0 - 97.0 FL    MCH 26.0 24.0 - 34.0 PG    MCHC 33.1 31.0 - 37.0 g/dL    RDW 23.2 (H) 11.6 - 14.5 % PLATELET 277 (L) 369 - 420 K/uL    NEUTROPHILS 84 (H) 40 - 73 %    LYMPHOCYTES 8 (L) 21 - 52 %    MONOCYTES 5 3 - 10 %    EOSINOPHILS 0 0 - 5 %    BASOPHILS 1 0 - 2 %    MYELOCYTES 2 %    ABS. NEUTROPHILS 16.5 (H) 1.8 - 8.0 K/UL    ABS. LYMPHOCYTES 1.6 0.9 - 3.6 K/UL    ABS. MONOCYTES 1.0 0.05 - 1.2 K/UL    ABS. EOSINOPHILS 0.0 0.0 - 0.4 K/UL    ABS. BASOPHILS 0.2 (H) 0.0 - 0.06 K/UL    RBC COMMENTS TARGET CELLS  1+        RBC COMMENTS HYPOCHROMIA  1+        RBC COMMENTS SCHISTOCYTES  FEW        DF MANUAL     METABOLIC PANEL, BASIC    Collection Time: 06/21/18  5:45 AM   Result Value Ref Range    Sodium 150 (H) 136 - 145 mmol/L    Potassium 3.4 (L) 3.5 - 5.5 mmol/L    Chloride 115 (H) 100 - 108 mmol/L    CO2 22 21 - 32 mmol/L    Anion gap 13 3.0 - 18 mmol/L    Glucose 180 (H) 74 - 99 mg/dL    BUN 41 (H) 7.0 - 18 MG/DL    Creatinine 1.42 (H) 0.6 - 1.3 MG/DL    BUN/Creatinine ratio 29 (H) 12 - 20      GFR est AA 45 (L) >60 ml/min/1.73m2    GFR est non-AA 37 (L) >60 ml/min/1.73m2    Calcium 7.5 (L) 8.5 - 10.1 MG/DL   GLUCOSE, POC    Collection Time: 06/21/18 11:11 AM   Result Value Ref Range    Glucose (POC) 188 (H) 70 - 110 mg/dL           Chemistry Recent Labs      06/21/18   0545  06/20/18   1700  06/20/18   0520  06/19/18   0448   GLU  180*   --   188*  152*   NA  150*   --   152*  149*   K  3.4*  2.9*  2.7*  3.9   CL  115*   --   114*  113*   CO2  22   --   21  21   BUN  41*   --   46*  47*   CREA  1.42*   --   1.62*  1.74*   CA  7.5*   --   7.6*  7.5*   MG  1.9   --   1.9  1.9   PHOS  2.7   --   3.5  3.7   AGAP  13   --   17  15   BUCR  29*   --   28*  27*   ALB   --    --    --   2.0*        Lactic Acid Lactic acid   Date Value Ref Range Status   06/17/2018 2.3 (HH) 0.4 - 2.0 MMOL/L Final     Comment:     CALLED TO AND CORRECTLY REPEATED BY:  Amisha Nino RN ICU ON 6/17/2018 6516 TO 1336       No results for input(s): LAC in the last 72 hours.      Liver Enzymes Protein, total   Date Value Ref Range Status 06/17/2018 4.6 (L) 6.4 - 8.2 g/dL Final     Albumin   Date Value Ref Range Status   06/19/2018 2.0 (L) 3.4 - 5.0 g/dL Final     Globulin   Date Value Ref Range Status   06/17/2018 3.1 2.0 - 4.0 g/dL Final     A-G Ratio   Date Value Ref Range Status   06/17/2018 0.5 (L) 0.8 - 1.7   Final     AST (SGOT)   Date Value Ref Range Status   06/17/2018 15 15 - 37 U/L Final     Alk. phosphatase   Date Value Ref Range Status   06/17/2018 51 45 - 117 U/L Final     Recent Labs      06/19/18   0448   ALB  2.0*        CBC w/Diff Recent Labs      06/21/18   0545  06/20/18   1700  06/20/18   0520   06/19/18   0448   WBC  19.6*   --   18.2*   --   18.0*   RBC  3.31*   --   3.17*   --   3.39*   HGB  8.6*  7.4*  7.8*   < >  8.4*   HCT  26.0*  22.8*  23.9*   < >  25.6*   PLT  118*   --   136   --   157   GRANS  84*   --   82*   --   85*   LYMPH  8*   --   10*   --   9*   EOS  0   --   1   --   1    < > = values in this interval not displayed.         Cardiac Enzymes No results found for: CPK, CK, CKMMB, CKMB, RCK3, CKMBT, CKNDX, CKND1, FABIAN, TROPT, TROIQ, KAYLAN, TROPT, TNIPOC, BNP, BNPP     BNP No results found for: BNP, BNPP, XBNPT     Coagulation Recent Labs      06/21/18   0335  06/20/18 2005 06/20/18   0520   APTT  115.9*  47.3*  130.3*         Thyroid  No results found for: T4, T3U, TSH, TSHEXT, TSHEXT    No results found for: T4       ABG Recent Labs      06/20/18   0709  06/18/18   1127   PHI  7.511*  7.500*   PCO2I  25.1*  24.9*   PO2I  65*  80   HCO3I  20.1*  19.4*   FIO2I  28  45        All Micro Results     Procedure Component Value Units Date/Time    CULTURE, FUNGUS [[id number]] Collected:  06/09/18 1024    Order Status:  Completed Specimen:  Bronchial lavage Updated:  06/18/18 1329     Special Requests: NO SPECIAL REQUESTS        FUNGUS SMEAR NO FUNGAL ELEMENTS SEEN        Culture result: NO FUNGUS ISOLATED 9 DAYS       CULTURE, BLOOD [[id number]] Collected:  06/09/18 1423    Order Status:  Completed Specimen:  Whole Blood from Blood Updated:  06/15/18 0230     Special Requests: NO SPECIAL REQUESTS        Culture result: NO GROWTH 6 DAYS       CULTURE, BLOOD [[id number]] Collected:  06/09/18 1223    Order Status:  Completed Specimen:  Whole Blood from Blood Updated:  06/15/18 0230     Special Requests: left hand     Culture result: NO GROWTH 6 DAYS       AFB CULTURE + SMEAR W/RFLX PCR AND ID [[id number]] Collected:  06/09/18 1024    Order Status:  Completed Specimen:  Respiratory sample Updated:  06/12/18 2106     Source BRONCHIAL LAVAGE        AFB Specimen processing Concentration     Acid Fast Smear NEGATIVE          (NOTE)  Performed At: [address] [id number]  Dangelo Maher MD YB:[id number]          Acid Fast Culture PENDING    CULTURE, SPUTUM/BRONCH/OTH [[id number]] Collected:  06/09/18 1024    Order Status:  Completed Specimen:  Sputum from Bronchial lavage Updated:  06/11/18 1058     Special Requests: NO SPECIAL REQUESTS        GRAM STAIN FEW WBC'S         FEW GRAM NEGATIVE RODS        Culture result:         RARE NORMAL RESPIRATORY JOSETTE    CULTURE, ANAEROBIC [[id number]] Collected:  06/05/18 0220    Order Status:  Completed Specimen:  Peritoneal Fluid Updated:  06/11/18 0734     Special Requests: NO SPECIAL REQUESTS        Culture result:         NO ANAEROBES ISOLATED 5 DAYS    CULTURE, BLOOD [[id number]] Collected:  06/04/18 0740    Order Status:  Completed Specimen:  Blood from Blood Updated:  06/10/18 0655     Special Requests: NO SPECIAL REQUESTS        Culture result: NO GROWTH 6 DAYS       CULTURE, BLOOD [[id number]] Collected:  06/04/18 0740    Order Status:  Completed Specimen:  Blood from Blood Updated:  06/10/18 0655     Special Requests: NO SPECIAL REQUESTS        Culture result: NO GROWTH 6 DAYS       CULTURE, BODY FLUID [facility] STAIN [[id number]]  (Abnormal)  (Susceptibility) Collected:  06/05/18 0220    Order Status:  Completed Specimen:  Peritoneal Fluid Updated:  06/08/18 3262     Special Requests: NO SPECIAL REQUESTS        GRAM STAIN MANY WBC'S         NO ORGANISMS SEEN        Culture result:         RARE KLEBSIELLA OXYTOCA (A)            CALLED TO AND CORRECTLY REPEATED BY:  ANGEL LUIS DE LOS SANTOS RN ICU ON 6/7/2018 1032 TO 5096      CULTURE, URINE [[id number]] Collected:  06/05/18 1245    Order Status:  Completed Specimen:  Urine from Fry Specimen Updated:  06/07/18 1103     Special Requests: NO SPECIAL REQUESTS        Culture result: NO GROWTH 2 DAYS       C. DIFFICILE/EPI PCR [[id number]] Collected:  06/05/18 0930    Order Status:  Canceled Specimen:  Stool         Echo 6/9/18:  Left ventricle: Systolic function was normal. Ejection fraction was estimated   in the range of 65 % to 70 %. No obvious  wall motion abnormalities identified in the views obtained. Wall thickness   was mildly increased. Doppler parameters  were consistent with abnormal left ventricular relaxation (grade 1 diastolic   dysfunction). Right ventricle: The size was normal. Systolic function was normal.  Mitral valve: There was mild annular calcification. Tricuspid valve: Pulmonary artery systolic pressure was mildly increased. Pulmonary artery systolic pressure: 34 mmHg. PVL LE 6/6/18: acute b/l peroneal DVT. CT abd pelvis 6/4/18:  1. Postsurgical hysterectomy, bilateral oophorectomy, pelvic lymphadenectomy and  a mastectomy surgical changes. Small volume of ascites in the abdomen and  pelvis, with a few tiny locules of gas in the right hemipelvis would be in  keeping with recent postsurgical etiology. 2. Abnormal edematous thick-walled small bowel loops in the left abdomen and  pelvis, with TRAM lamellar edematous configuration, induration. No findings of  pneumatosis. The overall appearance is nonspecific. Diagnostic considerations  include infectious etiology, nonspecific edema which may be unresolved  postsurgical etiology.  Ischemia is also included in the differential diagnostic  consideration, however, there are no findings of pneumatosis, portal venous gas. 3. Stool in the right colon extending to the hepatic flexure with surrounding  gas, foci of pneumatosis could be obscured. No associated bowel wall thickening. 4. Satisfactory position of nasogastric tube. 5. Hepatomegaly, steatosis with 2 rounded low-density lesions, potentially  cysts. 6. Bibasilar atelectasis. CXR reviewed by me:  CXR 6/20/18: IMPRESSION:   1. Interval insertion of right arm PICC, in satisfactory position. 2. Improving chest showing decreasing pulmonary edema and decreasing right-sided  pleural effusion       IMPRESSION:   · Severe sepsis due to Klebsiella Oxytoca peritonitis. · Klebsiella oxytoca peritonitis. S/p laparotomy and peritoneal lavage: Klebsiella positive. Ruled out ischemic bowel in laparotomy. 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 · S/p bronchoscopy 6/9/18: no aspiration noted. Cx NGTD. · VDRF due to sepsis/PE/?ARDS, reintubated on 6/8/18  · S/p Shock likely due to sepsis and obstructive due to presumed PE. Shock resolved. Off vasopressors. · Acute b/l peroneal DVT  · Presumed PE with severe hypoxia and high A-a gradient, elevated RVSP 34 mm Hg. · Anemia, no active external bleeding. · Thrombocytopenia, improving   · JESS, improving  · Hypernatremia. · Metabolic ad lactic acidosis, resolved. · Elevated LFT, due to shock liver, improving  · Foreign body on peritoneal biopsy, per path report. · Anasarca due to fluid resuscitation, JESS, hypoalbuminemia and sequale of sepsis. · Code status: DNR. · Very poor overall prognosis. RECOMMENDATIONS:   Respiratory: Resp seem stable; incent spirometry; on NC O2; on heparin drip for DVTs and likely PEs   Keep SPO2 >=92%. HOB 30 degree elevation all the time. Aggressive pulmonary toileting. Aspiration precautions. CVS: Echo ok with mildly elevated RVSP but normal RV systolic function.    ID: Bronch cx normal robin. Peritoneal cx Klebsiella Oxytoca resistant to ampicillin. Leucocytosis-stable; Afebrile. Ab -  Zosyn since 6/13; levaquin since 6/18, flagyl since 6/15 - complete 7 days levaquin; complete 10 days Zosyn or Flagyl - consult with Surgery team before stopping  Defer follow up Abd imaging to Surgery team.   Hematology/Oncology: Monitor Hb and platelets while on heparin; transfuse to keep Hb>7 gm/dl; on heparin drip for DVT and suspected PEs. Renal: Nephrologist on case; Creatinine improving. Overall fluid positive by 20 kg since admission by weight; lasix bid with albumin - diuresing well with stable renal fn. Adjust electrolytes in TPN. GI/: TPN; planned for barium study - patient could not do today due to abd pains  Endocrine: Maintain blood glucose 140-180. Humalog sc. Neurology: wean fentanyl drip - balance post-op pains with excessive sedation; prn iv fentanyl   Pain/Sedation: fentanyl drip - management per Dr Silvia Salinas  Skin/Wound: local surgical site care. Electrolytes: Replace electrolytes per ICU electrolyte replacement protocol. Nutrition: TPN started 6/18  Prophylaxis: DVT and GI Prophylaxis (heparin /protonix)  Restraints: none  Lines/Tubes:   ETT: 6/8/18-came out 6/17/18  Central line: right subclavian 6/4/18 - removed 6/20  Picc line 6/19 - Central line bundle followed  Fry: 6/4/18 (Medically necessary for strict input/output monitoring in critically ill patient, will remove it when not needed. Fry bundle followed). Will defer respective systems problem management to primary and other respective consultant and follow patient in ICU with primary and other medical team.  Further recommendations will be based on the patient's response to recommended treatment and results of the investigation ordered. Quality Care: PPI, DVT prophylaxis, HOB elevated, Infection control all reviewed and addressed. Updating patient Brother Lionel Carey daily;  Full Code status based on his decision, with his 3 brother; patient has no children    PICC Line/Fry cath medically necessary  PT and OT on case    Care of plan d/w RN, RT    High complexity decision making was performed during the evaluation of this patient          Katt Pak MD  6/21/2018

## 2018-06-21 NOTE — PROGRESS NOTES
Problem: Mobility Impaired (Adult and Pediatric)  Goal: *Acute Goals and Plan of Care (Insert Text)  Physical Therapy Goals  Initiated 6/15/2018 and to be accomplished within 3-7 day(s)  1. Patient will move from supine to sit and sit to supine  in bed with maximal assistance. 2.  Patient will transfer from bed to chair and chair to bed with maximal assistance using the least restrictive device. 3.  Patient will perform sit to stand with maximal assistance. 4.  Patient will ambulate with maximal assistance for 5 feet with the least restrictive device. physical Therapy TREATMENT    Patient: Gilberto Dinero (34 y.o. female)  Date: 6/21/2018  Diagnosis: Abdominal pain  Abdominal pain  Abdominal Pain  aspiration  POSTOP WOUND EXPLORATION Sepsis (HCC)  Procedure(s) (LRB):  WOUND EXPLORATION, EXPLORATORY LAPAROTOMY, ILLEOSTMOY, LYSES OF ADHESIONS AND WOUND VAC PLACEMENT (N/A) 6 Days Post-Op  Precautions: Fall   Chart, physical therapy assessment, plan of care and goals were reviewed. ASSESSMENT:  Pt resistive to all movement, no AROM noted, no command following. Pt requires TotalA for rolling. Pt with increased pain with rolling. With even minimal movement pt HR increased into 150's. Will attempt to progress as pt tolerates. Progression toward goals:  []      Improving appropriately and progressing toward goals  [x]      Improving slowly and progressing toward goals  []      Not making progress toward goals and plan of care will be adjusted     PLAN:  Patient continues to benefit from skilled intervention to address the above impairments. Continue treatment per established plan of care.   Discharge Recommendations:  Skilled Nursing Facility/LTC  Further Equipment Recommendations for Discharge:  TBD     SUBJECTIVE:   Patient stated: none    OBJECTIVE DATA SUMMARY:   Critical Behavior:  Neurologic State: Alert  Orientation Level: Oriented to person  Cognition: Follows commands  Safety/Judgement: Decreased insight into deficits, Decreased awareness of need for safety  Functional Mobility Training:  Bed Mobility:  Rolling: Total assistance  Pain:  Pain Scale 1: FLACC  Pain Intensity 1: 0  Pain Location 1: Abdomen  Pain Description 1: Aching  Pain Intervention(s) 1: Medication (see MAR)  Activity Tolerance:   poor  Please refer to the flowsheet for vital signs taken during this treatment.   After treatment:   [] Patient left in no apparent distress sitting up in chair  [x] Patient left in no apparent distress in bed  [x] Call bell left within reach  [x] Nursing notified  [] Caregiver present  [] Bed alarm activated      [employer]kt[employer] Law   Time Calculation: 18 mins

## 2018-06-21 NOTE — PROGRESS NOTES
Speech Therapy Note:    Modified barium swallow study attempted this AM. Unable to complete procedure due to pt's pain when sitting upright. SLP will re-attempt when medically stable with appropriate pain management for completion of study. Recommend patient remain NPO except ice chips and strict aspiration precautions. RN, Jennifer jarrell.       Herminia Frost M.S., CF-SLP  Speech Language Pathologist

## 2018-06-21 NOTE — PROGRESS NOTES
Received bedside report in SBAR format, Patient resting in bed, just recently received PRN Fentanyl, apperars comfortable at this time, Heparin gtt, PCA Fentanyl gtt and tpn verified and correct. Assumed care of patient at this time , K+ replacements in progress per protocol    0800 Assessment completed per flowsheets. Brother Deandre Sahni at bedside, and stated, why don't you cut the tv off if she is tired, and stated \"I don't care if my sister wants tv on or not, she doesn't know any better, If tv is on, cut it off, if she needs rest\". Patient education provided that if patient wants tv on, and request, she is oriented,then her wishes would be honored, he verbalized he is the POA and has the decision making authority not her, education again  provided, that if his sister is oriented, she would still remain decision maker. He states  She is not in the right mind to answer any questions, Again,the Brother Deandre Sahni was educated that patient was oriented and Lucid at the present and requested Animal planet to stay on. [employer] was left on as requested by the patient. 1000-Patient resting in bed, No acute distress. 1025 On way to [facility] test, Patient tearful, stated I just want to eat, discussed Barrium test, speech at bedside. 1110 Back from Test for Barrium swallow, Patient unable to sit up in chair for test, painful, patient states I don't not want this,Patient Alert and oriented x 3, Verbalizes pain 8/10, and states please give me pain medicine, PRN Fentanyl given and fentanyl pca running at 30mcg/hr. Patient Grimacing, Heart rate sinus tachy hr in 140s. 144/111. Dr Asia Bautsita Notified and assessed patient. 1122 Reassessed  After PRN  Fentanyl  , bp 137/91. Patient rate pain at 5/10 at this time. 1200-Reassessed Patient, Patient resting.     PTT therapeutic 91.4, no change, recheck ptt in AM     K, 3.9, on reassessment of levels per protocol,    1645- Patient HR irregular, in AFIb,Patient tachypneic resp in 30s   HR in 160s, IX order of cardizem 5mg now, and start Cardizem gtt, titrate per Dr. Lina Riggins. EKG Also completed    1845- 1800 Flagyl not available from pharmacy, pharmacy notified, Blood draw for CBC,BMP, and PT,INR per DR Forman done at this time as ordered. Pharmacist states medication will be rescheduled for 2000,Uncoming Nurse Tad Robles, Made aware    4675 Bedside shift change report given to Connor Hudson (oncoming nurse) by Kourtney Enriquez RN(offgoing nurse). Report included the following information SBAR, Kardex, Intake/Output, MAR, Accordion, Recent Results, Med Rec Status and Alarm Parameters . Heparin, TPN,PCA gtt, and cardizem Gtt verified

## 2018-06-21 NOTE — INTERDISCIPLINARY ROUNDS
IDR Rounds   Name: Conchita Caldwell MRN: [id number]   : 1950 Hospital: [facility] [place]   Date: 2018  Daron Citizen           Diagnosis:   Lisandro Wilcox ·   Patient Active Problem List ·   Diagnosis · Code ·    · Ovarian ca ([facility] · C56.9 ·    · Severe obesity (BMI 35.0-39.9) (HCC) · E66.01 ·    · Abdominal pain · R10.9 ·    · Sepsis ([address] · A41.9 ·    · Ischemic colitis ([address] · K55.9 ·    · Oliguria · R34 ·    · Acute respiratory failure ([facility] · J96.00 ·    · JESS (acute kidney injury) ([facility] · S50.2 ·    · Metabolic acidosis · N09.0 ·   Severe sepsis with lactic acidosis ? source of sepsis  · ARDS   · Hypotension ? Related to pain meds ? Due to sepsis  · Worsening urine out put -- Possible ARF due to sepsis and hypotension  · Metabolic acidsis with lactate elevation ? Related to sepsis   · Bilateral peroneal DVT <18> Unknown if presented on admission   PLAN:Per DR Chey Naqvi  ---- --theraputic heparin per protocol for dvt  --  -- Laproscopy failed to show ischmic bowel but improvement in lactate and wbc noted today am  ---- Glucose and electrolyte protocol    CVS:Echo reviewed EF 65% RV is ok not dilated still tachycardic ? Due to pain or undergoing sepsis, Dc albutero and start atrovent  RS:ARDs vent protocol, Taper FIO2 as tolerated   -- Aspiration precaution  -- -- HOB elevated  ID:On vanco start date 6/3, Zosyn 6/3,   ENDO:SSI monitor blood sugars  GI:Start feeds On PPI  RENAL: Nephro consult , iv fluids monitor  CNS:Mentation is ok for now on and off drowsy due to sedation  HEMATOLOGY: Monitor hb get PT and INR on heparin drip   MUSCULOSKELETAL:no acute issued  · PAIN AND SEDATION: Fentanyl drip and prn versed if needed   · Skin/Wound: Surgical wound looks clean   · Electrolytes: Replace electrolytes per ICU electrolyte replacement protocol. · IVF: D5 1/2 ns   · Nutrition: Start feeds for now  · Prophylaxis: DVT Prophylaxis with scd,. GI Prophylaxis. · Restraints: none  · PT/OT eval and treat. OOB when appropriate. · Lines/Tubes: Subclavian line 6/4, Fry 6/4, Vent 6/5  ADVANCE DIRECTIVE:Full code  Quality Care: PPI, DVT prophylaxis, HOB elevated, Infection control all reviewed and addressed. Events and notes from last 24 hours reviewed.  Care plan discussed in IDR rounds

## 2018-06-21 NOTE — PROGRESS NOTES
Nephrology Progress note    Subjective:     Lily Pope is a 75 y.o. female with PMH DM, HTN, clear cell ovarian ca s/p debulking who presented with abdominal pain and possible sepsis/ischemic colitis. Pt underwent laparotomy/peritoneal lavage/bx, noted to have decreased UOP and increasing Cr to 2.1 today from baseline 0.6. CT neg for mass or hydro. Pt given lasix yesterday, still with high O2 requirements on vent. Unable to provide further history. On 6/8 Pt decompensated, ? Aspiration,  back on vent was hypoxic despite  Fi02 of 100%, , Hypotensive on IV pressors, Acidotic and on HC03 drip, Urine output decreased markedly over the weekend. Underwent Bronchoscopy 6/9. Pt requires less FiO2 now. UOP picking up. Wound has been draining yellow-brown fluid  - S/P Exploratory laparotomy, Lysis of adhesions, Ileostomy and wound VAC placement on 6/16.   - Pt self-extubated 6/17    Pt lethargic, but awake. Cr better, responding well to low dose diuretics, SNa up     Admit Date: 6/4/2018  Allergy:  Allergies   Allergen Reactions    Hydrocodone Other (comments)     Breaks into cold sweat    Metformin Other (comments)     Breaks out into cold sweat. Can Take Glucophage brand name med        Objective:     Visit Vitals    BP (!) 137/91    Pulse (!) 117    Temp 99.4 °F (37.4 °C)    Resp 27    Ht 5' 1\" (1.549 m)    Wt 100.8 kg (222 lb 3.6 oz)    SpO2 96%    BMI 41.99 kg/m2         Intake/Output Summary (Last 24 hours) at 06/21/18 1202  Last data filed at 06/21/18 1130   Gross per 24 hour   Intake          2906.15 ml   Output             5640 ml   Net         -2733.85 ml       Physical Exam:     General: Off vent, on NC O2   HENT: Atraumatic and normocephalic.    Eyes: Sclera anicteric   Neck: No JVD or mass   Cardiovascular: Normal S1 S2   Pulmonary/Chest Wall: Decreased breath sounds bilaterally   Abdominal: Soft, obese, NABS   Musculoskeletal: ++ edema LEs   Neurological: Awake alert       Data Review:      Lab Results   Component Value Date/Time    WBC 19.6 (H) 06/21/2018 05:45 AM    RBC 3.31 (L) 06/21/2018 05:45 AM    HCT 26.0 (L) 06/21/2018 05:45 AM    MCV 78.5 06/21/2018 05:45 AM    MCH 26.0 06/21/2018 05:45 AM    MCHC 33.1 06/21/2018 05:45 AM    RDW 23.2 (H) 06/21/2018 05:45 AM      Lab Results   Component Value Date    IRON 8 (L) 06/07/2018   No components found for: FERRITIN  No components found for: PTHINT  Urinalysis  No results found for: UGLU         Impression:     -JESS- likely ATN,  S Cr improving, 1.4 today with good diuresis  -Septic Shock/hypotension, BPs normalized  -Resp Failure,  Was re-intubated 6/9,  Self extubated   -Severe Metabolic acidosis, improving.   -Ovarian ca s/p debulking then laparoscopy with lavage  -KLEBSIELLA OXYTOCA sepsis (Peritoneal fluid)  -DM  -h.o. HTN  -Anemia  -Elevated liver enzymes  -S/P Exploratory laparotomy, Lysis of adhesions, Ileostomy and wound VAC placement on 6/16  -Leukocytosis  -Hypernatremia, improving slowly  -Hypokalemia, repleted         Plan:     Hold Lasix for now due to worsening hypernatremia   If possible free water po  Replace electrolytes per protocol   Monitor I/O and chemistry, H&H   Antibiotics.     Vanc dosing per pharmacist  Will follow closely        Ynes Ruiz MD   [facility] [employer]  [phone]

## 2018-06-21 NOTE — PROGRESS NOTES
Admit date: 6/4/2018      ASSESSMENT/PLAN  Naveed pride 75 y. 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 Will need adjuvant chemotherapy to complete primary therapy. GI - Peritonitis secondary to diverticulitis with perforation diverted with distal ileostomy. Post operative ileus resolved. TPN. Increased sanguinous drainage today. FEN - electrolyte replacement protocol ordered. TPN  ID - sepsis secondary to peritonitis. On Flagyl, Zosyn and Levaquin. WBC remains elevated. Patient remains afebrile. Renal - JESS, resolving  Heme - Acute blood loss anemia, lolis-operative loss exacerbated by heparin gtt. Tranfusion pRBCs prn. Drainage from BRANDON is more sanguinous. Will repeat labs now. DVT/PE: on Heparin gtt  CV - Afib today  Pulm - Respiratory failure, resolved. O2 NC and ABG stable. Psych - improved. Restraints no longer required. Disp - condition improving, continue ICU care  Code Status - Full Code      SUBJECTIVE  Sleeping now. Did not tolerate barium swallow. Was very anxious.        OBJECTIVE  Physical Exam:  Patient Vitals for the past 24 hrs:   BP Temp Pulse Resp SpO2 Weight   06/21/18 1739 - - - - - 101.8 kg (224 lb 6.9 oz)   06/21/18 1700 121/78 97.1 °F (36.2 °C) (!) 119 (!) 32 96 % -   06/21/18 1631 - 97.1 °F (36.2 °C) - - - -   06/21/18 1627 (!) 145/96 - (!) 130 - - -   06/21/18 1600 (!) 145/96 97.1 °F (36.2 °C) (!) 133 24 99 % -   06/21/18 1500 125/83 - (!) 126 29 - -   06/21/18 1400 136/82 - (!) 111 24 97 % -   06/21/18 1300 (!) 143/98 - (!) 113 28 (!) 89 % -   06/21/18 1200 130/79 98.5 °F (36.9 °C) (!) 106 28 97 % -   06/21/18 1115 (!) 137/91 - (!) 117 27 96 % -   06/21/18 1108 - 99.4 °F (37.4 °C) - - - -   06/21/18 1100 (!) 144/111 - (!) 149 (!) 40 96 % -   06/21/18 1000 (!) 152/91 - (!) 107 26 100 % -   06/21/18 0900 141/86 - 97 (!) 37 100 % - 06/21/18 0800 149/87 98.3 °F (36.8 °C) 95 29 100 % -   06/21/18 0702 - 98.3 °F (36.8 °C) - - - -   06/21/18 0700 137/77 98.3 °F (36.8 °C) (!) 111 22 97 % -   06/21/18 0600 134/81 - 88 (!) 32 100 % -   06/21/18 0500 150/82 - 87 25 100 % -   06/21/18 0400 133/89 98.1 °F (36.7 °C) 86 28 100 % -   06/21/18 0300 141/85 - 90 19 100 % -   06/21/18 0200 138/77 - 90 29 100 % -   06/21/18 0130 146/81 - 87 29 100 % -   06/21/18 0115 147/84 - 89 18 97 % -   06/21/18 0100 144/83 - 86 29 100 % -   06/21/18 0045 156/83 - 88 27 100 % -   06/21/18 0030 148/85 - 89 27 100 % -   06/21/18 0015 (!) 141/92 - 94 27 100 % -   06/21/18 0000 (!) 139/91 97.8 °F (36.6 °C) 90 30 100 % -   06/20/18 2345 140/85 98.1 °F (36.7 °C) 89 27 100 % -   06/20/18 2330 138/83 - 91 26 100 % -   06/20/18 2315 139/81 97.5 °F (36.4 °C) 92 27 100 % -   06/20/18 2300 146/85 - 86 25 99 % -   06/20/18 2245 149/84 98 °F (36.7 °C) 91 27 99 % -   06/20/18 2230 143/84 97.9 °F (36.6 °C) 86 24 99 % -   06/20/18 2215 144/82 98 °F (36.7 °C) 90 23 100 % -   06/20/18 2200 140/80 97.9 °F (36.6 °C) 92 23 99 % -   06/20/18 2151 141/82 - 99 27 99 % -   06/20/18 2145 - 98 °F (36.7 °C) - - - -   06/20/18 2100 144/85 - 96 26 100 % -   06/20/18 2000 149/82 - 94 28 100 % -   06/20/18 1900 158/82 - 93 29 100 % -     General - sleeping  HEENT - nc in place  Abdomen - appropriately tender, nondistended, hypo bowel sounds. Drain output more sanguinous today. Good ostomy output  Incision - wound edges are without erythema or necrosis. Wound vac in place. Due to change on Friday. Vagina - blood on pad no obvious bleeding.   Extremities - 2+ edema    Intake/Output Summary (Last 24 hours) at 06/21/18 1815  Last data filed at 06/21/18 1700   Gross per 24 hour   Intake          2578.21 ml   Output             4660 ml   Net         -2081.79 ml      Labs  CBC  Recent Labs      06/21/18   0545  06/20/18   1700  06/20/18   0520   06/19/18   0448   WBC  19.6*   --   18.2*   --   18.0*   HCT 26.0*  22.8*  23.9*   < >  25.6*   MCV  78.5   --   75.4   --   75.5   MONOS  5   --   7   --   5   EOS  0   --   1   --   1   BASOS  1   --   0   --   0    < > = values in this interval not displayed.         CMP  Recent Labs      06/21/18   0545  06/20/18   0520  06/19/18   0448   NA  150*  152*  149*   CO2  22 21 21   BUN  41*  46*  47*        Lab Results   Component Value Date/Time    Glucose 180 (H) 06/21/2018 05:45 AM    Glucose (POC) 249 (H) 06/21/2018 06:07 PM    Glucose,  (H) 06/15/2018 05:14 PM          Current Hospital Medications    Current Facility-Administered Medications:     TPN ADULT - CENTRAL, , IntraVENous, CONTINUOUS, Katt Pak MD, Last Rate: 60 mL/hr at 06/21/18 1604    dilTIAZem (CARDIZEM) 100 mg in 0.9% sodium chloride (MBP/ADV) 100 mL infusion, 0-15 mg/hr, IntraVENous, TITRATE, Katt Pak MD, Last Rate: 5 mL/hr at 06/21/18 1651, 5 mg/hr at 06/21/18 1651    dilTIAZem (CARDIZEM) 100 mg, , , ,     0.9% sodium chloride infusion 250 mL, 250 mL, IntraVENous, PRN, Mina Franco MD    albumin human 25% (BUMINATE) solution 12.5 g, 12.5 g, IntraVENous, Q12H, Katt Pak MD, 12.5 g at 06/21/18 0905    0.9% sodium chloride infusion 250 mL, 250 mL, IntraVENous, PRN, Mina Franco MD    insulin regular (NOVOLIN R, HUMULIN R) injection, , SubCUTAneous, Q6H, Katt Pak MD, Stopped at 06/21/18 1800    levoFLOXacin (LEVAQUIN) 750 mg in D5W IVPB, 750 mg, IntraVENous, Q48H, Mina Franco MD, Last Rate: 100 mL/hr at 06/21/18 0106, 750 mg at 06/21/18 0106    metroNIDAZOLE (FLAGYL) IVPB premix 500 mg, 500 mg, IntraVENous, Q8H, Alicia Schmidt MD, Stopped at 06/21/18 1215    ipratropium (ATROVENT) 0.02 % nebulizer solution 0.5 mg, 0.5 mg, Nebulization, Q4H PRN, Alicia Schmidt MD    piperacillin-tazobactam (ZOSYN) 2.25 g in 0.9% sodium chloride (MBP/ADV) 50 mL MBP, 2.25 g, IntraVENous, Q6H, Pietro Forman MD, Last Rate: 100 mL/hr at 06/21/18 1742, 2.25 g at 06/21/18 1742    fentaNYL (PF) 900 mcg/30 ml infusion soln, 0-50 mcg/hr, IntraVENous, TITRATE, Katt Pak MD, Last Rate: 1 mL/hr at 06/21/18 1624, 30 mcg/hr at 06/21/18 1624    naloxone (NARCAN) injection 0.4 mg, 0.4 mg, IntraVENous, EVERY 2 MINUTES AS NEEDED, Katt Pak MD    heparin 25,000 units in D5W 250 ml infusion, 18-36 Units/kg/hr, IntraVENous, TITRATE, Christen Cash MD, Last Rate: 14.3 mL/hr at 06/21/18 0717, 16 Units/kg/hr at 06/21/18 0717    ondansetron (ZOFRAN) injection 4 mg, 4 mg, IntraVENous, Q6H PRN, Kee Sanchez MD, 4 mg at 06/08/18 2152    oxymetazoline (AFRIN) 0.05 % nasal spray 2 Spray, 2 Spray, Both Nostrils, BID PRN, Jaymie Romero MD    alum-mag hydroxide-simeth (MYLANTA) oral suspension 30 mL, 30 mL, Oral, Q4H PRN, Christen Cash MD, 30 mL at 06/07/18 2225    fentaNYL citrate (PF) injection 25 mcg, 25 mcg, IntraVENous, Q3H PRN, Christen Cash MD, 25 mcg at 06/21/18 1442    fluticasone (FLONASE) 50 mcg/actuation nasal spray 2 Spray, 2 Spray, Both Nostrils, DAILY PRN, Mina Franco MD    ELECTROLYTE REPLACEMENT PROTOCOL-POTASSIUM Renal Dosing, 1 Each, Other, PRN, Christen Zendejas MD    ELECTROLYTE REPLACEMENT PROTOCOL-MAGNESIUM, 1 Each, Other, PRN, Christen Zendejas MD    ELECTROLYTE REPLACEMENT PROTOCOL-PHOSPHORUS Renal Dosing, 1 Each, Other, PRN, Christen Zendejas MD    ELECTROLYTE REPLACEMENT PROTOCOL-CALCIUM, 1 Each, Other, PRN, Christen Zendejas MD    sodium chloride (NS) flush 5-10 mL, 5-10 mL, IntraVENous, PRN, Magdalena Diaz MD    glucose chewable tablet 16 g, 4 Tab, Oral, PRN, Christen Zendejas MD    glucagon (GLUCAGEN) injection 1 mg, 1 mg, IntraMUSCular, PRN, Christen Zendejas MD    dextrose (D50W) injection syrg 12.5-25 g, 25-50 mL, IntraVENous, PRN, Christen Zendejas MD    pantoprazole (PROTONIX) 40 mg in sodium chloride 0.9% 10 mL injection, 40 mg, IntraVENous, DAILY, Alden Blandon MD, 40 mg at 06/21/18 2102      Mina Franco MD

## 2018-06-21 NOTE — PROGRESS NOTES
Pt awake, alert, responsive and moves all 4. Continuing to improve. abd - bowel sounds present. Ostomy pink and patent, functioning with gas and outpt  Wound vac in place    [address] [facility] to give gastrografin swallow. If passes, may start feeding per speech therapy and advance to regular diet ad john as tolerated  Ostomy belt to keep in place  Continue wound vac change q3-5 and prn.     Going out of town so deferring continuing surgical care to Dr. Gita Ortiz, DO  6/20/2018

## 2018-06-21 NOTE — PROGRESS NOTES
Bedside and Verbal shift change report received from ILANA Nicole (offgoing nurse). Report included the following information SBAR, Kardex, Intake/Output, MAR and Recent Results. 2030 Shift assessment completed, see EMR. 2005 PTT 47.3 Heparin drip will be increase by 4 units/kg/ hr and an 80 units/kg bolus will be given. 2140 Called patient's brother Murtaza Rodriguez for consent for blood transfusion. Consent for blood transfusion was given by Murtaza Rodriguez over the phone and withness by ED Foley RN.     2150 Blood Transfusion started. 0030 Blood transfusion completed no transfusion reaction noted. 0100 Levaquin started at this time due to limited lines and incompatibility. 0414 .9 heparin drip decreased to 16 unit/kg/hr at this time. 0345 Reassessment completed, see EMR    0615 CHG bath given, vaginal bleeding continues and patient had bowel movement via rectum. Patient grimicing and guarding abdomin. Patient denied being in pain several times before admitting that she was in pain.

## 2018-06-21 NOTE — PROGRESS NOTES
Hospitalist Progress Note    Patient: Wilver Singh MRN: [id number]  [facility]: [id number]    YOB: 1950  Age: 75 y.o. Sex: female    DOA: 6/4/2018 LOS:  LOS: 17 days                Assessment/Plan     Patient Active Problem List   Diagnosis Code    Ovarian ca ([facility] C56.9    Severe obesity (BMI 35.0-39.9) ([facility]) E66.01    Abdominal pain R10.9    Sepsis ([facility] A41.9    Oliguria R34    Acute respiratory failure [facility] J96.00    JESS (acute kidney injury) ([facility] N33.0    Metabolic acidosis N72.0    Acute deep vein thrombosis (DVT) of lower extremity ([facility]) I82.409    S/P ileostomy ([facility] Z93.2    Hypoalbuminemia E88.09    Peritonitis ([facility] K65.9            76 yo female with history of clear cell ovarian ca, s/p HEATH, BSO debulking surgery. Admitted for abdominal pain. S/p laparoscopy and peritoneal lavage 6/5/2018. Patient is awake alert, answering to questions. Resp -   Acute resp failure with hypoxia - self extubated 06/17/2018. On oxygen by NC  S/p bronchoscopy 06/09/2018, no aspiration, resp cultures no growth to date. Suspected PE    ID -   Severe Sepsis - secondary to peritonitis. Peritoneal fluid growing klebsiella oxytoca. ANTIBIOTICS zosyn, levaquin, flagyl. CVS - Monitor HD. Shock - sepsis vs secondary to suspected PE. Now off pressors. Anasarca - likely third spacing on albumin    Heme/onc - Follow H&H, plts. .  Acute bilateral peroneal DVT - on heparin drip  Anemia -       Renal - Trend BUN, Cr, follow I/O, romero in place. Check and replace Mg, K, phos. JESS - nephrology following, creatine improving. Metabolic acidosis - resolved  Hypernatremia - pharmacy to correct with TPN    Endocrine -  Follow FSG    Neuro/ Pain/ Sedation - Fentanyl gtt, wean off drip. Patient more alert after decreasing fentanyl gtt    GI - onTPN   Shock liver resolved  SLP eval      Prophylaxis - DVT: heparin drip, GI: protonix    Long discussion with brother at bedside.                Physical Exam:  General: As above    HEENT: NC, Atraumatic. PERRLA, anicteric sclerae. Lungs: CTA Bilaterally. No Wheezing/Rhonchi/Rales. Heart:  Regular  rhythm,  No murmur, No Rubs, No Gallops  Abdomen: Soft, Non distended, Non tender. decreased Bowel sounds, ileostomy. Extremities: Edema bilaterally upper and lower ext. Vital signs/Intake and Output:  Visit Vitals    /82    Pulse (!) 111    Temp 98.5 °F (36.9 °C)    Resp 24    Ht 5' 1\" (1.549 m)    Wt 100.8 kg (222 lb 3.6 oz)    SpO2 97%    BMI 41.99 kg/m2     Current Shift:  06/21 0701 - 06/21 1900  In: 360 [I.V.:300]  Out: 1400 [Urine:725; Drains:325]  Last three shifts:  06/19 1901 - 06/21 0700  In: 4348.6 [P.O.:450; I.V.:3528.6]  Out: 7795 [Urine:4825; Drains:1495]            Labs: Results:       Chemistry Recent Labs      06/21/18   1315  06/21/18   0545  06/20/18   1700  06/20/18   0520  06/19/18   0448   GLU   --   180*   --   188*  152*   NA   --   150*   --   152*  149*   K  3.9  3.4*  2.9*  2.7*  3.9   CL   --   115*   --   114*  113*   CO2   --   22   --   21 21   BUN   --   41*   --   46*  47*   CREA   --   1.42*   --   1.62*  1.74*   CA   --   7.5*   --   7.6*  7.5*   AGAP   --   13   --   17  15   BUCR   --   29*   --   28*  27*   ALB   --    --    --    --   2.0*      CBC w/Diff Recent Labs      06/21/18   0545  06/20/18   1700  06/20/18   0520   06/19/18   0448   WBC  19.6*   --   18.2*   --   18.0*   RBC  3.31*   --   3.17*   --   3.39*   HGB  8.6*  7.4*  7.8*   < >  8.4*   HCT  26.0*  22.8*  23.9*   < >  25.6*   PLT  118*   --   136   --   157   GRANS  84*   --   82*   --   85*   LYMPH  8*   --   10*   --   9*   EOS  0   --   1   --   1    < > = values in this interval not displayed. Cardiac Enzymes No results for input(s): CPK, CKND1, FABIAN in the last 72 hours.     No lab exists for component: CKRMB, TROIP   Coagulation Recent Labs      06/21/18   1315  06/21/18   0335   APTT  91.4*  115.9*       Lipid Panel Lab Results Component Value Date/Time    Triglyceride 64 06/19/2018 04:48 AM      BNP No results for input(s): BNPP in the last 72 hours.    Liver Enzymes Recent Labs      06/19/18   0448   ALB  2.0*      Thyroid Studies No results found for: T4, T3U, TSH, TSHEXT, TSHEXT     Procedures/imaging: see electronic medical records for all procedures/Xrays and details which were not copied into this note but were reviewed prior to creation of Plan

## 2018-06-22 ENCOUNTER — APPOINTMENT (OUTPATIENT)
Dept: CT IMAGING | Age: 68
DRG: 853 | End: 2018-06-22
Attending: OBSTETRICS & GYNECOLOGY
Payer: MEDICARE

## 2018-06-22 LAB
ABO + RH BLD: NORMAL
ANION GAP SERPL CALC-SCNC: 13 MMOL/L (ref 3–18)
APTT PPP: 33.5 SEC (ref 23–36.4)
APTT PPP: 98.2 SEC (ref 23–36.4)
ARTERIAL PATENCY WRIST A: YES
BASE DEFICIT BLD-SCNC: 6 MMOL/L
BASOPHILS # BLD: 0 K/UL (ref 0–0.1)
BASOPHILS NFR BLD: 0 % (ref 0–3)
BDY SITE: ABNORMAL
BLD PROD TYP BPU: NORMAL
BLOOD GROUP ANTIBODIES SERPL: NORMAL
BPU ID: NORMAL
BUN SERPL-MCNC: 45 MG/DL (ref 7–18)
BUN/CREAT SERPL: 30 (ref 12–20)
CA-I SERPL-SCNC: 1.1 MMOL/L (ref 1.12–1.32)
CALCIUM SERPL-MCNC: 7.3 MG/DL (ref 8.5–10.1)
CALLED TO:,BCALL1: NORMAL
CHLORIDE SERPL-SCNC: 116 MMOL/L (ref 100–108)
CO2 SERPL-SCNC: 19 MMOL/L (ref 21–32)
CREAT SERPL-MCNC: 1.5 MG/DL (ref 0.6–1.3)
CROSSMATCH RESULT,%XM: NORMAL
DIFFERENTIAL METHOD BLD: ABNORMAL
EOSINOPHIL # BLD: 0 K/UL (ref 0–0.4)
EOSINOPHIL NFR BLD: 0 % (ref 0–5)
ERYTHROCYTE [DISTWIDTH] IN BLOOD BY AUTOMATED COUNT: 24.7 % (ref 11.6–14.5)
ERYTHROCYTE [DISTWIDTH] IN BLOOD BY AUTOMATED COUNT: 25.3 % (ref 11.6–14.5)
GAS FLOW.O2 O2 DELIVERY SYS: ABNORMAL L/MIN
GAS FLOW.O2 SETTING OXYMISER: 4 L/M
GLUCOSE BLD STRIP.AUTO-MCNC: 193 MG/DL (ref 70–110)
GLUCOSE BLD STRIP.AUTO-MCNC: 207 MG/DL (ref 70–110)
GLUCOSE BLD STRIP.AUTO-MCNC: 221 MG/DL (ref 70–110)
GLUCOSE BLD STRIP.AUTO-MCNC: 221 MG/DL (ref 70–110)
GLUCOSE SERPL-MCNC: 239 MG/DL (ref 74–99)
HCO3 BLD-SCNC: 17.9 MMOL/L (ref 22–26)
HCT VFR BLD AUTO: 23.3 % (ref 35–45)
HCT VFR BLD AUTO: 27.8 % (ref 35–45)
HGB BLD-MCNC: 7.6 G/DL (ref 12–16)
HGB BLD-MCNC: 9.2 G/DL (ref 12–16)
INR PPP: 1.4 (ref 0.8–1.2)
LYMPHOCYTES # BLD: 2.8 K/UL (ref 0.8–3.5)
LYMPHOCYTES NFR BLD: 9 % (ref 20–51)
MAGNESIUM SERPL-MCNC: 2 MG/DL (ref 1.6–2.6)
MCH RBC QN AUTO: 25.9 PG (ref 24–34)
MCH RBC QN AUTO: 26.1 PG (ref 24–34)
MCHC RBC AUTO-ENTMCNC: 32.6 G/DL (ref 31–37)
MCHC RBC AUTO-ENTMCNC: 33.1 G/DL (ref 31–37)
MCV RBC AUTO: 78.8 FL (ref 74–97)
MCV RBC AUTO: 79.5 FL (ref 74–97)
MONOCYTES # BLD: 1.9 K/UL (ref 0–1)
MONOCYTES NFR BLD: 6 % (ref 2–9)
NEUTS SEG # BLD: 26.9 K/UL (ref 1.8–8)
NEUTS SEG NFR BLD: 85 % (ref 42–75)
O2/TOTAL GAS SETTING VFR VENT: 36 %
PCO2 BLD: 25.3 MMHG (ref 35–45)
PH BLD: 7.46 [PH] (ref 7.35–7.45)
PHOSPHATE SERPL-MCNC: 3.1 MG/DL (ref 2.5–4.9)
PLATELET # BLD AUTO: 158 K/UL (ref 135–420)
PLATELET # BLD AUTO: 181 K/UL (ref 135–420)
PLATELET COMMENTS,PCOM: ABNORMAL
PO2 BLD: 65 MMHG (ref 80–100)
POTASSIUM SERPL-SCNC: 4.1 MMOL/L (ref 3.5–5.5)
PROTHROMBIN TIME: 17 SEC (ref 11.5–15.2)
RBC # BLD AUTO: 2.93 M/UL (ref 4.2–5.3)
RBC # BLD AUTO: 3.53 M/UL (ref 4.2–5.3)
RBC MORPH BLD: ABNORMAL
SAO2 % BLD: 94 % (ref 92–97)
SERVICE CMNT-IMP: ABNORMAL
SODIUM SERPL-SCNC: 148 MMOL/L (ref 136–145)
SPECIMEN EXP DATE BLD: NORMAL
SPECIMEN TYPE: ABNORMAL
STATUS OF UNIT,%ST: NORMAL
TOTAL RESP. RATE, ITRR: 28
UNIT DIVISION, %UDIV: 0
WBC # BLD AUTO: 23.6 K/UL (ref 4.6–13.2)
WBC # BLD AUTO: 31.6 K/UL (ref 4.6–13.2)
WBC MORPH BLD: ABNORMAL

## 2018-06-22 PROCEDURE — 74011000258 HC RX REV CODE- 258: Performed by: OBSTETRICS & GYNECOLOGY

## 2018-06-22 PROCEDURE — 74011636637 HC RX REV CODE- 636/637: Performed by: INTERNAL MEDICINE

## 2018-06-22 PROCEDURE — 74011250636 HC RX REV CODE- 250/636: Performed by: INTERNAL MEDICINE

## 2018-06-22 PROCEDURE — 84100 ASSAY OF PHOSPHORUS: CPT | Performed by: INTERNAL MEDICINE

## 2018-06-22 PROCEDURE — 74011000250 HC RX REV CODE- 250

## 2018-06-22 PROCEDURE — 77010033678 HC OXYGEN DAILY

## 2018-06-22 PROCEDURE — 74011000250 HC RX REV CODE- 250: Performed by: INTERNAL MEDICINE

## 2018-06-22 PROCEDURE — 85025 COMPLETE CBC W/AUTO DIFF WBC: CPT | Performed by: INTERNAL MEDICINE

## 2018-06-22 PROCEDURE — 71275 CT ANGIOGRAPHY CHEST: CPT

## 2018-06-22 PROCEDURE — 85730 THROMBOPLASTIN TIME PARTIAL: CPT | Performed by: INTERNAL MEDICINE

## 2018-06-22 PROCEDURE — 85610 PROTHROMBIN TIME: CPT | Performed by: INTERNAL MEDICINE

## 2018-06-22 PROCEDURE — 82803 BLOOD GASES ANY COMBINATION: CPT

## 2018-06-22 PROCEDURE — 80048 BASIC METABOLIC PNL TOTAL CA: CPT | Performed by: INTERNAL MEDICINE

## 2018-06-22 PROCEDURE — P9059 PLASMA, FRZ BETWEEN 8-24HOUR: HCPCS | Performed by: INTERNAL MEDICINE

## 2018-06-22 PROCEDURE — 74011250636 HC RX REV CODE- 250/636: Performed by: OBSTETRICS & GYNECOLOGY

## 2018-06-22 PROCEDURE — 36600 WITHDRAWAL OF ARTERIAL BLOOD: CPT

## 2018-06-22 PROCEDURE — 74011636320 HC RX REV CODE- 636/320: Performed by: OBSTETRICS & GYNECOLOGY

## 2018-06-22 PROCEDURE — 77030019952 HC CANSTR VAC ASST KCON -B

## 2018-06-22 PROCEDURE — 82962 GLUCOSE BLOOD TEST: CPT

## 2018-06-22 PROCEDURE — 86900 BLOOD TYPING SEROLOGIC ABO: CPT | Performed by: INTERNAL MEDICINE

## 2018-06-22 PROCEDURE — 65270000029 HC RM PRIVATE

## 2018-06-22 PROCEDURE — 86920 COMPATIBILITY TEST SPIN: CPT | Performed by: INTERNAL MEDICINE

## 2018-06-22 PROCEDURE — 74011000258 HC RX REV CODE- 258: Performed by: INTERNAL MEDICINE

## 2018-06-22 PROCEDURE — C9113 INJ PANTOPRAZOLE SODIUM, VIA: HCPCS | Performed by: INTERNAL MEDICINE

## 2018-06-22 PROCEDURE — 77030019934 HC DRSG VAC ASST KCON -B

## 2018-06-22 PROCEDURE — 82330 ASSAY OF CALCIUM: CPT | Performed by: INTERNAL MEDICINE

## 2018-06-22 PROCEDURE — 77030011256 HC DRSG MEPILEX <16IN NO BORD MOLN -A

## 2018-06-22 PROCEDURE — 83735 ASSAY OF MAGNESIUM: CPT | Performed by: INTERNAL MEDICINE

## 2018-06-22 PROCEDURE — P9047 ALBUMIN (HUMAN), 25%, 50ML: HCPCS | Performed by: INTERNAL MEDICINE

## 2018-06-22 PROCEDURE — 36430 TRANSFUSION BLD/BLD COMPNT: CPT

## 2018-06-22 PROCEDURE — 36415 COLL VENOUS BLD VENIPUNCTURE: CPT | Performed by: INTERNAL MEDICINE

## 2018-06-22 PROCEDURE — 85027 COMPLETE CBC AUTOMATED: CPT | Performed by: INTERNAL MEDICINE

## 2018-06-22 PROCEDURE — P9016 RBC LEUKOCYTES REDUCED: HCPCS | Performed by: INTERNAL MEDICINE

## 2018-06-22 RX ORDER — VANCOMYCIN/0.9 % SOD CHLORIDE 1.5G/250ML
1500 PLASTIC BAG, INJECTION (ML) INTRAVENOUS ONCE
Status: COMPLETED | OUTPATIENT
Start: 2018-06-22 | End: 2018-06-22

## 2018-06-22 RX ORDER — DEXTROSE 50 % IN WATER (D50W) INTRAVENOUS SYRINGE
25-50 AS NEEDED
Status: DISCONTINUED | OUTPATIENT
Start: 2018-06-22 | End: 2018-07-11 | Stop reason: HOSPADM

## 2018-06-22 RX ORDER — INSULIN GLARGINE 100 [IU]/ML
10 INJECTION, SOLUTION SUBCUTANEOUS DAILY
Status: DISCONTINUED | OUTPATIENT
Start: 2018-06-22 | End: 2018-06-25

## 2018-06-22 RX ORDER — PROTAMINE SULFATE 10 MG/ML
25 INJECTION, SOLUTION INTRAVENOUS
Status: COMPLETED | OUTPATIENT
Start: 2018-06-22 | End: 2018-06-22

## 2018-06-22 RX ORDER — FUROSEMIDE 10 MG/ML
40 INJECTION INTRAMUSCULAR; INTRAVENOUS AS NEEDED
Status: COMPLETED | OUTPATIENT
Start: 2018-06-22 | End: 2018-06-22

## 2018-06-22 RX ORDER — DEXTROSE MONOHYDRATE AND SODIUM CHLORIDE 5; .45 G/100ML; G/100ML
50 INJECTION, SOLUTION INTRAVENOUS CONTINUOUS
Status: DISCONTINUED | OUTPATIENT
Start: 2018-06-22 | End: 2018-06-22

## 2018-06-22 RX ORDER — SODIUM CHLORIDE 9 MG/ML
250 INJECTION, SOLUTION INTRAVENOUS AS NEEDED
Status: DISCONTINUED | OUTPATIENT
Start: 2018-06-22 | End: 2018-06-22

## 2018-06-22 RX ORDER — PROTAMINE SULFATE 10 MG/ML
12.5 INJECTION, SOLUTION INTRAVENOUS
Status: COMPLETED | OUTPATIENT
Start: 2018-06-22 | End: 2018-06-22

## 2018-06-22 RX ORDER — SODIUM BICARBONATE 1 MEQ/ML
SYRINGE (ML) INTRAVENOUS
Status: COMPLETED
Start: 2018-06-22 | End: 2018-06-22

## 2018-06-22 RX ORDER — INSULIN LISPRO 100 [IU]/ML
INJECTION, SOLUTION INTRAVENOUS; SUBCUTANEOUS EVERY 6 HOURS
Status: DISCONTINUED | OUTPATIENT
Start: 2018-06-22 | End: 2018-07-11 | Stop reason: HOSPADM

## 2018-06-22 RX ORDER — VANCOMYCIN HYDROCHLORIDE
1250 EVERY 24 HOURS
Status: DISCONTINUED | OUTPATIENT
Start: 2018-06-23 | End: 2018-06-25

## 2018-06-22 RX ORDER — SODIUM CHLORIDE 9 MG/ML
250 INJECTION, SOLUTION INTRAVENOUS AS NEEDED
Status: DISCONTINUED | OUTPATIENT
Start: 2018-06-22 | End: 2018-07-01 | Stop reason: SDUPTHER

## 2018-06-22 RX ORDER — SODIUM BICARBONATE 1 MEQ/ML
50 SYRINGE (ML) INTRAVENOUS ONCE
Status: COMPLETED | OUTPATIENT
Start: 2018-06-22 | End: 2018-06-22

## 2018-06-22 RX ADMIN — Medication 50 MCG/HR: at 20:11

## 2018-06-22 RX ADMIN — MICAFUNGIN SODIUM 100 MG: 20 INJECTION, POWDER, LYOPHILIZED, FOR SOLUTION INTRAVENOUS at 13:50

## 2018-06-22 RX ADMIN — METRONIDAZOLE 500 MG: 500 INJECTION, SOLUTION INTRAVENOUS at 20:59

## 2018-06-22 RX ADMIN — SODIUM CHLORIDE 5 MG/HR: 900 INJECTION, SOLUTION INTRAVENOUS at 13:20

## 2018-06-22 RX ADMIN — FENTANYL CITRATE 25 MCG: 50 INJECTION, SOLUTION INTRAMUSCULAR; INTRAVENOUS at 13:15

## 2018-06-22 RX ADMIN — PIPERACILLIN SODIUM,TAZOBACTAM SODIUM 2.25 G: 2; .25 INJECTION, POWDER, FOR SOLUTION INTRAVENOUS at 05:16

## 2018-06-22 RX ADMIN — METRONIDAZOLE 500 MG: 500 INJECTION, SOLUTION INTRAVENOUS at 13:21

## 2018-06-22 RX ADMIN — INSULIN GLARGINE 10 UNITS: 100 INJECTION, SOLUTION SUBCUTANEOUS at 13:09

## 2018-06-22 RX ADMIN — PIPERACILLIN SODIUM,TAZOBACTAM SODIUM 2.25 G: 2; .25 INJECTION, POWDER, FOR SOLUTION INTRAVENOUS at 13:08

## 2018-06-22 RX ADMIN — FENTANYL CITRATE 25 MCG: 50 INJECTION, SOLUTION INTRAMUSCULAR; INTRAVENOUS at 16:50

## 2018-06-22 RX ADMIN — INSULIN LISPRO 2 UNITS: 100 INJECTION, SOLUTION INTRAVENOUS; SUBCUTANEOUS at 23:37

## 2018-06-22 RX ADMIN — VANCOMYCIN HYDROCHLORIDE 1500 MG: 10 INJECTION, POWDER, LYOPHILIZED, FOR SOLUTION INTRAVENOUS at 15:32

## 2018-06-22 RX ADMIN — PROTAMINE SULFATE 13 MG: 10 INJECTION, SOLUTION INTRAVENOUS at 11:51

## 2018-06-22 RX ADMIN — PIPERACILLIN SODIUM,TAZOBACTAM SODIUM 2.25 G: 2; .25 INJECTION, POWDER, FOR SOLUTION INTRAVENOUS at 00:36

## 2018-06-22 RX ADMIN — HUMAN INSULIN 4 UNITS: 100 INJECTION, SOLUTION SUBCUTANEOUS at 05:47

## 2018-06-22 RX ADMIN — METRONIDAZOLE 500 MG: 500 INJECTION, SOLUTION INTRAVENOUS at 05:00

## 2018-06-22 RX ADMIN — FENTANYL CITRATE 25 MCG: 50 INJECTION, SOLUTION INTRAMUSCULAR; INTRAVENOUS at 00:06

## 2018-06-22 RX ADMIN — CALCIUM GLUCONATE 2 G: 94 INJECTION, SOLUTION INTRAVENOUS at 09:13

## 2018-06-22 RX ADMIN — SODIUM BICARBONATE 50 MEQ: 84 INJECTION, SOLUTION INTRAVENOUS at 10:38

## 2018-06-22 RX ADMIN — Medication 50 MEQ: at 10:38

## 2018-06-22 RX ADMIN — INSULIN LISPRO 3 UNITS: 100 INJECTION, SOLUTION INTRAVENOUS; SUBCUTANEOUS at 13:09

## 2018-06-22 RX ADMIN — ALBUMIN (HUMAN) 12.5 G: 0.25 INJECTION, SOLUTION INTRAVENOUS at 21:52

## 2018-06-22 RX ADMIN — PIPERACILLIN SODIUM,TAZOBACTAM SODIUM 2.25 G: 2; .25 INJECTION, POWDER, FOR SOLUTION INTRAVENOUS at 23:34

## 2018-06-22 RX ADMIN — FENTANYL CITRATE 25 MCG: 50 INJECTION, SOLUTION INTRAMUSCULAR; INTRAVENOUS at 09:20

## 2018-06-22 RX ADMIN — PIPERACILLIN SODIUM,TAZOBACTAM SODIUM 2.25 G: 2; .25 INJECTION, POWDER, FOR SOLUTION INTRAVENOUS at 19:23

## 2018-06-22 RX ADMIN — POTASSIUM CHLORIDE: 2 INJECTION, SOLUTION, CONCENTRATE INTRAVENOUS at 17:24

## 2018-06-22 RX ADMIN — IOPAMIDOL 100 ML: 755 INJECTION, SOLUTION INTRAVENOUS at 14:43

## 2018-06-22 RX ADMIN — ALBUMIN (HUMAN) 12.5 G: 0.25 INJECTION, SOLUTION INTRAVENOUS at 09:13

## 2018-06-22 RX ADMIN — FUROSEMIDE 40 MG: 10 INJECTION, SOLUTION INTRAMUSCULAR; INTRAVENOUS at 19:14

## 2018-06-22 RX ADMIN — PROTAMINE SULFATE 25 MG: 10 INJECTION, SOLUTION INTRAVENOUS at 10:20

## 2018-06-22 RX ADMIN — HEPARIN SODIUM 16 UNITS/KG/HR: 10000 INJECTION, SOLUTION INTRAVENOUS at 05:16

## 2018-06-22 RX ADMIN — INSULIN LISPRO 4 UNITS: 100 INJECTION, SOLUTION INTRAVENOUS; SUBCUTANEOUS at 18:39

## 2018-06-22 RX ADMIN — HUMAN INSULIN 4 UNITS: 100 INJECTION, SOLUTION SUBCUTANEOUS at 00:04

## 2018-06-22 RX ADMIN — SODIUM CHLORIDE 40 MG: 9 INJECTION INTRAMUSCULAR; INTRAVENOUS; SUBCUTANEOUS at 09:14

## 2018-06-22 NOTE — PROGRESS NOTES
Called by Dr. Brenda Beal to report patient is bleeding from the left BRANDON drain. We discussed stopping Hep gtt, giving Protamine, checking STAT H/H, and giving transfusion pRBCs. I came to exam patient. She is alert but not oriented. She nodded her head that she recognized me but will not answer verbally. Kole Law, brother is at bedside. VS: AF, P 114, R 28, /80, O2 95% on 2L nc  Pale, clammy  CV: tachy  Lungs: CTA bilateral  Abdomen: Wound with wound vac is stable, ileostomy has out put, abdomen is not distended, NABS, LLQ BRANDON is filled with blood. No bleeding around the drain. Perineum: romero in place. Serousanguinous vaginal drainage present, no clots. Ext: 2+ edema  A/P: Clinical status change with encephalopathy, intraabdominal bleeding, and worsening leukocytosis. Also had Afib yesterday and had very stressful transport for attempted procedure (Barium swallow) yesterday. Not clear what is going on. Already stopped Heparin gtt, giving Protamine, and FFP ordered. Will order STAT CT of the chest,abd,pelvis WITH IV CONTRAST. I discussed the risk of the IV contrast with the brother and he is aware of the risk of contrast induced nephropathy and ARF in setting of already acute kidney damage. I believe the risk vs benefits favor proceeding as we need this information to move forward on management decisions. I have spoken personally with Dr. Sharyle Cal, Radiologist, who will be reading the CT STAT regarding patient's complicated history and what we need to know from the CT. Brother confirmed FULL CODE STATUS. Discussed possibility of needing to intubate patient later if condition declines. I will look at CT as soon as available.   Corina Paredes MD

## 2018-06-22 NOTE — PROGRESS NOTES
Nephrology Progress note    Subjective:     Dusty Dempsey is a 75 y.o. female with PMH DM, HTN, clear cell ovarian ca s/p debulking who presented with abdominal pain and possible sepsis/ischemic colitis. Pt underwent laparotomy/peritoneal lavage/bx, noted to have decreased UOP and increasing Cr to 2.1 today from baseline 0.6. CT neg for mass or hydro. Pt given lasix yesterday, still with high O2 requirements on vent. Unable to provide further history. On 6/8 Pt decompensated, ? Aspiration,  back on vent was hypoxic despite  Fi02 of 100%, , Hypotensive on IV pressors, Acidotic and on HC03 drip, Urine output decreased markedly over the weekend. Underwent Bronchoscopy 6/9. Pt requires less FiO2 now. UOP picking up. Wound has been draining yellow-brown fluid  - S/P Exploratory laparotomy, Lysis of adhesions, Ileostomy and wound VAC placement on 6/16.   - Pt self-extubated 6/17    Pt lethargic, but awake. Cr 1.5 today. Diuretic on hold, SNa down to 148  Bleeding from BRANDON drain reported. Pt has been referred for  STAT CT study  Admit Date: 6/4/2018  Allergy:  Allergies   Allergen Reactions    Hydrocodone Other (comments)     Breaks into cold sweat    Metformin Other (comments)     Breaks out into cold sweat. Can Take Glucophage brand name med        Objective:     Visit Vitals    /80    Pulse (!) 114    Temp 98.3 °F (36.8 °C)    Resp 28    Ht 5' 1\" (1.549 m)    Wt 101.8 kg (224 lb 6.9 oz)    SpO2 95%    BMI 42.41 kg/m2         Intake/Output Summary (Last 24 hours) at 06/22/18 1443  Last data filed at 06/22/18 1435   Gross per 24 hour   Intake          2550.09 ml   Output             2380 ml   Net           170.09 ml       Physical Exam:     General: Off vent, on NC O2   HENT: Atraumatic and normocephalic.    Eyes: Sclera anicteric   Neck: No JVD or mass   Cardiovascular: Normal S1 S2   Pulmonary/Chest Wall: Decreased breath sounds bilaterally   Abdominal: Soft, obese, NABS   Musculoskeletal: ++ edema LEs Neurological: Awake alert       Data Review:      Lab Results   Component Value Date/Time    WBC 23.6 (H) 06/22/2018 12:25 PM    RBC 2.93 (L) 06/22/2018 12:25 PM    HCT 23.3 (L) 06/22/2018 12:25 PM    MCV 79.5 06/22/2018 12:25 PM    MCH 25.9 06/22/2018 12:25 PM    MCHC 32.6 06/22/2018 12:25 PM    RDW 25.3 (H) 06/22/2018 12:25 PM      Lab Results   Component Value Date    IRON 8 (L) 06/07/2018   No components found for: FERRITIN  No components found for: PTHINT  Urinalysis  No results found for: UGLU         Impression:     -JESS- likely ATN,  S Cr  1.5 today with good diuresis  -Septic Shock/hypotension, BPs normalized  -Resp Failure,  Was re-intubated 6/9,  Self extubated   -Severe Metabolic acidosis, improving.   -Ovarian ca s/p debulking then laparoscopy with lavage  -KLEBSIELLA OXYTOCA sepsis (Peritoneal fluid)  -DM  -h.o. HTN  -Anemia  -Elevated liver enzymes  -S/P Exploratory laparotomy, Lysis of adhesions, Ileostomy and wound VAC placement on 6/16  -Leukocytosis  -Hypernatremia, improving slowly  -Hypokalemia, repleted         Plan:     Hold Lasix for now due to worsening hypernatremia   Replace electrolytes per protocol   Monitor I/O and chemistry, H&H   Antibiotics. Vanc dosing per pharmacist  Will follow Renal function closely after exposure to IV contrast for CT study today. Family aware of potential risk of Contrast induced Nephropathy (JOHANNE). Will continue her  IVF currently in the  form of TPN at 60ml/hr, hopefully hydration would serve some protective effect against JOHANNE against. Would avoid extra IV Saline in view of  potential Fluid overload. Also switch Kvo IVs from NS to 1/2 NS due to Hypernatremia.       MD Denver Aragon Malissa  [phone]

## 2018-06-22 NOTE — PROGRESS NOTES
Speech Therapy Note:    Followed up for dysphagia tx/MBS this AM. Per discussion with Dr. Guero Lomax, patient not appropriate for PO trials this day. Recommend patient remain NPO. SLP to d/c MBS orders at this time, will re-attempt at MD discretion.     Saw Buckner M.S., CF-SLP  Speech Language Pathologist

## 2018-06-22 NOTE — PROGRESS NOTES
\NUTRITION FOLLOW-UP     RECOMMENDATIONS/PLAN:   - Continue w/ POC  - Adv diet per MD  - Recc SLP consult before adv diet  Monitor labs/lytes, TPN tolerance, diet adv, skin integrity, wt, fluid status, BM     NUTRITION ASSESSMENT:   Client Update: 75 yrs old Female with acute respiratory failure, ischemia colitis, sepsis, JESS, metabolic acidosis. 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 Pt was extubated, and 1 day later reintubated. Thought to have peritonitis. Pt is s/p laparotomy and peritoneal lavage with klebsiella positive. 6/15/18 s/p exp lap surgery for intraabdominal fluid collection/wound vac and ileostomy placed. Pt self extubated 6/17/18. Pt is on TPN. Ileostomy and wound vac in place     6/21/18: MBS attempted however, pt did not tolerate secondary to pain, SLP tried to follow up 6/22 pt remain NPO, noted nursing notes of pt with bloody drainage from BRANDON drain. FOOD/NUTRITION INTAKE   Diet Order:  TPN: 1379kcal 200g dextrose 75g protein 44g lipids    Food Allergies: NKFA  Average PO Intake:      No data found. Pertinent Medications:  [x] Reviewed; heparin, protonix, zosyn, KCl,   Electrolyte Replacement Protocol: [x]K [x]Mg [x]PO4  Insulin:  [x]SSI  []Pre-meal   []Basal    []Drip  []None  Cultural/Mosque Food Preferences: None Identified     BIOCHEMICAL DATA & MEDICAL TESTS  Pertinent Labs:  [x] Reviewed;  Na-150, K-3.4, Glucose-180, Ca-7.5  ANTHROPOMETRICS  Height: 5' 1\" (154.9 cm)       Weight: 101.8 kg (224 lb 6.8 oz)         BMI: 42 kg/m^2 morbidly obese (Greater than or = to 40% BMI)   Adm Weight: 196 lbs                Weight change: +26lbs since admission  Adjusted Body Weight: 58.1kg      NUTRITION-FOCUSED PHYSICAL ASSESSMENT  Skin: No PU      GI: +ileostomy output 400ml yesterday     NUTRITION PRESCRIPTION  Calories: 979-1246 kcal/day based on 11-14kcal/kg  Protein: 95 g/day based on 2.0g/kg of IBW  CHO: 122-156 g/day based on 50% of total energy  Fluid: 979-1246 ml/day based on 1 kcal/ml      NUTRITION DIAGNOSES:   1.  Inadequate oral intake related to diet order as evidence by NPO diet       NUTRITION INTERVENTIONS:     INTERVENTIONS:                     GOALS:  1. TPN:Continue w/ current TPN 1. Continue w/ TPN and diet adv if possible by next review 3 days         LEARNING NEEDS (Diet, Supplementation, Food/Nutrient-Drug Interaction):   [x] None Identified   [] Education provided/documented      Identified and patient: [] Declined   [] Was not appropriate/indicated         NUTRITION MONITORING /EVALUATION:   Adjust EN/PN as appropriate  Monitor wt  Monitor renal labs, electrolytes, fluid status      Previous Recommendations Implemented: yes        Previous Goals Met:  yes -TPN at goal       [x] Participated in Interdisciplinary Rounds    [x] Interdisciplinary Care Plan Reviewed  DISCHARGE NUTRITION RECOMMENDATIONS ADDRESSED:                                                [x] To be determined closer to discharge     NUTRITION RISK:           [x] At risk                        [] Not currently at risk        Will follow-up per policy.   Bao Hoyt 1

## 2018-06-22 NOTE — PROGRESS NOTES
Consult for TPN Dosing per Pharmacy by Dr. Thomas Streeter provided for this 75 y.o. female, for indication of Ileus  Day of Therapy  05    TPN Dosing Wt:  62.4 kg    Labs:  BMP BMP:   Lab Results   Component Value Date/Time     (H) 06/22/2018 04:45 AM    K 4.1 06/22/2018 04:45 AM     (H) 06/22/2018 04:45 AM    CO2 19 (L) 06/22/2018 04:45 AM    AGAP 13 06/22/2018 04:45 AM     (H) 06/22/2018 04:45 AM    BUN 45 (H) 06/22/2018 04:45 AM    CREA 1.50 (H) 06/22/2018 04:45 AM    GFRAA 42 (L) 06/22/2018 04:45 AM    GFRNA 35 (L) 06/22/2018 04:45 AM          CMP CMP:   Lab Results   Component Value Date/Time     (H) 06/22/2018 04:45 AM    K 4.1 06/22/2018 04:45 AM     (H) 06/22/2018 04:45 AM    CO2 19 (L) 06/22/2018 04:45 AM    AGAP 13 06/22/2018 04:45 AM     (H) 06/22/2018 04:45 AM    BUN 45 (H) 06/22/2018 04:45 AM    CREA 1.50 (H) 06/22/2018 04:45 AM    GFRAA 42 (L) 06/22/2018 04:45 AM    GFRNA 35 (L) 06/22/2018 04:45 AM    CA 7.3 (L) 06/22/2018 04:45 AM    MG 2.0 06/22/2018 04:45 AM    PHOS 3.1 06/22/2018 04:45 AM    ALB 2.0 (L) 06/21/2018 06:50 PM    TP 4.8 (L) 06/21/2018 06:50 PM    GLOB 2.8 06/21/2018 06:50 PM    AGRAT 0.7 (L) 06/21/2018 06:50 PM    SGOT 17 06/21/2018 06:50 PM    ALT 24 06/21/2018 06:50 PM         Ca/ Phos Lab Results   Component Value Date/Time    Calcium 7.3 (L) 06/22/2018 04:45 AM    Phosphorus 3.1 06/22/2018 04:45 AM       Mag    Lab Results   Component Value Date/Time    Magnesium 2.0 06/22/2018 04:45 AM      Tg No components found for: TGL   Albumin Lab Results   Component Value Date/Time    Protein, total 4.8 (L) 06/21/2018 06:50 PM    Albumin 2.0 (L) 06/21/2018 06:50 PM         GOAL:   Kcal requirements:    ~22 kcal/kg   Fluid requirements:    ~30 ml/kg   Macronutrients:   Protein     1.2 g/kg/day 300 kcal   Lipids      44 gm/day 396 kcal   Dextrose remainder of total kcal:  200 gm/day 680 kcal   Total Calories:    1376 kcal     Today's TPN formulation provides (100 % of Goal):   Calories:   1376 kcal   Fluid:    ~23 ml/kg   Protein:   1.2 gm/kg/day   Fat:    44 gm/day   CHO:    3.2 gm/kg/day   Sodium:   0 mEq   Potassium:   83 mEq   Calcium:   0 gm   Magnesium:   1.5 gm   Thiamine:   0 mg   OAC:                           55 mEq    TPN to be initiated at 100%  goal macronutrients and titrated to goal per patient tolerance. Electrolytes to be monitored and adjusted daily. MD to replete electrolytes acutely outside of TPN as needed. Additional recommendations/Orders:    1. Corrective insulin coverage q6h while on TPN. 2. Change/decrease IVF to while on TPN-MD to further adjust as needed  3. BMP, Ca, Mag, Phos ordered daily  4. Triglycerides, Prealbumin, CMP, INR ordered upon initiation and weekly  5. GIR: 2.3 mg/kg/min  6. Electrolyte adjustments for today:                 - K decreased from 100 mEq to 83 mEq    - PO4 increased from 45 mEq to 55 mEq    - Cl decreased from 25 mEq to 15 mEq    - Insulin R to stay @ 10 units in TPN bag for now, per CDE recommendations. Will manage BG w/ SS. Pharmacy to follow daily and will make changes based on labs/clinical status. Juliano Almonte, Pharm. D.   Clinical Pharmacist  [phone]

## 2018-06-22 NOTE — PROGRESS NOTES
0920--Patient medicated with Fentanyl 25mcg. Patient unable to verbalize numeric scale for pain level but did verbalize a \"yes\" when asked if she was in pain. 1000--Dr. Baeza at bedside and assessed BRANDON drain to LLQ. Bloody drainage > 200ml from BRANDON drain. Received orders to stop Heparin gtt and he will contact Dr. Silvestre Prader who is patient's gyn oncology surgeon. 1315--Patient was medicated again with Fentanyl 25mcg. Patient's brother alerted me that she was in pain and patient again verbalized to me she was in pain and PRN dose was given. 1510--Patient transported and returned to room 109 for CTA of chest, abdomen, pelvis. 1555--First unit of FFP given. Also clarified PRBCs orders with Dr. Deanna Gray and received orders for 2 units PRBCs. 1645--Spoke c Dr. Silvestre Prader about CT results. Received orders for BLE Venous Duplex studies and continue to hold Heparin for now. No plans to return to OR at this time as CT was negative for any issues at this time. Also received orders to remove RLQ drain. 1650--Fentanyl 25mcg PRN dose given for wound vac dressing change. 1708--FFP completed. 1715--Dr. Forman removed RLQ drain. 1729--First unit of PRBCs started. 1730--WCON RN in to change patient wound vac. Silver foam placed in wound bed. Next dressing change Friday and Dr. Silvestre Prader would like to be present. 1736--Spoke c Dr. Berry Augustin and received orders for Lasix 40mg IVP in between PRBCs and to change all KVO fluids to 0.49% Saline given hypernatremia. 1745--Confirmed with Dr. Deanna Gray patient is to receive repeat CBC and Coag's s/p FFP and PRBC infusion. 1800--Spoke c Lab staff about fungal blood cx. Receiving a shipment of bottles tonight and I did inform them patient is to be a lab draw for that cx.     1914--PRBCs completed. 1930--Bedside and Verbal shift change report given to Jesse Travis RN (oncoming nurse) by Isaak Brooks RN (offgoing nurse).  Report included the following information SBAR, Kardex, Intake/Output, MAR, Recent Results, Med Rec Status and Cardiac Rhythm SR-ST c PACs/PVCs.

## 2018-06-22 NOTE — ROUTINE PROCESS
Bedside and Verbal shift change report given to Chitra Callaway RN (oncoming nurse) by Rohan Bray RN (offgoing nurse).  Report included the following information SBAR, Kardex, Intake/Output, MAR, Recent Results, Med Rec Status and Cardiac Rhythm ST.

## 2018-06-22 NOTE — PROGRESS NOTES
[facility]                  Pulmonary, Critical Care, and Sleep Medicine     Name: Holli Alejandre MRN: [id number]   : 1950 Hospital: [facility] [place]    Date: 2018        PCCM Note                                              Subjective/History of Present Illness:     Patient is a 75 y.o. 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 S/p exploratory laparotomy 6/15/18 - findings of sigmoid diverticulitis with suspected perforation, underwent ileostomy and wound vac placement. 18:  Patient self extubated  am  Nurse saw red blood from left BRANDON drain - brisk; I assessed patient; stopped heparin drip; protamine 25 mg ordered stat and given; total collection of 250 mls in 30 mins from left BRANDON drain; also seen vaginal bleeding  Hx of worsening abd pains this am; PCA fentanyl drip increased to 30 mg/hr; plus prn iv Fentanyl  On 3 lits nc o2; mildly tachypneic  Leucocytosis without fevers  Afib overnight with RVR - on Cardizem drip 10 mg/hr     cc overnight      Surgeries  18 - Laparoscopy converted to laparotomy, total abdominal hysterectomy, bilateral salpingo-oophorectomy, omentectomy, bilateral pelvic and periaortic lymphadenectomy and radical tumor debulking to R0. Path - ADENOCARCINOMA OF LEFT OVARY.     18 - Diagnostic laparoscopy converted to laparotomy, peritoneal biopsies, aerobic and anaerobic cultures of peritoneal fluid and peritoneal lavage. 6/15/18 - Wound exploration. Exploratory laparotomy. Lysis of adhesions. Ileostomy and wound VAC placement.       Allergies   Allergen Reactions    Hydrocodone Other (comments)     Breaks into cold sweat    Metformin Other (comments)     Breaks out into cold sweat.  Can Take Glucophage brand name med      Past Medical History:   Diagnosis Date    Diabetes ([address]     many years type 2    Hypertension     many years      Past Surgical History:   Procedure Laterality Date    HX OOPHORECTOMY  2018    HX TONSILLECTOMY      as a child       Social History   Substance Use Topics    Smoking status: Never Smoker    Smokeless tobacco: Never Used    Alcohol use No        Current Facility-Administered Medications   Medication Dose Route Frequency    protamine injection 25 mg  25 mg IntraVENous NOW    TPN ADULT - CENTRAL   IntraVENous CONTINUOUS    dilTIAZem (CARDIZEM) 100 mg in 0.9% sodium chloride (MBP/ADV) 100 mL infusion  0-15 mg/hr IntraVENous TITRATE    albumin human 25% (BUMINATE) solution 12.5 g  12.5 g IntraVENous Q12H    insulin regular (NOVOLIN R, HUMULIN R) injection   SubCUTAneous Q6H    levoFLOXacin (LEVAQUIN) 750 mg in D5W IVPB  750 mg IntraVENous Q48H    metroNIDAZOLE (FLAGYL) IVPB premix 500 mg  500 mg IntraVENous Q8H    piperacillin-tazobactam (ZOSYN) 2.25 g in 0.9% sodium chloride (MBP/ADV) 50 mL MBP  2.25 g IntraVENous Q6H    fentaNYL (PF) 900 mcg/30 ml infusion soln  0-50 mcg/hr IntraVENous TITRATE    pantoprazole (PROTONIX) 40 mg in sodium chloride 0.9% 10 mL injection  40 mg IntraVENous DAILY         Objective:   Vital Signs:    Visit Vitals    /79    Pulse (!) 106    Temp 98.5 °F (36.9 °C)    Resp 24    Ht 5' 1\" (1.549 m)    Wt 101.8 kg (224 lb 6.9 oz)    SpO2 99%    BMI 42.41 kg/m2       O2 Device: Nasal cannula   O2 Flow Rate (L/min): 3 l/min   Temp (24hrs), Av.2 °F (36.8 °C), Min:97.1 °F (36.2 °C), Max:99.4 °F (37.4 °C)       Intake/Output:   Last shift:       07 - 1900  In: 170 [I.V.:170]  Out: 250 [Drains:250]    Last 3 shifts: 1901 -  0700  In: 4349.2 [I.V.:3559.2]  Out: 2429 [Urine:3675; Drains:850]      Intake/Output Summary (Last 24 hours) at 06/22/18 1014  Last data filed at 06/22/18 1007   Gross per 24 hour   Intake          2520.09 ml   Output             2790 ml   Net          -269.91 ml       ABG:  Post-self extubation  No results found for: PH, PHI, PCO2, PCO2I, PO2, PO2I, HCO3, HCO3I, FIO2, FIO2I    Physical Exam:     General/Neurology: weak, lethargic, weakly opening eyes  Head:   Normocephalic, without obvious abnormality, atraumatic. Eye:   no scleral icterus, no pallor, no cyanosis. Pupils not dilated. Neck:   Symmetric. No lymphadenopathy. Trachea midline  Lung: Moderate air entry bilateral equal. Decreased breath sounds bases. Not in distress. No wheezing. Heart:   S1 S2 present. No murmur. No JVD. Abdomen:  Soft. Obese. + BS. Midline lower abd surgical site - open wound with wound vac. BRANDON drain bilateral - red blood in bulbs, RT abd wall Ileostomy. No abd distension or guarding. Extremities:  No cyanosis. No clubbing. Bilateral UE and LE edema. Anasarca. Pulses: Palpable radials and DPs bilateral.   Lymphatic:  No cervical or supraclav lymphadenopathy.    Skin:   No rash      Data:       Recent Results (from the past 12 hour(s))   GLUCOSE, POC    Collection Time: 06/21/18 11:46 PM   Result Value Ref Range    Glucose (POC) 229 (H) 70 - 110 mg/dL   MAGNESIUM    Collection Time: 06/22/18  4:45 AM   Result Value Ref Range    Magnesium 2.0 1.6 - 2.6 mg/dL   PHOSPHORUS    Collection Time: 06/22/18  4:45 AM   Result Value Ref Range    Phosphorus 3.1 2.5 - 4.9 MG/DL   CALCIUM, IONIZED    Collection Time: 06/22/18  4:45 AM   Result Value Ref Range    Ionized Calcium 1.10 (L) 1.12 - 1.32 MMOL/L   CBC WITH AUTOMATED DIFF    Collection Time: 06/22/18  4:45 AM   Result Value Ref Range    WBC 31.6 (H) 4.6 - 13.2 K/uL    RBC 3.53 (L) 4.20 - 5.30 M/uL    HGB 9.2 (L) 12.0 - 16.0 g/dL    HCT 27.8 (L) 35.0 - 45.0 %    MCV 78.8 74.0 - 97.0 FL    MCH 26.1 24.0 - 34.0 PG    MCHC 33.1 31.0 - 37.0 g/dL    RDW 24.7 (H) 11.6 - 14.5 %    PLATELET 972 650 - 598 K/uL    NEUTROPHILS 85 (H) 42 - 75 %    LYMPHOCYTES 9 (L) 20 - 51 %    MONOCYTES 6 2 - 9 %    EOSINOPHILS 0 0 - 5 %    BASOPHILS 0 0 - 3 %    ABS. NEUTROPHILS 26.9 (H) 1.8 - 8.0 K/UL    ABS. LYMPHOCYTES 2.8 0.8 - 3.5 K/UL    ABS. MONOCYTES 1.9 (H) 0 - 1.0 K/UL    ABS. EOSINOPHILS 0.0 0.0 - 0.4 K/UL    ABS.  BASOPHILS 0.0 0.0 - 0.1 K/UL    PLATELET COMMENTS 1+ LARGE PLATELETS     RBC COMMENTS ANISOCYTOSIS  2+        RBC COMMENTS POLYCHROMASIA  1+        RBC COMMENTS TARGET CELLS  1+        RBC COMMENTS HYPOCHROMIA  SLIGHT        WBC COMMENTS HYPERSEGMENTED POLYS      DF MANUAL     METABOLIC PANEL, BASIC    Collection Time: 06/22/18  4:45 AM   Result Value Ref Range    Sodium 148 (H) 136 - 145 mmol/L    Potassium 4.1 3.5 - 5.5 mmol/L    Chloride 116 (H) 100 - 108 mmol/L    CO2 19 (L) 21 - 32 mmol/L    Anion gap 13 3.0 - 18 mmol/L    Glucose 239 (H) 74 - 99 mg/dL    BUN 45 (H) 7.0 - 18 MG/DL    Creatinine 1.50 (H) 0.6 - 1.3 MG/DL    BUN/Creatinine ratio 30 (H) 12 - 20      GFR est AA 42 (L) >60 ml/min/1.73m2    GFR est non-AA 35 (L) >60 ml/min/1.73m2    Calcium 7.3 (L) 8.5 - 10.1 MG/DL   PTT    Collection Time: 06/22/18  4:45 AM   Result Value Ref Range    aPTT 98.2 (H) 23.0 - 36.4 SEC   GLUCOSE, POC    Collection Time: 06/22/18  5:33 AM   Result Value Ref Range    Glucose (POC) 221 (H) 70 - 110 mg/dL           Chemistry Recent Labs      06/22/18   0445  06/21/18   1850  06/21/18   1315  06/21/18   0545   06/20/18   0520   GLU  239*  283*   --   180*   --   188*   NA  148*  148*   --   150*   --   152*   K  4.1  3.9  3.9  3.4*   < >  2.7*   CL  116*  115*   --   115*   --   114*   CO2  19*  19*   --   22   --   21   BUN  45*  41*   --   41*   --   46*   CREA  1.50*  1.45*   --   1.42*   --   1.62*   CA  7.3*  7.1*   --   7.5*   --   7.6*   MG  2.0   --    --   1.9   --   1.9   PHOS  3.1   --    --   2.7   --   3.5 AGAP  13  14   --   13   --   17   BUCR  30*  28*   --   29*   --   28*   AP   --   58   --    --    --    --    TP   --   4.8*   --    --    --    --    ALB   --   2.0*   --    --    --    --    GLOB   --   2.8   --    --    --    --    AGRAT   --   0.7*   --    --    --    --     < > = values in this interval not displayed. Lactic Acid Lactic acid   Date Value Ref Range Status   06/17/2018 2.3 (HH) 0.4 - 2.0 MMOL/L Final     Comment:     CALLED TO AND CORRECTLY REPEATED BY:  Mellissa Councilman, RN ICU ON 6/17/2018 5492 TO [id number]       No results for input(s): LAC in the last 72 hours. Liver Enzymes Protein, total   Date Value Ref Range Status   06/21/2018 4.8 (L) 6.4 - 8.2 g/dL Final     Albumin   Date Value Ref Range Status   06/21/2018 2.0 (L) 3.4 - 5.0 g/dL Final     Globulin   Date Value Ref Range Status   06/21/2018 2.8 2.0 - 4.0 g/dL Final     A-G Ratio   Date Value Ref Range Status   06/21/2018 0.7 (L) 0.8 - 1.7   Final     AST (SGOT)   Date Value Ref Range Status   06/21/2018 17 15 - 37 U/L Final     Alk.  phosphatase   Date Value Ref Range Status   06/21/2018 58 45 - 117 U/L Final     Recent Labs      06/21/18   1850   TP  4.8*   ALB  2.0*   GLOB  2.8   AGRAT  0.7*   SGOT  17   AP  58        CBC w/Diff Recent Labs      06/22/18   0445  06/21/18   1850  06/21/18   0545   WBC  31.6*  33.8*  19.6*   RBC  3.53*  3.97*  3.31*   HGB  9.2*  10.4*  8.6*   HCT  27.8*  31.7*  26.0*   PLT  181  197  118*   GRANS  85*  91*  84*   LYMPH  9*  5*  8*   EOS  0  0  0        Cardiac Enzymes No results found for: CPK, CK, CKMMB, CKMB, RCK3, CKMBT, CKNDX, CKND1, FABIAN, TROPT, TROIQ, KAYLAN, TROPT, TNIPOC, BNP, BNPP     BNP No results found for: BNP, BNPP, XBNPT     Coagulation Recent Labs      06/22/18   0445  06/21/18   1850  06/21/18   1315  06/21/18   0335   PTP   --   16.3*   --    --    INR   --   1.4*   --    --    APTT  98.2*   --   91.4*  115.9*         Thyroid  No results found for: T4, T3U, TSH, TSHEXT, TSHEXT    No results found for: T4       ABG Recent Labs      06/22/18   1034  06/20/18   0709   PHI  7.459*  7.511*   PCO2I  25.3*  25.1*   PO2I  65*  65*   HCO3I  17.9*  20.1*   FIO2I  36  28        All Micro Results     Procedure Component Value Units Date/Time    CULTURE, FUNGUS [[id number]] Collected:  06/09/18 1024    Order Status:  Completed Specimen:  Bronchial lavage Updated:  06/18/18 1329     Special Requests: NO SPECIAL REQUESTS        FUNGUS SMEAR NO FUNGAL ELEMENTS SEEN        Culture result: NO FUNGUS ISOLATED 9 DAYS       CULTURE, BLOOD [[id number]] Collected:  06/09/18 1423    Order Status:  Completed Specimen:  Whole Blood from Blood Updated:  06/15/18 0230     Special Requests: NO SPECIAL REQUESTS        Culture result: NO GROWTH 6 DAYS       CULTURE, BLOOD [[id number]] Collected:  06/09/18 1223    Order Status:  Completed Specimen:  Whole Blood from Blood Updated:  06/15/18 0230     Special Requests: left hand     Culture result: NO GROWTH 6 DAYS       AFB CULTURE + SMEAR W/RFLX PCR AND ID [[id number]] Collected:  06/09/18 1024    Order Status:  Completed Specimen:  Respiratory sample Updated:  06/12/18 2106     Source BRONCHIAL LAVAGE        AFB Specimen processing Concentration     Acid Fast Smear NEGATIVE          (NOTE)  Performed At: [address] [id number]  Jez Cortes MD UP:[id number]          Acid Fast Culture PENDING    CULTURE, SPUTUM/BRONCH/OTH [[id number]] Collected:  06/09/18 1024    Order Status:  Completed Specimen:  Sputum from Bronchial lavage Updated:  06/11/18 1058     Special Requests: NO SPECIAL REQUESTS        GRAM STAIN FEW WBC'S         FEW GRAM NEGATIVE RODS        Culture result:         RARE NORMAL RESPIRATORY JOSETTE    CULTURE, ANAEROBIC [[id number]] Collected:  06/05/18 0220    Order Status:  Completed Specimen:  Peritoneal Fluid Updated:  06/11/18 0734     Special Requests: NO SPECIAL REQUESTS        Culture result:         NO ANAEROBES ISOLATED 5 DAYS    CULTURE, BLOOD [[id number]] Collected:  06/04/18 0740    Order Status:  Completed Specimen:  Blood from Blood Updated:  06/10/18 0655     Special Requests: NO SPECIAL REQUESTS        Culture result: NO GROWTH 6 DAYS       CULTURE, BLOOD [[id number]] Collected:  06/04/18 0740    Order Status:  Completed Specimen:  Blood from Blood Updated:  06/10/18 0655     Special Requests: NO SPECIAL REQUESTS        Culture result: NO GROWTH 6 DAYS       CULTURE, BODY FLUID Cookie Claire STAIN [[id number]]  (Abnormal)  (Susceptibility) Collected:  06/05/18 0220    Order Status:  Completed Specimen:  Peritoneal Fluid Updated:  06/08/18 0936     Special Requests: NO SPECIAL REQUESTS        GRAM STAIN MANY WBC'S         NO ORGANISMS SEEN        Culture result:         RARE KLEBSIELLA OXYTOCA (A)            CALLED TO AND CORRECTLY REPEATED BY:  ANGEL LUIS DE LOS SANTOS RN ICU ON 6/7/2018 1032 TO 5087      CULTURE, URINE [[id number]] Collected:  06/05/18 1245    Order Status:  Completed Specimen:  Urine from Fry Specimen Updated:  06/07/18 1103     Special Requests: NO SPECIAL REQUESTS        Culture result: NO GROWTH 2 DAYS       C. DIFFICILE/EPI PCR [[id number]] Collected:  06/05/18 0930    Order Status:  Canceled Specimen:  Stool         Echo 6/9/18:  Left ventricle: Systolic function was normal. Ejection fraction was estimated   in the range of 65 % to 70 %. No obvious  wall motion abnormalities identified in the views obtained. Wall thickness   was mildly increased. Doppler parameters  were consistent with abnormal left ventricular relaxation (grade 1 diastolic   dysfunction). Right ventricle: The size was normal. Systolic function was normal.  Mitral valve: There was mild annular calcification. Tricuspid valve: Pulmonary artery systolic pressure was mildly increased. Pulmonary artery systolic pressure: 34 mmHg. PVL LE 6/6/18: acute b/l peroneal DVT. CT abd pelvis 6/4/18:  1.  Postsurgical hysterectomy, bilateral oophorectomy, pelvic lymphadenectomy and  a mastectomy surgical changes. Small volume of ascites in the abdomen and  pelvis, with a few tiny locules of gas in the right hemipelvis would be in  keeping with recent postsurgical etiology. 2. Abnormal edematous thick-walled small bowel loops in the left abdomen and  pelvis, with TRAM lamellar edematous configuration, induration. No findings of  pneumatosis. The overall appearance is nonspecific. Diagnostic considerations  include infectious etiology, nonspecific edema which may be unresolved  postsurgical etiology. Ischemia is also included in the differential diagnostic  consideration, however, there are no findings of pneumatosis, portal venous gas. 3. Stool in the right colon extending to the hepatic flexure with surrounding  gas, foci of pneumatosis could be obscured. No associated bowel wall thickening. 4. Satisfactory position of nasogastric tube. 5. Hepatomegaly, steatosis with 2 rounded low-density lesions, potentially  cysts. 6. Bibasilar atelectasis. CXR reviewed by me:  CXR 6/20/18: IMPRESSION:   1. Interval insertion of right arm PICC, in satisfactory position. 2. Improving chest showing decreasing pulmonary edema and decreasing right-sided  pleural effusion       IMPRESSION:   · Intra-abd bleeding  · Leucocytosis  · Severe sepsis due to Klebsiella Oxytoca peritonitis. · Klebsiella oxytoca peritonitis. S/p laparotomy and peritoneal lavage: Klebsiella positive. Ruled out ischemic bowel in laparotomy. 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 · S/p bronchoscopy 6/9/18: no aspiration noted. Cx NGTD. · VDRF due to sepsis/PE/?ARDS, reintubated on 6/8/18 and self-extubated 6/17  · S/p Shock likely due to sepsis and obstructive due to presumed PE. Shock resolved. Off vasopressors. · Acute b/l peroneal DVT  · Presumed PE with severe hypoxia and high A-a gradient, elevated RVSP 34 mm Hg.    · Anemia, no active external bleeding. · Thrombocytopenia, improving   · JESS, improving  · Hypernatremia. · Metabolic ad lactic acidosis, resolved. · Elevated LFT, due to shock liver, improving  · Foreign body on peritoneal biopsy, per path report. · Anasarca due to fluid resuscitation, JESS, hypoalbuminemia and sequale of sepsis. · Code status: DNR. · Very poor overall prognosis. RECOMMENDATIONS:   Respiratory: mildly tachypneic; ABG shows resp alkalosis; continue o2 support; low threshold for intubation; heparin drip (for DVTs and likely PEs) has been stopped this am  Keep SPO2 >=92%. HOB 30 degree elevation all the time. Aggressive pulmonary toileting. Aspiration precautions. Hem: reverse with protamine; 25 mg iv once and 12.5 mg in 30 mins (was running 1400 units/hr when stopped); follow H/H, coags; more protamine if needed based on bleeding and coagulopathy; also ordered 4 units FFP. 2 units PRBCs ordered. Keep Hb>8gm/dl. D/w Dr Gretchen Morales and plan reviewed. Likely will need CTA chest and abd with iv contrast - will plan when patient stable and if Brother Thania Sams ok considering high risks for renal failure. CVS: Echo ok with mildly elevated RVSP but normal RV systolic function. ID:  Bronch cx normal robin. Peritoneal cx Klebsiella Oxytoca resistant to ampicillin. Leucocytosis-worsened, but Afebrile - ?stress related vs intra-abd infection  Ab -  Zosyn since 6/13; levaquin since 6/18, flagyl since 6/15; will add iv vanc and also start micafungin  Renal: Nephrologist on case; Creatinine stable 1.5 today. Overall fluid positive by 20 kg since admission by weight; lasix prn with albumin - diuresing well with stable renal fn. Adjust electrolytes in TPN. GI/: TPN; barium study 6/21 - patient could not do today due to abd pains  Endocrine: Maintain blood glucose 140-180. Humalog sc. Insulin in TPN. Also added Lantus 10 units sc per recommendations for diabetes nurse.    Neurology: wean fentanyl drip - balance post-op pains with excessive sedation; prn iv fentanyl   Pain/Sedation: fentanyl drip - management per Dr Radha Colón  Skin/Wound: local surgical site care. Electrolytes: Replace electrolytes per ICU electrolyte replacement protocol. Nutrition: TPN started 6/18 - 60 ml/hr  Prophylaxis: DVT and GI Prophylaxis (heparin /protonix)  Restraints: none  Lines/Tubes:   ETT: 6/8/18-came out 6/17/18  Central line: right subclavian 6/4/18 - removed 6/20  Picc line 6/19 - Central line bundle followed  Fry: 6/4/18 (Medically necessary for strict input/output monitoring in critically ill patient, will remove it when not needed. Fry bundle followed). Will defer respective systems problem management to primary and other respective consultant and follow patient in ICU with primary and other medical team.  Further recommendations will be based on the patient's response to recommended treatment and results of the investigation ordered. Quality Care: PPI, DVT prophylaxis, HOB elevated, Infection control all reviewed and addressed. Updated patient Brother Mook Paris today; updated patient condition and management plans;  Full Code status based on his decision, with his 3 brother; patient has no children    PICC Line/Fyr cath medically necessary  PT and OT on case    Care of plan d/w RN, RT, Dr Radha Colón  High complexity decision making was performed during the evaluation of this patient   CC time 40 mins, excludes d/w family or procedures         Gevoanna Berkowitz MD  6/22/2018

## 2018-06-22 NOTE — DIABETES MGMT
GLYCEMIC CONTROL PROGRESS NOTE:    -discussed in rounds, known h/o T2DM HbA1C within recommended range for age + comorbids on oral home regimen  -BG out of target range ICU: 140-180 mg/dL   -TDD = 18 (TPN 10 regular insulin + 8 regular insulin corrective coverage)  -recommend initiate basal insulin & change corrective coverage to lispro (orders entered per Pulmonologist)   *Lantus 10 units   *- Humalog Very Insulin Resistant Corrective Coverage  -Glucose Results:   Lab Results   Component Value Date/Time     (H) 06/22/2018 04:45 AM     (H) 06/21/2018 06:50 PM    GLUCPOC 221 (H) 06/22/2018 11:40 AM    GLUCPOC 221 (H) 06/22/2018 05:33 AM    GLUCPOC 229 (H) 06/21/2018 11:46 PM         Isac Concepcion RN, MS  Glycemic Control Team  Pager [phone] (M-TH 8:30-5P)  *After Hours pager [phone]

## 2018-06-22 NOTE — PROGRESS NOTES
Pharmacy Dosing Services: Vancomycin    Consult for Vancomycin Dosing by Pharmacy by Dr. Brianda Chilel provided for this 75y.o. year old female , for indication of sepsis. Day of Therapy 1    Ht Readings from Last 1 Encounters:   06/04/18 154.9 cm (61\")        Wt Readings from Last 1 Encounters:   06/21/18 101.8 kg (224 lb 6.9 oz)        Other Current Antibiotics Zosyn 2.25 grams IV q6h  Levofloxacin 750 mg IV q48h  Metronidazole 500 mg IV q8h   Serum Creatinine Lab Results   Component Value Date/Time    Creatinine 1.50 (H) 06/22/2018 04:45 AM      Creatinine Clearance Estimated Creatinine Clearance: 39.3 mL/min (based on Cr of 1.5). BUN Lab Results   Component Value Date/Time    BUN 45 (H) 06/22/2018 04:45 AM    BUN, POC 37 (H) 06/15/2018 05:14 PM      WBC Lab Results   Component Value Date/Time    WBC 31.6 (H) 06/22/2018 04:45 AM      H/H Lab Results   Component Value Date/Time    HGB 9.2 (L) 06/22/2018 04:45 AM      Platelets Lab Results   Component Value Date/Time    PLATELET 378 84/90/7135 04:45 AM      Temp 98.3 °F (36.8 °C)     Start Vancomycin therapy, with loading dose of 1500 mg IV once, scheduled for 6/22/18 at 13:00 or asap. Follow with maintenance dose of Vancomycin 1250 mg IV every 24 hours. Dose calculated to approximate a therapeutic trough of 15 - 20 mcg/mL. Pharmacy to follow daily and will make changes to dose and/or frequency based on clinical status.   Pharmacist Varsha Ontiveros, [facility]

## 2018-06-22 NOTE — PROGRESS NOTES
Hospitalist Progress Note    Patient: [place] MRN: [id number]  CSN: [id number]    YOB: 1950  Age: 75 y.o. Sex: female    DOA: 6/4/2018 LOS:  LOS: 18 days                Assessment/Plan     Patient Active Problem List   Diagnosis Code    Ovarian ca ([facility] C56.9    Severe obesity (BMI 35.0-39.9) ([facility]) E66.01    Abdominal pain R10.9    Sepsis ([facility] A41.9    Oliguria R34    Acute respiratory failure ([facility] J96.00    JESS (acute kidney injury) ([facility] L63.8    Metabolic acidosis X85.1    Acute deep vein thrombosis (DVT) of lower extremity ([facility]) I82.409    S/P ileostomy ([facility] Z93.2    Hypoalbuminemia E88.09    Peritonitis ([facility] K65.9            76 yo female with history of clear cell ovarian ca, s/p HEATH, BSO debulking surgery. Admitted for abdominal pain. S/p laparoscopy and peritoneal lavage 6/5/2018. Patient is awake alert, answering to questions. Resp -   Acute resp failure with hypoxia - self extubated 06/17/2018. On oxygen by NC  S/p bronchoscopy 06/09/2018, no aspiration, resp cultures no growth to date. Suspected PE  Management per pulmonary    ID -   Severe Sepsis - secondary to peritonitis. Peritoneal fluid growing klebsiella oxytoca. ANTIBIOTICS zosyn, levaquin, flagyl. CVS - Monitor HD. Shock - sepsis vs secondary to suspected PE. Now off pressors. Anasarca - likely third spacing on albumin    Heme/onc - Follow H&H, plts. Bleeding from BRANDON drain - protamine and FFP. Acute bilateral peroneal DVT - heparin drip stopped. Anemia - blood transfusion ordered. Renal - Trend BUN, Cr, follow I/O, romero in place. Check and replace Mg, K, phos. JESS - nephrology following, monitor renal function. Metabolic acidosis - resolved  Hypernatremia - pharmacy to correct with TPN    Endocrine -  Follow FSG, on SSI    Neuro/ Pain/ Sedation - Fentanyl gtt, wean off drip.   Patient more alert after decreasing fentanyl gtt    GI - onTPN   Shock liver resolved  SLP eval  Bleeding from BRANDON drain - STAT CT ordered by primary. Prophylaxis - DVT: heparin drip, GI: protonix    Long discussion with brother at bedside. Physical Exam:  General: As above    HEENT: NC, Atraumatic. PERRLA, anicteric sclerae. Lungs: CTA Bilaterally. No Wheezing/Rhonchi/Rales. Heart:  Regular  rhythm,  No murmur, No Rubs, No Gallops  Abdomen: Soft, Non distended, Non tender. decreased Bowel sounds, ileostomy. Extremities: Edema bilaterally upper and lower ext. Vital signs/Intake and Output:  Visit Vitals    /80    Pulse (!) 114    Temp 98.3 °F (36.8 °C)    Resp 28    Ht 5' 1\" (1.549 m)    Wt 101.8 kg (224 lb 6.9 oz)    SpO2 95%    BMI 42.41 kg/m2     Current Shift:  06/22 0701 - 06/22 1900  In: 170 [I.V.:170]  Out: 340 [Drains:340]  Last three shifts:  06/20 1901 - 06/22 0700  In: 4349.2 [I.V.:3559.2]  Out: 5250 [Urine:3675; Drains:850]            Labs: Results:       Chemistry Recent Labs      06/22/18 0445  06/21/18   1850  06/21/18   1315  06/21/18   0545   GLU  239*  283*   --   180*   NA  148*  148*   --   150*   K  4.1  3.9  3.9  3.4*   CL  116*  115*   --   115*   CO2  19*  19*   --   22   BUN  45*  41*   --   41*   CREA  1.50*  1.45*   --   1.42*   CA  7.3*  7.1*   --   7.5*   AGAP  13  14   --   13   BUCR  30*  28*   --   29*   AP   --   58   --    --    TP   --   4.8*   --    --    ALB   --   2.0*   --    --    GLOB   --   2.8   --    --    AGRAT   --   0.7*   --    --       CBC w/Diff Recent Labs      06/22/18   0445  06/21/18   1850  06/21/18   0545   WBC  31.6*  33.8*  19.6*   RBC  3.53*  3.97*  3.31*   HGB  9.2*  10.4*  8.6*   HCT  27.8*  31.7*  26.0*   PLT  181  197  118*   GRANS  85*  91*  84*   LYMPH  9*  5*  8*   EOS  0  0  0      Cardiac Enzymes No results for input(s): CPK, CKND1, FABIAN in the last 72 hours.     No lab exists for component: CKRMB, TROIP   Coagulation Recent Labs      06/22/18   0445  06/21/18   1850  06/21/18   1315   PTP --   16.3*   --    INR   --   1.4*   --    APTT  98.2*   --   91.4*       Lipid Panel Lab Results   Component Value Date/Time    Triglyceride 64 06/19/2018 04:48 AM      BNP No results for input(s): BNPP in the last 72 hours.    Liver Enzymes Recent Labs      06/21/18   1850   TP  4.8*   ALB  2.0*   AP  58   SGOT  17      Thyroid Studies No results found for: T4, T3U, TSH, TSHEXT, TSHEXT     Procedures/imaging: see electronic medical records for all procedures/Xrays and details which were not copied into this note but were reviewed prior to creation of Plan

## 2018-06-22 NOTE — PROGRESS NOTES
I reviewed CT scan. No evidence of PE. RLL consolidation most c/w atelectasis. Abdominal ascites and post op fluid with no area of blood collection or abscess or active bleeding. RLQ tissue mass is consistent with indurated bowel adhesions in area of known chronic diverticulitis. I would like to repeat the bilateral LE dopplers to re-evaluate peroneal DVT. If there is propagation she will need IVC filter as full anti-coagulation is not an option. If improved will consider prophylactic heparin as patient remains at risk for DVT/PE. Removed LLQ drain as this has not been draining and has backed out some already. I assisted [facility] with wound care in changing the wound vac. We discussed chemically debridement to be placed under the sponge. Will replace wound vac and will need to be changed in 3 days. Overall, patient is much better currently than earlier today. Her pain is better controlled and she is verbalizing appropriately.     Flory Mccabe MD

## 2018-06-22 NOTE — PROGRESS NOTES
Problem: Mobility Impaired (Adult and Pediatric)  Goal: *Acute Goals and Plan of Care (Insert Text)  Physical Therapy Goals  Updated 6/20/2018 and to be accomplished within 7 day(s)  1. Patient will move from supine to sit and sit to supine in bed with maximal assistance. 2.  Patient will transfer from bed to chair and chair to bed with maximal assistance using the least restrictive device. 3.  Patient will perform sit to stand with maximal assistance. 4.  Patient will ambulate with maximal assistance for 5 feet with the least restrictive device. 6/22/2018  PT treatment not completed due to:  [] Off Unit for testing/procedure  [] Pain  [] Eating  [] Patient too lethargic  [] Nausea/vomiting  [] Dialysis treatment in progress   [x] Other:Advised to hold PT at this time as pt with ?internal bleeding and for CT scan. Will f/u at later time if/as appropriate. Thank you.    Scott La, PT

## 2018-06-22 NOTE — WOUND CARE
Wound care in to see patient however patient on the way out for stat CT. Wound care will return when schedule permits.

## 2018-06-23 ENCOUNTER — APPOINTMENT (OUTPATIENT)
Dept: VASCULAR SURGERY | Age: 68
DRG: 853 | End: 2018-06-23
Attending: OBSTETRICS & GYNECOLOGY
Payer: MEDICARE

## 2018-06-23 ENCOUNTER — APPOINTMENT (OUTPATIENT)
Dept: GENERAL RADIOLOGY | Age: 68
DRG: 853 | End: 2018-06-23
Attending: OBSTETRICS & GYNECOLOGY
Payer: MEDICARE

## 2018-06-23 LAB
ALBUMIN SERPL-MCNC: 2.3 G/DL (ref 3.4–5)
ANION GAP SERPL CALC-SCNC: 12 MMOL/L (ref 3–18)
BASOPHILS # BLD: 0 K/UL (ref 0–0.1)
BASOPHILS # BLD: 0.1 K/UL (ref 0–0.1)
BASOPHILS NFR BLD: 0 % (ref 0–3)
BASOPHILS NFR BLD: 1 % (ref 0–3)
BUN SERPL-MCNC: 44 MG/DL (ref 7–18)
BUN/CREAT SERPL: 31 (ref 12–20)
CA-I SERPL-SCNC: 1.1 MMOL/L (ref 1.12–1.32)
CA-I SERPL-SCNC: 1.16 MMOL/L (ref 1.12–1.32)
CALCIUM SERPL-MCNC: 7.4 MG/DL (ref 8.5–10.1)
CHLORIDE SERPL-SCNC: 116 MMOL/L (ref 100–108)
CO2 SERPL-SCNC: 23 MMOL/L (ref 21–32)
CREAT SERPL-MCNC: 1.42 MG/DL (ref 0.6–1.3)
DIFFERENTIAL METHOD BLD: ABNORMAL
DIFFERENTIAL METHOD BLD: ABNORMAL
EOSINOPHIL # BLD: 0.1 K/UL (ref 0–0.4)
EOSINOPHIL # BLD: 0.1 K/UL (ref 0–0.4)
EOSINOPHIL NFR BLD: 1 % (ref 0–5)
EOSINOPHIL NFR BLD: 1 % (ref 0–5)
ERYTHROCYTE [DISTWIDTH] IN BLOOD BY AUTOMATED COUNT: 22.2 % (ref 11.6–14.5)
ERYTHROCYTE [DISTWIDTH] IN BLOOD BY AUTOMATED COUNT: 23.3 % (ref 11.6–14.5)
GLUCOSE BLD STRIP.AUTO-MCNC: 149 MG/DL (ref 70–110)
GLUCOSE BLD STRIP.AUTO-MCNC: 163 MG/DL (ref 70–110)
GLUCOSE BLD STRIP.AUTO-MCNC: 176 MG/DL (ref 70–110)
GLUCOSE BLD STRIP.AUTO-MCNC: 197 MG/DL (ref 70–110)
GLUCOSE SERPL-MCNC: 208 MG/DL (ref 74–99)
HCT VFR BLD AUTO: 23.1 % (ref 35–45)
HCT VFR BLD AUTO: 27.3 % (ref 35–45)
HCT VFR BLD AUTO: 27.8 % (ref 35–45)
HGB BLD-MCNC: 7.5 G/DL (ref 12–16)
HGB BLD-MCNC: 8.9 G/DL (ref 12–16)
HGB BLD-MCNC: 9 G/DL (ref 12–16)
INR PPP: 1.3 (ref 0.8–1.2)
LYMPHOCYTES # BLD: 1.3 K/UL (ref 0.8–3.5)
LYMPHOCYTES # BLD: 1.5 K/UL (ref 0.8–3.5)
LYMPHOCYTES NFR BLD: 11 % (ref 20–51)
LYMPHOCYTES NFR BLD: 9 % (ref 20–51)
MAGNESIUM SERPL-MCNC: 2.1 MG/DL (ref 1.6–2.6)
MCH RBC QN AUTO: 27.1 PG (ref 24–34)
MCH RBC QN AUTO: 27.7 PG (ref 24–34)
MCHC RBC AUTO-ENTMCNC: 32.5 G/DL (ref 31–37)
MCHC RBC AUTO-ENTMCNC: 32.6 G/DL (ref 31–37)
MCV RBC AUTO: 83.4 FL (ref 74–97)
MCV RBC AUTO: 85 FL (ref 74–97)
MONOCYTES # BLD: 0.1 K/UL (ref 0–1)
MONOCYTES # BLD: 0.5 K/UL (ref 0–1)
MONOCYTES NFR BLD: 1 % (ref 2–9)
MONOCYTES NFR BLD: 4 % (ref 2–9)
NEUTS BAND NFR BLD MANUAL: 1 % (ref 0–5)
NEUTS SEG # BLD: 11.4 K/UL (ref 1.8–8)
NEUTS SEG # BLD: 12.6 K/UL (ref 1.8–8)
NEUTS SEG NFR BLD: 83 % (ref 42–75)
NEUTS SEG NFR BLD: 88 % (ref 42–75)
PHOSPHATE SERPL-MCNC: 3.4 MG/DL (ref 2.5–4.9)
PLATELET # BLD AUTO: 104 K/UL (ref 135–420)
PLATELET # BLD AUTO: 115 K/UL (ref 135–420)
PLATELET COMMENTS,PCOM: ABNORMAL
PLATELET COMMENTS,PCOM: ABNORMAL
POTASSIUM SERPL-SCNC: 3.6 MMOL/L (ref 3.5–5.5)
POTASSIUM SERPL-SCNC: 3.7 MMOL/L (ref 3.5–5.5)
POTASSIUM SERPL-SCNC: 3.8 MMOL/L (ref 3.5–5.5)
PROTHROMBIN TIME: 15.5 SEC (ref 11.5–15.2)
RBC # BLD AUTO: 2.77 M/UL (ref 4.2–5.3)
RBC # BLD AUTO: 3.21 M/UL (ref 4.2–5.3)
RBC MORPH BLD: ABNORMAL
SODIUM SERPL-SCNC: 151 MMOL/L (ref 136–145)
WBC # BLD AUTO: 13.7 K/UL (ref 4.6–13.2)
WBC # BLD AUTO: 14.2 K/UL (ref 4.6–13.2)

## 2018-06-23 PROCEDURE — 82330 ASSAY OF CALCIUM: CPT | Performed by: INTERNAL MEDICINE

## 2018-06-23 PROCEDURE — 74011250636 HC RX REV CODE- 250/636: Performed by: OBSTETRICS & GYNECOLOGY

## 2018-06-23 PROCEDURE — 74011000250 HC RX REV CODE- 250: Performed by: INTERNAL MEDICINE

## 2018-06-23 PROCEDURE — P9016 RBC LEUKOCYTES REDUCED: HCPCS | Performed by: INTERNAL MEDICINE

## 2018-06-23 PROCEDURE — 65270000029 HC RM PRIVATE

## 2018-06-23 PROCEDURE — C9113 INJ PANTOPRAZOLE SODIUM, VIA: HCPCS | Performed by: INTERNAL MEDICINE

## 2018-06-23 PROCEDURE — 82962 GLUCOSE BLOOD TEST: CPT

## 2018-06-23 PROCEDURE — 74011250636 HC RX REV CODE- 250/636: Performed by: HOSPITALIST

## 2018-06-23 PROCEDURE — 84132 ASSAY OF SERUM POTASSIUM: CPT | Performed by: HOSPITALIST

## 2018-06-23 PROCEDURE — 74011250636 HC RX REV CODE- 250/636: Performed by: INTERNAL MEDICINE

## 2018-06-23 PROCEDURE — 93970 EXTREMITY STUDY: CPT

## 2018-06-23 PROCEDURE — 36592 COLLECT BLOOD FROM PICC: CPT

## 2018-06-23 PROCEDURE — 74011000258 HC RX REV CODE- 258: Performed by: INTERNAL MEDICINE

## 2018-06-23 PROCEDURE — 97530 THERAPEUTIC ACTIVITIES: CPT

## 2018-06-23 PROCEDURE — P9059 PLASMA, FRZ BETWEEN 8-24HOUR: HCPCS | Performed by: INTERNAL MEDICINE

## 2018-06-23 PROCEDURE — 74011636637 HC RX REV CODE- 636/637: Performed by: INTERNAL MEDICINE

## 2018-06-23 PROCEDURE — 80069 RENAL FUNCTION PANEL: CPT | Performed by: INTERNAL MEDICINE

## 2018-06-23 PROCEDURE — 77010033678 HC OXYGEN DAILY

## 2018-06-23 PROCEDURE — 71045 X-RAY EXAM CHEST 1 VIEW: CPT

## 2018-06-23 PROCEDURE — 85025 COMPLETE CBC W/AUTO DIFF WBC: CPT | Performed by: INTERNAL MEDICINE

## 2018-06-23 PROCEDURE — 85610 PROTHROMBIN TIME: CPT | Performed by: OBSTETRICS & GYNECOLOGY

## 2018-06-23 PROCEDURE — 36430 TRANSFUSION BLD/BLD COMPNT: CPT

## 2018-06-23 PROCEDURE — 74011000258 HC RX REV CODE- 258: Performed by: OBSTETRICS & GYNECOLOGY

## 2018-06-23 PROCEDURE — P9047 ALBUMIN (HUMAN), 25%, 50ML: HCPCS | Performed by: INTERNAL MEDICINE

## 2018-06-23 PROCEDURE — 85018 HEMOGLOBIN: CPT | Performed by: OBSTETRICS & GYNECOLOGY

## 2018-06-23 PROCEDURE — 83735 ASSAY OF MAGNESIUM: CPT | Performed by: INTERNAL MEDICINE

## 2018-06-23 RX ORDER — FUROSEMIDE 10 MG/ML
40 INJECTION INTRAMUSCULAR; INTRAVENOUS ONCE
Status: COMPLETED | OUTPATIENT
Start: 2018-06-23 | End: 2018-06-23

## 2018-06-23 RX ORDER — POTASSIUM CHLORIDE 7.45 MG/ML
10 INJECTION INTRAVENOUS ONCE
Status: COMPLETED | OUTPATIENT
Start: 2018-06-23 | End: 2018-06-27

## 2018-06-23 RX ORDER — POTASSIUM CHLORIDE 7.45 MG/ML
10 INJECTION INTRAVENOUS
Status: COMPLETED | OUTPATIENT
Start: 2018-06-23 | End: 2018-06-27

## 2018-06-23 RX ADMIN — METRONIDAZOLE 500 MG: 500 INJECTION, SOLUTION INTRAVENOUS at 20:23

## 2018-06-23 RX ADMIN — POTASSIUM CHLORIDE 10 MEQ: 10 INJECTION, SOLUTION INTRAVENOUS at 11:05

## 2018-06-23 RX ADMIN — PIPERACILLIN SODIUM,TAZOBACTAM SODIUM 2.25 G: 2; .25 INJECTION, POWDER, FOR SOLUTION INTRAVENOUS at 23:46

## 2018-06-23 RX ADMIN — INSULIN LISPRO 2 UNITS: 100 INJECTION, SOLUTION INTRAVENOUS; SUBCUTANEOUS at 06:18

## 2018-06-23 RX ADMIN — PIPERACILLIN SODIUM,TAZOBACTAM SODIUM 2.25 G: 2; .25 INJECTION, POWDER, FOR SOLUTION INTRAVENOUS at 11:27

## 2018-06-23 RX ADMIN — ALBUMIN (HUMAN) 12.5 G: 0.25 INJECTION, SOLUTION INTRAVENOUS at 08:17

## 2018-06-23 RX ADMIN — VANCOMYCIN HYDROCHLORIDE 1250 MG: 10 INJECTION, POWDER, LYOPHILIZED, FOR SOLUTION INTRAVENOUS at 15:51

## 2018-06-23 RX ADMIN — MICAFUNGIN SODIUM 100 MG: 20 INJECTION, POWDER, LYOPHILIZED, FOR SOLUTION INTRAVENOUS at 11:40

## 2018-06-23 RX ADMIN — INSULIN LISPRO 2 UNITS: 100 INJECTION, SOLUTION INTRAVENOUS; SUBCUTANEOUS at 18:27

## 2018-06-23 RX ADMIN — POTASSIUM CHLORIDE 10 MEQ: 10 INJECTION, SOLUTION INTRAVENOUS at 03:59

## 2018-06-23 RX ADMIN — POTASSIUM CHLORIDE 10 MEQ: 10 INJECTION, SOLUTION INTRAVENOUS at 20:56

## 2018-06-23 RX ADMIN — SODIUM CHLORIDE 40 MG: 9 INJECTION INTRAMUSCULAR; INTRAVENOUS; SUBCUTANEOUS at 08:10

## 2018-06-23 RX ADMIN — PIPERACILLIN SODIUM,TAZOBACTAM SODIUM 2.25 G: 2; .25 INJECTION, POWDER, FOR SOLUTION INTRAVENOUS at 06:12

## 2018-06-23 RX ADMIN — POTASSIUM CHLORIDE: 2 INJECTION, SOLUTION, CONCENTRATE INTRAVENOUS at 17:53

## 2018-06-23 RX ADMIN — METRONIDAZOLE 500 MG: 500 INJECTION, SOLUTION INTRAVENOUS at 05:00

## 2018-06-23 RX ADMIN — ALBUMIN (HUMAN) 12.5 G: 0.25 INJECTION, SOLUTION INTRAVENOUS at 21:35

## 2018-06-23 RX ADMIN — POTASSIUM CHLORIDE 10 MEQ: 10 INJECTION, SOLUTION INTRAVENOUS at 05:00

## 2018-06-23 RX ADMIN — CALCIUM GLUCONATE 2 G: 94 INJECTION, SOLUTION INTRAVENOUS at 03:59

## 2018-06-23 RX ADMIN — METRONIDAZOLE 500 MG: 500 INJECTION, SOLUTION INTRAVENOUS at 11:45

## 2018-06-23 RX ADMIN — Medication 30 MCG/HR: at 21:39

## 2018-06-23 RX ADMIN — FUROSEMIDE 40 MG: 10 INJECTION, SOLUTION INTRAMUSCULAR; INTRAVENOUS at 10:59

## 2018-06-23 RX ADMIN — LEVOFLOXACIN 750 MG: 5 INJECTION, SOLUTION INTRAVENOUS at 01:22

## 2018-06-23 RX ADMIN — INSULIN GLARGINE 10 UNITS: 100 INJECTION, SOLUTION SUBCUTANEOUS at 08:32

## 2018-06-23 RX ADMIN — INSULIN LISPRO 2 UNITS: 100 INJECTION, SOLUTION INTRAVENOUS; SUBCUTANEOUS at 11:55

## 2018-06-23 RX ADMIN — PIPERACILLIN SODIUM,TAZOBACTAM SODIUM 2.25 G: 2; .25 INJECTION, POWDER, FOR SOLUTION INTRAVENOUS at 18:27

## 2018-06-23 NOTE — PROGRESS NOTES
Problem: Mobility Impaired (Adult and Pediatric)  Goal: *Acute Goals and Plan of Care (Insert Text)  Physical Therapy Goals  Updated 6/20/2018 and to be accomplished within 7 day(s)  1. Patient will move from supine to sit and sit to supine in bed with maximal assistance. 2.  Patient will transfer from bed to chair and chair to bed with maximal assistance using the least restrictive device. 3.  Patient will perform sit to stand with maximal assistance. 4.  Patient will ambulate with maximal assistance for 5 feet with the least restrictive device. Outcome: Progressing Towards Goal  physical Therapy TREATMENT    Patient: Dusty Dempsey (65 y.o. female)  Date: 6/23/2018  Diagnosis: Abdominal pain  Abdominal pain  Abdominal Pain  aspiration  POSTOP WOUND EXPLORATION Sepsis (HCC)  Procedure(s) (LRB):  WOUND EXPLORATION, EXPLORATORY LAPAROTOMY, ILLEOSTMOY, LYSES OF ADHESIONS AND WOUND VAC PLACEMENT (N/A) 8 Days Post-Op  Precautions: Fall   Chart, physical therapy assessment, plan of care and goals were reviewed. ASSESSMENT:  Pt is agreable to attempting EOB. Pt's self assist. Pt is able to sit EOB for 20 min, but has slightly labored breathing (% SPO2). 3 standing attempts with total assist, to change bed padding. Increase of labored breathing, but SPO2 does not drop below 96%. Pt remain positive and nonresistant to all requested activities. Pt notes willingness to resume tomorrow. Progression toward goals:  []      Improving appropriately and progressing toward goals  [x]      Improving slowly and progressing toward goals  []      Not making progress toward goals and plan of care will be adjusted     PLAN:  Patient continues to benefit from skilled intervention to address the above impairments. Continue treatment per established plan of care.   Discharge Recommendations:  Leandro Liang or To Be Determined  Further Equipment Recommendations for Discharge:  N/A     SUBJECTIVE:   Patient stated ok.     OBJECTIVE DATA SUMMARY:   Critical Behavior:  Neurologic State: Drowsy, Eyes open to voice  Orientation Level: Disoriented to situation, Disoriented to time, Disoriented to place, Disoriented X4 (pt will not answer questions, did reorient)  Cognition: Decreased attention/concentration, Follows commands, Impaired decision making  Safety/Judgement: Decreased awareness of environment, Decreased awareness of need for assistance, Decreased awareness of need for safety, Lack of insight into deficits  Functional Mobility Training:  Bed Mobility:  Rolling: Maximum assistance; Total assistance  Supine to Sit: Maximum assistance; Total assistance  Sit to Supine: Maximum assistance  Transfers:  Sit to Stand: Total assistance  Stand to Sit: Total assistance  Balance:  Sitting: Impaired; With support  Sitting - Static: Fair (occasional)  Sitting - Dynamic: Fair (occasional)  Standing: Impaired  Standing - Static: Poor  Standing - Dynamic : None  Therapeutic Exercises:   AP, AC, QS and heel slides  Pain:  Pain in: Mild- moderate  Pain out: Moderate  Activity Tolerance:   Fair  Please refer to the flowsheet for vital signs taken during this treatment.   After treatment:   [] Patient left in no apparent distress sitting up in chair  [x] Patient left in no apparent distress in bed  [x] Call bell left within reach  [x] Nursing notified  [x] Caregiver present  [] Bed alarm activated      Lillian Grider PTA   Time Calculation: 35 mins

## 2018-06-23 NOTE — PROGRESS NOTES
Admit date: 6/4/2018      ASSESSMENT/PLAN  Russell pride 75 y. 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 Will need adjuvant chemotherapy to complete primary therapy. GI - Peritonitis secondary to diverticulitis with perforation diverted with distal ileostomy. Post operative ileus resolved. TPN. Less sanguinous drainage today. FEN - electrolyte replacement protocol ordered. TPN  ID - sepsis secondary to peritonitis. On Flagyl, Zosyn, Levaquin, Vanc and micafungin . WBC trending down. Patient remains afebrile. Renal - JESS, resolving. No apparent further injury from IV contrast yesterday,  Heme - Acute blood loss anemia, lolis-operative loss exacerbated by heparin gtt. Tranfusion pRBCs prn. DVT- bilateral peroneal DVT. Repeated venous doppler this am.  CV - stable  Pulm - Respiratory failure, resolved. O2 NC and ABG stable. Psych - selectively interactive, not communicating today  Disp - condition labile, continue ICU care  Code Status - Full Code    SUBJECTIVE  No responsive. Not grimacing. Brother, Thania Sams, present and he says she is responsive to him but not talking. He reports that she denies pain.       OBJECTIVE  Physical Exam:  Patient Vitals for the past 24 hrs:   BP Temp Pulse Resp SpO2 Height Weight   06/23/18 0930 128/77 96.9 °F (36.1 °C) 80 24 98 % - -   06/23/18 0900 126/74 96.3 °F (35.7 °C) 77 22 100 % - -   06/23/18 0830 131/73 - 77 23 99 % - -   06/23/18 0829 131/73 96.1 °F (35.6 °C) 77 23 99 % - -   06/23/18 0815 124/73 96.2 °F (35.7 °C) 78 22 100 % - -   06/23/18 0800 126/74 96.5 °F (35.8 °C) 78 21 98 % - -   06/23/18 0745 129/70 96.3 °F (35.7 °C) 80 22 99 % - -   06/23/18 0740 125/67 96.4 °F (35.8 °C) 80 22 98 % - -   06/23/18 0736 125/67 97.2 °F (36.2 °C) 79 20 99 % - -   06/23/18 0730 125/67 96.8 °F (36 °C) 82 20 99 % - -   06/23/18 0725 128/75 96.4 °F (35.8 °C) 78 20 98 % - -   06/23/18 0720 128/78 96.8 °F (36 °C) 79 21 98 % 5' 1\" (1.549 m) 98.7 kg (217 lb 9.5 oz)   06/23/18 0717 - 97.4 °F (36.3 °C) - - - - -   06/23/18 0712 - 97 °F (36.1 °C) - - - - -   06/23/18 0700 127/72 - 79 21 98 % - -   06/23/18 0600 124/70 - 78 21 96 % - -   06/23/18 0530 152/81 - 95 (!) 31 96 % - -   06/23/18 0500 130/70 - 83 28 96 % - -   06/23/18 0430 123/68 - 76 19 94 % - -   06/23/18 0400 121/74 - 74 20 94 % - -   06/23/18 0330 117/71 - 81 21 93 % - -   06/23/18 0321 - 97.5 °F (36.4 °C) - - - - -   06/23/18 0315 116/68 - 86 20 93 % - -   06/23/18 0300 119/72 - 90 19 96 % - -   06/23/18 0245 112/65 - 76 19 96 % - -   06/23/18 0230 121/69 - 80 17 96 % - -   06/23/18 0215 102/60 - 78 20 97 % - -   06/23/18 0200 118/68 - 78 21 93 % - -   06/23/18 0158 - 98.2 °F (36.8 °C) - - - - -   06/23/18 0145 105/59 - 76 18 97 % - -   06/23/18 0130 107/62 - 76 19 93 % - -   06/23/18 0124 - 98 °F (36.7 °C) - - - - -   06/23/18 0115 106/59 97.6 °F (36.4 °C) 78 19 96 % - -   06/23/18 0100 116/67 - 78 18 98 % - 98.7 kg (217 lb 9.5 oz)   06/23/18 0056 - 98 °F (36.7 °C) - - - - -   06/23/18 0045 126/71 - 76 19 96 % - -   06/23/18 0000 124/70 - 86 22 96 % - -   06/22/18 2345 118/69 97.8 °F (36.6 °C) 78 19 95 % - -   06/22/18 2300 114/68 - 78 19 100 % - -   06/22/18 2230 116/72 - 82 20 97 % - -   06/22/18 2200 130/77 97.8 °F (36.6 °C) 92 21 98 % - -   06/22/18 2130 118/68 - 87 19 98 % - -   06/22/18 2115 115/65 98 °F (36.7 °C) 87 21 98 % - -   06/22/18 2100 114/63 - 86 22 97 % - -   06/22/18 2045 124/75 98.1 °F (36.7 °C) 90 22 98 % - -   06/22/18 2030 119/68 98 °F (36.7 °C) 90 21 99 % - -   06/22/18 2015 124/65 98 °F (36.7 °C) 79 25 98 % - -   06/22/18 2000 135/77 - 90 22 98 % - -   06/22/18 1958 - 97.9 °F (36.6 °C) - - - - -   06/22/18 1915 - - 88 21 97 % - -   06/22/18 1900 134/71 - 90 22 98 % - -   06/22/18 1800 130/71 - 92 20 97 % - -   06/22/18 1723 131/79 - (!) 102 17 93 % - -   06/22/18 1720 - 97.7 °F (36.5 °C) - - - - -   06/22/18 1700 (!) 151/94 - (!) 105 27 96 % - -   06/22/18 1612 132/77 - 95 23 99 % - -   06/22/18 1610 - 97.8 °F (36.6 °C) - - - - -   06/22/18 1600 140/78 - 99 21 100 % - -   06/22/18 1558 138/79 - (!) 103 25 98 % - -   06/22/18 1552 - 97.7 °F (36.5 °C) - - - - -   06/22/18 1517 - 98.3 °F (36.8 °C) - - - - -   06/22/18 1500 120/69 - (!) 131 (!) 37 (!) 87 % - -   06/22/18 1400 129/75 - (!) 107 24 97 % - -   06/22/18 1300 133/86 - (!) 105 26 99 % - -   06/22/18 1200 135/85 - (!) 104 29 98 % - -   06/22/18 1142 - 98.3 °F (36.8 °C) - - - - -   06/22/18 1100 140/72 - (!) 102 29 97 % - -         Intake/Output Summary (Last 24 hours) at 06/23/18 1002  Last data filed at 06/23/18 0728   Gross per 24 hour   Intake          4447.32 ml   Output             3485 ml   Net           962.32 ml        General - Seems to be sleeping with eyes open. Not oriented.   HEENT - within normal limits  Heart - regular rate and rhythm  Lungs - coarse now, tachypnic  Abdomen - soft, nontender, nondistended, normal bowel sounds  Incision - wound vac in place  Extremities - 2+ edema LE    Labs  CBC  Recent Labs      06/23/18   0244  06/22/18   1225  06/22/18   0445  06/21/18   1850   WBC  14.2*  23.6*  31.6*  33.8*   HCT  23.1*  23.3*  27.8*  31.7*   MCV  83.4  79.5  78.8  79.8   MONOS  1*   --   6  4   EOS  1   --   0  0   BASOS  1   --   0  0        CMP  Recent Labs      06/23/18   0244  06/22/18   0445  06/21/18   1850   NA  151*  148*  148*   CO2  23  19*  19*   BUN  44*  45*  41*        Lab Results   Component Value Date/Time    Glucose 208 (H) 06/23/2018 02:44 AM    Glucose (POC) 197 (H) 06/23/2018 06:16 AM    Glucose,  (H) 06/15/2018 05:14 PM          Current Hospital Medications    Current Facility-Administered Medications:     TPN ADULT - CENTRAL, , IntraVENous, CONTINUOUS, Art Munguia MD    0.9% sodium chloride infusion 250 mL, 250 mL, IntraVENous, PRN, Art Munguia MD  Jeffery.Patricia micafungin (MYCAMINE) 100 mg in 0.9% sodium chloride (MBP/ADV) 100 mL, 100 mg, IntraVENous, Q24H, Kristin Quiroz MD, Last Rate: 100 mL/hr at 06/22/18 1350, 100 mg at 06/22/18 1350    insulin glargine (LANTUS) injection 10 Units, 10 Units, SubCUTAneous, DAILY, Kristin Quiroz MD, 10 Units at 06/23/18 0036    insulin lispro (HUMALOG) injection, , SubCUTAneous, Q6H, Kristin Quiroz MD, 2 Units at 06/23/18 0618    dextrose (D50W) injection syrg 12.5-25 g, 25-50 mL, IntraVENous, PRN, Kristin Quiroz MD    TPN ADULT - CENTRAL, , IntraVENous, CONTINUOUS, Kristin Quiroz MD, Last Rate: 60 mL/hr at 06/22/18 1724    Vancomycin - Pharmacy to Dose, 1 Each, Other, Rx Dosing/Monitoring, Kristin Quiroz MD    vancomycin (VANCOCIN) 1250 mg in  ml infusion, 1,250 mg, IntraVENous, Q24H, Kristin Quiroz MD    dilTIAZem (CARDIZEM) 100 mg in 0.9% sodium chloride (MBP/ADV) 100 mL infusion, 0-15 mg/hr, IntraVENous, TITRATE, Kristin Quiroz MD, Last Rate: 5 mL/hr at 06/22/18 1320, 5 mg/hr at 06/22/18 1320    albumin human 25% (BUMINATE) solution 12.5 g, 12.5 g, IntraVENous, Q12H, Kristin Quiroz MD, Last Rate: 50 mL/hr at 06/23/18 0817, 12.5 g at 06/23/18 0817    levoFLOXacin (LEVAQUIN) 750 mg in D5W IVPB, 750 mg, IntraVENous, Q48H, Demarco Faria MD, Last Rate: 100 mL/hr at 06/23/18 0122, 750 mg at 06/23/18 0122    metroNIDAZOLE (FLAGYL) IVPB premix 500 mg, 500 mg, IntraVENous, Q8H, Shanna Zuniga MD, Last Rate: 100 mL/hr at 06/23/18 0500, 500 mg at 06/23/18 0500    ipratropium (ATROVENT) 0.02 % nebulizer solution 0.5 mg, 0.5 mg, Nebulization, Q4H PRN, Shanna Zuniga MD    piperacillin-tazobactam (ZOSYN) 2.25 g in 0.9% sodium chloride (MBP/ADV) 50 mL MBP, 2.25 g, IntraVENous, Q6H, Pietro Forman MD, Last Rate: 100 mL/hr at 06/23/18 0612, 2.25 g at 06/23/18 0612    fentaNYL (PF) 900 mcg/30 ml infusion soln, 0-75 mcg/hr, IntraVENous, TITRATE, Pietro HOBSON MD Dasia, Last Rate: 1 mL/hr at 06/23/18 0101, 30 mcg/hr at 06/23/18 0101    naloxone (NARCAN) injection 0.4 mg, 0.4 mg, IntraVENous, EVERY 2 MINUTES AS NEEDED, Lyndon Tavares MD    [facility]) injection 4 mg, 4 mg, IntraVENous, Q6H PRN, Arnulfo Coronel MD, 4 mg at 06/08/18 2152    oxymetazoline (AFRIN) 0.05 % nasal spray 2 Spray, 2 Spray, Both Nostrils, BID PRN, Lino Burns MD    alum-mag hydroxide-simeth (MYLANTA) oral suspension 30 mL, 30 mL, Oral, Q4H PRN, Christen Odell MD, 30 mL at 06/07/18 2225    fentaNYL citrate (PF) injection 25 mcg, 25 mcg, IntraVENous, Q3H PRN, Christen Odell MD, 25 mcg at 06/22/18 1650    fluticasone (FLONASE) 50 mcg/actuation nasal spray 2 Spray, 2 Spray, Both Nostrils, DAILY PRN, Euna MD Darrin    ELECTROLYTE REPLACEMENT PROTOCOL-POTASSIUM Renal Dosing, 1 Each, Mazin PRN, Christen Rubio MD    ELECTROLYTE REPLACEMENT PROTOCOL-MAGNESIUM, 1 Each, Mazin PRN, Christen Rubio MD    ELECTROLYTE REPLACEMENT PROTOCOL-PHOSPHORUS Renal Dosing, 1 Each, Mazin PRN, Christen Rubio MD    ELECTROLYTE REPLACEMENT PROTOCOL-CALCIUM, 1 Each, Mazin, PRN, Christen Rubio MD    sodium chloride (NS) flush 5-10 mL, 5-10 mL, IntraVENous, PRN, Krystina Wells MD    glucose chewable tablet 16 g, 4 Tab, Oral, PRN, Christen Rubio MD    glucagon (GLUCAGEN) injection 1 mg, 1 mg, IntraMUSCular, PRN, Christen Rubio MD    dextrose (D50W) injection syrg 12.5-25 g, 25-50 mL, IntraVENous, PRN, Christen Rubio MD    pantoprazole (PROTONIX) 40 mg in sodium chloride 0.9% 10 mL injection, 40 mg, IntraVENous, DAILY, Shaun Ro MD, 40 mg at 06/23/18 0810      Euna Ip, MD

## 2018-06-23 NOTE — PROGRESS NOTES
Hospitalist Progress Note    Patient: Adiel Nur MRN: [id number]  CSN: [id number]    YOB: 1950  Age: 75 y.o. Sex: female    DOA: 6/4/2018 LOS:  LOS: 19 days                Assessment/Plan     Patient Active Problem List   Diagnosis Code    Ovarian ca ([facility] C56.9    Severe obesity (BMI 35.0-39.9) ([facility]) E66.01    Abdominal pain R10.9    Sepsis ([facility] A41.9    Oliguria R34    Acute respiratory failure ([facility] J96.00    JESS (acute kidney injury) ([facility] N15.0    Metabolic acidosis E29.0    Acute deep vein thrombosis (DVT) of lower extremity ([facility]) I82.409    S/P ileostomy ([facility] Z93.2    Hypoalbuminemia E88.09    Peritonitis ([facility] K65.9            76 yo female with history of clear cell ovarian ca, s/p HEATH, BSO debulking surgery. Admitted for abdominal pain. S/p laparoscopy and peritoneal lavage 6/5/2018. Patient is awake alert, answering to questions. Resp -   Acute resp failure with hypoxia - self extubated 06/17/2018. On oxygen by NC  S/p bronchoscopy 06/09/2018, no aspiration, resp cultures no growth to date. CTA chest with no evidence of PE  Management per pulmonary    ID -   Severe Sepsis - secondary to peritonitis. Peritoneal fluid growing klebsiella oxytoca. ANTIBIOTICS zosyn, levaquin, flagyl. CVS - Monitor HD. Shock - sepsis vs secondary to suspected PE. Now off pressors. Anasarca - likely third spacing on albumin    Heme/onc - Follow H&H, plts. Bleeding from BRANDON drain - protamine and FFP. Acute bilateral peroneal DVT - heparin drip stopped. Anemia - blood transfusion ordered. Renal - Trend BUN, Cr, follow I/O, romero in place. Check and replace Mg, K, phos. JESS - nephrology following, monitor renal function. Metabolic acidosis - resolved  Hypernatremia - pharmacy to correct with TPN    Endocrine -  Follow FSG, on SSI    Neuro/ Pain/ Sedation - Fentanyl gtt, wean off drip.   Patient more alert after decreasing fentanyl gtt    GI - onTPN   Shock liver resolved  SLP eval  Bleeding from BRANDON drain - CT abdomen and pelvis reviewed. Prophylaxis - DVT: SCDs, GI: protonix    Long discussion with brother at bedside. Physical Exam:  General: As above    HEENT: NC, Atraumatic. PERRLA, anicteric sclerae. Lungs: CTA Bilaterally. No Wheezing/Rhonchi/Rales. Heart:  Regular  rhythm,  No murmur, No Rubs, No Gallops  Abdomen: Soft, Non distended, Non tender. decreased Bowel sounds, ileostomy. Extremities: Edema bilaterally upper and lower ext. Vital signs/Intake and Output:  Visit Vitals    BP (P) 129/74 (BP 1 Location: Left arm, BP Patient Position: At rest)    Pulse (P) 78    Temp (P) 97.7 °F (36.5 °C)    Resp (P) 24    Ht 5' 1\" (1.549 m)    Wt 98.7 kg (217 lb 9.5 oz)    SpO2 (P) 100%    BMI 41.11 kg/m2     Current Shift:  06/23 0701 - 06/23 1900  In: -   Out: 265 [Urine:175; Drains:90]  Last three shifts:  06/21 1901 - 06/23 0700  In: 6034.5 [I.V.:4544.3]  Out: 4020 [Urine:2875; Drains:845]            Labs: Results:       Chemistry Recent Labs      06/23/18   0244  06/22/18   0445  06/21/18   1850   GLU  208*  239*  283*   NA  151*  148*  148*   K  3.6  4.1  3.9   CL  116*  116*  115*   CO2  23  19*  19*   BUN  44*  45*  41*   CREA  1.42*  1.50*  1.45*   CA  7.4*  7.3*  7.1*   AGAP  12  13  14   BUCR  31*  30*  28*   AP   --    --   58   TP   --    --   4.8*   ALB  2.3*   --   2.0*   GLOB   --    --   2.8   AGRAT   --    --   0.7*      CBC w/Diff Recent Labs      06/23/18   0244  06/22/18   1225  06/22/18   0445  06/21/18   1850   WBC  14.2*  23.6*  31.6*  33.8*   RBC  2.77*  2.93*  3.53*  3.97*   HGB  7.5*  7.6*  9.2*  10.4*   HCT  23.1*  23.3*  27.8*  31.7*   PLT  104*  158  181  197   GRANS  88*   --   85*  91*   LYMPH  9*   --   9*  5*   EOS  1   --   0  0      Cardiac Enzymes No results for input(s): CPK, CKND1, FABIAN in the last 72 hours.     No lab exists for component: Margarita Archer   Coagulation Recent Labs      06/23/18   0241 06/22/18   1225  06/22/18   0445   PTP  15.5*  17.0*   --    INR  1.3*  1.4*   --    APTT   --   33.5  98.2*       Lipid Panel Lab Results   Component Value Date/Time    Triglyceride 64 06/19/2018 04:48 AM      BNP No results for input(s): BNPP in the last 72 hours.    Liver Enzymes Recent Labs      06/23/18   0244  06/21/18   1850   TP   --   4.8*   ALB  2.3*  2.0*   AP   --   58   SGOT   --   17      Thyroid Studies No results found for: T4, T3U, TSH, TSHEXT, TSHEXT     Procedures/imaging: see electronic medical records for all procedures/Xrays and details which were not copied into this note but were reviewed prior to creation of Plan

## 2018-06-23 NOTE — PROGRESS NOTES
1915- Report and care received, assessment completed per flow sheet. Drowsy, opens eyes to repeated verbal stimuli, however, frequently re-closes them. Oriented to self, when asked where she is states \"No\", will not answer some questions. Attempted to reorient. FLACC= 0, no signs of pain noted at this time. When asked if having pain states \"No\". Brothers at bedside, states \"She doesn't know what she's saying. \". Brother Krysta Epps agitated, talked for a long time about his concern regarding her status and care, RN provided therapeutic listening. IVF infusing via IV pumps without difficulty. 1958- 1 unit PRBC checked x 2 RN's, see flow sheet. 2314- Blood completed, FFP not ready, blood bank to call when ready. 2345- Reassessment completed and without change, NAD, FLACC=0.    0300- Reassessment completed and without change per flow sheet. Drowsy, opens eyes to repeated verbal, does not answer questions, does purposefully attempt to grab nurses arm and closes mouth refusing mouth care. 65-  updated regarding H/H, status, romero/ostomy/and drain output, drowsiness and fentanyl drip decrease, and potassium (replacement protocol states to contact MD if receiving TPN). Orders received to transfuse 1 additional unit of PRBC, for a PT/INR, and to replace potassium per replacement protocol.

## 2018-06-23 NOTE — PROGRESS NOTES
Consult for TPN Dosing per Pharmacy by Dr. Constantino Dhillon provided for this 75 y.o. female, for indication of Ileus  Day of Therapy  06     TPN Dosing Wt:  62.4 kg    Labs:  BMP BMP:   Lab Results   Component Value Date/Time     (H) 06/23/2018 02:44 AM    K 3.8 06/23/2018 09:45 AM     (H) 06/23/2018 02:44 AM    CO2 23 06/23/2018 02:44 AM    AGAP 12 06/23/2018 02:44 AM     (H) 06/23/2018 02:44 AM    BUN 44 (H) 06/23/2018 02:44 AM    CREA 1.42 (H) 06/23/2018 02:44 AM    GFRAA 45 (L) 06/23/2018 02:44 AM    GFRNA 37 (L) 06/23/2018 02:44 AM          CMP CMP:   Lab Results   Component Value Date/Time     (H) 06/23/2018 02:44 AM    K 3.8 06/23/2018 09:45 AM     (H) 06/23/2018 02:44 AM    CO2 23 06/23/2018 02:44 AM    AGAP 12 06/23/2018 02:44 AM     (H) 06/23/2018 02:44 AM    BUN 44 (H) 06/23/2018 02:44 AM    CREA 1.42 (H) 06/23/2018 02:44 AM    GFRAA 45 (L) 06/23/2018 02:44 AM    GFRNA 37 (L) 06/23/2018 02:44 AM    CA 7.4 (L) 06/23/2018 02:44 AM    MG 2.1 06/23/2018 02:44 AM    PHOS 3.4 06/23/2018 02:44 AM    ALB 2.3 (L) 06/23/2018 02:44 AM         Ca/ Phos Lab Results   Component Value Date/Time    Calcium 7.4 (L) 06/23/2018 02:44 AM    Phosphorus 3.4 06/23/2018 02:44 AM       Mag    Lab Results   Component Value Date/Time    Magnesium 2.1 06/23/2018 02:44 AM      Tg No components found for: TGL   Albumin Lab Results   Component Value Date/Time    Protein, total 4.8 (L) 06/21/2018 06:50 PM    Albumin 2.3 (L) 06/23/2018 02:44 AM         GOAL:   Kcal requirements:                                       ~22 kcal/kg   Fluid requirements:                                      ~30 ml/kg   Macronutrients:   Protein                                                          1.2 g/kg/day 300 kcal   Lipids                                                            44 gm/day 396 kcal   Dextrose remainder of total kcal:                  200 gm/day 680 kcal   Total Calories: 1376 kcal      Today's TPN formulation provides (100 % of Goal):   Calories:                      1376 kcal   Fluid:                            ~23 ml/kg   Protein:                        1.2 gm/kg/day   Fat:                              44 gm/day   CHO:                           3.2 gm/kg/day   Sodium:                       0 mEq   Potassium:                  88 mEq   Calcium:                      0 gm   Magnesium:                1.5 gm   Thiamine:                    0 mg   OAC:                           55 mEq    TPN to be initiated at 100% goal macronutrients and titrated to goal per patient tolerance. Electrolytes to be monitored and adjusted daily. MD to replete electrolytes acutely outside of TPN as needed. Additional recommendations/Orders:    1. Corrective insulin coverage q6h while on TPN. 2. Change/decrease IVF to while on TPN-MD to further adjust as needed  3. BMP, Ca, Mag, Phos ordered daily  4. Triglycerides, Prealbumin, CMP, INR ordered upon initiation and weekly  5. GIR: 2.3 mg/kg/min  6. Electrolyte adjustments for today:                 - K increased from 83 mEq to 88 mEq    Pharmacy to follow daily and will make changes based on labs/clinical status. Donte Donis, Pharm. D.   Clinical Pharmacist  [phone]

## 2018-06-23 NOTE — PROGRESS NOTES
Nephrology Progress note    Subjective:     Kindra Nathan is a 75 y.o. female with a PMH DM, HTN, clear cell ovarian ca s/p debulking who presented with abdominal pain and possible sepsis/ischemic colitis. Pt underwent laparotomy/peritoneal lavage/bx, noted to have decreased UOP and increasing Cr to 2.1 today from baseline 0.6. CT neg for mass or hydro. Pt given lasix yesterday, still with high O2 requirements on vent. Unable to provide further history. On 6/8 Pt decompensated, ? Aspiration,  back on vent was hypoxic despite  Fi02 of 100%, , Hypotensive on IV pressors, Acidotic and on HC03 drip, Urine output decreased markedly over the weekend. Underwent Bronchoscopy 6/9. Pt requires less FiO2 now. UOP picking up. Wound has been draining yellow-brown fluid. S/P Exploratory laparotomy, Lysis of adhesions, Ileostomy and wound VAC placement on 6/16. Pt self-extubated 6/17. Seen this AM.  Cr down to 1.4, K 3.6 today and Na 151. UO 1.2L. Admit Date: 6/4/2018  Allergy:  Allergies   Allergen Reactions    Hydrocodone Other (comments)     Breaks into cold sweat    Metformin Other (comments)     Breaks out into cold sweat. Can Take Glucophage brand name med        Objective:     Visit Vitals    /72    Pulse 76    Temp 98.2 °F (36.8 °C)    Resp 20    Ht 5' 1\" (1.549 m)    Wt 98.7 kg (217 lb 9.5 oz)    SpO2 100%    BMI 41.11 kg/m2         Intake/Output Summary (Last 24 hours) at 06/23/18 1936  Last data filed at 06/23/18 1555   Gross per 24 hour   Intake          3374.28 ml   Output             4195 ml   Net          -820.72 ml       Physical Exam:     General: Off vent, on NC O2   HENT: Atraumatic and normocephalic.    Eyes: Sclera anicteric   Neck: No JVD or mass   Cardiovascular: Normal S1 S2   Pulmonary/Chest Wall: Decreased breath sounds bilaterally   Abdominal: Soft, obese, NABS   Musculoskeletal: ++ edema LEs   Neurological: Awake and no change       Data Review:      Lab Results   Component Value Date/Time    WBC 13.7 (H) 06/23/2018 12:15 PM    RBC 3.21 (L) 06/23/2018 12:15 PM    HCT 27.8 (L) 06/23/2018 06:30 PM    MCV 85.0 06/23/2018 12:15 PM    MCH 27.7 06/23/2018 12:15 PM    MCHC 32.6 06/23/2018 12:15 PM    RDW 22.2 (H) 06/23/2018 12:15 PM      Lab Results   Component Value Date    IRON 8 (L) 06/07/2018   No components found for: FERRITIN  No components found for: PTHINT  Urinalysis  No results found for: UGLU         Impression:     -JESS- likely ATN,  S Cr down to 1.4 today with good diuresis  -Septic Shock/hypotension, BPs normalized  -Resp Failure,  Was re-intubated 6/9,  Self extubated   -Severe Metabolic acidosis, improving.   -Ovarian ca s/p debulking then laparoscopy with lavage  -KLEBSIELLA OXYTOCA sepsis (Peritoneal fluid)  -DM  -h.o. HTN  -Anemia  -Elevated liver enzymes  -S/P Exploratory laparotomy, Lysis of adhesions, Ileostomy and wound VAC placement on 6/16  -Leukocytosis  -Hypernatremia, improving slowly  -Hypokalemia, repleted     Plan:     Cont to hold Lasix for now due to worsening hypernatremia   Replace electrolytes per protocol   Monitor I/O and chemistry, H&H  Antibiotics.   Cont Vanc dosing per pharmacist  Will follow Renal function closely  Adjusting TPN    Alka DickersonrMD [place]  [phone]

## 2018-06-23 NOTE — PROGRESS NOTES
0700 Completed shift report and assumed care from Mei Travis RN. We reviewed SBAR, labs, meds and plan of care, as well as verified drip rates  0715 Started blood transfusion with Mei Travis RN  0730 Completed shift assessment  0830 Dr. Vinod Joel rounded on pt, pt eyes are open but not responding to questions. He stated pt has history of being selective with interaction and he is aware. Pt blinking and neurologically benign, otherwise. 4092 Provided oral care and repositioned pt  1015 Spoke to Dr. Wanda Coulter regarding pt lack of response/ interaction, she rounded on pt and put in orders, will continue to monitor pt  1020 Dr. Kathy Gannon ordered 40 mg of lasix post-chest x-ray  1115 Reassessed pt, repositioned  1200 Called lab for ionized calcium value, they advised there was a machine down but results should be available soon  1330 Repositioned pt and provided oral care  1400 Helped PT work with pt. Sat bedside, BP stable, stood once momentarily  1420 Pt back in bed, VS stable, CHG completed  1600 Reassessed and repositioned pt, more alert and responsive  1830 Repositioned pt  1920 Completed shift report and transferred care to JAGDISH Ward RN. We reviewed SBAR, labs, meds, and plan of care.

## 2018-06-24 LAB
ANION GAP SERPL CALC-SCNC: 11 MMOL/L (ref 3–18)
ATRIAL RATE: 112 BPM
BASOPHILS # BLD: 0.1 K/UL (ref 0–0.1)
BASOPHILS NFR BLD: 1 % (ref 0–3)
BLD PROD TYP BPU: NORMAL
BPU ID: NORMAL
BUN SERPL-MCNC: 47 MG/DL (ref 7–18)
BUN/CREAT SERPL: 32 (ref 12–20)
CA-I SERPL-SCNC: 1.14 MMOL/L (ref 1.12–1.32)
CALCIUM SERPL-MCNC: 7.8 MG/DL (ref 8.5–10.1)
CALCULATED P AXIS, ECG09: 31 DEGREES
CALCULATED R AXIS, ECG10: 11 DEGREES
CALCULATED T AXIS, ECG11: 18 DEGREES
CALLED TO:,BCALL1: NORMAL
CHLORIDE SERPL-SCNC: 118 MMOL/L (ref 100–108)
CO2 SERPL-SCNC: 22 MMOL/L (ref 21–32)
CREAT SERPL-MCNC: 1.46 MG/DL (ref 0.6–1.3)
DIAGNOSIS, 93000: NORMAL
DIFFERENTIAL METHOD BLD: ABNORMAL
EOSINOPHIL # BLD: 0.3 K/UL (ref 0–0.4)
EOSINOPHIL NFR BLD: 2 % (ref 0–5)
ERYTHROCYTE [DISTWIDTH] IN BLOOD BY AUTOMATED COUNT: 23.4 % (ref 11.6–14.5)
GLUCOSE BLD STRIP.AUTO-MCNC: 131 MG/DL (ref 70–110)
GLUCOSE BLD STRIP.AUTO-MCNC: 182 MG/DL (ref 70–110)
GLUCOSE BLD STRIP.AUTO-MCNC: 191 MG/DL (ref 70–110)
GLUCOSE BLD STRIP.AUTO-MCNC: 201 MG/DL (ref 70–110)
GLUCOSE SERPL-MCNC: 146 MG/DL (ref 74–99)
HCT VFR BLD AUTO: 29 % (ref 35–45)
HGB BLD-MCNC: 9.4 G/DL (ref 12–16)
LYMPHOCYTES # BLD: 1.4 K/UL (ref 0.8–3.5)
LYMPHOCYTES NFR BLD: 10 % (ref 20–51)
MAGNESIUM SERPL-MCNC: 2 MG/DL (ref 1.6–2.6)
MCH RBC QN AUTO: 27.9 PG (ref 24–34)
MCHC RBC AUTO-ENTMCNC: 32.4 G/DL (ref 31–37)
MCV RBC AUTO: 86.1 FL (ref 74–97)
MONOCYTES # BLD: 0.3 K/UL (ref 0–1)
MONOCYTES NFR BLD: 2 % (ref 2–9)
NEUTS BAND NFR BLD MANUAL: 6 % (ref 0–5)
NEUTS SEG # BLD: 11.2 K/UL (ref 1.8–8)
NEUTS SEG NFR BLD: 79 % (ref 42–75)
P-R INTERVAL, ECG05: 146 MS
PHOSPHATE SERPL-MCNC: 2.9 MG/DL (ref 2.5–4.9)
PLATELET # BLD AUTO: 119 K/UL (ref 135–420)
PLATELET COMMENTS,PCOM: ABNORMAL
POTASSIUM SERPL-SCNC: 3.9 MMOL/L (ref 3.5–5.5)
Q-T INTERVAL, ECG07: 328 MS
QRS DURATION, ECG06: 78 MS
QTC CALCULATION (BEZET), ECG08: 447 MS
RBC # BLD AUTO: 3.37 M/UL (ref 4.2–5.3)
RBC MORPH BLD: ABNORMAL
SODIUM SERPL-SCNC: 151 MMOL/L (ref 136–145)
STATUS OF UNIT,%ST: NORMAL
UNIT DIVISION, %UDIV: 0
VENTRICULAR RATE, ECG03: 112 BPM
WBC # BLD AUTO: 14.2 K/UL (ref 4.6–13.2)

## 2018-06-24 PROCEDURE — C9113 INJ PANTOPRAZOLE SODIUM, VIA: HCPCS | Performed by: INTERNAL MEDICINE

## 2018-06-24 PROCEDURE — 82962 GLUCOSE BLOOD TEST: CPT

## 2018-06-24 PROCEDURE — 74011000250 HC RX REV CODE- 250: Performed by: INTERNAL MEDICINE

## 2018-06-24 PROCEDURE — P9047 ALBUMIN (HUMAN), 25%, 50ML: HCPCS | Performed by: INTERNAL MEDICINE

## 2018-06-24 PROCEDURE — 74011250636 HC RX REV CODE- 250/636: Performed by: OBSTETRICS & GYNECOLOGY

## 2018-06-24 PROCEDURE — 36592 COLLECT BLOOD FROM PICC: CPT

## 2018-06-24 PROCEDURE — 80048 BASIC METABOLIC PNL TOTAL CA: CPT | Performed by: OBSTETRICS & GYNECOLOGY

## 2018-06-24 PROCEDURE — 74011000258 HC RX REV CODE- 258: Performed by: INTERNAL MEDICINE

## 2018-06-24 PROCEDURE — 97110 THERAPEUTIC EXERCISES: CPT

## 2018-06-24 PROCEDURE — 77010033678 HC OXYGEN DAILY

## 2018-06-24 PROCEDURE — 97530 THERAPEUTIC ACTIVITIES: CPT

## 2018-06-24 PROCEDURE — 74011000258 HC RX REV CODE- 258: Performed by: OBSTETRICS & GYNECOLOGY

## 2018-06-24 PROCEDURE — 74011250636 HC RX REV CODE- 250/636: Performed by: INTERNAL MEDICINE

## 2018-06-24 PROCEDURE — 74011636637 HC RX REV CODE- 636/637: Performed by: INTERNAL MEDICINE

## 2018-06-24 PROCEDURE — 83735 ASSAY OF MAGNESIUM: CPT | Performed by: OBSTETRICS & GYNECOLOGY

## 2018-06-24 PROCEDURE — 84100 ASSAY OF PHOSPHORUS: CPT | Performed by: OBSTETRICS & GYNECOLOGY

## 2018-06-24 PROCEDURE — 85025 COMPLETE CBC W/AUTO DIFF WBC: CPT | Performed by: OBSTETRICS & GYNECOLOGY

## 2018-06-24 PROCEDURE — 82330 ASSAY OF CALCIUM: CPT | Performed by: INTERNAL MEDICINE

## 2018-06-24 PROCEDURE — 65270000029 HC RM PRIVATE

## 2018-06-24 RX ORDER — DEXTROSE MONOHYDRATE 50 MG/ML
50 INJECTION, SOLUTION INTRAVENOUS CONTINUOUS
Status: DISCONTINUED | OUTPATIENT
Start: 2018-06-24 | End: 2018-06-24

## 2018-06-24 RX ADMIN — INSULIN LISPRO 4 UNITS: 100 INJECTION, SOLUTION INTRAVENOUS; SUBCUTANEOUS at 17:42

## 2018-06-24 RX ADMIN — INSULIN LISPRO 2 UNITS: 100 INJECTION, SOLUTION INTRAVENOUS; SUBCUTANEOUS at 11:49

## 2018-06-24 RX ADMIN — PIPERACILLIN SODIUM,TAZOBACTAM SODIUM 2.25 G: 2; .25 INJECTION, POWDER, FOR SOLUTION INTRAVENOUS at 11:47

## 2018-06-24 RX ADMIN — METRONIDAZOLE 500 MG: 500 INJECTION, SOLUTION INTRAVENOUS at 11:47

## 2018-06-24 RX ADMIN — PIPERACILLIN SODIUM,TAZOBACTAM SODIUM 2.25 G: 2; .25 INJECTION, POWDER, FOR SOLUTION INTRAVENOUS at 23:28

## 2018-06-24 RX ADMIN — ALBUMIN (HUMAN) 12.5 G: 0.25 INJECTION, SOLUTION INTRAVENOUS at 08:41

## 2018-06-24 RX ADMIN — POTASSIUM CHLORIDE: 2 INJECTION, SOLUTION, CONCENTRATE INTRAVENOUS at 17:30

## 2018-06-24 RX ADMIN — SODIUM CHLORIDE 40 MG: 9 INJECTION INTRAMUSCULAR; INTRAVENOUS; SUBCUTANEOUS at 08:41

## 2018-06-24 RX ADMIN — VANCOMYCIN HYDROCHLORIDE 1250 MG: 10 INJECTION, POWDER, LYOPHILIZED, FOR SOLUTION INTRAVENOUS at 16:05

## 2018-06-24 RX ADMIN — METRONIDAZOLE 500 MG: 500 INJECTION, SOLUTION INTRAVENOUS at 05:24

## 2018-06-24 RX ADMIN — INSULIN GLARGINE 10 UNITS: 100 INJECTION, SOLUTION SUBCUTANEOUS at 08:41

## 2018-06-24 RX ADMIN — Medication 25 MCG/HR: at 19:47

## 2018-06-24 RX ADMIN — METRONIDAZOLE 500 MG: 500 INJECTION, SOLUTION INTRAVENOUS at 21:06

## 2018-06-24 RX ADMIN — INSULIN LISPRO 2 UNITS: 100 INJECTION, SOLUTION INTRAVENOUS; SUBCUTANEOUS at 23:52

## 2018-06-24 RX ADMIN — PIPERACILLIN SODIUM,TAZOBACTAM SODIUM 2.25 G: 2; .25 INJECTION, POWDER, FOR SOLUTION INTRAVENOUS at 17:42

## 2018-06-24 RX ADMIN — MICAFUNGIN SODIUM 100 MG: 20 INJECTION, POWDER, LYOPHILIZED, FOR SOLUTION INTRAVENOUS at 11:47

## 2018-06-24 RX ADMIN — ALBUMIN (HUMAN) 12.5 G: 0.25 INJECTION, SOLUTION INTRAVENOUS at 21:06

## 2018-06-24 RX ADMIN — PIPERACILLIN SODIUM,TAZOBACTAM SODIUM 2.25 G: 2; .25 INJECTION, POWDER, FOR SOLUTION INTRAVENOUS at 05:27

## 2018-06-24 NOTE — PROGRESS NOTES
Problem: Mobility Impaired (Adult and Pediatric)  Goal: *Acute Goals and Plan of Care (Insert Text)  Physical Therapy Goals  Updated 6/20/2018 and to be accomplished within 7 day(s)  1. Patient will move from supine to sit and sit to supine in bed with maximal assistance. 2.  Patient will transfer from bed to chair and chair to bed with maximal assistance using the least restrictive device. 3.  Patient will perform sit to stand with maximal assistance. 4.  Patient will ambulate with maximal assistance for 5 feet with the least restrictive device. Outcome: Progressing Towards Goal  physical Therapy TREATMENT    Patient: Conchita Caldwell (30 y.o. female)  Date: 6/24/2018  Diagnosis: Abdominal pain  Abdominal pain  Abdominal Pain  aspiration  POSTOP WOUND EXPLORATION Sepsis (HCC)  Procedure(s) (LRB):  WOUND EXPLORATION, EXPLORATORY LAPAROTOMY, ILLEOSTMOY, LYSES OF ADHESIONS AND WOUND VAC PLACEMENT (N/A) 9 Days Post-Op  Precautions: Fall   Chart, physical therapy assessment, plan of care and goals were reviewed. ASSESSMENT:  Pt presents with increased lethargy and increased labored breathing (resp rate of 30-42). Pt refuses any EOB transition and participates minimally with exercises. PROM provided for all extremities. Pt taps hand to notify writer of pain with Bilateral hip Abduction. Pt's response level is much less than yesterday's. Will attempt to resume, as appropriate. Progression toward goals:  []      Improving appropriately and progressing toward goals  [x]      Improving slowly and progressing toward goals  []      Not making progress toward goals and plan of care will be adjusted     PLAN:  Patient continues to benefit from skilled intervention to address the above impairments. Continue treatment per established plan of care.   Discharge Recommendations:  Rehab or [facility]  Further Equipment Recommendations for Discharge:  N/A     SUBJECTIVE:   Patient stated (nonverbal)    OBJECTIVE DATA SUMMARY:   Critical Behavior:  Neurologic State: Eyes open spontaneously  Orientation Level: Other (Comment) (Unwilling to participate)  Cognition: Decreased attention/concentration, Decreased command following  Safety/Judgement: Decreased awareness of environment, Decreased awareness of need for assistance, Decreased awareness of need for safety, Lack of insight into deficits  Functional Mobility Training:  Bed Mobility:  Rolling: Total assistance  Pain:  Pain Scale 1: Numeric (0 - 10)  Pain Intensity 1: 0  Activity Tolerance:   Poor  Please refer to the flowsheet for vital signs taken during this treatment.   After treatment:   [] Patient left in no apparent distress sitting up in chair  [x] Patient left in no apparent distress in bed  [x] Call bell left within reach  [x] Nursing notified  [] Caregiver present  [] Bed alarm activated      Karen Cruz PTA   Time Calculation: 43 mins

## 2018-06-24 NOTE — PROGRESS NOTES
1915- Bedside shift change report given to Sonia Warren RN (oncoming nurse) by Aki Tavarez RN (offgoing nurse). Report included the following information SBAR, Kardex, ED Summary, OR Summary, Procedure Summary, Intake/Output, MAR, Accordion and Recent Results. 1930- Alert to self and can cooperative with a lot of direction Nods appropriately. VSS. Lungs clear and diminished bilaterally. Generalized pitting edema (2+, 3+). PICC line is intact. Pump infusing with no difficulties. Will continue to monitor and assess  2300- Patient resting in no apparent distress, will continue to monitor and assess. 0000- Reassessment completed. No change to previous assessment. 0200- Patient resting. No c/o pain at this time. Will continue to monitor and assess. 09203- Reassessment completed. No change to previous assessment.

## 2018-06-24 NOTE — WOUND CARE
Wound care in to check wound vac, all seems functioning well. Wound care to continue to monitor vac and be available if needed.

## 2018-06-24 NOTE — PROGRESS NOTES
0730 Report received from Maurie Boxer, RN at bedside using [employer]. All questions answered and clinicals reviewed. 0900 Pt's brother Nazanin Lopez at bedside, updated by JAGDISH Heaton. Numerous questions and concerns addressed. 1200 Reassessment complete    1600 Reassessment complete    1900 Report given to Maurie Boxer, RN at bedside using [employer]. All questions answered and clinicals reviewed.

## 2018-06-24 NOTE — PROGRESS NOTES
Consult for TPN Dosing per Pharmacy by Dr. Clinton Walker provided for this 75 y.o. female, for indication of Ileus  Day of Therapy  07     TPN Dosing Wt:  62.4 kg    Labs:  BMP BMP:   Lab Results   Component Value Date/Time     (H) 06/24/2018 04:30 AM    K 3.9 06/24/2018 04:30 AM     (H) 06/24/2018 04:30 AM    CO2 22 06/24/2018 04:30 AM    AGAP 11 06/24/2018 04:30 AM     (H) 06/24/2018 04:30 AM    BUN 47 (H) 06/24/2018 04:30 AM    CREA 1.46 (H) 06/24/2018 04:30 AM    GFRAA 43 (L) 06/24/2018 04:30 AM    GFRNA 36 (L) 06/24/2018 04:30 AM          CMP CMP:   Lab Results   Component Value Date/Time     (H) 06/24/2018 04:30 AM    K 3.9 06/24/2018 04:30 AM     (H) 06/24/2018 04:30 AM    CO2 22 06/24/2018 04:30 AM    AGAP 11 06/24/2018 04:30 AM     (H) 06/24/2018 04:30 AM    BUN 47 (H) 06/24/2018 04:30 AM    CREA 1.46 (H) 06/24/2018 04:30 AM    GFRAA 43 (L) 06/24/2018 04:30 AM    GFRNA 36 (L) 06/24/2018 04:30 AM    CA 7.8 (L) 06/24/2018 04:30 AM    MG 2.0 06/24/2018 04:30 AM    PHOS 2.9 06/24/2018 04:30 AM         Ca/ Phos Lab Results   Component Value Date/Time    Calcium 7.8 (L) 06/24/2018 04:30 AM    Phosphorus 2.9 06/24/2018 04:30 AM       Mag    Lab Results   Component Value Date/Time    Magnesium 2.0 06/24/2018 04:30 AM      Tg No components found for: TGL   Albumin Lab Results   Component Value Date/Time    Protein, total 4.8 (L) 06/21/2018 06:50 PM    Albumin 2.3 (L) 06/23/2018 02:44 AM         GOAL:   Kcal requirements:    ~22 kcal/kg   Fluid requirements:    ~30 ml/kg   Macronutrients:   Protein     1.2 g/kg/day 300 kcal   Lipids      44 gm/day 396 kcal   Dextrose remainder of total kcal:  200 gm/day 680 kcal   Total Calories:    1376 kcal     Today's TPN formulation provides (100 % of Goal):   Calories:   1376 kcal   Fluid:    ~23 ml/kg   Protein:   1.2 gm/kg/day   Fat:    44 gm/day   CHO:    3.2 gm/kg/day   Sodium:   0 mEq   Potassium:   88 mEq   Calcium:   0 gm Magnesium:   1.5 gm   Thiamine:   0 mg   OAC:                           55 mEq  Insulin-R:                    10 units    TPN to be initiated at 100% goal macronutrients and titrated to goal per patient tolerance. Electrolytes to be monitored and adjusted daily. MD to replete electrolytes acutely outside of TPN as needed. Additional recommendations/Orders:    1. Corrective insulin coverage q6h while on TPN. 2. Change/decrease IVF to while on TPN-MD to further adjust as needed  3. BMP, Ca, Mag, Phos ordered daily  4. Triglycerides, Prealbumin, CMP, INR ordered upon initiation and weekly  5. GIR: 2.3 mg/kg/min  6. Electrolyte adjustments for today:                 - None    Pharmacy to follow daily and will make changes based on labs/clinical status. Devon Franz, Pharm. D.   Clinical Pharmacist  [phone]

## 2018-06-24 NOTE — PROGRESS NOTES
Problem: Pressure Injury - Risk of  Goal: *Prevention of pressure injury  Document Oscar Scale and appropriate interventions in the flowsheet. Outcome: Progressing Towards Goal  Pressure Injury Interventions:  Sensory Interventions: Assess changes in LOC, Assess need for specialty bed, Avoid rigorous massage over bony prominences, Keep linens dry and wrinkle-free, Minimize linen layers, Monitor skin under medical devices, Pressure redistribution bed/mattress (bed type), Turn and reposition approx. every two hours (pillows and wedges if needed)    Moisture Interventions: Apply protective barrier, creams and emollients, Assess need for specialty bed, Contain wound drainage, Internal/External fecal devices, Internal/External urinary devices, Limit adult briefs, Maintain skin hydration (lotion/cream), Minimize layers, Moisture barrier    Activity Interventions: Assess need for specialty bed, Pressure redistribution bed/mattress(bed type), PT/OT evaluation    Mobility Interventions: Assess need for specialty bed, HOB 30 degrees or less, Pressure redistribution bed/mattress (bed type), PT/OT evaluation, Turn and reposition approx. every two hours(pillow and wedges)    Nutrition Interventions: Document food/fluid/supplement intake, Discuss nutritional consult with provider    Friction and Shear Interventions: Feet elevated on foot rest, HOB 30 degrees or less, Lift sheet, Minimize layers               Problem: Falls - Risk of  Goal: *Absence of Falls  Document Goyo Fall Risk and appropriate interventions in the flowsheet.    Outcome: Progressing Towards Goal  Fall Risk Interventions:  Mobility Interventions: Bed/chair exit alarm    Mentation Interventions: Bed/chair exit alarm, Adequate sleep, hydration, pain control, Door open when patient unattended, Family/sitter at bedside    Medication Interventions: Bed/chair exit alarm    Elimination Interventions: Bed/chair exit alarm

## 2018-06-24 NOTE — PROGRESS NOTES
Hospitalist Progress Note    Patient: Lily Pope MRN: [id number]  CSN: [id number]    YOB: 1950  Age: 75 y.o. Sex: female    DOA: 6/4/2018 LOS:  LOS: 20 days                Assessment/Plan     Patient Active Problem List   Diagnosis Code    Ovarian ca ([facility] C56.9    Severe obesity (BMI 35.0-39.9) ([facility]) E66.01    Abdominal pain R10.9    Sepsis ([facility] A41.9    Oliguria R34    [facility] J96.00    JESS (acute kidney injury) ([facility] O08.2    Metabolic acidosis Z16.4    Acute deep vein thrombosis (DVT) of lower extremity ([facility]) I82.409    S/P ileostomy ([facility] Z93.2    Hypoalbuminemia E88.09    Peritonitis ([facility] K65.9            74 yo female with history of clear cell ovarian ca, s/p HEATH, BSO debulking surgery. Admitted for abdominal pain. S/p laparoscopy and peritoneal lavage 6/5/2018. Patient is awake alert, answering to questions. Resp -   Acute resp failure with hypoxia - self extubated 06/17/2018. On oxygen by NC  S/p bronchoscopy 06/09/2018, no aspiration, resp cultures no growth to date. CTA chest with no evidence of PE  Management per pulmonary    ID -   Severe Sepsis - secondary to peritonitis. Peritoneal fluid growing klebsiella oxytoca. ANTIBIOTICS zosyn, levaquin, flagyl. CVS - Monitor HD. Shock - sepsis vs secondary to suspected PE. Now off pressors. Anasarca - likely third spacing on albumin    Heme/onc - Follow H&H, plts. Bleeding from BRANDON drain - protamine and FFP. Acute bilateral peroneal DVT - heparin drip stopped. Anemia - blood transfusion ordered. Renal - Trend BUN, Cr, follow I/O, romero in place. Check and replace Mg, K, phos. JESS - nephrology following, monitor renal function. Metabolic acidosis - resolved  Hypernatremia - pharmacy to correct with TPN    Endocrine -  Follow FSG, on SSI    Neuro/ Pain/ Sedation - Fentanyl gtt, wean off drip.   Patient more alert after decreasing fentanyl gtt    GI - onTPN   Shock liver resolved  SLP eval  Bleeding from BRANDON drain - CT abdomen and pelvis reviewed. Prophylaxis - DVT: SCDs, GI: protonix    Long discussion with brother at bedside. Physical Exam:  General: As above    HEENT: NC, Atraumatic. PERRLA, anicteric sclerae. Lungs: CTA Bilaterally. No Wheezing/Rhonchi/Rales. Heart:  Regular  rhythm,  No murmur, No Rubs, No Gallops  Abdomen: Soft, Non distended, Non tender. decreased Bowel sounds, ileostomy. Extremities: Edema bilaterally upper and lower ext.              Vital signs/Intake and Output:  Visit Vitals    /82    Pulse 90    Temp 97.8 °F (36.6 °C)    Resp 27    Ht 5' 1\" (1.549 m)    Wt 102.8 kg (226 lb 10.1 oz)    SpO2 98%    BMI 42.82 kg/m2     Current Shift:  06/24 0701 - 06/24 1900  In: 1213.1 [I.V.:1213.1]  Out: 1120 [Urine:640; Drains:80]  Last three shifts:  06/22 1901 - 06/24 0700  In: 3662.8 [I.V.:2161.5]  Out: 2887 [NZQHA:9296; Drains:440]            Labs: Results:       Chemistry Recent Labs      06/24/18   0430  06/23/18   1550  06/23/18   0945  06/23/18   0244  06/22/18   0445  06/21/18   1850   GLU  146*   --    --   208*  239*  283*   NA  151*   --    --   151*  148*  148*   K  3.9  3.7  3.8  3.6  4.1  3.9   CL  118*   --    --   116*  116*  115*   CO2  22   --    --   23  19*  19*   BUN  47*   --    --   44*  45*  41*   CREA  1.46*   --    --   1.42*  1.50*  1.45*   CA  7.8*   --    --   7.4*  7.3*  7.1*   AGAP  11   --    --   12  13  14   BUCR  32*   --    --   31*  30*  28*   AP   --    --    --    --    --   58   TP   --    --    --    --    --   4.8*   ALB   --    --    --   2.3*   --   2.0*   GLOB   --    --    --    --    --   2.8   AGRAT   --    --    --    --    --   0.7*      CBC w/Diff Recent Labs      06/24/18   0430  06/23/18   1830  06/23/18   1215  06/23/18   0244   WBC  14.2*   --   13.7*  14.2*   RBC  3.37*   --   3.21*  2.77*   HGB  9.4*  9.0*  8.9*  7.5*   HCT  29.0*  27.8*  27.3*  23.1*   PLT  119*   --   115*  104* GRANS  79*   --   83*  88*   LYMPH  10*   --   11*  9*   EOS  2   --   1  1      Cardiac Enzymes No results for input(s): CPK, CKND1, FABIAN in the last 72 hours. No lab exists for component: CKRMB, TROIP   Coagulation Recent Labs      06/23/18   0244  06/22/18   1225  06/22/18   0445   PTP  15.5*  17.0*   --    INR  1.3*  1.4*   --    APTT   --   33.5  98.2*       Lipid Panel Lab Results   Component Value Date/Time    Triglyceride 64 06/19/2018 04:48 AM      BNP No results for input(s): BNPP in the last 72 hours.    Liver Enzymes Recent Labs      06/23/18   0244  06/21/18   1850   TP   --   4.8*   ALB  2.3*  2.0*   AP   --   58   SGOT   --   17      Thyroid Studies No results found for: T4, T3U, TSH, TSHEXT, TSHEXT     Procedures/imaging: see electronic medical records for all procedures/Xrays and details which were not copied into this note but were reviewed prior to creation of Plan

## 2018-06-24 NOTE — PROGRESS NOTES
Admit date: 6/4/2018      ASSESSMENT/PLAN  Ewa Don a 75 y. 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 Will need adjuvant chemotherapy to complete primary therapy. GI - Peritonitis secondary to diverticulitis with perforation diverted with distal ileostomy. Post operative ileus resolved. Good ileostomy out put. TPN now. Will allow trial of po today. FEN - electrolyte replacement protocol ordered. TPN  ID - sepsis secondary to peritonitis. On Flagyl, Zosyn, Levaquin, Vanc and micafungin . WBC trending down. Patient remains afebrile. Renal - JESS, resolving. Heme - Acute blood loss anemia, lolis-operative loss exacerbated by heparin gtt. Tranfusion pRBCs prn. DVT- bilateral peroneal DVT. Repeated venous doppler yesterday but no results in EPIC yet  CV - stable  Pulm - Respiratory failure, resolved. O2 NC and ABG stable. Psych - selectively interactive, communicating better today  Disp - condition labile, continue ICU care  Code Status - Full Code     SUBJECTIVE  More responsive. Was eating frosty with brother this am and no choking or coughing. Patient wants to eat. No pain. No nausea.        OBJECTIVE  Physical Exam:  Patient Vitals for the past 24 hrs:   BP Temp Pulse Resp SpO2 Height Weight   06/24/18 1100 150/88 - 95 28 100 % - -   06/24/18 1000 127/82 - 94 28 95 % - -   06/24/18 0900 139/84 - 88 30 100 % - -   06/24/18 0840 - - - - - 5' 1\" (1.549 m) 102.8 kg (226 lb 10.1 oz)   06/24/18 0800 142/83 97.5 °F (36.4 °C) 92 (!) 31 99 % - -   06/24/18 0700 143/86 - 90 (!) 34 100 % - -   06/24/18 0600 145/85 - 93 (!) 35 99 % - -   06/24/18 0500 146/83 - 92 (!) 33 99 % - -   06/24/18 0400 (!) 132/92 98 °F (36.7 °C) 92 (!) 31 100 % - -   06/24/18 0300 140/87 - (!) 101 29 95 % - -   06/24/18 0200 138/79 - 90 29 97 % - -   06/24/18 0100 - - 87 (!) 31 100 % - -   06/24/18 0000 - 97.6 °F (36.4 °C) 85 30 97 % - -   06/23/18 2300 143/79 - 85 20 100 % - -   06/23/18 2200 134/76 - 85 28 100 % - -   06/23/18 2100 135/75 - 83 26 100 % - -   06/23/18 2000 130/76 98.6 °F (37 °C) 87 30 99 % - -   06/23/18 1900 125/72 - 76 20 100 % - -   06/23/18 1800 133/70 - 84 24 100 % - -   06/23/18 1700 116/69 - 80 24 98 % - -   06/23/18 1600 121/65 98.2 °F (36.8 °C) 73 23 100 % - -   06/23/18 1500 125/68 - 73 23 100 % - -   06/23/18 1409 (!) 131/93 - 85 30 100 % - -   06/23/18 1400 134/75 - 82 24 96 % - -   06/23/18 1300 135/77 - 78 24 100 % - -         Intake/Output Summary (Last 24 hours) at 06/24/18 1231  Last data filed at 06/24/18 1157   Gross per 24 hour   Intake          1825.01 ml   Output             1775 ml   Net            50.01 ml      General - More alert today  HEENT - within normal limits  Heart - regular rate and rhythm  Lungs - coarse now, tachypnic  Abdomen - soft, nontender, nondistended, normal bowel sounds  Incision - wound vac in place  Extremities - 2+ edema LE    Labs  CBC  Recent Labs      06/24/18   0430  06/23/18   1830  06/23/18   1215  06/23/18   0244   WBC  14.2*   --   13.7*  14.2*   HCT  29.0*  27.8*  27.3*  23.1*   MCV  86.1   --   85.0  83.4   BANDS  6*   --   1   --    MONOS  2   --   4  1*   EOS  2   --   1  1   BASOS  1   --   0  1        CMP  Recent Labs      06/24/18   0430  06/23/18   0244  06/22/18   0445   NA  151*  151*  148*   CO2  22  23  19*   BUN  47*  44*  45*        Lab Results   Component Value Date/Time    Glucose 146 (H) 06/24/2018 04:30 AM    Glucose (POC) 191 (H) 06/24/2018 11:29 AM    Glucose,  (H) 06/15/2018 05:14 PM          Current Hospital Medications    Current Facility-Administered Medications:     TPN ADULT - CENTRAL, , IntraVENous, CONTINUOUS, Nathan Jara MD    TPN ADULT - CENTRAL, , IntraVENous, CONTINUOUS, Nathan Jara MD, Last Rate: 60 mL/hr at 06/23/18 1753    0.9% sodium chloride infusion 250 mL, 250 mL, IntraVENous, PRN, Keely Auguste MD    micafungin [facility]) 100 mg in 0.9% sodium chloride (MBP/ADV) 100 mL, 100 mg, IntraVENous, Q24H, Keely Auguste MD, Last Rate: 100 mL/hr at 06/24/18 1147, 100 mg at 06/24/18 1147    insulin glargine (LANTUS) injection 10 Units, 10 Units, SubCUTAneous, DAILY, Keely Auguste MD, 10 Units at 06/24/18 0841    insulin lispro (HUMALOG) injection, , SubCUTAneous, Q6H, Keely Auguste MD, 2 Units at 06/24/18 1149    dextrose (D50W) injection syrg 12.5-25 g, 25-50 mL, IntraVENous, PRN, Keely Auguste MD    Vancomycin - Pharmacy to Dose, 1 Each, Other, Rx Dosing/Monitoring, Keely Auguste MD    vancomycin (VANCOCIN) 1250 mg in  ml infusion, 1,250 mg, IntraVENous, Q24H, Keely Auguste MD, Last Rate: 125 mL/hr at 06/23/18 1551, 1,250 mg at 06/23/18 1551    albumin human 25% (BUMINATE) solution 12.5 g, 12.5 g, IntraVENous, Q12H, Keely Auguste MD, Last Rate: 50 mL/hr at 06/23/18 0817, 12.5 g at 06/24/18 0841    levoFLOXacin (LEVAQUIN) 750 mg in D5W IVPB, 750 mg, IntraVENous, Q48H, Sarah Madrid MD, Last Rate: 100 mL/hr at 06/23/18 0122, 750 mg at 06/23/18 0122    metroNIDAZOLE (FLAGYL) IVPB premix 500 mg, 500 mg, IntraVENous, Q8H, Chadd Wesley MD, Last Rate: 100 mL/hr at 06/24/18 1147, 500 mg at 06/24/18 1147    ipratropium (ATROVENT) 0.02 % nebulizer solution 0.5 mg, 0.5 mg, Nebulization, Q4H PRN, Chadd Wesley MD    piperacillin-tazobactam (ZOSYN) 2.25 g in 0.9% sodium chloride (MBP/ADV) 50 mL MBP, 2.25 g, IntraVENous, Q6H, Pietro Forman MD, Last Rate: 100 mL/hr at 06/24/18 1147, 2.25 g at 06/24/18 1147    fentaNYL (PF) 900 mcg/30 ml infusion soln, 0-75 mcg/hr, IntraVENous, TITRATE, Sarah Madrid MD, Last Rate: 0.3 mL/hr at 06/24/18 1148, 10 mcg/hr at 06/24/18 1148    naloxone (NARCAN) injection 0.4 mg, 0.4 mg, IntraVENous, EVERY 2 MINUTES AS NEEDED, Keely Auguste MD    ondansetron [facility]) injection 4 mg, 4 mg, IntraVENous, Q6H PRN, Nicki Garza MD, 4 mg at 06/08/18 2152    oxymetazoline (AFRIN) 0.05 % nasal spray 2 Spray, 2 Spray, Both Nostrils, BID PRN, Tushar Garcia MD    alum-mag hydroxide-[facility]) oral suspension 30 mL, 30 mL, Oral, Q4H PRN, Christen Nj MD, 30 mL at 06/07/18 2225    fentaNYL citrate (PF) injection 25 mcg, 25 mcg, IntraVENous, Q3H PRN, Christen Nj MD, 25 mcg at 06/22/18 1650    fluticasone (FLONASE) 50 mcg/actuation nasal spray 2 Spray, 2 Spray, Both Nostrils, DAILY PRN, Laurel Dueñas MD    ELECTROLYTE REPLACEMENT PROTOCOL-POTASSIUM Renal Dosing, 1 Each, Other, PRN, Christen Suh MD    ELECTROLYTE REPLACEMENT PROTOCOL-MAGNESIUM, 1 Each, Other, PRN, Christen Suh MD    ELECTROLYTE REPLACEMENT PROTOCOL-PHOSPHORUS Renal Dosing, 1 Each, Other, PRN, Christen Suh MD    ELECTROLYTE REPLACEMENT PROTOCOL-CALCIUM, 1 Each, Other, PRN, Christen Suh MD    sodium chloride (NS) flush 5-10 mL, 5-10 mL, IntraVENous, PRN, Nba Cotton MD    glucose chewable tablet 16 g, 4 Tab, Oral, PRN, Christen Suh MD    glucagon (GLUCAGEN) injection 1 mg, 1 mg, IntraMUSCular, PRN, Christen Suh MD    dextrose (D50W) injection syrg 12.5-25 g, 25-50 mL, IntraVENous, PRN, Christen Suh MD    pantoprazole (PROTONIX) 40 mg in sodium chloride 0.9% 10 mL injection, 40 mg, IntraVENous, DAILY, Sharee Santana MD, 40 mg at 06/24/18 0841      Laurel Dueñas MD

## 2018-06-24 NOTE — PROGRESS NOTES
[facility]                  Pulmonary, Critical Care, and Sleep Medicine     Name: Dusty Dempsey MRN: [id number]   : 1950 Hospital: [facility] [place]    Date: 2018        [facility]                                              Subjective/History of Present Illness:     Patient is a 75 y.o. 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 S/p exploratory laparotomy 6/15/18 - findings of sigmoid diverticulitis with suspected perforation, underwent ileostomy and wound vac placement. 18:  More awake and responsive to verbal commands. Awake and eating ice chips with her brother at bedside. No pain fever chills cough cp   Persistent pedal edema. Hand edema resolved  Still have bloody drainage in BRANDON drain  No abd pain  Ileostomy without bleeding. On TPN  BP stable  No other issues overnight. Surgeries  18 - Laparoscopy converted to laparotomy, total abdominal hysterectomy, bilateral salpingo-oophorectomy, omentectomy, bilateral pelvic and periaortic lymphadenectomy and radical tumor debulking to R0. Path - ADENOCARCINOMA OF LEFT OVARY.     18 - Diagnostic laparoscopy converted to laparotomy, peritoneal biopsies, aerobic and anaerobic cultures of peritoneal fluid and peritoneal lavage. 6/15/18 - Wound exploration. Exploratory laparotomy. Lysis of adhesions. Ileostomy and wound VAC placement. Allergies   Allergen Reactions    Hydrocodone Other (comments)     Breaks into cold sweat    Metformin Other (comments)     Breaks out into cold sweat.  Can Take Glucophage brand name med Past Medical History:   Diagnosis Date    Diabetes ([facility] [address]     many years type 2    Hypertension     many years      Past Surgical History:   Procedure Laterality Date    HX OOPHORECTOMY  2018    HX TONSILLECTOMY      as a child       Social History   Substance Use Topics    Smoking status: Never Smoker    Smokeless tobacco: Never Used    Alcohol use No        Current Facility-Administered Medications   Medication Dose Route Frequency    TPN ADULT - CENTRAL   IntraVENous CONTINUOUS    micafungin (MYCAMINE) 100 mg in 0.9% sodium chloride (MBP/ADV) 100 mL  100 mg IntraVENous Q24H    insulin glargine (LANTUS) injection 10 Units  10 Units SubCUTAneous DAILY    insulin lispro (HUMALOG) injection   SubCUTAneous Q6H    Vancomycin - Pharmacy to Dose  1 Each Other Rx Dosing/Monitoring    vancomycin (VANCOCIN) 1250 mg in  ml infusion  1,250 mg IntraVENous Q24H    albumin human 25% (BUMINATE) solution 12.5 g  12.5 g IntraVENous Q12H    levoFLOXacin (LEVAQUIN) 750 mg in D5W IVPB  750 mg IntraVENous Q48H    metroNIDAZOLE (FLAGYL) IVPB premix 500 mg  500 mg IntraVENous Q8H    piperacillin-tazobactam (ZOSYN) 2.25 g in 0.9% sodium chloride (MBP/ADV) 50 mL MBP  2.25 g IntraVENous Q6H    fentaNYL (PF) 900 mcg/30 ml infusion soln  0-75 mcg/hr IntraVENous TITRATE    pantoprazole (PROTONIX) 40 mg in sodium chloride 0.9% 10 mL injection  40 mg IntraVENous DAILY         Objective:   Vital Signs:    Visit Vitals    /82    Pulse 94    Temp 97.5 °F (36.4 °C)    Resp 28    Ht 5' 1\" (1.549 m)    Wt 102.8 kg (226 lb 10.1 oz)    SpO2 95%    BMI 42.82 kg/m2       O2 Device: (P) Nasal cannula   O2 Flow Rate (L/min): (P) 4 l/min   Temp (24hrs), Av.8 °F (36.6 °C), Min:96.9 °F (36.1 °C), Max:98.6 °F (37 °C)       Intake/Output:   Last shift:           Last 3 shifts:  1901 -  0700  In: 3662.8 [I.V.:2161.5]  Out: 3750 [Urine:3475; Drains:440]      Intake/Output Summary (Last 24 hours) at 06/24/18 1052  Last data filed at 06/24/18 0400   Gross per 24 hour   Intake           861.93 ml   Output             1825 ml   Net          -963.07 ml       ABG:  Post-self extubation  No results found for: PH, PHI, PCO2, PCO2I, PO2, PO2I, HCO3, HCO3I, FIO2, FIO2I    Physical Exam:     General/Neurology: weak, lethargic, awake. Following simple commands. Head:   Normocephalic, without obvious abnormality, atraumatic. Eye:   no scleral icterus, no pallor, no cyanosis. Pupils not dilated. Neck:   Symmetric. No lymphadenopathy. Trachea midline  Lung: Moderate air entry bilateral equal. Decreased breath sounds bases. Mild wheezing and crackles at bases. Heart:   S1 S2 present. No murmur. No JVD. Abdomen:  Soft. Obese. + BS. Midline lower abd surgical site - open wound with wound vac. BRANDON drain LLQ- red blood in bulbs, Rt abd wall Ileostomy. No abd distension or guarding. Extremities:  No cyanosis. No clubbing. No hand edema. B/l feet/leg/thigh edema 2+. Pulses: Palpable radials and DPs bilateral.   Lymphatic:  No cervical or supraclav lymphadenopathy.    Skin:   No rash      Data:       Recent Results (from the past 12 hour(s))   GLUCOSE, POC    Collection Time: 06/23/18 11:49 PM   Result Value Ref Range    Glucose (POC) 149 (H) 70 - 110 mg/dL   MAGNESIUM    Collection Time: 06/24/18  4:30 AM   Result Value Ref Range    Magnesium 2.0 1.6 - 2.6 mg/dL   PHOSPHORUS    Collection Time: 06/24/18  4:30 AM   Result Value Ref Range    Phosphorus 2.9 2.5 - 4.9 MG/DL   CALCIUM, IONIZED    Collection Time: 06/24/18  4:30 AM   Result Value Ref Range    Ionized Calcium 1.14 1.12 - 1.32 MMOL/L   CBC WITH AUTOMATED DIFF    Collection Time: 06/24/18  4:30 AM   Result Value Ref Range    WBC 14.2 (H) 4.6 - 13.2 K/uL    RBC 3.37 (L) 4.20 - 5.30 M/uL    HGB 9.4 (L) 12.0 - 16.0 g/dL    HCT 29.0 (L) 35.0 - 45.0 %    MCV 86.1 74.0 - 97.0 FL    MCH 27.9 24.0 - 34.0 PG    MCHC 32.4 31.0 - 37.0 g/dL    RDW 23.4 (H) 11.6 - 14.5 %    PLATELET 945 (L) 580 - 420 K/uL    NEUTROPHILS 79 (H) 42 - 75 %    BAND NEUTROPHILS 6 (H) 0 - 5 %    LYMPHOCYTES 10 (L) 20 - 51 %    MONOCYTES 2 2 - 9 %    EOSINOPHILS 2 0 - 5 %    BASOPHILS 1 0 - 3 %    ABS. NEUTROPHILS 11.2 (H) 1.8 - 8.0 K/UL    ABS. LYMPHOCYTES 1.4 0.8 - 3.5 K/UL    ABS. MONOCYTES 0.3 0 - 1.0 K/UL    ABS. EOSINOPHILS 0.3 0.0 - 0.4 K/UL    ABS.  BASOPHILS 0.1 0.0 - 0.1 K/UL    PLATELET COMMENTS 1+ LARGE PLATELETS     RBC COMMENTS ANISOCYTOSIS  2+        RBC COMMENTS POLYCHROMASIA  1+        RBC COMMENTS HYPOCHROMIA  SLIGHT        DF MANUAL     METABOLIC PANEL, BASIC    Collection Time: 06/24/18  4:30 AM   Result Value Ref Range    Sodium 151 (H) 136 - 145 mmol/L    Potassium 3.9 3.5 - 5.5 mmol/L    Chloride 118 (H) 100 - 108 mmol/L    CO2 22 21 - 32 mmol/L    Anion gap 11 3.0 - 18 mmol/L    Glucose 146 (H) 74 - 99 mg/dL    BUN 47 (H) 7.0 - 18 MG/DL    Creatinine 1.46 (H) 0.6 - 1.3 MG/DL    BUN/Creatinine ratio 32 (H) 12 - 20      GFR est AA 43 (L) >60 ml/min/1.73m2    GFR est non-AA 36 (L) >60 ml/min/1.73m2    Calcium 7.8 (L) 8.5 - 10.1 MG/DL   GLUCOSE, POC    Collection Time: 06/24/18  5:02 AM   Result Value Ref Range    Glucose (POC) 131 (H) 70 - 110 mg/dL           Chemistry Recent Labs      06/24/18   0430  06/23/18   1550  06/23/18   0945  06/23/18   0244  06/22/18   0445  06/21/18   1850   GLU  146*   --    --   208*  239*  283*   NA  151*   --    --   151*  148*  148*   K  3.9  3.7  3.8  3.6  4.1  3.9   CL  118*   --    --   116*  116*  115*   CO2  22   --    --   23  19*  19*   BUN  47*   --    --   44*  45*  41*   CREA  1.46*   --    --   1.42*  1.50*  1.45*   CA  7.8*   --    --   7.4*  7.3*  7.1*   MG  2.0   --    --   2.1  2.0   --    PHOS  2.9   --    --   3.4  3.1   --    AGAP  11   --    --   12  13  14   BUCR  32*   --    --   31*  30*  28*   AP   --    --    --    --    --   58   TP   --    --    --    --    --   4.8*   ALB   --    --    --   2.3*   --   2.0*   GLOB   --    -- --    --    --   2.8   AGRAT   --    --    --    --    --   0.7*        Lactic Acid Lactic acid   Date Value Ref Range Status   06/17/2018 2.3 (HH) 0.4 - 2.0 MMOL/L Final     Comment:     CALLED TO AND CORRECTLY REPEATED BY:  Judith Randhawa RN ICU ON 6/17/2018 0904 TO 1412       No results for input(s): LAC in the last 72 hours. Liver Enzymes Protein, total   Date Value Ref Range Status   06/21/2018 4.8 (L) 6.4 - 8.2 g/dL Final     Albumin   Date Value Ref Range Status   06/23/2018 2.3 (L) 3.4 - 5.0 g/dL Final     Globulin   Date Value Ref Range Status   06/21/2018 2.8 2.0 - 4.0 g/dL Final     A-G Ratio   Date Value Ref Range Status   06/21/2018 0.7 (L) 0.8 - 1.7   Final     AST (SGOT)   Date Value Ref Range Status   06/21/2018 17 15 - 37 U/L Final     Alk.  phosphatase   Date Value Ref Range Status   06/21/2018 58 45 - 117 U/L Final     Recent Labs      06/23/18   0244  06/21/18   1850   TP   --   4.8*   ALB  2.3*  2.0*   GLOB   --   2.8   AGRAT   --   0.7*   SGOT   --   17   AP   --   58        CBC w/Diff Recent Labs      06/24/18   0430  06/23/18   1830  06/23/18   1215  06/23/18   0244   WBC  14.2*   --   13.7*  14.2*   RBC  3.37*   --   3.21*  2.77*   HGB  9.4*  9.0*  8.9*  7.5*   HCT  29.0*  27.8*  27.3*  23.1*   PLT  119*   --   115*  104*   GRANS  79*   --   83*  88*   LYMPH  10*   --   11*  9*   EOS  2   --   1  1        Cardiac Enzymes No results found for: CPK, CK, CKMMB, CKMB, RCK3, CKMBT, CKNDX, CKND1, FABIAN, TROPT, TROIQ, KAYLAN, TROPT, TNIPOC, BNP, BNPP     BNP No results found for: BNP, BNPP, XBNPT     Coagulation Recent Labs      06/23/18   0244  06/22/18   1225  06/22/18   0445  06/21/18   1850  06/21/18   1315   PTP  15.5*  17.0*   --   16.3*   --    INR  1.3*  1.4*   --   1.4*   --    APTT   --   33.5  98.2*   --   91.4*         Thyroid  No results found for: T4, T3U, TSH, TSHEXT, TSHEXT    No results found for: T4       ABG Recent Labs      06/22/18   1034   PHI  7.459*   PCO2I  25.3*   PO2I  65* HCO3I  17.9*   FIO2I  36        All Micro Results     Procedure Component Value Units Date/Time    CULTURE, BLOOD FOR FUNGUS [[id number]]     Order Status:  Canceled Specimen:  Blood     CULTURE, FUNGUS [[id number]] Collected:  06/09/18 1024    Order Status:  Completed Specimen:  Bronchial lavage Updated:  06/18/18 1329     Special Requests: NO SPECIAL REQUESTS        FUNGUS SMEAR NO FUNGAL ELEMENTS SEEN        Culture result: NO FUNGUS ISOLATED 9 DAYS       CULTURE, BLOOD [[id number]] Collected:  06/09/18 1423    Order Status:  Completed Specimen:  Whole Blood from Blood Updated:  06/15/18 0230     Special Requests: NO SPECIAL REQUESTS        Culture result: NO GROWTH 6 DAYS       CULTURE, BLOOD [[id number]] Collected:  06/09/18 1223    Order Status:  Completed Specimen:  Whole Blood from Blood Updated:  06/15/18 0230     Special Requests: left hand     Culture result: NO GROWTH 6 DAYS       AFB CULTURE + SMEAR W/RFLX PCR AND ID [[id number]] Collected:  06/09/18 1024    Order Status:  Completed Specimen:  Respiratory sample Updated:  06/12/18 2106     Source BRONCHIAL LAVAGE        AFB Specimen processing Concentration     Acid Fast Smear NEGATIVE          (NOTE)  Performed At: [address] [id number]  Marco Harper MD SC:[id number]          Acid Fast Culture PENDING    CULTURE, SPUTUM/BRONCH/OTH [[id number]] Collected:  06/09/18 1024    Order Status:  Completed Specimen:  Sputum from Bronchial lavage Updated:  06/11/18 1058     Special Requests: NO SPECIAL REQUESTS        GRAM STAIN FEW WBC'S         FEW GRAM NEGATIVE RODS        Culture result:         RARE NORMAL RESPIRATORY JOSETTE    CULTURE, ANAEROBIC [[id number]] Collected:  06/05/18 0220    Order Status:  Completed Specimen:  Peritoneal Fluid Updated:  06/11/18 0734     Special Requests: NO SPECIAL REQUESTS        Culture result:         NO ANAEROBES ISOLATED 5 DAYS    CULTURE, BLOOD [[id number]] Collected:  06/04/18 0740 Order Status:  Completed Specimen:  Blood from Blood Updated:  06/10/18 0655     Special Requests: NO SPECIAL REQUESTS        Culture result: NO GROWTH 6 DAYS       CULTURE, BLOOD [[id number]] Collected:  06/04/18 0740    Order Status:  Completed Specimen:  Blood from Blood Updated:  06/10/18 0655     Special Requests: NO SPECIAL REQUESTS        Culture result: NO GROWTH 6 DAYS       CULTURE, BODY FLUID Kristene Mash STAIN [[id number]]  (Abnormal)  (Susceptibility) Collected:  06/05/18 0220    Order Status:  Completed Specimen:  Peritoneal Fluid Updated:  06/08/18 0936     Special Requests: NO SPECIAL REQUESTS        GRAM STAIN MANY WBC'S         NO ORGANISMS SEEN        Culture result:         RARE KLEBSIELLA OXYTOCA (A)            CALLED TO AND CORRECTLY REPEATED BY:  ANGEL LUIS DE LOS SANTOS RN ICU ON 6/7/2018 1032 TO 5087      CULTURE, URINE [[id number]] Collected:  06/05/18 1245    Order Status:  Completed Specimen:  Urine from Fry Specimen Updated:  06/07/18 1103     Special Requests: NO SPECIAL REQUESTS        Culture result: NO GROWTH 2 DAYS       C. DIFFICILE/EPI PCR [[id number]] Collected:  06/05/18 0930    Order Status:  Canceled Specimen:  Stool           Echo 6/9/18:  Left ventricle: Systolic function was normal. Ejection fraction was estimated   in the range of 65 % to 70 %. No obvious  wall motion abnormalities identified in the views obtained. Wall thickness   was mildly increased. Doppler parameters  were consistent with abnormal left ventricular relaxation (grade 1 diastolic   dysfunction). Right ventricle: The size was normal. Systolic function was normal.  Mitral valve: There was mild annular calcification. Tricuspid valve: Pulmonary artery systolic pressure was mildly increased. Pulmonary artery systolic pressure: 34 mmHg. PVL LE 6/6/18: acute b/l peroneal DVT. CT abd pelvis 6/4/18:  1. Postsurgical hysterectomy, bilateral oophorectomy, pelvic lymphadenectomy and  a mastectomy surgical changes. Small volume of ascites in the abdomen and  pelvis, with a few tiny locules of gas in the right hemipelvis would be in  keeping with recent postsurgical etiology. 2. Abnormal edematous thick-walled small bowel loops in the left abdomen and  pelvis, with TRAM lamellar edematous configuration, induration. No findings of  pneumatosis. The overall appearance is nonspecific. Diagnostic considerations  include infectious etiology, nonspecific edema which may be unresolved  postsurgical etiology. Ischemia is also included in the differential diagnostic  consideration, however, there are no findings of pneumatosis, portal venous gas. 3. Stool in the right colon extending to the hepatic flexure with surrounding  gas, foci of pneumatosis could be obscured. No associated bowel wall thickening. 4. Satisfactory position of nasogastric tube. 5. Hepatomegaly, steatosis with 2 rounded low-density lesions, potentially  cysts. 6. Bibasilar atelectasis. CT chest/abd/plevis: 6/22/18:  Study limited due to streak and motion artifacts as described. No evidence of pulmonary embolism within the limitations of artifacts. Prominent consolidation right lower lobe likely infiltrates and atelectasis. Small consolidation seen in the left lower lobe. Mild diffuse ascites present throughout the abdomen pelvis with diffuse  anasarca. Large soft tissue consolidation in the pelvis containing air bubbles likely  representing adherent bowel with adjacent trapped fluid. There is what may be a subcutaneous hematoma adjacent to the right ileostomy  measuring 5.5 cm x 3.7 cm x 7.0 cm, however, there is no definite  retroperitoneal or any other intra-abdominal hematoma. Bilateral pelvic drains as described above. Chronic compression deformity of L1. CXR reviewed by me:  CXR 6/23/18: Worsening pulmonary interstitial edema with right greater than left pleural  effusions. Underexpansion.        IMPRESSION:   · Severe sepsis due to Klebsiella Oxytoca peritonitis and then perforation. · Klebsiella oxytoca peritonitis. S/p laparotomy and peritoneal lavage: Klebsiella positive. Ruled out ischemic bowel in laparotomy. · S/p laparotomy on 6/15/18 for peritonitis due to diverticulitis with perforation, s/p distal ileostomy. · Intraabdominal bleeding while on heparin. · S/p bronchoscopy 6/9/18: no aspiration noted. Cx Negative. · VDRF due to sepsis/?PE/?ARDS, reintubated on 6/8/18 and self-extubated 6/17  · S/p Shock likely due to sepsis and obstructive due to presumed PE. Shock resolved. · Acute b/l peroneal DVT  · Presumed PE with severe hypoxia and high A-a gradient, elevated RVSP 34 mm Hg. CTA 6/22/18 negative for central or main PA PE. Off heparin due to intraabdominal bleeding. · Anemia  · Thrombocytopenia  · JESS, improving  · Hypernatremia. · Metabolic and lactic acidosis, resolved. · Elevated LFT, due to shock liver, improving  · Foreign body on peritoneal biopsy, per path report. · Anasarca due to fluid resuscitation, JESS, hypoalbuminemia and sequale of sepsis. · AFib converted to NSR  · Adenocarcinoma of ovary     · Code status: DNR. · Very poor overall prognosis. RECOMMENDATIONS:   Respiratory: mildly tachypneic; ABG shows resp alkalosis; continue o2 support; low threshold for re-intubation; heparin drip (for DVTs and likely PEs) has been stopped since 6/22/18 due to intra abdominal bleeding; INR reversed with protamin, FFP. CTA negative for central PE. PVL LE repeat pending result. If positive for DVT, then consider IVC filter. Keep SPO2 >=92%. HOB 30 degree elevation all the time. Aggressive pulmonary toileting. Aspiration precautions. Hem: see above. follow H/H, coags; Keep Hb>8gm/dl. CVS: Echo ok with mildly elevated RVSP but normal RV systolic function. AFib converted to NSR. D/c cardizem drip. ID:  Bronch cx normal robin.  Peritoneal cx Klebsiella Oxytoca resistant to ampicillin. Leucocytosis-improved since on micafungin/vanco. Afebrile. Leucocytosis ?stress related vs intra-abd infection  Abx -  Zosyn since 6/13; levaquin since 6/18, flagyl since 6/15; iv vanc since 6/22, micafungin since 6/22  Renal: Nephrologist on case; Creatinine stable/improving despite of IV contrast, monitor. Lasix held for hypernatremia. On albumin. On TPM.   GI/: TPN; barium study 6/21 - patient could not do it on 6/22 due to abd pain  Endocrine: Maintain blood glucose 140-180. Humalog sc. Insulin in TPN. on Lantus  Neurology: weaning fentanyl drip - balance post-op pains with excessive sedation  Pain/Sedation: fentanyl drip - management per Dr Brad Dakins  Skin/Wound: local surgical site care. Wound vac on. Electrolytes: Replace electrolytes per ICU electrolyte replacement protocol. Nutrition: TPN started 6/18   Prophylaxis: DVT and GI Prophylaxis (heparin /protonix)  Restraints: none  Lines/Tubes:   ETT: 6/8/18- self extubated 6/17/18  Central line: right subclavian 6/4/18 - removed 6/20  Picc line 6/19 - Central line bundle followed  Fry: 6/4/18 (Medically necessary for strict input/output monitoring in critically ill patient, will remove it when not needed. Fry bundle followed). Will defer respective systems problem management to primary and other respective consultant and follow patient in ICU with primary and other medical team.  Further recommendations will be based on the patient's response to recommended treatment and results of the investigation ordered. Quality Care: PPI, DVT prophylaxis, HOB elevated, Infection control all reviewed and addressed. Updated patient Brother Rich Hale at bedside again on 6/24: updated with critical condition and overall poor prognosis. Discussed the ocarina cancer and its prognosis etc. Patient has no children. Code status: Full code. PICC Line/Fry cath medically necessary  PT and OT on case    Care of plan d/w RN, RT, MDR.    High complexity decision making was performed during the evaluation of this patient   CC time 35 mins, excludes d/w family Kayleigh Zhong MD  6/24/2018

## 2018-06-24 NOTE — PROGRESS NOTES
Nephrology Progress note    Subjective:     Razia Strauss is a 75 y.o. female with a PMH DM, HTN, clear cell ovarian ca s/p debulking who presented with abdominal pain and possible sepsis/ischemic colitis. Pt underwent laparotomy/peritoneal lavage/bx, noted to have decreased UOP and increasing Cr to 2.1 today from baseline 0.6. CT neg for mass or hydro. Pt given lasix yesterday, still with high O2 requirements on vent. Unable to provide further history. On 6/8 Pt decompensated, ? Aspiration,  back on vent was hypoxic despite  Fi02 of 100%, , Hypotensive on IV pressors, Acidotic and on HC03 drip, Urine output decreased markedly over the weekend. Underwent Bronchoscopy 6/9. Pt requires less FiO2 now. UOP picking up. Wound has been draining yellow-brown fluid. S/P Exploratory laparotomy, Lysis of adhesions, Ileostomy and wound VAC placement on 6/16. Pt self-extubated 6/17. Her Cr down to 1.4 and remains unchanged today. UO 1.2 L from yesterday. Pt has no new complts. Admit Date: 6/4/2018  Allergy:  Allergies   Allergen Reactions    Hydrocodone Other (comments)     Breaks into cold sweat    Metformin Other (comments)     Breaks out into cold sweat. Can Take Glucophage brand name med        Objective:     Visit Vitals    /82    Pulse 90    Temp 97.8 °F (36.6 °C)    Resp 27    Ht 5' 1\" (1.549 m)    Wt 102.8 kg (226 lb 10.1 oz)    SpO2 98%    BMI 42.82 kg/m2         Intake/Output Summary (Last 24 hours) at 06/24/18 1400  Last data filed at 06/24/18 1200   Gross per 24 hour   Intake          2075.01 ml   Output             2175 ml   Net           -99.99 ml       Physical Exam:     General: NAD   HENT: Atraumatic and normocephalic.    Eyes: Sclera anicteric   Neck: No JVD or mass   Cardiovascular: Normal S1 S2   Pulmonary/Chest Wall: Decreased breath sounds bilaterally   Abdominal: Soft, obese, NABS   Musculoskeletal: ++ edema LEs   Neurological: Awake and no change       Data Review:      Lab Results   Component Value Date/Time    WBC 14.2 (H) 06/24/2018 04:30 AM    RBC 3.37 (L) 06/24/2018 04:30 AM    HCT 29.0 (L) 06/24/2018 04:30 AM    MCV 86.1 06/24/2018 04:30 AM    MCH 27.9 06/24/2018 04:30 AM    MCHC 32.4 06/24/2018 04:30 AM    RDW 23.4 (H) 06/24/2018 04:30 AM      Lab Results   Component Value Date    IRON 8 (L) 06/07/2018   No components found for: FERRITIN  No components found for: PTHINT  Urinalysis  No results found for: UGLU         Impression:     -JESS- likely ATN,  S Cr down to 1.4 today with good diuresis  -Septic Shock/hypotension, BPs normalized  -Resp Failure,  Was re-intubated 6/9,  Self extubated   -Severe Metabolic acidosis, improving.   -Ovarian ca s/p debulking then laparoscopy with lavage  -KLEBSIELLA OXYTOCA sepsis (Peritoneal fluid)  -DM  -h.o. HTN  -Anemia  -Elevated liver enzymes  -S/P Exploratory laparotomy, Lysis of adhesions, Ileostomy and wound VAC placement on 6/16  -Leukocytosis  -Hypernatremia, improving slowly  -Hypokalemia, repleted     Plan:     Cont to hold Lasix for now due to worsening hypernatremia   Replace electrolytes per protocol   Monitor I/O and chemistry, H&H  Antibiotics.   Cont Vanc dosing per pharmacist  Will follow Renal function closely, may need D5W tomorrow  Adjusting TPN    MD Denver Camara  [phone]

## 2018-06-25 PROBLEM — E87.0 HYPERNATREMIA: Status: ACTIVE | Noted: 2018-06-25

## 2018-06-25 LAB
ANION GAP SERPL CALC-SCNC: 14 MMOL/L (ref 3–18)
BASOPHILS # BLD: 0 K/UL (ref 0–0.06)
BASOPHILS NFR BLD: 0 % (ref 0–2)
BUN SERPL-MCNC: 51 MG/DL (ref 7–18)
BUN/CREAT SERPL: 38 (ref 12–20)
CA-I SERPL-SCNC: 1.13 MMOL/L (ref 1.12–1.32)
CALCIUM SERPL-MCNC: 7.5 MG/DL (ref 8.5–10.1)
CHLORIDE SERPL-SCNC: 119 MMOL/L (ref 100–108)
CO2 SERPL-SCNC: 20 MMOL/L (ref 21–32)
CREAT SERPL-MCNC: 1.34 MG/DL (ref 0.6–1.3)
DATE LAST DOSE: NORMAL
DIFFERENTIAL METHOD BLD: ABNORMAL
EOSINOPHIL # BLD: 0.1 K/UL (ref 0–0.4)
EOSINOPHIL NFR BLD: 1 % (ref 0–5)
ERYTHROCYTE [DISTWIDTH] IN BLOOD BY AUTOMATED COUNT: 24.7 % (ref 11.6–14.5)
GLUCOSE BLD STRIP.AUTO-MCNC: 177 MG/DL (ref 70–110)
GLUCOSE BLD STRIP.AUTO-MCNC: 188 MG/DL (ref 70–110)
GLUCOSE BLD STRIP.AUTO-MCNC: 199 MG/DL (ref 70–110)
GLUCOSE BLD STRIP.AUTO-MCNC: 213 MG/DL (ref 70–110)
GLUCOSE SERPL-MCNC: 196 MG/DL (ref 74–99)
HCT VFR BLD AUTO: 28.1 % (ref 35–45)
HCT VFR BLD AUTO: 28.8 % (ref 35–45)
HGB BLD-MCNC: 8.8 G/DL (ref 12–16)
HGB BLD-MCNC: 9.1 G/DL (ref 12–16)
LYMPHOCYTES # BLD: 0.7 K/UL (ref 0.9–3.6)
LYMPHOCYTES NFR BLD: 6 % (ref 21–52)
MAGNESIUM SERPL-MCNC: 2.4 MG/DL (ref 1.6–2.6)
MCH RBC QN AUTO: 27.7 PG (ref 24–34)
MCHC RBC AUTO-ENTMCNC: 31.6 G/DL (ref 31–37)
MCV RBC AUTO: 87.8 FL (ref 74–97)
MONOCYTES # BLD: 0.6 K/UL (ref 0.05–1.2)
MONOCYTES NFR BLD: 5 % (ref 3–10)
NEUTS SEG # BLD: 10.3 K/UL (ref 1.8–8)
NEUTS SEG NFR BLD: 88 % (ref 40–73)
PHOSPHATE SERPL-MCNC: 2.7 MG/DL (ref 2.5–4.9)
PLATELET # BLD AUTO: 127 K/UL (ref 135–420)
POTASSIUM SERPL-SCNC: 3.9 MMOL/L (ref 3.5–5.5)
RBC # BLD AUTO: 3.28 M/UL (ref 4.2–5.3)
RBC MORPH BLD: ABNORMAL
REPORTED DOSE,DOSE: NORMAL UNITS
REPORTED DOSE/TIME,TMG: 1600
SODIUM SERPL-SCNC: 153 MMOL/L (ref 136–145)
VANCOMYCIN TROUGH SERPL-MCNC: 17.3 UG/ML (ref 10–20)
WBC # BLD AUTO: 11.7 K/UL (ref 4.6–13.2)

## 2018-06-25 PROCEDURE — 94640 AIRWAY INHALATION TREATMENT: CPT

## 2018-06-25 PROCEDURE — P9047 ALBUMIN (HUMAN), 25%, 50ML: HCPCS | Performed by: INTERNAL MEDICINE

## 2018-06-25 PROCEDURE — 74011000258 HC RX REV CODE- 258: Performed by: OBSTETRICS & GYNECOLOGY

## 2018-06-25 PROCEDURE — 74011250636 HC RX REV CODE- 250/636: Performed by: OBSTETRICS & GYNECOLOGY

## 2018-06-25 PROCEDURE — 80048 BASIC METABOLIC PNL TOTAL CA: CPT | Performed by: OBSTETRICS & GYNECOLOGY

## 2018-06-25 PROCEDURE — 82330 ASSAY OF CALCIUM: CPT | Performed by: INTERNAL MEDICINE

## 2018-06-25 PROCEDURE — 80202 ASSAY OF VANCOMYCIN: CPT | Performed by: INTERNAL MEDICINE

## 2018-06-25 PROCEDURE — 92526 ORAL FUNCTION THERAPY: CPT

## 2018-06-25 PROCEDURE — 74011000250 HC RX REV CODE- 250: Performed by: INTERNAL MEDICINE

## 2018-06-25 PROCEDURE — 36592 COLLECT BLOOD FROM PICC: CPT

## 2018-06-25 PROCEDURE — 65270000029 HC RM PRIVATE

## 2018-06-25 PROCEDURE — 83735 ASSAY OF MAGNESIUM: CPT | Performed by: OBSTETRICS & GYNECOLOGY

## 2018-06-25 PROCEDURE — 74011636637 HC RX REV CODE- 636/637: Performed by: INTERNAL MEDICINE

## 2018-06-25 PROCEDURE — 85018 HEMOGLOBIN: CPT | Performed by: OBSTETRICS & GYNECOLOGY

## 2018-06-25 PROCEDURE — 85025 COMPLETE CBC W/AUTO DIFF WBC: CPT | Performed by: OBSTETRICS & GYNECOLOGY

## 2018-06-25 PROCEDURE — 84100 ASSAY OF PHOSPHORUS: CPT | Performed by: OBSTETRICS & GYNECOLOGY

## 2018-06-25 PROCEDURE — 74011250636 HC RX REV CODE- 250/636: Performed by: INTERNAL MEDICINE

## 2018-06-25 PROCEDURE — 74011250636 HC RX REV CODE- 250/636: Performed by: HOSPITALIST

## 2018-06-25 PROCEDURE — C9113 INJ PANTOPRAZOLE SODIUM, VIA: HCPCS | Performed by: INTERNAL MEDICINE

## 2018-06-25 PROCEDURE — 77010033678 HC OXYGEN DAILY

## 2018-06-25 PROCEDURE — 82962 GLUCOSE BLOOD TEST: CPT

## 2018-06-25 PROCEDURE — 97110 THERAPEUTIC EXERCISES: CPT

## 2018-06-25 PROCEDURE — 74011000258 HC RX REV CODE- 258: Performed by: INTERNAL MEDICINE

## 2018-06-25 RX ORDER — INSULIN GLARGINE 100 [IU]/ML
13 INJECTION, SOLUTION SUBCUTANEOUS DAILY
Status: DISCONTINUED | OUTPATIENT
Start: 2018-06-26 | End: 2018-07-07

## 2018-06-25 RX ORDER — VANCOMYCIN/0.9 % SOD CHLORIDE 1.5G/250ML
1500 PLASTIC BAG, INJECTION (ML) INTRAVENOUS EVERY 24 HOURS
Status: DISCONTINUED | OUTPATIENT
Start: 2018-06-25 | End: 2018-06-29

## 2018-06-25 RX ORDER — DEXTROSE MONOHYDRATE 50 MG/ML
75 INJECTION, SOLUTION INTRAVENOUS CONTINUOUS
Status: DISCONTINUED | OUTPATIENT
Start: 2018-06-25 | End: 2018-06-27

## 2018-06-25 RX ORDER — ALBUMIN HUMAN 250 G/1000ML
12.5 SOLUTION INTRAVENOUS EVERY 8 HOURS
Status: DISCONTINUED | OUTPATIENT
Start: 2018-06-25 | End: 2018-07-03

## 2018-06-25 RX ADMIN — METRONIDAZOLE 500 MG: 500 INJECTION, SOLUTION INTRAVENOUS at 11:27

## 2018-06-25 RX ADMIN — LEVOFLOXACIN 750 MG: 5 INJECTION, SOLUTION INTRAVENOUS at 02:44

## 2018-06-25 RX ADMIN — Medication 25 MCG/HR: at 07:25

## 2018-06-25 RX ADMIN — DEXTROSE MONOHYDRATE 5 ML/HR: 5 INJECTION, SOLUTION INTRAVENOUS at 09:12

## 2018-06-25 RX ADMIN — PIPERACILLIN SODIUM,TAZOBACTAM SODIUM 2.25 G: 2; .25 INJECTION, POWDER, FOR SOLUTION INTRAVENOUS at 11:26

## 2018-06-25 RX ADMIN — METRONIDAZOLE 500 MG: 500 INJECTION, SOLUTION INTRAVENOUS at 05:28

## 2018-06-25 RX ADMIN — FENTANYL CITRATE 25 MCG: 50 INJECTION, SOLUTION INTRAMUSCULAR; INTRAVENOUS at 21:46

## 2018-06-25 RX ADMIN — METRONIDAZOLE 500 MG: 500 INJECTION, SOLUTION INTRAVENOUS at 21:44

## 2018-06-25 RX ADMIN — PIPERACILLIN SODIUM,TAZOBACTAM SODIUM 2.25 G: 2; .25 INJECTION, POWDER, FOR SOLUTION INTRAVENOUS at 17:03

## 2018-06-25 RX ADMIN — ALBUMIN (HUMAN) 12.5 G: 0.25 INJECTION, SOLUTION INTRAVENOUS at 13:50

## 2018-06-25 RX ADMIN — VANCOMYCIN HYDROCHLORIDE 1500 MG: 10 INJECTION, POWDER, LYOPHILIZED, FOR SOLUTION INTRAVENOUS at 14:51

## 2018-06-25 RX ADMIN — POTASSIUM CHLORIDE: 2 INJECTION, SOLUTION, CONCENTRATE INTRAVENOUS at 17:12

## 2018-06-25 RX ADMIN — INSULIN LISPRO 2 UNITS: 100 INJECTION, SOLUTION INTRAVENOUS; SUBCUTANEOUS at 05:28

## 2018-06-25 RX ADMIN — IPRATROPIUM BROMIDE 0.5 MG: 0.5 SOLUTION RESPIRATORY (INHALATION) at 22:32

## 2018-06-25 RX ADMIN — ALBUMIN (HUMAN) 12.5 G: 0.25 INJECTION, SOLUTION INTRAVENOUS at 09:12

## 2018-06-25 RX ADMIN — DEXTROSE MONOHYDRATE 75 ML/HR: 5 INJECTION, SOLUTION INTRAVENOUS at 09:07

## 2018-06-25 RX ADMIN — SODIUM CHLORIDE 40 MG: 9 INJECTION INTRAMUSCULAR; INTRAVENOUS; SUBCUTANEOUS at 09:29

## 2018-06-25 RX ADMIN — INSULIN LISPRO 4 UNITS: 100 INJECTION, SOLUTION INTRAVENOUS; SUBCUTANEOUS at 12:16

## 2018-06-25 RX ADMIN — PIPERACILLIN SODIUM,TAZOBACTAM SODIUM 2.25 G: 2; .25 INJECTION, POWDER, FOR SOLUTION INTRAVENOUS at 05:28

## 2018-06-25 RX ADMIN — FENTANYL CITRATE 25 MCG: 50 INJECTION, SOLUTION INTRAMUSCULAR; INTRAVENOUS at 17:33

## 2018-06-25 RX ADMIN — ALBUMIN (HUMAN) 12.5 G: 0.25 INJECTION, SOLUTION INTRAVENOUS at 21:44

## 2018-06-25 RX ADMIN — INSULIN GLARGINE 10 UNITS: 100 INJECTION, SOLUTION SUBCUTANEOUS at 09:06

## 2018-06-25 RX ADMIN — INSULIN LISPRO 2 UNITS: 100 INJECTION, SOLUTION INTRAVENOUS; SUBCUTANEOUS at 17:49

## 2018-06-25 RX ADMIN — MICAFUNGIN SODIUM 100 MG: 20 INJECTION, POWDER, LYOPHILIZED, FOR SOLUTION INTRAVENOUS at 12:24

## 2018-06-25 NOTE — INTERDISCIPLINARY ROUNDS
CRITICAL CARE INTERDISCIPLINARY ROUNDS    Patient Information:    Name:   Katherine Llanes    Age:   75 y.o. Admission Date:   6/4/2018    Critical Care Day: 20    Surgery Date:  6/15    Attending Provider:   Jamal Almanzar MD      Code Status: Full Code    Problem List:     Patient Active Problem List   Diagnosis Code    Ovarian ca ([facility] [address] C56.9    Severe obesity (BMI 35.0-39.9) ([facility] [address] E66.01    Abdominal pain R10.9    Sepsis ([address] A41.9    Oliguria R34    Acute respiratory failure ([address] J96.00    JESS (acute kidney injury) ([address] U78.4    Metabolic acidosis X65.1    Acute deep vein thrombosis (DVT) of lower extremity ([address] I82.409    S/P ileostomy ([address] Z93.2    Hypoalbuminemia E88.09    Peritonitis ([address] K65.9    Hypernatremia E87.0       Principal Problem:  Sepsis ([address]    During rounds the following quality care indicators and evidence based practices were addressed :  DVT Prophylaxis, PUD Prophylaxis, Pain Management, Nutritional Status and Critical Care Interventions Drains and Lines Fry Day21 ([facility] ) ; Central Line Day:6  Glycemic Control:   Recent Labs      06/25/18   0520  06/24/18   0430  06/23/18   0244   GLU  196*  146*  208*       Acute MI/PCI:   Not applicable    Door to Balloon: Admission Time: 0658      Heart Failure:    Not applicable     SCIP Measures for other Surgeries:   Not applicable CHG Bath Daily on All Ortho    Pneumonia:    Not applicable    Interdisciplinary team rounds were held with the following team membersCare Management, Nursing, Pharmacy and Physician. Plan of care discussed.      Goals of Care/ Recommendations:  Pain management

## 2018-06-25 NOTE — PROGRESS NOTES
Problem: Falls - Risk of  Goal: *Absence of Falls  Document Goyo Fall Risk and appropriate interventions in the flowsheet.    Outcome: Progressing Towards Goal  Fall Risk Interventions:  Mobility Interventions: Bed/chair exit alarm    Mentation Interventions: Adequate sleep, hydration, pain control    Medication Interventions: Bed/chair exit alarm, Patient to call before getting OOB    Elimination Interventions: Bed/chair exit alarm, Call light in reach

## 2018-06-25 NOTE — PROGRESS NOTES
1915- Bedside shift change report given to Noemi Parikh RN (oncoming nurse) by Brennan King RN (offgoing nurse). Report included the following information SBAR, Kardex, ED Summary, OR Summary, Procedure Summary, Intake/Output, MAR, Accordion and Recent Results.   1930-

## 2018-06-25 NOTE — DIABETES MGMT
GLYCEMIC CONTROL PROGRESS NOTE:    -discussed in rounds, known h/o T2DM HbA1C within recommended range for age + comorbids on oral home regimen  -BG out of target range ICU: 140-180 mg/dL   -TDD = 18 (TPN 10 regular insulin + 8 - Humalog Normal Insulin Sensitivity Corrective Coverage)  -full liquid diet started this morning, pt may benefit from the addition of mealtime insulin as diet & po intake progresss  Addendum: recommendation   *Lantus 12 units    *- Humalog Very Insulin Resistant Corrective Coverage      -Glucose Results:   Lab Results   Component Value Date/Time     (H) 06/25/2018 05:20 AM    GLUCPOC 199 (H) 06/25/2018 05:03 AM    GLUCPOC 182 (H) 06/24/2018 11:38 PM    GLUCPOC 201 (H) 06/24/2018 05:02 PM         Gualberto Taylor RN, MS  Glycemic Control Team  Pager [phone] ([facility] 8:30-5P)  *After Hours pager [phone]

## 2018-06-25 NOTE — PROGRESS NOTES
Speech Therapy Note:    Could not progress with ST treatment because patient :     [X] was unresponsive  [ ] deferred due to:   [ ] was off the unit   [ ] on hold  [ ] NPO for procedure      Per RNAlba and chart review, pt tolerating pudding PO. Aspiration precautions discussed at length with pt's brother at bedside. SLP will re-attempt treatment later this day or as schedule permits.     Carole Barrera M.S., CF-SLP  Speech Language Pathologist

## 2018-06-25 NOTE — PROGRESS NOTES
[facility]                  Pulmonary, Critical Care, and Sleep Medicine     Name: Miriam Sierra MRN: [id number]   : 1950 Hospital: [facility] [place]    Date: 2018        [facility]                                              Subjective/History of Present Illness:   Patient is a 75 y.o. 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 S/p exploratory laparotomy 6/15/18 - findings of sigmoid diverticulitis with suspected perforation, underwent ileostomy and wound vac placement. Sleeping and open eyes to verbal commands. No pain fever chills cough cp   Persistent leg edema. Still have some drainage in BRANDON drain. No abd pain on current Fentanyl  Ileostomy without bleeding. On TPN  Hemodynamically stable  No other issues overnight. Review of Systems   Review of systems not obtained due to patient factors. Surgeries  18 - Laparoscopy converted to laparotomy, total abdominal hysterectomy, bilateral salpingo-oophorectomy, omentectomy, bilateral pelvic and periaortic lymphadenectomy and radical tumor debulking to R0. Path - ADENOCARCINOMA OF LEFT OVARY.     18 - Diagnostic laparoscopy converted to laparotomy, peritoneal biopsies, aerobic and anaerobic cultures of peritoneal fluid and peritoneal lavage. 6/15/18 - Wound exploration. Exploratory laparotomy. Lysis of adhesions. Ileostomy and wound VAC placement. Allergies   Allergen Reactions    Hydrocodone Other (comments)     Breaks into cold sweat    Metformin Other (comments)     Breaks out into cold sweat.  Can Take Glucophage brand name med      Past Medical History:   Diagnosis Date    Diabetes ([facility] [address]     many years type 2    Hypertension     many years      Past Surgical History:   Procedure Laterality Date    HX OOPHORECTOMY  2018    HX TONSILLECTOMY      as a child       Social History   Substance Use Topics    Smoking status: Never Smoker    Smokeless tobacco: Never Used    Alcohol use No        Current Facility-Administered Medications   Medication Dose Route Frequency    dextrose 5% infusion  75 mL/hr IntraVENous CONTINUOUS    albumin human 25% (BUMINATE) solution 12.5 g  12.5 g IntraVENous Q8H    TPN ADULT - CENTRAL   IntraVENous CONTINUOUS    fentaNYL (PF) 900 mcg/30 ml infusion soln  0-10 mcg/hr IntraVENous TITRATE    TPN ADULT - CENTRAL   IntraVENous CONTINUOUS    Vancomycin Trough Due  1 Each Other ONCE    micafungin (MYCAMINE) 100 mg in 0.9% sodium chloride (MBP/ADV) 100 mL  100 mg IntraVENous Q24H    insulin glargine (LANTUS) injection 10 Units  10 Units SubCUTAneous DAILY    insulin lispro (HUMALOG) injection   SubCUTAneous Q6H    Vancomycin - Pharmacy to Dose  1 Each Other Rx Dosing/Monitoring    vancomycin (VANCOCIN) 1250 mg in  ml infusion  1,250 mg IntraVENous Q24H    levoFLOXacin (LEVAQUIN) 750 mg in D5W IVPB  750 mg IntraVENous Q48H    metroNIDAZOLE (FLAGYL) IVPB premix 500 mg  500 mg IntraVENous Q8H    piperacillin-tazobactam (ZOSYN) 2.25 g in 0.9% sodium chloride (MBP/ADV) 50 mL MBP  2.25 g IntraVENous Q6H    pantoprazole (PROTONIX) 40 mg in sodium chloride 0.9% 10 mL injection  40 mg IntraVENous DAILY         Objective:   Vital Signs:    Visit Vitals    /84    Pulse 74    Temp 98.1 °F (36.7 °C)    Resp 21    Ht 5' 1\" (1.549 m)    Wt 102.8 kg (226 lb 10.1 oz)    SpO2 98%    BMI 42.82 kg/m2       O2 Device: Nasal cannula   O2 Flow Rate (L/min): 3 l/min   Temp (24hrs), Av.6 °F (36.4 °C), Min:97.4 °F (36.3 °C), Max:98.1 °F (36.7 °C)       Intake/Output:   Last shift: 06/25 0701 - 06/25 1900  In: 2 [I.V.:2]  Out: 75 [Drains:75]    Last 3 shifts: 06/23 1901 - 06/25 0700  In: 1213.1 [I.V.:1213.1]  Out: 2804 [Urine:2140; Drains:355]      Intake/Output Summary (Last 24 hours) at 06/25/18 1245  Last data filed at 06/25/18 1232   Gross per 24 hour   Intake                2 ml   Output             2630 ml   Net            -2628 ml       Physical Exam:  General/Neurology: weak, lethargic, arousable but easy to fall asleep. No following all commands. Head:   Normocephalic, without obvious abnormality, atraumatic. Eye:   no scleral icterus, no pallor, no cyanosis. Pupils not dilated. Neck:   Symmetric. No lymphadenopathy. Trachea midline  Lung: Moderate air entry bilateral equal. Decreased breath sounds bases. No wheezing or crackles at bases. Heart:   S1 S2 present. No murmur. No JVD. Abdomen:  Soft. Obese. + BS. Midline lower abd surgical site - open wound with wound vac. BRANDON drain LLQ, Rt abd wall Ileostomy. No abd distension or guarding. Extremities:  No cyanosis. No clubbing. No hand edema. B/l feet/leg/thigh edema 2+. Pulses: Palpable radials and DPs bilateral.   Lymphatic:  No cervical or supraclav lymphadenopathy.    Skin:   No rash      Data:       Recent Results (from the past 12 hour(s))   GLUCOSE, POC    Collection Time: 06/25/18  5:03 AM   Result Value Ref Range    Glucose (POC) 199 (H) 70 - 110 mg/dL   MAGNESIUM    Collection Time: 06/25/18  5:20 AM   Result Value Ref Range    Magnesium 2.4 1.6 - 2.6 mg/dL   PHOSPHORUS    Collection Time: 06/25/18  5:20 AM   Result Value Ref Range    Phosphorus 2.7 2.5 - 4.9 MG/DL   CALCIUM, IONIZED    Collection Time: 06/25/18  5:20 AM   Result Value Ref Range    Ionized Calcium 1.13 1.12 - 1.32 MMOL/L   CBC WITH AUTOMATED DIFF    Collection Time: 06/25/18  5:20 AM   Result Value Ref Range    WBC 11.7 4.6 - 13.2 K/uL    RBC 3.28 (L) 4.20 - 5.30 M/uL    HGB 9.1 (L) 12.0 - 16.0 g/dL    HCT 28.8 (L) 35.0 - 45.0 %    MCV 87.8 74.0 - 97.0 FL    MCH 27.7 24.0 - 34.0 PG    MCHC 31.6 31.0 - 37.0 g/dL    RDW 24.7 (H) 11.6 - 14.5 %    PLATELET 019 (L) 045 - 420 K/uL    NEUTROPHILS 88 (H) 40 - 73 %    LYMPHOCYTES 6 (L) 21 - 52 %    MONOCYTES 5 3 - 10 %    EOSINOPHILS 1 0 - 5 %    BASOPHILS 0 0 - 2 %    ABS. NEUTROPHILS 10.3 (H) 1.8 - 8.0 K/UL    ABS. LYMPHOCYTES 0.7 (L) 0.9 - 3.6 K/UL    ABS. MONOCYTES 0.6 0.05 - 1.2 K/UL    ABS. EOSINOPHILS 0.1 0.0 - 0.4 K/UL    ABS.  BASOPHILS 0.0 0.0 - 0.06 K/UL    RBC COMMENTS ANISOCYTOSIS  2+        RBC COMMENTS SPHEROCYTES  1+        RBC COMMENTS MACROCYTOSIS  1+        RBC COMMENTS TARGET CELLS  1+        RBC COMMENTS HYPOCHROMIA  1+        DF AUTOMATED     METABOLIC PANEL, BASIC    Collection Time: 06/25/18  5:20 AM   Result Value Ref Range    Sodium 153 (H) 136 - 145 mmol/L    Potassium 3.9 3.5 - 5.5 mmol/L    Chloride 119 (H) 100 - 108 mmol/L    CO2 20 (L) 21 - 32 mmol/L    Anion gap 14 3.0 - 18 mmol/L    Glucose 196 (H) 74 - 99 mg/dL    BUN 51 (H) 7.0 - 18 MG/DL    Creatinine 1.34 (H) 0.6 - 1.3 MG/DL    BUN/Creatinine ratio 38 (H) 12 - 20      GFR est AA 48 (L) >60 ml/min/1.73m2    GFR est non-AA 39 (L) >60 ml/min/1.73m2    Calcium 7.5 (L) 8.5 - 10.1 MG/DL   HGB & HCT    Collection Time: 06/25/18 11:55 AM   Result Value Ref Range    HGB 8.8 (L) 12.0 - 16.0 g/dL    HCT 28.1 (L) 35.0 - 45.0 %   GLUCOSE, POC    Collection Time: 06/25/18 12:03 PM   Result Value Ref Range    Glucose (POC) 213 (H) 70 - 110 mg/dL           Chemistry Recent Labs      06/25/18   0520  06/24/18   0430  06/23/18   1550   06/23/18   0244   GLU  196*  146*   --    --   208*   NA  153*  151*   --    --   151*   K  3.9  3.9  3.7   < >  3.6   CL  119*  118*   --    --   116*   CO2  20*  22   --    --   23   BUN  51*  47*   --    --   44*   CREA  1.34*  1.46*   --    --   1.42*   CA  7.5*  7.8*   --    --   7.4*   MG  2.4  2.0   --    --   2.1   PHOS  2.7  2.9   --    --   3.4   AGAP  14  11   --    --   12   BUCR  38*  32*   --    -- 31*   ALB   --    --    --    --   2.3*    < > = values in this interval not displayed. Lactic Acid Lactic acid   Date Value Ref Range Status   06/17/2018 2.3 (HH) 0.4 - 2.0 MMOL/L Final     Comment:     CALLED TO AND CORRECTLY REPEATED BY:  Tayler Flannery RN ICU ON 6/17/2018 4409 TO 6290       No results for input(s): LAC in the last 72 hours. Liver Enzymes Protein, total   Date Value Ref Range Status   06/21/2018 4.8 (L) 6.4 - 8.2 g/dL Final     Albumin   Date Value Ref Range Status   06/23/2018 2.3 (L) 3.4 - 5.0 g/dL Final     Globulin   Date Value Ref Range Status   06/21/2018 2.8 2.0 - 4.0 g/dL Final     A-G Ratio   Date Value Ref Range Status   06/21/2018 0.7 (L) 0.8 - 1.7   Final     AST (SGOT)   Date Value Ref Range Status   06/21/2018 17 15 - 37 U/L Final     Alk. phosphatase   Date Value Ref Range Status   06/21/2018 58 45 - 117 U/L Final     Recent Labs      06/23/18   0244   ALB  2.3*        CBC w/Diff Recent Labs      06/25/18   1155  06/25/18   0520  06/24/18   0430   06/23/18   1215   WBC   --   11.7  14.2*   --   13.7*   RBC   --   3.28*  3.37*   --   3.21*   HGB  8.8*  9.1*  9.4*   < >  8.9*   HCT  28.1*  28.8*  29.0*   < >  27.3*   PLT   --   127*  119*   --   115*   GRANS   --   88*  79*   --   83*   LYMPH   --   6*  10*   --   11*   EOS   --   1  2   --   1    < > = values in this interval not displayed. Cardiac Enzymes No results found for: CPK, CK, CKMMB, CKMB, RCK3, CKMBT, CKNDX, CKND1, FABIAN, TROPT, TROIQ, KAYLAN, TROPT, TNIPOC, BNP, BNPP     BNP No results found for: BNP, BNPP, XBNPT     Coagulation Recent Labs      06/23/18   0244   PTP  15.5*   INR  1.3*         Thyroid  No results found for: T4, T3U, TSH, TSHEXT, TSHEXT    No results found for: T4       ABG No results for input(s): PHI, PHI, POC2, PCO2I, PO2, PO2I, HCO3, HCO3I, FIO2, FIO2I in the last 72 hours.      All Micro Results     Procedure Component Value Units Date/Time    CULTURE, FUNGUS [[id number]] Collected: 06/09/18 1024    Order Status:  Completed Specimen:  Bronchial lavage Updated:  06/25/18 1109     Special Requests: NO SPECIAL REQUESTS        FUNGUS SMEAR NO FUNGAL ELEMENTS SEEN        Culture result:         NO FUNGUS ISOLATED 16 DAYS    CULTURE, BLOOD FOR FUNGUS [[id number]]     Order Status:  Canceled Specimen:  Blood     CULTURE, BLOOD [[id number]] Collected:  06/09/18 1423    Order Status:  Completed Specimen:  Whole Blood from Blood Updated:  06/15/18 0230     Special Requests: NO SPECIAL REQUESTS        Culture result: NO GROWTH 6 DAYS       CULTURE, BLOOD [[id number]] Collected:  06/09/18 1223    Order Status:  Completed Specimen:  Whole Blood from Blood Updated:  06/15/18 0230     Special Requests: left hand     Culture result: NO GROWTH 6 DAYS       AFB CULTURE + SMEAR W/RFLX PCR AND ID [[id number]] Collected:  06/09/18 1024    Order Status:  Completed Specimen:  Respiratory sample Updated:  06/12/18 2106     Source BRONCHIAL LAVAGE        AFB Specimen processing Concentration     Acid Fast Smear NEGATIVE          (NOTE)  Performed At: [address] [id number]  Edward Euceda MD QQ:[id number]          Acid Fast Culture PENDING    CULTURE, SPUTUM/BRONCH/OTH [[id number]] Collected:  06/09/18 1024    Order Status:  Completed Specimen:  Sputum from Bronchial lavage Updated:  06/11/18 1058     Special Requests: NO SPECIAL REQUESTS        GRAM STAIN FEW WBC'S         FEW GRAM NEGATIVE RODS        Culture result:         RARE NORMAL RESPIRATORY JOSETTE    CULTURE, ANAEROBIC [[id number]] Collected:  06/05/18 0220    Order Status:  Completed Specimen:  Peritoneal Fluid Updated:  06/11/18 0734     Special Requests: NO SPECIAL REQUESTS        Culture result:         NO ANAEROBES ISOLATED 5 DAYS    CULTURE, BLOOD [[id number]] Collected:  06/04/18 0740    Order Status:  Completed Specimen:  Blood from Blood Updated:  06/10/18 0655     Special Requests: NO SPECIAL REQUESTS Culture result: NO GROWTH 6 DAYS       CULTURE, BLOOD [[id number]] Collected:  06/04/18 0740    Order Status:  Completed Specimen:  Blood from Blood Updated:  06/10/18 0655     Special Requests: NO SPECIAL REQUESTS        Culture result: NO GROWTH 6 DAYS       CULTURE, BODY FLUID Bebe Weberh STAIN [[id number]]  (Abnormal)  (Susceptibility) Collected:  06/05/18 0220    Order Status:  Completed Specimen:  Peritoneal Fluid Updated:  06/08/18 0936     Special Requests: NO SPECIAL REQUESTS        GRAM STAIN MANY WBC'S         NO ORGANISMS SEEN        Culture result:         RARE KLEBSIELLA OXYTOCA (A)            CALLED TO AND CORRECTLY REPEATED BY:  ANGEL LUIS DE LOS SANTOS RN ICU ON 6/7/2018 1032 TO 5087      CULTURE, URINE [[id number]] Collected:  06/05/18 1245    Order Status:  Completed Specimen:  Urine from Fry Specimen Updated:  06/07/18 1103     Special Requests: NO SPECIAL REQUESTS        Culture result: NO GROWTH 2 DAYS       C. DIFFICILE/EPI PCR [[id number]] Collected:  06/05/18 0930    Order Status:  Canceled Specimen:  Stool           Echo 6/9/18:  Left ventricle: Systolic function was normal. Ejection fraction was estimated   in the range of 65 % to 70 %. No obvious  wall motion abnormalities identified in the views obtained. Wall thickness   was mildly increased. Doppler parameters  were consistent with abnormal left ventricular relaxation (grade 1 diastolic   dysfunction). Right ventricle: The size was normal. Systolic function was normal.  Mitral valve: There was mild annular calcification. Tricuspid valve: Pulmonary artery systolic pressure was mildly increased. Pulmonary artery systolic pressure: 34 mmHg. PVL LE 6/6/18: acute b/l peroneal DVT. CT abd pelvis 6/4/18:  1. Postsurgical hysterectomy, bilateral oophorectomy, pelvic lymphadenectomy and  a mastectomy surgical changes.  Small volume of ascites in the abdomen and  pelvis, with a few tiny locules of gas in the right hemipelvis would be in  keeping with recent postsurgical etiology. 2. Abnormal edematous thick-walled small bowel loops in the left abdomen and  pelvis, with TRAM lamellar edematous configuration, induration. No findings of  pneumatosis. The overall appearance is nonspecific. Diagnostic considerations  include infectious etiology, nonspecific edema which may be unresolved  postsurgical etiology. Ischemia is also included in the differential diagnostic  consideration, however, there are no findings of pneumatosis, portal venous gas. 3. Stool in the right colon extending to the hepatic flexure with surrounding  gas, foci of pneumatosis could be obscured. No associated bowel wall thickening. 4. Satisfactory position of nasogastric tube. 5. Hepatomegaly, steatosis with 2 rounded low-density lesions, potentially  cysts. 6. Bibasilar atelectasis. CT chest/abd/plevis: 6/22/18:  Study limited due to streak and motion artifacts as described. No evidence of pulmonary embolism within the limitations of artifacts. Prominent consolidation right lower lobe likely infiltrates and atelectasis. Small consolidation seen in the left lower lobe. Mild diffuse ascites present throughout the abdomen pelvis with diffuse  anasarca. Large soft tissue consolidation in the pelvis containing air bubbles likely  representing adherent bowel with adjacent trapped fluid. There is what may be a subcutaneous hematoma adjacent to the right ileostomy  measuring 5.5 cm x 3.7 cm x 7.0 cm, however, there is no definite  retroperitoneal or any other intra-abdominal hematoma. Bilateral pelvic drains as described above. Chronic compression deformity of L1. CXR reviewed by me:  CXR 6/23/18: Worsening pulmonary interstitial edema with right greater than left pleural  effusions. Underexpansion. IMPRESSION:   · Severe sepsis due to Klebsiella Oxytoca peritonitis and then perforation. · Klebsiella oxytoca peritonitis.  S/p laparotomy and peritoneal lavage: Klebsiella positive. Ruled out ischemic bowel in laparotomy. · S/p laparotomy on 6/15/18 for peritonitis due to diverticulitis with perforation, s/p distal ileostomy. · Intraabdominal bleeding while on heparin. · S/p bronchoscopy 6/9/18: no aspiration noted. Cx Negative. · VDRF due to sepsis/?PE/?ARDS, reintubated on 6/8/18 and self-extubated 6/17  · S/p Shock likely due to sepsis and obstructive due to presumed PE. Shock resolved. CTA chest no central PE. · Acute b/l peroneal DVT  · Presumed PE with severe hypoxia and high A-a gradient, elevated RVSP 34 mm Hg. CTA 6/22/18 negative for central or main PA PE. Off heparin due to intraabdominal bleeding and CTA chest no central PE. · Anemia  · Thrombocytopenia  · JESS, improving  · Hypernatremia. · Metabolic and lactic acidosis, resolved. · Elevated LFT, due to shock liver, improving  · Foreign body on peritoneal biopsy, per path report. · Anasarca due to fluid resuscitation, JESS, hypoalbuminemia and sequale of sepsis. · AFib converted to NSR  · Adenocarcinoma of ovary     · Code status: DNR. · Very poor overall prognosis. RECOMMENDATIONS:   Respiratory: mildly tachypneic; continue o2 support; low threshold for re-intubation; heparin drip (for DVTs and likely PEs) has been stopped since 6/22/18 due to intra abdominal bleeding; INR reversed with protamin, FFP. CTA negative for central PE. PVL LE repeat pending result. If positive for DVT, then consider IVC filter. Keep SPO2 >=92%. HOB 30 degree elevation all the time. Aggressive pulmonary toileting. Aspiration precautions. Judicious opiate pain medications. Lowered Fentanyl to 10 mcg/hr max. May use patch if okay with surgery  Hem: follow H/H, coags; Keep Hb> 7 gm/dl. CVS: Echo ok with mildly elevated RVSP but normal RV systolic function. AFib converted to NSR. Off of cardizem drip. ID:  Bronch cx normal robin.  Peritoneal cx Klebsiella Oxytoca resistant to ampicillin. Leucocytosis-improving since on micafungin/vanco. Afebrile. Leukocytosis. Any abdominal imaging per surgery. Abx - Zosyn since 6/13; levaquin since 6/18, flagyl since 6/15; iv vanc since 6/22, micafungin since 6/22  Renal: Nephrologist on case; Creatinine stable/improving despite of IV contrast, monitor. Lasix PRN for hypernatremia. On albumin. On TPN. GI/: TPN; barium study 6/21 - patient could not do it on 6/22 due to abd pain  Endocrine: Maintain blood glucose 140-180. Humalog sc. Insulin in TPN. on Lantus  Neurology: weaning fentanyl drip - balance post-op pains with excessive sedation  Pain/Sedation: fentanyl drip - management per  Vernadine Nab  Skin/Wound: local surgical site care. Wound vac on. Electrolytes: Replace electrolytes per ICU electrolyte replacement protocol. Nutrition: TPN started 6/18   Prophylaxis: DVT and GI Prophylaxis (heparin /protonix)  Restraints: none  Lines/Tubes:   ETT: 6/8/18- self extubated 6/17/18  Central line: right subclavian 6/4/18 - removed 6/20  Picc line 6/19 - Central line bundle followed  Fry: 6/4/18 (Medically necessary for strict input/output monitoring in critically ill patient, will remove it when not needed. Fry bundle followed). Will defer respective systems problem management to primary and other respective consultant and follow patient in ICU with primary and other medical team.  Further recommendations will be based on the patient's response to recommended treatment and results of the investigation ordered. Quality Care: PPI, DVT prophylaxis, HOB elevated, Infection control all reviewed and addressed. Updated patient Brother at bedside: updated with critical condition and overall poor prognosis. Discussed the ocarina cancer and its prognosis etc. Patient has no children. Code status: Full code. PICC Line/Fry cath medically necessary  PT and OT on case    Care of plan d/w RN, RT, MDR, brother at bedside.   High complexity decision making was performed during the evaluation of this patient   CC time 35 mins         Laron Ramos MD  6/25/2018

## 2018-06-25 NOTE — PROGRESS NOTES
Nephrology Progress note    Subjective:     Christie Carrillo is a 75 y.o. female with a PMH DM, HTN, clear cell ovarian ca s/p debulking who presented with abdominal pain and possible sepsis/ischemic colitis. Pt underwent laparotomy/peritoneal lavage/bx, noted to have decreased UOP and increasing Cr to 2.1 today from baseline 0.6. CT neg for mass or hydro. Pt given lasix yesterday, still with high O2 requirements on vent. Unable to provide further history. On 6/8 Pt decompensated, ? Aspiration,  back on vent was hypoxic despite  Fi02 of 100%, , Hypotensive on IV pressors, Acidotic and on HC03 drip, Urine output decreased markedly over the weekend. Underwent Bronchoscopy 6/9. Pt requires less FiO2 now. UOP picking up. Wound has been draining yellow-brown fluid. S/P Exploratory laparotomy, Lysis of adhesions, Ileostomy and wound VAC placement on 6/16. Pt self-extubated 6/17.      -Her Cr down to 1.3 w/ UO 2.1 L from yesterday. Pt sleepy, on Fentanyl. Brother c/o pt LE swelling not better. Admit Date: 6/4/2018  Allergy:  Allergies   Allergen Reactions    Hydrocodone Other (comments)     Breaks into cold sweat    Metformin Other (comments)     Breaks out into cold sweat. Can Take Glucophage brand name med        Objective:     Visit Vitals    /74 (BP 1 Location: Left arm, BP Patient Position: At rest)    Pulse 73    Temp 97.4 °F (36.3 °C)    Resp 22    Ht 5' 1\" (1.549 m)    Wt 102.8 kg (226 lb 10.1 oz)    SpO2 100%    BMI 42.82 kg/m2         Intake/Output Summary (Last 24 hours) at 06/25/18 0948  Last data filed at 06/25/18 0725   Gross per 24 hour   Intake              252 ml   Output             3105 ml   Net            -2853 ml       Physical Exam:     General: NAD   HENT: Atraumatic and normocephalic.    Eyes: Sclera anicteric   Neck: No JVD or mass   Cardiovascular: Normal S1 S2   Pulmonary/Chest Wall: Decreased breath sounds bilaterally   Abdominal: Soft, obese, NABS   Musculoskeletal: ++ edema LEs   Neurological: Awake and no change       Data Review:      Lab Results   Component Value Date/Time    WBC 11.7 06/25/2018 05:20 AM    RBC 3.28 (L) 06/25/2018 05:20 AM    HCT 28.8 (L) 06/25/2018 05:20 AM    MCV 87.8 06/25/2018 05:20 AM    MCH 27.7 06/25/2018 05:20 AM    MCHC 31.6 06/25/2018 05:20 AM    RDW 24.7 (H) 06/25/2018 05:20 AM      Lab Results   Component Value Date    IRON 8 (L) 06/07/2018   No components found for: FERRITIN  No components found for: PTHINT  Urinalysis  No results found for: UGLU         Impression:     -JESS- likely ATN,  S Cr down to 1.3 today with good diuresis  -Septic Shock/hypotension, BPs normalized  -Resp Failure,  Was re-intubated 6/9,  Self extubated   -Severe Metabolic acidosis, improving.   -Ovarian ca s/p debulking then laparoscopy with lavage  -KLEBSIELLA OXYTOCA sepsis (Peritoneal fluid)  -DM  -h.o. HTN  -Anemia  -Elevated liver enzymes  -S/P Exploratory laparotomy, Lysis of adhesions, Ileostomy and wound VAC placement on 6/16  -Leukocytosis  -Hypernatremia, worsening slowly  -Hypokalemia, repleted     Plan:   Agree with D5W  due to worsening hypernatremia   Cont to hold Lasix for now  Increase IV albumin to q8h  Replace electrolytes per protocol   Monitor I/O and chemistry, H&H  Antibiotics.   Cont Vanc dosing per pharmacist  Adjusting [address] [place] [address] MD Denver Mora  [phone]

## 2018-06-25 NOTE — PROGRESS NOTES
Admit date: 6/4/2018      ASSESSMENT/PLAN  Alexander pride 75 y. 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 Will need adjuvant chemotherapy to complete primary therapy. GI - Peritonitis secondary to diverticulitis with perforation diverted with distal ileostomy. Post operative ileus resolved. Good ileostomy output. Patient not taking much po. Continue TPN. ID - sepsis secondary to peritonitis. On Flagyl, Zosyn, Levaquin, Vanc and micafungin . WBC trending down. Patient remains afebrile. Renal - JESS, resolving. Heme - Acute blood loss anemia, lolis-operative loss exacerbated by heparin gtt. Tranfusion pRBCs prn. DVT- bilateral peroneal DVT. CV - stable  Pulm - Respiratory failure, resolved. Maintaining sats with 3 L O2 NC   Psych - selectively interactive, communicating better today  Disp - condition labile, continue ICU care  Code Status - Full Code      SUBJECTIVE  Pain not as well controlled today. Taking very little po but no nausea or vomiting.      OBJECTIVE  Physical Exam:  Patient Vitals for the past 24 hrs:   BP Temp Pulse Resp SpO2   06/25/18 1907 - 97.6 °F (36.4 °C) - - -   06/25/18 1900 163/87 - 80 22 100 %   06/25/18 1800 (!) 162/91 - 78 25 100 %   06/25/18 1700 (!) 151/92 - 78 25 100 %   06/25/18 1600 161/87 97.3 °F (36.3 °C) 77 26 99 %   06/25/18 1500 150/88 - 83 25 100 %   06/25/18 1400 149/90 - 77 26 100 %   06/25/18 1300 (!) 161/92 - 84 25 100 %   06/25/18 1200 141/84 - 74 21 98 %   06/25/18 1100 141/86 98.1 °F (36.7 °C) 80 21 100 %   06/25/18 1000 (!) 142/94 - 76 22 95 %   06/25/18 0900 (!) 160/91 - 81 24 100 %   06/25/18 0800 147/74 - 73 22 100 %   06/25/18 0700 148/83 97.4 °F (36.3 °C) 82 24 100 %   06/25/18 0600 145/77 - 75 21 99 %   06/25/18 0500 151/82 - 81 27 100 %   06/25/18 0400 150/83 97.5 °F (36.4 °C) 79 21 99 %   06/25/18 0300 153/85 - 82 22 99 %   06/25/18 0200 144/82 - 87 25 98 %   06/25/18 0100 143/78 - 86 24 100 %   06/25/18 0000 150/82 97.5 °F (36.4 °C) 79 25 100 %   06/24/18 2300 152/79 - 83 26 100 %   06/24/18 2200 150/84 - 83 24 100 %   06/24/18 2100 153/82 - 83 23 100 %   06/24/18 2000 152/87 97.4 °F (36.3 °C) 86 23 94 %         Intake/Output Summary (Last 24 hours) at 06/25/18 1925  Last data filed at 06/25/18 1813   Gross per 24 hour   Intake                2 ml   Output             2980 ml   Net            -2978 ml        General - More alert today  HEENT - within normal limits  Heart - regular rate and rhythm  Lungs - CTAb  Abdomen - soft, tender, nondistended, normal bowel sounds  Incision - wound vac in place  Extremities - 2+ edema LE    Labs  CBC  Recent Labs      06/25/18   1155  06/25/18   0520  06/24/18   0430   06/23/18   1215   WBC   --   11.7  14.2*   --   13.7*   HCT  28.1*  28.8*  29.0*   < >  27.3*   MCV   --   87.8  86.1   --   85.0   BANDS   --    --   6*   --   1   MONOS   --   5  2   --   4   EOS   --   1  2   --   1   BASOS   --   0  1   --   0    < > = values in this interval not displayed.         CMP  Recent Labs      06/25/18   0520  06/24/18   0430  06/23/18   0244   NA  153*  151*  151*   CO2  20*  22  23   BUN  51*  47*  44*        Lab Results   Component Value Date/Time    Glucose 196 (H) 06/25/2018 05:20 AM    Glucose (POC) 188 (H) 06/25/2018 05:21 PM    Glucose,  (H) 06/15/2018 05:14 PM          Current Hospital Medications    Current Facility-Administered Medications:     dextrose 5% infusion, 75 mL/hr, IntraVENous, CONTINUOUS, Raquel Virgen MD, Last Rate: 5 mL/hr at 06/25/18 0912, 5 mL/hr at 06/25/18 0912    albumin human 25% (BUMINATE) solution 12.5 g, 12.5 g, IntraVENous, Q8H, Berenice Haas MD, 12.5 g at 06/25/18 1350    TPN ADULT - CENTRAL, , IntraVENous, CONTINUOUS, Myriam Smallwood MD, Last Rate: 60 mL/hr at 06/25/18 1910    fentaNYL (PF) 900 mcg/30 ml infusion soln, 0-10 mcg/hr, IntraVENous, TITRATE, Clive Ryan MD, Last Rate: 0.3 mL/hr at 06/25/18 1248, 10 mcg/hr at 06/25/18 1248    vancomycin (VANCOCIN) 1500 mg in  ml infusion, 1,500 mg, IntraVENous, Q24H, David Farley MD, Last Rate: 250 mL/hr at 06/25/18 1554, 1,500 mg at 06/25/18 1554    [START ON 6/26/2018] insulin glargine (LANTUS) injection 13 Units, 13 Units, SubCUTAneous, DAILY, Jarad Alan MD    0.9% sodium chloride infusion 250 mL, 250 mL, IntraVENous, PRN, David Farley MD    micafungin [facility]) 100 mg in 0.9% sodium chloride (MBP/ADV) 100 mL, 100 mg, IntraVENous, Q24H, David Farley MD, Last Rate: 100 mL/hr at 06/25/18 1224, 100 mg at 06/25/18 1224    insulin lispro (HUMALOG) injection, , SubCUTAneous, Q6H, David Farley MD, 2 Units at 06/25/18 1749    dextrose (D50W) injection syrg 12.5-25 g, 25-50 mL, IntraVENous, PRN, David Farley MD    Vancomycin - Pharmacy to Dose, 1 Each, Other, Rx Dosing/Monitoring, David Farley MD    levoFLOXacin (LEVAQUIN) 750 mg in D5W IVPB, 750 mg, IntraVENous, Q48H, Emily Stevenson MD, Last Rate: 100 mL/hr at 06/25/18 0244, 750 mg at 06/25/18 0244    metroNIDAZOLE (FLAGYL) IVPB premix 500 mg, 500 mg, IntraVENous, Q8H, Sanjiv Rush MD, Last Rate: 100 mL/hr at 06/25/18 1127, 500 mg at 06/25/18 1127    ipratropium (ATROVENT) 0.02 % nebulizer solution 0.5 mg, 0.5 mg, Nebulization, Q4H PRN, Sanjiv Rush MD    piperacillin-tazobactam (ZOSYN) 2.25 g in 0.9% sodium chloride (MBP/ADV) 50 mL MBP, 2.25 g, IntraVENous, Q6H, Pietro Forman MD, Last Rate: 100 mL/hr at 06/25/18 1703, 2.25 g at 06/25/18 1703    naloxone (NARCAN) injection 0.4 mg, 0.4 mg, IntraVENous, EVERY 2 MINUTES AS NEEDED, David Farley MD    ondansetron [facility]) injection 4 mg, 4 mg, IntraVENous, Q6H PRN, Flora Lopes MD, 4 mg at 06/08/18 2152    oxymetazoline (AFRIN) 0.05 % nasal spray 2 Spray, 2 Spray, Both Nostrils, BID PRN, Jarad Alan MD    alum-mag hydroxide-simeth (MYLANTA) oral suspension 30 mL, 30 mL, Oral, Q4H PRN, Christen Rothman MD, 30 mL at 06/07/18 2225    fentaNYL citrate (PF) injection 25 mcg, 25 mcg, IntraVENous, Q3H PRN, Christen Rothman MD, 25 mcg at 06/25/18 1733    fluticasone (FLONASE) 50 mcg/actuation nasal spray 2 Spray, 2 Spray, Both Nostrils, DAILY PRN, Charissa Lancaster MD    ELECTROLYTE REPLACEMENT PROTOCOL-POTASSIUM Renal Dosing, 1 Each, Other, PRN, Christen Leroy MD    ELECTROLYTE REPLACEMENT PROTOCOL-MAGNESIUM, 1 Each, Other, PRN, Christen Leroy MD    ELECTROLYTE REPLACEMENT PROTOCOL-PHOSPHORUS Renal Dosing, 1 Each, Other, PRN, Christen Leroy MD    ELECTROLYTE REPLACEMENT PROTOCOL-CALCIUM, 1 Each, Other, PRN, Christen Leroy MD    sodium chloride (NS) flush 5-10 mL, 5-10 mL, IntraVENous, PRN, Jovani Villalobos MD    glucose chewable tablet 16 g, 4 Tab, Oral, PRN, Christen Leroy MD    glucagon (GLUCAGEN) injection 1 mg, 1 mg, IntraMUSCular, PRN, Christen Leroy MD    pantoprazole (PROTONIX) 40 mg in sodium chloride 0.9% 10 mL injection, 40 mg, IntraVENous, DAILY, Jaimie Negrete MD, 40 mg at 06/25/18 6072      Charissa Lancaster MD

## 2018-06-25 NOTE — PROGRESS NOTES
NUTRITION FOLLOW-UP    RECOMMENDATIONS/PLAN:   - Recc continuing w/ TPN at current rate until consistent PO intakes are made; Recc once 50% averages are met all meals recc cutting TPN down to 1/2 to meet estimated needs.   - Recc Ensure Pudding TID  Monitor labs/lytes, TPN tolerance, skin integrity, wt, fluid status, BM, PO intakes    NUTRITION ASSESSMENT:   Client Update: 75 yrs old Female with acute respiratory failure, ischemia colitis, sepsis, JESS, metabolic acidosis. 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 Pt was extubated, and 1 day later reintubated. Thought to have peritonitis. Pt is s/p laparotomy and peritoneal lavage with klebsiella positive. 6/15/18 s/p exp lap surgery for intraabdominal fluid collection/wound vac and ileostomy placed. Pt self extubated 6/17/18. Pt is on TPN. Ileostomy and wound vac in place       FOOD/NUTRITION INTAKE   Diet Order:  TPN: 1379kcal 200g dextrose 75g protein 44g lipids; Full Liquids Pudding Thick    Food Allergies: NKFA  Average PO Intake:      No data found. Pertinent Medications:  [x] Reviewed; dextrose 5%, protonix, zosyn, vancomycin   Electrolyte Replacement Protocol: [x]K [x]Mg [x]PO4  Insulin:  [x]SSI  []Pre-meal   []Basal    []Drip  []None  Cultural/Mosque Food Preferences: None Identified    BIOCHEMICAL DATA & MEDICAL TESTS  Pertinent Labs:  [x] Reviewed;  Na-153, Glucose-196, Ca-7.5   ANTHROPOMETRICS  Height: 5' 1\" (154.9 cm)       Weight: 102.8 kg (226 lb 10.1 oz)         BMI: 42.8 kg/m^2 morbidly obese (Greater than or = to 40% BMI)   Adm Weight: 196 lbs                Weight change: +28lbs since admission  Adjusted Body Weight: 58.1kg     NUTRITION-FOCUSED PHYSICAL ASSESSMENT  Skin: No PU      GI:  +ileostomy output 900ml yesterday    NUTRITION PRESCRIPTION  Calories: 979-1246 kcal/day based on 11-14kcal/kg  Protein: 95 g/day based on 2.0g/kg of IBW  CHO: 122-156 g/day based on 50% of total energy  ESJIA: [id number] CK/EFF based on 1 kcal/ml     NUTRITION DIAGNOSES:   1.  Inadequate oral intake related to diet order as evidence by NPO diet       NUTRITION INTERVENTIONS:      INTERVENTIONS:                                                                                                                                                                                                                                                        GOALS:  1. TPN:Continue w/ current TPN  2. Commercial Beverage-Ensure Pudding TID 1. Continue w/ TPN and diet adv if possible by next review 3 days         LEARNING NEEDS (Diet, Supplementation, Food/Nutrient-Drug Interaction):   [x] None Identified   [] Education provided/documented      Identified and patient: [] Declined   [] Was not appropriate/indicated        NUTRITION MONITORING /EVALUATION:   Adjust EN/PN as appropriate  Follow PO intake  Monitor wt  Monitor renal labs, electrolytes, fluid status  Monitor for additional supplement needs     Previous Recommendations Implemented: yes        Previous Goals Met:  yes -      [x] Participated in Interdisciplinary Rounds    [x] Interdisciplinary Care Plan Reviewed  DISCHARGE NUTRITION RECOMMENDATIONS ADDRESSED:        [x] To be determined closer to discharge    NUTRITION RISK:           [] At risk                        [] Not currently at risk        Will follow-up per policy.   Nancy Roe, [id number] [address]

## 2018-06-25 NOTE — PROGRESS NOTES
Pharmacy Dosing Services: Vancomycin   Consult provided for this 75y.o. year old female  for indication of sepsis. Day of Therapy 4  Pt receiving Vancomycin 1250 mg IV q24h for sepsis  Trough level = 17.3 today ( NOTE: Trough level drawn ~ 4 hours early  ? ? )  Renal function improved today   Due to improving renal function and the fact that trough was drawn early, will  Increase Vancomycin dose to 1500 mg IV q24h  Dose calculated to approximate a therapeutic trough of 15-20 mcg/mL. Pharmacy to follow daily and will make changes to dose and/or frequency based on clinical status.   Pharmacist Misbah Stiles, [facility]

## 2018-06-25 NOTE — PROGRESS NOTES
Problem: Dysphagia (Adult)  Goal: *Acute Goals and Plan of Care (Insert Text)  Recommendations:  Diet: Puree/thin  Meds: Per patient preference  Aspiration Precautions  Oral Care TID    Goals:  Patient will:  1. Tolerate PO trials with 0 s/s overt distress in 4/5 trials  2. Utilize compensatory swallow strategies/maneuvers (decrease bite/sip, size/rate, alt. liq/sol) with min cues in 4/5 trials  3. Perform oral-motor/laryngeal exercises to increase oropharyngeal swallow function with min cues  4. Complete an objective swallow study (i.e., MBSS) to assess swallow integrity, r/o aspiration, and determine of safest LRD, min A              Outcome: Progressing Towards Goal  Speech language pathology dysphagia treatment    Patient: Lily Pope (25 y.o. female)  Date: 6/25/2018  Diagnosis: Abdominal pain  Abdominal pain  Abdominal Pain  aspiration  POSTOP WOUND EXPLORATION Sepsis (HCC)  Procedure(s) (LRB):  WOUND EXPLORATION, EXPLORATORY LAPAROTOMY, ILLEOSTMOY, LYSES OF ADHESIONS AND WOUND VAC PLACEMENT (N/A) 10 Days Post-Op  Precautions: Aspiration Fall  PLOF: Independent     ASSESSMENT:  Followed up this day with dysphagia tx. Diet upgraded Pt A&O to self. Upon entering room, pt's brother feeding ice chips. HOB 30 degrees, pt refused for HOB to be raised further. Pt accepted SLP-fed puree; demo delayed swallow initiation and decreased hyolaryngeal elevation to palpation; 0 overt s/s of aspiration. Aspiration risks, resulting complications, and aspiration prevention d/w pt and brother, verbalized comprehension, needs reinforcement. Recommend puree/thin liquid diet at MD discretion. Strict aspiration precautions. Pt must be assisted and supervised 1:1 with all PO intake. Pt would benefit from MBS when 90 degree positioning can be tolerated. D/w RN, Angella Arenas.      Progression toward goals:  []         Improving appropriately and progressing toward goals  [x]         Improving slowly and progressing toward goals  [] Not making progress toward goals and plan of care will be adjusted     PLAN:  Recommendations and Planned Interventions:  Puree/thin at MD discretion   Patient continues to benefit from skilled intervention to address the above impairments. Continue treatment per established plan of care. Discharge Recommendations: To Be Determined     SUBJECTIVE:   Patient stated [facility]. OBJECTIVE:   Cognitive and Communication Status:  Neurologic State: Alert  Orientation Level: Oriented X4  Cognition: Follows commands  Perception: Appears intact  Perseveration: No perseveration noted  Safety/Judgement: Awareness of environment, Decreased insight into deficits  Dysphagia Treatment:  Oral Assessment:  Oral Assessment  Labial: No impairment  Dentition: Natural, Intact  Oral Hygiene: Good  Lingual: Decreased rate, Decreased strength  Velum: Unable to visualize  Mandible: No impairment  P.O. Trials:   Patient Position: HOB 40   Vocal quality prior to P.O.: No impairment   Consistency Presented: Ice chips, Puree   How Presented: SLP-fed/presented, Spoon       Bolus Acceptance: No impairment   Bolus Formation/Control: Impaired   Type of Impairment: Delayed, Mastication   Propulsion: Delayed (# of seconds)   Oral Residue: None   Initiation of Swallow: No impairment   Laryngeal Elevation: Decreased   Aspiration Signs/Symptoms: Delayed cough/throat clear   Pharyngeal Phase Characteristics: Easily fatigued    Effective Modifications: Small sips and bites   Cues for Modifications:  Moderate         Oral Phase Severity: Mild   Pharyngeal Phase Severity : Moderate    PAIN:  Start of Tx: 0  End of Tx: 0     GCODESwallowing:  Swallow Current Status CL= 60-79%   Swallow Goal Status CI= 1-19%    The severity rating is based on the following outcomes:  LEOPOLDO Noms Swallow Level 3    Clinical Judgement    After treatment:   []              Patient left in no apparent distress sitting up in chair  [x]              Patient left in no apparent distress in bed  [x]              Call bell left within reach  [x]              Nursing notified  [x]              Family present  []              Caregiver present  []              Bed alarm activated      COMMUNICATION/EDUCATION:   [x] Safe swallowing guidelines; compensatory techniques  []        Patient unable to participate in education; education ongoing with staff  [x]  Posted safety precautions in patient's room.   [] Oral-motor/laryngeal strengthening exercises      KENNETH Carreno  Time Calculation: 15 mins

## 2018-06-25 NOTE — PROGRESS NOTES
Consult for TPN Dosing per Pharmacy by Dr. Ervin Rodrigues provided for this 75 y.o. female, for indication of Ileus  Day of Therapy 8    TPN dosing weight:  62.4 kg     Labs:  BMP BMP:   Lab Results   Component Value Date/Time     (H) 06/25/2018 05:20 AM    K 3.9 06/25/2018 05:20 AM     (H) 06/25/2018 05:20 AM    CO2 20 (L) 06/25/2018 05:20 AM    AGAP 14 06/25/2018 05:20 AM     (H) 06/25/2018 05:20 AM    BUN 51 (H) 06/25/2018 05:20 AM    CREA 1.34 (H) 06/25/2018 05:20 AM    GFRAA 48 (L) 06/25/2018 05:20 AM    GFRNA 39 (L) 06/25/2018 05:20 AM          CMP CMP:   Lab Results   Component Value Date/Time     (H) 06/25/2018 05:20 AM    K 3.9 06/25/2018 05:20 AM     (H) 06/25/2018 05:20 AM    CO2 20 (L) 06/25/2018 05:20 AM    AGAP 14 06/25/2018 05:20 AM     (H) 06/25/2018 05:20 AM    BUN 51 (H) 06/25/2018 05:20 AM    CREA 1.34 (H) 06/25/2018 05:20 AM    GFRAA 48 (L) 06/25/2018 05:20 AM    GFRNA 39 (L) 06/25/2018 05:20 AM    CA 7.5 (L) 06/25/2018 05:20 AM    MG 2.4 06/25/2018 05:20 AM    PHOS 2.7 06/25/2018 05:20 AM         Ca/ Phos Lab Results   Component Value Date/Time    Calcium 7.5 (L) 06/25/2018 05:20 AM    Phosphorus 2.7 06/25/2018 05:20 AM       Mag    Lab Results   Component Value Date/Time    Magnesium 2.4 06/25/2018 05:20 AM      Tg No components found for: TGL   Albumin Lab Results   Component Value Date/Time    Protein, total 4.8 (L) 06/21/2018 06:50 PM    Albumin 2.3 (L) 06/23/2018 02:44 AM         GOAL   Kcal requirements:                                    ~22 kcal/kg   Fluid requirements:                                   ~30 ml/kg   Macronutrients:   Protein                                                      1.2 g/kg/day 300 kcal   Lipids                                                        4 gm/day 396 kcal   Dextrose remainder of total kcal:             200 gm/day 680 kcal   Total Calories:                                           1376 kcal      TPN to continue at goal macronutrients today. Per dietary recommendations, TPN to continue at full strength today then decrease by 50% once patient is eating 50% average all meals. Electrolytes to be monitored and adjusted daily. MD to replete electrolytes acutely outside of TPN as needed. Additional recommendations/Orders:   1. Corrective insulin coverage q6h while on TPN. 2. MD added D5W at 75 ml/hr today-MD to further adjust as needed ( since pt is starting to eat and         plan is to wean TPN soon, will not add any more insulin to TPN and cover with sliding scale if needed )   3. Increase KPhos to 12 mmol  4. Remove Mag from next bag TPN - Mag level increased to 2.4 today   5. BMP,  Mag, Phos ordered daily  6. Triglycerides, Prealbumin, CMP ordered upon initiation and weekly ( ordered for tomorrow 6/26/18 )     Pharmacy to follow daily and will make changes based on labs/clinical status.

## 2018-06-25 NOTE — PROGRESS NOTES
Hospitalist Progress Note-critical care note     Patient: Wilver Singh MRN: [id number]  CSN: [id number]    YOB: 1950  Age: 75 y.o. Sex: female    DOA: 6/4/2018 LOS:  LOS: 21 days            Chief complaint:   hyoerntremia. D/p ileostomy , peritonitis , sepsis , jess ,ovarian cancer   Assessment/Plan         Hospital Problems  Date Reviewed: 6/5/2018          Codes Class Noted POA    Hypernatremia ICD-10-CM: E87.0  ICD-9-CM: 276.0  6/25/2018 Unknown        S/P ileostomy ([facility] ICD-10-CM: Z93.2  ICD-9-CM: V44.2  6/16/2018 No        Hypoalbuminemia ICD-10-CM: E88.09  ICD-9-CM: 273.8  6/16/2018 Unknown        Peritonitis ([facility] ICD-10-CM: K65.9  ICD-9-CM: 567.9  6/16/2018 Unknown        Acute deep vein thrombosis (DVT) of lower extremity ([facility] ICD-10-CM: I82.409  ICD-9-CM: 453.40  6/7/2018 Yes        Acute respiratory failure ([facility] ICD-10-CM: J96.00  ICD-9-CM: 518.81  6/5/2018 No        JESS (acute kidney injury) ([facility] ICD-10-CM: N17.9  ICD-9-CM: 584.9  6/5/2018 Unknown        Metabolic acidosis SOX-26-FC: E87.2  ICD-9-CM: 276.2  6/5/2018 No        Abdominal pain ICD-10-CM: R10.9  ICD-9-CM: 789.00  6/4/2018 Yes        * (Principal)Sepsis ([facility] ICD-10-CM: A41.9  ICD-9-CM: 038.9, 995.91  6/4/2018 Yes        Oliguria ICD-10-CM: R34  ICD-9-CM: 788.5  6/4/2018 Yes        Ovarian ca ([facility] ICD-10-CM: C56.9  ICD-9-CM: 183.0  5/29/2018 Yes              Acute resp failure with hypoxia   - self extubated 06/17/2018. On nc O2  - bronchoscopy 06/09/2018, no aspiration,  - resp cultures no growth to date. CTA chest negative for  PE  -pulm on board        Severe Sepsis   - secondary to peritonitis. Peritoneal fluid growing klebsiella oxytoca. ANTIBIOTICS zosyn, vanc flagyl. .micafungin         Shock   - sepsis   -resolved. Now off pressors. Anasarca - likely third spacing on albumin     Bleeding from BRANDON drain - protamine and FFP. Acute bilateral peroneal DVT - heparin drip stopped. due to bleeding    Anemia Received transfusion, so far h/h stable        JESS   - nephrology on board , cr mild improving     Metabolic acidosis - resolved    Hypernatremia   pharmacy to correct with TPN   add d5   DM  lantus , ssi      Bleeding from HUNTER drain   -stopped  CT abdomen and pelvis reviewed. Disposition :tbd  Poor prognosis   Review of systems:  Unable to obtain-refused to answer   Vital signs/Intake and Output:  Visit Vitals    /74 (BP 1 Location: Left arm, BP Patient Position: At rest)    Pulse 73    Temp 97.4 °F (36.3 °C)    Resp 22    Ht 5' 1\" (1.549 m)    Wt 102.8 kg (226 lb 10.1 oz)    SpO2 100%    BMI 42.82 kg/m2     Current Shift:     Last three shifts:  06/23 1901 - 06/25 0700  In: 1213.1 [I.V.:1213.1]  Out: 0787 [Urine:2140; Drains:355]    Physical Exam:  General: WD, WN. Alert, not cooperative, no acute distress    HEENT: NC, Atraumatic. PERRLA, anicteric sclerae. Lungs: CTA Bilaterally. No Wheezing/Rhonchi/Rales. Heart:  Regular  rhythm,  + murmur, No Rubs, No Gallops  Abdomen: Soft, Non distended, Non tender.  +Bowel sounds, colostomy bag with hunter tube   Extremities: No c/c. Edema   Psych:   Calm   Neurologic:  Unable to obtain due to not cooperate           Labs: Results:       Chemistry Recent Labs      06/25/18   0520  06/24/18   0430  06/23/18   1550   06/23/18   0244   GLU  196*  146*   --    --   208*   NA  153*  151*   --    --   151*   K  3.9  3.9  3.7   < >  3.6   CL  119*  118*   --    --   116*   CO2  20*  22   --    --   23   BUN  51*  47*   --    --   44*   CREA  1.34*  1.46*   --    --   1.42*   CA  7.5*  7.8*   --    --   7.4*   AGAP  14  11   --    --   12   BUCR  38*  32*   --    --   31*   ALB   --    --    --    --   2.3*    < > = values in this interval not displayed.       CBC w/Diff Recent Labs      06/25/18   0520  06/24/18   0430  06/23/18   1830  06/23/18   1215   WBC  11.7  14.2*   --   13.7*   RBC  3.28*  3.37*   --   3.21*   HGB  9.1*  9.4*  9.0*  8.9*   HCT  28.8* 29.0*  27.8*  27.3*   PLT  127*  119*   --   115*   GRANS  88*  79*   --   83*   LYMPH  6*  10*   --   11*   EOS  1  2   --   1      Cardiac Enzymes No results for input(s): CPK, CKND1, FABIAN in the last 72 hours. No lab exists for component: CKRMB, TROIP   Coagulation Recent Labs      06/23/18   0244  06/22/18   1225   PTP  15.5*  17.0*   INR  1.3*  1.4*   APTT   --   33.5       Lipid Panel Lab Results   Component Value Date/Time    Triglyceride 64 06/19/2018 04:48 AM      BNP No results for input(s): BNPP in the last 72 hours.    Liver Enzymes Recent Labs      06/23/18   0244   ALB  2.3*      Thyroid Studies No results found for: T4, T3U, TSH, TSHEXT, TSHEXT     Procedures/imaging: see electronic medical records for all procedures/Xrays and details which were not copied into this note but were reviewed prior to creation of Daniel Rodrigues MD

## 2018-06-25 NOTE — PROGRESS NOTES
Bedside and Verbal shift change report given to Philip Best RN (oncoming nurse) by Renny Canela RN (offgoing nurse). Report included the following information SBAR, Kardex, MAR and Cardiac Rhythm Sinus. 0800 - Initial assessment completed. 1200- No changes to assessment. Received order from  to change Fentanyl drip from 25mcg to 10mcg. Dressing at [address] drain incision is clean and dry, small amounts of blood on first layer of 4x4 dressing. 1400 - H&H resolved, hemoglobin 8.8  1600- No changes to assessment, patient seems to be more alert after decrease in Fentanyl drip dosage. Patient denying pain at this time. 1630- Patient had a bowel movement. Fry and mouth care completed. 1730 - Patients non verbal pain scale rated at a 7 out of 10. 25mcg administerd. Patients fan turned on, feet kept uncovered for comfort. Will reassess. 1830 - Patient responding well to pain medication, pain scale rating at a 3 out of 10.   1900- Bedside and Verbal shift change report given to Julia Rodriguez RN (oncoming nurse) by Philip Best RN (offgoing nurse). Report included the following information SBAR, Kardex, MAR and Cardiac Rhythm Sinus.

## 2018-06-26 ENCOUNTER — APPOINTMENT (OUTPATIENT)
Dept: GENERAL RADIOLOGY | Age: 68
DRG: 853 | End: 2018-06-26
Attending: INTERNAL MEDICINE
Payer: MEDICARE

## 2018-06-26 LAB
ABO + RH BLD: NORMAL
ALBUMIN SERPL-MCNC: 2.7 G/DL (ref 3.4–5)
ALBUMIN/GLOB SERPL: 0.8 {RATIO} (ref 0.8–1.7)
ALP SERPL-CCNC: 67 U/L (ref 45–117)
ALT SERPL-CCNC: 16 U/L (ref 13–56)
ANION GAP SERPL CALC-SCNC: 14 MMOL/L (ref 3–18)
ARTERIAL PATENCY WRIST A: YES
AST SERPL-CCNC: 16 U/L (ref 15–37)
BASE DEFICIT BLD-SCNC: 8 MMOL/L
BDY SITE: ABNORMAL
BILIRUB SERPL-MCNC: 0.7 MG/DL (ref 0.2–1)
BLD PROD TYP BPU: NORMAL
BLOOD GROUP ANTIBODIES SERPL: NORMAL
BODY TEMPERATURE: 98.6
BPU ID: NORMAL
BUN SERPL-MCNC: 49 MG/DL (ref 7–18)
BUN/CREAT SERPL: 42 (ref 12–20)
CA-I SERPL-SCNC: 1.13 MMOL/L (ref 1.12–1.32)
CALCIUM SERPL-MCNC: 7.6 MG/DL (ref 8.5–10.1)
CALLED TO:,BCALL1: NORMAL
CHLORIDE SERPL-SCNC: 117 MMOL/L (ref 100–108)
CO2 SERPL-SCNC: 19 MMOL/L (ref 21–32)
CREAT SERPL-MCNC: 1.17 MG/DL (ref 0.6–1.3)
CROSSMATCH RESULT,%XM: NORMAL
GAS FLOW.O2 O2 DELIVERY SYS: ABNORMAL L/MIN
GAS FLOW.O2 SETTING OXYMISER: 20 L/M
GLOBULIN SER CALC-MCNC: 3.2 G/DL (ref 2–4)
GLUCOSE BLD STRIP.AUTO-MCNC: 144 MG/DL (ref 70–110)
GLUCOSE BLD STRIP.AUTO-MCNC: 166 MG/DL (ref 70–110)
GLUCOSE BLD STRIP.AUTO-MCNC: 200 MG/DL (ref 70–110)
GLUCOSE BLD STRIP.AUTO-MCNC: 202 MG/DL (ref 70–110)
GLUCOSE SERPL-MCNC: 201 MG/DL (ref 74–99)
HCO3 BLD-SCNC: 16.5 MMOL/L (ref 22–26)
HCT VFR BLD AUTO: 29.4 % (ref 35–45)
HGB BLD-MCNC: 9.2 G/DL (ref 12–16)
MAGNESIUM SERPL-MCNC: 2.3 MG/DL (ref 1.6–2.6)
O2/TOTAL GAS SETTING VFR VENT: 0.5 %
PCO2 BLD: 24.5 MMHG (ref 35–45)
PH BLD: 7.43 [PH] (ref 7.35–7.45)
PHOSPHATE SERPL-MCNC: 2.7 MG/DL (ref 2.5–4.9)
PO2 BLD: 68 MMHG (ref 80–100)
POTASSIUM SERPL-SCNC: 3.7 MMOL/L (ref 3.5–5.5)
POTASSIUM SERPL-SCNC: 3.7 MMOL/L (ref 3.5–5.5)
PREALB SERPL-MCNC: 15.7 MG/DL (ref 20–40)
PROT SERPL-MCNC: 5.9 G/DL (ref 6.4–8.2)
SAO2 % BLD: 94 % (ref 92–97)
SERVICE CMNT-IMP: ABNORMAL
SODIUM SERPL-SCNC: 150 MMOL/L (ref 136–145)
SPECIMEN EXP DATE BLD: NORMAL
SPECIMEN TYPE: ABNORMAL
STATUS OF UNIT,%ST: NORMAL
TOTAL RESP. RATE, ITRR: 37
TRIGL SERPL-MCNC: 66 MG/DL (ref ?–150)
UNIT DIVISION, %UDIV: 0

## 2018-06-26 PROCEDURE — 74011000250 HC RX REV CODE- 250: Performed by: INTERNAL MEDICINE

## 2018-06-26 PROCEDURE — 71045 X-RAY EXAM CHEST 1 VIEW: CPT

## 2018-06-26 PROCEDURE — 74011636637 HC RX REV CODE- 636/637: Performed by: INTERNAL MEDICINE

## 2018-06-26 PROCEDURE — 74011250636 HC RX REV CODE- 250/636: Performed by: INTERNAL MEDICINE

## 2018-06-26 PROCEDURE — 82330 ASSAY OF CALCIUM: CPT | Performed by: INTERNAL MEDICINE

## 2018-06-26 PROCEDURE — 65270000029 HC RM PRIVATE

## 2018-06-26 PROCEDURE — 36600 WITHDRAWAL OF ARTERIAL BLOOD: CPT

## 2018-06-26 PROCEDURE — C9113 INJ PANTOPRAZOLE SODIUM, VIA: HCPCS | Performed by: INTERNAL MEDICINE

## 2018-06-26 PROCEDURE — 74011250636 HC RX REV CODE- 250/636: Performed by: HOSPITALIST

## 2018-06-26 PROCEDURE — 82962 GLUCOSE BLOOD TEST: CPT

## 2018-06-26 PROCEDURE — 85014 HEMATOCRIT: CPT | Performed by: OBSTETRICS & GYNECOLOGY

## 2018-06-26 PROCEDURE — P9047 ALBUMIN (HUMAN), 25%, 50ML: HCPCS | Performed by: INTERNAL MEDICINE

## 2018-06-26 PROCEDURE — 84134 ASSAY OF PREALBUMIN: CPT | Performed by: INTERNAL MEDICINE

## 2018-06-26 PROCEDURE — 83735 ASSAY OF MAGNESIUM: CPT | Performed by: INTERNAL MEDICINE

## 2018-06-26 PROCEDURE — 84132 ASSAY OF SERUM POTASSIUM: CPT | Performed by: INTERNAL MEDICINE

## 2018-06-26 PROCEDURE — 82803 BLOOD GASES ANY COMBINATION: CPT

## 2018-06-26 PROCEDURE — 74011636637 HC RX REV CODE- 636/637: Performed by: HOSPITALIST

## 2018-06-26 PROCEDURE — 74011000258 HC RX REV CODE- 258: Performed by: OBSTETRICS & GYNECOLOGY

## 2018-06-26 PROCEDURE — 84100 ASSAY OF PHOSPHORUS: CPT | Performed by: INTERNAL MEDICINE

## 2018-06-26 PROCEDURE — 74011250636 HC RX REV CODE- 250/636: Performed by: OBSTETRICS & GYNECOLOGY

## 2018-06-26 PROCEDURE — 74011000258 HC RX REV CODE- 258: Performed by: INTERNAL MEDICINE

## 2018-06-26 PROCEDURE — 84478 ASSAY OF TRIGLYCERIDES: CPT | Performed by: INTERNAL MEDICINE

## 2018-06-26 PROCEDURE — 77030019952 HC CANSTR VAC ASST KCON -B

## 2018-06-26 PROCEDURE — 74011000250 HC RX REV CODE- 250

## 2018-06-26 PROCEDURE — 94640 AIRWAY INHALATION TREATMENT: CPT

## 2018-06-26 RX ORDER — POTASSIUM CHLORIDE 750 MG/1
10 TABLET, EXTENDED RELEASE ORAL ONCE
Status: ACTIVE | OUTPATIENT
Start: 2018-06-26 | End: 2018-06-27

## 2018-06-26 RX ORDER — POTASSIUM CHLORIDE 7.45 MG/ML
10 INJECTION INTRAVENOUS ONCE
Status: COMPLETED | OUTPATIENT
Start: 2018-06-26 | End: 2018-06-27

## 2018-06-26 RX ORDER — HYDRALAZINE HYDROCHLORIDE 20 MG/ML
10 INJECTION INTRAMUSCULAR; INTRAVENOUS
Status: DISCONTINUED | OUTPATIENT
Start: 2018-06-26 | End: 2018-07-11 | Stop reason: HOSPADM

## 2018-06-26 RX ORDER — METOPROLOL TARTRATE 5 MG/5ML
5 INJECTION INTRAVENOUS EVERY 6 HOURS
Status: DISCONTINUED | OUTPATIENT
Start: 2018-06-26 | End: 2018-07-06

## 2018-06-26 RX ORDER — HYDRALAZINE HYDROCHLORIDE 20 MG/ML
10 INJECTION INTRAMUSCULAR; INTRAVENOUS
Status: DISCONTINUED | OUTPATIENT
Start: 2018-06-26 | End: 2018-06-26

## 2018-06-26 RX ORDER — METOPROLOL TARTRATE 5 MG/5ML
INJECTION INTRAVENOUS
Status: COMPLETED
Start: 2018-06-26 | End: 2018-06-26

## 2018-06-26 RX ADMIN — METOPROLOL TARTRATE 5 MG: 5 INJECTION INTRAVENOUS at 14:00

## 2018-06-26 RX ADMIN — PIPERACILLIN SODIUM,TAZOBACTAM SODIUM 2.25 G: 2; .25 INJECTION, POWDER, FOR SOLUTION INTRAVENOUS at 18:00

## 2018-06-26 RX ADMIN — FENTANYL CITRATE 25 MCG: 50 INJECTION, SOLUTION INTRAMUSCULAR; INTRAVENOUS at 10:35

## 2018-06-26 RX ADMIN — METRONIDAZOLE 500 MG: 500 INJECTION, SOLUTION INTRAVENOUS at 13:33

## 2018-06-26 RX ADMIN — INSULIN LISPRO 4 UNITS: 100 INJECTION, SOLUTION INTRAVENOUS; SUBCUTANEOUS at 06:00

## 2018-06-26 RX ADMIN — METRONIDAZOLE 500 MG: 500 INJECTION, SOLUTION INTRAVENOUS at 04:25

## 2018-06-26 RX ADMIN — INSULIN LISPRO 4 UNITS: 100 INJECTION, SOLUTION INTRAVENOUS; SUBCUTANEOUS at 12:00

## 2018-06-26 RX ADMIN — FENTANYL CITRATE 25 MCG: 50 INJECTION, SOLUTION INTRAMUSCULAR; INTRAVENOUS at 20:42

## 2018-06-26 RX ADMIN — DEXTROSE MONOHYDRATE 75 ML/HR: 5 INJECTION, SOLUTION INTRAVENOUS at 21:31

## 2018-06-26 RX ADMIN — METOPROLOL TARTRATE 5 MG: 5 INJECTION INTRAVENOUS at 20:42

## 2018-06-26 RX ADMIN — SODIUM CHLORIDE 40 MG: 9 INJECTION INTRAMUSCULAR; INTRAVENOUS; SUBCUTANEOUS at 09:00

## 2018-06-26 RX ADMIN — ALBUMIN (HUMAN) 12.5 G: 0.25 INJECTION, SOLUTION INTRAVENOUS at 22:00

## 2018-06-26 RX ADMIN — MICAFUNGIN SODIUM 100 MG: 20 INJECTION, POWDER, LYOPHILIZED, FOR SOLUTION INTRAVENOUS at 12:00

## 2018-06-26 RX ADMIN — POTASSIUM CHLORIDE 10 MEQ: 10 INJECTION, SOLUTION INTRAVENOUS at 08:00

## 2018-06-26 RX ADMIN — HYDRALAZINE HYDROCHLORIDE 10 MG: 20 INJECTION INTRAMUSCULAR; INTRAVENOUS at 20:45

## 2018-06-26 RX ADMIN — HYDRALAZINE HYDROCHLORIDE 10 MG: 20 INJECTION INTRAMUSCULAR; INTRAVENOUS at 16:00

## 2018-06-26 RX ADMIN — ALBUMIN (HUMAN) 12.5 G: 0.25 INJECTION, SOLUTION INTRAVENOUS at 14:00

## 2018-06-26 RX ADMIN — PIPERACILLIN SODIUM,TAZOBACTAM SODIUM 2.25 G: 2; .25 INJECTION, POWDER, FOR SOLUTION INTRAVENOUS at 06:00

## 2018-06-26 RX ADMIN — PIPERACILLIN SODIUM,TAZOBACTAM SODIUM 2.25 G: 2; .25 INJECTION, POWDER, FOR SOLUTION INTRAVENOUS at 00:22

## 2018-06-26 RX ADMIN — POTASSIUM CHLORIDE: 2 INJECTION, SOLUTION, CONCENTRATE INTRAVENOUS at 17:45

## 2018-06-26 RX ADMIN — FENTANYL CITRATE 25 MCG: 50 INJECTION, SOLUTION INTRAMUSCULAR; INTRAVENOUS at 07:35

## 2018-06-26 RX ADMIN — METRONIDAZOLE 500 MG: 500 INJECTION, SOLUTION INTRAVENOUS at 20:42

## 2018-06-26 RX ADMIN — INSULIN LISPRO 2 UNITS: 100 INJECTION, SOLUTION INTRAVENOUS; SUBCUTANEOUS at 00:21

## 2018-06-26 RX ADMIN — IPRATROPIUM BROMIDE 0.5 MG: 0.5 SOLUTION RESPIRATORY (INHALATION) at 07:30

## 2018-06-26 RX ADMIN — POTASSIUM CHLORIDE 10 MEQ: 10 INJECTION, SOLUTION INTRAVENOUS at 23:00

## 2018-06-26 RX ADMIN — PIPERACILLIN SODIUM,TAZOBACTAM SODIUM 2.25 G: 2; .25 INJECTION, POWDER, FOR SOLUTION INTRAVENOUS at 12:00

## 2018-06-26 RX ADMIN — ALBUMIN (HUMAN) 12.5 G: 0.25 INJECTION, SOLUTION INTRAVENOUS at 06:00

## 2018-06-26 RX ADMIN — INSULIN GLARGINE 13 UNITS: 100 INJECTION, SOLUTION SUBCUTANEOUS at 09:00

## 2018-06-26 NOTE — PROGRESS NOTES
Hospitalist Progress Note-critical care note     Patient: Wilver Singh MRN: [id number]  CSN: [id number]    YOB: 1950  Age: 75 y.o. Sex: female    DOA: 6/4/2018 LOS:  LOS: 22 days            Chief complaint:   hyoerntremia. D/p ileostomy , peritonitis , sepsis , jess ,ovarian cancer   Assessment/Plan         Hospital Problems  Date Reviewed: 6/5/2018          Codes Class Noted POA    Hypernatremia ICD-10-CM: E87.0  ICD-9-CM: 276.0  6/25/2018 Unknown        S/P ileostomy ([facility] ICD-10-CM: Z93.2  ICD-9-CM: V44.2  6/16/2018 No        Hypoalbuminemia ICD-10-CM: E88.09  ICD-9-CM: 273.8  6/16/2018 Unknown        Peritonitis ([facility] ICD-10-CM: K65.9  ICD-9-CM: 567.9  6/16/2018 Unknown        Acute deep vein thrombosis (DVT) of lower extremity ([facility] ICD-10-CM: I82.409  ICD-9-CM: 453.40  6/7/2018 Yes        Acute respiratory failure ([facility] ICD-10-CM: J96.00  ICD-9-CM: 518.81  6/5/2018 No        JESS (acute kidney injury) ([facility] ICD-10-CM: N17.9  ICD-9-CM: 584.9  6/5/2018 Unknown        Metabolic acidosis [facility]-KH: E87.2  ICD-9-CM: 276.2  6/5/2018 No        Abdominal pain ICD-10-CM: R10.9  ICD-9-CM: 789.00  6/4/2018 Yes        * (Principal)Sepsis ([facility] ICD-10-CM: A41.9  ICD-9-CM: 038.9, 995.91  6/4/2018 Yes        Oliguria ICD-10-CM: R34  ICD-9-CM: 788.5  6/4/2018 Yes        Ovarian ca ([facility] ICD-10-CM: C56.9  ICD-9-CM: 183.0  5/29/2018 Yes              Acute resp failure with hypoxia   Stable   - self extubated 06/17/2018. On nc O2  - bronchoscopy 06/09/2018, no aspiration,  - resp cultures no growth to date. CTA chest negative for  PE  -pulm on board        Severe Sepsis  Stable    - secondary to peritonitis. Peritoneal fluid: klebsiella oxytoca. ANTIBIOTICS zosyn,levaquine, vanc flagyl. .micafungin         Shock   - sepsis   -resolved. Now off pressors. Anasarca - likely third spacing,  on albumin     Bleeding from BRANDON drain -resolved   protamine and FFP.     Acute bilateral peroneal DVT - heparin drip stopped. due to bleeding    Anemia   Received transfusion, so far h/h stable        JESS   - nephrology on board , cr mild improving     Metabolic acidosis - resolved    Hypernatremia  Improving  150 today    pharmacy to correct with TPN     DM  lantus , ssi      Bleeding from HUNTER drain   -stopped  CT abdomen and pelvis reviewed. Will continue monitor H/H     Disposition :tbd  Poor prognosis   Review of systems:  Unable to obtain-pt kept silence   Vital signs/Intake and Output:  Visit Vitals    BP (!) 170/98    Pulse 87    Temp 98.2 °F (36.8 °C)    Resp 27    Ht 5' 1\" (1.549 m)    Wt 103.2 kg (227 lb 8.2 oz)    SpO2 100%    BMI 42.99 kg/m2     Current Shift:  06/26 0701 - 06/26 1900  In: -   Out: 1692 [Urine:1100; Drains:25]  Last three shifts:  06/24 1901 - 06/26 0700  In: 2 [I.V.:2]  Out: 7392 [Urine:2875; Drains:205]    Physical Exam:  General: WD, WN. Alert,some  cooperative, no acute distress    HEENT: NC, Atraumatic. PERRLA, anicteric sclerae. Lungs: CTA Bilaterally. No Wheezing/Rhonchi/Rales. Heart:  Regular  rhythm,  + murmur, No Rubs, No Gallops  Abdomen: Soft, Non distended, Non tender.  +Bowel sounds, colostomy bag with hunter tube   Extremities: No c/c.  Edema   Psych:   Calm   Neurologic:  Unable to obtain due to not cooperate           Labs: Results:       Chemistry Recent Labs      06/26/18   0500  06/25/18   0520  06/24/18   0430   GLU  201*  196*  146*   NA  150*  153*  151*   K  3.7  3.9  3.9   CL  117*  119*  118*   CO2  19*  20*  22   BUN  49*  51*  47*   CREA  1.17  1.34*  1.46*   CA  7.6*  7.5*  7.8*   AGAP  14  14  11   BUCR  42*  38*  32*   AP  67   --    --    TP  5.9*   --    --    ALB  2.7*   --    --    GLOB  3.2   --    --    AGRAT  0.8   --    --       CBC w/Diff Recent Labs      06/26/18   0500  06/25/18   1155  06/25/18   0520  06/24/18   0430   WBC   --    --   11.7  14.2*   RBC   --    --   3.28*  3.37*   HGB  9.2*  8.8*  9.1*  9.4*   HCT  29.4*  28.1*  28.8*  29.0*   PLT   -- --   127*  119*   GRANS   --    --   88*  79*   LYMPH   --    --   6*  10*   EOS   --    --   1  2      Cardiac Enzymes No results for input(s): CPK, CKND1, FABIAN in the last 72 hours. No lab exists for component: CKRMB, TROIP   Coagulation No results for input(s): PTP, INR, APTT in the last 72 hours. No lab exists for component: INREXT, INREXT    Lipid Panel Lab Results   Component Value Date/Time    Triglyceride 66 06/26/2018 05:00 AM      BNP No results for input(s): BNPP in the last 72 hours.    Liver Enzymes Recent Labs      06/26/18   0500   TP  5.9*   ALB  2.7*   AP  67   SGOT  16      Thyroid Studies No results found for: T4, T3U, TSH, TSHEXT, TSHEXT     Procedures/imaging: see electronic medical records for all procedures/Xrays and details which were not copied into this note but were reviewed prior to creation of Lynda Sutton MD

## 2018-06-26 NOTE — PROGRESS NOTES
Speech Therapy Note:    Could not progress with ST treatment because patient :     [X] was unresponsive  [ ] deferred due to:   [ ] was off the unit   [ ] on hold  [ ] NPO for procedure      SLP will re-attempt treatment later this day or as schedule permits.     Moreno Pepper M.S., CF-SLP  Speech Language Pathologist

## 2018-06-26 NOTE — PROGRESS NOTES
IDR   Patient: Hal Johnson MRN: [id number]  CSN: [id number]    YOB: 1950  Age: 75 y.o. Sex: female    DOA: 6/4/2018 LOS:  LOS: 22 days              Chief complaint:   hyoerntremia. D/p ileostomy , peritonitis , sepsis , jess ,ovarian cancer   Assessment/Plan            Hospital Problems  Date Reviewed: 6/5/2018             Codes Class Noted POA     Hypernatremia ICD-10-CM: E87.0  ICD-9-CM: 276.0   6/25/2018 Unknown           S/P ileostomy ([facility] ICD-10-CM: Z93.2  ICD-9-CM: V44.2   6/16/2018 No           Hypoalbuminemia ICD-10-CM: E88.09  ICD-9-CM: 273.8   6/16/2018 Unknown           Peritonitis ([facility] ICD-10-CM: K65.9  ICD-9-CM: 567.9   6/16/2018 Unknown           Acute deep vein thrombosis (DVT) of lower extremity ([facility] ICD-10-CM: I82.409  ICD-9-CM: 453.40   6/7/2018 Yes           Acute respiratory failure ([facility]) ICD-10-CM: J96.00  ICD-9-CM: 518.81   6/5/2018 No           JESS (acute kidney injury) ([facility] ICD-10-CM: N17.9  ICD-9-CM: 584. 9   6/5/2018 Unknown           Metabolic acidosis ZBO-85-DENNIS: E87.2  ICD-9-CM: 276.2   6/5/2018 No           Abdominal pain ICD-10-CM: R10.9  ICD-9-CM: 789.00   6/4/2018 Yes           * (Principal)Sepsis ([facility] ICD-10-CM: A41.9  ICD-9-CM: 038.9, 995.91   6/4/2018 Yes           Oliguria ICD-10-CM: R34  ICD-9-CM: 509. 5   6/4/2018 Yes           Ovarian ca ([facility] ICD-10-CM: C56.9  ICD-9-CM: 183. 0   5/29/2018 Yes                  Acute resp failure with hypoxia   Stable   - self extubated 06/17/2018. On nc O2  - bronchoscopy 06/09/2018, no aspiration,  - resp cultures no growth to date. CTA chest negative for  PE  -pulm on board          Severe Sepsis  Stable    - secondary to peritonitis. Peritoneal fluid: klebsiella oxytoca. ANTIBIOTICS zosyn,levaquine, vanc flagyl. .micafungin           Shock   - sepsis   -resolved. Now off pressors.      Anasarca - likely third spacing,  on albumin      Bleeding from BRANDON drain -resolved   protamine and FFP.      Acute bilateral peroneal DVT - heparin drip stopped. due to bleeding     Anemia   Received transfusion, so far h/h stable          JESS   - nephrology on board , cr mild improving      Metabolic acidosis - resolved     Hypernatremia  Improving  150 today    pharmacy to correct with TPN      DM  lantus , ssi       Bleeding from BRANDON drain   -stopped  CT abdomen and pelvis reviewed.   Will continue monitor H/H and electrolytes, replace per protocol, pain more manageable      Disposition :tbd  Poor prognosis

## 2018-06-26 NOTE — PROGRESS NOTES
Spoke with Most, RN? (ICU) in regards to IVC filter placement by Dr. Shaila Freed. Asked her to communicate with patients RN that procedure is scheduled to be done tomorrow 6/27 @ 730,  NPO after midnight, and obtain consent.

## 2018-06-26 NOTE — CONSULTS
Surgery Consult      Patient: August Curran MRN: [id number]  CSN: [id number]      YOB: 1950    Age: 75 y.o. Sex: female      DOA: 6/4/2018       HPI:     August Curran is a 75 y.o. female with a history of DM, HTN, and ovarian cancer who was admitted to [facility] on 6/4/18 for sepsis. Mrs. Nena Espinoza underwent a debulking surgery and presented to the ED afterwards for increasing abdominal pain. She was found to have a perforated glass and peritonitis. On workup for possible PE, PVL of lower extremities revealed bilateral acute peroneal DVTs. Heparin gtt was started but not tolerated due to significant bleeding from BRANDON drains and wound vac requiring transfusion. Heparin gtt has since been stopped and vascular surgery is consulted for possible IVC filter placement. Past Medical History:   Diagnosis Date    Diabetes ([address]     many years type 2    Hypertension     many years       Past Surgical History:   Procedure Laterality Date    HX OOPHORECTOMY  05/29/2018    HX TONSILLECTOMY      as a child        History reviewed. No pertinent family history. Social History     Social History    Marital status: SINGLE     Spouse name: N/A    Number of children: N/A    Years of education: N/A     Social History Main Topics    Smoking status: Never Smoker    Smokeless tobacco: Never Used    Alcohol use No    Drug use: No    Sexual activity: Not Asked     Other Topics Concern    None     Social History Narrative       Prior to Admission medications    Medication Sig Start Date End Date Taking? Authorizing Provider   metFORMIN ER (GLUCOPHAGE XR) 500 mg tablet Take 2,000 mg by mouth daily (with dinner). Brand name only, pt reports allergy of cold sweats to the generic   Yes Historical Provider   fluticasone (FLONASE ALLERGY RELIEF) 50 mcg/actuation nasal spray 2 Sprays by Both Nostrils route daily as needed for Rhinitis.    Yes Historical Provider   acetaminophen-codeine (TYLENOL #3) 300-30 mg per tablet Take 1 Tab by mouth every four (4) hours as needed for Pain. Max Daily Amount: 6 Tabs. 6/1/18  Yes Shanon Galvez MD   HYDROmorphone (DILAUDID) 2 mg tablet Take 1 Tab by mouth every three (3) hours as needed. Max Daily Amount: 16 mg. 6/1/18  Yes Shanon Galvez MD   acetaminophen (TYLENOL ARTHRITIS PAIN) 650 mg TbER Take 650 mg by mouth every eight (8) hours as needed. Yes Historical Provider   phenylephrine (NEOSYNEPHRINE) 1 % spry 2 Sprays by Both Nostrils route every six (6) hours as needed. Pt uses several times every day for chronic nasal congestion    **pt pours phenylephrine into an AFRIN nasal spray bottle as she prefers this device**   Yes Historical Provider   amLODIPine (NORVASC) 10 mg tablet Take 10 mg by mouth daily. Yes Historical Provider   verapamil ER (VERELAN) 180 mg CR capsule Take 180 mg by mouth daily. Yes Historical Provider   atorvastatin (LIPITOR) 10 mg tablet Take 10 mg by mouth daily. Yes Historical Provider   losartan-hydroCHLOROthiazide (HYZAAR) 100-12.5 mg per tablet Take 1 Tab by mouth daily. Yes Historical Provider   Aspirin-Acetaminophen-Caffeine (GOODY'S EXTRA STRENGTH) 500-325-65 mg pwpk Take 2 Packets by mouth daily. Pt took daily for chronic HA until held for surgery in May 2018 then switched to tylenol arthritis daily    Historical Provider       Allergies   Allergen Reactions    Hydrocodone Other (comments)     Breaks into cold sweat    Metformin Other (comments)     Breaks out into cold sweat.  Can Take Glucophage brand name med       Physical Exam:      Visit Vitals    BP (!) 180/98    Pulse 88    Temp 98.2 °F (36.8 °C)    Resp 27    Ht 5' 1\" (1.549 m)    Wt 227 lb 8.2 oz (103.2 kg)    SpO2 100%    BMI 42.99 kg/m2       GENERAL: fatigued, mild distress, toxic, pale, morbidly obese, LUNG: clear to auscultation anteriorly, tachypneic, HEART: regular rate and rhythm, ABDOMEN: hypoactive bowel sounds, abdomen obese, multiple drains, wound vac to lower/mid abdomen, colostomy EXTREMITIES:  Warm and well-perfused, palpable DP pulses, 2+ edema bilateral lower legs SKIN: no visible rash or abnormalities    ROS:  Pertinent items are noted in HPI. Difficult to obtain due to fatigue and tachypnea  Unless otherwise mentioned in the HPI. Data Review:    CBC:   Lab Results   Component Value Date/Time    WBC 11.7 06/25/2018 05:20 AM    RBC 3.28 (L) 06/25/2018 05:20 AM    HGB 9.2 (L) 06/26/2018 05:00 AM    HCT 29.4 (L) 06/26/2018 05:00 AM    PLATELET 664 (L) 79/38/1789 05:20 AM      BMP:   Lab Results   Component Value Date/Time    Glucose 201 (H) 06/26/2018 05:00 AM    Sodium 150 (H) 06/26/2018 05:00 AM    Potassium 3.7 06/26/2018 05:00 AM    Chloride 117 (H) 06/26/2018 05:00 AM    CO2 19 (L) 06/26/2018 05:00 AM    BUN 49 (H) 06/26/2018 05:00 AM    Creatinine 1.17 06/26/2018 05:00 AM    Calcium 7.6 (L) 06/26/2018 05:00 AM     Coagulation:   Lab Results   Component Value Date/Time    Prothrombin time 15.5 (H) 06/23/2018 02:44 AM    INR 1.3 (H) 06/23/2018 02:44 AM    aPTT 33.5 06/22/2018 12:25 PM     Radiology review:     DUPLEX LOWER EXT VENOUS  Lower Extremity Venous Findings   Right Lower Venous   Thrombus present in the right peroneal vein. The common femoral, profunda femoris, proximal femoris, mid femoris, distal femoris, popliteal, posterior tibial and saphenous femoral junction vein(s) were imaged in the transverse and longitudinal planes. The vessels showed normal color filling and compressibility. Doppler interrogation of the veins showed phasic and spontaneous flow. Left Lower Venous   Thrombus present in the left peroneal vein. The common femoral, profunda femoris, proximal femoris, mid femoris, distal femoris, popliteal, posterior tibial and saphenous femoral junction vein(s) were imaged in the transverse and longitudinal planes. The vessels showed normal color filling and compressibility.  Doppler interrogation of the veins showed phasic and spontaneous flow. Assessment/Plan   Bilateral acute peroneal DVT: Patient unable to tolerate anticoagulation, therefore, patient is a candidate for IVC filter. According to CHEST guidelines it is generally acceptable not to treat tibial vessel DVTs with anticoagulation. Holding anticoagulation overnight is safe. Will plan for IVC filter placement tomorrow morning. Will contact patient's brother for consent.       Principal Problem:    Sepsis ([address] (6/4/2018)    Active Problems:    Ovarian ca ([address] (5/29/2018)      Abdominal pain (6/4/2018)      Oliguria (6/4/2018)      Acute respiratory failure ([address] (6/5/2018)      JESS (acute kidney injury) ([address] (3/9/9240)      Metabolic acidosis (9/2/6220)      Acute deep vein thrombosis (DVT) of lower extremity ([address] (6/7/2018)      S/P ileostomy ([address] (6/16/2018)      Hypoalbuminemia (6/16/2018)      Peritonitis ([address] (6/16/2018)      Hypernatremia (6/25/2018)        eRre Akhtar NP  June 26, 2018

## 2018-06-26 NOTE — PROGRESS NOTES
Consult for TPN Dosing per Pharmacy by Dr. Dajuan Bettencourt provided for this 75 y.o. female, for indication of Ileus  Day of Therapy  09     TPN Dosing Wt:  62.4 kg    Labs:  BMP BMP:   Lab Results   Component Value Date/Time     (H) 06/26/2018 05:00 AM    K 3.7 06/26/2018 05:00 AM     (H) 06/26/2018 05:00 AM    CO2 19 (L) 06/26/2018 05:00 AM    AGAP 14 06/26/2018 05:00 AM     (H) 06/26/2018 05:00 AM    BUN 49 (H) 06/26/2018 05:00 AM    CREA 1.17 06/26/2018 05:00 AM    GFRAA 56 (L) 06/26/2018 05:00 AM    GFRNA 46 (L) 06/26/2018 05:00 AM          CMP CMP:   Lab Results   Component Value Date/Time     (H) 06/26/2018 05:00 AM    K 3.7 06/26/2018 05:00 AM     (H) 06/26/2018 05:00 AM    CO2 19 (L) 06/26/2018 05:00 AM    AGAP 14 06/26/2018 05:00 AM     (H) 06/26/2018 05:00 AM    BUN 49 (H) 06/26/2018 05:00 AM    CREA 1.17 06/26/2018 05:00 AM    GFRAA 56 (L) 06/26/2018 05:00 AM    GFRNA 46 (L) 06/26/2018 05:00 AM    CA 7.6 (L) 06/26/2018 05:00 AM    MG 2.3 06/26/2018 05:00 AM    PHOS 2.7 06/26/2018 05:00 AM    ALB 2.7 (L) 06/26/2018 05:00 AM    TP 5.9 (L) 06/26/2018 05:00 AM    GLOB 3.2 06/26/2018 05:00 AM    AGRAT 0.8 06/26/2018 05:00 AM    SGOT 16 06/26/2018 05:00 AM    ALT 16 06/26/2018 05:00 AM         Ca/ Phos Lab Results   Component Value Date/Time    Calcium 7.6 (L) 06/26/2018 05:00 AM    Phosphorus 2.7 06/26/2018 05:00 AM       Mag    Lab Results   Component Value Date/Time    Magnesium 2.3 06/26/2018 05:00 AM      Tg No components found for: TGL   Albumin Lab Results   Component Value Date/Time    Protein, total 5.9 (L) 06/26/2018 05:00 AM    Albumin 2.7 (L) 06/26/2018 05:00 AM         GOAL:   Kcal requirements:    ~22 kcal/kg   Fluid requirements:    ~30 ml/kg   Macronutrients:   Protein     1.2 g/kg/day 300 kcal   Lipids      44 gm/day 396 kcal   Dextrose remainder of total kcal:  200 gm/day 680 kcal   Total Calories:    1376 kcal     Today's TPN formulation provides (100 % of Goal):   Calories:   1376 kcal   Fluid:    ~23 ml/kg   Protein:   1.2 gm/kg/day   Fat:    44 gm/day   CHO:    3.2 gm/kg/day   Sodium:   0 mEq   Potassium:   112 mEq   Calcium:   0 gm   Magnesium:   0 gm   Thiamine:   0 mg   OAC:                           60 mEq  PO4:                           15 mmol  Insulin (Reg):              10 units    TPN to be initiated at 100% goal macronutrients and titrated to goal per patient tolerance. Electrolytes to be monitored and adjusted daily. MD to replete electrolytes acutely outside of TPN as needed. Additional recommendations/Orders:    1. Corrective insulin coverage q6h while on TPN. 2. Change/decrease IVF to while on TPN-MD to further adjust as needed  3. BMP, Ca, Mag, Phos ordered daily  4. Triglycerides, Prealbumin, CMP, INR ordered upon initiation and weekly  5. GIR: 2.3 mg/kg/min  6. Electrolyte adjustments for today:                 - K increased from 93 mEq to 112 mEq    - PO4 increased from 12 mmol to 15 mmol    - OAC increased from 55 mEq to 60 mEq    Pharmacy to follow daily and will make changes based on labs/clinical status. Jessica Finch, Pharm. D.   Clinical Pharmacist  [phone]

## 2018-06-26 NOTE — PROGRESS NOTES
Patient developed anxiety and started to pull out NC, became more tachypneic. Staff RN placed patient on NRB, patient kept pulling off. Patient placed on HF O2 with 20 lpm and 50% fio2. ABG drawn on HF showed respiratory alkalosis and PaO2 68%.  Flow increased to 30 Lpm. CXR ordered for tonight and tomorrow Lorie Esquivel MD

## 2018-06-26 NOTE — PROGRESS NOTES
Problem: Mobility Impaired (Adult and Pediatric)  Goal: *Acute Goals and Plan of Care (Insert Text)  Physical Therapy Goals  Updated 6/20/2018 and to be accomplished within 7 day(s)  1. Patient will move from supine to sit and sit to supine in bed with maximal assistance. 2.  Patient will transfer from bed to chair and chair to bed with maximal assistance using the least restrictive device. 3.  Patient will perform sit to stand with maximal assistance. 4.  Patient will ambulate with maximal assistance for 5 feet with the least restrictive device. Outcome: Not Progressing Towards Goal  physical Therapy TREATMENT    Patient: Darren Mcallister (75 y.o. female)  Date: 6/25/2018  Diagnosis: Abdominal pain  Abdominal pain  Abdominal Pain  aspiration  POSTOP WOUND EXPLORATION Sepsis (HCC)  Procedure(s) (LRB):  WOUND EXPLORATION, EXPLORATORY LAPAROTOMY, ILLEOSTMOY, LYSES OF ADHESIONS AND WOUND VAC PLACEMENT (N/A) 10 Days Post-Op  Precautions: Fall   Chart, physical therapy assessment, plan of care and goals were reviewed. ASSESSMENT:  Chart reviewed and okayed to see pt per nurse Era. Pt eyes closed for majority of session and pt did not respond verbally to communications. PROM performed R UE and LLE; Attempted same to Other extremities, however, pt resisted movements and did not allow PT to perform ROM activity. Pt held hands up off bed motioning \"no more\" and session ceased. Will continue as appropriate and re-evaluate as appropriate. Progression toward goals:  []      Improving appropriately and progressing toward goals  []      Improving slowly and progressing toward goals  [x]      Not making progress toward goals and plan of care will be adjusted     PLAN:  Patient continues to benefit from skilled intervention to address the above impairments. Continue treatment per established plan of care. Discharge Recommendations:   To Be Determined  Further Equipment Recommendations for Discharge:  N/A SUBJECTIVE:   Patient stated     OBJECTIVE DATA SUMMARY:   Critical Behavior:  Neurologic State: Alert  Orientation Level: Oriented X4  Cognition: Follows commands  Safety/Judgement: Awareness of environment, Decreased insight into deficits  Therapeutic Exercises:   PROM performed R UE and LLE; Attempted same to Other extremities, however, pt resisted movements and did not allow PT to perform ROM activity  Pain:  Pain Scale 1: Adult Nonverbal Pain Scale  Pain Intensity 1: 0  Activity Tolerance:   Fair   Please refer to the flowsheet for vital signs taken during this treatment.   After treatment:   [] Patient left in no apparent distress sitting up in chair  [x] Patient left in no apparent distress in bed  [] Call bell left within reach  [x] Nursing notified  [] Caregiver present  [] Bed alarm activated      Kayce Darby PT   Time Calculation: 11 mins

## 2018-06-26 NOTE — PROGRESS NOTES
Nephrology Progress note    Subjective:     Opal Gomes is a 75 y.o. female with PMH DM, HTN, clear cell ovarian ca s/p debulking who presented with abdominal pain and possible sepsis/ischemic colitis. Pt underwent laparotomy/peritoneal lavage/bx, noted to have decreased UOP and increasing Cr to 2.1 today from baseline 0.6. CT neg for mass or hydro. Pt given lasix yesterday, still with high O2 requirements on vent. Unable to provide further history. On 6/8 Pt decompensated, ? Aspiration,  back on vent was hypoxic despite  Fi02 of 100%, , Hypotensive on IV pressors, Acidotic and on HC03 drip, Urine output decreased markedly. Underwent Bronchoscopy 6/9. Pt requires less FiO2 now. UOP picking up. Wound was draining yellow-brown fluid  - S/P Exploratory laparotomy, Lysis of adhesions, Ileostomy and wound VAC placement on 6/16.   - Pt self-extubated 6/17  Pt awake. Good Urine output. Admit Date: 6/4/2018  Allergy:  Allergies   Allergen Reactions    Hydrocodone Other (comments)     Breaks into cold sweat    Metformin Other (comments)     Breaks out into cold sweat. Can Take Glucophage brand name med        Objective:     Visit Vitals    BP (!) 170/98    Pulse 87    Temp 98.2 °F (36.8 °C)    Resp 27    Ht 5' 1\" (1.549 m)    Wt 103.2 kg (227 lb 8.2 oz)    SpO2 100%    BMI 42.99 kg/m2         Intake/Output Summary (Last 24 hours) at 06/26/18 1233  Last data filed at 06/26/18 1210   Gross per 24 hour   Intake                0 ml   Output             2925 ml   Net            -2925 ml       Physical Exam:     General: On NC O2   HENT: Atraumatic and normocephalic.    Eyes: Sclera anicteric   Neck: No JVD    Cardiovascular: Normal S1 S2   Pulmonary/Chest Wall: Decreased breath sounds bilaterally   Abdominal: Soft, obese, NABS   Musculoskeletal: ++ edema LEs   Neurological: Awake       Data Review:      Lab Results   Component Value Date/Time    WBC 11.7 06/25/2018 05:20 AM    RBC 3.28 (L) 06/25/2018 05:20 AM HCT 29.4 (L) 06/26/2018 05:00 AM    MCV 87.8 06/25/2018 05:20 AM    MCH 27.7 06/25/2018 05:20 AM    MCHC 31.6 06/25/2018 05:20 AM    RDW 24.7 (H) 06/25/2018 05:20 AM      Lab Results   Component Value Date    IRON 8 (L) 06/07/2018   No components found for: FERRITIN  No components found for: PTHINT  Urinalysis  No results found for: UGLU         Impression:     -JESS- likely ATN,  S Cr  Down to 1.17 today with good diuresis  -Septic Shock/hypotension, Hypotension resolved. -Resp Failure,  Was re-intubated 6/9,  Self extubated   -Severe Metabolic acidosis, improving.   -Ovarian ca s/p debulking then laparoscopy with lavage  -KLEBSIELLA OXYTOCA sepsis (Peritoneal fluid)  -DM  -HTN  -Anemia  -Elevated liver enzymes  -S/P Exploratory laparotomy, Lysis of adhesions, Ileostomy and wound VAC placement on 6/16  -Leukocytosis, improved  -Hypernatremia, 153 => 150,  improving   -Hypokalemia, repleted   - DVT      Plan:   Continue IV D5W and TPN  Hold Lasix for now due to worsening hypernatremia   Replace electrolytes per protocol   Monitor I/O and chemistry, H&H   Antibiotics.   Vanc dosing per pharmacist    Corinne Maser, MD [facility]  [phone]

## 2018-06-26 NOTE — PROGRESS NOTES
Admit date: 6/4/2018      ASSESSMENT/PLAN  Jackie Whelan a 75 y. 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 Will need adjuvant chemotherapy to complete primary therapy. GI - Peritonitis secondary to diverticulitis with perforation diverted with distal ileostomy. Post operative ileus resolved. Good ileostomy output. Patient not taking much po. Continue TPN. ID - sepsis secondary to peritonitis. On Flagyl, Zosyn, Levaquin, Vanc and micafungin . WBC trending down. Patient remains afebrile. Renal - JESS, resolving. Heme - Stable   DVT- bilateral peroneal DVT. Vascular consulted for IVC filter. CV - stable  Pulm - Tachypnea, good sats. Psych - selectively interactive, communicating better today  Disp - condition labile, continue ICU care  Code Status - Full Code      SUBJECTIVE  Complains of difficulty breathing. Pain controlled. Taking very little po but no nausea or vomiting.        OBJECTIVE  Physical Exam:  Patient Vitals for the past 24 hrs:   BP Temp Pulse Resp SpO2 Weight   06/26/18 1625 - 97.9 °F (36.6 °C) - - - -   06/26/18 1600 176/90 97.9 °F (36.6 °C) 73 27 99 % -   06/26/18 1500 (!) 167/95 - 74 27 99 % -   06/26/18 1400 171/89 - 68 20 100 % -   06/26/18 1300 (!) 175/108 - 80 29 100 % -   06/26/18 1209 - 98.2 °F (36.8 °C) - - - -   06/26/18 1200 (!) 170/98 98.2 °F (36.8 °C) 87 27 100 % -   06/26/18 1100 (!) 185/95 - 85 29 99 % -   06/26/18 1000 (!) 181/92 - 80 20 96 % -   06/26/18 0900 (!) 171/101 - 83 30 100 % -   06/26/18 0800 180/90 97.4 °F (36.3 °C) 68 23 97 % -   06/26/18 0730 - - - - 97 % 103.2 kg (227 lb 8.2 oz)   06/26/18 0700 - - 76 28 98 % -   06/26/18 0600 - - 76 25 100 % -   06/26/18 0500 (!) 169/91 - 77 29 97 % -   06/26/18 0400 (!) 167/96 97.4 °F (36.3 °C) 77 29 100 % -   06/26/18 0300 163/89 - 78 30 99 % -   06/26/18 0200 (!) 156/97 - 83 29 100 % - 06/26/18 0100 (!) 168/91 - 74 25 100 % -   06/26/18 0000 160/90 98 °F (36.7 °C) 85 26 100 % -   06/25/18 2300 169/88 - 77 27 99 % -   06/25/18 2255 - 98.2 °F (36.8 °C) - - - -   06/25/18 2200 (!) 171/91 - 76 23 100 % -   06/25/18 2100 160/88 - 78 28 100 % -   06/25/18 2000 160/87 97.8 °F (36.6 °C) 73 23 100 % -   06/25/18 1907 - 97.6 °F (36.4 °C) - - - -   06/25/18 1900 163/87 - 80 22 100 % -   06/25/18 1800 (!) 162/91 - 78 25 100 % -         Intake/Output Summary (Last 24 hours) at 06/26/18 1719  Last data filed at 06/26/18 1210   Gross per 24 hour   Intake              100 ml   Output             2025 ml   Net            -1925 ml      General - More alert today  HEENT - within normal limits  Heart - regular rate and rhythm  Lungs - CTAb but tachypnic  Abdomen - soft, tender, nondistended, normal bowel sounds  Incision - wound vac in place  Extremities - 2+ edema LE    Labs  CBC  Recent Labs      06/26/18   0500  06/25/18   1155  06/25/18   0520  06/24/18   0430   WBC   --    --   11.7  14.2*   HCT  29.4*  28.1*  28.8*  29.0*   MCV   --    --   87.8  86.1   BANDS   --    --    --   6*   MONOS   --    --   5  2   EOS   --    --   1  2   BASOS   --    --   0  1        CMP  Recent Labs      06/26/18   0500  06/25/18   0520  06/24/18   0430   NA  150*  153*  151*   CO2  19*  20*  22   BUN  49*  51*  47*        Lab Results   Component Value Date/Time    Glucose 201 (H) 06/26/2018 05:00 AM    Glucose (POC) 202 (H) 06/26/2018 11:50 AM    Glucose,  (H) 06/15/2018 05:14 PM          Current Hospital Medications    Current Facility-Administered Medications:     TPN ADULT - CENTRAL, , IntraVENous, CONTINUOUS, Laron Ramos MD    metoprolol (LOPRESSOR) injection 5 mg, 5 mg, IntraVENous, Q6H, Laron Ramos MD, 5 mg at 06/26/18 1400    hydrALAZINE (APRESOLINE) 20 mg/mL injection 10 mg, 10 mg, IntraVENous, Q6H PRN, Laron Ramos MD    dextrose 5% infusion, 75 mL/hr, IntraVENous, CONTINUOUS, Jaymie Romero MD, Last Rate: 5 mL/hr at 06/25/18 0912, 5 mL/hr at 06/25/18 0912    albumin human 25% (BUMINATE) solution 12.5 g, 12.5 g, IntraVENous, Q8H, Berenice Haas MD, 12.5 g at 06/26/18 1400    fentaNYL (PF) 900 mcg/30 ml infusion soln, 0-10 mcg/hr, IntraVENous, TITRATE, Maciej Hennessy MD, Last Rate: 0.3 mL/hr at 06/26/18 0730, 10 mcg/hr at 06/26/18 0730    vancomycin (VANCOCIN) 1500 mg in  ml infusion, 1,500 mg, IntraVENous, Q24H, Tavon Vo MD, Stopped at 06/26/18 1600    insulin glargine (LANTUS) injection 13 Units, 13 Units, SubCUTAneous, DAILY, Juan Galan MD, 13 Units at 06/26/18 0900    0.9% sodium chloride infusion 250 mL, 250 mL, IntraVENous, PRN, Tavon Vo MD    micafungin [facility]) 100 mg in 0.9% sodium chloride (MBP/ADV) 100 mL, 100 mg, IntraVENous, Q24H, Tavon Vo MD, Last Rate: 100 mL/hr at 06/26/18 1200, 100 mg at 06/26/18 1200    insulin lispro (HUMALOG) injection, , SubCUTAneous, Q6H, Tavon Vo MD, 4 Units at 06/26/18 1200    dextrose (D50W) injection syrg 12.5-25 g, 25-50 mL, IntraVENous, PRN, Tavon Vo MD    Vancomycin - Pharmacy to Dose, 1 Each, Other, Rx Dosing/Monitoring, Tavon Vo MD    levoFLOXacin (LEVAQUIN) 750 mg in D5W IVPB, 750 mg, IntraVENous, Q48H, Junito Green MD, Last Rate: 100 mL/hr at 06/25/18 0244, 750 mg at 06/25/18 0244    metroNIDAZOLE (FLAGYL) IVPB premix 500 mg, 500 mg, IntraVENous, Q8H, Ofelia Doll MD, Last Rate: 100 mL/hr at 06/26/18 1333, 500 mg at 06/26/18 1333    ipratropium (ATROVENT) 0.02 % nebulizer solution 0.5 mg, 0.5 mg, Nebulization, Q4H PRN, Ofelia Doll MD, 0.5 mg at 06/26/18 0730    piperacillin-tazobactam (ZOSYN) 2.25 g in 0.9% sodium chloride (MBP/ADV) 50 mL MBP, 2.25 g, IntraVENous, Q6H, Pietro Forman MD, Last Rate: 100 mL/hr at 06/26/18 1200, 2.25 g at 06/26/18 1200    naloxone (NARCAN) injection 0.4 mg, 0.4 mg, IntraVENous, EVERY 2 MINUTES AS NEEDED, Tavon Vo, MD    ondansetron [facility]) injection 4 mg, 4 mg, IntraVENous, Q6H PRN, Cristian Garcia MD, 4 mg at 06/08/18 2152    oxymetazoline (AFRIN) 0.05 % nasal spray 2 Spray, 2 Spray, Both Nostrils, BID PRN, Elijah Abraham MD    alum-mag hydroxide-[facility]) oral suspension 30 mL, 30 mL, Oral, Q4H PRN, Rutul A Dayton Mortimer, MD, 30 mL at 06/07/18 2225    fentaNYL citrate (PF) injection 25 mcg, 25 mcg, IntraVENous, Q3H PRN, Rutul A Dayton Mortimer, MD, 25 mcg at 06/26/18 1035    fluticasone (FLONASE) 50 mcg/actuation nasal spray 2 Spray, 2 Spray, Both Nostrils, DAILY PRN, Corina Paredes MD    ELECTROLYTE REPLACEMENT PROTOCOL-POTASSIUM Renal Dosing, 1 Each, Other, PRN, Christen Bird MD    ELECTROLYTE REPLACEMENT PROTOCOL-MAGNESIUM, 1 Each, Other, PRN, Christen Bird MD    ELECTROLYTE REPLACEMENT PROTOCOL-PHOSPHORUS Renal Dosing, 1 Each, Other, PRN, Christen Bird MD    ELECTROLYTE REPLACEMENT PROTOCOL-CALCIUM, 1 Each, Other, PRN, Christen Bird MD    sodium chloride (NS) flush 5-10 mL, 5-10 mL, IntraVENous, PRN, Kearney Bosworth, MD    glucose chewable tablet 16 g, 4 Tab, Oral, PRN, Christen Bird MD    glucagon (GLUCAGEN) injection 1 mg, 1 mg, IntraMUSCular, PRN, Christen Bird MD    pantoprazole (PROTONIX) 40 mg in sodium chloride 0.9% 10 mL injection, 40 mg, IntraVENous, DAILY, Maria M Hanson MD, 40 mg at 06/26/18 0900      Corina Paredes MD

## 2018-06-26 NOTE — PROGRESS NOTES
1915- Bedside shift change report given to Ela Hope RN (oncoming nurse) by Bishop Kevin RN (offgoing nurse). Report included the following information SBAR, Kardex, Procedure Summary, Intake/Output, MAR, Accordion and Recent Results. 1930- Shift assessment completed. A&Ox4. Diminished lung sound with scattered wheezing. VSS. Generalized pitting edema +3,+4. BRANDON drain and ileostomy intact. No abnormalities to sites. IV pump infusing with no difficulties. Will continue to monitor and assess. 2220- Respiratory called to give PRN treatment for wheezing. 0000- No changes to previous assessment. Will continue to monitor and assess. 0400- Reassessment completed. No changes to previous assessment. Will continue to monitor and assess. 0730- Bedside shift change report given to Isauro Ward RN (oncoming nurse) by Ela Hope RN (offgoing nurse). Report included the following information SBAR, Kardex, ED Summary, Procedure Summary, Intake/Output, MAR, Accordion and Recent Results.

## 2018-06-26 NOTE — DIABETES MGMT
GLYCEMIC CONTROL PROGRESS NOTE:    -discussed in rounds, known h/o T2DM HbA1C within recommended range for age + comorbids on oral home regimen  -BG out of target range ICU: 140-180 mg/dL   -TDD = 18 (TPN 10 regular insulin + 8 - Humalog Normal Insulin Sensitivity Corrective Coverage)  -24 BG trending up, adjustment made to Lantus yesterday    -Glucose Results:   Lab Results   Component Value Date/Time     (H) 06/26/2018 05:00 AM    GLUCPOC 200 (H) 06/26/2018 06:03 AM    GLUCPOC 177 (H) 06/25/2018 11:26 PM    GLUCPOC 188 (H) 06/25/2018 05:21 PM         Alexis Arredondo RN, MS  Glycemic Control Team  Pager [phone] (M-TH 8:30-5P)  *After Hours pager [phone]

## 2018-06-26 NOTE — PROGRESS NOTES
[facility] [place] [facility]                  Pulmonary, Critical Care, and Sleep Medicine     Name: Paco Murrell MRN: [id number]   : 1950 Hospital: [facility] [place]    Date: 2018        [facility]                                              Subjective/History of Present Illness:   Patient is a 75 y.o. 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 S/p exploratory laparotomy 6/15/18 - findings of sigmoid diverticulitis with suspected perforation, underwent ileostomy and wound vac placement. More awake, alert, and follows and nodding to verbal commands. No fever chills cough cp. Reports pain mostly controlled  Persistent leg edema. PVL studies showed bl calf veins DVT  Has some drainage in BRANDON drain. No abd pain on current Fentanyl  Ileostomy without bleeding. On TPN  Hemodynamically stable  No other issues overnight. Review of Systems   Review of systems not obtained due to patient factors. Surgeries  18 - Laparoscopy converted to laparotomy, total abdominal hysterectomy, bilateral salpingo-oophorectomy, omentectomy, bilateral pelvic and periaortic lymphadenectomy and radical tumor debulking to R0. Path - ADENOCARCINOMA OF LEFT OVARY.     18 - Diagnostic laparoscopy converted to laparotomy, peritoneal biopsies, aerobic and anaerobic cultures of peritoneal fluid and peritoneal lavage. 6/15/18 - Wound exploration. Exploratory laparotomy. Lysis of adhesions. Ileostomy and wound VAC placement.       Allergies   Allergen Reactions    Hydrocodone Other (comments)     Breaks into cold sweat    Metformin Other (comments)     Breaks out into cold sweat.  Can Take Glucophage brand name med      Past Medical History:   Diagnosis Date    Diabetes ([address]     many years type 2    Hypertension     many years      Past Surgical History:   Procedure Laterality Date    HX OOPHORECTOMY  2018    HX TONSILLECTOMY      as a child       Social History   Substance Use Topics    Smoking status: Never Smoker    Smokeless tobacco: Never Used    Alcohol use No        Current Facility-Administered Medications   Medication Dose Route Frequency    TPN ADULT - CENTRAL   IntraVENous CONTINUOUS    metoprolol (LOPRESSOR) injection 5 mg  5 mg IntraVENous Q6H    dextrose 5% infusion  75 mL/hr IntraVENous CONTINUOUS    albumin human 25% (BUMINATE) solution 12.5 g  12.5 g IntraVENous Q8H    TPN ADULT - CENTRAL   IntraVENous CONTINUOUS    fentaNYL (PF) 900 mcg/30 ml infusion soln  0-10 mcg/hr IntraVENous TITRATE    vancomycin (VANCOCIN) 1500 mg in  ml infusion  1,500 mg IntraVENous Q24H    insulin glargine (LANTUS) injection 13 Units  13 Units SubCUTAneous DAILY    micafungin (MYCAMINE) 100 mg in 0.9% sodium chloride (MBP/ADV) 100 mL  100 mg IntraVENous Q24H    insulin lispro (HUMALOG) injection   SubCUTAneous Q6H    Vancomycin - Pharmacy to Dose  1 Each Other Rx Dosing/Monitoring    levoFLOXacin (LEVAQUIN) 750 mg in D5W IVPB  750 mg IntraVENous Q48H    metroNIDAZOLE (FLAGYL) IVPB premix 500 mg  500 mg IntraVENous Q8H    piperacillin-tazobactam (ZOSYN) 2.25 g in 0.9% sodium chloride (MBP/ADV) 50 mL MBP  2.25 g IntraVENous Q6H    pantoprazole (PROTONIX) 40 mg in sodium chloride 0.9% 10 mL injection  40 mg IntraVENous DAILY         Objective:   Vital Signs:    Visit Vitals    BP (!) 180/98    Pulse 88    Temp 98.2 °F (36.8 °C)    Resp 27    Ht 5' 1\" (1.549 m)    Wt 103.2 kg (227 lb 8.2 oz)    SpO2 100%    BMI 42.99 kg/m2       O2 Device: Nasal cannula   O2 Flow Rate (L/min): 3 l/min   Temp (24hrs), Av.8 °F (36.6 °C), Min:97.3 °F (36.3 °C), Max:98.2 °F (36.8 °C)       Intake/Output:   Last shift:      06/26 0701 - 06/26 1900  In: 100 [I.V.:100]  Out: 1325 [Urine:1100; Drains:25]    Last 3 shifts: 06/24 1901 - 06/26 0700  In: 2 [I.V.:2]  Out: 2346 [Urine:2875; Drains:205]      Intake/Output Summary (Last 24 hours) at 06/26/18 1417  Last data filed at 06/26/18 1210   Gross per 24 hour   Intake              100 ml   Output             2925 ml   Net            -2825 ml       Physical Exam:  General/Neurology: weak, awake, following most of the commands. Head:   Normocephalic, without obvious abnormality, atraumatic. Eye:   no scleral icterus, no pallor, no cyanosis. Pupils not dilated. Neck:   Symmetric. No lymphadenopathy. Trachea midline  Lung: Moderate air entry bilateral equal. Decreased breath sounds bases. No wheezing or crackles at bases. Heart:   S1 S2 present. No murmur. No JVD. Abdomen:  Soft. Obese. + BS. Midline lower abd surgical site - open wound with wound vac. BRANDON drain LLQ, Rt abd wall Ileostomy. No abd distension or guarding. Extremities:  No cyanosis. No clubbing. No hand edema. B/l feet/leg/thigh edema 2+. Pulses: Palpable radials and DPs bilateral.   Lymphatic:  No cervical or supraclav lymphadenopathy.    Skin:   No rash      Data:       Recent Results (from the past 12 hour(s))   MAGNESIUM    Collection Time: 06/26/18  5:00 AM   Result Value Ref Range    Magnesium 2.3 1.6 - 2.6 mg/dL   PHOSPHORUS    Collection Time: 06/26/18  5:00 AM   Result Value Ref Range    Phosphorus 2.7 2.5 - 4.9 MG/DL   CALCIUM, IONIZED    Collection Time: 06/26/18  5:00 AM   Result Value Ref Range    Ionized Calcium 1.13 1.12 - 1.32 MMOL/L   HGB & HCT    Collection Time: 06/26/18  5:00 AM   Result Value Ref Range    HGB 9.2 (L) 12.0 - 16.0 g/dL    HCT 29.4 (L) 35.0 - 45.0 %   PREALBUMIN    Collection Time: 06/26/18  5:00 AM   Result Value Ref Range    Prealbumin 15.7 (L) 20 - 40 MG/DL   TRIGLYCERIDE Collection Time: 06/26/18  5:00 AM   Result Value Ref Range    Triglyceride 66 <675 MG/DL   METABOLIC PANEL, COMPREHENSIVE    Collection Time: 06/26/18  5:00 AM   Result Value Ref Range    Sodium 150 (H) 136 - 145 mmol/L    Potassium 3.7 3.5 - 5.5 mmol/L    Chloride 117 (H) 100 - 108 mmol/L    CO2 19 (L) 21 - 32 mmol/L    Anion gap 14 3.0 - 18 mmol/L    Glucose 201 (H) 74 - 99 mg/dL    BUN 49 (H) 7.0 - 18 MG/DL    Creatinine 1.17 0.6 - 1.3 MG/DL    BUN/Creatinine ratio 42 (H) 12 - 20      GFR est AA 56 (L) >60 ml/min/1.73m2    GFR est non-AA 46 (L) >60 ml/min/1.73m2    Calcium 7.6 (L) 8.5 - 10.1 MG/DL    Bilirubin, total 0.7 0.2 - 1.0 MG/DL    ALT (SGPT) 16 13 - 56 U/L    AST (SGOT) 16 15 - 37 U/L    Alk. phosphatase 67 45 - 117 U/L    Protein, total 5.9 (L) 6.4 - 8.2 g/dL    Albumin 2.7 (L) 3.4 - 5.0 g/dL    Globulin 3.2 2.0 - 4.0 g/dL    A-G Ratio 0.8 0.8 - 1.7     GLUCOSE, POC    Collection Time: 06/26/18  6:03 AM   Result Value Ref Range    Glucose (POC) 200 (H) 70 - 110 mg/dL   GLUCOSE, POC    Collection Time: 06/26/18 11:50 AM   Result Value Ref Range    Glucose (POC) 202 (H) 70 - 110 mg/dL           Chemistry Recent Labs      06/26/18   0500  06/25/18   0520  06/24/18   0430   GLU  201*  196*  146*   NA  150*  153*  151*   K  3.7  3.9  3.9   CL  117*  119*  118*   CO2  19*  20*  22   BUN  49*  51*  47*   CREA  1.17  1.34*  1.46*   CA  7.6*  7.5*  7.8*   MG  2.3  2.4  2.0   PHOS  2.7  2.7  2.9   AGAP  14  14  11   BUCR  42*  38*  32*   AP  67   --    --    TP  5.9*   --    --    ALB  2.7*   --    --    GLOB  3.2   --    --    AGRAT  0.8   --    --         Lactic Acid Lactic acid   Date Value Ref Range Status   06/17/2018 2.3 (HH) 0.4 - 2.0 MMOL/L Final     Comment:     CALLED TO AND CORRECTLY REPEATED BY:  Dao Barrera, RN ICU ON 6/17/2018 5156 TO 2788       No results for input(s): LAC in the last 72 hours.      Liver Enzymes Protein, total   Date Value Ref Range Status   06/26/2018 5.9 (L) 6.4 - 8.2 g/dL Final Albumin   Date Value Ref Range Status   06/26/2018 2.7 (L) 3.4 - 5.0 g/dL Final     Globulin   Date Value Ref Range Status   06/26/2018 3.2 2.0 - 4.0 g/dL Final     A-G Ratio   Date Value Ref Range Status   06/26/2018 0.8 0.8 - 1.7   Final     AST (SGOT)   Date Value Ref Range Status   06/26/2018 16 15 - 37 U/L Final     Alk. phosphatase   Date Value Ref Range Status   06/26/2018 67 45 - 117 U/L Final     Recent Labs      06/26/18   0500   TP  5.9*   ALB  2.7*   GLOB  3.2   AGRAT  0.8   SGOT  16   AP  67        CBC w/Diff Recent Labs      06/26/18   0500  06/25/18   1155  06/25/18   0520  06/24/18   0430   WBC   --    --   11.7  14.2*   RBC   --    --   3.28*  3.37*   HGB  9.2*  8.8*  9.1*  9.4*   HCT  29.4*  28.1*  28.8*  29.0*   PLT   --    --   127*  119*   GRANS   --    --   88*  79*   LYMPH   --    --   6*  10*   EOS   --    --   1  2        Cardiac Enzymes No results found for: CPK, CK, CKMMB, CKMB, RCK3, CKMBT, CKNDX, CKND1, FABIAN, TROPT, TROIQ, KAYLAN, TROPT, TNIPOC, BNP, BNPP     BNP No results found for: BNP, BNPP, XBNPT     Coagulation No results for input(s): PTP, INR, APTT in the last 72 hours. No lab exists for component: INREXT, INREXT      Thyroid  No results found for: T4, T3U, TSH, TSHEXT, TSHEXT    No results found for: T4       ABG No results for input(s): PHI, PHI, POC2, PCO2I, PO2, PO2I, HCO3, HCO3I, FIO2, FIO2I in the last 72 hours.      All Micro Results     Procedure Component Value Units Date/Time    CULTURE, FUNGUS [[id number]] Collected:  06/09/18 1024    Order Status:  Completed Specimen:  Bronchial lavage Updated:  06/25/18 1109     Special Requests: NO SPECIAL REQUESTS        FUNGUS SMEAR NO FUNGAL ELEMENTS SEEN        Culture result:         NO FUNGUS ISOLATED 16 DAYS    CULTURE, BLOOD FOR FUNGUS [[id number]]     Order Status:  Canceled Specimen:  Blood     CULTURE, BLOOD [[id number]] Collected:  06/09/18 1423    Order Status:  Completed Specimen:  Whole Blood from Blood Updated: 06/15/18 0230     Special Requests: NO SPECIAL REQUESTS        Culture result: NO GROWTH 6 DAYS       CULTURE, BLOOD [[id number]] Collected:  06/09/18 1223    Order Status:  Completed Specimen:  Whole Blood from Blood Updated:  06/15/18 0230     Special Requests: left hand     Culture result: NO GROWTH 6 DAYS       AFB CULTURE + SMEAR W/RFLX PCR AND ID [[id number]] Collected:  06/09/18 1024    Order Status:  Completed Specimen:  Respiratory sample Updated:  06/12/18 2106     Source BRONCHIAL LAVAGE        AFB Specimen processing Concentration     Acid Fast Smear NEGATIVE          (NOTE)  Performed At: [address] [id number]  Marco Harper MD [id number]          Acid Fast Culture PENDING    CULTURE, SPUTUM/BRONCH/OTH [[id number]] Collected:  06/09/18 1024    Order Status:  Completed Specimen:  Sputum from Bronchial lavage Updated:  06/11/18 1058     Special Requests: NO SPECIAL REQUESTS        GRAM STAIN FEW WBC'S         FEW GRAM NEGATIVE RODS        Culture result:         RARE NORMAL RESPIRATORY JOSETTE    CULTURE, ANAEROBIC [[id number]] Collected:  06/05/18 0220    Order Status:  Completed Specimen:  Peritoneal Fluid Updated:  06/11/18 0734     Special Requests: NO SPECIAL REQUESTS        Culture result:         NO ANAEROBES ISOLATED 5 DAYS    CULTURE, BLOOD [[id number]] Collected:  06/04/18 0740    Order Status:  Completed Specimen:  Blood from Blood Updated:  06/10/18 0655     Special Requests: NO SPECIAL REQUESTS        Culture result: NO GROWTH 6 DAYS       CULTURE, BLOOD [[id number]] Collected:  06/04/18 0740    Order Status:  Completed Specimen:  Blood from Blood Updated:  06/10/18 0655     Special Requests: NO SPECIAL REQUESTS        Culture result: NO GROWTH 6 DAYS       CULTURE, BODY FLUID Margrette Rival STAIN [[id number]]  (Abnormal)  (Susceptibility) Collected:  06/05/18 0220    Order Status:  Completed Specimen:  Peritoneal Fluid Updated:  06/08/18 0936     Special Requests: NO SPECIAL REQUESTS        GRAM STAIN MANY WBC'S         NO ORGANISMS SEEN        Culture result:         RARE KLEBSIELLA OXYTOCA (A)            CALLED TO AND CORRECTLY REPEATED BY:  ANGEL LUIS DE LOS SANTOS RN ICU ON 6/7/2018 1032 TO 5051      CULTURE, URINE [[id number]] Collected:  06/05/18 1245    Order Status:  Completed Specimen:  Urine from Fry Specimen Updated:  06/07/18 1103     Special Requests: NO SPECIAL REQUESTS        Culture result: NO GROWTH 2 DAYS       C. DIFFICILE/EPI PCR [[id number]] Collected:  06/05/18 0930    Order Status:  Canceled Specimen:  Stool           Echo 6/9/18:  Left ventricle: Systolic function was normal. Ejection fraction was estimated   in the range of 65 % to 70 %. No obvious  wall motion abnormalities identified in the views obtained. Wall thickness   was mildly increased. Doppler parameters  were consistent with abnormal left ventricular relaxation (grade 1 diastolic   dysfunction). Right ventricle: The size was normal. Systolic function was normal.  Mitral valve: There was mild annular calcification. Tricuspid valve: Pulmonary artery systolic pressure was mildly increased. Pulmonary artery systolic pressure: 34 mmHg. PVL LE 6/6/18: acute b/l peroneal DVT. CT abd pelvis 6/4/18:  1. Postsurgical hysterectomy, bilateral oophorectomy, pelvic lymphadenectomy and  a mastectomy surgical changes. Small volume of ascites in the abdomen and  pelvis, with a few tiny locules of gas in the right hemipelvis would be in  keeping with recent postsurgical etiology. 2. Abnormal edematous thick-walled small bowel loops in the left abdomen and  pelvis, with TRAM lamellar edematous configuration, induration. No findings of  pneumatosis. The overall appearance is nonspecific. Diagnostic considerations  include infectious etiology, nonspecific edema which may be unresolved  postsurgical etiology.  Ischemia is also included in the differential diagnostic  consideration, however, there are no findings of pneumatosis, portal venous gas. 3. Stool in the right colon extending to the hepatic flexure with surrounding  gas, foci of pneumatosis could be obscured. No associated bowel wall thickening. 4. Satisfactory position of nasogastric tube. 5. Hepatomegaly, steatosis with 2 rounded low-density lesions, potentially  cysts. 6. Bibasilar atelectasis. CT chest/abd/plevis: 6/22/18:  Study limited due to streak and motion artifacts as described. No evidence of pulmonary embolism within the limitations of artifacts. Prominent consolidation right lower lobe likely infiltrates and atelectasis. Small consolidation seen in the left lower lobe. Mild diffuse ascites present throughout the abdomen pelvis with diffuse  anasarca. Large soft tissue consolidation in the pelvis containing air bubbles likely  representing adherent bowel with adjacent trapped fluid. There is what may be a subcutaneous hematoma adjacent to the right ileostomy  measuring 5.5 cm x 3.7 cm x 7.0 cm, however, there is no definite  retroperitoneal or any other intra-abdominal hematoma. Bilateral pelvic drains as described above. Chronic compression deformity of L1. CXR reviewed by me:  CXR 6/23/18: Worsening pulmonary interstitial edema with right greater than left pleural  effusions. Underexpansion. IMPRESSION:   · Severe sepsis due to Klebsiella Oxytoca peritonitis and then perforation. · Klebsiella oxytoca peritonitis. S/p laparotomy and peritoneal lavage: Klebsiella positive. Ruled out ischemic bowel in laparotomy. · S/p laparotomy on 6/15/18 for peritonitis due to diverticulitis with perforation, s/p distal ileostomy. · Intraabdominal bleeding while on heparin. · S/p bronchoscopy 6/9/18: no aspiration noted. Cx Negative. · VDRF due to sepsis/?PE/?ARDS, reintubated on 6/8/18 and self-extubated 6/17  · S/p Shock likely due to sepsis and obstructive due to presumed PE. Shock resolved.  CTA chest no central PE.  · Acute b/l peroneal DVT  · Presumed PE with severe hypoxia and high A-a gradient, elevated RVSP 34 mm Hg. CTA 6/22/18 negative for central or main PA PE. Off heparin due to intraabdominal bleeding and CTA chest no central PE. · Anemia  · Thrombocytopenia  · JESS, improving  · Hypernatremia, managed by renal  · Metabolic and lactic acidosis, resolved. · Elevated LFT, due to shock liver, resolved  · Foreign body on peritoneal biopsy, per path report. · Anasarca due to fluid resuscitation, JESS, hypoalbuminemia and sequale of sepsis. · AFib converted to NSR  · Adenocarcinoma of ovary     · Code status: Full code. · Poor overall prognosis. RECOMMENDATIONS:   Respiratory: mildly chronic tachypneic; continue O2 support; low threshold for re-intubation; heparin drip (for DVTs and likely PEs) has been stopped since 6/22/18 due to intra abdominal bleeding; INR reversed with protamin, FFP. CTA negative for central PE. PVL LE repeat showed acute calf DVT. Vascular surgery evaluated for any IVC filter. Keep SPO2 >=92%. HOB 30 degree elevation all the time. Aggressive pulmonary toileting. Aspiration precautions. Judicious opiate pain medications. Fentanyl to 0.5-15 mcg/hr max. May use patch if okay with surgery  Hem: follow CBC; Keep Hb> 7 gm/dl. CVS: Echo ok with mildly elevated RVSP but normal RV systolic function. AFib converted to NSR. Off of cardizem drip. ID:  Bronch cx normal robin. Peritoneal cx Klebsiella Oxytoca resistant to ampicillin. Leukocytosis-resolved since on micafungin/vanco. Afebrile. Any abdominal imaging per surgery. Abx - Zosyn since 6/13; levaquin since 6/18, flagyl since 6/15; iv vanc since 6/22, micafungin since 6/22  Renal: Nephrologist on case; Creatinine improved. Lasix PRN for hypernatremia. On albumin. On TPN. GI/: TPN; barium study 6/21 - patient could not do it on 6/22 due to abd pain  Endocrine: Maintain blood glucose 140-180. Humalog sc. Insulin in TPN.   on Jing  Neurology: weaning fentanyl drip - balance post-op pain without sedation   Pain/Sedation: fentanyl drip - management per Dr Silvestre Prader- recommend judicious use  Skin/Wound: local surgical site care. Wound vac on. Electrolytes: Replace electrolytes per ICU electrolyte replacement protocol, renal service. Nutrition: TPN started 6/18   Prophylaxis: GI Prophylaxis (protonix)  Restraints: none  Lines/Tubes:   ETT: 6/8/18- self extubated 6/17/18  Central line: right subclavian 6/4/18 - removed 6/20  Picc line 6/19 - Central line bundle followed  Fry: 6/4/18 (Medically necessary for strict input/output monitoring in critically ill patient, will remove it when not needed. Fry bundle followed). Will defer respective systems problem management to primary and other respective consultant and follow patient in ICU with primary and other medical team.  Further recommendations will be based on the patient's response to recommended treatment and results of the investigation ordered. Quality Care: PPI, DVT prophylaxis, HOB elevated, Infection control all reviewed and addressed. Brother not at bedside: overall poor prognosis. Patient has no children. Code status: Full code. PICC Line/Fry cath medically necessary  PT and OT on case    Care of plan d/w RN, RT, MDR.   High complexity decision making was performed during the evaluation of this patient   CC time 34 mins         Keisha Stanford MD  6/26/2018

## 2018-06-26 NOTE — PROGRESS NOTES
0715 Bedside  shift change report given to Jennifer Enriquez RN (oncoming nurse) by Israel Jaeger (offgoing nurse). Report included the following information SBAR, Kardex, Procedure Summary, Intake/Output, MAR, Accordion, Recent Results, Med Rec Status, Cardiac Rhythm NSR  and Alarm Parameters . Assumed care of patient at this time   0800 Assessment completed    1200-Reassessment, no change    1600, PRN hydralazine for systolic >483, noted effective     1800Reassessment, no changes    1915 Patient developed anxiety and started to pull out NC, became more tachypneic. Staff RN placed patient on NRB, patient kept pulling off. Patient placed on HF O2 with 20 lpm and 50% fio2. ABG drawn on HF showed respiratory alkalosis and PaO2 68%. Flow increased to 30 Lpm. CXR ordered for tonight and tomorrow AM. bedside shift report given to doxo in [employer]. PCA and TPN verified.

## 2018-06-27 ENCOUNTER — APPOINTMENT (OUTPATIENT)
Dept: GENERAL RADIOLOGY | Age: 68
DRG: 853 | End: 2018-06-27
Attending: INTERNAL MEDICINE
Payer: MEDICARE

## 2018-06-27 PROBLEM — E87.6 HYPOKALEMIA: Status: ACTIVE | Noted: 2018-06-27

## 2018-06-27 LAB
ALBUMIN SERPL-MCNC: 2.4 G/DL (ref 3.4–5)
ANION GAP SERPL CALC-SCNC: 14 MMOL/L (ref 3–18)
ARTERIAL PATENCY WRIST A: YES
BASE DEFICIT BLD-SCNC: 7 MMOL/L
BASOPHILS # BLD: 0.1 K/UL (ref 0–0.1)
BASOPHILS NFR BLD: 1 % (ref 0–3)
BDY SITE: ABNORMAL
BUN SERPL-MCNC: 41 MG/DL (ref 7–18)
BUN/CREAT SERPL: 39 (ref 12–20)
CA-I SERPL-SCNC: 1.16 MMOL/L (ref 1.12–1.32)
CALCIUM SERPL-MCNC: 7.3 MG/DL (ref 8.5–10.1)
CHLORIDE SERPL-SCNC: 118 MMOL/L (ref 100–108)
CO2 SERPL-SCNC: 19 MMOL/L (ref 21–32)
CREAT SERPL-MCNC: 1.06 MG/DL (ref 0.6–1.3)
DIFFERENTIAL METHOD BLD: ABNORMAL
EOSINOPHIL # BLD: 1.1 K/UL (ref 0–0.4)
EOSINOPHIL NFR BLD: 8 % (ref 0–5)
ERYTHROCYTE [DISTWIDTH] IN BLOOD BY AUTOMATED COUNT: 25.3 % (ref 11.6–14.5)
GAS FLOW.O2 O2 DELIVERY SYS: ABNORMAL L/MIN
GAS FLOW.O2 SETTING OXYMISER: 40 L/M
GLUCOSE BLD STRIP.AUTO-MCNC: 141 MG/DL (ref 70–110)
GLUCOSE BLD STRIP.AUTO-MCNC: 152 MG/DL (ref 70–110)
GLUCOSE BLD STRIP.AUTO-MCNC: 161 MG/DL (ref 70–110)
GLUCOSE SERPL-MCNC: 177 MG/DL (ref 74–99)
HCO3 BLD-SCNC: 19.6 MMOL/L (ref 22–26)
HCT VFR BLD AUTO: 32.8 % (ref 35–45)
HGB BLD-MCNC: 10.2 G/DL (ref 12–16)
INR PPP: 1.4 (ref 0.8–1.2)
LYMPHOCYTES # BLD: 0.5 K/UL (ref 0.8–3.5)
LYMPHOCYTES NFR BLD: 4 % (ref 20–51)
MAGNESIUM SERPL-MCNC: 2 MG/DL (ref 1.6–2.6)
MCH RBC QN AUTO: 27.9 PG (ref 24–34)
MCHC RBC AUTO-ENTMCNC: 31.1 G/DL (ref 31–37)
MCV RBC AUTO: 89.6 FL (ref 74–97)
MONOCYTES # BLD: 0 K/UL (ref 0–1)
MONOCYTES NFR BLD: 0 % (ref 2–9)
NEUTS BAND NFR BLD MANUAL: 1 % (ref 0–5)
NEUTS SEG # BLD: 11.8 K/UL (ref 1.8–8)
NEUTS SEG NFR BLD: 86 % (ref 42–75)
NRBC BLD-RTO: 1 PER 100 WBC
O2/TOTAL GAS SETTING VFR VENT: 100 %
PCO2 BLD: 38.2 MMHG (ref 35–45)
PH BLD: 7.32 [PH] (ref 7.35–7.45)
PHOSPHATE SERPL-MCNC: 2.9 MG/DL (ref 2.5–4.9)
PLATELET # BLD AUTO: 130 K/UL (ref 135–420)
PLATELET COMMENTS,PCOM: ABNORMAL
PO2 BLD: 273 MMHG (ref 80–100)
POTASSIUM SERPL-SCNC: 3.8 MMOL/L (ref 3.5–5.5)
PROTHROMBIN TIME: 16.3 SEC (ref 11.5–15.2)
RBC # BLD AUTO: 3.66 M/UL (ref 4.2–5.3)
RBC MORPH BLD: ABNORMAL
SAO2 % BLD: 100 % (ref 92–97)
SERVICE CMNT-IMP: ABNORMAL
SODIUM SERPL-SCNC: 151 MMOL/L (ref 136–145)
SPECIMEN TYPE: ABNORMAL
TOTAL RESP. RATE, ITRR: 24
WBC # BLD AUTO: 13.7 K/UL (ref 4.6–13.2)

## 2018-06-27 PROCEDURE — 71045 X-RAY EXAM CHEST 1 VIEW: CPT

## 2018-06-27 PROCEDURE — 74011250636 HC RX REV CODE- 250/636: Performed by: INTERNAL MEDICINE

## 2018-06-27 PROCEDURE — 74011250636 HC RX REV CODE- 250/636: Performed by: OBSTETRICS & GYNECOLOGY

## 2018-06-27 PROCEDURE — 77010033711 HC HIGH FLOW OXYGEN

## 2018-06-27 PROCEDURE — 83735 ASSAY OF MAGNESIUM: CPT | Performed by: INTERNAL MEDICINE

## 2018-06-27 PROCEDURE — 74011636637 HC RX REV CODE- 636/637: Performed by: HOSPITALIST

## 2018-06-27 PROCEDURE — C9113 INJ PANTOPRAZOLE SODIUM, VIA: HCPCS | Performed by: INTERNAL MEDICINE

## 2018-06-27 PROCEDURE — 80069 RENAL FUNCTION PANEL: CPT | Performed by: INTERNAL MEDICINE

## 2018-06-27 PROCEDURE — 85610 PROTHROMBIN TIME: CPT | Performed by: SURGERY

## 2018-06-27 PROCEDURE — 36592 COLLECT BLOOD FROM PICC: CPT

## 2018-06-27 PROCEDURE — 74011000258 HC RX REV CODE- 258: Performed by: OBSTETRICS & GYNECOLOGY

## 2018-06-27 PROCEDURE — 74011000258 HC RX REV CODE- 258: Performed by: INTERNAL MEDICINE

## 2018-06-27 PROCEDURE — 74011636637 HC RX REV CODE- 636/637: Performed by: INTERNAL MEDICINE

## 2018-06-27 PROCEDURE — 65270000029 HC RM PRIVATE

## 2018-06-27 PROCEDURE — 87070 CULTURE OTHR SPECIMN AEROBIC: CPT | Performed by: OBSTETRICS & GYNECOLOGY

## 2018-06-27 PROCEDURE — 36600 WITHDRAWAL OF ARTERIAL BLOOD: CPT

## 2018-06-27 PROCEDURE — 74011000250 HC RX REV CODE- 250: Performed by: INTERNAL MEDICINE

## 2018-06-27 PROCEDURE — 85025 COMPLETE CBC W/AUTO DIFF WBC: CPT | Performed by: INTERNAL MEDICINE

## 2018-06-27 PROCEDURE — 82330 ASSAY OF CALCIUM: CPT | Performed by: INTERNAL MEDICINE

## 2018-06-27 PROCEDURE — 82803 BLOOD GASES ANY COMBINATION: CPT

## 2018-06-27 PROCEDURE — P9047 ALBUMIN (HUMAN), 25%, 50ML: HCPCS | Performed by: INTERNAL MEDICINE

## 2018-06-27 PROCEDURE — 82962 GLUCOSE BLOOD TEST: CPT

## 2018-06-27 RX ORDER — HYDROMORPHONE HYDROCHLORIDE 2 MG/ML
1 INJECTION, SOLUTION INTRAMUSCULAR; INTRAVENOUS; SUBCUTANEOUS ONCE
Status: COMPLETED | OUTPATIENT
Start: 2018-06-27 | End: 2018-06-27

## 2018-06-27 RX ORDER — LORAZEPAM 2 MG/ML
1 INJECTION INTRAMUSCULAR ONCE
Status: COMPLETED | OUTPATIENT
Start: 2018-06-27 | End: 2018-06-27

## 2018-06-27 RX ORDER — HYDROMORPHONE HYDROCHLORIDE 1 MG/ML
0.5 INJECTION, SOLUTION INTRAMUSCULAR; INTRAVENOUS; SUBCUTANEOUS
Status: DISCONTINUED | OUTPATIENT
Start: 2018-06-27 | End: 2018-06-28 | Stop reason: SDUPTHER

## 2018-06-27 RX ORDER — ACETAMINOPHEN 10 MG/ML
1000 INJECTION, SOLUTION INTRAVENOUS EVERY 6 HOURS
Status: DISCONTINUED | OUTPATIENT
Start: 2018-06-27 | End: 2018-07-03

## 2018-06-27 RX ORDER — FUROSEMIDE 10 MG/ML
40 INJECTION INTRAMUSCULAR; INTRAVENOUS ONCE
Status: COMPLETED | OUTPATIENT
Start: 2018-06-27 | End: 2018-06-27

## 2018-06-27 RX ADMIN — METRONIDAZOLE 500 MG: 500 INJECTION, SOLUTION INTRAVENOUS at 13:20

## 2018-06-27 RX ADMIN — FENTANYL CITRATE 25 MCG: 50 INJECTION, SOLUTION INTRAMUSCULAR; INTRAVENOUS at 11:59

## 2018-06-27 RX ADMIN — PIPERACILLIN SODIUM,TAZOBACTAM SODIUM 2.25 G: 2; .25 INJECTION, POWDER, FOR SOLUTION INTRAVENOUS at 06:00

## 2018-06-27 RX ADMIN — VANCOMYCIN HYDROCHLORIDE 1500 MG: 10 INJECTION, POWDER, LYOPHILIZED, FOR SOLUTION INTRAVENOUS at 17:26

## 2018-06-27 RX ADMIN — METOPROLOL TARTRATE 5 MG: 5 INJECTION INTRAVENOUS at 15:52

## 2018-06-27 RX ADMIN — ALBUMIN (HUMAN) 12.5 G: 0.25 INJECTION, SOLUTION INTRAVENOUS at 21:49

## 2018-06-27 RX ADMIN — HYDROMORPHONE HYDROCHLORIDE 1 MG: 2 INJECTION, SOLUTION INTRAMUSCULAR; INTRAVENOUS; SUBCUTANEOUS at 14:20

## 2018-06-27 RX ADMIN — ACETAMINOPHEN 1000 MG: 10 INJECTION, SOLUTION INTRAVENOUS at 21:14

## 2018-06-27 RX ADMIN — PIPERACILLIN SODIUM,TAZOBACTAM SODIUM 2.25 G: 2; .25 INJECTION, POWDER, FOR SOLUTION INTRAVENOUS at 21:14

## 2018-06-27 RX ADMIN — SODIUM CHLORIDE 40 MG: 9 INJECTION INTRAMUSCULAR; INTRAVENOUS; SUBCUTANEOUS at 09:28

## 2018-06-27 RX ADMIN — Medication 1 MG: at 05:00

## 2018-06-27 RX ADMIN — INSULIN LISPRO 2 UNITS: 100 INJECTION, SOLUTION INTRAVENOUS; SUBCUTANEOUS at 00:00

## 2018-06-27 RX ADMIN — PIPERACILLIN SODIUM,TAZOBACTAM SODIUM 2.25 G: 2; .25 INJECTION, POWDER, FOR SOLUTION INTRAVENOUS at 12:18

## 2018-06-27 RX ADMIN — FUROSEMIDE 40 MG: 10 INJECTION, SOLUTION INTRAMUSCULAR; INTRAVENOUS at 14:20

## 2018-06-27 RX ADMIN — MICAFUNGIN SODIUM 100 MG: 20 INJECTION, POWDER, LYOPHILIZED, FOR SOLUTION INTRAVENOUS at 14:28

## 2018-06-27 RX ADMIN — METOPROLOL TARTRATE 5 MG: 5 INJECTION INTRAVENOUS at 09:28

## 2018-06-27 RX ADMIN — METRONIDAZOLE 500 MG: 500 INJECTION, SOLUTION INTRAVENOUS at 04:00

## 2018-06-27 RX ADMIN — ACETAMINOPHEN 1000 MG: 10 INJECTION, SOLUTION INTRAVENOUS at 14:26

## 2018-06-27 RX ADMIN — INSULIN GLARGINE 13 UNITS: 100 INJECTION, SOLUTION SUBCUTANEOUS at 09:29

## 2018-06-27 RX ADMIN — PIPERACILLIN SODIUM,TAZOBACTAM SODIUM 2.25 G: 2; .25 INJECTION, POWDER, FOR SOLUTION INTRAVENOUS at 00:00

## 2018-06-27 RX ADMIN — LEVOFLOXACIN 750 MG: 5 INJECTION, SOLUTION INTRAVENOUS at 02:45

## 2018-06-27 RX ADMIN — INSULIN LISPRO 2 UNITS: 100 INJECTION, SOLUTION INTRAVENOUS; SUBCUTANEOUS at 06:00

## 2018-06-27 RX ADMIN — METOPROLOL TARTRATE 5 MG: 5 INJECTION INTRAVENOUS at 02:00

## 2018-06-27 RX ADMIN — ALBUMIN (HUMAN) 12.5 G: 0.25 INJECTION, SOLUTION INTRAVENOUS at 14:55

## 2018-06-27 RX ADMIN — POTASSIUM CHLORIDE: 2 INJECTION, SOLUTION, CONCENTRATE INTRAVENOUS at 17:20

## 2018-06-27 RX ADMIN — METOPROLOL TARTRATE 5 MG: 5 INJECTION INTRAVENOUS at 21:14

## 2018-06-27 RX ADMIN — ALBUMIN (HUMAN) 12.5 G: 0.25 INJECTION, SOLUTION INTRAVENOUS at 06:00

## 2018-06-27 RX ADMIN — METRONIDAZOLE 500 MG: 500 INJECTION, SOLUTION INTRAVENOUS at 21:49

## 2018-06-27 NOTE — PROGRESS NOTES
Patient maintained on HFNC at 40L and 50% FIO2, VS stable and WOB is improved from earlier in the shift when the HFNC was initiated.

## 2018-06-27 NOTE — PROGRESS NOTES
Consult for TPN Dosing per Pharmacy by Dr. Sharri Agosto provided for this 75 y.o. female, for indication of Ileus  Day of Therapy  10     TPN Dosing Wt:  62.4 kg    Labs:  BMP BMP:   Lab Results   Component Value Date/Time     (H) 06/27/2018 05:45 AM    K 3.8 06/27/2018 05:45 AM     (H) 06/27/2018 05:45 AM    CO2 19 (L) 06/27/2018 05:45 AM    AGAP 14 06/27/2018 05:45 AM     (H) 06/27/2018 05:45 AM    BUN 41 (H) 06/27/2018 05:45 AM    CREA 1.06 06/27/2018 05:45 AM    GFRAA >60 06/27/2018 05:45 AM    GFRNA 52 (L) 06/27/2018 05:45 AM          CMP CMP:   Lab Results   Component Value Date/Time     (H) 06/27/2018 05:45 AM    K 3.8 06/27/2018 05:45 AM     (H) 06/27/2018 05:45 AM    CO2 19 (L) 06/27/2018 05:45 AM    AGAP 14 06/27/2018 05:45 AM     (H) 06/27/2018 05:45 AM    BUN 41 (H) 06/27/2018 05:45 AM    CREA 1.06 06/27/2018 05:45 AM    GFRAA >60 06/27/2018 05:45 AM    GFRNA 52 (L) 06/27/2018 05:45 AM    CA 7.3 (L) 06/27/2018 05:45 AM    MG 2.0 06/27/2018 05:45 AM    PHOS 2.9 06/27/2018 05:45 AM    ALB 2.4 (L) 06/27/2018 05:45 AM         Ca/ Phos Lab Results   Component Value Date/Time    Calcium 7.3 (L) 06/27/2018 05:45 AM    Phosphorus 2.9 06/27/2018 05:45 AM       Mag    Lab Results   Component Value Date/Time    Magnesium 2.0 06/27/2018 05:45 AM      Tg No components found for: TGL   Albumin Lab Results   Component Value Date/Time    Protein, total 5.9 (L) 06/26/2018 05:00 AM    Albumin 2.4 (L) 06/27/2018 05:45 AM         GOAL:   Kcal requirements:    ~22 kcal/kg   Fluid requirements:    ~30 ml/kg   Macronutrients:   Protein     1.2 g/kg/day 300 kcal   Lipids      44 gm/day 396 kcal   Dextrose remainder of total kcal:  200 gm/day 680 kcal   Total Calories:    1376 kcal     Today's TPN formulation provides (100 % of Goal):   Calories:   1376 kcal   Fluid:    ~23 ml/kg   Protein:   1.2 gm/kg/day   Fat:    44 gm/day   CHO:    3.2 gm/kg/day   Sodium:   0 mEq   Potassium:   122 mEq   Calcium:   0 gm   Magnesium:   1 gm   Thiamine:   0 mg   OAC:                           80 mEq  PO4:                           15 mmol  Insulin (Reg):              10 units    TPN to be initiated at 100% goal macronutrients and titrated to goal per patient tolerance. Electrolytes to be monitored and adjusted daily. MD to replete electrolytes acutely outside of TPN as needed. Additional recommendations/Orders:    1. Corrective insulin coverage q6h while on TPN. 2. Change/decrease IVF to while on TPN-MD to further adjust as needed  3. BMP, Ca, Mag, Phos ordered daily  4. Triglycerides, Prealbumin, CMP, INR ordered upon initiation and weekly  5. GIR: 2.3 mg/kg/min  6. Electrolyte adjustments for today:                 - Cl decreased from 30 mEq to 20 mEq    - OAC increased from 60 mEq to 80 mEq    - K increased from 112 mEq to 122 mEq    - Mag added to TPN today: 1 gm    Pharmacy to follow daily and will make changes based on labs/clinical status. Joycelyn Martin, Pharm. D.   Clinical Pharmacist  [phone]

## 2018-06-27 NOTE — PROGRESS NOTES
Speech Therapy Note:    Followed up this day. Per chart review and d/w RN, pt remains inappropriate for skilled dysphagia therapy due to current respiratory status, pain level and alertness. Pt remains inappropriate for MBSS at this time. Recommend continue NPO with alternate source of nutrition/hydration. SLP to D/C accordingly.  Available for re-consult at MD request. D/w Dr. Angelica Heaton and Dr. Winnie Agee M.S., CF-SLP  Speech Language Pathologist

## 2018-06-27 NOTE — PROGRESS NOTES
Given schedule unable to place filter today. Pt has bilateral tibial vein DVTs and without propagation to popliteal vein it is acceptable not to anticoagulate these patients based on chest guidelines. However, given her condition and history do believe she would benefit from a filter. Continue to hold anticoagulation with plans to place IVC filter tomorrow afternoon.

## 2018-06-27 NOTE — PROGRESS NOTES
1131--PT/INR drawn and sent. Patient lying flat to see if she will tolerate lying flat for IVC Filter placement. 1159--Patient medicated with Fentanyl 25mcg IVP for pain. Patient unable to verbalize numerical value but appears to be in pain as brow is furrowed and she is moaning. 1215--Dr Micheal Murphy at bedside and spoke to family and IVC filter is to be preformed tomorrow. 1329--Spoke c Dr. Eliane Chan about patient's pain control. Received orders to IV Tylenol and a one time dose of Dilaudid 1mg IVP; patient previously tolerated Dilaudid dosing. 1339--Spoke c Dr. See Medina and received orders for Lasix 20mg IVP x1 dose for patient's pulmonary edema. 1720--LLQ BRANDON drain removed. Cath tip sent for culture. Patient had a lot of purulent drainage come out after removal of drain. 1930--Bedside and Verbal shift change report given to Shantal Foster RN (oncoming nurse) by Elinor Franz RN (offgoing nurse).  Report included the following information SBAR, Kardex, Intake/Output, MAR, Recent Results, Med Rec Status and Cardiac Rhythm SR.

## 2018-06-27 NOTE — PROGRESS NOTES
Attended IDR's discussed plan of care,discussed potential for LTAC when stable,at this time per  he believes  believe's pt not stable enough at this time,IVC placement for tomorrow,cm will cont to review and remain available.

## 2018-06-27 NOTE — ROUTINE PROCESS
Bedside and Verbal shift change report given to Taran Pacheco RN (oncoming nurse) by Loretta Ennis RN (offgoing nurse). Report included the following information SBAR, Kardex, Intake/Output, MAR, Recent Results, Med Rec Status and Cardiac Rhythm SR c PVCs.

## 2018-06-27 NOTE — PROGRESS NOTES
Problem: Mobility Impaired (Adult and Pediatric)  Goal: *Acute Goals and Plan of Care (Insert Text)  Physical Therapy Goals  Updated 6/20/2018 and to be accomplished within 7 day(s)  1. Patient will move from supine to sit and sit to supine in bed with maximal assistance. 2.  Patient will transfer from bed to chair and chair to bed with maximal assistance using the least restrictive device. 3.  Patient will perform sit to stand with maximal assistance. 4.  Patient will ambulate with maximal assistance for 5 feet with the least restrictive device. Outcome: Not Progressing Towards Goal  PT session held due to:  [x]  IVC placement held until tomorrow   [x]  No anticoagulation for new DVT  []  Extreme Pain  []  Dialysis treatment in progress. Will f/u when appropriate. Thank you.   Danae Gonzalez, PTA

## 2018-06-27 NOTE — DIABETES MGMT
GLYCEMIC CONTROL PROGRESS NOTE:    -discussed in rounds, known h/o T2DM HbA1C within recommended range for age + comorbids on oral home regimen  -BG out of target range ICU: 140-180 mg/dL   -TDD = 23 (TPN 13 regular insulin + 10 - Humalog Normal Insulin Sensitivity Corrective Coverage)  -24 BG trending down, adjustment made to Lantus yesterday    -Glucose Results:   Lab Results   Component Value Date/Time     (H) 06/27/2018 05:45 AM    GLUCPOC 152 (H) 06/27/2018 05:48 AM    GLUCPOC 166 (H) 06/26/2018 11:37 PM    GLUCPOC 144 (H) 06/26/2018 05:42 PM         Selene Whitman RN, MS  Glycemic Control Team  Pager [phone] (M-TH 8:30-5P)  *After Hours pager [phone]

## 2018-06-27 NOTE — PROGRESS NOTES
1900-  1915- Oxygen saturations in the 60s. Dr. Aydee Chiang and respiratory at bedside. Ordered HI flow. 1920- Oxygen saturations now stable, high 90s. Will monitor and keep Dr. Aydee Chiang abreast of any changes in condition. 1930- Initial assessment complete. Pt is alert with flat affect. VSS. Scattered crackles and wheezing heard and is also tachypneic. Generalized 3+,4+ pitting edema. Wound vac and BRANDON drain intact and draining. IV pump infusing with no difficulties. Will continue to assess and monitor  0000- Reassessment completed. No changes to previous assessment. Will continue to monitor and assess.

## 2018-06-27 NOTE — PROGRESS NOTES
Nephrology Progress note    Subjective:     Maggie Urbina is a 75 y.o. female with PMH DM, HTN, clear cell ovarian ca s/p debulking who presented with abdominal pain and possible sepsis/ischemic colitis. Pt underwent laparotomy/peritoneal lavage/bx, noted to have decreased UOP and increasing Cr to 2.1 today from baseline 0.6. CT neg for mass or hydro. Pt given lasix yesterday, still with high O2 requirements on vent. Unable to provide further history. On 6/8 Pt decompensated, ? Aspiration,  back on vent was hypoxic despite  Fi02 of 100%, , Hypotensive on IV pressors, Acidotic and on HC03 drip, Urine output decreased markedly. Underwent Bronchoscopy 6/9. Pt requires less FiO2 now. UOP picking up. Wound was draining yellow-brown fluid  - S/P Exploratory laparotomy, Lysis of adhesions, Ileostomy and wound VAC placement on 6/16.   - Pt self-extubated 6/17  Pt awake. Good Urine output. Admit Date: 6/4/2018  Allergy:  Allergies   Allergen Reactions    Hydrocodone Other (comments)     Breaks into cold sweat    Metformin Other (comments)     Breaks out into cold sweat. Can Take Glucophage brand name med        Objective:     Visit Vitals    /72    Pulse 79    Temp 97.8 °F (36.6 °C)    Resp 25    Ht 5' 1\" (1.549 m)    Wt 102.7 kg (226 lb 6.6 oz)    SpO2 97%    BMI 42.78 kg/m2         Intake/Output Summary (Last 24 hours) at 06/27/18 1426  Last data filed at 06/27/18 1323   Gross per 24 hour   Intake              150 ml   Output             2625 ml   Net            -2475 ml       Physical Exam:     General: On NC O2   HENT: Atraumatic and normocephalic.    Eyes: Sclera anicteric   Neck: No JVD    Cardiovascular: Normal S1 S2   Pulmonary/Chest Wall: Decreased breath sounds bilaterally   Abdominal: Soft, obese, NABS   Musculoskeletal: ++ edema LEs   Neurological: Awake       Data Review:      Lab Results   Component Value Date/Time    WBC 13.7 (H) 06/27/2018 05:45 AM    RBC 3.66 (L) 06/27/2018 05:45 AM HCT 32.8 (L) 06/27/2018 05:45 AM    MCV 89.6 06/27/2018 05:45 AM    MCH 27.9 06/27/2018 05:45 AM    MCHC 31.1 06/27/2018 05:45 AM    RDW 25.3 (H) 06/27/2018 05:45 AM      Lab Results   Component Value Date    IRON 8 (L) 06/07/2018   No components found for: FERRITIN  No components found for: PTHINT  Urinalysis  No results found for: UGLU     CXR   6/27  IMPRESSION:     1. Overall, no significant change in this one day interval.     2. Prominence of pulmonary vasculature with interstitial opacities suggesting  pulmonary edema. Superimposed right basilar and left perihilar opacities may  represent superimposed atelectasis or pneumonia. Impression:     -JESS- likely ATN,  S Cr  Down to 1.06 today with good diuresis  -Septic Shock/hypotension, Hypotension resolved. -Resp Failure,  Was re-intubated 6/9,  Self extubated   -Severe Metabolic acidosis, improving.   -Ovarian ca s/p debulking then laparoscopy with lavage  -KLEBSIELLA OXYTOCA sepsis (Peritoneal fluid)  -DM  -HTN  -Anemia  -Elevated liver enzymes  -S/P Exploratory laparotomy, Lysis of adhesions, Ileostomy and wound VAC placement on 6/16  -Leukocytosis, improved  -Hypernatremia, 151 today  -Hypokalemia, repleted   - DVT  - Pulm edema      Plan:   Continue  TPN  Resume IV Lasix prn to control Pulm edema. Lasix 40mg IV today  Replace electrolytes per protocol   Monitor I/O and chemistry, H&H   Antibiotics.   Vanc dosing per pharmacist    MD Denver Anguiano  [phone]

## 2018-06-27 NOTE — PROGRESS NOTES
[facility]                  Pulmonary, Critical Care, and Sleep Medicine     Name: Asher London MRN: [id number]   : 1950 Hospital: [facility] [place]    Date: 2018        [facility]                                              Subjective/History of Present Illness:   Patient is a 75 y.o. 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 S/p exploratory laparotomy 6/15/18 - findings of sigmoid diverticulitis with suspected perforation, underwent ileostomy and wound vac placement. Last evening patient became more anxious, pulling on O2, desaturating, required HF  Remained on HF with SPO2 100%. Remained anxious and required Ativan x 1 this AM  ABG had shown respiratory alkalosis  Currently sedated, opens eyes on calling name  No fever chills. Persistent leg edema. PVL studies had showed bl calf veins DVT  Has some drainage in BRANDON drain. On low dose Fentanyl  Ileostomy without bleeding. On TPN  Hemodynamically stable  Updated vascular surgery regarding events from night    Review of Systems   Review of systems not obtained due to patient factors. Surgeries  18 - Laparoscopy converted to laparotomy, total abdominal hysterectomy, bilateral salpingo-oophorectomy, omentectomy, bilateral pelvic and periaortic lymphadenectomy and radical tumor debulking to R0. Path - ADENOCARCINOMA OF LEFT OVARY.     18 - Diagnostic laparoscopy converted to laparotomy, peritoneal biopsies, aerobic and anaerobic cultures of peritoneal fluid and peritoneal lavage. 6/15/18 - Wound exploration. Exploratory laparotomy.  Lysis of adhesions. Ileostomy and wound VAC placement. Allergies   Allergen Reactions    Hydrocodone Other (comments)     Breaks into cold sweat    Metformin Other (comments)     Breaks out into cold sweat.  Can Take Glucophage brand name med      Past Medical History:   Diagnosis Date    Diabetes ([address]     many years type 2    Hypertension     many years      Past Surgical History:   Procedure Laterality Date    HX OOPHORECTOMY  05/29/2018    HX TONSILLECTOMY      as a child       Social History   Substance Use Topics    Smoking status: Never Smoker    Smokeless tobacco: Never Used    Alcohol use No        Current Facility-Administered Medications   Medication Dose Route Frequency    TPN ADULT - CENTRAL   IntraVENous CONTINUOUS    metoprolol (LOPRESSOR) injection 5 mg  5 mg IntraVENous Q6H    potassium chloride (KLOR-CON) tablet 10 mEq  10 mEq Oral ONCE    albumin human 25% (BUMINATE) solution 12.5 g  12.5 g IntraVENous Q8H    fentaNYL (PF) 900 mcg/30 ml infusion soln  0-10 mcg/hr IntraVENous TITRATE    vancomycin (VANCOCIN) 1500 mg in  ml infusion  1,500 mg IntraVENous Q24H    insulin glargine (LANTUS) injection 13 Units  13 Units SubCUTAneous DAILY    micafungin (MYCAMINE) 100 mg in 0.9% sodium chloride (MBP/ADV) 100 mL  100 mg IntraVENous Q24H    insulin lispro (HUMALOG) injection   SubCUTAneous Q6H    Vancomycin - Pharmacy to Dose  1 Each Other Rx Dosing/Monitoring    levoFLOXacin (LEVAQUIN) 750 mg in D5W IVPB  750 mg IntraVENous Q48H    metroNIDAZOLE (FLAGYL) IVPB premix 500 mg  500 mg IntraVENous Q8H    piperacillin-tazobactam (ZOSYN) 2.25 g in 0.9% sodium chloride (MBP/ADV) 50 mL MBP  2.25 g IntraVENous Q6H    pantoprazole (PROTONIX) 40 mg in sodium chloride 0.9% 10 mL injection  40 mg IntraVENous DAILY         Objective:   Vital Signs:    Visit Vitals    /80    Pulse 94    Temp 97.7 °F (36.5 °C)    Resp (!) 33    Ht 5' 1\" (1.549 m)    Wt 102.7 kg (226 lb 6.6 oz)    SpO2 100%    BMI 42.78 kg/m2       O2 Device: Heated, Hi flow nasal cannula   O2 Flow Rate (L/min): 40 l/min   Temp (24hrs), Av.8 °F (36.6 °C), Min:97.5 °F (36.4 °C), Max:98.2 °F (36.8 °C)       Intake/Output:   Last shift:           Last 3 shifts:  1901 -  0700  In: 100 [I.V.:100]  Out: 8553 [Urine:3550; Drains:25]      Intake/Output Summary (Last 24 hours) at 18 0833  Last data filed at 18 0530   Gross per 24 hour   Intake              100 ml   Output             2250 ml   Net            -2150 ml       Physical Exam:  General/Neurology: sedated, opens eyes on calling name, on HF O2. Head:   Normocephalic, without obvious abnormality, atraumatic. Eye:   no scleral icterus, no pallor, no cyanosis. Pupils not dilated. Neck:   Symmetric. No lymphadenopathy. Trachea midline  Lung: Moderate air entry bilateral equal. Decreased breath sounds bases. Few rales scattered. No wheezing or crackles at bases. Heart:   S1 S2 present. No murmur. No JVD. Abdomen:  Soft. Obese. + BS. Midline lower abd surgical site - open wound with wound vac. BRANDON drain LLQ, Rt abd wall Ileostomy. No abd distension or guarding. Extremities:  No cyanosis. No clubbing. No hand edema. B/l feet/leg/thigh edema 2+. Pulses: Palpable radials and DPs bilateral.   Lymphatic:  No cervical or supraclav lymphadenopathy.    Skin:   No rash      Data:       Recent Results (from the past 12 hour(s))   GLUCOSE, POC    Collection Time: 18 11:37 PM   Result Value Ref Range    Glucose (POC) 166 (H) 70 - 110 mg/dL   MAGNESIUM    Collection Time: 18  5:45 AM   Result Value Ref Range    Magnesium 2.0 1.6 - 2.6 mg/dL   RENAL FUNCTION PANEL    Collection Time: 18  5:45 AM   Result Value Ref Range    Sodium 151 (H) 136 - 145 mmol/L    Potassium 3.8 3.5 - 5.5 mmol/L    Chloride 118 (H) 100 - 108 mmol/L    CO2 19 (L) 21 - 32 mmol/L    Anion gap 14 3.0 - 18 mmol/L    Glucose 177 (H) 74 - 99 mg/dL    BUN 41 (H) 7.0 - 18 MG/DL    Creatinine 1.06 0.6 - 1.3 MG/DL    BUN/Creatinine ratio 39 (H) 12 - 20      GFR est AA >60 >60 ml/min/1.73m2    GFR est non-AA 52 (L) >60 ml/min/1.73m2    Calcium 7.3 (L) 8.5 - 10.1 MG/DL    Phosphorus 2.9 2.5 - 4.9 MG/DL    Albumin 2.4 (L) 3.4 - 5.0 g/dL   CBC WITH AUTOMATED DIFF    Collection Time: 06/27/18  5:45 AM   Result Value Ref Range    WBC 13.7 (H) 4.6 - 13.2 K/uL    RBC 3.66 (L) 4.20 - 5.30 M/uL    HGB 10.2 (L) 12.0 - 16.0 g/dL    HCT 32.8 (L) 35.0 - 45.0 %    MCV 89.6 74.0 - 97.0 FL    MCH 27.9 24.0 - 34.0 PG    MCHC 31.1 31.0 - 37.0 g/dL    RDW 25.3 (H) 11.6 - 14.5 %    PLATELET 150 (L) 072 - 420 K/uL    NEUTROPHILS 86 (H) 42 - 75 %    BAND NEUTROPHILS 1 0 - 5 %    LYMPHOCYTES 4 (L) 20 - 51 %    MONOCYTES 0 (L) 2 - 9 %    EOSINOPHILS 8 (H) 0 - 5 %    BASOPHILS 1 0 - 3 %    NRBC 1.0 (H) 0  WBC    ABS. NEUTROPHILS 11.8 (H) 1.8 - 8.0 K/UL    ABS. LYMPHOCYTES 0.5 (L) 0.8 - 3.5 K/UL    ABS. MONOCYTES 0.0 0 - 1.0 K/UL    ABS. EOSINOPHILS 1.1 (H) 0.0 - 0.4 K/UL    ABS.  BASOPHILS 0.1 0.0 - 0.1 K/UL    PLATELET COMMENTS LARGE PLATELETS      RBC COMMENTS ANISOCYTOSIS  2+        RBC COMMENTS MICROCYTOSIS  1+        RBC COMMENTS MACROCYTOSIS  1+        RBC COMMENTS POLYCHROMASIA  1+        DF MANUAL     GLUCOSE, POC    Collection Time: 06/27/18  5:48 AM   Result Value Ref Range    Glucose (POC) 152 (H) 70 - 110 mg/dL           Chemistry Recent Labs      06/27/18   0545  06/26/18   1800  06/26/18   0500  06/25/18   0520   GLU  177*   --   201*  196*   NA  151*   --   150*  153*   K  3.8  3.7  3.7  3.9   CL  118*   --   117*  119*   CO2  19*   --   19*  20*   BUN  41*   --   49*  51*   CREA  1.06   --   1.17  1.34*   CA  7.3*   --   7.6*  7.5*   MG  2.0   --   2.3  2.4   PHOS  2.9   --   2.7  2.7   AGAP  14   --   14  14   BUCR  39*   --   42*  38*   AP   --    --   67   --    TP   --    --   5.9*   --    ALB  2.4*   --   2.7*   --    GLOB   --    --   3.2   --    AGRAT   --    --   0.8   -- Lactic Acid Lactic acid   Date Value Ref Range Status   06/17/2018 2.3 (HH) 0.4 - 2.0 MMOL/L Final     Comment:     CALLED TO AND CORRECTLY REPEATED BY:  Camryn Vallejo RN ICU ON 6/17/2018 7232 TO 7839       No results for input(s): LAC in the last 72 hours. Liver Enzymes Protein, total   Date Value Ref Range Status   06/26/2018 5.9 (L) 6.4 - 8.2 g/dL Final     Albumin   Date Value Ref Range Status   06/27/2018 2.4 (L) 3.4 - 5.0 g/dL Final     Globulin   Date Value Ref Range Status   06/26/2018 3.2 2.0 - 4.0 g/dL Final     A-G Ratio   Date Value Ref Range Status   06/26/2018 0.8 0.8 - 1.7   Final     AST (SGOT)   Date Value Ref Range Status   06/26/2018 16 15 - 37 U/L Final     Alk. phosphatase   Date Value Ref Range Status   06/26/2018 67 45 - 117 U/L Final     Recent Labs      06/27/18   0545  06/26/18   0500   TP   --   5.9*   ALB  2.4*  2.7*   GLOB   --   3.2   AGRAT   --   0.8   SGOT   --   16   AP   --   67        CBC w/Diff Recent Labs      06/27/18   0545  06/26/18   0500  06/25/18   1155  06/25/18   0520   WBC  13.7*   --    --   11.7   RBC  3.66*   --    --   3.28*   HGB  10.2*  9.2*  8.8*  9.1*   HCT  32.8*  29.4*  28.1*  28.8*   PLT  130*   --    --   127*   GRANS  86*   --    --   88*   LYMPH  4*   --    --   6*   EOS  8*   --    --   1        Cardiac Enzymes No results found for: CPK, CK, CKMMB, CKMB, RCK3, CKMBT, CKNDX, CKND1, FABIAN, TROPT, TROIQ, KAYLAN, TROPT, TNIPOC, BNP, BNPP     BNP No results found for: BNP, BNPP, XBNPT     Coagulation No results for input(s): PTP, INR, APTT in the last 72 hours.     No lab exists for component: INREXT, INREXT      Thyroid  No results found for: T4, T3U, TSH, TSHEXT, TSHEXT    No results found for: T4       ABG Recent Labs      06/26/18 1948   PHI  7.435   PCO2I  24.5*   PO2I  68*   HCO3I  16.5*   FIO2I  0.50        All Micro Results     Procedure Component Value Units Date/Time    CULTURE, FUNGUS [[id number]] Collected:  06/09/18 1024    Order Status: Completed Specimen:  Bronchial lavage Updated:  06/25/18 1109     Special Requests: NO SPECIAL REQUESTS        FUNGUS SMEAR NO FUNGAL ELEMENTS SEEN        Culture result:         NO FUNGUS ISOLATED 16 DAYS    CULTURE, BLOOD FOR FUNGUS [[id number]]     Order Status:  Canceled Specimen:  Blood     CULTURE, BLOOD [[id number]] Collected:  06/09/18 1423    Order Status:  Completed Specimen:  Whole Blood from Blood Updated:  06/15/18 0230     Special Requests: NO SPECIAL REQUESTS        Culture result: NO GROWTH 6 DAYS       CULTURE, BLOOD [[id number]] Collected:  06/09/18 1223    Order Status:  Completed Specimen:  Whole Blood from Blood Updated:  06/15/18 0230     Special Requests: left hand     Culture result: NO GROWTH 6 DAYS       AFB CULTURE + SMEAR W/RFLX PCR AND ID [[id number]] Collected:  06/09/18 1024    Order Status:  Completed Specimen:  Respiratory sample Updated:  06/12/18 2106     Source BRONCHIAL LAVAGE        AFB Specimen processing Concentration     Acid Fast Smear NEGATIVE          (NOTE)  Performed At: [address] [id number]  Tatiana Watkins MD [id number]          Acid Fast Culture PENDING    CULTURE, SPUTUM/BRONCH/OTH [[id number]] Collected:  06/09/18 1024    Order Status:  Completed Specimen:  Sputum from Bronchial lavage Updated:  06/11/18 1058     Special Requests: NO SPECIAL REQUESTS        GRAM STAIN FEW WBC'S         FEW GRAM NEGATIVE RODS        Culture result:         RARE NORMAL RESPIRATORY JOSETTE    CULTURE, ANAEROBIC [[id number]] Collected:  06/05/18 0220    Order Status:  Completed Specimen:  Peritoneal Fluid Updated:  06/11/18 0734     Special Requests: NO SPECIAL REQUESTS        Culture result:         NO ANAEROBES ISOLATED 5 DAYS    CULTURE, BLOOD [[id number]] Collected:  06/04/18 0740    Order Status:  Completed Specimen:  Blood from Blood Updated:  06/10/18 0655     Special Requests: NO SPECIAL REQUESTS        Culture result: NO GROWTH 6 DAYS CULTURE, BLOOD [[id number]] Collected:  06/04/18 0740    Order Status:  Completed Specimen:  Blood from Blood Updated:  06/10/18 0655     Special Requests: NO SPECIAL REQUESTS        Culture result: NO GROWTH 6 DAYS       CULTURE, BODY FLUID Elease Hussar STAIN [[id number]]  (Abnormal)  (Susceptibility) Collected:  06/05/18 0220    Order Status:  Completed Specimen:  Peritoneal Fluid Updated:  06/08/18 0936     Special Requests: NO SPECIAL REQUESTS        GRAM STAIN MANY WBC'S         NO ORGANISMS SEEN        Culture result:         RARE KLEBSIELLA OXYTOCA (A)            CALLED TO AND CORRECTLY REPEATED BY:  ANGEL LUIS DE LOS SANTOS RN ICU ON 6/7/2018 1032 TO 5087      CULTURE, URINE [[id number]] Collected:  06/05/18 1245    Order Status:  Completed Specimen:  Urine from Fry Specimen Updated:  06/07/18 1103     Special Requests: NO SPECIAL REQUESTS        Culture result: NO GROWTH 2 DAYS       C. DIFFICILE/EPI PCR [[id number]] Collected:  06/05/18 0930    Order Status:  Canceled Specimen:  Stool           Echo 6/9/18:  Left ventricle: Systolic function was normal. Ejection fraction was estimated   in the range of 65 % to 70 %. No obvious  wall motion abnormalities identified in the views obtained. Wall thickness   was mildly increased. Doppler parameters  were consistent with abnormal left ventricular relaxation (grade 1 diastolic   dysfunction). Right ventricle: The size was normal. Systolic function was normal.  Mitral valve: There was mild annular calcification. Tricuspid valve: Pulmonary artery systolic pressure was mildly increased. Pulmonary artery systolic pressure: 34 mmHg. PVL LE 6/6/18: acute b/l peroneal DVT. CT abd pelvis 6/4/18:  1. Postsurgical hysterectomy, bilateral oophorectomy, pelvic lymphadenectomy and  a mastectomy surgical changes. Small volume of ascites in the abdomen and  pelvis, with a few tiny locules of gas in the right hemipelvis would be in  keeping with recent postsurgical etiology.   2. Abnormal edematous thick-walled small bowel loops in the left abdomen and  pelvis, with TRAM lamellar edematous configuration, induration. No findings of  pneumatosis. The overall appearance is nonspecific. Diagnostic considerations  include infectious etiology, nonspecific edema which may be unresolved  postsurgical etiology. Ischemia is also included in the differential diagnostic  consideration, however, there are no findings of pneumatosis, portal venous gas. 3. Stool in the right colon extending to the hepatic flexure with surrounding  gas, foci of pneumatosis could be obscured. No associated bowel wall thickening. 4. Satisfactory position of nasogastric tube. 5. Hepatomegaly, steatosis with 2 rounded low-density lesions, potentially  cysts. 6. Bibasilar atelectasis. Imaging:  [x]I have personally reviewed the patients chest radiographs images and report   6/27/18  CXR port [preliminary]  Unchanged pulmonary interstitial infiltrates        6/26/18    Results from Hospital Encounter encounter on 06/04/18   XR CHEST PORT   Narrative EXAM: AP portable upright chest    INDICATION: Shortness of breath    COMPARISON: Same date 5:48 AM    _______________    FINDINGS:    There is a right-sided PICC line with tip at the junction of the right atrium  and superior vena cava. The shallow inspiration. The heart size is enlarged. The  mediastinal contours stable. Worsening bilateral interstitial and alveolar  infiltrates are present suggesting pulmonary edema however superimposed  pneumonia not excluded. There are no effusions or pneumothorax.    _______________         Impression IMPRESSION:    Bilateral interstitial and alveolar infiltrates slightly worse suggesting  pulmonary edema however pneumonia not excluded            Results from Hospital Encounter encounter on 06/04/18   CTA CHEST ABD PELV W CONT   Narrative CT CHEST, ABDOMEN, AND PELVIS:    COMPARISON: CT abdomen/pelvis dated 6/4/2018.     INDICATION: DVTs, blood from left drain. TECHNIQUE: Axial was performed through the chest, abdomen, and pelvis after  intravenous contrast administration only. Coronal and sagittal reformations  were generated. One or more dose reduction techniques were used on this CT: automated exposure  control, adjustment of the mAs and/or kVp according to patient's size, and  iterative reconstruction techniques. The specific techniques utilized on this CT  exam have been documented in the patient's electronic medical record.  ============    CTA CHEST:    Technique: Axial imaging was obtained dynamically through the pulmonary arteries  using high-resolution CT angiographic protocol, without complications. In  addition, coronal and sagittal reconstructed MIP arteriograms were performed, to  better evaluate the pulmonary vasculature, and to increase sensitivity for  detection of pulmonary emboli. CT angiogram quality parameters:  1. Overall exam quality - adequate  2. Adequacy of pulmonary enhancement-adequate   3. Adequacy of breath-hold-optimal: Artifacts visible   4. There are significant streak artifacts affecting scan quality.    ============    PULMONARY ARTERIES: The pulmonary arteries are adequately opacified but without  evidence of any definite filling defects to suggest pulmonary thromboembolism  within the limitations of the streak artifacts. AORTA:  Mildly tortuous; no aneurysm or dissection. LUNGS: Consolidation with air bronchograms noted involving a large portion of  the right lower lobe most likely infiltrates and atelectasis. A smaller  consolidation is seen in the left lower lobe. Ill-defined groundglass opacities may represent vascular congestion. PLEURA: Normal, with no effusion or pneumothorax. AIRWAY: Unremarkable. MEDIASTINUM: Normal heart size. No pericardial effusion. Right-sided PICC line  is present. LYMPH NODES: No mediastinal, hilar or axillary lymphadenopathy.     CT ABDOMEN AND PELVIS:    Streak artifacts limit evaluation. LIVER, BILIARY: Small 1.6 cm cyst is seen in the right lobe. No significant  biliary dilation. Ascites noted adjacent to the gallbladder. PANCREAS: Normal.    SPLEEN: Normal.    ADRENALS: Normal.    KIDNEYS/URETERS/BLADDER: There is no hydronephrosis on either side. Fry catheter decompresses the bladder. PELVIC ORGANS: Large soft tissue consolidation is seen in the pelvis likely  combination of adherent bowel and trapped fluid. A right-sided drain extends into the posterior aspect of the pelvic  consolidation described above. A left-sided drain extends along the left paracolic gutter. VASCULATURE: Unremarkable    LYMPH NODES: No enlarged lymph nodes. GASTROINTESTINAL TRACT: A right-sided ileostomy noted without evidence of  obstruction. BONES: Extensive degenerative changes are seen throughout the spine with  moderate to severe compression deformity of L1 vertebral body. OTHER: There is mild ascites throughout the abdomen and pelvis. There is diffuse  anasarca. Hyperdense mass in the anterior subcutaneous fat adjacent to the ileostomy  defect measures measuring 5.5 cm in width by 3.7 cm in thickness by 7 cm in  height possibly representing focal hematoma.  _______________         Impression IMPRESSION:    Study limited due to streak and motion artifacts as described. No evidence of pulmonary embolism within the limitations of artifacts. Prominent consolidation right lower lobe likely infiltrates and atelectasis. Small consolidation seen in the left lower lobe. Mild diffuse ascites present throughout the abdomen pelvis with diffuse  anasarca. Large soft tissue consolidation in the pelvis containing air bubbles likely  representing adherent bowel with adjacent trapped fluid.   There is what may be a subcutaneous hematoma adjacent to the right ileostomy  measuring 5.5 cm x 3.7 cm x 7.0 cm, however, there is no definite  retroperitoneal or any other intra-abdominal hematoma. Bilateral pelvic drains as described above. Chronic compression deformity of L1. IMPRESSION:   · Acute hypoxic respiratory failure, on HF, possibilities multifactorial with pulmonary edema, aspiration pneumonitis. No central or segmental PE on recent CTA chest. Previously required ventilator support this admission due to sepsis, reintubated on 6/8/18 and self-extubated 6/17  · Tachypnea likely multifactorial with pulmonary alveolar infiltrates, mixed NAG metabolic acidosis and respiratory alkalosis, pain, anxiety possible  · S/p bronchoscopy 6/9/18: no aspiration noted. Cx Negative. · Severe sepsis due to Klebsiella Oxytoca peritonitis and then perforation. S/p laparotomy 6/5/18 [POD # 5 tumor radical debulking] and peritoneal lavage: Klebsiella positive. Ruled out ischemic bowel in laparotomy. S/p laparotomy on 6/15/18 for peritonitis due to diverticulitis with perforation, s/p distal ileostomy. · Leukocytosis, improved on micafungin/vanco.  · Off heparin due to intraabdominal bleeding and CTA chest no central PE. INR reversed with protamin, FFP.  · S/p Shock suspected from sepsis and obstructive due to presumed PE with severe hypoxia and high A-a gradient, elevated RVSP 34 mm Hg. CTA 6/22/18 negative for central or main PA PE. Shock resolved. CTA chest no central PE. · Acute b/l peroneal DVT, vascular surgery evaluated for IVC filter. · Anemia, stable Hgb  · Thrombocytopenia, mild, no active bleeding  · JESS, improved  · Hypernatremia, managed per renal  · Non-anion gap metabolic acidosis   · Elevated LFT, due to shock liver, resolved  · Foreign body on peritoneal biopsy, per path report. · Anasarca due to fluid resuscitation, JESS, hypoalbuminemia and sequale of sepsis. · AFib converted to NSR  · Adenocarcinoma of ovary s/p radical tumor debulking surgery 5/29/18    · Code status: Full code. · Poor overall prognosis.       RECOMMENDATIONS:   Respiratory: mild tachypneic; continue HF O2 support; low threshold for re-intubation. Keep SPO2 >=92%. ABG this AM. Daily CXR  HOB 30 degree elevation all the time. Aggressive pulmonary toileting. Aspiration precautions. Judicious opiate pain medications. Fentanyl to 0.5-10 mcg/hr max. May use patch if okay with surgery  Will discuss with renal if diuresis could be tried  Hem: follow CBC; Keep Hb> 7 gm/dl. Await IVC filter placement. Patient is at increased risk for further propagation of DVT and could affect weak respiratory status, hence needs IVC filter and benefits out weight risks. CVS: Echo ok with mildly elevated RVSP but normal RV systolic function. AFib converted to NSR. Off of cardizem drip. ID:  Bronch cx normal robin. Peritoneal cx Klebsiella Oxytoca resistant to ampicillin. On micafungin/vanco for Leukocytosis. Afebrile. Any abdominal imaging per surgery. Abx - Zosyn since 6/13; levaquin since 6/18, flagyl since 6/15; iv vanco since 6/22, micafungin since 6/22  Renal: Nephrologist on case; Creatinine improved. Lasix PRN because of hypernatremia. On albumin. Will DC IVF given worsening in CXR   GI/: TPN; barium study 6/21 - patient could not do it on 6/22 due to abd pain  Endocrine: Maintain blood glucose 140-180. Humalog sc. Insulin in TPN. on Lantus  Neurology: low dose fentanyl drip - balance post-op pain without sedation- recommend judicious use of sedatives  Pain/Sedation: fentanyl drip - management per Dr Mykel Anders  Skin/Wound: local surgical site care. Wound vac on. Electrolytes: Replace electrolytes per ICU electrolyte replacement protocol, renal service. Nutrition: TPN started 6/18.  NPO   Prophylaxis: GI Prophylaxis (protonix)  Restraints: none  Lines/Tubes:   ETT: 6/8/18- self extubated 6/17/18  Central line: right subclavian 6/4/18 - removed 6/20  Picc line 6/19 - Central line bundle followed  Lisandra: 6/4/18 (Medically necessary for strict input/output monitoring in critically ill patient, will remove it when not needed. Fry bundle followed). Will defer respective systems problem management to primary and other respective consultant and follow patient in ICU with primary and other medical team.  Further recommendations will be based on the patient's response to recommended treatment and results of the investigation ordered. Quality Care: PPI, DVT prophylaxis, HOB elevated, Infection control all reviewed and addressed. Brother at bedside, updated: overall poor prognosis with patient at increased risk for further deterioration, prolonged hospitalization, nosocomial infections, failure to thrive. Patient has no children. Code status: Full code. PICC Line/Fry cath medically necessary  PT and OT on case    Care of plan d/w RN, RT, MDR. High complexity decision making was performed during the evaluation of this patient   CC time 36 mins     Martine Clements MD  6/27/2018     Addendum 9:45 am  Late entry note  ABG reviewed and spoke with RT to adjust HF O2 - flow and FiO2 for goal SPO2 > 90%  Spoke with Pharmacy to see if any of the antibiotics could be changed to IVF concentration and in D5 or 1/2 NS IVF other than NS. Spoke with RN to call nephrology to check if Lasix 20 mg Q12 x 2 dose could be tried today. Abd drain management per surgery.     Martine Clements MD

## 2018-06-27 NOTE — PROGRESS NOTES
Hospitalist Progress Note-critical care note     Patient: Terrie Lozada MRN: [id number]  CSN: [id number]    YOB: 1950  Age: 75 y.o. Sex: female    DOA: 6/4/2018 LOS:  LOS: 23 days            Chief complaint:   hyoerntremia. D/p ileostomy , peritonitis , sepsis , jess ,ovarian cancer, hypokalemia    Assessment/Plan         Hospital Problems  Date Reviewed: 6/5/2018          Codes Class Noted POA    Hypernatremia ICD-10-CM: E87.0  ICD-9-CM: 276.0  6/25/2018 Unknown        S/P ileostomy ([facility] ICD-10-CM: Z93.2  ICD-9-CM: V44.2  6/16/2018 No        Hypoalbuminemia ICD-10-CM: E88.09  ICD-9-CM: 273.8  6/16/2018 Unknown        Peritonitis ([facility] ICD-10-CM: K65.9  ICD-9-CM: 567.9  6/16/2018 Unknown        Acute deep vein thrombosis (DVT) of lower extremity ([facility] ICD-10-CM: I82.409  ICD-9-CM: 453.40  6/7/2018 Yes        Acute respiratory failure ([facility] ICD-10-CM: J96.00  ICD-9-CM: 518.81  6/5/2018 No        JESS (acute kidney injury) ([facility] ICD-10-CM: N17.9  ICD-9-CM: 584.9  6/5/2018 Unknown        Metabolic acidosis BMI-35-KA: E87.2  ICD-9-CM: 276.2  6/5/2018 No        Abdominal pain ICD-10-CM: R10.9  ICD-9-CM: 789.00  6/4/2018 Yes        * (Principal)Sepsis ([facility] ICD-10-CM: A41.9  ICD-9-CM: 038.9, 995.91  6/4/2018 Yes        Oliguria ICD-10-CM: R34  ICD-9-CM: 788.5  6/4/2018 Yes        Ovarian ca [facility]) ICD-10-CM: C56.9  ICD-9-CM: 183.0  5/29/2018 Yes              Acute resp failure with hypoxia   Worsening, desat overnight, on high flow O2 now   - self extubated 06/17/2018.   - bronchoscopy 06/09/2018, no aspiration,  - resp cultures no growth to date. CTA chest negative for  PE         Severe Sepsis  Stable    - secondary to peritonitis. Peritoneal fluid: klebsiella oxytoca. ANTIBIOTICS zosyn,levaquine, vanc flagyl. .micafungin         Shock   - sepsis   -resolved. Now off pressors. Anasarca - likely third spacing,  on albumin     Bleeding from BRANDON drain -resolved   protamine and FFP.     Acute bilateral peroneal DVT - heparin drip stopped. due to bleeding. ivc filter planning for today per dr. Darrius Tolliver     Anemia   Received transfusion, so far h/h stable        JESS   - nephrology on board , cr wnl      Metabolic acidosis    Hypernatremia  Improving  151, iv hydration hold due to worsening respiratory part    pharmacy to correct with TPN     DM  lantus , ssi      Bleeding from HUNTER drain   -stopped  CT abdomen and pelvis reviewed. Will continue monitor H/H   Hypokalemia   K replacement   Disposition :tbd  Poor prognosis , family still want pt to be full code. Nurse: anxiety and pulling the tube ,ativan just given   Review of systems:  Unable to obtain due to ativan given   Vital signs/Intake and Output:  Visit Vitals    /80    Pulse 94    Temp 97.7 °F (36.5 °C)    Resp (!) 33    Ht 5' 1\" (1.549 m)    Wt 102.7 kg (226 lb 6.6 oz)    SpO2 100%    BMI 42.78 kg/m2     Current Shift:     Last three shifts:  06/25 1901 - 06/27 0700  In: 100 [I.V.:100]  Out: 3775 [Urine:3550; Drains:25]    Physical Exam:  General: WD, eyes closed, not cooperative due to sedation,  in mild distress    HEENT: NC, Atraumatic. PERRLA, anicteric sclerae. Lungs: CTA Bilaterally. No Wheezing/Rhonchi/Rales. Heart:  Regular  rhythm,  + murmur, No Rubs, No Gallops  Abdomen: Soft, Non distended, Non tender.  +Bowel sounds, colostomy bag with hunter tube   Extremities: No c/c.  Edema   Psych:   Calm   Neurologic:  Unable to obtain due to sedation           Labs: Results:       Chemistry Recent Labs      06/27/18   0545  06/26/18   1800  06/26/18   0500  06/25/18   0520   GLU  177*   --   201*  196*   NA  151*   --   150*  153*   K  3.8  3.7  3.7  3.9   CL  118*   --   117*  119*   CO2  19*   --   19*  20*   BUN  41*   --   49*  51*   CREA  1.06   --   1.17  1.34*   CA  7.3*   --   7.6*  7.5*   AGAP  14   --   14  14   BUCR  39*   --   42*  38*   AP   --    --   67   --    TP   --    --   5.9*   --    ALB  2.4*   --   2.7*   -- GLOB   --    --   3.2   --    AGRAT   --    --   0.8   --       CBC w/Diff Recent Labs      06/27/18   0545  06/26/18   0500  06/25/18   1155  06/25/18   0520   WBC  13.7*   --    --   11.7   RBC  3.66*   --    --   3.28*   HGB  10.2*  9.2*  8.8*  9.1*   HCT  32.8*  29.4*  28.1*  28.8*   PLT  130*   --    --   127*   GRANS  86*   --    --   88*   LYMPH  4*   --    --   6*   EOS  8*   --    --   1      Cardiac Enzymes No results for input(s): CPK, CKND1, FABIAN in the last 72 hours. No lab exists for component: CKRMB, TROIP   Coagulation No results for input(s): PTP, INR, APTT in the last 72 hours. No lab exists for component: INREXT, INREXT    Lipid Panel Lab Results   Component Value Date/Time    Triglyceride 66 06/26/2018 05:00 AM      BNP No results for input(s): BNPP in the last 72 hours.    Liver Enzymes Recent Labs      06/27/18   0545  06/26/18   0500   TP   --   5.9*   ALB  2.4*  2.7*   AP   --   67   SGOT   --   16      Thyroid Studies No results found for: T4, T3U, TSH, TSHEXT, TSHEXT     Procedures/imaging: see electronic medical records for all procedures/Xrays and details which were not copied into this note but were reviewed prior to creation of Nicki Mendez MD

## 2018-06-28 ENCOUNTER — APPOINTMENT (OUTPATIENT)
Dept: INTERVENTIONAL RADIOLOGY/VASCULAR | Age: 68
DRG: 853 | End: 2018-06-28
Attending: SURGERY
Payer: MEDICARE

## 2018-06-28 LAB
ANION GAP SERPL CALC-SCNC: 13 MMOL/L (ref 3–18)
BASOPHILS # BLD: 0 K/UL (ref 0–0.1)
BASOPHILS NFR BLD: 0 % (ref 0–3)
BUN SERPL-MCNC: 43 MG/DL (ref 7–18)
BUN/CREAT SERPL: 42 (ref 12–20)
CA-I SERPL-SCNC: 1.16 MMOL/L (ref 1.12–1.32)
CALCIUM SERPL-MCNC: 7.6 MG/DL (ref 8.5–10.1)
CHLORIDE SERPL-SCNC: 119 MMOL/L (ref 100–108)
CO2 SERPL-SCNC: 21 MMOL/L (ref 21–32)
CREAT SERPL-MCNC: 1.02 MG/DL (ref 0.6–1.3)
DIFFERENTIAL METHOD BLD: ABNORMAL
EOSINOPHIL # BLD: 0.3 K/UL (ref 0–0.4)
EOSINOPHIL NFR BLD: 3 % (ref 0–5)
ERYTHROCYTE [DISTWIDTH] IN BLOOD BY AUTOMATED COUNT: 25.3 % (ref 11.6–14.5)
GLUCOSE BLD STRIP.AUTO-MCNC: 124 MG/DL (ref 70–110)
GLUCOSE BLD STRIP.AUTO-MCNC: 128 MG/DL (ref 70–110)
GLUCOSE BLD STRIP.AUTO-MCNC: 147 MG/DL (ref 70–110)
GLUCOSE BLD STRIP.AUTO-MCNC: 154 MG/DL (ref 70–110)
GLUCOSE BLD STRIP.AUTO-MCNC: 157 MG/DL (ref 70–110)
GLUCOSE BLD STRIP.AUTO-MCNC: 161 MG/DL (ref 70–110)
GLUCOSE SERPL-MCNC: 160 MG/DL (ref 74–99)
HCT VFR BLD AUTO: 32.1 % (ref 35–45)
HGB BLD-MCNC: 9.7 G/DL (ref 12–16)
LYMPHOCYTES # BLD: 0.2 K/UL (ref 0.8–3.5)
LYMPHOCYTES NFR BLD: 2 % (ref 20–51)
MAGNESIUM SERPL-MCNC: 1.8 MG/DL (ref 1.6–2.6)
MCH RBC QN AUTO: 27.5 PG (ref 24–34)
MCHC RBC AUTO-ENTMCNC: 30.2 G/DL (ref 31–37)
MCV RBC AUTO: 90.9 FL (ref 74–97)
MONOCYTES # BLD: 0.3 K/UL (ref 0–1)
MONOCYTES NFR BLD: 3 % (ref 2–9)
NEUTS BAND NFR BLD MANUAL: 3 % (ref 0–5)
NEUTS SEG # BLD: 9.3 K/UL (ref 1.8–8)
NEUTS SEG NFR BLD: 89 % (ref 42–75)
PHOSPHATE SERPL-MCNC: 3.3 MG/DL (ref 2.5–4.9)
PLATELET # BLD AUTO: 119 K/UL (ref 135–420)
PLATELET COMMENTS,PCOM: ABNORMAL
POTASSIUM SERPL-SCNC: 4.1 MMOL/L (ref 3.5–5.5)
RBC # BLD AUTO: 3.53 M/UL (ref 4.2–5.3)
RBC MORPH BLD: ABNORMAL
SODIUM SERPL-SCNC: 153 MMOL/L (ref 136–145)
WBC # BLD AUTO: 10.5 K/UL (ref 4.6–13.2)
WBC MORPH BLD: ABNORMAL

## 2018-06-28 PROCEDURE — 82330 ASSAY OF CALCIUM: CPT | Performed by: INTERNAL MEDICINE

## 2018-06-28 PROCEDURE — 77030013687 IR IVC FILTER PLACEMENT PERCUTANEOUS

## 2018-06-28 PROCEDURE — 85025 COMPLETE CBC W/AUTO DIFF WBC: CPT | Performed by: OBSTETRICS & GYNECOLOGY

## 2018-06-28 PROCEDURE — 65270000029 HC RM PRIVATE

## 2018-06-28 PROCEDURE — 74011000258 HC RX REV CODE- 258: Performed by: OBSTETRICS & GYNECOLOGY

## 2018-06-28 PROCEDURE — 74011250636 HC RX REV CODE- 250/636: Performed by: OBSTETRICS & GYNECOLOGY

## 2018-06-28 PROCEDURE — P9047 ALBUMIN (HUMAN), 25%, 50ML: HCPCS | Performed by: INTERNAL MEDICINE

## 2018-06-28 PROCEDURE — 99151 MOD SED SAME PHYS/QHP <5 YRS: CPT

## 2018-06-28 PROCEDURE — 83735 ASSAY OF MAGNESIUM: CPT | Performed by: OBSTETRICS & GYNECOLOGY

## 2018-06-28 PROCEDURE — 74011636637 HC RX REV CODE- 636/637: Performed by: INTERNAL MEDICINE

## 2018-06-28 PROCEDURE — 74011250636 HC RX REV CODE- 250/636: Performed by: INTERNAL MEDICINE

## 2018-06-28 PROCEDURE — 74011000258 HC RX REV CODE- 258: Performed by: INTERNAL MEDICINE

## 2018-06-28 PROCEDURE — 74011636637 HC RX REV CODE- 636/637: Performed by: HOSPITALIST

## 2018-06-28 PROCEDURE — 74011000250 HC RX REV CODE- 250: Performed by: INTERNAL MEDICINE

## 2018-06-28 PROCEDURE — 74011250636 HC RX REV CODE- 250/636: Performed by: SURGERY

## 2018-06-28 PROCEDURE — 97164 PT RE-EVAL EST PLAN CARE: CPT

## 2018-06-28 PROCEDURE — 06H03DZ INSERTION OF INTRALUMINAL DEVICE INTO INFERIOR VENA CAVA, PERCUTANEOUS APPROACH: ICD-10-PCS | Performed by: SURGERY

## 2018-06-28 PROCEDURE — 82962 GLUCOSE BLOOD TEST: CPT

## 2018-06-28 PROCEDURE — 97110 THERAPEUTIC EXERCISES: CPT

## 2018-06-28 PROCEDURE — 84100 ASSAY OF PHOSPHORUS: CPT | Performed by: OBSTETRICS & GYNECOLOGY

## 2018-06-28 PROCEDURE — 80048 BASIC METABOLIC PNL TOTAL CA: CPT | Performed by: OBSTETRICS & GYNECOLOGY

## 2018-06-28 PROCEDURE — 77030011256 HC DRSG MEPILEX <16IN NO BORD MOLN -A

## 2018-06-28 PROCEDURE — 74011250637 HC RX REV CODE- 250/637: Performed by: INTERNAL MEDICINE

## 2018-06-28 PROCEDURE — 74011636320 HC RX REV CODE- 636/320: Performed by: SURGERY

## 2018-06-28 PROCEDURE — 77010033678 HC OXYGEN DAILY

## 2018-06-28 PROCEDURE — C9113 INJ PANTOPRAZOLE SODIUM, VIA: HCPCS | Performed by: INTERNAL MEDICINE

## 2018-06-28 PROCEDURE — 74011000250 HC RX REV CODE- 250: Performed by: SURGERY

## 2018-06-28 RX ORDER — HYDROMORPHONE HYDROCHLORIDE 2 MG/ML
0.5 INJECTION, SOLUTION INTRAMUSCULAR; INTRAVENOUS; SUBCUTANEOUS
Status: DISCONTINUED | OUTPATIENT
Start: 2018-06-28 | End: 2018-06-28

## 2018-06-28 RX ORDER — HEPARIN SODIUM 200 [USP'U]/100ML
INJECTION, SOLUTION INTRAVENOUS
Status: DISPENSED
Start: 2018-06-28 | End: 2018-06-28

## 2018-06-28 RX ORDER — HEPARIN SODIUM 200 [USP'U]/100ML
500 INJECTION, SOLUTION INTRAVENOUS
Status: COMPLETED | OUTPATIENT
Start: 2018-06-28 | End: 2018-06-28

## 2018-06-28 RX ORDER — SODIUM CHLORIDE 9 MG/ML
25 INJECTION, SOLUTION INTRAVENOUS CONTINUOUS
Status: DISCONTINUED | OUTPATIENT
Start: 2018-06-28 | End: 2018-07-03

## 2018-06-28 RX ORDER — LEVOFLOXACIN 5 MG/ML
750 INJECTION, SOLUTION INTRAVENOUS EVERY 24 HOURS
Status: DISCONTINUED | OUTPATIENT
Start: 2018-06-28 | End: 2018-06-30

## 2018-06-28 RX ORDER — NALOXONE HYDROCHLORIDE 0.4 MG/ML
0.2 INJECTION, SOLUTION INTRAMUSCULAR; INTRAVENOUS; SUBCUTANEOUS AS NEEDED
Status: DISCONTINUED | OUTPATIENT
Start: 2018-06-28 | End: 2018-06-29

## 2018-06-28 RX ORDER — LIDOCAINE HYDROCHLORIDE 10 MG/ML
1-20 INJECTION INFILTRATION; PERINEURAL
Status: COMPLETED | OUTPATIENT
Start: 2018-06-28 | End: 2018-06-28

## 2018-06-28 RX ORDER — MIDAZOLAM HYDROCHLORIDE 1 MG/ML
.5-4 INJECTION, SOLUTION INTRAMUSCULAR; INTRAVENOUS
Status: DISCONTINUED | OUTPATIENT
Start: 2018-06-28 | End: 2018-06-29

## 2018-06-28 RX ORDER — MAGNESIUM SULFATE 1 G/100ML
1 INJECTION INTRAVENOUS ONCE
Status: COMPLETED | OUTPATIENT
Start: 2018-06-28 | End: 2018-06-28

## 2018-06-28 RX ORDER — FLUMAZENIL 0.1 MG/ML
0.2 INJECTION INTRAVENOUS
Status: DISCONTINUED | OUTPATIENT
Start: 2018-06-28 | End: 2018-06-29

## 2018-06-28 RX ORDER — FENTANYL CITRATE 50 UG/ML
25-200 INJECTION, SOLUTION INTRAMUSCULAR; INTRAVENOUS
Status: DISCONTINUED | OUTPATIENT
Start: 2018-06-28 | End: 2018-06-29

## 2018-06-28 RX ORDER — HYDROMORPHONE HYDROCHLORIDE 2 MG/ML
1 INJECTION, SOLUTION INTRAMUSCULAR; INTRAVENOUS; SUBCUTANEOUS
Status: DISCONTINUED | OUTPATIENT
Start: 2018-06-28 | End: 2018-07-05

## 2018-06-28 RX ADMIN — HYDROMORPHONE HYDROCHLORIDE 1 MG: 2 INJECTION, SOLUTION INTRAMUSCULAR; INTRAVENOUS; SUBCUTANEOUS at 22:11

## 2018-06-28 RX ADMIN — ALBUMIN (HUMAN) 12.5 G: 0.25 INJECTION, SOLUTION INTRAVENOUS at 22:12

## 2018-06-28 RX ADMIN — HYDROMORPHONE HYDROCHLORIDE 0.5 MG: 2 INJECTION, SOLUTION INTRAMUSCULAR; INTRAVENOUS; SUBCUTANEOUS at 07:47

## 2018-06-28 RX ADMIN — MICAFUNGIN SODIUM 100 MG: 20 INJECTION, POWDER, LYOPHILIZED, FOR SOLUTION INTRAVENOUS at 14:04

## 2018-06-28 RX ADMIN — PIPERACILLIN SODIUM,TAZOBACTAM SODIUM 2.25 G: 2; .25 INJECTION, POWDER, FOR SOLUTION INTRAVENOUS at 12:29

## 2018-06-28 RX ADMIN — SODIUM CHLORIDE 40 MG: 9 INJECTION INTRAMUSCULAR; INTRAVENOUS; SUBCUTANEOUS at 09:33

## 2018-06-28 RX ADMIN — VANCOMYCIN HYDROCHLORIDE 1500 MG: 10 INJECTION, POWDER, LYOPHILIZED, FOR SOLUTION INTRAVENOUS at 18:33

## 2018-06-28 RX ADMIN — ACETAMINOPHEN 1000 MG: 10 INJECTION, SOLUTION INTRAVENOUS at 07:47

## 2018-06-28 RX ADMIN — HYDROMORPHONE HYDROCHLORIDE 0.5 MG: 2 INJECTION, SOLUTION INTRAMUSCULAR; INTRAVENOUS; SUBCUTANEOUS at 03:47

## 2018-06-28 RX ADMIN — HYDROMORPHONE HYDROCHLORIDE 0.5 MG: 2 INJECTION, SOLUTION INTRAMUSCULAR; INTRAVENOUS; SUBCUTANEOUS at 18:35

## 2018-06-28 RX ADMIN — ACETAMINOPHEN 1000 MG: 10 INJECTION, SOLUTION INTRAVENOUS at 03:07

## 2018-06-28 RX ADMIN — PIPERACILLIN SODIUM,TAZOBACTAM SODIUM 2.25 G: 2; .25 INJECTION, POWDER, FOR SOLUTION INTRAVENOUS at 23:35

## 2018-06-28 RX ADMIN — PIPERACILLIN SODIUM,TAZOBACTAM SODIUM 2.25 G: 2; .25 INJECTION, POWDER, FOR SOLUTION INTRAVENOUS at 05:29

## 2018-06-28 RX ADMIN — METOPROLOL TARTRATE 5 MG: 5 INJECTION INTRAVENOUS at 20:20

## 2018-06-28 RX ADMIN — ALBUMIN (HUMAN) 12.5 G: 0.25 INJECTION, SOLUTION INTRAVENOUS at 05:29

## 2018-06-28 RX ADMIN — METRONIDAZOLE 500 MG: 500 INJECTION, SOLUTION INTRAVENOUS at 12:33

## 2018-06-28 RX ADMIN — ALBUMIN (HUMAN) 12.5 G: 0.25 INJECTION, SOLUTION INTRAVENOUS at 16:17

## 2018-06-28 RX ADMIN — INSULIN GLARGINE 13 UNITS: 100 INJECTION, SOLUTION SUBCUTANEOUS at 09:33

## 2018-06-28 RX ADMIN — METOPROLOL TARTRATE 5 MG: 5 INJECTION INTRAVENOUS at 16:17

## 2018-06-28 RX ADMIN — METRONIDAZOLE 500 MG: 500 INJECTION, SOLUTION INTRAVENOUS at 20:20

## 2018-06-28 RX ADMIN — POTASSIUM CHLORIDE: 2 INJECTION, SOLUTION, CONCENTRATE INTRAVENOUS at 17:49

## 2018-06-28 RX ADMIN — ACETAMINOPHEN 1000 MG: 10 INJECTION, SOLUTION INTRAVENOUS at 15:14

## 2018-06-28 RX ADMIN — PIPERACILLIN SODIUM,TAZOBACTAM SODIUM 2.25 G: 2; .25 INJECTION, POWDER, FOR SOLUTION INTRAVENOUS at 18:58

## 2018-06-28 RX ADMIN — ACETAMINOPHEN 1000 MG: 10 INJECTION, SOLUTION INTRAVENOUS at 20:20

## 2018-06-28 RX ADMIN — HYDROMORPHONE HYDROCHLORIDE 0.5 MG: 2 INJECTION, SOLUTION INTRAMUSCULAR; INTRAVENOUS; SUBCUTANEOUS at 11:00

## 2018-06-28 RX ADMIN — MAGNESIUM SULFATE HEPTAHYDRATE 1 G: 1 INJECTION, SOLUTION INTRAVENOUS at 06:12

## 2018-06-28 RX ADMIN — METRONIDAZOLE 500 MG: 500 INJECTION, SOLUTION INTRAVENOUS at 03:11

## 2018-06-28 RX ADMIN — IOPAMIDOL 10 ML: 612 INJECTION, SOLUTION INTRAVENOUS at 09:02

## 2018-06-28 RX ADMIN — INSULIN LISPRO 2 UNITS: 100 INJECTION, SOLUTION INTRAVENOUS; SUBCUTANEOUS at 00:47

## 2018-06-28 RX ADMIN — LEVOFLOXACIN 750 MG: 5 INJECTION, SOLUTION INTRAVENOUS at 17:46

## 2018-06-28 RX ADMIN — METOPROLOL TARTRATE 5 MG: 5 INJECTION INTRAVENOUS at 09:33

## 2018-06-28 RX ADMIN — Medication 1 SPRAY: at 11:08

## 2018-06-28 RX ADMIN — METOPROLOL TARTRATE 5 MG: 5 INJECTION INTRAVENOUS at 03:07

## 2018-06-28 RX ADMIN — PIPERACILLIN SODIUM,TAZOBACTAM SODIUM 2.25 G: 2; .25 INJECTION, POWDER, FOR SOLUTION INTRAVENOUS at 00:08

## 2018-06-28 RX ADMIN — FENTANYL CITRATE 25 MCG: 50 INJECTION, SOLUTION INTRAMUSCULAR; INTRAVENOUS at 08:50

## 2018-06-28 RX ADMIN — HYDROMORPHONE HYDROCHLORIDE 0.5 MG: 2 INJECTION, SOLUTION INTRAMUSCULAR; INTRAVENOUS; SUBCUTANEOUS at 14:26

## 2018-06-28 RX ADMIN — Medication 1000 UNITS: at 09:02

## 2018-06-28 RX ADMIN — INSULIN LISPRO 2 UNITS: 100 INJECTION, SOLUTION INTRAVENOUS; SUBCUTANEOUS at 12:33

## 2018-06-28 RX ADMIN — LIDOCAINE HYDROCHLORIDE 4 ML: 10 INJECTION, SOLUTION INFILTRATION; PERINEURAL at 09:01

## 2018-06-28 NOTE — PROGRESS NOTES
Problem: Mobility Impaired (Adult and Pediatric)  Goal: *Acute Goals and Plan of Care (Insert Text)  Physical Therapy Goals  Updated 6/28/2018 and to be accomplished within 5-7 day(s)  1. Bed mobility: Rolling L to R to with max/mod A with use of HR for positioning. 2. Transfer: Supine to/from Sit with mod/max A with HR for change of position/prep for EOB sitting. 3. Activity Tolerance: Tolerate EOB sitting 5-10 minutes with fair UE supported balance for ADL/balance activities. 4. Patient will perform sit to stand with RW/maximal assistance and tolerate same x 15-30 seconds. 5. Ambulation:  Ambulate 5 ft. mod/max A with RW for increased functional mobility. Physical Therapy Goals  Updated 6/20/2018 and to be accomplished within 7 day(s)  1. Patient will move from supine to sit and sit to supine in bed with maximal assistance. 2.  Patient will transfer from bed to chair and chair to bed with maximal assistance using the least restrictive device. 3.  Patient will perform sit to stand with maximal assistance. 4.  Patient will ambulate with maximal assistance for 5 feet with the least restrictive device. Outcome: Progressing Towards Goal  physical Therapy RE-EVALUATION and TREATMENT    Patient: Holli Alejandre (87 y.o. female)  Date: 6/28/2018  Diagnosis: Abdominal pain  Abdominal pain  Abdominal Pain  aspiration  POSTOP WOUND EXPLORATION Sepsis ([address]  Procedure(s) (LRB):  WOUND EXPLORATION, EXPLORATORY LAPAROTOMY, ILLEOSTMOY, LYSES OF ADHESIONS AND WOUND VAC PLACEMENT (N/A) 13 Days Post-Op  Precautions: Fall    ASSESSMENT:  Pt in and out of wakefulness during session; intermittently watching television program.  Pt brothers present. Pt progress limited at best at this point. Pt with bilateral LE DVT's and underwent IVC filter implant today.   Continues to require total assistance for bed mobility this week, participation with therapy has been limited by procedures, pt compliance at times, and pain presence. Today, pt in/out of wakefulness, but compliant with attempting tasks during gentle therapeutic ex as able. Able to complete AA movements of UE's LE's 1-3 reps intermittently for: RUE elevation, elbow flex/ext and mass grasp, L UE elbow flex and mass grasp and BLE DF, hip adduction only. No c/o pain at this time. Did note weeping and edema L UE. PICC line R UE. Wound vac abdomen. Patient's progression toward goals since last assessment: minimal overall- goals reset     PLAN:  Goals have been updated based on progression since last assessment. Patient continues to benefit from skilled intervention to address the above impairments. Continue to follow the patient 1-2 times per day/4-7 days per week to address goals. Planned Interventions:  [x]     Bed Mobility Training          [x]     Neuromuscular Re-Education  [x]     Transfer Training                []    Orthotic/Prosthetic Training  [x]     Gait Training                       []     Modalities  [x]     Therapeutic Exercises       []     Edema Management/Control  [x]     Therapeutic Activities         [x]     Patient and Family Training/Education  []     Other (comment):  Discharge Recommendations: LTAC  Further Equipment Recommendations for Discharge: to be determined     SUBJECTIVE:   Patient stated Orville Pisano.     OBJECTIVE DATA SUMMARY:   Critical Behavior:  Neurologic State: Alert, Confused  Orientation Level: Oriented to person, Oriented to place, Disoriented to situation, Disoriented to time  Cognition: Decreased attention/concentration, Follows commands  Safety/Judgement: Awareness of environment, Decreased insight into deficits  Functional Mobility Training: not tested this date; remains total assist per nursing   Therapeutic Exercises: as noted above x 10reps all  Pain:  Pain Scale 1: Numeric (0 - 10)  Pain Intensity 1: 0  Activity Tolerance:   Fair   Please refer to the flowsheet for vital signs taken during this treatment.   After treatment:   []  Patient left in no apparent distress sitting up in chair  [x]  Patient left in no apparent distress in bed  []  Call bell left within reach  [x]  Nursing notified  [x]  Caregiver present  []  Bed alarm activated    Lu Yancey PT   Time Calculation: 26 mins

## 2018-06-28 NOTE — PROGRESS NOTES
Pharmacist Renal Dosing Progress Note for Levofloxacin  Physician Dr. Silvia Salinas    The following medication: Levofloxacin was automatically dose-adjusted per [facility] P&T Committee Protocol, with respect to renal function. Consult provided for this   75 y.o. , female , for the indication of intra-abdominal infection. Day of therapy ~ 14  Dose adjusted to:  Levofloxacin 750 mg IV q24h    Pt Weight:   Wt Readings from Last 1 Encounters:   06/27/18 102.7 kg (226 lb 6.6 oz)         Previous Regimen  Levofloxacin 750 mg IV q48h   Serum Creatinine Lab Results   Component Value Date/Time    Creatinine 1.02 06/28/2018 04:30 AM       Creatinine Clearance Estimated Creatinine Clearance: 58.2 mL/min (based on Cr of 1.02). BUN Lab Results   Component Value Date/Time    BUN 43 (H) 06/28/2018 04:30 AM    BUN, POC 37 (H) 06/15/2018 05:14 PM         Pharmacy to continue to monitor patient daily. Will make dosage adjustments based upon changing renal function.   Signed Jennie Haque information:   [phone]

## 2018-06-28 NOTE — PROGRESS NOTES
Admit date: 6/4/2018      ASSESSMENT/PLAN  Miller Diaz a 75 y. 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 Will need adjuvant chemotherapy to complete primary therapy. GI - Peritonitis secondary to diverticulitis with perforation diverted with distal ileostomy. Post operative ileus resolved. Good ileostomy output. Patient not taking much po. Continue TPN. ID - sepsis secondary to peritonitis. On Flagyl, Zosyn, Levaquin, Vanc and micafungin . WBC trending down. Patient remains afebrile. Renal - JESS, resolving. Heme - Stable   DVT- bilateral peroneal DVT. Vascular consulted for IVC filter. CV - stable  Pulm - oxygenating well on nc   Psych - selectively interactive, communicating better today  Disp - condition labile, continue ICU care  Code Status - Full Code      SUBJECTIVE  Less responsive today. Brother reports poor pain control.       OBJECTIVE  Physical Exam:  Patient Vitals for the past 24 hrs:   BP Temp Pulse Resp SpO2 Weight   06/27/18 1900 128/79 - 69 14 98 % -   06/27/18 1800 120/70 - 70 15 99 % -   06/27/18 1700 107/69 - 73 15 98 % -   06/27/18 1600 121/70 - 76 17 97 % -   06/27/18 1554 - 97.3 °F (36.3 °C) - - - -   06/27/18 1500 124/72 - 75 20 97 % -   06/27/18 1400 140/76 - 80 26 97 % -   06/27/18 1300 142/87 - 89 25 97 % -   06/27/18 1200 140/86 - 92 25 96 % -   06/27/18 1158 - 97.8 °F (36.6 °C) - - - -   06/27/18 1157 - - - - 97 % -   06/27/18 1112 - - - - 97 % -   06/27/18 1100 129/72 - 79 25 100 % -   06/27/18 1000 141/86 - 75 25 99 % -   06/27/18 0900 (!) 152/94 - 98 27 100 % -   06/27/18 0823 (!) 147/93 - 95 30 100 % -   06/27/18 0818 - 97.7 °F (36.5 °C) - - - -   06/27/18 0816 - - - - - 102.7 kg (226 lb 6.6 oz)   06/27/18 0757 - - - - 100 % -   06/27/18 0700 (!) 158/91 - 85 (!) 32 100 % -   06/27/18 0600 159/80 - 94 (!) 33 100 % -   06/27/18 0500 156/85 - 93 (!) 31 98 % -   06/27/18 0400 144/82 - 76 23 100 % -   06/27/18 0300 (!) 161/92 - 85 25 97 % -   06/27/18 0200 (!) 162/93 - 85 25 100 % -   06/27/18 0100 (!) 156/92 - 87 (!) 31 96 % -   06/27/18 0000 159/87 - 93 (!) 36 100 % -         Intake/Output Summary (Last 24 hours) at 06/27/18 2355  Last data filed at 06/27/18 1859   Gross per 24 hour   Intake          3179.67 ml   Output             2025 ml   Net          1154.67 ml        General - resting in no acute distress, alert and oriented  HEENT - within normal limits  Heart - regular rate and rhythm  Lungs - clear to auscultation  Abdomen - soft, nontender, nondistended, normal bowel sounds  Incision - clean, dry, intact  Extremities - no edema    Labs  CBC  Recent Labs      06/27/18   0545  06/26/18   0500  06/25/18   1155  06/25/18   0520   WBC  13.7*   --    --   11.7   HCT  32.8*  29.4*  28.1*  28.8*   MCV  89.6   --    --   87.8   BANDS  1   --    --    --    MONOS  0*   --    --   5   EOS  8*   --    --   1   BASOS  1   --    --   0        CMP  Recent Labs      06/27/18   0545  06/26/18   0500  06/25/18   0520   NA  151*  150*  153*   CO2  19*  19*  20*   BUN  41*  49*  51*        Lab Results   Component Value Date/Time    Glucose 177 (H) 06/27/2018 05:45 AM    Glucose (POC) 161 (H) 06/27/2018 06:03 PM    Glucose,  (H) 06/15/2018 05:14 PM          Current Hospital Medications    Current Facility-Administered Medications:     TPN ADULT - CENTRAL, , IntraVENous, CONTINUOUS, Maciej Hennessy MD, Last Rate: 60 mL/hr at 06/27/18 1720    acetaminophen (OFIRMEV) infusion 1,000 mg, 1,000 mg, IntraVENous, Q6H, Pietro Forman MD, Last Rate: 400 mL/hr at 06/27/18 2114, 1,000 mg at 06/27/18 2114    HYDROmorphone (DILAUDID) injection 0.5 mg, 0.5 mg, IntraVENous, Q2H PRN, Junito Green MD    metoprolol (LOPRESSOR) injection 5 mg, 5 mg, IntraVENous, Q6H, Maciej Hennessy MD, 5 mg at 06/27/18 2114    hydrALAZINE (APRESOLINE) 20 mg/mL injection 10 mg, 10 mg, IntraVENous, Q4H PRN, Chrissy Johnson MD, 10 mg at 06/26/18 2045    albumin human 25% (BUMINATE) solution 12.5 g, 12.5 g, IntraVENous, Q8H, Berenice Haas MD, 12.5 g at 06/27/18 2149    vancomycin (VANCOCIN) 1500 mg in  ml infusion, 1,500 mg, IntraVENous, Q24H, Felipa Champion MD, Last Rate: 250 mL/hr at 06/27/18 1726, 1,500 mg at 06/27/18 1726    insulin glargine (LANTUS) injection 13 Units, 13 Units, SubCUTAneous, DAILY, Jack Altman MD, 13 Units at 06/27/18 0929    0.9% sodium chloride infusion 250 mL, 250 mL, IntraVENous, PRN, Felipa Champion MD    [facility]) 100 mg in 0.9% sodium chloride (MBP/ADV) 100 mL, 100 mg, IntraVENous, Q24H, Felipa Champion MD, Last Rate: 100 mL/hr at 06/27/18 1428, 100 mg at 06/27/18 1428    insulin lispro (HUMALOG) injection, , SubCUTAneous, Q6H, Felipa Champion MD, Stopped at 06/27/18 1200    dextrose (D50W) injection syrg 12.5-25 g, 25-50 mL, IntraVENous, PRN, Felipa Champion MD    Vancomycin - Pharmacy to Dose, 1 Each, Other, Rx Dosing/Monitoring, Felipa Champion MD    levoFLOXacin (LEVAQUIN) 750 mg in D5W IVPB, 750 mg, IntraVENous, Q48H, Claribel Whelan MD, Last Rate: 100 mL/hr at 06/27/18 0245, 750 mg at 06/27/18 0245    metroNIDAZOLE (FLAGYL) IVPB premix 500 mg, 500 mg, IntraVENous, Q8H, Sherry Castro MD, Last Rate: 100 mL/hr at 06/27/18 2149, 500 mg at 06/27/18 2149    ipratropium (ATROVENT) 0.02 % nebulizer solution 0.5 mg, 0.5 mg, Nebulization, Q4H PRN, Sherry Castro MD, 0.5 mg at 06/26/18 0730    piperacillin-tazobactam (ZOSYN) 2.25 g in 0.9% sodium chloride (MBP/ADV) 50 mL MBP, 2.25 g, IntraVENous, Q6H, Pietro Forman MD, Last Rate: 100 mL/hr at 06/27/18 2114, 2.25 g at 06/27/18 2114    naloxone (NARCAN) injection 0.4 mg, 0.4 mg, IntraVENous, EVERY 2 MINUTES AS NEEDED, Felipa Champion MD    ondansetron [facility]) injection 4 mg, 4 mg, IntraVENous, Q6H PRN, Mohsen Walker MD, 4 mg at 06/08/18 2152    oxymetazoline (AFRIN) 0.05 % nasal spray 2 Spray, 2 Spray, Both Nostrils, BID PRN, Jarad Alan MD    alum-mag hydroxide-[facility]) oral suspension 30 mL, 30 mL, Oral, Q4H PRN, Christen Dan MD, 30 mL at 06/07/18 2225    fentaNYL citrate (PF) injection 25 mcg, 25 mcg, IntraVENous, Q3H PRN, Christen Dan MD, 25 mcg at 06/27/18 1159    fluticasone (FLONASE) 50 mcg/actuation nasal spray 2 Spray, 2 Spray, Both Nostrils, DAILY PRN, Emily Stevenson MD    ELECTROLYTE REPLACEMENT PROTOCOL-POTASSIUM Renal Dosing, 1 Each, Other, PRN, Christen Calderon MD    ELECTROLYTE REPLACEMENT PROTOCOL-MAGNESIUM, 1 Each, Other, PRN, Christen Calderon MD    ELECTROLYTE REPLACEMENT PROTOCOL-PHOSPHORUS Renal Dosing, 1 Each, Other, PRN, Christen Calderon MD    ELECTROLYTE REPLACEMENT PROTOCOL-CALCIUM, 1 Each, Other, PRN, Christen Calderon MD    sodium chloride (NS) flush 5-10 mL, 5-10 mL, IntraVENous, PRN, Shae Guy MD    glucose chewable tablet 16 g, 4 Tab, Oral, PRN, Christen Calderon MD    glucagon (GLUCAGEN) injection 1 mg, 1 mg, IntraMUSCular, PRN, Christen Calderon MD    pantoprazole (PROTONIX) 40 mg in sodium chloride 0.9% 10 mL injection, 40 mg, IntraVENous, DAILY, Juan Orta MD, 40 mg at 06/27/18 1899      Emily Stevenson MD

## 2018-06-28 NOTE — PROGRESS NOTES
Consult for TPN Dosing per Pharmacy by Dr. Matt Bosworth provided for this 75 y.o. female, for indication of Ileus  Day of Therapy 11    TPN Dosing Wt:  62.4 kg    Labs:  BMP BMP:   Lab Results   Component Value Date/Time     (H) 06/28/2018 04:30 AM    K 4.1 06/28/2018 04:30 AM     (H) 06/28/2018 04:30 AM    CO2 21 06/28/2018 04:30 AM    AGAP 13 06/28/2018 04:30 AM     (H) 06/28/2018 04:30 AM    BUN 43 (H) 06/28/2018 04:30 AM    CREA 1.02 06/28/2018 04:30 AM    GFRAA >60 06/28/2018 04:30 AM    GFRNA 54 (L) 06/28/2018 04:30 AM          CMP CMP:   Lab Results   Component Value Date/Time     (H) 06/28/2018 04:30 AM    K 4.1 06/28/2018 04:30 AM     (H) 06/28/2018 04:30 AM    CO2 21 06/28/2018 04:30 AM    AGAP 13 06/28/2018 04:30 AM     (H) 06/28/2018 04:30 AM    BUN 43 (H) 06/28/2018 04:30 AM    CREA 1.02 06/28/2018 04:30 AM    GFRAA >60 06/28/2018 04:30 AM    GFRNA 54 (L) 06/28/2018 04:30 AM    CA 7.6 (L) 06/28/2018 04:30 AM    MG 1.8 06/28/2018 04:30 AM    PHOS 3.3 06/28/2018 04:30 AM         Ca/ Phos Lab Results   Component Value Date/Time    Calcium 7.6 (L) 06/28/2018 04:30 AM    Phosphorus 3.3 06/28/2018 04:30 AM       Mag    Lab Results   Component Value Date/Time    Magnesium 1.8 06/28/2018 04:30 AM      Tg No components found for: TGL   Albumin Lab Results   Component Value Date/Time    Protein, total 5.9 (L) 06/26/2018 05:00 AM    Albumin 2.4 (L) 06/27/2018 05:45 AM       Prealbumin:  15.7  (6/26/18)    Goals  Kcal requirements:  ~ 22 kcal/kg     (1376 kcal/day)  Fluid requirements    ~ 30 ml/kg  Macronutrients:  Protein  1.2 g/kg/day             75 g/day = 300 kcal  Lipids    ~28% of total kcal        44 g/day = 396 kcal  Dextrose remainder of total kcal   200 g/day = 680 kcal      TPN  will continue at 100% goal macronutrients.     Na = 153   TPN rate increased to 80 ml/hr to allow for more free water, to allow for fluid ~ 30 m/kg/day (based on TPN dosing weight), also to accommodate electrolytes within TPN. Pt does not have any IV maintenance fluids ordered at present time. Electrolytes to be monitored and adjusted daily. MD to replete electrolytes acutely outside of TPN as needed. Pt received Magnesium 1 gram IV bolus. Additional recommendations/Orders:   1. Corrective insulin coverage q6h while on TPN. TPN also contains 10 units regular insulin         ( BS: 161, 157, 147, 154)    2. MD to further add  /adjust IV maintenance fluids as appropriate while on TPN. 3. KCl reduced to 10 mEq  (total K in TPN = 112 mEq)  4. Magnesium increased to 12 mEq  5. MVI increased to 10 ml,   trace elements increased to 1 ml. 6. BMP, Mag, Phos ordered daily    Pharmacy to follow daily and will make changes based on labs/clinical status.    Thalia Nelson, PharmD     [phone]

## 2018-06-28 NOTE — PROGRESS NOTES
Nephrology Progress note    Subjective:     Mattie Horvath is a 75 y.o. female with PMH DM, HTN, clear cell ovarian ca s/p debulking who presented with abdominal pain and possible sepsis/ischemic colitis. Pt underwent laparotomy/peritoneal lavage/bx, noted to have decreased UOP and increasing Cr to 2.1 today from baseline 0.6. CT neg for mass or hydro. Pt given lasix yesterday, still with high O2 requirements on vent. Unable to provide further history. On 6/8 Pt decompensated, ? Aspiration,  back on vent was hypoxic despite  Fi02 of 100%, , Hypotensive on IV pressors, Acidotic and on HC03 drip, Urine output decreased markedly. Underwent Bronchoscopy 6/9. Pt requires less FiO2 now. UOP picking up. Wound was draining yellow-brown fluid. S/P Exploratory laparotomy, Lysis of adhesions, Ileostomy and wound VAC placement on 6/16. Pt self-extubated 6/17. Remains stable today. UO over 2000 cc yesterday. Cr 1.0 and Na 153. Admit Date: 6/4/2018  Allergy:  Allergies   Allergen Reactions    Hydrocodone Other (comments)     Breaks into cold sweat    Metformin Other (comments)     Breaks out into cold sweat. Can Take Glucophage brand name med        Objective:     Visit Vitals    /58 (BP 1 Location: Right arm, BP Patient Position: At rest)    Pulse 75    Temp 97.1 °F (36.2 °C)    Resp 18    Ht 5' 1\" (1.549 m)    Wt 102.7 kg (226 lb 6.6 oz)    SpO2 96%    BMI 42.78 kg/m2         Intake/Output Summary (Last 24 hours) at 06/28/18 1553  Last data filed at 06/28/18 1440   Gross per 24 hour   Intake             2721 ml   Output             2725 ml   Net               -4 ml       Physical Exam:     General: NAD   HENT: Atraumatic and normocephalic.    Eyes: Sclera anicteric   Neck: No JVD or mass   Cardiovascular: Normal S1 S2, no m/r/g   Pulmonary/Chest Wall: Decreased breath sounds bilaterally   Abdominal: Soft, obese, NABS   Musculoskeletal: ++ edema LEs   Neurological: Awake       Data Review:      Lab Results   Component Value Date/Time    WBC 10.5 06/28/2018 04:30 AM    RBC 3.53 (L) 06/28/2018 04:30 AM    HCT 32.1 (L) 06/28/2018 04:30 AM    MCV 90.9 06/28/2018 04:30 AM    MCH 27.5 06/28/2018 04:30 AM    MCHC 30.2 (L) 06/28/2018 04:30 AM    RDW 25.3 (H) 06/28/2018 04:30 AM      Lab Results   Component Value Date    IRON 8 (L) 06/07/2018   No components found for: FERRITIN  No components found for: PTHINT  Urinalysis  No results found for: UGLU     CXR   6/27  IMPRESSION:     1. Overall, no significant change in this one day interval.     2. Prominence of pulmonary vasculature with interstitial opacities suggesting  pulmonary edema. Superimposed right basilar and left perihilar opacities may  represent superimposed atelectasis or pneumonia. Impression:     -JESS- likely ATN,  S Cr  Down to 1 today with good diuresis  -Hypernatremia, persists  -Septic Shock/hypotension, Hypotension resolved. -Resp Failure,  Was re-intubated 6/9,  Self extubated   -Severe Metabolic acidosis, improving.   -Ovarian ca s/p debulking then laparoscopy with lavage  -KLEBSIELLA OXYTOCA sepsis (Peritoneal fluid)  -DM  -HTN  -Anemia  -Elevated liver enzymes  -S/P Exploratory laparotomy, Lysis of adhesions, Ileostomy and wound VAC placement on 6/16  -Leukocytosis, improved  -Hypokalemia, repleted   -DVT  - Pulm edema    Plan:   Continue TPN  Resume IV Lasix prn to control Pulm edema.  No lasix today as Na is higher  Replace electrolytes per protocol   Monitor I/O and chemistry, H&H   Antibiotic dosing per pharmacist    Wes Chicas MD   [facility]  [phone]

## 2018-06-28 NOTE — ROUTINE PROCESS
Bedside and Verbal shift change report given to Rogelio Rai RN (oncoming nurse) by Erica Carter RN (offgoing nurse).  Report included the following information SBAR, Kardex, Intake/Output, MAR, Recent Results, Med Rec Status and Cardiac Rhythm SR.

## 2018-06-28 NOTE — PROGRESS NOTES
NUTRITION FOLLOW-UP    RECOMMENDATIONS/PLAN:   - Continue w/ TPN as TPN is meeting estimated needs at this time   Monitor labs/lytes, TPN tolerance, skin integrity, wt, fluid status, BM    NUTRITION ASSESSMENT:   Client Update: 75 yrs old Female with acute respiratory failure, ischemia colitis, sepsis, JESS, metabolic acidosis. 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 Pt was extubated, and 1 day later reintubated. Thought to have peritonitis. Pt is s/p laparotomy and peritoneal lavage with klebsiella positive. 6/15/18 s/p exp lap surgery for intraabdominal fluid collection/wound vac and ileostomy placed. Pt self extubated 6/17/18. Pt is on TPN. Ileostomy and wound vac in place [facility] filter placement schedule for today     FOOD/NUTRITION INTAKE   Diet Order:  TPN: 1379kcal 200g dextrose 75g protein 44g lipids   Food Allergies: NKFA  Average PO Intake:      No data found. Pertinent Medications:  [x] Reviewed; lopressor, flagyl, protonix, zosyn, vancomycin   Electrolyte Replacement Protocol: [x]K [x]Mg [x]PO4  Insulin:  [x]SSI  []Pre-meal   [x]Basal    []Drip  []None  Cultural/Sikhism Food Preferences: None Identified    BIOCHEMICAL DATA & MEDICAL TESTS  Pertinent Labs:  [x] Reviewed;  Na-153 Glucose-160 Ca-7.6   ANTHROPOMETRICS  Height: 5' 1\" (154.9 cm)       Weight: 102.7 kg (226 lb 6.6 oz)         BMI: 42.8 kg/m^2 morbidly obese (Greater than or = to 40% BMI)   Adm Weight: 196 lbs                Weight change: +28lbs since admission  Adjusted Body Weight: 58.1kg     NUTRITION-FOCUSED PHYSICAL ASSESSMENT  Skin: No PU      GI: +ileostomy output 425ml yesterday    NUTRITION PRESCRIPTION  Calories: 979-1246 kcal/day based on 11-14kcal/kg  Protein: 95 g/day based on 2.0g/kg of IBW  CHO: 122-156 g/day based on 50% of total energy  UGMYL: 051-7904 KZ/HERB based on 1 kcal/ml      NUTRITION DIAGNOSES:   1.  Inadequate oral intake related to diet order as evidence by NPO diet       NUTRITION INTERVENTIONS:       INTERVENTIONS:                                                                                                                                                                                                                                                        GOALS:  1. TPN:Continue w/ current TPN   1. Continue w/ TPN and diet adv if possible by next review 3 days       LEARNING NEEDS (Diet, Supplementation, Food/Nutrient-Drug Interaction):   [x] None Identified   [] Education provided/documented      Identified and patient: [] Declined   [] Was not appropriate/indicated        NUTRITION MONITORING /EVALUATION:   Adjust EN/PN as appropriate  Monitor wt  Monitor renal labs, electrolytes, fluid status     Previous Recommendations Implemented: yes        Previous Goals Met:  yes -      [x] Participated in Interdisciplinary Rounds    [x] Interdisciplinary Care Plan Reviewed  DISCHARGE NUTRITION RECOMMENDATIONS ADDRESSED:      [x] To be determined closer to discharge    NUTRITION RISK:           [x] At risk                        [] Not currently at risk        Will follow-up per policy.   Bao Johnson 1

## 2018-06-28 NOTE — PROGRESS NOTES
Hospitalist Progress Note-critical care note     Patient: Dariela Richmond MRN: [id number]  CSN: [id number]    YOB: 1950  Age: 75 y.o. Sex: female    DOA: 6/4/2018 LOS:  LOS: 24 days            Chief complaint:   hyoerntremia. D/p ileostomy , peritonitis , sepsis , jess ,ovarian cancer, hypokalemia    Assessment/Plan         Hospital Problems  Date Reviewed: 6/5/2018          Codes Class Noted POA    Hypokalemia ICD-10-CM: E87.6  ICD-9-CM: 276.8  6/27/2018 Unknown        Hypernatremia ICD-10-CM: E87.0  ICD-9-CM: 276.0  6/25/2018 Unknown        S/P ileostomy ([facility] ICD-10-CM: Z93.2  ICD-9-CM: V44.2  6/16/2018 No        Hypoalbuminemia ICD-10-CM: E88.09  ICD-9-CM: 273.8  6/16/2018 Unknown        Peritonitis ([facility] ICD-10-CM: K65.9  ICD-9-CM: 567.9  6/16/2018 Unknown        Acute deep vein thrombosis (DVT) of lower extremity ([facility] ICD-10-CM: I82.409  ICD-9-CM: 453.40  6/7/2018 Yes        Acute respiratory failure ([facility] ICD-10-CM: J96.00  ICD-9-CM: 518.81  6/5/2018 No        JESS (acute kidney injury) ([facility] ICD-10-CM: N17.9  ICD-9-CM: 584.9  6/5/2018 Unknown        Metabolic acidosis EZP-84-YD: E87.2  ICD-9-CM: 276.2  6/5/2018 No        Abdominal pain ICD-10-CM: R10.9  ICD-9-CM: 789.00  6/4/2018 Yes        * (Principal)Sepsis ([facility] ICD-10-CM: A41.9  ICD-9-CM: 038.9, 995.91  6/4/2018 Yes        Oliguria ICD-10-CM: R34  ICD-9-CM: 788.5  6/4/2018 Yes        Ovarian ca ([facility] ICD-10-CM: C56.9  ICD-9-CM: 183.0  5/29/2018 Yes              Acute resp failure with hypoxia   Mild improving. Off high flow O2.    - self extubated 06/17/2018.   - bronchoscopy 06/09/2018, no aspiration,  - resp cultures no growth to date. CTA chest negative for  PE         Severe Sepsis   - secondary to peritonitis. Peritoneal fluid cx:klebsiella oxytoca. ANTIBIOTICS zosyn,levaquine, vanc flagyl. .micafungin         Shock   - sepsis   -resolved. Now off pressors.      Anasarca - likely third spacing,  on albumin     Bleeding from BRANDON drain -resolved   protamine and FFP. Acute bilateral peroneal DVT - heparin drip stopped. due to bleeding. ivc filter planning  per dr. Coby Zendejas     Anemia   Received transfusion, so far h/h stable        JESS   - nephrology on board , cr wnl      Metabolic acidosis    Hypernatremia  153 iv hydration hold due to worsening r   pharmacy to correct with TPN     DM  lantus , ssi      Bleeding from HUNTER drain   -stopped  CT abdomen and pelvis reviewed. Will continue monitor H/H     Hypokalemia   K replacement      Poor prognosis , family still want pt to be full code. Nurse: anxiety and pulling the tube ,ativan just given   Review of systems:  General: no fever/chills. Tired   Pulm: sob, no wheezing  Heart : no chest pain. No palpitation   Gi : abdomen pain, nausea, no vomiting   Vital signs/Intake and Output:  Visit Vitals    /80    Pulse 77    Temp 97.6 °F (36.4 °C)    Resp 19    Ht 5' 1\" (1.549 m)    Wt 102.7 kg (226 lb 6.6 oz)    SpO2 99%    BMI 42.78 kg/m2     Current Shift:     Last three shifts:  06/26 1901 - 06/28 0700  In: 4430.7 [I.V.:4430.7]  Out: 4100 [Urine:3550; Drains:125]    Physical Exam:  General: WD, eyes closed, not cooperative due to sedation,  in mild distress    HEENT: NC, Atraumatic. PERRLA, anicteric sclerae. Lungs: CTA Bilaterally. No Wheezing/Rhonchi/Rales. Heart:  Regular  rhythm,  + murmur, No Rubs, No Gallops  Abdomen: Soft, Non distended, Non tender.  +Bowel sounds, colostomy bag with hunter tube   Extremities: No c/c.  Edema   Psych:   Anxious   Neurologic:  No acute new  neuro deficit           Labs: Results:       Chemistry Recent Labs      06/28/18   0430  06/27/18   0545  06/26/18   1800  06/26/18   0500   GLU  160*  177*   --   201*   NA  153*  151*   --   150*   K  4.1  3.8  3.7  3.7   CL  119*  118*   --   117*   CO2  21  19*   --   19*   BUN  43*  41*   --   49*   CREA  1.02  1.06   --   1.17   CA  7.6*  7.3*   --   7.6*   AGAP  13  14   --   14   BUCR  42*  39*   -- 42*   AP   --    --    --   67   TP   --    --    --   5.9*   ALB   --   2.4*   --   2.7*   GLOB   --    --    --   3.2   AGRAT   --    --    --   0.8      CBC w/Diff Recent Labs      06/28/18   0430  06/27/18   0545  06/26/18   0500   WBC  10.5  13.7*   --    RBC  3.53*  3.66*   --    HGB  9.7*  10.2*  9.2*   HCT  32.1*  32.8*  29.4*   PLT  119*  130*   --    GRANS  89*  86*   --    LYMPH  2*  4*   --    EOS  3  8*   --       Cardiac Enzymes No results for input(s): CPK, CKND1, FABIAN in the last 72 hours. No lab exists for component: CKRMB, TROIP   Coagulation Recent Labs      06/27/18   1131   PTP  16.3*   INR  1.4*       Lipid Panel Lab Results   Component Value Date/Time    Triglyceride 66 06/26/2018 05:00 AM      BNP No results for input(s): BNPP in the last 72 hours.    Liver Enzymes Recent Labs      06/27/18   0545  06/26/18   0500   TP   --   5.9*   ALB  2.4*  2.7*   AP   --   67   SGOT   --   16      Thyroid Studies No results found for: T4, T3U, TSH, TSHEXT, TSHEXT     Procedures/imaging: see electronic medical records for all procedures/Xrays and details which were not copied into this note but were reviewed prior to creation of Key Azevedo MD

## 2018-06-28 NOTE — PROGRESS NOTES
[facility]                  Pulmonary, Critical Care, and Sleep Medicine     Name: Dariela Richmond MRN: [id number]   : 1950 Hospital: [facility] [place]    Date: 2018        [facility]                                              Subjective/History of Present Illness:   Patient is a 75 y.o. 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 S/p exploratory laparotomy 6/15/18 - findings of sigmoid diverticulitis with suspected perforation, underwent ileostomy and wound vac placement. Weaned on O2 NC at 4 Lpm, received Lasix 40 mg x 1  Had IVC filter placed this AM and tolerated well  Currently AA, following commands, opens eyes on calling name quickly  No fever chills. Persistent leg edema. Had removal of BRANDON drain on 18, tip sent for culture. Off of Fentanyl, started on Dilaudid and feels better  Ileostomy without bleeding. On TPN  Hemodynamically stable    Review of Systems   Review of systems not obtained due to patient factors. Surgeries  18 - Laparoscopy converted to laparotomy, total abdominal hysterectomy, bilateral salpingo-oophorectomy, omentectomy, bilateral pelvic and periaortic lymphadenectomy and radical tumor debulking to R0. Path - ADENOCARCINOMA OF LEFT OVARY.     18 - Diagnostic laparoscopy converted to laparotomy, peritoneal biopsies, aerobic and anaerobic cultures of peritoneal fluid and peritoneal lavage. 6/15/18 - Wound exploration. Exploratory laparotomy. Lysis of adhesions. Ileostomy and wound VAC placement.       Allergies   Allergen Reactions    Hydrocodone Other (comments)     Breaks into cold sweat    Metformin Other (comments)     Breaks out into cold sweat.  Can Take Glucophage brand name med      Past Medical History:   Diagnosis Date    Diabetes ([address]     many years type 2    Hypertension     many years      Past Surgical History:   Procedure Laterality Date    HX OOPHORECTOMY  05/29/2018    HX TONSILLECTOMY      as a child       Social History   Substance Use Topics    Smoking status: Never Smoker    Smokeless tobacco: Never Used    Alcohol use No        Current Facility-Administered Medications   Medication Dose Route Frequency    0.9% sodium chloride infusion  25 mL/hr IntraVENous CONTINUOUS    heparinized saline 2 units/mL 1,000 unit/500 mL infusion        TPN ADULT - CENTRAL   IntraVENous CONTINUOUS    acetaminophen (OFIRMEV) infusion 1,000 mg  1,000 mg IntraVENous Q6H    metoprolol (LOPRESSOR) injection 5 mg  5 mg IntraVENous Q6H    albumin human 25% (BUMINATE) solution 12.5 g  12.5 g IntraVENous Q8H    vancomycin (VANCOCIN) 1500 mg in  ml infusion  1,500 mg IntraVENous Q24H    insulin glargine (LANTUS) injection 13 Units  13 Units SubCUTAneous DAILY    micafungin (MYCAMINE) 100 mg in 0.9% sodium chloride (MBP/ADV) 100 mL  100 mg IntraVENous Q24H    insulin lispro (HUMALOG) injection   SubCUTAneous Q6H    Vancomycin - Pharmacy to Dose  1 Each Other Rx Dosing/Monitoring    levoFLOXacin (LEVAQUIN) 750 mg in D5W IVPB  750 mg IntraVENous Q48H    metroNIDAZOLE (FLAGYL) IVPB premix 500 mg  500 mg IntraVENous Q8H    piperacillin-tazobactam (ZOSYN) 2.25 g in 0.9% sodium chloride (MBP/ADV) 50 mL MBP  2.25 g IntraVENous Q6H    pantoprazole (PROTONIX) 40 mg in sodium chloride 0.9% 10 mL injection  40 mg IntraVENous DAILY         Objective:   Vital Signs:    Visit Vitals    BP (!) 147/91 (BP 1 Location: Left arm, BP Patient Position: At rest)    Pulse 77    Temp 97.6 °F (36.4 °C)    Resp 16    Ht 5' 1\" (1.549 m)    Wt 102.7 kg (226 lb 6.6 oz)    SpO2 95%    BMI 42.78 kg/m2       O2 Device: Nasal cannula   O2 Flow Rate (L/min): 4 l/min   Temp (24hrs), Av.9 °F (36.6 °C), Min:97.3 °F (36.3 °C), Max:98.7 °F (37.1 °C)       Intake/Output:   Last shift:           Last 3 shifts:  1901 -  0700  In: 4430.7 [I.V.:4430.7]  Out: 4100 [Urine:3550; Drains:125]      Intake/Output Summary (Last 24 hours) at 18 1024  Last data filed at 18 0604   Gross per 24 hour   Intake             2871 ml   Output             2600 ml   Net              271 ml       Physical Exam:  General/Neurology: awake, following commands, weak looking, on O2 via NC  Head:   Normocephalic, without obvious abnormality, atraumatic. Eye:   no scleral icterus, no pallor, no cyanosis. Pupils not dilated. Neck:   Symmetric. No lymphadenopathy. Trachea midline  Lung: Moderate air entry bilateral equal. Decreased breath sounds bases. Improved rales scattered. No wheezing or crackles at bases. Heart:   S1 S2 present. No murmur. No JVD. Abdomen:  Soft. Obese. + BS. Midline lower abd surgical site - open wound with wound vac. BRANDON drain LLQ, Rt abd wall Ileostomy. No abd distension or guarding. Extremities:  No cyanosis. No clubbing. No hand edema. B/l feet/leg/thigh edema 2+. Pulses: Palpable radials and DPs bilateral.   Lymphatic:  No cervical or supraclav lymphadenopathy.    Skin:   No rash      Data:       Recent Results (from the past 12 hour(s))   GLUCOSE, POC    Collection Time: 18 12:08 AM   Result Value Ref Range    Glucose (POC) 157 (H) 70 - 110 mg/dL   MAGNESIUM    Collection Time: 18  4:30 AM   Result Value Ref Range    Magnesium 1.8 1.6 - 2.6 mg/dL   PHOSPHORUS    Collection Time: 18  4:30 AM   Result Value Ref Range    Phosphorus 3.3 2.5 - 4.9 MG/DL   CALCIUM, IONIZED    Collection Time: 18  4:30 AM   Result Value Ref Range    Ionized Calcium 1.16 1.12 - 3.65 MMOL/L   METABOLIC PANEL, BASIC    Collection Time: 18  4:30 AM   Result Value Ref Range    Sodium 153 (H) 136 - 145 mmol/L    Potassium 4.1 3.5 - 5.5 mmol/L    Chloride 119 (H) 100 - 108 mmol/L    CO2 21 21 - 32 mmol/L    Anion gap 13 3.0 - 18 mmol/L    Glucose 160 (H) 74 - 99 mg/dL    BUN 43 (H) 7.0 - 18 MG/DL    Creatinine 1.02 0.6 - 1.3 MG/DL    BUN/Creatinine ratio 42 (H) 12 - 20      GFR est AA >60 >60 ml/min/1.73m2    GFR est non-AA 54 (L) >60 ml/min/1.73m2    Calcium 7.6 (L) 8.5 - 10.1 MG/DL   CBC WITH AUTOMATED DIFF    Collection Time: 06/28/18  4:30 AM   Result Value Ref Range    WBC 10.5 4.6 - 13.2 K/uL    RBC 3.53 (L) 4.20 - 5.30 M/uL    HGB 9.7 (L) 12.0 - 16.0 g/dL    HCT 32.1 (L) 35.0 - 45.0 %    MCV 90.9 74.0 - 97.0 FL    MCH 27.5 24.0 - 34.0 PG    MCHC 30.2 (L) 31.0 - 37.0 g/dL    RDW 25.3 (H) 11.6 - 14.5 %    PLATELET 113 (L) 379 - 420 K/uL    NEUTROPHILS 89 (H) 42 - 75 %    BAND NEUTROPHILS 3 0 - 5 %    LYMPHOCYTES 2 (L) 20 - 51 %    MONOCYTES 3 2 - 9 %    EOSINOPHILS 3 0 - 5 %    BASOPHILS 0 0 - 3 %    ABS. NEUTROPHILS 9.3 (H) 1.8 - 8.0 K/UL    ABS. LYMPHOCYTES 0.2 (L) 0.8 - 3.5 K/UL    ABS. MONOCYTES 0.3 0 - 1.0 K/UL    ABS. EOSINOPHILS 0.3 0.0 - 0.4 K/UL    ABS.  BASOPHILS 0.0 0.0 - 0.1 K/UL    PLATELET COMMENTS 1+ LARGE PLATELETS     RBC COMMENTS ANISOCYTOSIS  2+        RBC COMMENTS MACROCYTOSIS  1+        RBC COMMENTS POLYCHROMASIA  SLIGHT        WBC COMMENTS TOXIC GRANULATION      DF MANUAL     GLUCOSE, POC    Collection Time: 06/28/18  5:33 AM   Result Value Ref Range    Glucose (POC) 147 (H) 70 - 110 mg/dL   GLUCOSE, POC    Collection Time: 06/28/18 10:05 AM   Result Value Ref Range    Glucose (POC) 154 (H) 70 - 110 mg/dL           Chemistry Recent Labs      06/28/18   0430  06/27/18   0545  06/26/18   1800  06/26/18   0500   GLU  160*  177*   --   201*   NA  153*  151*   --   150*   K  4.1  3.8  3.7  3.7   CL  119*  118*   --   117*   CO2  21  19*   --   19*   BUN  43*  41*   --   49*   CREA  1.02  1.06   --   1.17   CA  7.6*  7.3*   --   7.6*   MG 1.8  2.0   --   2.3   PHOS  3.3  2.9   --   2.7   AGAP  13  14   --   14   BUCR  42*  39*   --   42*   AP   --    --    --   67   TP   --    --    --   5.9*   ALB   --   2.4*   --   2.7*   GLOB   --    --    --   3.2   AGRAT   --    --    --   0.8        Lactic Acid Lactic acid   Date Value Ref Range Status   06/17/2018 2.3 (HH) 0.4 - 2.0 MMOL/L Final     Comment:     CALLED TO AND CORRECTLY REPEATED BY:  Latricia Bradley RN ICU ON 6/17/2018 1786 TO 0458       No results for input(s): LAC in the last 72 hours. Liver Enzymes Protein, total   Date Value Ref Range Status   06/26/2018 5.9 (L) 6.4 - 8.2 g/dL Final     Albumin   Date Value Ref Range Status   06/27/2018 2.4 (L) 3.4 - 5.0 g/dL Final     Globulin   Date Value Ref Range Status   06/26/2018 3.2 2.0 - 4.0 g/dL Final     A-G Ratio   Date Value Ref Range Status   06/26/2018 0.8 0.8 - 1.7   Final     AST (SGOT)   Date Value Ref Range Status   06/26/2018 16 15 - 37 U/L Final     Alk.  phosphatase   Date Value Ref Range Status   06/26/2018 67 45 - 117 U/L Final     Recent Labs      06/27/18   0545  06/26/18   0500   TP   --   5.9*   ALB  2.4*  2.7*   GLOB   --   3.2   AGRAT   --   0.8   SGOT   --   16   AP   --   67        CBC w/Diff Recent Labs      06/28/18   0430  06/27/18   0545  06/26/18   0500   WBC  10.5  13.7*   --    RBC  3.53*  3.66*   --    HGB  9.7*  10.2*  9.2*   HCT  32.1*  32.8*  29.4*   PLT  119*  130*   --    GRANS  89*  86*   --    LYMPH  2*  4*   --    EOS  3  8*   --         Cardiac Enzymes No results found for: CPK, CK, CKMMB, CKMB, RCK3, CKMBT, CKNDX, CKND1, FABIAN, TROPT, TROIQ, KAYLAN, TROPT, TNIPOC, BNP, BNPP     BNP No results found for: BNP, BNPP, XBNPT     Coagulation Recent Labs      06/27/18   1131   PTP  16.3*   INR  1.4*         Thyroid  No results found for: T4, T3U, TSH, TSHEXT, TSHEXT    No results found for: T4       ABG Recent Labs      06/27/18   0934  06/26/18   1948   PHI  7.318*  7.435   PCO2I  38.2  24.5*   PO2I  273*  68*   HCO3I 19.6*  16.5*   FIO2I  100  0.50        All Micro Results     Procedure Component Value Units Date/Time    CULTURE, CATHETER TIP [[id number]] Collected:  06/27/18 1720    Order Status:  Completed Specimen:  Tenzin Hidalgo Updated:  06/27/18 2118    CULTURE, FUNGUS [[id number]] Collected:  06/09/18 1024    Order Status:  Completed Specimen:  Bronchial lavage Updated:  06/25/18 1109     Special Requests: NO SPECIAL REQUESTS        FUNGUS SMEAR NO FUNGAL ELEMENTS SEEN        Culture result:         NO FUNGUS ISOLATED 16 DAYS    CULTURE, BLOOD FOR FUNGUS [[id number]]     Order Status:  Canceled Specimen:  Blood     CULTURE, BLOOD [[id number]] Collected:  06/09/18 1423    Order Status:  Completed Specimen:  Whole Blood from Blood Updated:  06/15/18 0230     Special Requests: NO SPECIAL REQUESTS        Culture result: NO GROWTH 6 DAYS       CULTURE, BLOOD [[id number]] Collected:  06/09/18 1223    Order Status:  Completed Specimen:  Whole Blood from Blood Updated:  06/15/18 0230     Special Requests: left hand     Culture result: NO GROWTH 6 DAYS       AFB CULTURE + SMEAR W/RFLX PCR AND ID [[id number]] Collected:  06/09/18 1024    Order Status:  Completed Specimen:  Respiratory sample Updated:  06/12/18 2106     Source BRONCHIAL LAVAGE        AFB Specimen processing Concentration     Acid Fast Smear NEGATIVE          (NOTE)  Performed At: [address] [id number]  Nito Vu MD [id number]          Acid Fast Culture PENDING    CULTURE, SPUTUM/BRONCH/OTH [[id number]] Collected:  06/09/18 1024    Order Status:  Completed Specimen:  Sputum from Bronchial lavage Updated:  06/11/18 1058     Special Requests: NO SPECIAL REQUESTS        GRAM STAIN FEW WBC'S         FEW GRAM NEGATIVE RODS        Culture result:         RARE NORMAL RESPIRATORY JOSETTE    CULTURE, ANAEROBIC [[id number]] Collected:  06/05/18 0220    Order Status:  Completed Specimen:  Peritoneal Fluid Updated:  06/11/18 1327 Special Requests: NO SPECIAL REQUESTS        Culture result:         NO ANAEROBES ISOLATED 5 DAYS    CULTURE, BLOOD [[id number]] Collected:  06/04/18 0740    Order Status:  Completed Specimen:  Blood from Blood Updated:  06/10/18 0655     Special Requests: NO SPECIAL REQUESTS        Culture result: NO GROWTH 6 DAYS       CULTURE, BLOOD [[id number]] Collected:  06/04/18 0740    Order Status:  Completed Specimen:  Blood from Blood Updated:  06/10/18 0655     Special Requests: NO SPECIAL REQUESTS        Culture result: NO GROWTH 6 DAYS       CULTURE, BODY FLUID Elena Jock STAIN [[id number]]  (Abnormal)  (Susceptibility) Collected:  06/05/18 0220    Order Status:  Completed Specimen:  Peritoneal Fluid Updated:  06/08/18 0936     Special Requests: NO SPECIAL REQUESTS        GRAM STAIN MANY WBC'S         NO ORGANISMS SEEN        Culture result:         RARE KLEBSIELLA OXYTOCA (A)            CALLED TO AND CORRECTLY REPEATED BY:  ANGEL LUIS DE LOS SANTOS RN ICU ON 6/7/2018 1032 TO 5087      CULTURE, URINE [[id number]] Collected:  06/05/18 1245    Order Status:  Completed Specimen:  Urine from Fry Specimen Updated:  06/07/18 1103     Special Requests: NO SPECIAL REQUESTS        Culture result: NO GROWTH 2 DAYS       C. DIFFICILE/EPI PCR [[id number]] Collected:  06/05/18 0930    Order Status:  Canceled Specimen:  Stool           Echo 6/9/18:  Left ventricle: Systolic function was normal. Ejection fraction was estimated   in the range of 65 % to 70 %. No obvious  wall motion abnormalities identified in the views obtained. Wall thickness   was mildly increased. Doppler parameters  were consistent with abnormal left ventricular relaxation (grade 1 diastolic   dysfunction). Right ventricle: The size was normal. Systolic function was normal.  Mitral valve: There was mild annular calcification. Tricuspid valve: Pulmonary artery systolic pressure was mildly increased. Pulmonary artery systolic pressure: 34 mmHg.       PVL LE 6/6/18: acute b/l peroneal DVT. CT abd pelvis 6/4/18:  1. Postsurgical hysterectomy, bilateral oophorectomy, pelvic lymphadenectomy and  a mastectomy surgical changes. Small volume of ascites in the abdomen and  pelvis, with a few tiny locules of gas in the right hemipelvis would be in  keeping with recent postsurgical etiology. 2. Abnormal edematous thick-walled small bowel loops in the left abdomen and  pelvis, with TRAM lamellar edematous configuration, induration. No findings of  pneumatosis. The overall appearance is nonspecific. Diagnostic considerations  include infectious etiology, nonspecific edema which may be unresolved  postsurgical etiology. Ischemia is also included in the differential diagnostic  consideration, however, there are no findings of pneumatosis, portal venous gas. 3. Stool in the right colon extending to the hepatic flexure with surrounding  gas, foci of pneumatosis could be obscured. No associated bowel wall thickening. 4. Satisfactory position of nasogastric tube. 5. Hepatomegaly, steatosis with 2 rounded low-density lesions, potentially  cysts. 6. Bibasilar atelectasis. Imaging:  [x]I have personally reviewed the patients chest radiographs images and report   6/27/18    Results from Hospital Encounter encounter on 06/04/18   XR CHEST PORT   Narrative EXAM: AP portable upright chest    INDICATION: Shortness of breath    COMPARISON: Same date 5:48 AM    _______________    FINDINGS:    There is a right-sided PICC line with tip at the junction of the right atrium  and superior vena cava. The shallow inspiration. The heart size is enlarged. The  mediastinal contours stable. Worsening bilateral interstitial and alveolar  infiltrates are present suggesting pulmonary edema however superimposed  pneumonia not excluded.  There are no effusions or pneumothorax.    _______________         Impression IMPRESSION:    Bilateral interstitial and alveolar infiltrates slightly worse suggesting  pulmonary edema however pneumonia not excluded            Results from Hospital Encounter encounter on 06/04/18   CTA CHEST ABD PELV W CONT   Narrative CT CHEST, ABDOMEN, AND PELVIS:    COMPARISON: CT abdomen/pelvis dated 6/4/2018. INDICATION: DVTs, blood from left drain. TECHNIQUE: Axial was performed through the chest, abdomen, and pelvis after  intravenous contrast administration only. Coronal and sagittal reformations  were generated. One or more dose reduction techniques were used on this CT: automated exposure  control, adjustment of the mAs and/or kVp according to patient's size, and  iterative reconstruction techniques. The specific techniques utilized on this CT  exam have been documented in the patient's electronic medical record.  ============    CTA CHEST:    Technique: Axial imaging was obtained dynamically through the pulmonary arteries  using high-resolution CT angiographic protocol, without complications. In  addition, coronal and sagittal reconstructed MIP arteriograms were performed, to  better evaluate the pulmonary vasculature, and to increase sensitivity for  detection of pulmonary emboli. CT angiogram quality parameters:  1. Overall exam quality - adequate  2. Adequacy of pulmonary enhancement-adequate   3. Adequacy of breath-hold-optimal: Artifacts visible   4. There are significant streak artifacts affecting scan quality.    ============    PULMONARY ARTERIES: The pulmonary arteries are adequately opacified but without  evidence of any definite filling defects to suggest pulmonary thromboembolism  within the limitations of the streak artifacts. AORTA:  Mildly tortuous; no aneurysm or dissection. LUNGS: Consolidation with air bronchograms noted involving a large portion of  the right lower lobe most likely infiltrates and atelectasis. A smaller  consolidation is seen in the left lower lobe. Ill-defined groundglass opacities may represent vascular congestion.     PLEURA: Normal, with no effusion or pneumothorax. AIRWAY: Unremarkable. MEDIASTINUM: Normal heart size. No pericardial effusion. Right-sided PICC line  is present. LYMPH NODES: No mediastinal, hilar or axillary lymphadenopathy. CT ABDOMEN AND PELVIS:    Streak artifacts limit evaluation. LIVER, BILIARY: Small 1.6 cm cyst is seen in the right lobe. No significant  biliary dilation. Ascites noted adjacent to the gallbladder. PANCREAS: Normal.    SPLEEN: Normal.    ADRENALS: Normal.    KIDNEYS/URETERS/BLADDER: There is no hydronephrosis on either side. Fry catheter decompresses the bladder. PELVIC ORGANS: Large soft tissue consolidation is seen in the pelvis likely  combination of adherent bowel and trapped fluid. A right-sided drain extends into the posterior aspect of the pelvic  consolidation described above. A left-sided drain extends along the left paracolic gutter. VASCULATURE: Unremarkable    LYMPH NODES: No enlarged lymph nodes. GASTROINTESTINAL TRACT: A right-sided ileostomy noted without evidence of  obstruction. BONES: Extensive degenerative changes are seen throughout the spine with  moderate to severe compression deformity of L1 vertebral body. OTHER: There is mild ascites throughout the abdomen and pelvis. There is diffuse  anasarca. Hyperdense mass in the anterior subcutaneous fat adjacent to the ileostomy  defect measures measuring 5.5 cm in width by 3.7 cm in thickness by 7 cm in  height possibly representing focal hematoma.  _______________         Impression IMPRESSION:    Study limited due to streak and motion artifacts as described. No evidence of pulmonary embolism within the limitations of artifacts. Prominent consolidation right lower lobe likely infiltrates and atelectasis. Small consolidation seen in the left lower lobe. Mild diffuse ascites present throughout the abdomen pelvis with diffuse  anasarca.   Large soft tissue consolidation in the pelvis containing air bubbles likely  representing adherent bowel with adjacent trapped fluid. There is what may be a subcutaneous hematoma adjacent to the right ileostomy  measuring 5.5 cm x 3.7 cm x 7.0 cm, however, there is no definite  retroperitoneal or any other intra-abdominal hematoma. Bilateral pelvic drains as described above. Chronic compression deformity of L1. IMPRESSION:   · Acute hypoxic respiratory failure, improved to NC, possibilities multifactorial with pulmonary edema, aspiration pneumonitis. No central or segmental PE on recent CTA chest. Previously required ventilator support this admission due to sepsis, reintubated on 6/8/18 and self-extubated 6/17  · Tachypnea likely multifactorial with pulmonary alveolar infiltrates, mixed NAG metabolic acidosis and respiratory alkalosis, pain, anxiety possible - improved since change in pain medication, use of Ativan. · S/p bronchoscopy 6/9/18: no aspiration noted. Cx Negative. · Severe sepsis due to Klebsiella Oxytoca peritonitis and then perforation. S/p laparotomy 6/5/18 [POD # 5 tumor radical debulking] and peritoneal lavage: Klebsiella positive. Ruled out ischemic bowel in laparotomy. S/p laparotomy on 6/15/18 for peritonitis due to diverticulitis with perforation, s/p distal ileostomy. · Leukocytosis, improved on micafungin/vanco.  · Off heparin due to intraabdominal bleeding and CTA chest no central PE. INR reversed with protamin, FFP.  · S/p Shock suspected from sepsis and obstructive due to presumed PE with severe hypoxia and high A-a gradient, elevated RVSP 34 mm Hg. CTA 6/22/18 negative for central or main PA PE. Shock resolved. CTA chest no central PE. · Acute b/l peroneal DVT, s/p IVC filter by vascular surgery 6/28/18.    · Anemia, stable Hgb  · Thrombocytopenia, mild, no active bleeding  · JESS, improved  · Hypernatremia, worsened slightly following Lasix, managed per renal  · Non-anion gap metabolic acidosis   · Elevated LFT, due to shock liver, resolved  · Foreign body on peritoneal biopsy, per path report. · Anasarca due to fluid resuscitation, JESS, hypoalbuminemia and sequale of sepsis. · AFib converted to NSR  · Adenocarcinoma of ovary s/p radical tumor debulking surgery 5/29/18    · Code status: Full code. · Poor overall prognosis. RECOMMENDATIONS:   Respiratory: improved tachypneic; continue NC O2 support; low threshold for re-intubation. Keep SPO2 >=92%. ABG this AM. Periodic CXR in AM  Periodic ABG per clinical course  HOB 30 degree elevation all the time. Aggressive pulmonary toileting. Aspiration precautions. Judicious opiate pain medications. Periodic Lasix as needed in coordination with renal services  Hem: follow CBC; Keep Hb> 7 gm/dl. s/p IVC filter placement 6/28. CVS: Echo ok with mildly elevated RVSP but normal RV systolic function. AFib converted to NSR. Off of cardizem drip. ID:  Bronch cx normal robin. Peritoneal cx Klebsiella Oxytoca resistant to ampicillin. Follow up drain tip culture  On micafungin/vanco for Leukocytosis. Afebrile. Any abdominal imaging per surgery. Abx - Zosyn since 6/13; levaquin since 6/18, flagyl since 6/15; iv vanco since 6/22, micafungin since 6/22  Renal: Nephrologist on case; Creatinine improved. Lasix PRN because of hypernatremia. On albumin. GI/: TPN; barium study 6/21 - patient could not do it on 6/22 due to abd pain  Endocrine: Maintain blood glucose 140-180. Humalog sc. Insulin in TPN. on Lantus  Neurology: low dose fentanyl drip - balance post-op pain without sedation- recommend judicious use of sedatives  Pain/Sedation: dilaudid - management per Dr Neyda Carter  Skin/Wound: local surgical site care. Wound vac on. Electrolytes: Replace electrolytes per ICU electrolyte replacement protocol, renal service. Nutrition: TPN started 6/18.  NPO   Prophylaxis: GI Prophylaxis (protonix)  Restraints: none  Lines/Tubes:   ETT: 6/8/18- self extubated 6/17/18  Central line: right subclavian 6/4/18 - removed 6/20  Picc line 6/19 - Central line bundle followed  Fry: 6/4/18 (Medically necessary for strict input/output monitoring in critically ill patient, will remove it when not needed. Fry bundle followed). Will defer respective systems problem management to primary and other respective consultant and follow patient in ICU with primary and other medical team.  Further recommendations will be based on the patient's response to recommended treatment and results of the investigation ordered. Quality Care: PPI, DVT prophylaxis, HOB elevated, Infection control all reviewed and addressed. Brother at bedside, updated: overall poor prognosis with patient at increased risk for further deterioration, prolonged hospitalization, nosocomial infections, failure to thrive. Patient has no children. Code status: Full code. PICC Line/Fry cath medically necessary  PT and OT on case    Care of plan d/w RN, RT, MDR, brother.   High complexity decision making was performed during the evaluation of this patient   CC time 36 mins     Clive Ryan MD  6/28/2018

## 2018-06-28 NOTE — H&P (VIEW-ONLY)
Surgery Consult      Patient: Opal Gomes MRN: [id number]  CSN: [id number]      YOB: 1950    Age: 75 y.o. Sex: female      DOA: 6/4/2018       HPI:     Opal Gomes is a 75 y.o. female with a history of DM, HTN, and ovarian cancer who was admitted to [facility] on 6/4/18 for sepsis. Mrs. Jade Trimble underwent a debulking surgery and presented to the ED afterwards for increasing abdominal pain. She was found to have a perforated glass and peritonitis. On workup for possible PE, PVL of lower extremities revealed bilateral acute peroneal DVTs. Heparin gtt was started but not tolerated due to significant bleeding from BRANDON drains and wound vac requiring transfusion. Heparin gtt has since been stopped and vascular surgery is consulted for possible IVC filter placement. Past Medical History:   Diagnosis Date    Diabetes ([facility] [address]     many years type 2    Hypertension     many years       Past Surgical History:   Procedure Laterality Date    HX OOPHORECTOMY  05/29/2018    HX TONSILLECTOMY      as a child        History reviewed. No pertinent family history. Social History     Social History    Marital status: SINGLE     Spouse name: N/A    Number of children: N/A    Years of education: N/A     Social History Main Topics    Smoking status: Never Smoker    Smokeless tobacco: Never Used    Alcohol use No    Drug use: No    Sexual activity: Not Asked     Other Topics Concern    None     Social History Narrative       Prior to Admission medications    Medication Sig Start Date End Date Taking? Authorizing Provider   metFORMIN ER (GLUCOPHAGE XR) 500 mg tablet Take 2,000 mg by mouth daily (with dinner). Brand name only, pt reports allergy of cold sweats to the generic   Yes Historical Provider   fluticasone (FLONASE ALLERGY RELIEF) 50 mcg/actuation nasal spray 2 Sprays by Both Nostrils route daily as needed for Rhinitis.    Yes Historical Provider   acetaminophen-codeine (TYLENOL #3) 300-30 mg per tablet Take 1 Tab by mouth every four (4) hours as needed for Pain. Max Daily Amount: 6 Tabs. 6/1/18  Yes Lucila Cano MD   HYDROmorphone (DILAUDID) 2 mg tablet Take 1 Tab by mouth every three (3) hours as needed. Max Daily Amount: 16 mg. 6/1/18  Yes Lucila Cano MD   acetaminophen (TYLENOL ARTHRITIS PAIN) 650 mg TbER Take 650 mg by mouth every eight (8) hours as needed. Yes Historical Provider   phenylephrine (NEOSYNEPHRINE) 1 % spry 2 Sprays by Both Nostrils route every six (6) hours as needed. Pt uses several times every day for chronic nasal congestion    **pt pours phenylephrine into an AFRIN nasal spray bottle as she prefers this device**   Yes Historical Provider   amLODIPine (NORVASC) 10 mg tablet Take 10 mg by mouth daily. Yes Historical Provider   verapamil ER (VERELAN) 180 mg CR capsule Take 180 mg by mouth daily. Yes Historical Provider   atorvastatin (LIPITOR) 10 mg tablet Take 10 mg by mouth daily. Yes Historical Provider   losartan-hydroCHLOROthiazide (HYZAAR) 100-12.5 mg per tablet Take 1 Tab by mouth daily. Yes Historical Provider   Aspirin-Acetaminophen-Caffeine (GOODY'S EXTRA STRENGTH) 500-325-65 mg pwpk Take 2 Packets by mouth daily. Pt took daily for chronic HA until held for surgery in May 2018 then switched to tylenol arthritis daily    Historical Provider       Allergies   Allergen Reactions    Hydrocodone Other (comments)     Breaks into cold sweat    Metformin Other (comments)     Breaks out into cold sweat.  Can Take Glucophage brand name med       Physical Exam:      Visit Vitals    BP (!) 180/98    Pulse 88    Temp 98.2 °F (36.8 °C)    Resp 27    Ht 5' 1\" (1.549 m)    Wt 227 lb 8.2 oz (103.2 kg)    SpO2 100%    BMI 42.99 kg/m2       GENERAL: fatigued, mild distress, toxic, pale, morbidly obese, LUNG: clear to auscultation anteriorly, tachypneic, HEART: regular rate and rhythm, ABDOMEN: hypoactive bowel sounds, abdomen obese, multiple drains, wound vac to lower/mid abdomen, colostomy EXTREMITIES:  Warm and well-perfused, palpable DP pulses, 2+ edema bilateral lower legs SKIN: no visible rash or abnormalities    ROS:  Pertinent items are noted in HPI. Difficult to obtain due to fatigue and tachypnea  Unless otherwise mentioned in the HPI. Data Review:    CBC:   Lab Results   Component Value Date/Time    WBC 11.7 06/25/2018 05:20 AM    RBC 3.28 (L) 06/25/2018 05:20 AM    HGB 9.2 (L) 06/26/2018 05:00 AM    HCT 29.4 (L) 06/26/2018 05:00 AM    PLATELET 571 (L) 99/32/6747 05:20 AM      BMP:   Lab Results   Component Value Date/Time    Glucose 201 (H) 06/26/2018 05:00 AM    Sodium 150 (H) 06/26/2018 05:00 AM    Potassium 3.7 06/26/2018 05:00 AM    Chloride 117 (H) 06/26/2018 05:00 AM    CO2 19 (L) 06/26/2018 05:00 AM    BUN 49 (H) 06/26/2018 05:00 AM    Creatinine 1.17 06/26/2018 05:00 AM    Calcium 7.6 (L) 06/26/2018 05:00 AM     Coagulation:   Lab Results   Component Value Date/Time    Prothrombin time 15.5 (H) 06/23/2018 02:44 AM    INR 1.3 (H) 06/23/2018 02:44 AM    aPTT 33.5 06/22/2018 12:25 PM     Radiology review:     DUPLEX LOWER EXT VENOUS  Lower Extremity Venous Findings   Right Lower Venous   Thrombus present in the right peroneal vein. The common femoral, profunda femoris, proximal femoris, mid femoris, distal femoris, popliteal, posterior tibial and saphenous femoral junction vein(s) were imaged in the transverse and longitudinal planes. The vessels showed normal color filling and compressibility. Doppler interrogation of the veins showed phasic and spontaneous flow. Left Lower Venous   Thrombus present in the left peroneal vein. The common femoral, profunda femoris, proximal femoris, mid femoris, distal femoris, popliteal, posterior tibial and saphenous femoral junction vein(s) were imaged in the transverse and longitudinal planes. The vessels showed normal color filling and compressibility.  Doppler interrogation of the veins showed phasic and spontaneous flow. Assessment/Plan   Bilateral acute peroneal DVT: Patient unable to tolerate anticoagulation, therefore, patient is a candidate for IVC filter. According to CHEST guidelines it is generally acceptable not to treat tibial vessel DVTs with anticoagulation. Holding anticoagulation overnight is safe. Will plan for IVC filter placement tomorrow morning. Will contact patient's brother for consent.       Principal Problem:    Sepsis ([address] (6/4/2018)    Active Problems:    Ovarian ca ([address] (5/29/2018)      Abdominal pain (6/4/2018)      Oliguria (6/4/2018)      Acute respiratory failure ([address] (6/5/2018)      JESS (acute kidney injury) ([address] (8/4/6559)      Metabolic acidosis (5/8/0272)      Acute deep vein thrombosis (DVT) of lower extremity ([address] (6/7/2018)      S/P ileostomy ([address] (6/16/2018)      Hypoalbuminemia (6/16/2018)      Peritonitis ([address] (6/16/2018)      Hypernatremia (6/25/2018)        Agnes Mac NP  June 26, 2018

## 2018-06-28 NOTE — PROGRESS NOTES
80- Received report from [employer]    2000-  Assessment complete. Lungs Diminished. Ileostomy and romero emptied. Chamber level marked on wound vac and pumps cleared. Visit Vitals    /80    Pulse 77    Temp 98.7 °F (37.1 °C)    Resp 19    Ht 5' 1\" (1.549 m)    Wt 102.7 kg (226 lb 6.6 oz)    SpO2 99%    BMI 42.78 kg/m2     0000- Reassessment complete. No signs of distress or pain. Patient resting quietly in bed. 0130- Patient awake with frown on face. Tense body. Asked patient if she is in pain and she nodded head yes. PRN dilaudid administered (0.5mg)    0200- Pain reassessment. Patient resting quietly in bed with eyes closed. Clear rise and fall of chest.     0400- Reassessment complete    0500- CHG given. Gown and linen changed. Patient has serosanguinous drainage from left BRANDON drain site. 0600- Patient's morning lab results require replacement of magnesium (1gm) per electrolyte protocol. Order for timed redraw at 1100 placed. 0720- Bedside and Verbal shift change report given to SLY Barnes RN (oncoming nurse) by Mimi Bell. Vivienne Mejía RN (offgoing nurse). Report included the following information SBAR, Kardex and MAR.

## 2018-06-28 NOTE — PROGRESS NOTES
0747--Patient medicated with Dilaudid 0.5mg IVP for pain. 0817--PAIN REASSESSMENT: 0/10. Patient is sleeping. Patient also transported to Angio/IR suite for IVC Filter with vascular surgery. 0920--Patient transported back to ICU 9 following procedure. 1100--Patient reported pain. Unable to truly verbalize on numerical scale. Patient medicated with Dilaudid 0.5mg IVP. 1130--PAIN REASSESSMENT: 0/10. Patient is sleeping and easily aroused. States she is comfortable. Brother Farhad Diaz at bedside. 1426--Patient verbalized she was in pain and was medicated with Dilaudid 0.5mg IVP. 1456--PAIN REASSESSMENT: 0/10. Patient is sleeping. Siblings are at bedside. 1835--Patient verbalized she was in pain. Patient medicated with Dilaudid 0.5mg IVP. 1850--PICC dressing changed. 1915--Bedside and Verbal shift change report given to Lien Herman RN (oncoming nurse) by Estefanía Randall RN (offgoing nurse). Report included the following information SBAR, Kardex, Procedure Summary, Intake/Output, MAR, Recent Results, Med Rec Status and Cardiac Rhythm SR. NOTED THAT PATIENT'S PREVIOUS BRANDON DRAIN SITE IS STILL VERY LEAKING AND REQUIRED SEVERAL DRESSING CHANGES TO THAT AREA TODAY TO MAINTAIN SKIN INTEGRITY. DR CORTEZ IS AWARE.

## 2018-06-28 NOTE — INTERDISCIPLINARY ROUNDS
CRITICAL CARE INTERDISCIPLINARY ROUNDS    Patient Information:    Name:   Christie Carrillo    Age:   75 y.o. Admission Date:   6/4/2018    Critical Care Day: 23    Surgery Date:  6-5-18    Attending Provider:   Mina Franco MD    Surgeon: [facility]     Consultant(s):   Burnetta Goltz (Pulmonary), Fred Loo (Renal), Glenn Munson (Vascular Surgery)    Critical Care Physician:  Burnetta Goltz    Code Status: Full Code    Problem List:     Patient Active Problem List   Diagnosis Code    Ovarian ca [facility]) C56.9    Severe obesity (BMI 35.0-39.9) ([address] E66.01    Abdominal pain R10.9    Sepsis ([address] A41.9    Oliguria R34    Acute respiratory failure ([address] J96.00    JESS (acute kidney injury) ([address] L86.7    Metabolic acidosis F42.7    Acute deep vein thrombosis (DVT) of lower extremity ([address] I82.409    S/P ileostomy ([address] Z93.2    Hypoalbuminemia E88.09    Peritonitis ([address] K65.9    Hypernatremia E87.0    Hypokalemia E87.6       Principal Problem:  Sepsis ([address]    During rounds the following quality care indicators and evidence based practices were addressed :  PUD Prophylaxis, Pressure Injury Prevention, Pain Management and Nutritional Status Fry Day 23 (M-Care YES) ; Central Line Day: 8; Antibiotic Stewardship: ZOSYN, VANCO, FLAGYL, MICAFUNGIN    Glycemic Control:   Recent Labs      06/27/18   0545  06/26/18   0500  06/25/18   0520   GLU  177*  201*  196*   ;  Recent Labs      06/27/18   0934  06/26/18   1948   PHI  7.318*  7.435   PCO2I  38.2  24.5*   PO2I  273*  68*    Adjustments: CONTINUE LANTUS AT 13UNITS PLUS SSI Q6HRS    Interdisciplinary team rounds were held with the following team membersCare Management, Diabetes Treatment Specialist, Nursing, Nutrition, Occupational Therapy, Pharmacy, Physician and Respiratory Therapy. Plan of care discussed. Goals of Care/ Recommendations: CONTINUE ICU LEVEL OF CARE. WEAN HI-FLOW NC WHEN APPROPRIATE AND TOLERABLE. PLAN FOR IVC FILTER TODAY.  ELECTROLYTE REPLACEMENT WITH TPN. CONTINUE TPN. MANAGE PAIN CONTROL. PATIENT TO REMAIN STRICT NPO FOR POSSIBLE ASPIRATION RISK; WILL NEED MBSS WHEN STABLE AND ABLE TO TOLERATE PROCEDURE/MOVEMENT. WILL DISCUSS NEED FOR LASIX WITH RENAL TODAY AS PATIENT WAS WORSENING PULMONARY EDEMA ON CXR. See clinical pathway and/or care plan for interventions and desired outcomes.     Critical Care Discharge Status:  Red

## 2018-06-28 NOTE — OP NOTES
[address] REPORT    Chris Flannery  MR#: [id number]  : 1950  ACCOUNT #: [de-identified]   DATE OF SERVICE: 2018    PREOPERATIVE  DIAGNOSES:  Bilateral tibial vein deep venous thrombosis with blood loss anemia, status post abdominal surgery on heparin drip. POSTOPERATIVE DIAGNOSES:  DIAGNOSES:  Bilateral tibial vein deep venous thrombosis with blood loss anemia, status post abdominal surgery on heparin drip. PROCEDURES PERFORMED:  1. Ultrasound-guided percutaneous access left common femoral vein. 2.  Deployment of infrarenal inferior vena cava filter. SURGEON:  Erendira Neff MD     ASSISTANT:  None. ANESTHESIA:  Moderate sedation with local.    PACKS AND DRAINS:  None. IMPLANTS:  Cook Celect IVC filter. COMPLICATIONS:  None. CONDITION:  To recovery stable. ESTIMATED BLOOD LOSS:  Minimal.     SPECIMENS REMOVED:  None. FINDINGS:  No evidence of caval thrombosis after the placement of infrarenal IVC filter indicated. INDICATIONS FOR PROCEDURE:  The patient is a 70-year-old female with metastatic cancer who was found to have a tibial vein DVT. She was placed on heparin drip and had acute blood loss anemia after surgery, so decision was made to place an IVC filter. Informed consent was obtained. PROCEDURE IN DETAIL:  On 2018, the patient was taken to the catheter suite, identified by name and ID bracelet by myself as well as the entire team.  Once that was done, the patient was placed on the cath table in the supine position. After appropriate depth of anesthesia was obtained, she was prepped and draped and timeout performed. At this point, using ultrasound, the left common femoral vein was interrogated. It was compressible and patent, appeared to be appropriate site for access. Ultrasound-guided percutaneous access of the left common femoral vein was obtained.   We then advanced a wire and a dilator followed by the sheath for the filter and performed a venacavogram.  We marked the level of the renal veins and showed no evidence of brachial thrombosis and deployed the filter in an infrarenal fashion. Happy with the results, we removed the sheath and wire, held manual pressure for hemostasis. At the end of the procedure, I was present throughout the entire procedure.       MD Shital Price / [place]  D: 06/28/2018 09:00     T: 06/28/2018 12:19  JOB #: [id number]

## 2018-06-29 ENCOUNTER — APPOINTMENT (OUTPATIENT)
Dept: GENERAL RADIOLOGY | Age: 68
DRG: 853 | End: 2018-06-29
Attending: INTERNAL MEDICINE
Payer: MEDICARE

## 2018-06-29 LAB
ANION GAP SERPL CALC-SCNC: 12 MMOL/L (ref 3–18)
ARTERIAL PATENCY WRIST A: YES
BASE DEFICIT BLD-SCNC: 7 MMOL/L
BASOPHILS # BLD: 0 K/UL (ref 0–0.06)
BASOPHILS NFR BLD: 0 % (ref 0–2)
BDY SITE: ABNORMAL
BODY TEMPERATURE: 98.6
BUN SERPL-MCNC: 39 MG/DL (ref 7–18)
BUN/CREAT SERPL: 39 (ref 12–20)
CA-I SERPL-SCNC: 1.18 MMOL/L (ref 1.12–1.32)
CALCIUM SERPL-MCNC: 7.7 MG/DL (ref 8.5–10.1)
CHLORIDE SERPL-SCNC: 120 MMOL/L (ref 100–108)
CO2 SERPL-SCNC: 22 MMOL/L (ref 21–32)
CREAT SERPL-MCNC: 1.01 MG/DL (ref 0.6–1.3)
DATE LAST DOSE: NORMAL
DIFFERENTIAL METHOD BLD: ABNORMAL
EOSINOPHIL # BLD: 0.3 K/UL (ref 0–0.4)
EOSINOPHIL NFR BLD: 4 % (ref 0–5)
ERYTHROCYTE [DISTWIDTH] IN BLOOD BY AUTOMATED COUNT: 26.2 % (ref 11.6–14.5)
GAS FLOW.O2 O2 DELIVERY SYS: ABNORMAL L/MIN
GAS FLOW.O2 SETTING OXYMISER: 4 L/M
GLUCOSE BLD STRIP.AUTO-MCNC: 146 MG/DL (ref 70–110)
GLUCOSE BLD STRIP.AUTO-MCNC: 149 MG/DL (ref 70–110)
GLUCOSE BLD STRIP.AUTO-MCNC: 165 MG/DL (ref 70–110)
GLUCOSE SERPL-MCNC: 154 MG/DL (ref 74–99)
HCO3 BLD-SCNC: 18.8 MMOL/L (ref 22–26)
HCT VFR BLD AUTO: 22.6 % (ref 35–45)
HGB BLD-MCNC: 7 G/DL (ref 12–16)
LYMPHOCYTES # BLD: 0.3 K/UL (ref 0.9–3.6)
LYMPHOCYTES NFR BLD: 4 % (ref 21–52)
MAGNESIUM SERPL-MCNC: 2.2 MG/DL (ref 1.6–2.6)
MCH RBC QN AUTO: 29.2 PG (ref 24–34)
MCHC RBC AUTO-ENTMCNC: 31 G/DL (ref 31–37)
MCV RBC AUTO: 94.2 FL (ref 74–97)
MONOCYTES # BLD: 0.2 K/UL (ref 0.05–1.2)
MONOCYTES NFR BLD: 3 % (ref 3–10)
NEUTS SEG # BLD: 6.4 K/UL (ref 1.8–8)
NEUTS SEG NFR BLD: 89 % (ref 40–73)
O2/TOTAL GAS SETTING VFR VENT: 36 %
PCO2 BLD: 37.7 MMHG (ref 35–45)
PH BLD: 7.31 [PH] (ref 7.35–7.45)
PHOSPHATE SERPL-MCNC: 2.6 MG/DL (ref 2.5–4.9)
PLATELET # BLD AUTO: 91 K/UL (ref 135–420)
PO2 BLD: 72 MMHG (ref 80–100)
POTASSIUM SERPL-SCNC: 4 MMOL/L (ref 3.5–5.5)
RBC # BLD AUTO: 2.4 M/UL (ref 4.2–5.3)
RBC MORPH BLD: ABNORMAL
REPORTED DOSE,DOSE: NORMAL UNITS
REPORTED DOSE/TIME,TMG: 1833
SAO2 % BLD: 93 % (ref 92–97)
SERVICE CMNT-IMP: ABNORMAL
SODIUM SERPL-SCNC: 154 MMOL/L (ref 136–145)
SPECIMEN TYPE: ABNORMAL
TOTAL RESP. RATE, ITRR: 16
VANCOMYCIN TROUGH SERPL-MCNC: 14.8 UG/ML (ref 10–20)
WBC # BLD AUTO: 7.2 K/UL (ref 4.6–13.2)
WBC MORPH BLD: ABNORMAL

## 2018-06-29 PROCEDURE — 74011250636 HC RX REV CODE- 250/636: Performed by: INTERNAL MEDICINE

## 2018-06-29 PROCEDURE — 74011250636 HC RX REV CODE- 250/636: Performed by: OBSTETRICS & GYNECOLOGY

## 2018-06-29 PROCEDURE — 77030011641 HC PASTE OST ADH BMS -A

## 2018-06-29 PROCEDURE — P9016 RBC LEUKOCYTES REDUCED: HCPCS | Performed by: OBSTETRICS & GYNECOLOGY

## 2018-06-29 PROCEDURE — 86920 COMPATIBILITY TEST SPIN: CPT | Performed by: OBSTETRICS & GYNECOLOGY

## 2018-06-29 PROCEDURE — 82330 ASSAY OF CALCIUM: CPT | Performed by: SURGERY

## 2018-06-29 PROCEDURE — 82803 BLOOD GASES ANY COMBINATION: CPT

## 2018-06-29 PROCEDURE — 74011636637 HC RX REV CODE- 636/637: Performed by: HOSPITALIST

## 2018-06-29 PROCEDURE — 97606 NEG PRS WND THER DME>50 SQCM: CPT

## 2018-06-29 PROCEDURE — P9047 ALBUMIN (HUMAN), 25%, 50ML: HCPCS | Performed by: INTERNAL MEDICINE

## 2018-06-29 PROCEDURE — 80048 BASIC METABOLIC PNL TOTAL CA: CPT | Performed by: SURGERY

## 2018-06-29 PROCEDURE — 74011000258 HC RX REV CODE- 258: Performed by: OBSTETRICS & GYNECOLOGY

## 2018-06-29 PROCEDURE — 36430 TRANSFUSION BLD/BLD COMPNT: CPT

## 2018-06-29 PROCEDURE — 36600 WITHDRAWAL OF ARTERIAL BLOOD: CPT

## 2018-06-29 PROCEDURE — 74011000258 HC RX REV CODE- 258: Performed by: INTERNAL MEDICINE

## 2018-06-29 PROCEDURE — 74011000250 HC RX REV CODE- 250: Performed by: INTERNAL MEDICINE

## 2018-06-29 PROCEDURE — 82962 GLUCOSE BLOOD TEST: CPT

## 2018-06-29 PROCEDURE — 77030019952 HC CANSTR VAC ASST KCON -B

## 2018-06-29 PROCEDURE — 84100 ASSAY OF PHOSPHORUS: CPT | Performed by: SURGERY

## 2018-06-29 PROCEDURE — 77010033678 HC OXYGEN DAILY

## 2018-06-29 PROCEDURE — 74011000258 HC RX REV CODE- 258: Performed by: HOSPITALIST

## 2018-06-29 PROCEDURE — 94640 AIRWAY INHALATION TREATMENT: CPT

## 2018-06-29 PROCEDURE — 71045 X-RAY EXAM CHEST 1 VIEW: CPT

## 2018-06-29 PROCEDURE — 65270000029 HC RM PRIVATE

## 2018-06-29 PROCEDURE — 85025 COMPLETE CBC W/AUTO DIFF WBC: CPT | Performed by: OBSTETRICS & GYNECOLOGY

## 2018-06-29 PROCEDURE — 86850 RBC ANTIBODY SCREEN: CPT | Performed by: OBSTETRICS & GYNECOLOGY

## 2018-06-29 PROCEDURE — 74011000250 HC RX REV CODE- 250: Performed by: OBSTETRICS & GYNECOLOGY

## 2018-06-29 PROCEDURE — 74011250636 HC RX REV CODE- 250/636: Performed by: HOSPITALIST

## 2018-06-29 PROCEDURE — 80202 ASSAY OF VANCOMYCIN: CPT | Performed by: INTERNAL MEDICINE

## 2018-06-29 PROCEDURE — 83735 ASSAY OF MAGNESIUM: CPT | Performed by: SURGERY

## 2018-06-29 PROCEDURE — 74011636637 HC RX REV CODE- 636/637: Performed by: INTERNAL MEDICINE

## 2018-06-29 PROCEDURE — C9113 INJ PANTOPRAZOLE SODIUM, VIA: HCPCS | Performed by: INTERNAL MEDICINE

## 2018-06-29 RX ORDER — SODIUM CHLORIDE 9 MG/ML
250 INJECTION, SOLUTION INTRAVENOUS AS NEEDED
Status: DISCONTINUED | OUTPATIENT
Start: 2018-06-29 | End: 2018-07-11 | Stop reason: HOSPADM

## 2018-06-29 RX ORDER — VANCOMYCIN HCL IN 5 % DEXTROSE 1.5G/250ML
1500 PLASTIC BAG, INJECTION (ML) INTRAVENOUS EVERY 24 HOURS
Status: DISPENSED | OUTPATIENT
Start: 2018-06-29 | End: 2018-07-06

## 2018-06-29 RX ADMIN — METOPROLOL TARTRATE 5 MG: 5 INJECTION INTRAVENOUS at 09:05

## 2018-06-29 RX ADMIN — ALBUMIN (HUMAN) 12.5 G: 0.25 INJECTION, SOLUTION INTRAVENOUS at 05:01

## 2018-06-29 RX ADMIN — ACETAMINOPHEN 1000 MG: 10 INJECTION, SOLUTION INTRAVENOUS at 20:31

## 2018-06-29 RX ADMIN — HYDROMORPHONE HYDROCHLORIDE 1 MG: 2 INJECTION, SOLUTION INTRAMUSCULAR; INTRAVENOUS; SUBCUTANEOUS at 17:30

## 2018-06-29 RX ADMIN — ALBUMIN (HUMAN) 12.5 G: 0.25 INJECTION, SOLUTION INTRAVENOUS at 23:03

## 2018-06-29 RX ADMIN — METOPROLOL TARTRATE 5 MG: 5 INJECTION INTRAVENOUS at 01:50

## 2018-06-29 RX ADMIN — HYDROMORPHONE HYDROCHLORIDE 1 MG: 2 INJECTION, SOLUTION INTRAMUSCULAR; INTRAVENOUS; SUBCUTANEOUS at 07:22

## 2018-06-29 RX ADMIN — HYDROMORPHONE HYDROCHLORIDE 1 MG: 2 INJECTION, SOLUTION INTRAMUSCULAR; INTRAVENOUS; SUBCUTANEOUS at 02:10

## 2018-06-29 RX ADMIN — ACETAMINOPHEN 1000 MG: 10 INJECTION, SOLUTION INTRAVENOUS at 02:11

## 2018-06-29 RX ADMIN — HYDROMORPHONE HYDROCHLORIDE 1 MG: 2 INJECTION, SOLUTION INTRAMUSCULAR; INTRAVENOUS; SUBCUTANEOUS at 12:46

## 2018-06-29 RX ADMIN — LEVOFLOXACIN 750 MG: 5 INJECTION, SOLUTION INTRAVENOUS at 18:01

## 2018-06-29 RX ADMIN — SODIUM HYPOCHLORITE: 1.25 SOLUTION TOPICAL at 21:00

## 2018-06-29 RX ADMIN — PIPERACILLIN SODIUM,TAZOBACTAM SODIUM 2.25 G: 2; .25 INJECTION, POWDER, FOR SOLUTION INTRAVENOUS at 18:02

## 2018-06-29 RX ADMIN — VANCOMYCIN HYDROCHLORIDE 1500 MG: 10 INJECTION, POWDER, LYOPHILIZED, FOR SOLUTION INTRAVENOUS at 23:03

## 2018-06-29 RX ADMIN — HYDROMORPHONE HYDROCHLORIDE 1 MG: 2 INJECTION, SOLUTION INTRAMUSCULAR; INTRAVENOUS; SUBCUTANEOUS at 20:31

## 2018-06-29 RX ADMIN — MAGNESIUM SULFATE HEPTAHYDRATE: 500 INJECTION, SOLUTION INTRAMUSCULAR; INTRAVENOUS at 17:47

## 2018-06-29 RX ADMIN — PIPERACILLIN SODIUM,TAZOBACTAM SODIUM 2.25 G: 2; .25 INJECTION, POWDER, FOR SOLUTION INTRAVENOUS at 12:46

## 2018-06-29 RX ADMIN — IPRATROPIUM BROMIDE 0.5 MG: 0.5 SOLUTION RESPIRATORY (INHALATION) at 14:49

## 2018-06-29 RX ADMIN — ACETAMINOPHEN 1000 MG: 10 INJECTION, SOLUTION INTRAVENOUS at 09:05

## 2018-06-29 RX ADMIN — SODIUM CHLORIDE 40 MG: 9 INJECTION INTRAMUSCULAR; INTRAVENOUS; SUBCUTANEOUS at 09:05

## 2018-06-29 RX ADMIN — INSULIN LISPRO 2 UNITS: 100 INJECTION, SOLUTION INTRAVENOUS; SUBCUTANEOUS at 12:46

## 2018-06-29 RX ADMIN — ALBUMIN (HUMAN) 12.5 G: 0.25 INJECTION, SOLUTION INTRAVENOUS at 14:42

## 2018-06-29 RX ADMIN — METOPROLOL TARTRATE 5 MG: 5 INJECTION INTRAVENOUS at 14:42

## 2018-06-29 RX ADMIN — MICAFUNGIN SODIUM 100 MG: 20 INJECTION, POWDER, LYOPHILIZED, FOR SOLUTION INTRAVENOUS at 14:51

## 2018-06-29 RX ADMIN — INSULIN GLARGINE 13 UNITS: 100 INJECTION, SOLUTION SUBCUTANEOUS at 09:07

## 2018-06-29 RX ADMIN — METRONIDAZOLE 500 MG: 500 INJECTION, SOLUTION INTRAVENOUS at 03:54

## 2018-06-29 RX ADMIN — PIPERACILLIN SODIUM,TAZOBACTAM SODIUM 2.25 G: 2; .25 INJECTION, POWDER, FOR SOLUTION INTRAVENOUS at 05:02

## 2018-06-29 RX ADMIN — ACETAMINOPHEN 1000 MG: 10 INJECTION, SOLUTION INTRAVENOUS at 14:41

## 2018-06-29 RX ADMIN — METRONIDAZOLE 500 MG: 500 INJECTION, SOLUTION INTRAVENOUS at 12:50

## 2018-06-29 RX ADMIN — METOPROLOL TARTRATE 5 MG: 5 INJECTION INTRAVENOUS at 20:31

## 2018-06-29 RX ADMIN — METRONIDAZOLE 500 MG: 500 INJECTION, SOLUTION INTRAVENOUS at 20:31

## 2018-06-29 NOTE — PROGRESS NOTES
Cm discussed transition of care with  possibility of LTAC for wound care,iv abx ,nutritrion management,  agreeable would like to further discuss with pt and son and if both still agreeable would like to plan transfer for Monday,for continuity of care cm forwarded referral for review.

## 2018-06-29 NOTE — WOUND CARE
Wound Care Progress Note     Follow-up visit for wound vac dressing change and ostomy appliance change. Assessment:   Communication:   Continent - as a Fry  has an ostomy. Independently repositions Requires  full assists in repositioning. Diet: TPN   Pre-medicated for pain by RN. 1. Mid line abdomen  surgical  Incision   24 x 8 x 5.5  Base is mixed with viable and necrotic tissue. Heavy exudate. Treatment: Negative wound vac with instill of Vashe (20ml every 6 hours for 6 mins)             2. . Ostomy - right side of mid line incision. See additional ostomy assessment note. 3. Rash ? Yeast?  Abdomen, upper thighs and breast              Patient repositioned on right side. Wound Recommendations:      Skin Care & Pressure Relief Recommendations:  Minimize layers of linen/pads under patient to optimize support surface. Turn/reposition approximately every 2 hours and offload heels pillows or Prevalon boots.   Manage incontinence ; Proshield to buttocks and sacrum daily and as needed with incontinence care    Discussed above plan with patient & Kin, NAINA DERAS MD    Transition of Care: Plan to follow weekly and per product recommendations while admitted to hospital.

## 2018-06-29 NOTE — PROGRESS NOTES
7545- Report from [facility]. Patient care assumed    1925- Assessment complete. Visit Vitals    BP (!) 140/91    Pulse 79    Temp 97.5 °F (36.4 °C)    Resp 15    Ht 5' 1\" (1.549 m)    Wt 106.1 kg (233 lb 14.5 oz)    SpO2 96%    BMI 44.2 kg/m2       0500- Patient's electrolyte levels do not require any replacement. Hemoglobin 7.0, Hematocrit 22.6, and platelets 91.     3545- Bedside and Verbal shift change report given to Agnes Cisse RN (oncoming nurse) by Meliza Roca RN (offgoing nurse). Report included the following information SBAR, Kardex and MAR.

## 2018-06-29 NOTE — PROGRESS NOTES
Admit date: 6/4/2018      ASSESSMENT/PLAN  Tracy Butler a 75 y. 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 Status post complete surgical resection with no evidence of residual disease. Discussed adjuvant chemotherapy however patient is not medically appropriate at this time. Patient is high risk of recurrence based on pathology. GI - Peritonitis, clinically resolved. Awaiting culture results from tip of last drain removed before stopping antibiotics. Post operative ileus resolved. Good ileostomy output. Continues to be at risk for aspiration. Continue TPN. ID - sepsis secondary to peritonitis. On Flagyl, Zosyn, Levaquin, Vanc and micafungin . WBC trending down. Patient remains afebrile. Drain tip culture pending. Wound vac changed today. Sharp debridement performed. Wound vac replaced with intermittent irrigation. Renal - JESS, resolved. Heme - Stable   DVT- bilateral peroneal DVT. IVC filter placed without issues. CV - stable  Pulm - supplemental O2 required  Psych - selectively interactive  Disp - condition labile, continue ICU care  Code Status - Full Code      SUBJECTIVE  No new complaints. Pain is controlled. No nausea. No vomiting.      OBJECTIVE  Physical Exam:  Patient Vitals for the past 24 hrs:   BP Temp Pulse Resp SpO2 Weight   06/29/18 1155 - 98.6 °F (37 °C) - - - -   06/29/18 0710 - (!) 100.5 °F (38.1 °C) - - - -   06/29/18 0700 (!) 167/94 - 99 24 - -   06/29/18 0600 (!) 157/95 - 89 20 - -   06/29/18 0500 149/79 - 79 15 94 % -   06/29/18 0400 (!) 140/91 - 79 15 96 % -   06/29/18 0300 147/71 - 89 17 93 % -   06/29/18 0253 - - - - - 106.1 kg (233 lb 14.5 oz)   06/29/18 0200 (!) 165/94 - 95 23 95 % -   06/29/18 0100 156/85 - 79 19 95 % -   06/29/18 0000 (!) 158/94 - 89 20 96 % -   06/28/18 2300 154/89 97.5 °F (36.4 °C) 73 13 95 % -   06/28/18 2200 146/87 - 80 17 - -   06/28/18 2100 135/89 - 68 13 - -   06/28/18 2020 (!) 141/91 - - - - -   06/28/18 2000 (!) 141/91 - 85 14 97 % -   06/28/18 1900 134/86 97.9 °F (36.6 °C) 79 15 96 % -   06/28/18 1800 141/88 - 91 20 96 % -   06/28/18 1700 151/85 - 70 20 95 % -   06/28/18 1600 148/85 - 76 15 99 % -   06/28/18 1542 115/58 97.1 °F (36.2 °C) 75 18 96 % -   06/28/18 1500 155/79 - 80 15 95 % -   06/28/18 1400 150/79 - 77 14 95 % -         Intake/Output Summary (Last 24 hours) at 06/29/18 1335  Last data filed at 06/29/18 1155   Gross per 24 hour   Intake          3506.33 ml   Output             2725 ml   Net           781.33 ml          Labs  CBC  Recent Labs      06/29/18   0430  06/28/18   0430  06/27/18   0545   WBC  7.2  10.5  13.7*   HCT  22.6*  32.1*  32.8*   MCV  94.2  90.9  89.6   BANDS   --   3  1   MONOS  3  3  0*   EOS  4  3  8*   BASOS  0  0  1        CMP  Recent Labs      06/29/18   0400  06/28/18   0430  06/27/18   0545   NA  154*  153*  151*   CO2  22  21  19*   BUN  39*  43*  41*        Lab Results   Component Value Date/Time    Glucose 154 (H) 06/29/2018 04:00 AM    Glucose (POC) 165 (H) 06/29/2018 11:51 AM    Glucose,  (H) 06/15/2018 05:14 PM          Current Hospital Medications    Current Facility-Administered Medications:     vancomycin (VANCOCIN) 1500 mg in D5W 500 mL infusion, 1,500 mg, IntraVENous, Q24H, Omaira Mckeon MD    TPN ADULT - CENTRAL, , IntraVENous, CONTINUOUS, Lucrecia Booker MD    0.9% sodium chloride infusion 250 mL, 250 mL, IntraVENous, PRN, Maggi Mo MD    0.9% sodium chloride infusion, 25 mL/hr, IntraVENous, CONTINUOUS, Randi Marie MD    saliva stimulant (BIOTENE) 1 Spray, 1 Spray, Oral, PRN, Omaira Mckeon MD, 1 Spray at 06/28/18 1108    TPN ADULT - CENTRAL, , IntraVENous, CONTINUOUS, Lucrecia Booker MD, Last Rate: 80 mL/hr at 06/28/18 1749    levoFLOXacin (LEVAQUIN) 750 mg in D5W IVPB, 750 mg, IntraVENous, Q24H, Pietro Auguste MD, Last Rate: 100 mL/hr at 06/28/18 1746, 750 mg at 06/28/18 1746    HYDROmorphone (PF) (DILAUDID) injection 1 mg, 1 mg, IntraVENous, Q2H PRN, Magali Mo MD, 1 mg at 06/29/18 1246    acetaminophen (OFIRMEV) infusion 1,000 mg, 1,000 mg, IntraVENous, Q6H, Magali Mo MD, Last Rate: 400 mL/hr at 06/29/18 0905, 1,000 mg at 06/29/18 0905    metoprolol (LOPRESSOR) injection 5 mg, 5 mg, IntraVENous, Q6H, Jade Patino MD, 5 mg at 06/29/18 0905    hydrALAZINE (APRESOLINE) 20 mg/mL injection 10 mg, 10 mg, IntraVENous, Q4H PRN, Jade Patino MD, 10 mg at 06/26/18 2045    albumin human 25% (BUMINATE) solution 12.5 g, 12.5 g, IntraVENous, Q8H, Berenice Haas MD, 12.5 g at 06/29/18 0501    insulin glargine (LANTUS) injection 13 Units, 13 Units, SubCUTAneous, DAILY, Nato Mott MD, 13 Units at 06/29/18 0907    0.9% sodium chloride infusion 250 mL, 250 mL, IntraVENous, PRN, Scott Hoang MD    [facility]) 100 mg in 0.9% sodium chloride (MBP/ADV) 100 mL, 100 mg, IntraVENous, Q24H, Jade Patino MD, Last Rate: 100 mL/hr at 06/28/18 1404, 100 mg at 06/28/18 1404    insulin lispro (HUMALOG) injection, , SubCUTAneous, Q6H, Scott Hoang MD, 2 Units at 06/29/18 1246    dextrose (D50W) injection syrg 12.5-25 g, 25-50 mL, IntraVENous, PRN, Scott Hoang MD    Vancomycin - Pharmacy to Dose, 1 Each, Other, Rx Dosing/Monitoring, Scott Hoang MD    metroNIDAZOLE (FLAGYL) IVPB premix 500 mg, 500 mg, IntraVENous, Q8H, Justyna Tello MD, Last Rate: 100 mL/hr at 06/29/18 1250, 500 mg at 06/29/18 1250    ipratropium (ATROVENT) 0.02 % nebulizer solution 0.5 mg, 0.5 mg, Nebulization, Q4H PRN, Justyna Tello MD, 0.5 mg at 06/26/18 0730    piperacillin-tazobactam (ZOSYN) 2.25 g in 0.9% sodium chloride (MBP/ADV) 50 mL MBP, 2.25 g, IntraVENous, Q6H, Nato Mott MD, Last Rate: 100 mL/hr at 06/29/18 1246, 2.25 g at 06/29/18 1246    naloxone (NARCAN) injection 0.4 mg, 0.4 mg, IntraVENous, EVERY 2 MINUTES AS Paulette Escobar MD    ondansetron [facility]) injection 4 mg, 4 mg, IntraVENous, Q6H PRN, Mik Carrillo MD, 4 mg at 06/08/18 2152    oxymetazoline (AFRIN) 0.05 % nasal spray 2 Spray, 2 Spray, Both Nostrils, BID PRN, Lou Jordan MD    alum-mag hydroxide-[facility]) oral suspension 30 mL, 30 mL, Oral, Q4H PRN, Christen Osuna MD, 30 mL at 06/07/18 2225    fluticasone (FLONASE) 50 mcg/actuation nasal spray 2 Spray, 2 Spray, Both Nostrils, DAILY PRN, Blaire Yeung MD    ELECTROLYTE REPLACEMENT PROTOCOL-POTASSIUM Renal Dosing, 1 Each, Other, PRN, Christen Bui MD    ELECTROLYTE REPLACEMENT PROTOCOL-MAGNESIUM, 1 Each, Other, PRN, Christen Bui MD    ELECTROLYTE REPLACEMENT PROTOCOL-PHOSPHORUS Renal Dosing, 1 Each, Other, PRN, Christen Bui MD    ELECTROLYTE REPLACEMENT PROTOCOL-CALCIUM, 1 Each, Other, PRN, Christen Bui MD    sodium chloride (NS) flush 5-10 mL, 5-10 mL, IntraVENous, PRN, Sampson Nolan MD    glucose chewable tablet 16 g, 4 Tab, Oral, PRN, Christen Bui MD    glucagon (GLUCAGEN) injection 1 mg, 1 mg, IntraMUSCular, PRN, Christen Bui MD    pantoprazole (PROTONIX) 40 mg in sodium chloride 0.9% 10 mL injection, 40 mg, IntraVENous, DAILY, López Bowman MD, 40 mg at 06/29/18 3992      Blaire Yeung MD

## 2018-06-29 NOTE — PROGRESS NOTES
Consult for TPN Dosing per Pharmacy by Dr. Mellisa Rizvi provided for this 75 y.o. female, for indication of Ileus  Day of Therapy 12    TPN Dosing Wt:  62.4 kg    Labs:  BMP BMP:   Lab Results   Component Value Date/Time     (H) 06/29/2018 04:00 AM    K 4.0 06/29/2018 04:00 AM     (H) 06/29/2018 04:00 AM    CO2 22 06/29/2018 04:00 AM    AGAP 12 06/29/2018 04:00 AM     (H) 06/29/2018 04:00 AM    BUN 39 (H) 06/29/2018 04:00 AM    CREA 1.01 06/29/2018 04:00 AM    GFRAA >60 06/29/2018 04:00 AM    GFRNA 55 (L) 06/29/2018 04:00 AM          CMP CMP:   Lab Results   Component Value Date/Time     (H) 06/29/2018 04:00 AM    K 4.0 06/29/2018 04:00 AM     (H) 06/29/2018 04:00 AM    CO2 22 06/29/2018 04:00 AM    AGAP 12 06/29/2018 04:00 AM     (H) 06/29/2018 04:00 AM    BUN 39 (H) 06/29/2018 04:00 AM    CREA 1.01 06/29/2018 04:00 AM    GFRAA >60 06/29/2018 04:00 AM    GFRNA 55 (L) 06/29/2018 04:00 AM    CA 7.7 (L) 06/29/2018 04:00 AM    MG 2.2 06/29/2018 04:00 AM    PHOS 2.6 06/29/2018 04:00 AM         Ca/ Phos Lab Results   Component Value Date/Time    Calcium 7.7 (L) 06/29/2018 04:00 AM    Phosphorus 2.6 06/29/2018 04:00 AM       Mag    Lab Results   Component Value Date/Time    Magnesium 2.2 06/29/2018 04:00 AM      Tg No components found for: TGL   Albumin Lab Results   Component Value Date/Time    Protein, total 5.9 (L) 06/26/2018 05:00 AM    Albumin 2.4 (L) 06/27/2018 05:45 AM         Goals  Kcal requirements:  ~ 22 kcal/kg     (1376 kcal/day)  Fluid requirements    ~ 30 ml/kg  Macronutrients:  Protein  1.2 g/kg/day             75 g/day = 300 kcal  Lipids    ~ 28% of total kcal        44 g/day = 396 kcal  Dextrose remainder of total kcal   200 g/day = 680 kcal    TPN  will continue at 100% goal macronutrients. TPN rate at 80 ml/hr. Pt does not have any IV maintenance fluids ordered at present time. Electrolytes to be monitored and adjusted daily.   MD to replete electrolytes acutely outside of TPN as needed. Additional recommendations/Orders:   1. Corrective insulin coverage q6h while on TPN. TPN also contains 10 units regular insulin. Pt also has order for Lantus 13 units SQ daily. (BS: 124, 128, 149, 165)  2. MD to further add  /adjust IV maintenance fluids as appropriate while on TPN. 3. Increase KPhos to 24 mmol    4. KCl removed, K acetate adjusted to 75 mEq   (total K in today's TPN: 110.2 mEq)  5. BMP, Mag, Phos ordered daily  6. Triglycerides, Prealbumin, CMP ordered upon initiation and weekly    Pharmacy to follow daily and will make changes based on labs/clinical status.    Carolina Sanchez, PharmD     [phone]

## 2018-06-29 NOTE — PROGRESS NOTES
Problem: Mobility Impaired (Adult and Pediatric)  Goal: *Acute Goals and Plan of Care (Insert Text)  Physical Therapy Goals  Updated 6/28/2018 and to be accomplished within 5-7 day(s)  1. Bed mobility: Rolling L to R to with max/mod A with use of HR for positioning. 2. Transfer: Supine to/from Sit with mod/max A with HR for change of position/prep for EOB sitting. 3. Activity Tolerance: Tolerate EOB sitting 5-10 minutes with fair UE supported balance for ADL/balance activities. 4. Patient will perform sit to stand with RW/maximal assistance and tolerate same x 15-30 seconds. 5. Ambulation:  Ambulate 5 ft. mod/max A with RW for increased functional mobility. Physical Therapy Goals  Updated 6/20/2018 and to be accomplished within 7 day(s)  1. Patient will move from supine to sit and sit to supine in bed with maximal assistance. 2.  Patient will transfer from bed to chair and chair to bed with maximal assistance using the least restrictive device. 3.  Patient will perform sit to stand with maximal assistance. 4.  Patient will ambulate with maximal assistance for 5 feet with the least restrictive device. Outcome: Not Progressing Towards Goal  ,PT session held due to:  []  Nausea/vomiting  [x]  Surgeon Communication/ suggestion  []  Extreme Pain  []  Dialysis treatment in progress. Will f/u tomorrow. Thank you.   Niko Kearns PTA

## 2018-06-29 NOTE — PROGRESS NOTES
Problem: Mobility Impaired (Adult and Pediatric)  Goal: *Acute Goals and Plan of Care (Insert Text)  Physical Therapy Goals  Updated 6/28/2018 and to be accomplished within 5-7 day(s)  1. Bed mobility: Rolling L to R to with max/mod A with use of HR for positioning. 2. Transfer: Supine to/from Sit with mod/max A with HR for change of position/prep for EOB sitting. 3. Activity Tolerance: Tolerate EOB sitting 5-10 minutes with fair UE supported balance for ADL/balance activities. 4. Patient will perform sit to stand with RW/maximal assistance and tolerate same x 15-30 seconds. 5. Ambulation:  Ambulate 5 ft. mod/max A with RW for increased functional mobility. Physical Therapy Goals  Updated 6/20/2018 and to be accomplished within 7 day(s)  1. Patient will move from supine to sit and sit to supine in bed with maximal assistance. 2.  Patient will transfer from bed to chair and chair to bed with maximal assistance using the least restrictive device. 3.  Patient will perform sit to stand with maximal assistance. 4.  Patient will ambulate with maximal assistance for 5 feet with the least restrictive device. Outcome: Not Progressing Towards Goal  PT session held due to:  [x]  WC and surgeon present to change wound vac   []  RN Communication/ suggestion  []  Extreme Pain  []  Dialysis treatment in progress. Will f/u later as pt's schedule allows. Thank you.   Trang Jamil, CEDRIC

## 2018-06-29 NOTE — PROGRESS NOTES
0730  Report received from AUGUSTA Agustin, at bedside using [employer]. All questions answered and clinicals reviewed. Ecchymosis noted under left upper arm bp cuff. Cuff removed and placed to right lower arm under PICC line insertion site. Warm compresses applied to left upper arm. PIV to R AC flushes without difficulty and good blood return. Pt nods no to pain with flushing. Will continue to monitor. 1030  Multidisciplinary rounds completed with critical care team.  Plan of care reviewed and orders received. 80  Dr Cedillo Ped in to change wound vac with wound care RN. Dr. Mamadou Martinez debrided abdominal wound at bedside. 0  Pts son at bedside. Pt began to cough and then could not inhale any air or take a breath in.  Pt.head lowered and slowly losing consciousness. RN team called to bedside. Attempted to bag pt with ambu without success and very difficult to bag. Then pt was able to be bagged with ambu 100% O2 all of a sudden. Pt has mucous plug requiring ambu bagging for approx 5 minutes. HR up to 160s, SVT. Mucous plug removed with yankauer and pt recovered quickly after ambu bagging. Pt awake and alert and sob resolved within 10 minutes. O2 sat 96% on 4LNC. Pt appears in much less distress with decreased SOB since 0730 assessment after mucous plug removed. 1440  Dr. Tawnya Vegas at bedside to examine patient. Pt VSS. Pt awake and alert and nods yes that her breathing feels much better. 1750  PRBCs infusing via L AC PIV without difficulty and with positive blood return. IV Flushes easily. Warm compress applied to right upper arm ecchymosis. 1930  Report given to Toshia Zhang RN, at bedside using SBAR format. All wounds and drains and drips visualized. Vanc trough sent to lab. All questions answered and clinicals reviewed.

## 2018-06-29 NOTE — PROGRESS NOTES
[facility]                  Pulmonary, Critical Care, and Sleep Medicine     Name: Mirella Newman MRN: [id number]   : 1950 Hospital: [facility] [place]    Date: 2018        [facility] Note                                              Subjective/History of Present Illness:   Patient is a 75 y.o. 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 S/p exploratory laparotomy 6/15/18 - findings of sigmoid diverticulitis with suspected perforation, underwent ileostomy and wound vac placement. Remained on O2 NC at 4 Lpm. Developed an episode of mucus plugging this afternoon requiring deep suctioning and bagging with expectoration of mucus plug with improved breathing. Remained with poor cough. Currently Awake, Alert, following commands, opens eyes on calling name  Had wound vac change today. Going to get 1 unit of PRBC per surgery  No fever chills. Persistent leg edema  Had removal of BRANDON drain on 18, tip culture pending  Pain better controlled on Dilaudid  Ileostomy without bleeding. On TPN  Hemodynamically stable    Review of Systems   Review of systems not obtained due to patient factors. Surgeries  18 - Laparoscopy converted to laparotomy, total abdominal hysterectomy, bilateral salpingo-oophorectomy, omentectomy, bilateral pelvic and periaortic lymphadenectomy and radical tumor debulking to R0. Path - ADENOCARCINOMA OF LEFT OVARY.     18 - Diagnostic laparoscopy converted to laparotomy, peritoneal biopsies, aerobic and anaerobic cultures of peritoneal fluid and peritoneal lavage.     6/15/18 - Wound exploration. Exploratory laparotomy. Lysis of adhesions. Ileostomy and wound VAC placement. Allergies   Allergen Reactions    Hydrocodone Other (comments)     Breaks into cold sweat    Metformin Other (comments)     Breaks out into cold sweat.  Can Take Glucophage brand name med      Past Medical History:   Diagnosis Date    Diabetes ([address]     many years type 2    Hypertension     many years      Past Surgical History:   Procedure Laterality Date    HX OOPHORECTOMY  05/29/2018    HX TONSILLECTOMY      as a child       Social History   Substance Use Topics    Smoking status: Never Smoker    Smokeless tobacco: Never Used    Alcohol use No        Current Facility-Administered Medications   Medication Dose Route Frequency    vancomycin (VANCOCIN) 1500 mg in D5W 500 mL infusion  1,500 mg IntraVENous Q24H    TPN ADULT - CENTRAL   IntraVENous CONTINUOUS    sodium hypochlorite (QUARTER STRENGTH DAKIN'S) 0.125% irrigation (bottle)   Topical Q6H    0.9% sodium chloride infusion  25 mL/hr IntraVENous CONTINUOUS    levoFLOXacin (LEVAQUIN) 750 mg in D5W IVPB  750 mg IntraVENous Q24H    acetaminophen (OFIRMEV) infusion 1,000 mg  1,000 mg IntraVENous Q6H    metoprolol (LOPRESSOR) injection 5 mg  5 mg IntraVENous Q6H    albumin human 25% (BUMINATE) solution 12.5 g  12.5 g IntraVENous Q8H    insulin glargine (LANTUS) injection 13 Units  13 Units SubCUTAneous DAILY    micafungin (MYCAMINE) 100 mg in 0.9% sodium chloride (MBP/ADV) 100 mL  100 mg IntraVENous Q24H    insulin lispro (HUMALOG) injection   SubCUTAneous Q6H    Vancomycin - Pharmacy to Dose  1 Each Other Rx Dosing/Monitoring    metroNIDAZOLE (FLAGYL) IVPB premix 500 mg  500 mg IntraVENous Q8H    piperacillin-tazobactam (ZOSYN) 2.25 g in 0.9% sodium chloride (MBP/ADV) 50 mL MBP  2.25 g IntraVENous Q6H    pantoprazole (PROTONIX) 40 mg in sodium chloride 0.9% 10 mL injection  40 mg IntraVENous DAILY         Objective:   Vital Signs:    Visit Vitals  BP (!) 134/95    Pulse (!) 104    Temp 97.6 °F (36.4 °C)    Resp 16    Ht 5' 1\" (1.549 m)    Wt 106.1 kg (233 lb 14.5 oz)    SpO2 97%    BMI 44.2 kg/m2       O2 Device: Nasal cannula   O2 Flow Rate (L/min): 4 l/min   Temp (24hrs), Av.4 °F (36.9 °C), Min:97.5 °F (36.4 °C), Max:100.5 °F (38.1 °C)       Intake/Output:   Last shift:       07 - 1900  In: -   Out: 1050 [Urine:1050]    Last 3 shifts: 1901 -  0700  In: 5627.3 [I.V.:5627.3]  Out: 1107 [Urine:2850; Drains:50]      Intake/Output Summary (Last 24 hours) at 18 1643  Last data filed at 18 1610   Gross per 24 hour   Intake          3406.33 ml   Output             2250 ml   Net          1156.33 ml       Physical Exam:  General/Neurology: awake, following commands, weak looking, weak cough, on O2 via NC  Head:   Normocephalic, without obvious abnormality, atraumatic. Eye:   no scleral icterus, no pallor, no cyanosis. Pupils not dilated. Neck:   Symmetric. No lymphadenopathy. Trachea midline  Lung: Moderate equal air entry bilateral. Decreased breath sounds bases. No wheezing or crackles at bases. Heart:   S1 S2 present. No murmur. No JVD. Abdomen:  Soft. Obese. + BS. Midline lower abd surgical site - open wound with wound vac. BRANDON drain LLQ, Rt abd wall Ileostomy. No abd distension or guarding. Extremities:  No cyanosis. No clubbing. No hand edema. B/l feet/leg/thigh edema 2+. Pulses: Palpable radials and DPs bilateral.   Lymphatic:  No cervical or supraclav lymphadenopathy.    Skin:   No rash      Data:       Recent Results (from the past 12 hour(s))   GLUCOSE, POC    Collection Time: 18  5:30 AM   Result Value Ref Range    Glucose (POC) 149 (H) 70 - 110 mg/dL   GLUCOSE, POC    Collection Time: 18 11:51 AM   Result Value Ref Range    Glucose (POC) 165 (H) 70 - 110 mg/dL   TYPE + CROSSMATCH    Collection Time: 18  1:30 PM   Result Value Ref Range    Crossmatch Expiration 2018 ABO/Rh(D) PENDING     Antibody screen PENDING    POC G3    Collection Time: 06/29/18  2:54 PM   Result Value Ref Range    Device: NASAL CANNULA      Flow rate (POC) 4.0 L/M    FIO2 (POC) 36 %    pH (POC) 7.306 (L) 7.35 - 7.45      pCO2 (POC) 37.7 35.0 - 45.0 MMHG    pO2 (POC) 72 (L) 80 - 100 MMHG    HCO3 (POC) 18.8 (L) 22 - 26 MMOL/L    sO2 (POC) 93 92 - 97 %    Base deficit (POC) 7 mmol/L    Allens test (POC) YES      Total resp. rate 16      Site LEFT RADIAL      Patient temp. 98.6      Specimen type (POC) ARTERIAL      Performed by Sara Mcgill            Chemistry Recent Labs      06/29/18   0400  06/28/18   0430  06/27/18   0545   GLU  154*  160*  177*   NA  154*  153*  151*   K  4.0  4.1  3.8   CL  120*  119*  118*   CO2  22  21  19*   BUN  39*  43*  41*   CREA  1.01  1.02  1.06   CA  7.7*  7.6*  7.3*   MG  2.2  1.8  2.0   PHOS  2.6  3.3  2.9   AGAP  12  13  14   BUCR  39*  42*  39*   ALB   --    --   2.4*        Lactic Acid Lactic acid   Date Value Ref Range Status   06/17/2018 2.3 (HH) 0.4 - 2.0 MMOL/L Final     Comment:     CALLED TO AND CORRECTLY REPEATED BY:  Camryn Vallejo RN ICU ON 6/17/2018 [id number] TO [id number]       No results for input(s): LAC in the last 72 hours. Liver Enzymes Protein, total   Date Value Ref Range Status   06/26/2018 5.9 (L) 6.4 - 8.2 g/dL Final     Albumin   Date Value Ref Range Status   06/27/2018 2.4 (L) 3.4 - 5.0 g/dL Final     Globulin   Date Value Ref Range Status   06/26/2018 3.2 2.0 - 4.0 g/dL Final     A-G Ratio   Date Value Ref Range Status   06/26/2018 0.8 0.8 - 1.7   Final     AST (SGOT)   Date Value Ref Range Status   06/26/2018 16 15 - 37 U/L Final     Alk.  phosphatase   Date Value Ref Range Status   06/26/2018 67 45 - 117 U/L Final     Recent Labs      06/27/18   0545   ALB  2.4*        CBC w/Diff Recent Labs      06/29/18   0430  06/28/18   0430  06/27/18   0545   WBC  7.2  10.5  13.7*   RBC  2.40*  3.53*  3.66*   HGB  7.0*  9.7*  10.2*   HCT  22.6*  32.1*  32.8*   PLT 91*  119*  130*   GRANS  89*  89*  86*   LYMPH  4*  2*  4*   EOS  4  3  8*        Cardiac Enzymes No results found for: CPK, CK, CKMMB, CKMB, RCK3, CKMBT, CKNDX, CKND1, FABIAN, TROPT, TROIQ, KAYLAN, TROPT, TNIPOC, BNP, BNPP     BNP No results found for: BNP, BNPP, XBNPT     Coagulation Recent Labs      06/27/18   1131   PTP  16.3*   INR  1.4*         Thyroid  No results found for: T4, T3U, TSH, TSHEXT, TSHEXT    No results found for: T4       ABG Recent Labs      06/29/18   1454  06/27/18   0934  06/26/18   1948   PHI  7.306*  7.318*  7.435   PCO2I  37.7  38.2  24.5*   PO2I  72*  273*  68*   HCO3I  18.8*  19.6*  16.5*   FIO2I  36  100  0.50        All Micro Results     Procedure Component Value Units Date/Time    CULTURE, CATHETER TIP [[id number]] Collected:  06/27/18 1720    Order Status:  Completed Specimen:  Bharat Reilly Updated:  06/29/18 1154     Special Requests: NO SPECIAL REQUESTS        Culture result: NO GROWTH 1 DAY       CULTURE, FUNGUS [[id number]] Collected:  06/09/18 1024    Order Status:  Completed Specimen:  Bronchial lavage Updated:  06/25/18 1109     Special Requests: NO SPECIAL REQUESTS        FUNGUS SMEAR NO FUNGAL ELEMENTS SEEN        Culture result:         NO FUNGUS ISOLATED 16 DAYS    CULTURE, BLOOD FOR FUNGUS [[id number]]     Order Status:  Canceled Specimen:  Blood     CULTURE, BLOOD [[id number]] Collected:  06/09/18 1423    Order Status:  Completed Specimen:  Whole Blood from Blood Updated:  06/15/18 0230     Special Requests: NO SPECIAL REQUESTS        Culture result: NO GROWTH 6 DAYS       CULTURE, BLOOD [[id number]] Collected:  06/09/18 1223    Order Status:  Completed Specimen:  Whole Blood from Blood Updated:  06/15/18 0230     Special Requests: left hand     Culture result: NO GROWTH 6 DAYS       AFB CULTURE + SMEAR W/RFLX PCR AND ID [[id number]] Collected:  06/09/18 1024    Order Status:  Completed Specimen:  Respiratory sample Updated:  06/12/18 2106     Source BRONCHIAL LAVAGE        AFB Specimen processing Concentration     Acid Fast Smear NEGATIVE          (NOTE)  Performed At: [address] [id number]  Gui Couch MD KW:[id number]          Acid Fast Culture PENDING    CULTURE, SPUTUM/BRONCH/OTH [[id number]] Collected:  06/09/18 1024    Order Status:  Completed Specimen:  Sputum from Bronchial lavage Updated:  06/11/18 1058     Special Requests: NO SPECIAL REQUESTS        GRAM STAIN FEW WBC'S         FEW GRAM NEGATIVE RODS        Culture result:         RARE NORMAL RESPIRATORY JOSETTE    CULTURE, ANAEROBIC [[id number]] Collected:  06/05/18 0220    Order Status:  Completed Specimen:  Peritoneal Fluid Updated:  06/11/18 0734     Special Requests: NO SPECIAL REQUESTS        Culture result:         NO ANAEROBES ISOLATED 5 DAYS    CULTURE, BLOOD [[id number]] Collected:  06/04/18 0740    Order Status:  Completed Specimen:  Blood from Blood Updated:  06/10/18 0655     Special Requests: NO SPECIAL REQUESTS        Culture result: NO GROWTH 6 DAYS       CULTURE, BLOOD [[id number]] Collected:  06/04/18 0740    Order Status:  Completed Specimen:  Blood from Blood Updated:  06/10/18 0655     Special Requests: NO SPECIAL REQUESTS        Culture result: NO GROWTH 6 DAYS       CULTURE, BODY FLUID Harlen Fickle STAIN [[id number]]  (Abnormal)  (Susceptibility) Collected:  06/05/18 0220    Order Status:  Completed Specimen:  Peritoneal Fluid Updated:  06/08/18 0936     Special Requests: NO SPECIAL REQUESTS        GRAM STAIN MANY WBC'S         NO ORGANISMS SEEN        Culture result:         RARE KLEBSIELLA OXYTOCA (A)            CALLED TO AND CORRECTLY REPEATED BY:  ANGEL LUIS DE LOS SANTOS RN ICU ON 6/7/2018 1032 TO 5087      CULTURE, URINE [[id number]] Collected:  06/05/18 1245    Order Status:  Completed Specimen:  Urine from Fry Specimen Updated:  06/07/18 1103     Special Requests: NO SPECIAL REQUESTS        Culture result: NO GROWTH 2 DAYS       C. DIFFICILE/EPI PCR [[id number]] Collected: 06/05/18 0930    Order Status:  Canceled Specimen:  Stool           Echo 6/9/18:  Left ventricle: Systolic function was normal. Ejection fraction was estimated   in the range of 65 % to 70 %. No obvious  wall motion abnormalities identified in the views obtained. Wall thickness   was mildly increased. Doppler parameters  were consistent with abnormal left ventricular relaxation (grade 1 diastolic   dysfunction). Right ventricle: The size was normal. Systolic function was normal.  Mitral valve: There was mild annular calcification. Tricuspid valve: Pulmonary artery systolic pressure was mildly increased. Pulmonary artery systolic pressure: 34 mmHg. PVL LE 6/6/18: acute b/l peroneal DVT. CT abd pelvis 6/4/18:  1. Postsurgical hysterectomy, bilateral oophorectomy, pelvic lymphadenectomy and  a mastectomy surgical changes. Small volume of ascites in the abdomen and  pelvis, with a few tiny locules of gas in the right hemipelvis would be in  keeping with recent postsurgical etiology. 2. Abnormal edematous thick-walled small bowel loops in the left abdomen and  pelvis, with TRAM lamellar edematous configuration, induration. No findings of  pneumatosis. The overall appearance is nonspecific. Diagnostic considerations  include infectious etiology, nonspecific edema which may be unresolved  postsurgical etiology. Ischemia is also included in the differential diagnostic  consideration, however, there are no findings of pneumatosis, portal venous gas. 3. Stool in the right colon extending to the hepatic flexure with surrounding  gas, foci of pneumatosis could be obscured. No associated bowel wall thickening. 4. Satisfactory position of nasogastric tube. 5. Hepatomegaly, steatosis with 2 rounded low-density lesions, potentially  cysts. 6. Bibasilar atelectasis.       Imaging:  [x]I have personally reviewed the patients chest radiographs images and report   6/29/18    Results from [facility] encounter on 06/04/18   XR CHEST PORT   Narrative EXAM: Chest portable    INDICATION: CHF    COMPARISON: 6/27/2018    _______________    FINDINGS:    AP portable chest film was performed. Right upper extremity PICC line good  position. Apparent elevation right hemidiaphragm probably represents consolidation in the  right lower lobe. The discrepancy in the levels of the diaphragms is  significantly different when compared to exam from 6/14/2018. Central pulmonary  vascularity is less engorged with improved perihilar infiltrates. Cannot rule  out right effusion. _______________         Impression IMPRESSION:      1. Findings consistent with improving CHF and pulmonary edema. 2. Probable right lower lobe atelectasis resulting in indistinctness of the  right hemidiaphragm. Results from [place] encounter on 06/04/18   CTA CHEST ABD PELV W CONT   Narrative CT CHEST, ABDOMEN, AND PELVIS:    COMPARISON: CT abdomen/pelvis dated 6/4/2018. INDICATION: DVTs, blood from left drain. TECHNIQUE: Axial was performed through the chest, abdomen, and pelvis after  intravenous contrast administration only. Coronal and sagittal reformations  were generated. One or more dose reduction techniques were used on this CT: automated exposure  control, adjustment of the mAs and/or kVp according to patient's size, and  iterative reconstruction techniques. The specific techniques utilized on this CT  exam have been documented in the patient's electronic medical record.  ============    CTA CHEST:    Technique: Axial imaging was obtained dynamically through the pulmonary arteries  using high-resolution CT angiographic protocol, without complications. In  addition, coronal and sagittal reconstructed MIP arteriograms were performed, to  better evaluate the pulmonary vasculature, and to increase sensitivity for  detection of pulmonary emboli. CT angiogram quality parameters:  1.  Overall exam quality - adequate  2. Adequacy of pulmonary enhancement-adequate   3. Adequacy of breath-hold-optimal: Artifacts visible   4. There are significant streak artifacts affecting scan quality.    ============    PULMONARY ARTERIES: The pulmonary arteries are adequately opacified but without  evidence of any definite filling defects to suggest pulmonary thromboembolism  within the limitations of the streak artifacts. AORTA:  Mildly tortuous; no aneurysm or dissection. LUNGS: Consolidation with air bronchograms noted involving a large portion of  the right lower lobe most likely infiltrates and atelectasis. A smaller  consolidation is seen in the left lower lobe. Ill-defined groundglass opacities may represent vascular congestion. PLEURA: Normal, with no effusion or pneumothorax. AIRWAY: Unremarkable. MEDIASTINUM: Normal heart size. No pericardial effusion. Right-sided PICC line  is present. LYMPH NODES: No mediastinal, hilar or axillary lymphadenopathy. CT ABDOMEN AND PELVIS:    Streak artifacts limit evaluation. LIVER, BILIARY: Small 1.6 cm cyst is seen in the right lobe. No significant  biliary dilation. Ascites noted adjacent to the gallbladder. PANCREAS: Normal.    SPLEEN: Normal.    ADRENALS: Normal.    KIDNEYS/URETERS/BLADDER: There is no hydronephrosis on either side. Fry catheter decompresses the bladder. PELVIC ORGANS: Large soft tissue consolidation is seen in the pelvis likely  combination of adherent bowel and trapped fluid. A right-sided drain extends into the posterior aspect of the pelvic  consolidation described above. A left-sided drain extends along the left paracolic gutter. VASCULATURE: Unremarkable    LYMPH NODES: No enlarged lymph nodes. GASTROINTESTINAL TRACT: A right-sided ileostomy noted without evidence of  obstruction.     BONES: Extensive degenerative changes are seen throughout the spine with  moderate to severe compression deformity of L1 vertebral body.    OTHER: There is mild ascites throughout the abdomen and pelvis. There is diffuse  anasarca. Hyperdense mass in the anterior subcutaneous fat adjacent to the ileostomy  defect measures measuring 5.5 cm in width by 3.7 cm in thickness by 7 cm in  height possibly representing focal hematoma.  _______________         Impression IMPRESSION:    Study limited due to streak and motion artifacts as described. No evidence of pulmonary embolism within the limitations of artifacts. Prominent consolidation right lower lobe likely infiltrates and atelectasis. Small consolidation seen in the left lower lobe. Mild diffuse ascites present throughout the abdomen pelvis with diffuse  anasarca. Large soft tissue consolidation in the pelvis containing air bubbles likely  representing adherent bowel with adjacent trapped fluid. There is what may be a subcutaneous hematoma adjacent to the right ileostomy  measuring 5.5 cm x 3.7 cm x 7.0 cm, however, there is no definite  retroperitoneal or any other intra-abdominal hematoma. Bilateral pelvic drains as described above. Chronic compression deformity of L1. IMPRESSION:   · Acute hypoxic respiratory failure, on NC, latest episode most likely related to mucus plug, previously has multifactorial possibilities with pulmonary edema, aspiration pneumonitis. No central or segmental PE on recent CTA chest. Previously required ventilator support this admission due to sepsis, reintubated on 6/8/18 and self-extubated 6/17  · Tachypnea likely multifactorial with pulmonary edema, mixed NAG metabolic acidosis and respiratory alkalosis, pain, anxiety possible - improved since change in pain medication, use of Lasix, Ativan. · S/p bronchoscopy 6/9/18: no aspiration noted. Cx Negative. · Severe sepsis due to Klebsiella Oxytoca peritonitis and then perforation. S/p laparotomy 6/5/18 [POD # 5 tumor radical debulking] and peritoneal lavage: Klebsiella positive.  Ruled out ischemic bowel in laparotomy. S/p laparotomy on 6/15/18 for peritonitis due to diverticulitis with perforation, s/p distal ileostomy. · Leukocytosis, improved on micafungin/vanco.  · Off heparin for intraabdominal bleeding and CTA chest no central PE. INR reversed with protamin, FFP.  · S/p Shock suspected from sepsis and obstructive due to presumed PE with severe hypoxia and high A-a gradient, elevated RVSP 34 mm Hg. CTA 6/22/18 negative for central or main PA PE. Shock resolved. CTA chest no central PE. · Acute b/l peroneal DVT, s/p IVC filter by vascular surgery 6/28/18. · Anemia, drop in Hgb, no gross bleeding  · Thrombocytopenia, mild, no active bleeding  · JESS, improved  · Hypernatremia, worsened slightly following Lasix, managed per renal  · Non-anion gap metabolic acidosis   · Elevated LFT, due to shock liver, resolved  · Foreign body on peritoneal biopsy, per path report. · Anasarca due to fluid resuscitation, JESS, hypoalbuminemia and sequale of sepsis. · AFib converted to NSR  · Adenocarcinoma of ovary s/p radical tumor debulking surgery 5/29/18    · Code status: Full code. · Poor overall prognosis. RECOMMENDATIONS:   Respiratory: improved respiration after mucus plug removed; ABG stable; continue NC O2 support; low threshold for re-intubation. Keep SPO2 >=92%. Spoke with brother at the bedside. Per patient and brother- she is full code  Periodic CXR in AM. Periodic ABG per clinical course  HOB 30 degree elevation all the time. Aggressive pulmonary toileting. Aspiration precautions. Judicious opiate pain medications. Periodic Lasix as needed in coordination with renal services  Hem: follow CBC; Keep Hb> 7 gm/dl. s/p IVC filter placement 6/28. Transfusion per surgery  Any radiological imaging per surgery  CVS: Echo ok with mildly elevated RVSP but normal RV systolic function. AFib converted to NSR. Off of cardizem drip. ID:  Bronch cx normal robin.  Peritoneal cx Klebsiella Oxytoca resistant to ampicillin. Follow up drain tip culture  On micafungin/vanco for Leukocytosis. Afebrile. Will discuss with surgery regarding duration of antibiotics. Abx - Zosyn since 6/13 [day 17]; levaquin since 6/17 [day 13], flagyl since 6/15 [day 15]; iv vanco since 6/22, micafungin since 6/22  Renal: Nephrologist on case; Creatinine improved. Lasix PRN because of hypernatremia. On albumin. Spoke with pharmacy to see if IVF with antibiotics could be changed to D5, but nationwide shortage of small bags of IVF to mix antibiotics but department will look around. GI/: TPN; barium study 6/21 - patient could not do it on 6/22 due to abd pain  Endocrine: Maintain blood glucose 140-180. Humalog sc. Insulin in TPN. on Lantus  Neurology: Dilaudid- balance post-op pain without sedation- recommend judicious use of sedatives  Pain/Sedation: dilaudid - management per Dr Dominick Rod  Skin/Wound: local surgical site care. Wound vac changed 6/29. Electrolytes: Replace electrolytes per ICU electrolyte replacement protocol, renal service. Nutrition: TPN started 6/18. NPO   Prophylaxis: GI Prophylaxis (protonix)  Restraints: none  Lines/Tubes:   ETT: 6/8/18- self extubated 6/17/18  Central line: right subclavian 6/4/18 - removed 6/20  Picc line 6/19 - Central line bundle followed  Fry: 6/4/18 (Medically necessary for strict input/output monitoring in critically ill patient, will remove it when not needed. Fry bundle followed). Will defer respective systems problem management to primary and other respective consultant and follow patient in ICU with primary and other medical team.  Further recommendations will be based on the patient's response to recommended treatment and results of the investigation ordered. Quality Care: PPI, DVT prophylaxis, HOB elevated, Infection control all reviewed and addressed.     Brother at bedside, updated: overall poor prognosis with patient at increased risk for further deterioration, prolonged hospitalization, nosocomial infections, failure to thrive. Patient has no children. Code status: Full code. PICC Line/Fry cath medically necessary  PT and OT on case    Care of plan d/w RN, RT, MDR, brother.   High complexity decision making was performed during the evaluation of this patient   CC time 36 mins     Konstantin Mendiola MD  6/29/2018

## 2018-06-29 NOTE — PROGRESS NOTES
Hospitalist Progress Note-critical care note     Patient: Hal Johnson MRN: [id number]  CSN: [id number]    YOB: 1950  Age: 75 y.o. Sex: female    DOA: 6/4/2018 LOS:  LOS: 25 days            Chief complaint:   hyoerntremia. D/p ileostomy , peritonitis , sepsis , jess ,ovarian cancer, hypokalemia    Assessment/Plan         Hospital Problems  Date Reviewed: 6/5/2018          Codes Class Noted POA    Hypokalemia ICD-10-CM: E87.6  ICD-9-CM: 276.8  6/27/2018 Unknown        Hypernatremia ICD-10-CM: E87.0  ICD-9-CM: 276.0  6/25/2018 Unknown        S/P ileostomy ([facility] ICD-10-CM: Z93.2  ICD-9-CM: V44.2  6/16/2018 No        Hypoalbuminemia ICD-10-CM: E88.09  ICD-9-CM: 273.8  6/16/2018 Unknown        Peritonitis ([facility] ICD-10-CM: K65.9  ICD-9-CM: 567.9  6/16/2018 Unknown        Acute deep vein thrombosis (DVT) of lower extremity ([facility] ICD-10-CM: I82.409  ICD-9-CM: 453.40  6/7/2018 Yes        Acute respiratory failure ([facility] ICD-10-CM: J96.00  ICD-9-CM: 518.81  6/5/2018 No        JESS (acute kidney injury) ([facility] ICD-10-CM: N17.9  ICD-9-CM: 584.9  6/5/2018 Unknown        Metabolic acidosis VYQ-51-MI: E87.2  ICD-9-CM: 276.2  6/5/2018 No        Abdominal pain ICD-10-CM: R10.9  ICD-9-CM: 789.00  6/4/2018 Yes        * (Principal)Sepsis ([facility] ICD-10-CM: A41.9  ICD-9-CM: 038.9, 995.91  6/4/2018 Yes        Oliguria ICD-10-CM: R34  ICD-9-CM: 788.5  6/4/2018 Yes        Ovarian ca ([facility] ICD-10-CM: C56.9  ICD-9-CM: 183.0  5/29/2018 Yes              Acute resp failure with hypoxia   Worsening overnight   cxr pulm edema . Lasix was hold due to hypernatremia   - self extubated 06/17/2018.   - bronchoscopy 06/09/2018, no aspiration,  - resp cultures no growth to date. CTA chest negative for  PE         Severe Sepsis   - secondary to peritonitis. Peritoneal fluid cx:klebsiella oxytoca. Continue iv abx  zosyn,levaquine, vanc flagyl. .micafungin         Shock   - sepsis   -resolved. Now off pressors.      Anasarca - likely third spacing,  on albumin-hold due to sodium high      Bleeding from HUNTER drain -resolved   protamine and FFP. Acute bilateral peroneal DVT - heparin drip stopped. due to bleeding. ivc filter placed   per dr. Renetta Bass     Anemia   Received transfusion, so far h/h stable        JESS   -atn ,  nephrology on board , cr wnl      Metabolic acidosis    Hypernatremia  Lasix/albumin hold    pharmacy to correct with TPN     DM  lantus , ssi      Bleeding from HUNTER drain   -stopped  CT abdomen and pelvis reviewed. Will continue monitor H/H     Hypokalemia   Resolved       Poor prognosis , family still want pt to be full code. Brother was at the bedside    She has low change to survive this hospitalization       Review of systems:  Unable to obtain due to medication given   Vital signs/Intake and Output:  Visit Vitals    BP (!) 167/94    Pulse 99    Temp 98.6 °F (37 °C)    Resp 24    Ht 5' 1\" (1.549 m)    Wt 106.1 kg (233 lb 14.5 oz)    SpO2 94%    BMI 44.2 kg/m2     Current Shift:  06/29 0701 - 06/29 1900  In: -   Out: 725 [Urine:725]  Last three shifts:  06/27 1901 - 06/29 0700  In: 5627.3 [I.V.:5627.3]  Out: 7582 [Urine:2850; Drains:50]    Physical Exam:  General: WD, open eyes per verbal stimuli, not cooperative due to sedation,  in mild distress    HEENT: NC, Atraumatic. PERRLA, anicteric sclerae. Lungs: Coarse bilateral lungs   Heart:  Regular  rhythm,  + murmur, No Rubs, No Gallops  Abdomen: Soft, Non distended, Non tender.  +Bowel sounds, colostomy bag with hunter tube   Extremities: No c/c.  Edema   Psych:   Calm   Neurologic:  Open eyes with verbal stimuli           Labs: Results:       Chemistry Recent Labs      06/29/18   0400  06/28/18   0430  06/27/18   0545   GLU  154*  160*  177*   NA  154*  153*  151*   K  4.0  4.1  3.8   CL  120*  119*  118*   CO2  22  21  19*   BUN  39*  43*  41*   CREA  1.01  1.02  1.06   CA  7.7*  7.6*  7.3*   AGAP  12  13  14   BUCR  39*  42*  39*   ALB   --    --   2.4*      CBC w/Diff Recent Labs      06/29/18   0430  06/28/18   0430  06/27/18   0545   WBC  7.2  10.5  13.7*   RBC  2.40*  3.53*  3.66*   HGB  7.0*  9.7*  10.2*   HCT  22.6*  32.1*  32.8*   PLT  91*  119*  130*   GRANS  89*  89*  86*   LYMPH  4*  2*  4*   EOS  4  3  8*      Cardiac Enzymes No results for input(s): CPK, CKND1, FABIAN in the last 72 hours. No lab exists for component: CKRMB, TROIP   Coagulation Recent Labs      06/27/18   1131   PTP  16.3*   INR  1.4*       Lipid Panel Lab Results   Component Value Date/Time    Triglyceride 66 06/26/2018 05:00 AM      BNP No results for input(s): BNPP in the last 72 hours.    Liver Enzymes Recent Labs      06/27/18   0545   ALB  2.4*      Thyroid Studies No results found for: T4, T3U, TSH, TSHEXT, TSHEXT     Procedures/imaging: see electronic medical records for all procedures/Xrays and details which were not copied into this note but were reviewed prior to creation of Nery Vivar MD

## 2018-06-29 NOTE — PROGRESS NOTES
Nephrology Progress note    Subjective:     Darren Mcallister is a 75 y.o. female with PMH DM, HTN, clear cell ovarian ca s/p debulking who presented with abdominal pain and possible sepsis/ischemic colitis. Pt underwent laparotomy/peritoneal lavage/bx, noted to have decreased UOP and increasing Cr to 2.1 today from baseline 0.6. CT neg for mass or hydro. Pt given lasix yesterday, still with high O2 requirements on vent. Unable to provide further history. On 6/8 Pt decompensated, ? Aspiration,  back on vent was hypoxic despite  Fi02 of 100%, , Hypotensive on IV pressors, Acidotic and on HC03 drip, Urine output decreased markedly. Underwent Bronchoscopy 6/9. Pt requires less FiO2 now. UOP picking up. Wound was draining yellow-brown fluid. S/P Exploratory laparotomy, Lysis of adhesions, Ileostomy and wound VAC placement on 6/16. Pt self-extubated 6/17. Remains stable today. UO over 725 cc yesterday. Cr 1.0 and Na 154. CXR shows improving CHF. Sleeping now, had pain meds for wound vac change    Admit Date: 6/4/2018  Allergy:  Allergies   Allergen Reactions    Hydrocodone Other (comments)     Breaks into cold sweat    Metformin Other (comments)     Breaks out into cold sweat. Can Take Glucophage brand name med        Objective:     Visit Vitals    BP (!) 167/94    Pulse 99    Temp 98.6 °F (37 °C)    Resp 24    Ht 5' 1\" (1.549 m)    Wt 106.1 kg (233 lb 14.5 oz)    SpO2 94%    BMI 44.2 kg/m2         Intake/Output Summary (Last 24 hours) at 06/29/18 1405  Last data filed at 06/29/18 1155   Gross per 24 hour   Intake          3406.33 ml   Output             2725 ml   Net           681.33 ml       Physical Exam:     General: NAD   HENT: Atraumatic and normocephalic.    Eyes: Sclera anicteric   Neck: No JVD or mass   Cardiovascular: Normal S1 S2, no m/r/g   Pulmonary/Chest Wall: Decreased breath sounds bilaterally   Abdominal: Soft, obese, NABS   Musculoskeletal: ++ edema LEs   Neurological: Awake       Data Review:      Lab Results   Component Value Date/Time    WBC 7.2 06/29/2018 04:30 AM    RBC 2.40 (L) 06/29/2018 04:30 AM    HCT 22.6 (L) 06/29/2018 04:30 AM    MCV 94.2 06/29/2018 04:30 AM    MCH 29.2 06/29/2018 04:30 AM    MCHC 31.0 06/29/2018 04:30 AM    RDW 26.2 (H) 06/29/2018 04:30 AM      Lab Results   Component Value Date    IRON 8 (L) 06/07/2018   No components found for: FERRITIN  No components found for: PTHINT  Urinalysis  No results found for: UGLU     CXR   6/27  IMPRESSION:     1. Overall, no significant change in this one day interval.     2. Prominence of pulmonary vasculature with interstitial opacities suggesting  pulmonary edema. Superimposed right basilar and left perihilar opacities may  represent superimposed atelectasis or pneumonia. Impression:     -JESS- likely ATN,  S Cr  Down to 1 today with good diuresis  -Hypernatremia, persists  -Septic Shock/hypotension, Hypotension resolved.   -Resp Failure,  Was re-intubated 6/9,  Self extubated   -Severe Metabolic acidosis, improving.   -Ovarian ca s/p debulking then laparoscopy with lavage  -KLEBSIELLA OXYTOCA sepsis (Peritoneal fluid)  -DM  -HTN  -Anemia  -Elevated liver enzymes  -S/P Exploratory laparotomy, Lysis of adhesions, Ileostomy and wound VAC placement on 6/16  -Leukocytosis, improved  -Hypokalemia, repleted   -DVT  - Pulm edema    Plan:   Continue TPN  No lasix today as Na is higher  Replace electrolytes per protocol   Monitor I/O and chemistry, H&H   Antibiotic dosing per pharmacist    MD Denver Paz  [phone]

## 2018-06-29 NOTE — PROGRESS NOTES
Completed placement of IVC filter by Dr Lala Loja. Patient tolerated well. VS stable, reviewed procedure, MEDs, VS and SBAR with Fernando Kelly RN transported back to ICU no distress noted.

## 2018-06-29 NOTE — WOUND CARE
OSTOMY Assessment    Stoma Tissue Assessment: ___Post-op _X__Follow-up       Type of Diversion: __X_New___ Established ___Revision__X_Permanent____Temporary    _____  Ileostomy   ___X__  Colostomy: ____ Ascending, ____ Transverse, _____.  Sigmoid   _____  [place]   _____  Ileal Conduit   _____  Mucous Fistula     Appearance of Ostomy:   __X_ Bright Red /Moist/Viable ___ Dark Red ___ Pink ___ Sloughing ___Necrotic____Pallor ____Red  ___Dry ____Moist    Stoma Size: ____35_____ (mm) 1 3/8 inch _X_Round ___ Welford Lipa ___Irregular     Stoma Height:   ____Flush ____Retracted __X_Budded ____Edematous ____Prolapse     Stoma Location  ____ Left Side          __X_Right Side     _____Umbilicus  _____Incision Line  ____ Above Belt       ____ Below Belt    Abdominal Contours  ____ Firm ____ Flat __X_Flabby _x_Soft ___Round ___ Hard ___ Other    GI Stoma Function: _x_ Yes ___No   Output:   ____Sero-Sanguenous ____Sanguenous____ Bilious ____ Liquid __x_Semi-liquid ____ Pasty ____ Formed ___Soft ___Firm ____ Black_x__Brown ____Foul Odor    Flatus Present: ___Yes  ___No  ? unknow      Peristomal skin:   _x_ Intact         Application for Patient  Pouch System Recommended: __One-Piece _X_Two-Piece ___Custom     Flat:   ___Pre-cut   _X_Cut-to fit

## 2018-06-29 NOTE — PROGRESS NOTES
Pharmacy Dosing Services: Vancomycin    Consult for Vancomycin Dosing by Pharmacy by Dr. Aranza Nugent provided for this 75y.o. year old female , for indication of sepsis. Day of Therapy 8 of 14  (14 days of therapy requested by Dr. Evi Cohen during ICU rounds)    Scr = 1.01   CrCl = 59.8 ml/min   WBC = 7.2   Current dose:  Vancomycin 1500 mg IV every 24 hours  (order adjusted to be compounded in D5W per MD request due to Na = 154). Ordered Vancomycin Trough for  6/29/18 at 18:00 (~ 1 hour prior to 19:00 dose). Goal therapeutic range:  15 - 20     Ht Readings from Last 1 Encounters:   06/24/18 154.9 cm (61\")        Wt Readings from Last 1 Encounters:   06/29/18 106.1 kg (233 lb 14.5 oz)      Other Current Antibiotics / Antifungals Zosyn (to end 6/29/18)  Levaquin  Flagyl  Micafungin  (to end after 14 days therapy)     Serum Creatinine Lab Results   Component Value Date/Time    Creatinine 1.01 06/29/2018 04:00 AM      Creatinine Clearance Estimated Creatinine Clearance: 59.8 mL/min (based on Cr of 1.01). BUN Lab Results   Component Value Date/Time    BUN 39 (H) 06/29/2018 04:00 AM    BUN, POC 37 (H) 06/15/2018 05:14 PM      WBC Lab Results   Component Value Date/Time    WBC 7.2 06/29/2018 04:30 AM      H/H Lab Results   Component Value Date/Time    HGB 7.0 (L) 06/29/2018 04:30 AM      Platelets Lab Results   Component Value Date/Time    PLATELET 91 (L) 22/14/6458 04:30 AM      Temp 97.6 °F (36.4 °C)     Pharmacy to follow daily and will make changes to dose and/or frequency based on clinical status.   Pharmacist Gomez De Jesus, [address]

## 2018-06-29 NOTE — PROGRESS NOTES
Admit date: 6/4/2018      ASSESSMENT/PLAN  Tammy Ward a 75 y. 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 Will need adjuvant chemotherapy to complete primary therapy. GI - Peritonitis secondary to diverticulitis with perforation diverted with distal ileostomy. Post operative ileus resolved. Good ileostomy output. Patient not taking much po. Continue TPN. ID - sepsis secondary to peritonitis. On Flagyl, Zosyn, Levaquin, Vanc and micafungin . WBC trending down. Patient remains afebrile. Drain tip culture pending. Renal - JESS, resolving. Heme - Stable   DVT- bilateral peroneal DVT. IVC filter placed today without issues. CV - stable  Pulm - supplemental O2 required  Psych - selectively interactive, communicating better today  Disp - condition labile, continue ICU care  Code Status - Full Code      SUBJECTIVE  No new complaints. Pain is controlled. No nausea. No vomiting.        OBJECTIVE  Physical Exam:  Patient Vitals for the past 24 hrs:   BP Temp Pulse Resp SpO2   06/28/18 2020 (!) 141/91 - - - -   06/28/18 1900 - 97.9 °F (36.6 °C) - - -   06/28/18 1800 141/88 - 91 20 96 %   06/28/18 1700 151/85 - 70 20 95 %   06/28/18 1600 148/85 - 76 15 99 %   06/28/18 1542 115/58 97.1 °F (36.2 °C) 75 18 96 %   06/28/18 1500 155/79 - 80 15 95 %   06/28/18 1400 150/79 - 77 14 95 %   06/28/18 1300 (!) 164/91 - 82 19 97 %   06/28/18 1200 154/82 - 81 18 99 %   06/28/18 1114 - 97.5 °F (36.4 °C) - - -   06/28/18 1100 157/89 - 77 19 99 %   06/28/18 1000 137/75 - 79 21 100 %   06/28/18 0930 (!) 154/95 - 90 22 100 %   06/28/18 0920 141/84 - 81 15 -   06/28/18 0858 (!) 147/91 - 77 16 95 %   06/28/18 0855 (!) 154/97 - 80 16 96 %   06/28/18 0850 160/90 - 81 26 -   06/28/18 0845 (!) 156/93 - 77 22 96 %   06/28/18 0839 (!) 156/93 - 83 24 97 %   06/28/18 0800 152/86 - 79 17 98 %   06/28/18 0759 - - - - 97 %   06/28/18 0732 - 97.6 °F (36.4 °C) - - -   06/28/18 0730 146/90 - 78 19 96 %   06/28/18 0700 (!) 152/91 - 77 19 96 %   06/28/18 0600 144/80 - 77 19 99 %   06/28/18 0500 114/72 - 80 15 98 %   06/28/18 0400 140/81 - 72 17 98 %   06/28/18 0307 139/84 - 84 - -   06/28/18 0300 139/84 - 82 20 97 %   06/28/18 0200 149/87 - 86 22 96 %   06/28/18 0100 145/82 - 89 20 97 %   06/28/18 0000 151/75 98.7 °F (37.1 °C) 72 17 97 %   06/27/18 2300 132/78 - - - -   06/27/18 2200 126/71 - 67 17 98 %         Intake/Output Summary (Last 24 hours) at 06/28/18 2111  Last data filed at 06/28/18 1924   Gross per 24 hour   Intake          3584.33 ml   Output             1800 ml   Net          1784.33 ml      General - resting in no acute distress, alert and oriented  HEENT - within normal limits  Heart - regular rate and rhythm  Lungs - clear to auscultation  Abdomen - soft, nontender, nondistended, normal bowel sounds  Incision - clean, dry, intact  Extremities - no edema    Labs  CBC  Recent Labs      06/28/18   0430  06/27/18   0545  06/26/18   0500   WBC  10.5  13.7*   --    HCT  32.1*  32.8*  29.4*   MCV  90.9  89.6   --    BANDS  3  1   --    MONOS  3  0*   --    EOS  3  8*   --    BASOS  0  1   --         CMP  Recent Labs      06/28/18   0430  06/27/18   0545  06/26/18   0500   NA  153*  151*  150*   CO2  21  19*  19*   BUN  43*  41*  49*        Lab Results   Component Value Date/Time    Glucose 160 (H) 06/28/2018 04:30 AM    Glucose (POC) 124 (H) 06/28/2018 06:08 PM    Glucose,  (H) 06/15/2018 05:14 PM          Current Hospital Medications    Current Facility-Administered Medications:     0.9% sodium chloride infusion, 25 mL/hr, IntraVENous, CONTINUOUS, Laine Gleason MD    fentaNYL citrate (PF) injection  mcg,  mcg, IntraVENous, Laine Bagley MD, 25 mcg at 06/28/18 0850    flumazenil (ROMAZICON) 0.1 mg/mL injection 0.2 mg, 0.2 mg, IntraVENous, Laine Bagley MD    naloxone [facility]) injection 0.2 mg, 0.2 mg, IntraVENous, PRN, Yelitza Bazan MD    HYDROmorphone (PF) (DILAUDID) injection 0.5 mg, 0.5 mg, IntraVENous, Q2H PRN, Blaire Yeung MD, 0.5 mg at 06/28/18 1835    midazolam (VERSED) injection 0.5-4 mg, 0.5-4 mg, IntraVENous, Rad Multiple, Yelitza Bazan MD    saliva stimulant (BIOTENE) 1 Spray, 1 Spray, Oral, PRN, Kostas Dye MD, 1 Spray at 06/28/18 1108  [address] [facility], , IntraVENous, CONTINUOUS, Jackie Jeffrey MD, Last Rate: 80 mL/hr at 06/28/18 1749    levoFLOXacin (LEVAQUIN) 750 mg in D5W IVPB, 750 mg, IntraVENous, Q24H, Blaire Yeung MD, Last Rate: 100 mL/hr at 06/28/18 1746, 750 mg at 06/28/18 1746    acetaminophen (OFIRMEV) infusion 1,000 mg, 1,000 mg, IntraVENous, Q6H, Blaire Yeung MD, Last Rate: 400 mL/hr at 06/28/18 2020, 1,000 mg at 06/28/18 2020    metoprolol (LOPRESSOR) injection 5 mg, 5 mg, IntraVENous, Q6H, Kostas Dye MD, 5 mg at 06/28/18 2020    hydrALAZINE (APRESOLINE) 20 mg/mL injection 10 mg, 10 mg, IntraVENous, Q4H PRN, Kostas Dye MD, 10 mg at 06/26/18 2045    albumin human 25% (BUMINATE) solution 12.5 g, 12.5 g, IntraVENous, Q8H, Berneice Haas MD, 12.5 g at 06/28/18 1617    vancomycin (VANCOCIN) 1500 mg in  ml infusion, 1,500 mg, IntraVENous, Q24H, Jackie Jeffrey MD, Last Rate: 250 mL/hr at 06/28/18 1833, 1,500 mg at 06/28/18 1833    insulin glargine (LANTUS) injection 13 Units, 13 Units, SubCUTAneous, DAILY, Lou Jordan MD, 13 Units at 06/28/18 0933    0.9% sodium chloride infusion 250 mL, 250 mL, IntraVENous, PRN, Jackie Jeffrey MD    micafungin [facility]) 100 mg in 0.9% sodium chloride (MBP/ADV) 100 mL, 100 mg, IntraVENous, Q24H, Jackie Jeffrey MD, Last Rate: 100 mL/hr at 06/28/18 1404, 100 mg at 06/28/18 1404    insulin lispro (HUMALOG) injection, , SubCUTAneous, Q6H, Jackie Jeffrey MD, Stopped at 06/28/18 1800    dextrose (D50W) injection syrg 12.5-25 g, 25-50 mL, IntraVENous, PRN, Myriam Smallwood MD    Vancomycin - Pharmacy to Dose, 1 Each, Other, Rx Dosing/Monitoring, Myriam Smallwood MD    metroNIDAZOLE (FLAGYL) IVPB premix 500 mg, 500 mg, IntraVENous, Q8H, Judge Juan J MD, Last Rate: 100 mL/hr at 06/28/18 2020, 500 mg at 06/28/18 2020    ipratropium (ATROVENT) 0.02 % nebulizer solution 0.5 mg, 0.5 mg, Nebulization, Q4H PRN, Judge Juna J MD, 0.5 mg at 06/26/18 0730    piperacillin-tazobactam (ZOSYN) 2.25 g in 0.9% sodium chloride (MBP/ADV) 50 mL MBP, 2.25 g, IntraVENous, Q6H, Pietro Forman MD, Last Rate: 100 mL/hr at 06/28/18 1858, 2.25 g at 06/28/18 1858    naloxone (NARCAN) injection 0.4 mg, 0.4 mg, IntraVENous, EVERY 2 MINUTES AS NEEDED, Myriam Smallwood MD    [facility]ron [facility]) injection 4 mg, 4 mg, IntraVENous, Q6H PRN, Reilly Ann MD, 4 mg at 06/08/18 2152    oxymetazoline (AFRIN) 0.05 % nasal spray 2 Spray, 2 Spray, Both Nostrils, BID PRN, Raquel Virgen MD    alum-mag hydroxide-simeth (MYLANTA) oral suspension 30 mL, 30 mL, Oral, Q4H PRN, Christen Mathews MD, 30 mL at 06/07/18 2225    fluticasone (FLONASE) 50 mcg/actuation nasal spray 2 Spray, 2 Spray, Both Nostrils, DAILY PRN, Ean Griffin MD    ELECTROLYTE REPLACEMENT PROTOCOL-POTASSIUM Renal Dosing, 1 Each, Other, PRN, Christen Salgado MD    ELECTROLYTE REPLACEMENT PROTOCOL-MAGNESIUM, 1 Each, Other, PRN, Christen Salgado MD    ELECTROLYTE REPLACEMENT PROTOCOL-PHOSPHORUS Renal Dosing, 1 Each, Other, PRN, Christen Salgado MD    ELECTROLYTE REPLACEMENT PROTOCOL-CALCIUM, 1 Each, Other, PRN, Christen Salgado MD    sodium chloride (NS) flush 5-10 mL, 5-10 mL, IntraVENous, PRN, Bhakti Flores MD    glucose chewable tablet 16 g, 4 Tab, Oral, PRN, Christen Salgado MD    glucagon (GLUCAGEN) injection 1 mg, 1 mg, IntraMUSCular, PRN, Christen Salgado MD    pantoprazole (PROTONIX) 40 mg in sodium chloride 0.9% 10 mL injection, 40 mg, IntraVENous, DAILY, Christen Salgado MD, 40 mg at 06/28/18 Maggie Gerber MD

## 2018-06-30 ENCOUNTER — APPOINTMENT (OUTPATIENT)
Dept: GENERAL RADIOLOGY | Age: 68
DRG: 853 | End: 2018-06-30
Attending: HOSPITALIST
Payer: MEDICARE

## 2018-06-30 LAB
ABO + RH BLD: NORMAL
ANION GAP SERPL CALC-SCNC: 10 MMOL/L (ref 3–18)
ARTERIAL PATENCY WRIST A: YES
ARTERIAL PATENCY WRIST A: YES
BASE DEFICIT BLD-SCNC: 4 MMOL/L
BASE DEFICIT BLD-SCNC: 5 MMOL/L
BASOPHILS # BLD: 0 K/UL (ref 0–0.06)
BASOPHILS NFR BLD: 0 % (ref 0–2)
BDY SITE: ABNORMAL
BDY SITE: ABNORMAL
BLD PROD TYP BPU: NORMAL
BLOOD GROUP ANTIBODIES SERPL: NORMAL
BODY TEMPERATURE: 98.6
BPU ID: NORMAL
BUN SERPL-MCNC: 37 MG/DL (ref 7–18)
BUN/CREAT SERPL: 41 (ref 12–20)
CA-I SERPL-SCNC: 1.17 MMOL/L (ref 1.12–1.32)
CALCIUM SERPL-MCNC: 7.7 MG/DL (ref 8.5–10.1)
CALLED TO:,BCALL1: NORMAL
CHLORIDE SERPL-SCNC: 112 MMOL/L (ref 100–108)
CO2 SERPL-SCNC: 22 MMOL/L (ref 21–32)
CREAT SERPL-MCNC: 0.9 MG/DL (ref 0.6–1.3)
CROSSMATCH RESULT,%XM: NORMAL
DIFFERENTIAL METHOD BLD: ABNORMAL
EOSINOPHIL # BLD: 0.4 K/UL (ref 0–0.4)
EOSINOPHIL NFR BLD: 4 % (ref 0–5)
ERYTHROCYTE [DISTWIDTH] IN BLOOD BY AUTOMATED COUNT: 25.7 % (ref 11.6–14.5)
GAS FLOW.O2 O2 DELIVERY SYS: ABNORMAL L/MIN
GAS FLOW.O2 O2 DELIVERY SYS: ABNORMAL L/MIN
GAS FLOW.O2 SETTING OXYMISER: 45 L/M
GLUCOSE BLD STRIP.AUTO-MCNC: 115 MG/DL (ref 70–110)
GLUCOSE BLD STRIP.AUTO-MCNC: 146 MG/DL (ref 70–110)
GLUCOSE BLD STRIP.AUTO-MCNC: 157 MG/DL (ref 70–110)
GLUCOSE BLD STRIP.AUTO-MCNC: 180 MG/DL (ref 70–110)
GLUCOSE SERPL-MCNC: 156 MG/DL (ref 74–99)
HCO3 BLD-SCNC: 20.8 MMOL/L (ref 22–26)
HCO3 BLD-SCNC: 21.7 MMOL/L (ref 22–26)
HCT VFR BLD AUTO: 33.1 % (ref 35–45)
HGB BLD-MCNC: 10 G/DL (ref 12–16)
LYMPHOCYTES # BLD: 0.4 K/UL (ref 0.9–3.6)
LYMPHOCYTES NFR BLD: 4 % (ref 21–52)
MAGNESIUM SERPL-MCNC: 2.1 MG/DL (ref 1.6–2.6)
MCH RBC QN AUTO: 27.7 PG (ref 24–34)
MCHC RBC AUTO-ENTMCNC: 30.2 G/DL (ref 31–37)
MCV RBC AUTO: 91.7 FL (ref 74–97)
MONOCYTES # BLD: 0.2 K/UL (ref 0.05–1.2)
MONOCYTES NFR BLD: 2 % (ref 3–10)
NEUTS SEG # BLD: 8.9 K/UL (ref 1.8–8)
NEUTS SEG NFR BLD: 90 % (ref 40–73)
O2/TOTAL GAS SETTING VFR VENT: 70 %
O2/TOTAL GAS SETTING VFR VENT: 80 %
PCO2 BLD: 40.9 MMHG (ref 35–45)
PCO2 BLD: 41 MMHG (ref 35–45)
PEEP RESPIRATORY: 6 CMH2O
PH BLD: 7.31 [PH] (ref 7.35–7.45)
PH BLD: 7.33 [PH] (ref 7.35–7.45)
PHOSPHATE SERPL-MCNC: 2.9 MG/DL (ref 2.5–4.9)
PIP ISTAT,IPIP: 12
PLATELET # BLD AUTO: 104 K/UL (ref 135–420)
PO2 BLD: 115 MMHG (ref 80–100)
PO2 BLD: 291 MMHG (ref 80–100)
POTASSIUM SERPL-SCNC: 4.1 MMOL/L (ref 3.5–5.5)
RBC # BLD AUTO: 3.61 M/UL (ref 4.2–5.3)
SAO2 % BLD: 100 % (ref 92–97)
SAO2 % BLD: 98 % (ref 92–97)
SERVICE CMNT-IMP: ABNORMAL
SERVICE CMNT-IMP: ABNORMAL
SODIUM SERPL-SCNC: 144 MMOL/L (ref 136–145)
SPECIMEN EXP DATE BLD: NORMAL
SPECIMEN TYPE: ABNORMAL
SPECIMEN TYPE: ABNORMAL
SPONTANEOUS TIMED, IST: YES
STATUS OF UNIT,%ST: NORMAL
TOTAL RESP. RATE, ITRR: 15
TOTAL RESP. RATE, ITRR: 16
UNIT DIVISION, %UDIV: 0
WBC # BLD AUTO: 9.9 K/UL (ref 4.6–13.2)

## 2018-06-30 PROCEDURE — 71045 X-RAY EXAM CHEST 1 VIEW: CPT

## 2018-06-30 PROCEDURE — 74011000258 HC RX REV CODE- 258: Performed by: HOSPITALIST

## 2018-06-30 PROCEDURE — 87086 URINE CULTURE/COLONY COUNT: CPT | Performed by: INTERNAL MEDICINE

## 2018-06-30 PROCEDURE — 65270000029 HC RM PRIVATE

## 2018-06-30 PROCEDURE — 74011250636 HC RX REV CODE- 250/636: Performed by: OBSTETRICS & GYNECOLOGY

## 2018-06-30 PROCEDURE — 74011636637 HC RX REV CODE- 636/637: Performed by: INTERNAL MEDICINE

## 2018-06-30 PROCEDURE — 87040 BLOOD CULTURE FOR BACTERIA: CPT | Performed by: INTERNAL MEDICINE

## 2018-06-30 PROCEDURE — 74011000250 HC RX REV CODE- 250: Performed by: INTERNAL MEDICINE

## 2018-06-30 PROCEDURE — 74011250636 HC RX REV CODE- 250/636: Performed by: HOSPITALIST

## 2018-06-30 PROCEDURE — 74011636637 HC RX REV CODE- 636/637: Performed by: HOSPITALIST

## 2018-06-30 PROCEDURE — 94660 CPAP INITIATION&MGMT: CPT

## 2018-06-30 PROCEDURE — 74011250636 HC RX REV CODE- 250/636: Performed by: INTERNAL MEDICINE

## 2018-06-30 PROCEDURE — 36600 WITHDRAWAL OF ARTERIAL BLOOD: CPT

## 2018-06-30 PROCEDURE — 82803 BLOOD GASES ANY COMBINATION: CPT

## 2018-06-30 PROCEDURE — P9047 ALBUMIN (HUMAN), 25%, 50ML: HCPCS | Performed by: INTERNAL MEDICINE

## 2018-06-30 PROCEDURE — 83735 ASSAY OF MAGNESIUM: CPT | Performed by: SURGERY

## 2018-06-30 PROCEDURE — 84100 ASSAY OF PHOSPHORUS: CPT | Performed by: SURGERY

## 2018-06-30 PROCEDURE — 74011000258 HC RX REV CODE- 258: Performed by: INTERNAL MEDICINE

## 2018-06-30 PROCEDURE — 82330 ASSAY OF CALCIUM: CPT | Performed by: SURGERY

## 2018-06-30 PROCEDURE — C9113 INJ PANTOPRAZOLE SODIUM, VIA: HCPCS | Performed by: INTERNAL MEDICINE

## 2018-06-30 PROCEDURE — 36415 COLL VENOUS BLD VENIPUNCTURE: CPT | Performed by: INTERNAL MEDICINE

## 2018-06-30 PROCEDURE — 80048 BASIC METABOLIC PNL TOTAL CA: CPT | Performed by: SURGERY

## 2018-06-30 PROCEDURE — 77010033711 HC HIGH FLOW OXYGEN

## 2018-06-30 PROCEDURE — 85025 COMPLETE CBC W/AUTO DIFF WBC: CPT | Performed by: SURGERY

## 2018-06-30 RX ADMIN — PIPERACILLIN SODIUM,TAZOBACTAM SODIUM 2.25 G: 2; .25 INJECTION, POWDER, FOR SOLUTION INTRAVENOUS at 02:56

## 2018-06-30 RX ADMIN — HYDROMORPHONE HYDROCHLORIDE 1 MG: 2 INJECTION, SOLUTION INTRAMUSCULAR; INTRAVENOUS; SUBCUTANEOUS at 02:56

## 2018-06-30 RX ADMIN — MEROPENEM 1 G: 1 INJECTION, POWDER, FOR SOLUTION INTRAVENOUS at 12:04

## 2018-06-30 RX ADMIN — HYDROMORPHONE HYDROCHLORIDE 1 MG: 2 INJECTION, SOLUTION INTRAMUSCULAR; INTRAVENOUS; SUBCUTANEOUS at 07:21

## 2018-06-30 RX ADMIN — ACETAMINOPHEN 1000 MG: 10 INJECTION, SOLUTION INTRAVENOUS at 09:16

## 2018-06-30 RX ADMIN — MAGNESIUM SULFATE HEPTAHYDRATE: 500 INJECTION, SOLUTION INTRAMUSCULAR; INTRAVENOUS at 18:23

## 2018-06-30 RX ADMIN — METRONIDAZOLE 500 MG: 500 INJECTION, SOLUTION INTRAVENOUS at 05:21

## 2018-06-30 RX ADMIN — SODIUM HYPOCHLORITE: 1.25 SOLUTION TOPICAL at 15:00

## 2018-06-30 RX ADMIN — INSULIN GLARGINE 13 UNITS: 100 INJECTION, SOLUTION SUBCUTANEOUS at 09:10

## 2018-06-30 RX ADMIN — ALBUMIN (HUMAN) 12.5 G: 0.25 INJECTION, SOLUTION INTRAVENOUS at 05:23

## 2018-06-30 RX ADMIN — ACETAMINOPHEN 1000 MG: 10 INJECTION, SOLUTION INTRAVENOUS at 14:42

## 2018-06-30 RX ADMIN — MEROPENEM 1 G: 1 INJECTION, POWDER, FOR SOLUTION INTRAVENOUS at 20:14

## 2018-06-30 RX ADMIN — INSULIN LISPRO 2 UNITS: 100 INJECTION, SOLUTION INTRAVENOUS; SUBCUTANEOUS at 02:57

## 2018-06-30 RX ADMIN — METOPROLOL TARTRATE 5 MG: 5 INJECTION INTRAVENOUS at 20:14

## 2018-06-30 RX ADMIN — METOPROLOL TARTRATE 5 MG: 5 INJECTION INTRAVENOUS at 14:36

## 2018-06-30 RX ADMIN — METRONIDAZOLE 500 MG: 500 INJECTION, SOLUTION INTRAVENOUS at 11:31

## 2018-06-30 RX ADMIN — METOPROLOL TARTRATE 5 MG: 5 INJECTION INTRAVENOUS at 02:56

## 2018-06-30 RX ADMIN — METRONIDAZOLE 500 MG: 500 INJECTION, SOLUTION INTRAVENOUS at 20:14

## 2018-06-30 RX ADMIN — SODIUM HYPOCHLORITE: 1.25 SOLUTION TOPICAL at 03:00

## 2018-06-30 RX ADMIN — SODIUM HYPOCHLORITE: 1.25 SOLUTION TOPICAL at 21:00

## 2018-06-30 RX ADMIN — INSULIN LISPRO 2 UNITS: 100 INJECTION, SOLUTION INTRAVENOUS; SUBCUTANEOUS at 18:36

## 2018-06-30 RX ADMIN — SODIUM CHLORIDE 40 MG: 9 INJECTION INTRAMUSCULAR; INTRAVENOUS; SUBCUTANEOUS at 09:20

## 2018-06-30 RX ADMIN — MICAFUNGIN SODIUM 100 MG: 20 INJECTION, POWDER, LYOPHILIZED, FOR SOLUTION INTRAVENOUS at 17:13

## 2018-06-30 RX ADMIN — SODIUM HYPOCHLORITE: 1.25 SOLUTION TOPICAL at 09:00

## 2018-06-30 RX ADMIN — ACETAMINOPHEN 1000 MG: 10 INJECTION, SOLUTION INTRAVENOUS at 02:56

## 2018-06-30 RX ADMIN — ALBUMIN (HUMAN) 12.5 G: 0.25 INJECTION, SOLUTION INTRAVENOUS at 15:36

## 2018-06-30 RX ADMIN — METOPROLOL TARTRATE 5 MG: 5 INJECTION INTRAVENOUS at 07:21

## 2018-06-30 RX ADMIN — VANCOMYCIN HYDROCHLORIDE 1500 MG: 10 INJECTION, POWDER, LYOPHILIZED, FOR SOLUTION INTRAVENOUS at 13:11

## 2018-06-30 NOTE — PROGRESS NOTES
Consult for TPN Dosing per Pharmacy by Dr. Beto Caballero provided for this 75 y.o. female, for indication of Ileus  Day of Therapy 13     TPN Dosing Wt:  62.4 kg     Labs:  BMP BMP:   Lab Results   Component Value Date/Time     06/30/2018 03:30 AM    K 4.1 06/30/2018 03:30 AM     (H) 06/30/2018 03:30 AM    CO2 22 06/30/2018 03:30 AM    AGAP 10 06/30/2018 03:30 AM     (H) 06/30/2018 03:30 AM    BUN 37 (H) 06/30/2018 03:30 AM    CREA 0.90 06/30/2018 03:30 AM    GFRAA >60 06/30/2018 03:30 AM    GFRNA >60 06/30/2018 03:30 AM          CMP CMP:   Lab Results   Component Value Date/Time     06/30/2018 03:30 AM    K 4.1 06/30/2018 03:30 AM     (H) 06/30/2018 03:30 AM    CO2 22 06/30/2018 03:30 AM    AGAP 10 06/30/2018 03:30 AM     (H) 06/30/2018 03:30 AM    BUN 37 (H) 06/30/2018 03:30 AM    CREA 0.90 06/30/2018 03:30 AM    GFRAA >60 06/30/2018 03:30 AM    GFRNA >60 06/30/2018 03:30 AM    CA 7.7 (L) 06/30/2018 03:30 AM    MG 2.1 06/30/2018 03:30 AM    PHOS 2.9 06/30/2018 03:30 AM         Ca/ Phos Lab Results   Component Value Date/Time    Calcium 7.7 (L) 06/30/2018 03:30 AM    Phosphorus 2.9 06/30/2018 03:30 AM       Mag    Lab Results   Component Value Date/Time    Magnesium 2.1 06/30/2018 03:30 AM      Tg No components found for: TGL   Albumin Lab Results   Component Value Date/Time    Protein, total 5.9 (L) 06/26/2018 05:00 AM    Albumin 2.4 (L) 06/27/2018 05:45 AM         Goals  Kcal requirements:  ~ 22 kcal/kg     (1396 kcal/day)  Fluid requirements    ~ 30 ml/kg  Macronutrients:  Protein  1.3 g/kg/day             80 g/day = 320 kcal  Lipids    ~ 28% of total kcal        44 g/day = 396 kcal  Dextrose remainder of total kcal   200 g/day = 680 kcal     TPN  will continue at 100% goal macronutrients. TPN rate at 80 ml/hr. Pt does not have any IV maintenance fluids ordered at present time. Electrolytes to be monitored and adjusted daily.   MD to replete electrolytes acutely outside of TPN as needed. Additional recommendations/Orders:   1. Corrective insulin coverage q6h while on TPN. TPN also contains 10 units regular insulin. Pt also has order for Lantus 13 units SQ daily. 2.   MD to further add  /adjust IV maintenance fluids as appropriate while on TPN. 3.   K acetate adjusted to 80 mEq   (total K in today's TPN: 115.2 mEq)  2. BMP, Mag, Phos ordered daily  3.  Triglycerides, Prealbumin, CMP ordered upon initiation and weekly

## 2018-06-30 NOTE — PROGRESS NOTES
[facility]                  Pulmonary, Critical Care, and Sleep Medicine     Name: Kindra Nathan MRN: [id number]   : 1950 Hospital: [facility] [place]    Date: 2018        PCCM Note                                              Subjective/History of Present Illness:   Patient is a 75 y.o. 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 S/p exploratory laparotomy 6/15/18 - findings of sigmoid diverticulitis with suspected perforation, underwent ileostomy and wound vac placement. This AM required to be on HF O2 NC at 40 Lpm for drop in SPO2 and lethargy  Awake, weak looking, follows some simple commands, opens eyes on calling name  Had fever this AM.  Persistent leg edema  Pain better controlled on Dilaudid, total 6 mg in past 24 hrs  Ileostomy without bleeding. On TPN  Hemodynamically stable  Developed an episode of mucus plugging this afternoon requiring deep suctioning and bagging with expectoration of mucus plug with improved breathing. Had wound vac change . Going to get 1 unit of PRBC per surgery   Had removal of BRANDON drain on 18, tip culture so far negative    Review of Systems   Review of systems not obtained due to patient factors. Surgeries  18 - Laparoscopy converted to laparotomy, total abdominal hysterectomy, bilateral salpingo-oophorectomy, omentectomy, bilateral pelvic and periaortic lymphadenectomy and radical tumor debulking to R0.    Path - ADENOCARCINOMA OF LEFT OVARY.     18 - Diagnostic laparoscopy converted to laparotomy, peritoneal biopsies, aerobic and anaerobic cultures of peritoneal fluid and peritoneal lavage. 6/15/18 - Wound exploration. Exploratory laparotomy. Lysis of adhesions. Ileostomy and wound VAC placement. Allergies   Allergen Reactions    Hydrocodone Other (comments)     Breaks into cold sweat    Metformin Other (comments)     Breaks out into cold sweat.  Can Take Glucophage brand name med      Past Medical History:   Diagnosis Date    Diabetes ([address]     many years type 2    Hypertension     many years      Past Surgical History:   Procedure Laterality Date    HX OOPHORECTOMY  05/29/2018    HX TONSILLECTOMY      as a child       Social History   Substance Use Topics    Smoking status: Never Smoker    Smokeless tobacco: Never Used    Alcohol use No        Current Facility-Administered Medications   Medication Dose Route Frequency    TPN ADULT - CENTRAL   IntraVENous CONTINUOUS    vancomycin (VANCOCIN) 1500 mg in D5W 500 mL infusion  1,500 mg IntraVENous Q24H    TPN ADULT - CENTRAL   IntraVENous CONTINUOUS    sodium hypochlorite (QUARTER STRENGTH DAKIN'S) 0.125% irrigation (bottle)   Topical Q6H    0.9% sodium chloride infusion  25 mL/hr IntraVENous CONTINUOUS    levoFLOXacin (LEVAQUIN) 750 mg in D5W IVPB  750 mg IntraVENous Q24H    acetaminophen (OFIRMEV) infusion 1,000 mg  1,000 mg IntraVENous Q6H    metoprolol (LOPRESSOR) injection 5 mg  5 mg IntraVENous Q6H    albumin human 25% (BUMINATE) solution 12.5 g  12.5 g IntraVENous Q8H    insulin glargine (LANTUS) injection 13 Units  13 Units SubCUTAneous DAILY    micafungin (MYCAMINE) 100 mg in 0.9% sodium chloride (MBP/ADV) 100 mL  100 mg IntraVENous Q24H    insulin lispro (HUMALOG) injection   SubCUTAneous Q6H    Vancomycin - Pharmacy to Dose  1 Each Other Rx Dosing/Monitoring    metroNIDAZOLE (FLAGYL) IVPB premix 500 mg  500 mg IntraVENous Q8H    piperacillin-tazobactam (ZOSYN) 2.25 g in 0.9% sodium chloride (MBP/ADV) 50 mL MBP  2.25 g IntraVENous Q6H    pantoprazole (PROTONIX) 40 mg in sodium chloride 0.9% 10 mL injection  40 mg IntraVENous DAILY         Objective:   Vital Signs:    Visit Vitals    BP (!) 151/95    Pulse (!) 108    Temp (!) 101.5 °F (38.6 °C)    Resp 14    Ht 5' 1\" (1.549 m)    Wt 106.1 kg (233 lb 14.5 oz)    SpO2 99%    BMI 44.2 kg/m2       O2 Device: Nasal cannula   O2 Flow Rate (L/min): 45 l/min   Temp (24hrs), Av.1 °F (36.7 °C), Min:97.5 °F (36.4 °C), Max:101.5 °F (38.6 °C)       Intake/Output:   Last shift:           Last 3 shifts:  1901 -  0700  In: 7277 [I.V.:7277]  Out: 7171 [Urine:3550]      Intake/Output Summary (Last 24 hours) at 18 0957  Last data filed at 18 0246   Gross per 24 hour   Intake             5234 ml   Output             3000 ml   Net             2234 ml       Physical Exam:  General/Neurology: awake, following commands, weak looking, on O2 via HF NC  Head:   Normocephalic, without obvious abnormality, atraumatic. Eye:   no scleral icterus, no pallor, no cyanosis. Pupils not dilated. Neck:   Symmetric. No lymphadenopathy. Trachea midline  Lung: Moderate equal air entry bilateral. Decreased breath sounds bases. No wheezing or crackles at bases. Heart:   S1 S2 present. No murmur. No JVD. Abdomen:  Soft. Obese. + BS. Midline lower abd surgical site - open wound with wound vac. BRANDON drain LLQ, Rt abd wall Ileostomy. No abd distension or guarding. Extremities:  No cyanosis. No clubbing. No hand edema. B/l feet/leg/thigh edema 2+. Pulses: Palpable radials and DPs bilateral.   Lymphatic:  No cervical or supraclav lymphadenopathy.    Skin:   No rash      Data:       Recent Results (from the past 12 hour(s))   GLUCOSE, POC    Collection Time: 18 11:59 PM   Result Value Ref Range    Glucose (POC) 180 (H) 70 - 110 mg/dL   MAGNESIUM    Collection Time: 18  3:30 AM   Result Value Ref Range    Magnesium 2.1 1.6 - 2.6 mg/dL   CALCIUM, IONIZED    Collection Time: 18  3:30 AM   Result Value Ref Range Ionized Calcium 1.17 1.12 - 1.98 MMOL/L   METABOLIC PANEL, BASIC    Collection Time: 06/30/18  3:30 AM   Result Value Ref Range    Sodium 144 136 - 145 mmol/L    Potassium 4.1 3.5 - 5.5 mmol/L    Chloride 112 (H) 100 - 108 mmol/L    CO2 22 21 - 32 mmol/L    Anion gap 10 3.0 - 18 mmol/L    Glucose 156 (H) 74 - 99 mg/dL    BUN 37 (H) 7.0 - 18 MG/DL    Creatinine 0.90 0.6 - 1.3 MG/DL    BUN/Creatinine ratio 41 (H) 12 - 20      GFR est AA >60 >60 ml/min/1.73m2    GFR est non-AA >60 >60 ml/min/1.73m2    Calcium 7.7 (L) 8.5 - 10.1 MG/DL   PHOSPHORUS    Collection Time: 06/30/18  3:30 AM   Result Value Ref Range    Phosphorus 2.9 2.5 - 4.9 MG/DL   CBC WITH AUTOMATED DIFF    Collection Time: 06/30/18  3:30 AM   Result Value Ref Range    WBC 9.9 4.6 - 13.2 K/uL    RBC 3.61 (L) 4.20 - 5.30 M/uL    HGB 10.0 (L) 12.0 - 16.0 g/dL    HCT 33.1 (L) 35.0 - 45.0 %    MCV 91.7 74.0 - 97.0 FL    MCH 27.7 24.0 - 34.0 PG    MCHC 30.2 (L) 31.0 - 37.0 g/dL    RDW 25.7 (H) 11.6 - 14.5 %    PLATELET 347 (L) 007 - 420 K/uL    NEUTROPHILS 90 (H) 40 - 73 %    LYMPHOCYTES 4 (L) 21 - 52 %    MONOCYTES 2 (L) 3 - 10 %    EOSINOPHILS 4 0 - 5 %    BASOPHILS 0 0 - 2 %    ABS. NEUTROPHILS 8.9 (H) 1.8 - 8.0 K/UL    ABS. LYMPHOCYTES 0.4 (L) 0.9 - 3.6 K/UL    ABS. MONOCYTES 0.2 0.05 - 1.2 K/UL    ABS. EOSINOPHILS 0.4 0.0 - 0.4 K/UL    ABS. BASOPHILS 0.0 0.0 - 0.06 K/UL    DF AUTOMATED     GLUCOSE, POC    Collection Time: 06/30/18  5:57 AM   Result Value Ref Range    Glucose (POC) 115 (H) 70 - 110 mg/dL   POC G3    Collection Time: 06/30/18  9:40 AM   Result Value Ref Range    Device: High Flow Nasal Cannula      Flow rate (POC) 45 L/M    FIO2 (POC) 80 %    pH (POC) 7.312 (L) 7.35 - 7.45      pCO2 (POC) 41.0 35.0 - 45.0 MMHG    pO2 (POC) 115 (H) 80 - 100 MMHG    HCO3 (POC) 20.8 (L) 22 - 26 MMOL/L    sO2 (POC) 98 (H) 92 - 97 %    Base deficit (POC) 5 mmol/L    Allens test (POC) YES      Total resp. rate 16      Site LEFT RADIAL      Patient temp.  98.6 Specimen type (POC) ARTERIAL      Performed by Mercedes Shore            Chemistry Recent Labs      06/30/18 0330 06/29/18 0400 06/28/18   0430   GLU  156*  154*  160*   NA  144  154*  153*   K  4.1  4.0  4.1   CL  112*  120*  119*   CO2  22  22  21   BUN  37*  39*  43*   CREA  0.90  1.01  1.02   CA  7.7*  7.7*  7.6*   MG  2.1  2.2  1.8   PHOS  2.9  2.6  3.3   AGAP  10  12  13   BUCR  41*  39*  42*        Lactic Acid Lactic acid   Date Value Ref Range Status   06/17/2018 2.3 (HH) 0.4 - 2.0 MMOL/L Final     Comment:     CALLED TO AND CORRECTLY REPEATED BY:  Aileen Nevarez RN ICU ON 6/17/2018 0822 TO 5097       No results for input(s): LAC in the last 72 hours. Liver Enzymes Protein, total   Date Value Ref Range Status   06/26/2018 5.9 (L) 6.4 - 8.2 g/dL Final     Albumin   Date Value Ref Range Status   06/27/2018 2.4 (L) 3.4 - 5.0 g/dL Final     Globulin   Date Value Ref Range Status   06/26/2018 3.2 2.0 - 4.0 g/dL Final     A-G Ratio   Date Value Ref Range Status   06/26/2018 0.8 0.8 - 1.7   Final     AST (SGOT)   Date Value Ref Range Status   06/26/2018 16 15 - 37 U/L Final     Alk. phosphatase   Date Value Ref Range Status   06/26/2018 67 45 - 117 U/L Final     No results for input(s): TP, ALB, GLOB, AGRAT, SGOT, GPT, AP, TBIL in the last 72 hours.     No lab exists for component: DBIL     CBC w/Diff Recent Labs      06/30/18 0330 06/29/18 0430 06/28/18   0430   WBC  9.9  7.2  10.5   RBC  3.61*  2.40*  3.53*   HGB  10.0*  7.0*  9.7*   HCT  33.1*  22.6*  32.1*   PLT  104*  91*  119*   GRANS  90*  89*  89*   LYMPH  4*  4*  2*   EOS  4  4  3        Cardiac Enzymes No results found for: CPK, CK, CKMMB, CKMB, RCK3, CKMBT, CKNDX, CKND1, FABIAN, TROPT, TROIQ, KAYLAN, TROPT, TNIPOC, BNP, BNPP     BNP No results found for: BNP, BNPP, XBNPT     Coagulation Recent Labs      06/27/18   1131   PTP  16.3*   INR  1.4*         Thyroid  No results found for: T4, T3U, TSH, TSHEXT, TSHEXT    No results found for: T4       ABG Recent Labs      06/30/18   0940  06/29/18   1454   PHI  7.312*  7.306*   PCO2I  41.0  37.7   PO2I  115*  72*   HCO3I  20.8*  18.8*   FIO2I  80  36        All Micro Results     Procedure Component Value Units Date/Time    CULTURE, CATHETER TIP [[id number]] Collected:  06/27/18 1720    Order Status:  Completed Specimen:  Priti Iqbal Updated:  06/30/18 0825     Special Requests: NO SPECIAL REQUESTS        Culture result: NO GROWTH 2 DAYS       CULTURE, FUNGUS [[id number]] Collected:  06/09/18 1024    Order Status:  Completed Specimen:  Bronchial lavage Updated:  06/25/18 1109     Special Requests: NO SPECIAL REQUESTS        FUNGUS SMEAR NO FUNGAL ELEMENTS SEEN        Culture result:         NO FUNGUS ISOLATED 16 DAYS    CULTURE, BLOOD FOR FUNGUS [[id number]]     Order Status:  Canceled Specimen:  Blood     CULTURE, BLOOD [[id number]] Collected:  06/09/18 1423    Order Status:  Completed Specimen:  Whole Blood from Blood Updated:  06/15/18 0230     Special Requests: NO SPECIAL REQUESTS        Culture result: NO GROWTH 6 DAYS       CULTURE, BLOOD [[id number]] Collected:  06/09/18 1223    Order Status:  Completed Specimen:  Whole Blood from Blood Updated:  06/15/18 0230     Special Requests: left hand     Culture result: NO GROWTH 6 DAYS       AFB CULTURE + SMEAR W/RFLX PCR AND ID [[id number]] Collected:  06/09/18 1024    Order Status:  Completed Specimen:  Respiratory sample Updated:  06/12/18 2106     Source BRONCHIAL LAVAGE        AFB Specimen processing Concentration     Acid Fast Smear NEGATIVE          (NOTE)  Performed At: [address] [id number]  Stanislav Jerome MD [id number]          Acid Fast Culture PENDING    CULTURE, SPUTUM/BRONCH/OTH [[id number]] Collected:  06/09/18 1024    Order Status:  Completed Specimen:  Sputum from Bronchial lavage Updated:  06/11/18 1058     Special Requests: NO SPECIAL REQUESTS        GRAM STAIN FEW WBC'S         FEW GRAM NEGATIVE RODS Culture result:         RARE NORMAL RESPIRATORY JOSETTE    CULTURE, ANAEROBIC [[id number]] Collected:  06/05/18 0220    Order Status:  Completed Specimen:  Peritoneal Fluid Updated:  06/11/18 0734     Special Requests: NO SPECIAL REQUESTS        Culture result:         NO ANAEROBES ISOLATED 5 DAYS    CULTURE, BLOOD [[id number]] Collected:  06/04/18 0740    Order Status:  Completed Specimen:  Blood from Blood Updated:  06/10/18 0655     Special Requests: NO SPECIAL REQUESTS        Culture result: NO GROWTH 6 DAYS       CULTURE, BLOOD [[id number]] Collected:  06/04/18 0740    Order Status:  Completed Specimen:  Blood from Blood Updated:  06/10/18 0655     Special Requests: NO SPECIAL REQUESTS        Culture result: NO GROWTH 6 DAYS       CULTURE, BODY FLUID [place] STAIN [[id number]]  (Abnormal)  (Susceptibility) Collected:  06/05/18 0220    Order Status:  Completed Specimen:  Peritoneal Fluid Updated:  06/08/18 0936     Special Requests: NO SPECIAL REQUESTS        GRAM STAIN MANY WBC'S         NO ORGANISMS SEEN        Culture result:         RARE KLEBSIELLA OXYTOCA (A)            CALLED TO AND CORRECTLY REPEATED BY:  ANGEL LUIS DE LOS SANTOS RN ICU ON 6/7/2018 1032 TO 5087      CULTURE, URINE [[id number]] Collected:  06/05/18 1245    Order Status:  Completed Specimen:  Urine from Fry Specimen Updated:  06/07/18 1103     Special Requests: NO SPECIAL REQUESTS        Culture result: NO GROWTH 2 DAYS       C. DIFFICILE/EPI PCR [[id number]] Collected:  06/05/18 0930    Order Status:  Canceled Specimen:  Stool           Echo 6/9/18:  Left ventricle: Systolic function was normal. Ejection fraction was estimated   in the range of 65 % to 70 %. No obvious  wall motion abnormalities identified in the views obtained. Wall thickness   was mildly increased. Doppler parameters  were consistent with abnormal left ventricular relaxation (grade 1 diastolic   dysfunction). Right ventricle:  The size was normal. Systolic function was normal.  Mitral valve: There was mild annular calcification. Tricuspid valve: Pulmonary artery systolic pressure was mildly increased. Pulmonary artery systolic pressure: 34 mmHg. PVL LE 6/6/18: acute b/l peroneal DVT. CT abd pelvis 6/4/18:  1. Postsurgical hysterectomy, bilateral oophorectomy, pelvic lymphadenectomy and  a mastectomy surgical changes. Small volume of ascites in the abdomen and  pelvis, with a few tiny locules of gas in the right hemipelvis would be in  keeping with recent postsurgical etiology. 2. Abnormal edematous thick-walled small bowel loops in the left abdomen and  pelvis, with TRAM lamellar edematous configuration, induration. No findings of  pneumatosis. The overall appearance is nonspecific. Diagnostic considerations  include infectious etiology, nonspecific edema which may be unresolved  postsurgical etiology. Ischemia is also included in the differential diagnostic  consideration, however, there are no findings of pneumatosis, portal venous gas. 3. Stool in the right colon extending to the hepatic flexure with surrounding  gas, foci of pneumatosis could be obscured. No associated bowel wall thickening. 4. Satisfactory position of nasogastric tube. 5. Hepatomegaly, steatosis with 2 rounded low-density lesions, potentially  cysts. 6. Bibasilar atelectasis. Imaging:  [x]I have personally reviewed the patients chest radiographs images and report   6/30/18    Results from Hospital Encounter encounter on 06/04/18   XR CHEST SNGL V   Narrative Chest, single view    Indication: Progressive hypoxia, shortness of breath    Comparison: 6/29/2018    Findings:  Portable upright AP view of the chest was obtained. Right upper  extremity PICC appears unchanged in position. The cardiomediastinal silhouette  is mildly prominent in size. Tortuous and atherosclerotic thoracic aorta. Mildly prominent central pulmonary vasculature, slightly improved compared to  prior study.  Mild, mostly central increased interstitial markings, also slightly  improved. Left lung appears adequately aerated with no confluent airspace  opacity. Persistent right perihilar and right midlung patchy opacity. There has  been slight interval improvement of right lung base aeration compared to prior  study. No definite pleural effusion. No pneumothorax. No acute osseous  abnormality. Impression Impression:    1. Cardiomegaly with slight interval improvement of central vascular congestion  and interstitial edema. 2. Persistent right perihilar and right midlung atelectasis/infiltrate. Results from [place] encounter on 06/04/18   CTA CHEST ABD PELV W CONT   Narrative CT CHEST, ABDOMEN, AND PELVIS:    COMPARISON: CT abdomen/pelvis dated 6/4/2018. INDICATION: DVTs, blood from left drain. TECHNIQUE: Axial was performed through the chest, abdomen, and pelvis after  intravenous contrast administration only. Coronal and sagittal reformations  were generated. One or more dose reduction techniques were used on this CT: automated exposure  control, adjustment of the mAs and/or kVp according to patient's size, and  iterative reconstruction techniques. The specific techniques utilized on this CT  exam have been documented in the patient's electronic medical record.  ============    CTA CHEST:    Technique: Axial imaging was obtained dynamically through the pulmonary arteries  using high-resolution CT angiographic protocol, without complications. In  addition, coronal and sagittal reconstructed MIP arteriograms were performed, to  better evaluate the pulmonary vasculature, and to increase sensitivity for  detection of pulmonary emboli. CT angiogram quality parameters:  1. Overall exam quality - adequate  2. Adequacy of pulmonary enhancement-adequate   3. Adequacy of breath-hold-optimal: Artifacts visible   4.  There are significant streak artifacts affecting scan quality.    ============    PULMONARY ARTERIES: The pulmonary arteries are adequately opacified but without  evidence of any definite filling defects to suggest pulmonary thromboembolism  within the limitations of the streak artifacts. AORTA:  Mildly tortuous; no aneurysm or dissection. LUNGS: Consolidation with air bronchograms noted involving a large portion of  the right lower lobe most likely infiltrates and atelectasis. A smaller  consolidation is seen in the left lower lobe. Ill-defined groundglass opacities may represent vascular congestion. PLEURA: Normal, with no effusion or pneumothorax. AIRWAY: Unremarkable. MEDIASTINUM: Normal heart size. No pericardial effusion. Right-sided PICC line  is present. LYMPH NODES: No mediastinal, hilar or axillary lymphadenopathy. CT ABDOMEN AND PELVIS:    Streak artifacts limit evaluation. LIVER, BILIARY: Small 1.6 cm cyst is seen in the right lobe. No significant  biliary dilation. Ascites noted adjacent to the gallbladder. PANCREAS: Normal.    SPLEEN: Normal.    ADRENALS: Normal.    KIDNEYS/URETERS/BLADDER: There is no hydronephrosis on either side. Fry catheter decompresses the bladder. PELVIC ORGANS: Large soft tissue consolidation is seen in the pelvis likely  combination of adherent bowel and trapped fluid. A right-sided drain extends into the posterior aspect of the pelvic  consolidation described above. A left-sided drain extends along the left paracolic gutter. VASCULATURE: Unremarkable    LYMPH NODES: No enlarged lymph nodes. GASTROINTESTINAL TRACT: A right-sided ileostomy noted without evidence of  obstruction. BONES: Extensive degenerative changes are seen throughout the spine with  moderate to severe compression deformity of L1 vertebral body. OTHER: There is mild ascites throughout the abdomen and pelvis. There is diffuse  anasarca.   Hyperdense mass in the anterior subcutaneous fat adjacent to the ileostomy  defect measures measuring 5.5 cm in width by 3.7 cm in thickness by 7 cm in  height possibly representing focal hematoma.  _______________         Impression IMPRESSION:    Study limited due to streak and motion artifacts as described. No evidence of pulmonary embolism within the limitations of artifacts. Prominent consolidation right lower lobe likely infiltrates and atelectasis. Small consolidation seen in the left lower lobe. Mild diffuse ascites present throughout the abdomen pelvis with diffuse  anasarca. Large soft tissue consolidation in the pelvis containing air bubbles likely  representing adherent bowel with adjacent trapped fluid. There is what may be a subcutaneous hematoma adjacent to the right ileostomy  measuring 5.5 cm x 3.7 cm x 7.0 cm, however, there is no definite  retroperitoneal or any other intra-abdominal hematoma. Bilateral pelvic drains as described above. Chronic compression deformity of L1. IMPRESSION:   · Acute hypoxic respiratory failure, on HF NC, multifactorial from any generalized weakness; pneumonia following episode of mucus plug 6/29, previously has multifactorial possibilities with pulmonary edema, aspiration pneumonitis. No pulmonary edema on CXR. No central or segmental PE on recent CTA chest. Previously required ventilator support this admission due to sepsis, reintubated on 6/8/18 and self-extubated 6/17  · S/p bronchoscopy 6/9/18: no aspiration noted then. Cx Negative. · Severe sepsis due to Klebsiella Oxytoca peritonitis and then perforation. S/p laparotomy 6/5/18 [POD # 5 tumor radical debulking] and peritoneal lavage: Klebsiella positive. Ruled out ischemic bowel in laparotomy. S/p laparotomy on 6/15/18 for peritonitis due to diverticulitis with perforation, s/p distal ileostomy. · Leukocytosis, improved on micafungin/vanco. Now new fever, source could be pneumonia, abdominal  · Off heparin for intraabdominal bleeding and CTA chest no central PE.  INR reversed with protamin, FFP.  · S/p Shock suspected from sepsis and obstructive due to presumed PE with severe hypoxia and high A-a gradient, elevated RVSP 34 mm Hg. CTA 6/22/18 negative for central or main PA PE. Shock resolved. CTA chest no central PE. · Acute b/l peroneal DVT, s/p IVC filter by vascular surgery 6/28/18. · Anemia, s/p PRBC tx 6/29/18, no gross bleeding, Hgb improved  · Thrombocytopenia, mild, no active bleeding  · JESS, improved  · Hypernatremia, worsened following Lasix- significant improvement this AM could be lab error since no major free water IV fluid use - managed per renal  · Non-anion gap metabolic acidosis   · Elevated LFT, due to shock liver, resolved  · Foreign body on peritoneal biopsy, per path report. · Anasarca due to fluid resuscitation, JESS, hypoalbuminemia and sequale of sepsis. · AFib converted to NSR  · Adenocarcinoma of ovary s/p radical tumor debulking surgery 5/29/18    · Code status: Full code. · Poor overall prognosis. RECOMMENDATIONS:   Respiratory: ABG reviewed at bedside; currently on HF NC O2 support - PCO2 is higher than her baseline suggesting that set up of respiratory acidosis- start BiPAP 12/6 followed by ABG; low threshold for re-intubation. Keep SPO2 >=92%. Spoke with brother at the bedside. CXR in AM. Periodic ABG in AM  HOB 30 degree elevation all the time. Aggressive pulmonary toileting. Aspiration precautions. Judicious opiate pain medications. Periodic Lasix as needed in coordination with renal services  ID:  Fever. R sided pulmonary infiltrates  Follow up drain tip culture negative to date  Repeat blood and urine cultures  Bronch cx normal robin. Peritoneal cx Klebsiella Oxytoca resistant to ampicillin. On micafungin/vanco for Leukocytosis. Change Zosyn and Levofloxacin to Merrem  Will discuss with surgery regarding duration of antibiotics. Abx - Zosyn since 6/13 [day 18] stopped 6/30/18; levaquin since 6/17 [day 14] stopped 6/30/18.    Continue Flagyl since 6/15 [day 16]; iv vanco since 6/22, micafungin since 6/22  Hem: follow CBC; Keep Hb> 7 gm/dl. s/p IVC filter placement 6/28. Transfusion per surgery  Any radiological imaging per surgery  [facility]: Echo ok with mildly elevated RVSP but normal RV systolic function. AFib converted to NSR. Off of cardizem drip. Renal: Nephrologist on case; Creatinine improved. Lasix PRN because of hypernatremia. On albumin. Spoke with pharmacy to see if IVF with antibiotics could be changed to D5, but nationwide shortage of small bags of IVF to mix antibiotics but department will look around. GI/: TPN; barium study 6/21 - patient could not do it on 6/22 due to abd pain  Endocrine: Maintain blood glucose 140-180. Humalog sc. Insulin in TPN. on Lantus  Neurology: Dilaudid- balance post-op pain without sedation- recommend judicious use of sedatives  Pain/Sedation: dilaudid - management per Dr Khalida Conrad - judicious use  Skin/Wound: local surgical site care. Wound vac changed 6/29. Electrolytes: Replace electrolytes per ICU electrolyte replacement protocol, renal service. Nutrition: TPN started 6/18. NPO   Prophylaxis: GI Prophylaxis (protonix)  Restraints: none  Lines/Tubes:   ETT: 6/8/18- self extubated 6/17/18  Central line: right subclavian 6/4/18 - removed 6/20  Picc line 6/19 - Central line bundle followed  Fry: 6/4/18 (Medically necessary for strict input/output monitoring in critically ill patient, will remove it when not needed. Fry bundle followed). Will defer respective systems problem management to primary and other respective consultant and follow patient in ICU with primary and other medical team.  Further recommendations will be based on the patient's response to recommended treatment and results of the investigation ordered. Quality Care: PPI, DVT prophylaxis, HOB elevated, Infection control all reviewed and addressed.     Brother at bedside, updated: overall poor prognosis with patient at increased risk for further deterioration, prolonged hospitalization, nosocomial infections, failure to thrive. Patient has no children. Code status: Full code. PICC Line/Fry cath medically necessary  PT and OT on case    Care of plan d/w RN, RT, MDR, brother. High complexity decision making was performed during the evaluation of this patient   CC time 39 mins     Konstantin Mendiola MD   6/30/2018     Addendum 12:10 pm  ABG reviewed with RT, FiO2 adjusted per PaO2  Patient appears more comfortable on BiPAP and no work of breathing  She is awake, alert, following commands; no nausea. Will continue BiPAP for now during today and overnight  ABg in AM; PRN today for any distress. Staff aware.     Konstantin Mendiola MD

## 2018-06-30 NOTE — PROGRESS NOTES
2000 Assessment completed. Blood infusing through PIV in left arm. Bruising and swelling noted to that arm. Blood return on IV present, no infiltration felt. Arm elevated on two pillows. 2200 No change in status. Resting. VSS. Mouth care completed. Pt has copious dried secretions in mouth. 2330 Reassessment no change. Resting. FLACC 0. HOB 45 degrees. 0530 Flat, red rash noted to abdomen, chest, under breasts. Dr. Alvaro Moore called and made aware. Reviewed patient status and vitals with physician. She wants to hospitalist to look at rash to assess further. Hospitalist paged. [phone] Dr. Mariano Fam made aware of new onset of rash to trunk and abdomen and that Dr Alvaro Moore wishes her to assess. Explained pt admission history, current vitals and reviewed medications with physician. Physician stated that it was okay for rounding hospitalist to assess later this am. No new orders obtained at this time. 0 Pt becoming more tachycardic. Scheduled dose of metoprolol given early. Dr. Kaitlynn Miranda at bedside. Pt oxygen saturations dropping into 80s. Lowest 85%. O2 turned to 6L NC. Dr. Kaitlynn Miranda ordered patient to be transitioned to high flow NC. RT made aware. Summary: New rash noted. Physicians aware. Wound vac remains intact. Brown stool draining from ileostomy right abdomen. Left abdominal former BRANDON site draining into ostomy bag. Fry draining gómez urine, adequate output. Received 1 unit PRBC, HH improved to 10/33. 1. Electrolyte protocol in place, no need for replacement. Pt transitioned to high flow this AM due to episode of hypoxia and increased work of breathing. Mouth care completed several times during shift due to copious amounts of dried matter. Turned q2.

## 2018-06-30 NOTE — PROGRESS NOTES
Admit date: 6/4/2018      ASSESSMENT/PLAN  Rosangela pride 75 y. 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 Status post complete surgical resection with no evidence of residual disease. Discussed adjuvant chemotherapy however patient is not medically appropriate at this time. Patient is high risk of recurrence based on pathology (Clear Cell). GI - Peritonitis resolved. Last drain removed 6/27/2018 and tip was cultures which is NGTD. Post operative ileus resolved. Good ileostomy output but unable to tolerate po as continues to be at risk for aspiration. Continue TPN. ID - Sepsis. Had clinically improved and was going to stop antibiotics for intra-abdominal sepsis once culture from tip of drain finalized no growth but patient developed fever last PM. Patient also had respiratory distress and pneumonia is now a serious consideration. I am deferring abx regimen and length to pulmonary team. ID consult, if available would be very helpful here. Currently patient is on Meropenem D1/7, Flagyl D16, Vanc D9/14 and Micafungin D9/14. Zosyn 26 days completed 6/30/18, Levaquin 16 days completed 6/29/18. WBC had trending down to normal but this am it is slightly higher. Wound vac was changed yesterday and sharp debridement was performed. Wound vac replaced with intermittent irrigation of dilute Dakins solution. Renal - JESS, resolved and Nephrology has signed off.   Heme - Hgb had been stable for many days but dropped yesterday to 7.0 and patient received one unit of pRBCs  DVT- bilateral peroneal DVT, full anticoagulation contraindicated. IVC filter was 6/28/2018 placed without issues. CV - Afib, converted to NSR. CHF, improved. HTN, on metoprolol. All managed by Intensivist/Hospitalist  Pulm - Acute hypoxic RF, currently on BIPAP. Had mucus plug yesterday. Also pulmonary edema, aspiration pneumonitis. Managed by Intensivist  Psych - selectively interactive, seemed depressed and have discussed starting antidepressant but not taking po now. Disp - condition labile, continue ICU care  Code Status - Full Code      SUBJECTIVE  Patient is resting with BIPAP on. Appears comfortable. Getting IV Dilaudid q 2 hours prn. Ileostomy normal output.      OBJECTIVE  Physical Exam:  General - Resting on BIPAP  HEENT - BIPAP in place  Heart - tachy  Abdomen - soft, non-tender, nondistended, normal bowel sounds, ileostomy looks good, wound vac in place with normal appearing skin edges, drainage bag in LLQ where drain was pulled.   Incision - wound vac in place  Extremities - 2+ edema all extremities     Patient Vitals for the past 24 hrs:   BP Temp Pulse Resp SpO2   06/30/18 1232 - - - - 98 %   06/30/18 1135 - (!) 100.8 °F (38.2 °C) - - -   06/30/18 0959 - - - - 100 %   06/30/18 0900 (!) 151/95 - (!) 108 14 99 %   06/30/18 0800 149/84 - (!) 106 13 -   06/30/18 0746 - - - - 96 %   06/30/18 0730 141/87 - 95 18 (!) 88 %   06/30/18 0728 - (!) 101.5 °F (38.6 °C) - - -   06/30/18 0700 161/88 - (!) 114 22 92 %   06/30/18 0630 (!) 160/91 98 °F (36.7 °C) (!) 125 28 92 %   06/30/18 0600 (!) 176/97 - (!) 110 18 94 %   06/30/18 0530 (!) 175/97 - (!) 113 20 95 %   06/30/18 0500 (!) 172/93 - (!) 106 18 93 %   06/30/18 0430 (!) 169/98 - (!) 105 18 93 %   06/30/18 0400 (!) 172/104 - (!) 103 17 92 %   06/30/18 0330 (!) 169/100 98 °F (36.7 °C) (!) 101 18 94 %   06/30/18 0315 161/85 - 94 15 92 %   06/30/18 0300 (!) 190/106 - (!) 121 28 93 %   06/30/18 0230 (!) 177/109 - (!) 107 28 93 %   06/30/18 0200 (!) 173/101 - 100 21 94 %   06/30/18 0130 (!) 172/91 - 94 17 94 %   06/30/18 0100 (!) 161/93 - 92 16 94 %   06/30/18 0030 165/89 - (!) 102 16 -   06/30/18 0000 (!) 149/92 - 90 15 -   06/29/18 2330 142/80 97.6 °F (36.4 °C) 88 14 95 %   06/29/18 2300 148/87 - 98 12 94 %   06/29/18 2230 148/68 - 88 15 94 %   06/29/18 2200 (!) 144/93 - 85 13 95 %   06/29/18 2130 (!) 153/94 - 87 14 93 %   06/29/18 2100 142/70 - 82 12 91 %   06/29/18 2030 162/88 - 91 21 95 %   06/29/18 2000 152/77 97.6 °F (36.4 °C) 93 18 93 %   06/29/18 1940 - 97.6 °F (36.4 °C) - - -   06/29/18 1930 (!) 159/96 - 92 25 94 %   06/29/18 1900 154/73 - 84 15 92 %   06/29/18 1845 - - 86 18 93 %   06/29/18 1830 137/81 - 80 16 92 %   06/29/18 1815 132/74 - 86 13 93 %   06/29/18 1800 144/83 - 92 16 96 %   06/29/18 1752 - 97.6 °F (36.4 °C) - - -   06/29/18 1745 143/82 - 94 15 95 %   06/29/18 1730 - - 91 13 96 %   06/29/18 1725 (!) 139/20 97.5 °F (36.4 °C) 91 16 97 %   06/29/18 1715 - - 95 14 95 %   06/29/18 1700 139/80 - 89 15 95 %   06/29/18 1645 - - (!) 101 23 95 %   06/29/18 1630 - - 93 14 95 %   06/29/18 1615 - - 96 17 98 %   06/29/18 1609 - 97.6 °F (36.4 °C) - - -   06/29/18 1600 (!) 134/95 97.6 °F (36.4 °C) (!) 104 16 97 %   06/29/18 1500 (!) 154/98 - (!) 109 16 95 %   06/29/18 1449 - - - - 95 %         Intake/Output Summary (Last 24 hours) at 06/30/18 1428  Last data filed at 06/30/18 0635   Gross per 24 hour   Intake             5234 ml   Output             2275 ml   Net             2959 ml          Labs  CBC  Recent Labs      06/30/18   0330  06/29/18   0430  06/28/18   0430   WBC  9.9  7.2  10.5   HCT  33.1*  22.6*  32.1*   MCV  91.7  94.2  90.9   BANDS   --    --   3   MONOS  2*  3  3   EOS  4  4  3   BASOS  0  0  0        CMP  Recent Labs      06/30/18   0330  06/29/18   0400  06/28/18   0430   NA  144  154*  153*   CO2  22  22  21   BUN  37*  39*  43*        Lab Results   Component Value Date/Time    Glucose 156 (H) 06/30/2018 03:30 AM    Glucose (POC) 146 (H) 06/30/2018 11:34 AM    Glucose,  (H) 06/15/2018 05:14 PM          Current Hospital Medications    Current Facility-Administered Medications:     TPN ADULT - CENTRAL, , IntraVENous, CONTINUOUS, Lyndon Tavares MD    meropenem (MERREM) 1 g in sterile water (preservative free) 20 mL IV syringe, 1 g, IntraVENous, Q8H, Darshana Dixon MD, 1 g at 06/30/18 1204    vancomycin (VANCOCIN) 1500 mg in D5W 500 mL infusion, 1,500 mg, IntraVENous, Q24H, Darshana Dixon MD, 1,500 mg at 06/30/18 1311    TPN ADULT - CENTRAL, , IntraVENous, CONTINUOUS, Lyndon Tavares MD, Last Rate: 80 mL/hr at 06/29/18 1747    0.9% sodium chloride infusion 250 mL, 250 mL, IntraVENous, PRN, Antonio Clifford MD    sodium hypochlorite (QUARTER STRENGTH DAKIN'S) 0.125% irrigation (bottle), , Topical, Q6H, Pietro Forman MD    0.9% sodium chloride infusion, 25 mL/hr, IntraVENous, CONTINUOUS, Barbara Zhong MD    saliva stimulant (BIOTENE) 1 Spray, 1 Spray, Oral, PRN, Darshana Dixon MD, 1 [place] at 06/28/18 1108    HYDROmorphone (PF) (DILAUDID) injection 1 mg, 1 mg, IntraVENous, Q2H PRN, Antonio Clifford MD, 1 mg at 06/30/18 0721    acetaminophen (OFIRMEV) infusion 1,000 mg, 1,000 mg, IntraVENous, Q6H, Antonio Clifford MD, Last Rate: 400 mL/hr at 06/30/18 0916, 1,000 mg at 06/30/18 0916    metoprolol (LOPRESSOR) injection 5 mg, 5 mg, IntraVENous, Q6H, Darshana Dixon MD, 5 mg at 06/30/18 0721    hydrALAZINE (APRESOLINE) 20 mg/mL injection 10 mg, 10 mg, IntraVENous, Q4H PRN, Darshana Dixon MD, 10 mg at 06/26/18 2045    albumin human 25% (BUMINATE) solution 12.5 g, 12.5 g, IntraVENous, Q8H, Berenice Haas MD, 12.5 g at 06/30/18 0523    insulin glargine (LANTUS) injection 13 Units, 13 Units, SubCUTAneous, DAILY, Lino Burns MD, 13 Units at 06/30/18 0910    0.9% sodium chloride infusion 250 mL, 250 mL, IntraVENous, PRN, Ivan Barrera MD    micafungin [facility]) 100 mg in 0.9% sodium chloride (MBP/ADV) 100 mL, 100 mg, IntraVENous, Q24H, Sabina Thompson MD, Last Rate: 100 mL/hr at 06/29/18 1451, 100 mg at 06/29/18 1451    insulin lispro (HUMALOG) injection, , SubCUTAneous, Q6H, Ivan Barrera MD, Stopped at 06/30/18 0600    dextrose (D50W) injection syrg 12.5-25 g, 25-50 mL, IntraVENous, PRN, Ivan Barrera MD    Vancomycin - Pharmacy to Dose, 1 Each, Other, Rx Dosing/Monitoring, Ivan Barrera MD    metroNIDAZOLE (FLAGYL) IVPB premix 500 mg, 500 mg, IntraVENous, Q8H, Mark Silvestre MD, Last Rate: 100 mL/hr at 06/30/18 1131, 500 mg at 06/30/18 1131    ipratropium (ATROVENT) 0.02 % nebulizer solution 0.5 mg, 0.5 mg, Nebulization, Q4H PRN, Mark Silvestre MD, 0.5 mg at 06/29/18 1449    naloxone (NARCAN) injection 0.4 mg, 0.4 mg, IntraVENous, EVERY 2 MINUTES AS NEEDED, Ivan Barrera MD    ondansetron [facility]) injection 4 mg, 4 mg, IntraVENous, Q6H PRN, Cristian Garcia MD, 4 mg at 06/08/18 2152    oxymetazoline (AFRIN) 0.05 % nasal spray 2 Spray, 2 Spray, Both Nostrils, BID PRN, Elijah Abraham MD    alum-mag hydroxide-simeth (MYLANTA) oral suspension 30 mL, 30 mL, Oral, Q4H PRN, Rutul A Dayton Mortimer, MD, 30 mL at 06/07/18 2225    fluticasone (FLONASE) 50 mcg/actuation nasal spray 2 Spray, 2 Spray, Both Nostrils, DAILY PRN, Corina Paredes MD    ELECTROLYTE REPLACEMENT PROTOCOL-POTASSIUM Renal Dosing, 1 Each, Other, PRN, Maria M Hanson MD    ELECTROLYTE REPLACEMENT PROTOCOL-MAGNESIUM, 1 Each, Other, PRN, Christen Bird MD    ELECTROLYTE REPLACEMENT PROTOCOL-PHOSPHORUS Renal Dosing, 1 Each, Other, PRN, Christen Bird MD    ELECTROLYTE REPLACEMENT PROTOCOL-CALCIUM, 1 Each, Other, PRN, Christen Bird MD    sodium chloride (NS) flush 5-10 mL, 5-10 mL, IntraVENous, PRN, Kearney Bosworth, MD    glucose chewable tablet 16 g, 4 Tab, Oral, PRN, Christen Bird MD    glucagon (GLUCAGEN) injection 1 mg, 1 mg, IntraMUSCular, PRN, Christen Jonas MD    pantoprazole (PROTONIX) 40 mg in sodium chloride 0.9% 10 mL injection, 40 mg, IntraVENous, DAILY, Meena Martin MD, 40 mg at 06/30/18 Mikal Joseph MD

## 2018-06-30 NOTE — PROGRESS NOTES
Nephrology Progress note    Subjective:     Isael Mccormack is a 75 y.o. female with PMH DM, HTN, clear cell ovarian ca s/p debulking who presented with abdominal pain and possible sepsis/ischemic colitis. Pt underwent laparotomy/peritoneal lavage/bx, noted to have decreased UOP and increasing Cr to 2.1 today from baseline 0.6. CT neg for mass or hydro. Pt given lasix yesterday, still with high O2 requirements on vent. Unable to provide further history. On 6/8 Pt decompensated, ? Aspiration,  back on vent was hypoxic despite  Fi02 of 100%, , Hypotensive on IV pressors, Acidotic and on HC03 drip, Urine output decreased markedly. Underwent Bronchoscopy 6/9. Pt requires less FiO2 now. UOP picking up. Wound was draining yellow-brown fluid. S/P Exploratory laparotomy, Lysis of adhesions, Ileostomy and wound VAC placement on 6/16. Pt self-extubated 6/17. Remains stable today. UO over 725 cc yesterday. Cr 1.0 and Na 154. CXR shows improving CHF. Had mucus plug yesterday, suctioned. More lethargic today    Admit Date: 6/4/2018  Allergy:  Allergies   Allergen Reactions    Hydrocodone Other (comments)     Breaks into cold sweat    Metformin Other (comments)     Breaks out into cold sweat. Can Take Glucophage brand name med        Objective:     Visit Vitals    BP (!) 151/95    Pulse (!) 108    Temp (!) 101.5 °F (38.6 °C)    Resp 14    Ht 5' 1\" (1.549 m)    Wt 106.1 kg (233 lb 14.5 oz)    SpO2 100%    BMI 44.2 kg/m2         Intake/Output Summary (Last 24 hours) at 06/30/18 1126  Last data filed at 06/30/18 9214   Gross per 24 hour   Intake             5234 ml   Output             3000 ml   Net             2234 ml       Physical Exam:     General: NAD   HENT: Atraumatic and normocephalic.    Eyes: Sclera anicteric   Neck: No JVD or mass   Cardiovascular: Normal S1 S2, no m/r/g   Pulmonary/Chest Wall: Decreased breath sounds bilaterally   Abdominal: Soft, obese, NABS   Musculoskeletal: ++ edema LEs Neurological: Awake       Data Review:      Lab Results   Component Value Date/Time    WBC 9.9 06/30/2018 03:30 AM    RBC 3.61 (L) 06/30/2018 03:30 AM    HCT 33.1 (L) 06/30/2018 03:30 AM    MCV 91.7 06/30/2018 03:30 AM    MCH 27.7 06/30/2018 03:30 AM    MCHC 30.2 (L) 06/30/2018 03:30 AM    RDW 25.7 (H) 06/30/2018 03:30 AM      Lab Results   Component Value Date    IRON 8 (L) 06/07/2018   No components found for: FERRITIN  No components found for: PTHINT  Urinalysis  No results found for: UGLU     CXR   6/27  IMPRESSION:     1. Overall, no significant change in this one day interval.     2. Prominence of pulmonary vasculature with interstitial opacities suggesting  pulmonary edema. Superimposed right basilar and left perihilar opacities may  represent superimposed atelectasis or pneumonia. Impression:     -JESS- likely ATN,  S Cr  Down to 0.9 today with good diuresis  -Hypernatremia, improved  -Septic Shock/hypotension, Hypotension resolved.   -Resp Failure,  Was re-intubated 6/9,  Self extubated   -Severe Metabolic acidosis, improving.   -Ovarian ca s/p debulking then laparoscopy with lavage  -KLEBSIELLA OXYTOCA sepsis (Peritoneal fluid)  -DM  -HTN  -Anemia  -Elevated liver enzymes  -S/P Exploratory laparotomy, Lysis of adhesions, Ileostomy and wound VAC placement on 6/16  -Leukocytosis, improved  -Hypokalemia, repleted   -DVT  - Pulm edema    Plan:   Lasix prn  No further recommendations, will sign off- please call for further assistance    MD Denver Almodovar  [phone]

## 2018-06-30 NOTE — PROGRESS NOTES
Hospitalist Progress Note-critical care note     Patient: Kindra Nathan MRN: [id number]  CSN: [id number]    YOB: 1950  Age: 75 y.o. Sex: female    DOA: 6/4/2018 LOS:  LOS: 26 days            Chief complaint:   hyoerntremia. D/p ileostomy , peritonitis , sepsis , jess ,ovarian cancer, hypokalemia    Assessment/Plan         Hospital Problems  Date Reviewed: 6/5/2018          Codes Class Noted POA    Hypokalemia ICD-10-CM: E87.6  ICD-9-CM: 276.8  6/27/2018 Unknown        Hypernatremia ICD-10-CM: E87.0  ICD-9-CM: 276.0  6/25/2018 Unknown        S/P ileostomy ([facility] ICD-10-CM: Z93.2  ICD-9-CM: V44.2  6/16/2018 No        Hypoalbuminemia ICD-10-CM: E88.09  ICD-9-CM: 273.8  6/16/2018 Unknown        Peritonitis ([facility] ICD-10-CM: K65.9  ICD-9-CM: 567.9  6/16/2018 Unknown        Acute deep vein thrombosis (DVT) of lower extremity ([facility] ICD-10-CM: I82.409  ICD-9-CM: 453.40  6/7/2018 Yes        Acute respiratory failure ([facility] ICD-10-CM: J96.00  ICD-9-CM: 518.81  6/5/2018 No        JESS (acute kidney injury) ([facility] ICD-10-CM: N17.9  ICD-9-CM: 584.9  6/5/2018 Unknown        Metabolic acidosis QBR-40-HU: E87.2  ICD-9-CM: 276.2  6/5/2018 No        Abdominal pain ICD-10-CM: R10.9  ICD-9-CM: 789.00  6/4/2018 Yes        * (Principal)Sepsis ([facility] ICD-10-CM: A41.9  ICD-9-CM: 038.9, 995.91  6/4/2018 Yes        Oliguria ICD-10-CM: R34  ICD-9-CM: 788.5  6/4/2018 Yes        Ovarian ca [facility]) ICD-10-CM: C56.9  ICD-9-CM: 183.0  5/29/2018 Yes              Acute resp failure with hypoxia   Continue decline, desat  Will repeat cxr   cxr pulm edema . Lasix was hold due to hypernatremia   - self extubated 06/17/2018.   - bronchoscopy 06/09/2018, no aspiration,  - resp cultures no growth to date. CTA chest negative for  PE         Severe Sepsis   - secondary to peritonitis. Still fever   Peritoneal fluid cx:klebsiella oxytoca. Continue iv abx  zosyn,levaquine, vanc flagyl. .micafungin         Shock   - sepsis   -resolved.   Now off pressors. Anasarca - likely third spacing,  on albumin-hold due to sodium high      Bleeding from HUNTER drain -resolved   protamine and FFP. Acute bilateral peroneal DVT   - heparin drip stopped. due to bleeding.  - ivc filter placed   per dr. Anupam Abad     Anemia   Received transfusion, so far h/h stable        JESS   -atn ,  nephrology on board , cr wnl      Metabolic acidosis    Hypernatremia  Lasix/albumin hold    pharmacy to correct with TPN     DM  lantus , ssi      Bleeding from HUNTER drain   -stopped  CT abdomen and pelvis reviewed. Will continue monitor H/H     Hypokalemia   Resolved       Poor prognosis ,  She can not  survive this hospitalization       Review of systems:  Unable to obtain   Vital signs/Intake and Output:  Visit Vitals    BP (!) 151/95    Pulse (!) 108    Temp (!) 100.8 °F (38.2 °C)    Resp 14    Ht 5' 1\" (1.549 m)    Wt 106.1 kg (233 lb 14.5 oz)    SpO2 98%    BMI 44.2 kg/m2     Current Shift:     Last three shifts:  06/28 1901 - 06/30 0700  In: 7277 [I.V.:7277]  Out: 4125 [Urine:3550]    Physical Exam:  General: WD, open eyes per voice , in distress   HEENT: NC, Atraumatic. PERRLA, anicteric sclerae. Lungs: Coarse bilateral lungs   Heart:  Regular  rhythm,  + murmur, No Rubs, No Gallops  Abdomen: Soft, Non distended, Non tender.  +Bowel sounds, colostomy bag with hunter tube   Extremities: No c/c.  Edema   Psych:   Calm   Neurologic:  Open eyes with verbal stimuli           Labs: Results:       Chemistry Recent Labs      06/30/18   0330  06/29/18   0400  06/28/18   0430   GLU  156*  154*  160*   NA  144  154*  153*   K  4.1  4.0  4.1   CL  112*  120*  119*   CO2  22  22  21   BUN  37*  39*  43*   CREA  0.90  1.01  1.02   CA  7.7*  7.7*  7.6*   AGAP  10  12  13   BUCR  41*  39*  42*      CBC w/Diff Recent Labs      06/30/18   0330  06/29/18   0430  06/28/18   0430   WBC  9.9  7.2  10.5   RBC  3.61*  2.40*  3.53*   HGB  10.0*  7.0*  9.7*   HCT  33.1*  22.6*  32.1*   PLT  104*  91*  119* GRANS  90*  89*  89*   LYMPH  4*  4*  2*   EOS  4  4  3      Cardiac Enzymes No results for input(s): CPK, CKND1, FABIAN in the last 72 hours. No lab exists for component: CKRMB, TROIP   Coagulation No results for input(s): PTP, INR, APTT in the last 72 hours. No lab exists for component: INREXT, INREXT    Lipid Panel Lab Results   Component Value Date/Time    Triglyceride 66 06/26/2018 05:00 AM      BNP No results for input(s): BNPP in the last 72 hours. Liver Enzymes No results for input(s): TP, ALB, TBIL, AP, SGOT, GPT in the last 72 hours.     No lab exists for component: DBIL   Thyroid Studies No results found for: T4, T3U, TSH, TSHEXT, TSHEXT     Procedures/imaging: see electronic medical records for all procedures/Xrays and details which were not copied into this note but were reviewed prior to creation of Tulio Daley MD

## 2018-06-30 NOTE — PROGRESS NOTES
Bedside and Verbal shift change report given to Rachael Brown (oncoming nurse) by Mark Jones RN (offgoing nurse). Report included the following information SBAR, Kardex, Intake/Output and Cardiac Rhythm Sinus. 0800 - Initial assessment completed. 1000 - Cultures drawn. Patient placed on BIpap set at 35% O2, after experiencing a drop in oxygnation and increased heart rate. Patient now saturating at 100%. Lungs sound clear but diminished. 1200 - No changes to assessment. Patients lungs sound clear but diminished. 1600 - No changes to assessment. 1800 - The patients tongue was bleeding, mouth mositurizer was applied.

## 2018-06-30 NOTE — PROGRESS NOTES
ABGs drawn on [facility] discussed with Dr. Chey Naqvi and order given to place pt on BIPAP 12/5 70%. RN aware.

## 2018-06-30 NOTE — PROGRESS NOTES
Problem: Mobility Impaired (Adult and Pediatric)  Goal: *Acute Goals and Plan of Care (Insert Text)  Physical Therapy Goals  Updated 6/28/2018 and to be accomplished within 5-7 day(s)  1. Bed mobility: Rolling L to R to with max/mod A with use of HR for positioning. 2. Transfer: Supine to/from Sit with mod/max A with HR for change of position/prep for EOB sitting. 3. Activity Tolerance: Tolerate EOB sitting 5-10 minutes with fair UE supported balance for ADL/balance activities. 4. Patient will perform sit to stand with RW/maximal assistance and tolerate same x 15-30 seconds. 5. Ambulation:  Ambulate 5 ft. mod/max A with RW for increased functional mobility. Physical Therapy Goals  Updated 6/20/2018 and to be accomplished within 7 day(s)  1. Patient will move from supine to sit and sit to supine in bed with maximal assistance. 2.  Patient will transfer from bed to chair and chair to bed with maximal assistance using the least restrictive device. 3.  Patient will perform sit to stand with maximal assistance. 4.  Patient will ambulate with maximal assistance for 5 feet with the least restrictive device. 6/30/2018  PT treatment not completed due to:  [] Off Unit for testing/procedure  [] Pain  [] Eating  [] Patient too lethargic  [] Nausea/vomiting  [] Dialysis treatment in progress   [x] Other: Nurse Mukesh Been advise pt not been seen today; medical decline. Will f/u tomorrow as status allows. Thank you.    Ruperto Webber, PT

## 2018-07-01 ENCOUNTER — APPOINTMENT (OUTPATIENT)
Dept: GENERAL RADIOLOGY | Age: 68
DRG: 853 | End: 2018-07-01
Attending: INTERNAL MEDICINE
Payer: MEDICARE

## 2018-07-01 ENCOUNTER — APPOINTMENT (OUTPATIENT)
Dept: GENERAL RADIOLOGY | Age: 68
DRG: 853 | End: 2018-07-01
Attending: OBSTETRICS & GYNECOLOGY
Payer: MEDICARE

## 2018-07-01 ENCOUNTER — APPOINTMENT (OUTPATIENT)
Dept: CT IMAGING | Age: 68
DRG: 853 | End: 2018-07-01
Attending: OBSTETRICS & GYNECOLOGY
Payer: MEDICARE

## 2018-07-01 LAB
ANION GAP SERPL CALC-SCNC: 10 MMOL/L (ref 3–18)
ARTERIAL PATENCY WRIST A: YES
BACTERIA SPEC CULT: NORMAL
BASE DEFICIT BLD-SCNC: 5 MMOL/L
BASOPHILS # BLD: 0 K/UL (ref 0–0.06)
BASOPHILS NFR BLD: 0 % (ref 0–2)
BDY SITE: ABNORMAL
BODY TEMPERATURE: 98.2
BUN SERPL-MCNC: 34 MG/DL (ref 7–18)
BUN/CREAT SERPL: 34 (ref 12–20)
CA-I SERPL-SCNC: 1.14 MMOL/L (ref 1.12–1.32)
CALCIUM SERPL-MCNC: 7.4 MG/DL (ref 8.5–10.1)
CHLORIDE SERPL-SCNC: 118 MMOL/L (ref 100–108)
CO2 SERPL-SCNC: 22 MMOL/L (ref 21–32)
CREAT SERPL-MCNC: 0.99 MG/DL (ref 0.6–1.3)
DIFFERENTIAL METHOD BLD: ABNORMAL
EOSINOPHIL # BLD: 0.4 K/UL (ref 0–0.4)
EOSINOPHIL NFR BLD: 6 % (ref 0–5)
ERYTHROCYTE [DISTWIDTH] IN BLOOD BY AUTOMATED COUNT: 25.3 % (ref 11.6–14.5)
GAS FLOW.O2 O2 DELIVERY SYS: ABNORMAL L/MIN
GLUCOSE BLD STRIP.AUTO-MCNC: 127 MG/DL (ref 70–110)
GLUCOSE BLD STRIP.AUTO-MCNC: 141 MG/DL (ref 70–110)
GLUCOSE BLD STRIP.AUTO-MCNC: 142 MG/DL (ref 70–110)
GLUCOSE BLD STRIP.AUTO-MCNC: 146 MG/DL (ref 70–110)
GLUCOSE SERPL-MCNC: 145 MG/DL (ref 74–99)
HCO3 BLD-SCNC: 20.8 MMOL/L (ref 22–26)
HCT VFR BLD AUTO: 29.8 % (ref 35–45)
HGB BLD-MCNC: 9.1 G/DL (ref 12–16)
LYMPHOCYTES # BLD: 0.5 K/UL (ref 0.9–3.6)
LYMPHOCYTES NFR BLD: 6 % (ref 21–52)
MAGNESIUM SERPL-MCNC: 2 MG/DL (ref 1.6–2.6)
MCH RBC QN AUTO: 27.9 PG (ref 24–34)
MCHC RBC AUTO-ENTMCNC: 30.5 G/DL (ref 31–37)
MCV RBC AUTO: 91.4 FL (ref 74–97)
MONOCYTES # BLD: 0.2 K/UL (ref 0.05–1.2)
MONOCYTES NFR BLD: 2 % (ref 3–10)
NEUTS SEG # BLD: 6.4 K/UL (ref 1.8–8)
NEUTS SEG NFR BLD: 86 % (ref 40–73)
O2/TOTAL GAS SETTING VFR VENT: 35 %
PCO2 BLD: 36 MMHG (ref 35–45)
PEEP RESPIRATORY: 6 CMH2O
PH BLD: 7.37 [PH] (ref 7.35–7.45)
PHOSPHATE SERPL-MCNC: 3 MG/DL (ref 2.5–4.9)
PIP ISTAT,IPIP: 12
PLATELET # BLD AUTO: 93 K/UL (ref 135–420)
PO2 BLD: 156 MMHG (ref 80–100)
POTASSIUM SERPL-SCNC: 3.8 MMOL/L (ref 3.5–5.5)
RBC # BLD AUTO: 3.26 M/UL (ref 4.2–5.3)
SAO2 % BLD: 99 % (ref 92–97)
SERVICE CMNT-IMP: ABNORMAL
SERVICE CMNT-IMP: NORMAL
SODIUM SERPL-SCNC: 150 MMOL/L (ref 136–145)
SPECIMEN TYPE: ABNORMAL
SPONTANEOUS TIMED, IST: YES
TOTAL RESP. RATE, ITRR: 19
WBC # BLD AUTO: 7.5 K/UL (ref 4.6–13.2)

## 2018-07-01 PROCEDURE — 74011250636 HC RX REV CODE- 250/636: Performed by: INTERNAL MEDICINE

## 2018-07-01 PROCEDURE — 65270000029 HC RM PRIVATE

## 2018-07-01 PROCEDURE — 74018 RADEX ABDOMEN 1 VIEW: CPT

## 2018-07-01 PROCEDURE — 36415 COLL VENOUS BLD VENIPUNCTURE: CPT | Performed by: INTERNAL MEDICINE

## 2018-07-01 PROCEDURE — 74011000250 HC RX REV CODE- 250: Performed by: INTERNAL MEDICINE

## 2018-07-01 PROCEDURE — 74011250636 HC RX REV CODE- 250/636: Performed by: SURGERY

## 2018-07-01 PROCEDURE — 82803 BLOOD GASES ANY COMBINATION: CPT

## 2018-07-01 PROCEDURE — 36600 WITHDRAWAL OF ARTERIAL BLOOD: CPT

## 2018-07-01 PROCEDURE — 71250 CT THORAX DX C-: CPT

## 2018-07-01 PROCEDURE — 87103 BLOOD FUNGUS CULTURE: CPT | Performed by: INTERNAL MEDICINE

## 2018-07-01 PROCEDURE — 97110 THERAPEUTIC EXERCISES: CPT

## 2018-07-01 PROCEDURE — 94660 CPAP INITIATION&MGMT: CPT

## 2018-07-01 PROCEDURE — 77010033678 HC OXYGEN DAILY

## 2018-07-01 PROCEDURE — 80048 BASIC METABOLIC PNL TOTAL CA: CPT | Performed by: SURGERY

## 2018-07-01 PROCEDURE — 74011000258 HC RX REV CODE- 258: Performed by: INTERNAL MEDICINE

## 2018-07-01 PROCEDURE — 74011636637 HC RX REV CODE- 636/637: Performed by: HOSPITALIST

## 2018-07-01 PROCEDURE — 82330 ASSAY OF CALCIUM: CPT | Performed by: SURGERY

## 2018-07-01 PROCEDURE — 77030004950 HC CATH ENTRL NG COVD -A

## 2018-07-01 PROCEDURE — 85025 COMPLETE CBC W/AUTO DIFF WBC: CPT | Performed by: SURGERY

## 2018-07-01 PROCEDURE — 74011636320 HC RX REV CODE- 636/320: Performed by: OBSTETRICS & GYNECOLOGY

## 2018-07-01 PROCEDURE — 84100 ASSAY OF PHOSPHORUS: CPT | Performed by: SURGERY

## 2018-07-01 PROCEDURE — 83735 ASSAY OF MAGNESIUM: CPT | Performed by: SURGERY

## 2018-07-01 PROCEDURE — 82962 GLUCOSE BLOOD TEST: CPT

## 2018-07-01 PROCEDURE — 74011636637 HC RX REV CODE- 636/637: Performed by: INTERNAL MEDICINE

## 2018-07-01 PROCEDURE — P9047 ALBUMIN (HUMAN), 25%, 50ML: HCPCS | Performed by: INTERNAL MEDICINE

## 2018-07-01 PROCEDURE — C9113 INJ PANTOPRAZOLE SODIUM, VIA: HCPCS | Performed by: INTERNAL MEDICINE

## 2018-07-01 PROCEDURE — 71045 X-RAY EXAM CHEST 1 VIEW: CPT

## 2018-07-01 PROCEDURE — 77030019563 HC DEV ATTCH FEED HOLL -A

## 2018-07-01 PROCEDURE — 74011250636 HC RX REV CODE- 250/636: Performed by: OBSTETRICS & GYNECOLOGY

## 2018-07-01 RX ORDER — DIPHENHYDRAMINE HYDROCHLORIDE 50 MG/ML
12.5 INJECTION, SOLUTION INTRAMUSCULAR; INTRAVENOUS ONCE
Status: COMPLETED | OUTPATIENT
Start: 2018-07-01 | End: 2018-07-01

## 2018-07-01 RX ORDER — POTASSIUM CHLORIDE 7.45 MG/ML
10 INJECTION INTRAVENOUS ONCE
Status: COMPLETED | OUTPATIENT
Start: 2018-07-01 | End: 2018-07-06

## 2018-07-01 RX ADMIN — MEROPENEM 1 G: 1 INJECTION, POWDER, FOR SOLUTION INTRAVENOUS at 03:38

## 2018-07-01 RX ADMIN — METOPROLOL TARTRATE 5 MG: 5 INJECTION INTRAVENOUS at 14:17

## 2018-07-01 RX ADMIN — INSULIN GLARGINE 13 UNITS: 100 INJECTION, SOLUTION SUBCUTANEOUS at 08:17

## 2018-07-01 RX ADMIN — SODIUM CHLORIDE 40 MG: 9 INJECTION INTRAMUSCULAR; INTRAVENOUS; SUBCUTANEOUS at 08:19

## 2018-07-01 RX ADMIN — METOPROLOL TARTRATE 5 MG: 5 INJECTION INTRAVENOUS at 21:55

## 2018-07-01 RX ADMIN — HYDROMORPHONE HYDROCHLORIDE 1 MG: 2 INJECTION, SOLUTION INTRAMUSCULAR; INTRAVENOUS; SUBCUTANEOUS at 03:38

## 2018-07-01 RX ADMIN — ALBUMIN (HUMAN) 12.5 G: 0.25 INJECTION, SOLUTION INTRAVENOUS at 16:56

## 2018-07-01 RX ADMIN — ACETAMINOPHEN 1000 MG: 10 INJECTION, SOLUTION INTRAVENOUS at 03:38

## 2018-07-01 RX ADMIN — ACETAMINOPHEN 1000 MG: 10 INJECTION, SOLUTION INTRAVENOUS at 21:55

## 2018-07-01 RX ADMIN — SODIUM HYPOCHLORITE: 1.25 SOLUTION TOPICAL at 21:00

## 2018-07-01 RX ADMIN — MEROPENEM 1 G: 1 INJECTION, POWDER, FOR SOLUTION INTRAVENOUS at 10:09

## 2018-07-01 RX ADMIN — SODIUM CHLORIDE 25 ML/HR: 900 INJECTION, SOLUTION INTRAVENOUS at 05:56

## 2018-07-01 RX ADMIN — METRONIDAZOLE 500 MG: 500 INJECTION, SOLUTION INTRAVENOUS at 12:17

## 2018-07-01 RX ADMIN — SODIUM HYPOCHLORITE: 1.25 SOLUTION TOPICAL at 08:22

## 2018-07-01 RX ADMIN — DIATRIZOATE MEGLUMINE AND DIATRIZOATE SODIUM 30 ML: 660; 100 LIQUID ORAL; RECTAL at 13:10

## 2018-07-01 RX ADMIN — MAGNESIUM SULFATE HEPTAHYDRATE: 500 INJECTION, SOLUTION INTRAMUSCULAR; INTRAVENOUS at 17:00

## 2018-07-01 RX ADMIN — FAMOTIDINE 20 MG: 10 INJECTION, SOLUTION INTRAVENOUS at 18:47

## 2018-07-01 RX ADMIN — ALBUMIN (HUMAN) 12.5 G: 0.25 INJECTION, SOLUTION INTRAVENOUS at 08:19

## 2018-07-01 RX ADMIN — METRONIDAZOLE 500 MG: 500 INJECTION, SOLUTION INTRAVENOUS at 21:57

## 2018-07-01 RX ADMIN — ACETAMINOPHEN 1000 MG: 10 INJECTION, SOLUTION INTRAVENOUS at 08:19

## 2018-07-01 RX ADMIN — DIPHENHYDRAMINE HYDROCHLORIDE 12.5 MG: 50 INJECTION, SOLUTION INTRAMUSCULAR; INTRAVENOUS at 18:48

## 2018-07-01 RX ADMIN — METOPROLOL TARTRATE 5 MG: 5 INJECTION INTRAVENOUS at 03:38

## 2018-07-01 RX ADMIN — ACETAMINOPHEN 1000 MG: 10 INJECTION, SOLUTION INTRAVENOUS at 14:17

## 2018-07-01 RX ADMIN — METOPROLOL TARTRATE 5 MG: 5 INJECTION INTRAVENOUS at 08:19

## 2018-07-01 RX ADMIN — SODIUM HYPOCHLORITE: 1.25 SOLUTION TOPICAL at 03:00

## 2018-07-01 RX ADMIN — MICAFUNGIN SODIUM 100 MG: 20 INJECTION, POWDER, LYOPHILIZED, FOR SOLUTION INTRAVENOUS at 15:34

## 2018-07-01 RX ADMIN — ALBUMIN (HUMAN) 12.5 G: 0.25 INJECTION, SOLUTION INTRAVENOUS at 00:39

## 2018-07-01 RX ADMIN — METRONIDAZOLE 500 MG: 500 INJECTION, SOLUTION INTRAVENOUS at 03:38

## 2018-07-01 RX ADMIN — VANCOMYCIN HYDROCHLORIDE 1500 MG: 10 INJECTION, POWDER, LYOPHILIZED, FOR SOLUTION INTRAVENOUS at 12:57

## 2018-07-01 RX ADMIN — HYDROMORPHONE HYDROCHLORIDE 1 MG: 2 INJECTION, SOLUTION INTRAMUSCULAR; INTRAVENOUS; SUBCUTANEOUS at 12:45

## 2018-07-01 RX ADMIN — MEROPENEM 1 G: 1 INJECTION, POWDER, FOR SOLUTION INTRAVENOUS at 18:48

## 2018-07-01 RX ADMIN — POTASSIUM CHLORIDE 10 MEQ: 10 INJECTION, SOLUTION INTRAVENOUS at 07:53

## 2018-07-01 RX ADMIN — HYDROMORPHONE HYDROCHLORIDE 1 MG: 2 INJECTION, SOLUTION INTRAMUSCULAR; INTRAVENOUS; SUBCUTANEOUS at 16:17

## 2018-07-01 NOTE — PROGRESS NOTES
Summary: Hemodynamically stable. Afebrile. Tolerated bipap throughout night well. Did not attempt to remove. ABGs this morning were improved. FiO2 titrated down to 28%. Wound vac remains intact. Brown stool draining from ileostomy right abdomen. Left abdominal former BRANDON site draining into ostomy bag. Fry draining gómez urine, adequate output. Electrolyte protocol in place, potassium ordered per protocol. Mouth care completed several times during shift due to copious amounts of dried matter. Turned q2. No acute events overnight. Bedside and Verbal shift change report given to ILANA Loo (oncoming nurse) by Emile Low RN   (offgoing nurse). Report included the following information SBAR, Kardex and MAR.

## 2018-07-01 NOTE — PROGRESS NOTES
Consult for TPN Dosing per Pharmacy by Dr. Armando Eagle provided for this 75 y.o. female, for indication of Ileus  Day of Therapy 14     TPN Dosing Wt:  62.4 kg        Labs:  BMP BMP:   Lab Results   Component Value Date/Time     (H) 07/01/2018 05:15 AM    K 3.8 07/01/2018 05:15 AM     (H) 07/01/2018 05:15 AM    CO2 22 07/01/2018 05:15 AM    AGAP 10 07/01/2018 05:15 AM     (H) 07/01/2018 05:15 AM    BUN 34 (H) 07/01/2018 05:15 AM    CREA 0.99 07/01/2018 05:15 AM    GFRAA >60 07/01/2018 05:15 AM    GFRNA 56 (L) 07/01/2018 05:15 AM          CMP CMP:   Lab Results   Component Value Date/Time     (H) 07/01/2018 05:15 AM    K 3.8 07/01/2018 05:15 AM     (H) 07/01/2018 05:15 AM    CO2 22 07/01/2018 05:15 AM    AGAP 10 07/01/2018 05:15 AM     (H) 07/01/2018 05:15 AM    BUN 34 (H) 07/01/2018 05:15 AM    CREA 0.99 07/01/2018 05:15 AM    GFRAA >60 07/01/2018 05:15 AM    GFRNA 56 (L) 07/01/2018 05:15 AM    CA 7.4 (L) 07/01/2018 05:15 AM    MG 2.0 07/01/2018 05:15 AM    PHOS 3.0 07/01/2018 05:15 AM         Ca/ Phos Lab Results   Component Value Date/Time    Calcium 7.4 (L) 07/01/2018 05:15 AM    Phosphorus 3.0 07/01/2018 05:15 AM       Mag    Lab Results   Component Value Date/Time    Magnesium 2.0 07/01/2018 05:15 AM      Tg No components found for: TGL   Albumin Lab Results   Component Value Date/Time    Protein, total 5.9 (L) 06/26/2018 05:00 AM    Albumin 2.4 (L) 06/27/2018 05:45 AM           Goals  Kcal requirements:  ~ 22 kcal/kg     (1396 kcal/day)  Fluid requirements    ~ 30 ml/kg  Macronutrients:  Protein  1.3 g/kg/day             80 g/day = 320 kcal  Lipids    ~ 28% of total kcal        44 g/day = 396 kcal  Dextrose remainder of total kcal   200 g/day = 680 kcal     TPN  will continue at 100% goal macronutrients. TPN rate at 80 ml/hr. Pt does not have any IV maintenance fluids ordered at present time. Electrolytes to be monitored and adjusted daily.   MD to replete electrolytes acutely outside of TPN as needed. Additional recommendations/Orders:                  1.   Corrective insulin coverage q6h while on TPN. TPN also contains 10 units regular insulin. Pt also has order for Lantus 13 units SQ daily. 2.   MD to further add  /adjust IV maintenance fluids as appropriate while on TPN. 3.   No change in electrolytes      4. BMP, Mag, Phos ordered daily      5.    Triglycerides, Prealbumin, CMP ordered upon initiation and weekly -due 7/3/18

## 2018-07-01 NOTE — PROGRESS NOTES
Problem: Mobility Impaired (Adult and Pediatric)  Goal: *Acute Goals and Plan of Care (Insert Text)  Physical Therapy Goals  Initiated 6/15/2018 and to be accomplished within 3-7 day(s)  1. Patient will move from supine to sit and sit to supine  in bed with maximal assistance. 2.  Patient will transfer from bed to chair and chair to bed with maximal assistance using the least restrictive device. 3.  Patient will perform sit to stand with maximal assistance. 4.  Patient will ambulate with maximal assistance for 5 feet with the least restrictive device. Outcome: Not Progressing Towards Goal  physical Therapy TREATMENT    Patient: Salma Gomez (46 y.o. female)  Date: 7/1/2018  Diagnosis: Abdominal pain  Abdominal pain  Abdominal Pain  aspiration  POSTOP WOUND EXPLORATION Sepsis (HCC)  Procedure(s) (LRB):  WOUND EXPLORATION, EXPLORATORY LAPAROTOMY, ILLEOSTMOY, LYSES OF ADHESIONS AND WOUND VAC PLACEMENT (N/A) 16 Days Post-Op  Precautions: Fall   Chart, physical therapy assessment, plan of care and goals were reviewed. ASSESSMENT:  Pt found being returned from CT scan. Pt agrees to exercise and initiates motions for a short period. No progress made, but pt is more agreeable. Pt agrees to increase effort on tomorrows treatment. Progression toward goals:  []      Improving appropriately and progressing toward goals  []      Improving slowly and progressing toward goals  [x]      Not making progress toward goals      PLAN:  Patient continues to benefit from skilled intervention to address the above impairments. Continue treatment per established plan of care. Discharge Recommendations:  TBD   Further Equipment Recommendations for Discharge:  bedside commode and rolling walker     SUBJECTIVE:   Patient stated (nonverbal, but agreeing to participation) .     OBJECTIVE DATA SUMMARY:   Critical Behavior:  Neurologic State: Alert  Orientation Level: Oriented to person, Oriented to place  Cognition: Follows commands, Recognition of people/places  Safety/Judgement: Awareness of environment, Decreased insight into deficits  Therapeutic Exercises:   AAROM of Bilateral LE  Foot and Ankle, 20 reps. Knee and Hip 5 reps in all directions     Pain:  Pain Scale 1: Adult Nonverbal Pain Scale  Pain in: unable to rate  Pain out: unable to rate   Activity Tolerance:   Poor+  Please refer to the flowsheet for vital signs taken during this treatment.   After treatment:   [] Patient left in no apparent distress sitting up in chair  [x] Patient left in no apparent distress in bed  [x] Call bell left within reach  [] Nursing notified  [] Caregiver present  [] Bed alarm activated      Raisa Woods PTA   Time Calculation: 11 mins

## 2018-07-01 NOTE — PROGRESS NOTES
0700  Bedside shift change report received from [place] RN. Report included the following information SBAR, Kardex, Procedure Summary, Intake/Output, MAR, Recent Results, Cardiac Rhythm NSR and Alarm Parameters . All questions were answered. 0800 Assessment complete    0907 Removed from BiPAP and placed on NC 3L, O2 Sat at 98%. Patient tolerating NC trial well. 1200 Reassessment complete    1600 Reassessment complete    1750 On call Nutritionalist called, no callback. Received orders from Dr. Lottie Myers for tubefeed to start Glucerna 1.5  at 15 ml/hr, free water 150 ml q 4 hr.    1830 Pt's neck and face red Called Dr. 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 1900 Bedside shift change report given to [place] RN. Report included the following information SBAR, Kardex, Procedure Summary, Intake/Output, MAR, Recent Results, Cardiac Rhythm NSR and Alarm Parameters . All questions were answered.

## 2018-07-01 NOTE — PROGRESS NOTES
[facility]                  Pulmonary, Critical Care, and Sleep Medicine     Name: August Curran MRN: [id number]   : 1950 Hospital: [facility]    Date: 2018        PCCM Note                                              Subjective/History of Present Illness:   Patient is a 75 y.o. 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 S/p exploratory laparotomy 6/15/18 - findings of sigmoid diverticulitis with suspected perforation, underwent ileostomy and wound vac placement. This AM came off of BiPAP to O2 via NC based on RR, ABG, SPO2 and alertness. Tolerating well  Awake, improving looking, follows simple commands, opens eyes on calling name  The patient denies cough, chest pain, dyspnea, wheezing or hemoptysis. No fever overnight. Persistent leg edema  Pain better controlled on Dilaudid, 2 mg in past 24 hrs  Ileostomy without bleeding. On TPN  Hemodynamically stable  Had wound vac change . 1 unit of PRBC on   Had removal of BRANDON drain on 18, tip culture negative final    Review of Systems   Review of systems not obtained due to patient factors. Surgeries  18 - Laparoscopy converted to laparotomy, total abdominal hysterectomy, bilateral salpingo-oophorectomy, omentectomy, bilateral pelvic and periaortic lymphadenectomy and radical tumor debulking to R0. Path - ADENOCARCINOMA OF LEFT OVARY.     18 - Diagnostic laparoscopy converted to laparotomy, peritoneal biopsies, aerobic and anaerobic cultures of peritoneal fluid and peritoneal lavage. 6/15/18 - Wound exploration. Exploratory laparotomy. Lysis of adhesions. Ileostomy and wound VAC placement. Allergies   Allergen Reactions    Hydrocodone Other (comments)     Breaks into cold sweat    Metformin Other (comments)     Breaks out into cold sweat.  Can Take Glucophage brand name med      Past Medical History:   Diagnosis Date    Diabetes ([address]     many years type 2    Hypertension     many years      Past Surgical History:   Procedure Laterality Date    HX OOPHORECTOMY  05/29/2018    HX TONSILLECTOMY      as a child       Social History   Substance Use Topics    Smoking status: Never Smoker    Smokeless tobacco: Never Used    Alcohol use No        Current Facility-Administered Medications   Medication Dose Route Frequency    TPN ADULT - CENTRAL   IntraVENous CONTINUOUS    diatrizoate meglumine-d.sodium (MD-GASTROVIEW,GASTROGRAFIN) 66-10 % contrast solution 30 mL  30 mL Oral ONCE    TPN ADULT - CENTRAL   IntraVENous CONTINUOUS    meropenem (MERREM) 1 g in sterile water (preservative free) 20 mL IV syringe  1 g IntraVENous Q8H    vancomycin (VANCOCIN) 1500 mg in D5W 500 mL infusion  1,500 mg IntraVENous Q24H    sodium hypochlorite (QUARTER STRENGTH DAKIN'S) 0.125% irrigation (bottle)   Topical Q6H    0.9% sodium chloride infusion  25 mL/hr IntraVENous CONTINUOUS    acetaminophen (OFIRMEV) infusion 1,000 mg  1,000 mg IntraVENous Q6H    metoprolol (LOPRESSOR) injection 5 mg  5 mg IntraVENous Q6H    albumin human 25% (BUMINATE) solution 12.5 g  12.5 g IntraVENous Q8H    insulin glargine (LANTUS) injection 13 Units  13 Units SubCUTAneous DAILY    micafungin (MYCAMINE) 100 mg in 0.9% sodium chloride (MBP/ADV) 100 mL  100 mg IntraVENous Q24H    insulin lispro (HUMALOG) injection   SubCUTAneous Q6H    Vancomycin - Pharmacy to Dose  1 Each Other Rx Dosing/Monitoring    metroNIDAZOLE (FLAGYL) IVPB premix 500 mg  500 mg IntraVENous Q8H    pantoprazole (PROTONIX) 40 mg in sodium chloride 0.9% 10 mL injection  40 mg IntraVENous DAILY         Objective:   Vital Signs:    Visit Vitals    /65    Pulse 75    Temp 97.1 °F (36.2 °C)    Resp 19    Ht 5' 1\" (1.549 m)    Wt 104.1 kg (229 lb 8 oz)    SpO2 99%    BMI 43.36 kg/m2       O2 Device: BIPAP   O2 Flow Rate (L/min): 45 l/min   Temp (24hrs), Av °F (36.7 °C), Min:97.1 °F (36.2 °C), Max:99.4 °F (37.4 °C)       Intake/Output:   Last shift:      701 - 1900  In: 250 [I.V.:250]  Out: 740 [Urine:550]    Last 3 shifts: 1901 -  0700  In: 4467.5 [I.V.:4467.5]  Out: 0283 [Urine:4200]      Intake/Output Summary (Last 24 hours) at 18 1301  Last data filed at 18 1243   Gross per 24 hour   Intake           2593.5 ml   Output             3615 ml   Net          -1021.5 ml       Physical Exam:  General/Neurology: awake, following commands, improving looking, on O2 via NC @ 4 Lpm  Head:   Normocephalic, without obvious abnormality, atraumatic. Eye:   no scleral icterus, no pallor, no cyanosis. Pupils not dilated. Neck:   Symmetric. No lymphadenopathy. Trachea midline  Lung: Moderate equal air entry bilateral. Decreased breath sounds bases. No wheezing or crackles at bases. Heart:   S1 S2 present. No murmur. No JVD. Abdomen:  Soft. Obese. + BS. Midline lower abd surgical site - open wound with wound vac. Rt abd wall Ileostomy. No abd distension or guarding. Extremities:  No cyanosis. No clubbing. No hand edema. B/l feet/leg/thigh edema 2-3+. Pulses: Palpable radials and DPs bilateral.   Lymphatic:  No cervical or supraclav lymphadenopathy.    Skin:   No rash      Data:       Recent Results (from the past 12 hour(s))   CULTURE, BLOOD FOR FUNGUS    Collection Time: 18  4:40 AM   Result Value Ref Range    Special Requests: NO SPECIAL REQUESTS      Culture result: NO GROWTH AFTER 2 HOURS     POC G3    Collection Time: 18  5:12 AM   Result Value Ref Range    Device: BIPAP      FIO2 (POC) 35 %    pH (POC) 7.368 7.35 - 7.45 pCO2 (POC) 36.0 35.0 - 45.0 MMHG    pO2 (POC) 156 (H) 80 - 100 MMHG    HCO3 (POC) 20.8 (L) 22 - 26 MMOL/L    sO2 (POC) 99 (H) 92 - 97 %    Base deficit (POC) 5 mmol/L    PEEP/CPAP (POC) 6 cmH2O    PIP (POC) 12      Allens test (POC) YES      Total resp. rate 19      Site LEFT RADIAL      Patient temp. 98.2      Specimen type (POC) ARTERIAL      Performed by Taco Summers     Spontaneous timed YES     MAGNESIUM    Collection Time: 07/01/18  5:15 AM   Result Value Ref Range    Magnesium 2.0 1.6 - 2.6 mg/dL   CALCIUM, IONIZED    Collection Time: 07/01/18  5:15 AM   Result Value Ref Range    Ionized Calcium 1.14 1.12 - 3.55 MMOL/L   METABOLIC PANEL, BASIC    Collection Time: 07/01/18  5:15 AM   Result Value Ref Range    Sodium 150 (H) 136 - 145 mmol/L    Potassium 3.8 3.5 - 5.5 mmol/L    Chloride 118 (H) 100 - 108 mmol/L    CO2 22 21 - 32 mmol/L    Anion gap 10 3.0 - 18 mmol/L    Glucose 145 (H) 74 - 99 mg/dL    BUN 34 (H) 7.0 - 18 MG/DL    Creatinine 0.99 0.6 - 1.3 MG/DL    BUN/Creatinine ratio 34 (H) 12 - 20      GFR est AA >60 >60 ml/min/1.73m2    GFR est non-AA 56 (L) >60 ml/min/1.73m2    Calcium 7.4 (L) 8.5 - 10.1 MG/DL   PHOSPHORUS    Collection Time: 07/01/18  5:15 AM   Result Value Ref Range    Phosphorus 3.0 2.5 - 4.9 MG/DL   CBC WITH AUTOMATED DIFF    Collection Time: 07/01/18  5:15 AM   Result Value Ref Range    WBC 7.5 4.6 - 13.2 K/uL    RBC 3.26 (L) 4.20 - 5.30 M/uL    HGB 9.1 (L) 12.0 - 16.0 g/dL    HCT 29.8 (L) 35.0 - 45.0 %    MCV 91.4 74.0 - 97.0 FL    MCH 27.9 24.0 - 34.0 PG    MCHC 30.5 (L) 31.0 - 37.0 g/dL    RDW 25.3 (H) 11.6 - 14.5 %    PLATELET 93 (L) 225 - 420 K/uL    NEUTROPHILS 86 (H) 40 - 73 %    LYMPHOCYTES 6 (L) 21 - 52 %    MONOCYTES 2 (L) 3 - 10 %    EOSINOPHILS 6 (H) 0 - 5 %    BASOPHILS 0 0 - 2 %    ABS. NEUTROPHILS 6.4 1.8 - 8.0 K/UL    ABS. LYMPHOCYTES 0.5 (L) 0.9 - 3.6 K/UL    ABS. MONOCYTES 0.2 0.05 - 1.2 K/UL    ABS. EOSINOPHILS 0.4 0.0 - 0.4 K/UL    ABS.  BASOPHILS 0.0 0.0 - 0.06 K/UL    DF AUTOMATED     GLUCOSE, POC    Collection Time: 07/01/18  6:01 AM   Result Value Ref Range    Glucose (POC) 141 (H) 70 - 110 mg/dL   GLUCOSE, POC    Collection Time: 07/01/18 11:54 AM   Result Value Ref Range    Glucose (POC) 146 (H) 70 - 110 mg/dL           Chemistry Recent Labs      07/01/18   0515  06/30/18   0330  06/29/18   0400   GLU  145*  156*  154*   NA  150*  144  154*   K  3.8  4.1  4.0   CL  118*  112*  120*   CO2  22  22  22   BUN  34*  37*  39*   CREA  0.99  0.90  1.01   CA  7.4*  7.7*  7.7*   MG  2.0  2.1  2.2   PHOS  3.0  2.9  2.6   AGAP  10  10  12   BUCR  34*  41*  39*        Lactic Acid Lactic acid   Date Value Ref Range Status   06/17/2018 2.3 (HH) 0.4 - 2.0 MMOL/L Final     Comment:     CALLED TO AND CORRECTLY REPEATED BY:  Ralph Orta, RN ICU ON 6/17/2018 0904 TO 2074       No results for input(s): LAC in the last 72 hours. Liver Enzymes Protein, total   Date Value Ref Range Status   06/26/2018 5.9 (L) 6.4 - 8.2 g/dL Final     Albumin   Date Value Ref Range Status   06/27/2018 2.4 (L) 3.4 - 5.0 g/dL Final     Globulin   Date Value Ref Range Status   06/26/2018 3.2 2.0 - 4.0 g/dL Final     A-G Ratio   Date Value Ref Range Status   06/26/2018 0.8 0.8 - 1.7   Final     AST (SGOT)   Date Value Ref Range Status   06/26/2018 16 15 - 37 U/L Final     Alk. phosphatase   Date Value Ref Range Status   06/26/2018 67 45 - 117 U/L Final     No results for input(s): TP, ALB, GLOB, AGRAT, SGOT, GPT, AP, TBIL in the last 72 hours.     No lab exists for component: DBIL     CBC w/Diff Recent Labs      07/01/18   0515  06/30/18   0330  06/29/18   0430   WBC  7.5  9.9  7.2   RBC  3.26*  3.61*  2.40*   HGB  9.1*  10.0*  7.0*   HCT  29.8*  33.1*  22.6*   PLT  93*  104*  91*   GRANS  86*  90*  89*   LYMPH  6*  4*  4*   EOS  6*  4  4        Cardiac Enzymes No results found for: CPK, CK, CKMMB, CKMB, RCK3, CKMBT, CKNDX, CKND1, FABIAN, TROPT, TROIQ, KAYLAN, TROPT, TNIPOC, BNP, BNPP     BNP No results found for: BNP, BNPP, XBNPT     Coagulation No results for input(s): PTP, INR, APTT in the last 72 hours.     No lab exists for component: INREXT, INREXT      Thyroid  No results found for: T4, T3U, TSH, TSHEXT, TSHEXT    No results found for: T4       ABG Recent Labs      07/01/18   0512  06/30/18   1156  06/30/18   0940   PHI  7.368  7.334*  7.312*   PCO2I  36.0  40.9  41.0   PO2I  156*  291*  115*   HCO3I  20.8*  21.7*  20.8*   FIO2I  35  70  80        All Micro Results     Procedure Component Value Units Date/Time    CULTURE, URINE [[id number]] Collected:  06/30/18 1628    Order Status:  Completed Specimen:  Urine from Fry Specimen Updated:  07/01/18 0921     Special Requests: NO SPECIAL REQUESTS        Culture result: NO GROWTH AFTER 12 HOURS       CULTURE, CATHETER TIP [[id number]] Collected:  06/27/18 1720    Order Status:  Completed Specimen:  [place] Standing Updated:  07/01/18 0744     Special Requests: NO SPECIAL REQUESTS        Culture result: NO GROWTH 3 DAYS       CULTURE, BLOOD [[id number]] Collected:  06/30/18 1316    Order Status:  Completed Specimen:  Blood from Blood Updated:  07/01/18 0736     Special Requests: NO SPECIAL REQUESTS        Culture result: NO GROWTH AFTER 15 HOURS       CULTURE, BLOOD [[id number]] Collected:  06/30/18 1224    Order Status:  Completed Specimen:  Blood from Blood Updated:  07/01/18 0736     Special Requests: NO SPECIAL REQUESTS        Culture result: NO GROWTH AFTER 18 HOURS       CULTURE, BLOOD FOR FUNGUS [[id number]] Collected:  07/01/18 0440    Order Status:  Completed Specimen:  Blood Updated:  07/01/18 0736     Special Requests: NO SPECIAL REQUESTS        Culture result: NO GROWTH AFTER 2 HOURS       CULTURE, FUNGUS [[id number]] Collected:  06/09/18 1024    Order Status:  Completed Specimen:  Bronchial lavage Updated:  06/25/18 1109     Special Requests: NO SPECIAL REQUESTS        FUNGUS SMEAR NO FUNGAL ELEMENTS SEEN        Culture result:         NO FUNGUS ISOLATED 16 DAYS CULTURE, BLOOD FOR FUNGUS [[id number]]     Order Status:  Canceled Specimen:  Blood     CULTURE, BLOOD [[id number]] Collected:  06/09/18 1423    Order Status:  Completed Specimen:  Whole Blood from Blood Updated:  06/15/18 0230     Special Requests: NO SPECIAL REQUESTS        Culture result: NO GROWTH 6 DAYS       CULTURE, BLOOD [[id number]] Collected:  06/09/18 1223    Order Status:  Completed Specimen:  Whole Blood from Blood Updated:  06/15/18 0230     Special Requests: left hand     Culture result: NO GROWTH 6 DAYS       AFB CULTURE + SMEAR W/RFLX PCR AND ID [[id number]] Collected:  06/09/18 1024    Order Status:  Completed Specimen:  Respiratory sample Updated:  06/12/18 2106     Source BRONCHIAL LAVAGE        AFB Specimen processing Concentration     Acid Fast Smear NEGATIVE          (NOTE)  Performed At: [address] [id number]  Ashley Glynn MD OL:[id number]          Acid Fast Culture PENDING    CULTURE, SPUTUM/BRONCH/OTH [[id number]] Collected:  06/09/18 1024    Order Status:  Completed Specimen:  Sputum from Bronchial lavage Updated:  06/11/18 1058     Special Requests: NO SPECIAL REQUESTS        GRAM STAIN FEW WBC'S         FEW GRAM NEGATIVE RODS        Culture result:         RARE NORMAL RESPIRATORY JOSETTE    CULTURE, ANAEROBIC [[id number]] Collected:  06/05/18 0220    Order Status:  Completed Specimen:  Peritoneal Fluid Updated:  06/11/18 0734     Special Requests: NO SPECIAL REQUESTS        Culture result:         NO ANAEROBES ISOLATED 5 DAYS    CULTURE, BLOOD [[id number]] Collected:  06/04/18 0740    Order Status:  Completed Specimen:  Blood from Blood Updated:  06/10/18 0655     Special Requests: NO SPECIAL REQUESTS        Culture result: NO GROWTH 6 DAYS       CULTURE, BLOOD [[id number]] Collected:  06/04/18 0740    Order Status:  Completed Specimen:  Blood from Blood Updated:  06/10/18 0655     Special Requests: NO SPECIAL REQUESTS        Culture result: NO GROWTH 6 DAYS       CULTURE, BODY FLUID Kristbismark Mash STAIN [[id number]]  (Abnormal)  (Susceptibility) Collected:  06/05/18 0220    Order Status:  Completed Specimen:  Peritoneal Fluid Updated:  06/08/18 0936     Special Requests: NO SPECIAL REQUESTS        GRAM STAIN MANY WBC'S         NO ORGANISMS SEEN        Culture result:         RARE KLEBSIELLA OXYTOCA (A)            CALLED TO AND CORRECTLY REPEATED BY:  ANGEL LUIS DE LOS SANTOS RN ICU ON 6/7/2018 1032 TO 5087      CULTURE, URINE [[id number]] Collected:  06/05/18 1245    Order Status:  Completed Specimen:  Urine from Fry Specimen Updated:  06/07/18 1103     Special Requests: NO SPECIAL REQUESTS        Culture result: NO GROWTH 2 DAYS       C. DIFFICILE/EPI PCR [[id number]] Collected:  06/05/18 0930    Order Status:  Canceled Specimen:  Stool           Echo 6/9/18:  Left ventricle: Systolic function was normal. Ejection fraction was estimated   in the range of 65 % to 70 %. No obvious  wall motion abnormalities identified in the views obtained. Wall thickness   was mildly increased. Doppler parameters  were consistent with abnormal left ventricular relaxation (grade 1 diastolic   dysfunction). Right ventricle: The size was normal. Systolic function was normal.  Mitral valve: There was mild annular calcification. Tricuspid valve: Pulmonary artery systolic pressure was mildly increased. Pulmonary artery systolic pressure: 34 mmHg. PVL LE 6/6/18: acute b/l peroneal DVT. CT abd pelvis 6/4/18:  1. Postsurgical hysterectomy, bilateral oophorectomy, pelvic lymphadenectomy and  a mastectomy surgical changes. Small volume of ascites in the abdomen and  pelvis, with a few tiny locules of gas in the right hemipelvis would be in  keeping with recent postsurgical etiology. 2. Abnormal edematous thick-walled small bowel loops in the left abdomen and  pelvis, with TRAM lamellar edematous configuration, induration. No findings of  pneumatosis. The overall appearance is nonspecific. Diagnostic considerations  include infectious etiology, nonspecific edema which may be unresolved  postsurgical etiology. Ischemia is also included in the differential diagnostic  consideration, however, there are no findings of pneumatosis, portal venous gas. 3. Stool in the right colon extending to the hepatic flexure with surrounding  gas, foci of pneumatosis could be obscured. No associated bowel wall thickening. 4. Satisfactory position of nasogastric tube. 5. Hepatomegaly, steatosis with 2 rounded low-density lesions, potentially  cysts. 6. Bibasilar atelectasis. Imaging:  [x]I have personally reviewed the patients chest radiographs images and report   7/1/18    Results from Hospital Encounter encounter on 06/04/18   XR ABD PORT  1 V   Narrative Supine abdominal radiograph    Comparison: 6/4/2018    Indication: Small bowel feeding tube placement. Findings: Small bowel feeding tube tip overlies distal stomach, may be partially  protruding into the duodenal bulb. Paucity of bowel gas. IVC filter noted. Impression Impression: Small bowel feeding tube tip is in the distal stomach or possibly  protruding into the duodenal bulb. 7/1/18  CXR  Frontal chest radiograph   Comparison: 6/30/2018   Indication: Right-sided pulmonary opacification follow-up.   Findings: Stable cardiomediastinal silhouette. Unchanged PICC placement. No visible pleural abnormality. Low lung volumes, more so on the right. Reticular opacification in the lower right lung with slightly less pronounced confluent appearance laterally. Left lung is clear. No acute osseous abnormality. IMPRESSION:    Persistent lower right lung opacification which appears predominantly reticular (less confluent appearance laterally).  This could be infectious or atelectatic        Results from Hospital Encounter encounter on 06/04/18   CTA CHEST ABD PELV W CONT   Narrative CT CHEST, ABDOMEN, AND PELVIS:    COMPARISON: CT abdomen/pelvis dated 6/4/2018. INDICATION: DVTs, blood from left drain. TECHNIQUE: Axial was performed through the chest, abdomen, and pelvis after  intravenous contrast administration only. Coronal and sagittal reformations  were generated. One or more dose reduction techniques were used on this CT: automated exposure  control, adjustment of the mAs and/or kVp according to patient's size, and  iterative reconstruction techniques. The specific techniques utilized on this CT  exam have been documented in the patient's electronic medical record.  ============    CTA CHEST:    Technique: Axial imaging was obtained dynamically through the pulmonary arteries  using high-resolution CT angiographic protocol, without complications. In  addition, coronal and sagittal reconstructed MIP arteriograms were performed, to  better evaluate the pulmonary vasculature, and to increase sensitivity for  detection of pulmonary emboli. CT angiogram quality parameters:  1. Overall exam quality - adequate  2. Adequacy of pulmonary enhancement-adequate   3. Adequacy of breath-hold-optimal: Artifacts visible   4. There are significant streak artifacts affecting scan quality.    ============    PULMONARY ARTERIES: The pulmonary arteries are adequately opacified but without  evidence of any definite filling defects to suggest pulmonary thromboembolism  within the limitations of the streak artifacts. AORTA:  Mildly tortuous; no aneurysm or dissection. LUNGS: Consolidation with air bronchograms noted involving a large portion of  the right lower lobe most likely infiltrates and atelectasis. A smaller  consolidation is seen in the left lower lobe. Ill-defined groundglass opacities may represent vascular congestion. PLEURA: Normal, with no effusion or pneumothorax. AIRWAY: Unremarkable. MEDIASTINUM: Normal heart size. No pericardial effusion. Right-sided PICC line  is present.     LYMPH NODES: No mediastinal, hilar or axillary lymphadenopathy. CT ABDOMEN AND PELVIS:    Streak artifacts limit evaluation. LIVER, BILIARY: Small 1.6 cm cyst is seen in the right lobe. No significant  biliary dilation. Ascites noted adjacent to the gallbladder. PANCREAS: Normal.    SPLEEN: Normal.    ADRENALS: Normal.    KIDNEYS/URETERS/BLADDER: There is no hydronephrosis on either side. Fry catheter decompresses the bladder. PELVIC ORGANS: Large soft tissue consolidation is seen in the pelvis likely  combination of adherent bowel and trapped fluid. A right-sided drain extends into the posterior aspect of the pelvic  consolidation described above. A left-sided drain extends along the left paracolic gutter. VASCULATURE: Unremarkable    LYMPH NODES: No enlarged lymph nodes. GASTROINTESTINAL TRACT: A right-sided ileostomy noted without evidence of  obstruction. BONES: Extensive degenerative changes are seen throughout the spine with  moderate to severe compression deformity of L1 vertebral body. OTHER: There is mild ascites throughout the abdomen and pelvis. There is diffuse  anasarca. Hyperdense mass in the anterior subcutaneous fat adjacent to the ileostomy  defect measures measuring 5.5 cm in width by 3.7 cm in thickness by 7 cm in  height possibly representing focal hematoma.  _______________         Impression IMPRESSION:    Study limited due to streak and motion artifacts as described. No evidence of pulmonary embolism within the limitations of artifacts. Prominent consolidation right lower lobe likely infiltrates and atelectasis. Small consolidation seen in the left lower lobe. Mild diffuse ascites present throughout the abdomen pelvis with diffuse  anasarca. Large soft tissue consolidation in the pelvis containing air bubbles likely  representing adherent bowel with adjacent trapped fluid.   There is what may be a subcutaneous hematoma adjacent to the right ileostomy  measuring 5.5 cm x 3.7 cm x 7.0 cm, however, there is no definite  retroperitoneal or any other intra-abdominal hematoma. Bilateral pelvic drains as described above. Chronic compression deformity of L1. IMPRESSION:   · Acute hypoxic respiratory failure, on NC, came off of BiPAP; multifactorial from any generalized weakness; aspiration pneumonia following episode of mucus plug 6/29, previously had other etiologies with pulmonary edema, aspiration pneumonitis. No central or segmental PE on recent CTA chest. Previously required ventilator support this admission due to sepsis, reintubated on 6/8/18 and self-extubated 6/17  · S/p bronchoscopy 6/9/18: no aspiration noted then. Cx Negative. · Severe sepsis due to Klebsiella Oxytoca peritonitis and then perforation. S/p laparotomy 6/5/18 [POD # 5 tumor radical debulking] and peritoneal lavage: Klebsiella positive. Ruled out ischemic bowel in laparotomy. S/p laparotomy on 6/15/18 for peritonitis due to diverticulitis with perforation, s/p distal ileostomy. · Leukocytosis, improved on micafungin/vanco. Now aspiration pneumonia; repeat blood and fungal cx in process. Abd drain tip culture negative final  · Off heparin for intraabdominal bleeding and CTA chest no central PE. INR reversed with protamin, FFP.  · S/p Shock suspected from sepsis and obstructive due to presumed PE with severe hypoxia and high A-a gradient, elevated RVSP 34 mm Hg. CTA 6/22/18 negative for central or main PA PE. Shock resolved. CTA chest no central PE. · Acute b/l peroneal DVT, s/p IVC filter by vascular surgery 6/28/18. · Anemia, s/p PRBC tx 6/29/18, no gross bleeding  · Thrombocytopenia, mild, no active bleeding  · JESS, improved  · Hypernatremia, worsened following Lasix- improvement slowly - managed per renal  · Elevated LFT, due to shock liver, resolved  · Foreign body on peritoneal biopsy, per path report. · Anasarca due to fluid resuscitation, JESS, hypoalbuminemia and sequale of sepsis.    · AFib converted to NSR  · Adenocarcinoma of ovary s/p radical tumor debulking surgery 5/29/18    · Code status: Full code. · Poor overall prognosis. RECOMMENDATIONS:   Respiratory: ABG reviewed at bedside; took off of BiPAP and placed on NC O2 @ 4 Lpm support - tolerating better on follow up- BiPAP 12/6 at night and followed by ABG in AM; low threshold for re-intubation. Keep SPO2 >=92%. Spoke with brother at the bedside. CXR in AM.  HOB 30 degree elevation all the time. Aggressive pulmonary toileting. Aspiration precautions. Judicious opiate pain medications. Periodic Lasix as needed in coordination with renal services  ID:  R sided pulmonary infiltrates. Follow up drain tip culture negative to date  Repeat blood and urine cultures pending  Spoke with Dr. Taylor Rincon placement today followed by CT chest, abd, pelvis with PO and IV contrast today  Bronch cx normal robin. Peritoneal cx Klebsiella Oxytoca resistant to ampicillin. On micafungin/vanco. Continue Merrem started 6/30/18  Abx - Zosyn since 6/13 [day 18] stopped 6/30/18; levaquin since 6/17 [day 14] stopped 6/30/18. Continue Flagyl since 6/15 [day 16]; iv vanco since 6/22, micafungin since 6/22  Hem: follow CBC; Keep Hb> 7 gm/dl. s/p IVC filter placement 6/28. Transfusion per surgery  CVS: Echo ok with mildly elevated RVSP but normal RV systolic function. AFib converted to NSR. Off of cardizem drip. Renal: Nephrologist on case; Creatinine improved. Lasix PRN because of hypernatremia. On albumin. Spoke with pharmacy to see if IVF with antibiotics could be changed to D5, but nationwide shortage of small bags of IVF to mix antibiotics but department will look around. GI/: TPN; barium study 6/21 - patient could not do it on 6/22 due to abd pain  Endocrine: Maintain blood glucose 140-180. Humalog sc. Insulin in TPN.   on Lantus  Neurology: Dilaudid- balance post-op pain without sedation- recommend judicious use of sedatives  Pain/Sedation: dilaudid - management per Dr Eliane Chan - judicious use  Skin/Wound: local surgical site care. Wound vac changed 6/29. Electrolytes: Replace electrolytes per ICU electrolyte replacement protocol, renal service. Nutrition: TPN started 6/18. NPO   Prophylaxis: GI Prophylaxis (protonix)  Restraints: none  Lines/Tubes:   ETT: 6/8/18- self extubated 6/17/18  Central line: right subclavian 6/4/18 - removed 6/20  Picc line 6/19 - Central line bundle followed  Fry: 6/4/18 (Medically necessary for strict input/output monitoring in critically ill patient, will remove it when not needed. Fry bundle followed). Will defer respective systems problem management to primary and other respective consultant and follow patient in ICU with primary and other medical team.  Further recommendations will be based on the patient's response to recommended treatment and results of the investigation ordered. Quality Care: PPI, DVT prophylaxis, HOB elevated, Infection control all reviewed and addressed. Brother at bedside, updated: overall poor prognosis with patient at increased risk for further deterioration, prolonged hospitalization, nosocomial infections, failure to thrive. Patient has no children. Code status: Full code. PICC Line/Fry cath medically necessary  PT and OT on case    Care of plan d/w RN, RT, MDR, brother, Dr. Eliane Chan.   High complexity decision making was performed during the evaluation of this patient   CC time 36 mins     Juan Mckeon MD   7/1/2018

## 2018-07-01 NOTE — PROGRESS NOTES
Admit date: 6/4/2018      ASSESSMENT/PLAN  Britney Price a 75 y. 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 Status post complete surgical resection with no evidence of residual disease. Discussed adjuvant chemotherapy however patient is not medically appropriate at this time. Patient is high risk of recurrence based on pathology (Clear Cell). GI - Peritonitis resolved. Last drain removed 6/27/2018 and tip was cultures which is NGTD. Post operative ileus resolved. Good ileostomy output but unable to tolerate po as continues to be at risk for aspiration. Continue TPN. Will place dobhoff today and if successful will give oral contrast and get CT. If CT looks good will start TF today. Will add LFTs to tomorrows labs. ID - Sepsis. Had clinically improved and was going to stop antibiotics for intra-abdominal sepsis once culture from tip of drain finalized no growth but patient developed fever two days ago. Patient has presumed pneumonia secondary to aspiration and is on Meropenem, Vanc and Flagyl. I am deferring abx regimen and length to pulmonary team. ID consult, if available would be very helpful here. Currently patient is on Meropenem D2/7, Flagyl D17, Vanc D10/14 and Micafungin D10/14. Zosyn 26 days completed 6/30/18, Levaquin 16 days completed 6/29/18. WBC had trending down to normal. Wound vac was changed Friday and sharp debridement was performed.  Wound vac replaced with intermittent irrigation of dilute Dakins solution. Drainage from wound vac is now serosang  Renal - JESS, resolved and Nephrology has signed off. Heme - Hgb had been stable since last transfusion 6/29/18   DVT- bilateral peroneal DVT, full anticoagulation contraindicated. IVC filter was 6/28/2018 placed without issues. CV - Afib, converted to NSR. CHF, improved. HTN, on metoprolol. All managed by Intensivist/Hospitalist  Pulm - Acute hypoxic RF, currently on BIPAP. Had mucus plug Friday. Also pulmonary edema, aspiration pneumonitis. Managed by Intensivist  Psych - selectively interactive, seems depressed and have discussed starting antidepressant but not taking po now. Disp - condition labile, continue ICU care  Code Status - Full Code      SUBJECTIVE  Patient is resting now with O2 per NC. Appears comfortable. Getting IV Tylenol around the clock and IV Dilaudid prn. Ileostomy normal output.        OBJECTIVE  Physical Exam:  General - Resting on O2 nc  HEENT - nml  Heart - NSR  Abdomen - soft, non-tender, nondistended, normal bowel sounds, ileostomy looks good, wound vac in place with normal appearing skin edges, drainage bag in LLQ where drain was pulled.   Incision - wound vac in place  Extremities - 2+ edema all extremities  Patient Vitals for the past 24 hrs:   BP Temp Pulse Resp SpO2 Weight   07/01/18 0900 120/65 - 75 19 99 % -   07/01/18 0800 120/70 97.5 °F (36.4 °C) 83 19 98 % -   07/01/18 0751 - - - - - 104.1 kg (229 lb 8 oz)   07/01/18 0723 - - - - 99 % -   07/01/18 0721 - 97.5 °F (36.4 °C) - - - -   07/01/18 0700 123/66 - 84 17 99 % -   07/01/18 0630 120/66 - 86 17 99 % -   07/01/18 0600 133/76 - 87 22 99 % -   07/01/18 0530 123/68 - 86 16 97 % -   07/01/18 0510 - - - - 99 % -   07/01/18 0430 134/67 - 93 27 100 % -   07/01/18 0400 139/78 99.4 °F (37.4 °C) 84 19 100 % -   07/01/18 0330 147/79 - 91 18 100 % -   07/01/18 0300 150/80 - 91 19 100 % -   07/01/18 0230 147/83 - 92 23 100 % - 07/01/18 0200 138/79 - 91 17 100 % -   07/01/18 0130 128/75 - 94 21 100 % -   07/01/18 0100 143/84 - 95 24 100 % -   07/01/18 0030 (!) 124/102 - (!) 102 21 100 % -   07/01/18 0002 - 98.2 °F (36.8 °C) - - 100 % -   07/01/18 0000 138/81 98.2 °F (36.8 °C) 91 18 100 % -   06/30/18 2330 139/80 - 90 22 100 % -   06/30/18 2300 137/81 - 88 23 100 % -   06/30/18 2230 130/74 - 86 19 100 % -   06/30/18 2200 128/73 - 84 19 100 % -   06/30/18 2130 127/75 - 85 22 100 % -   06/30/18 2100 134/76 - 81 24 100 % -   06/30/18 2038 - - - - 100 % -   06/30/18 2030 144/81 - 78 20 98 % -   06/30/18 2000 124/84 - 91 20 99 % -   06/30/18 1930 123/74 - 83 20 100 % -   06/30/18 1900 131/75 98.2 °F (36.8 °C) 83 23 100 % -   06/30/18 1800 126/70 - 81 21 96 % -   06/30/18 1700 138/81 - 87 18 99 % -   06/30/18 1602 - 98.2 °F (36.8 °C) - - - -   06/30/18 1600 127/75 - 83 17 98 % -   06/30/18 1546 - - - - 99 % -   06/30/18 1500 - - 88 25 98 % -   06/30/18 1400 122/72 - 96 15 99 % -   06/30/18 1300 118/63 - 100 17 97 % -   06/30/18 1232 - - - - 98 % -   06/30/18 1200 124/72 - (!) 102 19 99 % -   06/30/18 1135 - (!) 100.8 °F (38.2 °C) - - - -   06/30/18 1100 144/84 - (!) 108 16 100 % -         Intake/Output Summary (Last 24 hours) at 07/01/18 1005  Last data filed at 07/01/18 0800   Gross per 24 hour   Intake           2593.5 ml   Output             2875 ml   Net           -281.5 ml          Labs  CBC  Recent Labs      07/01/18   0515  06/30/18   0330  06/29/18   0430   WBC  7.5  9.9  7.2   HCT  29.8*  33.1*  22.6*   MCV  91.4  91.7  94.2   MONOS  2*  2*  3   EOS  6*  4  4   BASOS  0  0  0        CMP  Recent Labs      07/01/18   0515  06/30/18   0330  06/29/18   0400   NA  150*  144  154*   CO2  22  22  22   BUN  34*  37*  39*        Lab Results   Component Value Date/Time    Glucose 145 (H) 07/01/2018 05:15 AM    Glucose (POC) 141 (H) 07/01/2018 06:01 AM    Glucose,  (H) 06/15/2018 05:14 PM          Current Hospital Medications    Current Facility-Administered Medications:     TPN ADULT - CENTRAL, , IntraVENous, CONTINUOUS, Katt Pak MD, Last Rate: 80 mL/hr at 06/30/18 1823    meropenem (MERREM) 1 g in sterile water (preservative free) 20 mL IV syringe, 1 g, IntraVENous, Q8H, Laron Ramos MD, 1 g at 07/01/18 0338    vancomycin (VANCOCIN) 1500 mg in D5W 500 mL infusion, 1,500 mg, IntraVENous, Q24H, Laron Ramos MD, 1,500 mg at 06/30/18 1311    0.9% sodium chloride infusion 250 mL, 250 mL, IntraVENous, PRN, Mina Franco MD    sodium hypochlorite (QUARTER STRENGTH DAKIN'S) 0.125% irrigation (bottle), , Topical, Q6H, Pietro Gonzalez MD    0.9% sodium chloride infusion, 25 mL/hr, IntraVENous, CONTINUOUS, Lico Jones MD, Last Rate: 25 mL/hr at 07/01/18 0556, 25 mL/hr at 07/01/18 0556    saliva stimulant (BIOTENE) 1 Spray, 1 Spray, Oral, PRN, Laron Ramos MD, 1 Spray at 06/28/18 1108    HYDROmorphone (PF) (DILAUDID) injection 1 mg, 1 mg, IntraVENous, Q2H PRN, Mina Franco MD, 1 mg at 07/01/18 0338    acetaminophen (OFIRMEV) infusion 1,000 mg, 1,000 mg, IntraVENous, Q6H, Mina Franco MD, Last Rate: 400 mL/hr at 07/01/18 0819, 1,000 mg at 07/01/18 0819    metoprolol (LOPRESSOR) injection 5 mg, 5 mg, IntraVENous, Q6H, Laron Ramos MD, 5 mg at 07/01/18 0819    hydrALAZINE (APRESOLINE) 20 mg/mL injection 10 mg, 10 mg, IntraVENous, Q4H PRN, Laron Ramos MD, 10 mg at 06/26/18 2045    albumin human 25% (BUMINATE) solution 12.5 g, 12.5 g, IntraVENous, Q8H, Berenice Haas MD, 12.5 g at 07/01/18 0819    insulin glargine (LANTUS) injection 13 Units, 13 Units, SubCUTAneous, DAILY, Jaymie Romero MD, 13 Units at 07/01/18 0817    0.9% sodium chloride infusion 250 mL, 250 mL, IntraVENous, PRN, Katt Pak MD    [facility]) 100 mg in 0.9% sodium chloride (MBP/ADV) 100 mL, 100 mg, IntraVENous, Q24H, Laron Ramos MD, Last Rate: 100 mL/hr at 06/30/18 1713, 100 mg at 06/30/18 1713    insulin lispro (HUMALOG) injection, , SubCUTAneous, Q6H, Kristin Quiroz MD, Stopped at 07/01/18 0000    dextrose (D50W) injection syrg 12.5-25 g, 25-50 mL, IntraVENous, PRN, Kristin Quiroz MD    Vancomycin - Pharmacy to Dose, 1 Each, Other, Rx Dosing/Monitoring, Kristin Quiroz MD    metroNIDAZOLE (FLAGYL) IVPB premix 500 mg, 500 mg, IntraVENous, Q8H, Shanna Zuniga MD, Last Rate: 100 mL/hr at 07/01/18 0338, 500 mg at 07/01/18 0406    ipratropium (ATROVENT) 0.02 % nebulizer solution 0.5 mg, 0.5 mg, Nebulization, Q4H PRN, Shanna Zuniga MD, 0.5 mg at 06/29/18 1449    naloxone (NARCAN) injection 0.4 mg, 0.4 mg, IntraVENous, EVERY 2 MINUTES AS NEEDED, Kristin Quiroz MD    ondansetron [facility]) injection 4 mg, 4 mg, IntraVENous, Q6H PRN, Corrinne Linea, MD, 4 mg at 06/08/18 2152    oxymetazoline (AFRIN) 0.05 % nasal spray 2 Spray, 2 Spray, Both Nostrils, BID PRN, Hortencia Fernando MD    alum-mag hydroxide-simeth (MYLANTA) oral suspension 30 mL, 30 mL, Oral, Q4H PRN, Christen Almonte MD, 30 mL at 06/07/18 2225    fluticasone (FLONASE) 50 mcg/actuation nasal spray 2 Spray, 2 Spray, Both Nostrils, DAILY PRN, Demarco Faria MD    ELECTROLYTE REPLACEMENT PROTOCOL-POTASSIUM Renal Dosing, 1 Each, Other, PRN, Christen Covarrubias MD    ELECTROLYTE REPLACEMENT PROTOCOL-MAGNESIUM, 1 Each, Other, PRN, Christen Covarrubias MD    ELECTROLYTE REPLACEMENT PROTOCOL-PHOSPHORUS Renal Dosing, 1 Each, Other, PRN, Christen Covarrubias MD    ELECTROLYTE REPLACEMENT PROTOCOL-CALCIUM, 1 Each, Other, PRN, Christen Covarrubias MD    sodium chloride (NS) flush 5-10 mL, 5-10 mL, IntraVENous, PRN, Dona Peabody, MD    glucose chewable tablet 16 g, 4 Tab, Oral, PRN, Christen Covarrubias MD    glucagon (GLUCAGEN) injection 1 mg, 1 mg, IntraMUSCular, PRN, Christen Covarrubias MD    pantoprazole (PROTONIX) 40 mg in sodium chloride 0.9% 10 mL injection, 40 mg, IntraVENous, DAILY, Dylon Toro MD, 40 mg at 07/01/18 9475      Demarco Faria MD

## 2018-07-01 NOTE — PROGRESS NOTES
Hospitalist Progress Note-critical care note     Patient: Shital Bobo MRN: [id number]  CSN: [id number]    YOB: 1950  Age: 75 y.o. Sex: female    DOA: 6/4/2018 LOS:  LOS: 27 days            Chief complaint:   hyoerntremia. D/p ileostomy , peritonitis , sepsis , jess ,ovarian cancer, hypokalemia    Assessment/Plan         Hospital Problems  Date Reviewed: 6/5/2018          Codes Class Noted POA    Hypokalemia ICD-10-CM: E87.6  ICD-9-CM: 276.8  6/27/2018 Unknown        Hypernatremia ICD-10-CM: E87.0  ICD-9-CM: 276.0  6/25/2018 Unknown        S/P ileostomy ([facility] ICD-10-CM: Z93.2  ICD-9-CM: V44.2  6/16/2018 No        Hypoalbuminemia ICD-10-CM: E88.09  ICD-9-CM: 273.8  6/16/2018 Unknown        Peritonitis ([facility] ICD-10-CM: K65.9  ICD-9-CM: 567.9  6/16/2018 Unknown        Acute deep vein thrombosis (DVT) of lower extremity ([facility] ICD-10-CM: I82.409  ICD-9-CM: 453.40  6/7/2018 Yes        Acute respiratory failure ([facility] ICD-10-CM: J96.00  ICD-9-CM: 518.81  6/5/2018 No        JESS (acute kidney injury) ([facility] ICD-10-CM: N17.9  ICD-9-CM: 584.9  6/5/2018 Unknown        Metabolic acidosis RDQ-34-LT: E87.2  ICD-9-CM: 276.2  6/5/2018 No        Abdominal pain ICD-10-CM: R10.9  ICD-9-CM: 789.00  6/4/2018 Yes        * (Principal)Sepsis ([facility] ICD-10-CM: A41.9  ICD-9-CM: 038.9, 995.91  6/4/2018 Yes        Oliguria ICD-10-CM: R34  ICD-9-CM: 788.5  6/4/2018 Yes        Ovarian ca [facility]) ICD-10-CM: C56.9  ICD-9-CM: 183.0  5/29/2018 Yes          critical care plan :   respiratory   Acute resp failure with hypoxia   Continue desat, high flow O2 put on -now  bipap -low thresh hold to be reintubated   cxr pulm edema . Lasix was hold due to hypernatremia   - self extubated 06/17/2018.   - bronchoscopy 06/09/2018,   - resp cultures no growth to date. CTA chest negative for  PE       ID:   Severe Sepsis/shock    - secondary to peritonitis. Still fever   Peritoneal fluid cx:klebsiella oxytoca.   Continue iv abx  zosyn,levaquine, vanc flagyl. .micafungin -switch to merrem. +flagyl +vanc         Card; Shock   - sepsis, bcx so far negative, still having spike fever, abx adjusted    -resolved. Now off pressors. Anasarca - likely third spacing,  on albumin-hold due to sodium high        Hemo:   Bleeding from BRANDON drain -resolved   protamine and FFP. Acute bilateral peroneal DVT   - heparin drip stopped. due to bleeding.  - ivc filter placed   per dr. Shaila Freed   Anemia   Received transfusion, so far h/h stable        Renal :   JESS   -atn ,  nephrology on board , cr wnl    Metabolic acidosis  Hypernatremia: improving  pharmacy to correct with TPN    Gi :  tpn feeding       Endo:   DM  lantus , ssi            Poor prognosis ,  Brother was at the bedside      30 minutes of critical care time spent in the direct evaluation and treatment of this high risk patient. The reason for providing this level of medical care for this critically ill patient was due a critical illness that impaired one or more vital organ systems such that there was a high probability of imminent or life threatening deterioration in the patients condition. This care involved high complexity decision making to assess, manipulate, and support vital system functions, to treat this degreee vital organ system failure and to prevent further life threatening deterioration of the patients condition. Review of systems:  Unable to obtain due to on bipap   Vital signs/Intake and Output:  Visit Vitals    /65    Pulse 75    Temp 97.5 °F (36.4 °C)    Resp 19    Ht 5' 1\" (1.549 m)    Wt 104.1 kg (229 lb 8 oz)    SpO2 99%    BMI 43.36 kg/m2     Current Shift:     Last three shifts:  06/29 1901 - 07/01 0700  In: 4467.5 [I.V.:4467.5]  Out: 4525 [Urine:4200]    Physical Exam:  General: WD, open eyes per voice and following some command  , in distress   HEENT: NC, Atraumatic. PERRLA, anicteric sclerae.  bipap mask noted   Lungs: Coarse bilateral lungs   Heart:  Regular  rhythm,  + murmur, No Rubs, No Gallops  Abdomen: Soft, Non distended, Non tender.  +Bowel sounds, colostomy bag, wound vac noted   Extremities: No c/c. Edema   Psych:   Calm   Neurologic:  Open eyes with verbal stimuli           Labs: Results:       Chemistry Recent Labs      07/01/18 0515 06/30/18   0330  06/29/18   0400   GLU  145*  156*  154*   NA  150*  144  154*   K  3.8  4.1  4.0   CL  118*  112*  120*   CO2  22  22  22   BUN  34*  37*  39*   CREA  0.99  0.90  1.01   CA  7.4*  7.7*  7.7*   AGAP  10  10  12   BUCR  34*  41*  39*      CBC w/Diff Recent Labs      07/01/18 0515 06/30/18   0330 06/29/18   0430   WBC  7.5  9.9  7.2   RBC  3.26*  3.61*  2.40*   HGB  9.1*  10.0*  7.0*   HCT  29.8*  33.1*  22.6*   PLT  93*  104*  91*   GRANS  86*  90*  89*   LYMPH  6*  4*  4*   EOS  6*  4  4      Cardiac Enzymes No results for input(s): CPK, CKND1, FABIAN in the last 72 hours. No lab exists for component: CKRMB, TROIP   Coagulation No results for input(s): PTP, INR, APTT in the last 72 hours. No lab exists for component: INREXT, INREXT    Lipid Panel Lab Results   Component Value Date/Time    Triglyceride 66 06/26/2018 05:00 AM      BNP No results for input(s): BNPP in the last 72 hours. Liver Enzymes No results for input(s): TP, ALB, TBIL, AP, SGOT, GPT in the last 72 hours.     No lab exists for component: DBIL   Thyroid Studies No results found for: T4, T3U, TSH, TSHEXT, TSHEXT     Procedures/imaging: see electronic medical records for all procedures/Xrays and details which were not copied into this note but were reviewed prior to creation of Ryland Wheatley MD

## 2018-07-02 ENCOUNTER — APPOINTMENT (OUTPATIENT)
Dept: GENERAL RADIOLOGY | Age: 68
DRG: 853 | End: 2018-07-02
Attending: INTERNAL MEDICINE
Payer: MEDICARE

## 2018-07-02 LAB
ALBUMIN SERPL-MCNC: 2.8 G/DL (ref 3.4–5)
ALBUMIN/GLOB SERPL: 0.9 {RATIO} (ref 0.8–1.7)
ALP SERPL-CCNC: 95 U/L (ref 45–117)
ALT SERPL-CCNC: 15 U/L (ref 13–56)
ANION GAP SERPL CALC-SCNC: 13 MMOL/L (ref 3–18)
ARTERIAL PATENCY WRIST A: ABNORMAL
ARTERIAL PATENCY WRIST A: YES
AST SERPL-CCNC: 18 U/L (ref 15–37)
BACTERIA SPEC CULT: NORMAL
BASE DEFICIT BLD-SCNC: 5 MMOL/L
BASE DEFICIT BLD-SCNC: 5 MMOL/L
BASOPHILS # BLD: 0 K/UL (ref 0–0.06)
BASOPHILS NFR BLD: 0 % (ref 0–2)
BDY SITE: ABNORMAL
BDY SITE: ABNORMAL
BILIRUB DIRECT SERPL-MCNC: 0.3 MG/DL (ref 0–0.2)
BILIRUB SERPL-MCNC: 0.9 MG/DL (ref 0.2–1)
BUN SERPL-MCNC: 36 MG/DL (ref 7–18)
BUN/CREAT SERPL: 47 (ref 12–20)
CA-I SERPL-SCNC: 1.16 MMOL/L (ref 1.12–1.32)
CALCIUM SERPL-MCNC: 7.7 MG/DL (ref 8.5–10.1)
CHLORIDE SERPL-SCNC: 114 MMOL/L (ref 100–108)
CO2 SERPL-SCNC: 21 MMOL/L (ref 21–32)
CREAT SERPL-MCNC: 0.76 MG/DL (ref 0.6–1.3)
DIFFERENTIAL METHOD BLD: ABNORMAL
EOSINOPHIL # BLD: 0.6 K/UL (ref 0–0.4)
EOSINOPHIL NFR BLD: 7 % (ref 0–5)
ERYTHROCYTE [DISTWIDTH] IN BLOOD BY AUTOMATED COUNT: 25.7 % (ref 11.6–14.5)
GAS FLOW.O2 O2 DELIVERY SYS: ABNORMAL L/MIN
GAS FLOW.O2 O2 DELIVERY SYS: ABNORMAL L/MIN
GAS FLOW.O2 SETTING OXYMISER: 3 L/M
GLOBULIN SER CALC-MCNC: 3 G/DL (ref 2–4)
GLUCOSE BLD STRIP.AUTO-MCNC: 114 MG/DL (ref 70–110)
GLUCOSE BLD STRIP.AUTO-MCNC: 118 MG/DL (ref 70–110)
GLUCOSE BLD STRIP.AUTO-MCNC: 124 MG/DL (ref 70–110)
GLUCOSE BLD STRIP.AUTO-MCNC: 126 MG/DL (ref 70–110)
GLUCOSE BLD STRIP.AUTO-MCNC: 146 MG/DL (ref 70–110)
GLUCOSE SERPL-MCNC: 130 MG/DL (ref 74–99)
HCO3 BLD-SCNC: 19.6 MMOL/L (ref 22–26)
HCO3 BLD-SCNC: 19.7 MMOL/L (ref 22–26)
HCT VFR BLD AUTO: 31.8 % (ref 35–45)
HGB BLD-MCNC: 9.6 G/DL (ref 12–16)
LYMPHOCYTES # BLD: 0.7 K/UL (ref 0.9–3.6)
LYMPHOCYTES NFR BLD: 8 % (ref 21–52)
MAGNESIUM SERPL-MCNC: 2.1 MG/DL (ref 1.6–2.6)
MCH RBC QN AUTO: 27.4 PG (ref 24–34)
MCHC RBC AUTO-ENTMCNC: 30.2 G/DL (ref 31–37)
MCV RBC AUTO: 90.9 FL (ref 74–97)
MONOCYTES # BLD: 0.3 K/UL (ref 0.05–1.2)
MONOCYTES NFR BLD: 4 % (ref 3–10)
NEUTS SEG # BLD: 7.1 K/UL (ref 1.8–8)
NEUTS SEG NFR BLD: 81 % (ref 40–73)
O2/TOTAL GAS SETTING VFR VENT: 0.28 %
O2/TOTAL GAS SETTING VFR VENT: 32 %
PCO2 BLD: 29.9 MMHG (ref 35–45)
PCO2 BLD: 30 MMHG (ref 35–45)
PEEP RESPIRATORY: 6 CMH2O
PH BLD: 7.42 [PH] (ref 7.35–7.45)
PH BLD: 7.43 [PH] (ref 7.35–7.45)
PHOSPHATE SERPL-MCNC: 2.4 MG/DL (ref 2.5–4.9)
PIP ISTAT,IPIP: 12
PLATELET # BLD AUTO: 95 K/UL (ref 135–420)
PO2 BLD: 76 MMHG (ref 80–100)
PO2 BLD: 93 MMHG (ref 80–100)
POTASSIUM SERPL-SCNC: 4.1 MMOL/L (ref 3.5–5.5)
PREALB SERPL-MCNC: 14.2 MG/DL (ref 20–40)
PRESSURE SUPPORT SETTING VENT: 6 CMH2O
PROT SERPL-MCNC: 5.8 G/DL (ref 6.4–8.2)
RBC # BLD AUTO: 3.5 M/UL (ref 4.2–5.3)
SAO2 % BLD: 96 % (ref 92–97)
SAO2 % BLD: 98 % (ref 92–97)
SERVICE CMNT-IMP: ABNORMAL
SERVICE CMNT-IMP: ABNORMAL
SERVICE CMNT-IMP: NORMAL
SODIUM SERPL-SCNC: 148 MMOL/L (ref 136–145)
SPECIMEN TYPE: ABNORMAL
SPECIMEN TYPE: ABNORMAL
SPONTANEOUS TIMED, IST: YES
TOTAL RESP. RATE, ITRR: 17
TOTAL RESP. RATE, ITRR: 28
WBC # BLD AUTO: 8.8 K/UL (ref 4.6–13.2)

## 2018-07-02 PROCEDURE — 82803 BLOOD GASES ANY COMBINATION: CPT

## 2018-07-02 PROCEDURE — 74011250636 HC RX REV CODE- 250/636: Performed by: INTERNAL MEDICINE

## 2018-07-02 PROCEDURE — 82330 ASSAY OF CALCIUM: CPT | Performed by: SURGERY

## 2018-07-02 PROCEDURE — 74011000250 HC RX REV CODE- 250: Performed by: INTERNAL MEDICINE

## 2018-07-02 PROCEDURE — 87106 FUNGI IDENTIFICATION YEAST: CPT | Performed by: INTERNAL MEDICINE

## 2018-07-02 PROCEDURE — 77010033678 HC OXYGEN DAILY

## 2018-07-02 PROCEDURE — 74011000258 HC RX REV CODE- 258: Performed by: INTERNAL MEDICINE

## 2018-07-02 PROCEDURE — 85025 COMPLETE CBC W/AUTO DIFF WBC: CPT | Performed by: SURGERY

## 2018-07-02 PROCEDURE — 87070 CULTURE OTHR SPECIMN AEROBIC: CPT | Performed by: INTERNAL MEDICINE

## 2018-07-02 PROCEDURE — 74011250636 HC RX REV CODE- 250/636: Performed by: OBSTETRICS & GYNECOLOGY

## 2018-07-02 PROCEDURE — 94660 CPAP INITIATION&MGMT: CPT

## 2018-07-02 PROCEDURE — 97110 THERAPEUTIC EXERCISES: CPT

## 2018-07-02 PROCEDURE — C9113 INJ PANTOPRAZOLE SODIUM, VIA: HCPCS | Performed by: INTERNAL MEDICINE

## 2018-07-02 PROCEDURE — 82962 GLUCOSE BLOOD TEST: CPT

## 2018-07-02 PROCEDURE — 84134 ASSAY OF PREALBUMIN: CPT | Performed by: SURGERY

## 2018-07-02 PROCEDURE — 36415 COLL VENOUS BLD VENIPUNCTURE: CPT | Performed by: INTERNAL MEDICINE

## 2018-07-02 PROCEDURE — 87103 BLOOD FUNGUS CULTURE: CPT | Performed by: INTERNAL MEDICINE

## 2018-07-02 PROCEDURE — 80048 BASIC METABOLIC PNL TOTAL CA: CPT | Performed by: SURGERY

## 2018-07-02 PROCEDURE — 71045 X-RAY EXAM CHEST 1 VIEW: CPT

## 2018-07-02 PROCEDURE — 74011636637 HC RX REV CODE- 636/637: Performed by: HOSPITALIST

## 2018-07-02 PROCEDURE — 36600 WITHDRAWAL OF ARTERIAL BLOOD: CPT

## 2018-07-02 PROCEDURE — 74011250636 HC RX REV CODE- 250/636: Performed by: SURGERY

## 2018-07-02 PROCEDURE — 80076 HEPATIC FUNCTION PANEL: CPT | Performed by: SURGERY

## 2018-07-02 PROCEDURE — 65270000029 HC RM PRIVATE

## 2018-07-02 PROCEDURE — 83735 ASSAY OF MAGNESIUM: CPT | Performed by: SURGERY

## 2018-07-02 PROCEDURE — 74011636637 HC RX REV CODE- 636/637: Performed by: INTERNAL MEDICINE

## 2018-07-02 PROCEDURE — P9047 ALBUMIN (HUMAN), 25%, 50ML: HCPCS | Performed by: INTERNAL MEDICINE

## 2018-07-02 PROCEDURE — 84100 ASSAY OF PHOSPHORUS: CPT | Performed by: SURGERY

## 2018-07-02 RX ORDER — FUROSEMIDE 10 MG/ML
40 INJECTION INTRAMUSCULAR; INTRAVENOUS ONCE
Status: COMPLETED | OUTPATIENT
Start: 2018-07-02 | End: 2018-07-02

## 2018-07-02 RX ADMIN — SODIUM HYPOCHLORITE: 1.25 SOLUTION TOPICAL at 03:00

## 2018-07-02 RX ADMIN — SODIUM CHLORIDE 40 MG: 9 INJECTION INTRAMUSCULAR; INTRAVENOUS; SUBCUTANEOUS at 08:15

## 2018-07-02 RX ADMIN — ALBUMIN (HUMAN) 12.5 G: 0.25 INJECTION, SOLUTION INTRAVENOUS at 19:56

## 2018-07-02 RX ADMIN — METOPROLOL TARTRATE 5 MG: 5 INJECTION INTRAVENOUS at 18:19

## 2018-07-02 RX ADMIN — ACETAMINOPHEN 1000 MG: 10 INJECTION, SOLUTION INTRAVENOUS at 22:35

## 2018-07-02 RX ADMIN — ACETAMINOPHEN 1000 MG: 10 INJECTION, SOLUTION INTRAVENOUS at 05:29

## 2018-07-02 RX ADMIN — ACETAMINOPHEN 1000 MG: 10 INJECTION, SOLUTION INTRAVENOUS at 17:14

## 2018-07-02 RX ADMIN — MEROPENEM 1 G: 1 INJECTION, POWDER, FOR SOLUTION INTRAVENOUS at 10:27

## 2018-07-02 RX ADMIN — MICAFUNGIN SODIUM 100 MG: 20 INJECTION, POWDER, LYOPHILIZED, FOR SOLUTION INTRAVENOUS at 15:28

## 2018-07-02 RX ADMIN — HYDROMORPHONE HYDROCHLORIDE 1 MG: 2 INJECTION, SOLUTION INTRAMUSCULAR; INTRAVENOUS; SUBCUTANEOUS at 05:30

## 2018-07-02 RX ADMIN — MEROPENEM 1 G: 1 INJECTION, POWDER, FOR SOLUTION INTRAVENOUS at 19:56

## 2018-07-02 RX ADMIN — MAGNESIUM SULFATE HEPTAHYDRATE: 500 INJECTION, SOLUTION INTRAMUSCULAR; INTRAVENOUS at 17:42

## 2018-07-02 RX ADMIN — MEROPENEM 1 G: 1 INJECTION, POWDER, FOR SOLUTION INTRAVENOUS at 05:29

## 2018-07-02 RX ADMIN — ALBUMIN (HUMAN) 12.5 G: 0.25 INJECTION, SOLUTION INTRAVENOUS at 02:41

## 2018-07-02 RX ADMIN — INSULIN GLARGINE 13 UNITS: 100 INJECTION, SOLUTION SUBCUTANEOUS at 08:14

## 2018-07-02 RX ADMIN — METOPROLOL TARTRATE 5 MG: 5 INJECTION INTRAVENOUS at 05:30

## 2018-07-02 RX ADMIN — SODIUM CHLORIDE 25 ML/HR: 900 INJECTION, SOLUTION INTRAVENOUS at 07:56

## 2018-07-02 RX ADMIN — METRONIDAZOLE 500 MG: 500 INJECTION, SOLUTION INTRAVENOUS at 05:29

## 2018-07-02 RX ADMIN — ACETAMINOPHEN 1000 MG: 10 INJECTION, SOLUTION INTRAVENOUS at 10:27

## 2018-07-02 RX ADMIN — METOPROLOL TARTRATE 5 MG: 5 INJECTION INTRAVENOUS at 12:18

## 2018-07-02 RX ADMIN — ALBUMIN (HUMAN) 12.5 G: 0.25 INJECTION, SOLUTION INTRAVENOUS at 10:27

## 2018-07-02 RX ADMIN — HYDROMORPHONE HYDROCHLORIDE 1 MG: 2 INJECTION, SOLUTION INTRAMUSCULAR; INTRAVENOUS; SUBCUTANEOUS at 00:02

## 2018-07-02 RX ADMIN — FUROSEMIDE 40 MG: 10 INJECTION, SOLUTION INTRAMUSCULAR; INTRAVENOUS at 12:18

## 2018-07-02 RX ADMIN — METOPROLOL TARTRATE 5 MG: 5 INJECTION INTRAVENOUS at 23:31

## 2018-07-02 NOTE — PROGRESS NOTES
701  [facility] with Dr. Kacey Adams regarding patient's increase work of breathing, tachycardia. Said okay to use bipap with dobhoff placed. RT paged to place bipap mask on patient. 2330 Pt removing bipap, takes much orientation to coax patient to leave mask on. VSS.     0030 Tolerating mask well. Resting well. VSS. Summary[de-identified] Hemodynamically stable. Afebrile. Tolerated bipap throughout night well. ABGs this morning were improved. ReB371%. Wound vac remains intact. Brown stool draining from ileostomy right abdomen. Left abdominal former BRANDON site draining into ostomy bag. Tolerating trickle feed through dubhoff. Fry draining gómez urine, adequate output. Mouth care completed several times during shift due to copious amounts of dried matter. Turned q2. No acute events overnight. Bedside and Verbal shift change report given to ILANA Harris (oncoming nurse) by Anthony Peter RN   (offgoing nurse).  Report included the following information SBAR, Kardex and MAR.

## 2018-07-02 NOTE — PROGRESS NOTES
Problem: Mobility Impaired (Adult and Pediatric)  Goal: *Acute Goals and Plan of Care (Insert Text)  Physical Therapy Goals  Updated 6/28/2018 and to be accomplished within 5-7 day(s)  1. Bed mobility: Rolling L to R to with max/mod A with use of HR for positioning. 2. Transfer: Supine to/from Sit with mod/max A with HR for change of position/prep for EOB sitting. 3. Activity Tolerance: Tolerate EOB sitting 5-10 minutes with fair UE supported balance for ADL/balance activities. 4. Patient will perform sit to stand with RW/maximal assistance and tolerate same x 15-30 seconds. 5. Ambulation:  Ambulate 5 ft. mod/max A with RW for increased functional mobility. Physical Therapy Goals  Updated 6/20/2018 and to be accomplished within 7 day(s)  1. Patient will move from supine to sit and sit to supine in bed with maximal assistance. 2.  Patient will transfer from bed to chair and chair to bed with maximal assistance using the least restrictive device. 3.  Patient will perform sit to stand with maximal assistance. 4.  Patient will ambulate with maximal assistance for 5 feet with the least restrictive device. physical Therapy TREATMENT    Patient: Salma Gomez (34 y.o. female)  Date: 7/2/2018  Diagnosis: Abdominal pain  Abdominal pain  Abdominal Pain  aspiration  POSTOP WOUND EXPLORATION Sepsis ([address]  Procedure(s) (LRB):  WOUND EXPLORATION, EXPLORATORY LAPAROTOMY, ILLEOSTMOY, LYSES OF ADHESIONS AND WOUND VAC PLACEMENT (N/A) 17 Days Post-Op  Precautions: Fall   Chart, physical therapy assessment, plan of care and goals were reviewed. ASSESSMENT:  Pt brother present during session. Pt eyes closed and pt not responding throughout session. Yawns intermittently during PROM BUE's, and LE's with gentle heelcord stretch. RUE ac PICC present; L UE upper arm and forearm bruise present. Pt brother report bruise occurred during blood transfusion when \"needle moved out of place\" a few days ago.   Nurse aware.  Swelling L UE decreased since writer last treated pt. LE's remain edematous. No active movements during PT session, however, nurse Mark Polanco reports pt was using both hands to scratch neck (face and neck erythematous d/t medication reaction). Pt with no motivation for participation displayed today, despite pt brother speaking loudly while attempting to encourage pt participation. Progression toward goals:  []      Improving appropriately and progressing toward goals  []      Improving slowly and progressing toward goals  [x]      Not making progress toward goals and plan of care will be adjusted     PLAN:  Patient continues to benefit from skilled intervention to address the above impairments. Continue treatment per established plan of care. Discharge Recommendations:  LTACH  Further Equipment Recommendations for Discharge:       SUBJECTIVE:   Patient stated .    OBJECTIVE DATA SUMMARY:   Critical Behavior:  Neurologic State: Drowsy  Orientation Level: Disoriented to person, Disoriented to place  Cognition: Follows commands  Safety/Judgement: Awareness of environment, Decreased insight into deficits  Therapeutic Exercises:   PROM received B UE's, LE's  Pain:  Pain Scale 1: Adult Nonverbal Pain Scale  Pain Intensity 1: 0  Activity Tolerance:   Fair   Please refer to the flowsheet for vital signs taken during this treatment.   After treatment:   [] Patient left in no apparent distress sitting up in chair  [x] Patient left in no apparent distress in bed  [] Call bell left within reach  [x] Nursing notified  [x] Caregiver present  [] Bed alarm activated      Alvaro Barr PT   Time Calculation: 15 mins

## 2018-07-02 NOTE — PROGRESS NOTES
Chart reviewed attended IDR's cm spoke with  and  regarding plan for LTAC placement discussed last week for today,at this time medical team would like for pt to remain in ICU due to new low grade fever and abx treatment not finalized medical team aware treatment plan cn be continued at [facility]    feels more comfortable to have pt remain in ICU for close monitoring,per  will eval again on Friday for LTAC placement.

## 2018-07-02 NOTE — PROGRESS NOTES
Patient seen and examined: bilateral DVT with bleeding on anticoagulation, s/p IVC filter. Respirations unlabored, tachypneic, RRR, left groin access site soft. Will sign off but remain available, if needed.     Visit Vitals    /81    Pulse 93    Temp (!) 100.8 °F (38.2 °C)    Resp 18    Ht 5' 1\" (1.549 m)    Wt 229 lb 4.5 oz (104 kg)    SpO2 99%    BMI 43.32 kg/m2

## 2018-07-02 NOTE — PROGRESS NOTES
ID remote  Chart & labs reviewed . D/w Dr Shaheen Harvey & Dr Allan Given .   Will see pt in am .     Current recommendations:  - Continue Meropenem , Vancomycin , Micafungin IV  - DC Flagyl IV   -Give Vancomycin very slowly    -If pt cannot tolerate Vancomycin IV, change to linezolid IV or Ceftaroline IV  - BNP, pro calcitonin  now  - Sputum for gram stain,culture  - D/w hospitalist as well    Christy Denis MD [address]

## 2018-07-02 NOTE — PROGRESS NOTES
Pt placed on bipap for HS at this time. Pt receiving tube feeding through NG tube and MD is aware. Pt hesitant to wear bipap upon placement. However, RN was notified and subsequently administered medication that helped relieve pts anxiety. Pt was then able to relax and fell asleep. Pt appears comfortable and is resting, in no distress & VS stable. Will continue to monitor.

## 2018-07-02 NOTE — PROGRESS NOTES
ASSESSMENT/PLAN  Fercho pride 75 y. 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 Status post complete surgical resection with no evidence of residual disease. Discussed adjuvant chemotherapy however patient is not medically appropriate at this time. Patient is high risk of recurrence based on pathology (Clear Cell). GI - Peritonitis resolved. Last drain removed 6/27/2018 and tip was cultures which is NGTD.  Post operative ileus resolved. Good ileostomy output. Started TF and seems to be tolerating. Continue TPN for now. ID - Sepsis. Had clinically improved and was going to stop antibiotics for intra-abdominal sepsis once culture from tip of drain finalized no growth but patient developed fever three days ago. Patient has presumed pneumonia secondary to aspiration and is on Meropenem, Vanc and Flagyl. I am deferring abx regimen and length to pulmonary team. ID consult, if available would be very helpful here. Currently patient is on Meropenem D3/7, Flagyl D18, Vanc D11/14 and Micafungin D11/14. Zosyn 26 days completed 6/30/18, Levaquin 16 days completed 6/29/18. WBC had trending down to normal. Wound vac was changed Friday and sharp debridement was performed. Wound vac replaced with intermittent irrigation of dilute Dakins solution. Drainage from wound vac is now serosang  Renal - JESS, resolved and Nephrology has signed off.   Heme - Hgb had been stable since last transfusion 6/29/18   DVT- bilateral peroneal DVT, full anticoagulation contraindicated. IVC filter was 6/28/2018 placed without issues. CV - Afib, converted to NSR. CHF, improved. HTN, on metoprolol. All managed by Intensivist/Hospitalist  Pulm - Acute hypoxic RF, currently on BIPAP. Had mucus plug Friday. Also pulmonary edema, aspiration pneumonitis. Managed by Intensivist  Psych - more interactive, seems depressed and have discussed starting antidepressant, will start this today. Disp - condition labile, continue ICU care  Code Status - Full Code      SUBJECTIVE  Patient is resting now with O2 per NC. Appears comfortable. Getting IV Tylenol around the clock and IV Dilaudid prn. Ileostomy normal output. Has rash c/w red man syndrome after Vanc    OBJECTIVE  Physical Exam:  General - Resting on O2 3L nc  HEENT - nml  Heart - NSR  Abdomen - soft, non-tender, nondistended, normal bowel sounds, ileostomy looks good, wound vac in place with normal appearing skin edges, drainage bag in LLQ where drain was pulled.   Incision - wound vac in place  Extremities - 2+ edema all extremities  Patient Vitals for the past 24 hrs:   BP Temp Pulse Resp SpO2 Weight   07/02/18 0759 - - - - - 104 kg (229 lb 4.5 oz)   07/02/18 0701 - (!) 100.8 °F (38.2 °C) - - - -   07/02/18 0600 147/81 - 93 18 99 % -   07/02/18 0530 (!) 170/102 - (!) 110 22 99 % -   07/02/18 0519 - - - - 98 % -   07/02/18 0500 (!) 167/104 - (!) 112 19 - -   07/02/18 0430 (!) 164/99 - (!) 101 13 100 % -   07/02/18 0400 153/88 - (!) 104 20 100 % -   07/02/18 0330 (!) 138/91 - 97 15 100 % -   07/02/18 0300 143/83 - 94 16 99 % -   07/02/18 0230 125/71 - 90 (!) 31 100 % -   07/02/18 0200 129/78 - 92 16 98 % -   07/02/18 0130 110/62 - 94 23 98 % -   07/02/18 0100 123/64 - 100 16 96 % -   07/02/18 0030 131/71 - 96 16 95 % -   07/02/18 0000 161/89 - (!) 101 21 97 % -   07/01/18 2330 139/74 - (!) 101 25 96 % -   07/01/18 2300 137/76 - 99 (!) 31 96 % -   07/01/18 2230 142/78 - 99 28 96 % -   07/01/18 2200 (!) 159/97 - (!) 106 15 99 % -   07/01/18 2130 (!) 162/96 - (!) 116 (!) 33 99 % -   07/01/18 2100 (!) 159/95 - (!) 109 25 99 % -   07/01/18 2030 (!) 159/98 - (!) 105 20 100 % -   07/01/18 2000 (!) 149/95 97.7 °F (36.5 °C) 100 23 99 % -   07/01/18 1930 134/85 - 89 21 99 % -   07/01/18 1909 - 97.7 °F (36.5 °C) - - - -   07/01/18 1900 135/83 - 91 17 99 % -   07/01/18 1700 133/82 - 96 26 96 % -   07/01/18 1636 - 97.2 °F (36.2 °C) - - - -   07/01/18 1600 137/78 97.2 °F (36.2 °C) 83 19 98 % -   07/01/18 1500 140/88 - 82 22 97 % -   07/01/18 1400 126/74 - 91 18 98 % -   07/01/18 1300 128/73 - 92 23 98 % -   07/01/18 1242 - 97.1 °F (36.2 °C) - - - -   07/01/18 1200 130/74 97.1 °F (36.2 °C) 83 22 96 % -   07/01/18 1100 123/67 - 86 19 99 % -   07/01/18 1000 123/77 - 80 21 97 % -   07/01/18 0900 120/65 - 75 19 99 % -         Intake/Output Summary (Last 24 hours) at 07/02/18 0827  Last data filed at 07/02/18 0802   Gross per 24 hour   Intake          3298.33 ml   Output             2590 ml   Net           708.33 ml          Labs  CBC  Recent Labs      07/02/18   0530  07/01/18   0515  06/30/18   0330   WBC  8.8  7.5  9.9   HCT  31.8*  29.8*  33.1*   MCV  90.9  91.4  91.7   MONOS  4  2*  2*   EOS  7*  6*  4   BASOS  0  0  0        CMP  Recent Labs      07/02/18   0530  07/01/18   0515  06/30/18   0330   NA  148*  150*  144   CO2  21  22  22   BUN  36*  34*  37*        Lab Results   Component Value Date/Time    Glucose 130 (H) 07/02/2018 05:30 AM    Glucose (POC) 114 (H) 07/02/2018 05:40 AM    Glucose,  (H) 06/15/2018 05:14 PM          Current Hospital Medications    Current Facility-Administered Medications:     TPN ADULT - CENTRAL, , IntraVENous, CONTINUOUS, Yaniv Hamm MD, Last Rate: 80 mL/hr at 07/01/18 1700    meropenem (MERREM) 1 g in sterile water (preservative free) 20 mL IV syringe, 1 g, IntraVENous, Q8H, Oskar Villalta MD, 1 g at 07/02/18 0529    vancomycin (VANCOCIN) 1500 mg in D5W 500 mL infusion, 1,500 mg, IntraVENous, Q24H, Marina Lawson MD, 1,500 mg at 07/01/18 1257    0.9% sodium chloride infusion 250 mL, 250 mL, IntraVENous, PRN, Harsha Salazar MD    sodium hypochlorite (QUARTER STRENGTH DAKIN'S) 0.125% irrigation (bottle), , Topical, Q6H, Pietro Forman MD, Stopped at 07/02/18 0900    0.9% sodium chloride infusion, 25 mL/hr, IntraVENous, CONTINUOUS, Rere Concepcion MD, Last Rate: 25 mL/hr at 07/02/18 0756, 25 mL/hr at 07/02/18 0756    saliva stimulant (BIOTENE) 1 Spray, 1 Spray, Oral, PRN, Marina Lawson MD, 1 Spray at 06/28/18 1108    HYDROmorphone (PF) (DILAUDID) injection 1 mg, 1 mg, IntraVENous, Q2H PRN, Harsha Salazar MD, 1 mg at 07/02/18 0530    acetaminophen (OFIRMEV) infusion 1,000 mg, 1,000 mg, IntraVENous, Q6H, Harsha Salazar MD, Last Rate: 400 mL/hr at 07/02/18 0529, 1,000 mg at 07/02/18 0529    metoprolol (LOPRESSOR) injection 5 mg, 5 mg, IntraVENous, Q6H, Marina Lawson MD, 5 mg at 07/02/18 0530    hydrALAZINE (APRESOLINE) 20 mg/mL injection 10 mg, 10 mg, IntraVENous, Q4H PRN, Marina Lawson MD, 10 mg at 06/26/18 2045    albumin human 25% (BUMINATE) solution 12.5 g, 12.5 g, IntraVENous, Q8H, Berenice Haas MD, 12.5 g at 07/02/18 0241    insulin glargine (LANTUS) injection 13 Units, 13 Units, SubCUTAneous, DAILY, Luther Carey MD, 13 Units at 07/02/18 0814    micafungin (MYCAMINE) 100 mg in 0.9% sodium chloride (MBP/ADV) 100 mL, 100 mg, IntraVENous, Q24H, Marina Lawson MD, Last Rate: 100 mL/hr at 07/01/18 1534, 100 mg at 07/01/18 1534    insulin lispro (HUMALOG) injection, , SubCUTAneous, Q6H, Micah Patel MD, Stopped at 07/01/18 0000    dextrose (D50W) injection syrg 12.5-25 g, 25-50 mL, IntraVENous, PRN, Micah Patel MD    Vancomycin - Pharmacy to Dose, 1 Each, Other, Rx Dosing/Monitoring, Micah Patel MD    metroNIDAZOLE (FLAGYL) IVPB premix 500 mg, 500 mg, IntraVENous, Q8H, Jakob Monreal MD, Last Rate: 100 mL/hr at 07/02/18 0529, 500 mg at 07/02/18 0636    ipratropium (ATROVENT) 0.02 % nebulizer solution 0.5 mg, 0.5 mg, Nebulization, Q4H PRN, Kayleigh Zhong MD, 0.5 mg at 06/29/18 1449    naloxone (NARCAN) injection 0.4 mg, 0.4 mg, IntraVENous, EVERY 2 MINUTES AS NEEDED, Amos Nice MD    [facility]) injection 4 mg, 4 mg, IntraVENous, Q6H PRN, Renella Holter, MD, 4 mg at 06/08/18 2152    oxymetazoline (AFRIN) 0.05 % nasal spray 2 Spray, 2 Spray, Both Nostrils, BID PRN, Sanchez Cortez MD    alum-mag hydroxide-simeth (MYLANTA) oral suspension 30 mL, 30 mL, Oral, Q4H PRN, Christen Melchor MD, 30 mL at 06/07/18 2225    fluticasone (FLONASE) 50 mcg/actuation nasal spray 2 Spray, 2 Spray, Both Nostrils, DAILY PRN, Christene Litten, MD    ELECTROLYTE REPLACEMENT PROTOCOL-POTASSIUM Renal Dosing, 1 Each, Other, PRN, Christen Buckley MD    ELECTROLYTE REPLACEMENT PROTOCOL-MAGNESIUM, 1 Each, Other, PRN, Christen Buckley MD    ELECTROLYTE REPLACEMENT PROTOCOL-PHOSPHORUS Renal Dosing, 1 Each, Other, PRN, Christen Buckley MD    ELECTROLYTE REPLACEMENT PROTOCOL-CALCIUM, 1 Each, Other, PRN, Christen Buckley MD    sodium chloride (NS) flush 5-10 mL, 5-10 mL, IntraVENous, PRN, Ange Omalley MD    glucose chewable tablet 16 g, 4 Tab, Oral, PRN, Christen Buckley MD    glucagon (GLUCAGEN) injection 1 mg, 1 mg, IntraMUSCular, PRN, Christen Buckley MD    pantoprazole (PROTONIX) 40 mg in sodium chloride 0.9% 10 mL injection, 40 mg, IntraVENous, DAILY, Haley Reilly MD, 40 mg at 07/02/18 Lyssa Blackwood MD

## 2018-07-02 NOTE — DIABETES MGMT
GLYCEMIC CONTROL PROGRESS NOTE:    -discussed in rounds, known h/o T2DM HbA1C within recommended range for age + comorbids on oral home regimen  -BG out of target range ICU: 140-180 mg/dL   -TPN 80/hr  + TF changed from Glucerna to Vital if tolerating will start to titrate TPN down  -TDD = 23 (TPN 10 regular insulin + 13 lantus )  -24 BG trending down slightly  -GC improving    -Glucose Results:   Lab Results   Component Value Date/Time     (H) 07/02/2018 05:30 AM    GLUCPOC 114 (H) 07/02/2018 05:40 AM    GLUCPOC 124 (H) 07/02/2018 02:44 AM    GLUCPOC 142 (H) 07/01/2018 05:59 PM         Cristiano Tapia RN, MS  Glycemic Control Team  Pager [phone] (M-TH 8:30-5P)  *After Hours pager [phone]

## 2018-07-02 NOTE — PROGRESS NOTES
Hospitalist Progress Note-critical care note     Patient: Dusty Dempsey MRN: [id number]  CSN: [id number]    YOB: 1950  Age: 75 y.o. Sex: female    DOA: 6/4/2018 LOS:  LOS: 28 days            Chief complaint:   hyoerntremia. D/p ileostomy , peritonitis , sepsis , martha ,ovarian cancer, hypokalemia    Assessment/Plan         Hospital Problems  Date Reviewed: 6/5/2018          Codes Class Noted POA    Hypokalemia ICD-10-CM: E87.6  ICD-9-CM: 276.8  6/27/2018 Unknown        Hypernatremia ICD-10-CM: E87.0  ICD-9-CM: 276.0  6/25/2018 Unknown        S/P ileostomy ([facility] ICD-10-CM: Z93.2  ICD-9-CM: V44.2  6/16/2018 No        Hypoalbuminemia ICD-10-CM: E88.09  ICD-9-CM: 273.8  6/16/2018 Unknown        Peritonitis ([facility] ICD-10-CM: K65.9  ICD-9-CM: 567.9  6/16/2018 Unknown        Acute deep vein thrombosis (DVT) of lower extremity ([facility] ICD-10-CM: I82.409  ICD-9-CM: 453.40  6/7/2018 Yes        Acute respiratory failure ([facility] ICD-10-CM: J96.00  ICD-9-CM: 518.81  6/5/2018 No        MARTHA (acute kidney injury) ([facility] ICD-10-CM: N17.9  ICD-9-CM: 584.9  6/5/2018 Unknown        Metabolic acidosis XEU-44-VX: E87.2  ICD-9-CM: 276.2  6/5/2018 No        Abdominal pain ICD-10-CM: R10.9  ICD-9-CM: 789.00  6/4/2018 Yes        * (Principal)Sepsis ([facility] ICD-10-CM: A41.9  ICD-9-CM: 038.9, 995.91  6/4/2018 Yes        Oliguria ICD-10-CM: R34  ICD-9-CM: 788.5  6/4/2018 Yes        Ovarian ca [facility]) ICD-10-CM: C56.9  ICD-9-CM: 183.0  5/29/2018 Yes          critical care plan :   respiratory   Acute resp failure with hypoxia   Continue desat, need bipap at night   cxr pulm edema . Lasix was hold due to hypernatremia   - self extubated 06/17/2018.   - bronchoscopy 06/09/2018,   - resp cultures no growth to date. CTA chest negative for  PE       ID:   Severe Sepsis/shock    - secondary to peritonitis. Still fever   Peritoneal fluid cx:klebsiella oxytoca. Continue iv abx  zosyn,levaquine, vanc flagyl. .micafungin -switch to merrem. +flagyl +vanc         Card; Shock   - sepsis, bcx so far negative, on and off fever,   -resolved. Now off pressors. Anasarca - likely third spacing,  on albumin-hold due to sodium high        Hemo:   Bleeding from BRANDON drain -resolved   protamine and FFP. Acute bilateral peroneal DVT   - ivc filter placed   per dr. Saroj Dinero , no a candidate for anticoagulant due to bleeding   Anemia   Received transfusion, so far h/h stable        Renal :   JESS   -atn ,  nephrology on board , cr wnl    Metabolic acidosis  Hypernatremia: improving  pharmacy to correct with TPN    Gi :  tpn feeding       Endo:   DM  lantus , ssi            Poor prognosis ,  Brother was at the bedside      Review of systems:  Unable to obtain due to medication given   Vital signs/Intake and Output:  Visit Vitals    /81    Pulse 93    Temp (!) 100.8 °F (38.2 °C)    Resp 18    Ht 5' 1\" (1.549 m)    Wt 104 kg (229 lb 4.5 oz)    SpO2 99%    BMI 43.32 kg/m2     Current Shift:  07/02 0701 - 07/02 1900  In: -   Out: 1450 [MVJJH:4290; Drains:50]  Last three shifts:  06/30 1901 - 07/02 0700  In: 4872.5 [I.V.:4722.5]  Out: 3365 [Urine:2625]    Physical Exam:  General: WD, open eyes per voice , in mild distress   HEENT: NC, Atraumatic. PERRLA, anicteric sclerae. bipap mask noted   Lungs: Coarse bilateral lungs   Heart:  Regular  rhythm,  + murmur, No Rubs, No Gallops  Abdomen: Soft, Non distended, Non tender.  +Bowel sounds, colostomy bag, wound vac noted   Extremities: No c/c.  Edema   Psych:   Calm   Neurologic:  Open eyes with verbal stimuli           Labs: Results:       Chemistry Recent Labs      07/02/18   0530  07/01/18   0515  06/30/18   0330   GLU  130*  145*  156*   NA  148*  150*  144   K  4.1  3.8  4.1   CL  114*  118*  112*   CO2  21  22  22   BUN  36*  34*  37*   CREA  0.76  0.99  0.90   CA  7.7*  7.4*  7.7*   AGAP  13  10  10   BUCR  47*  34*  41*   AP  95   --    --    TP  5.8*   --    --    ALB  2.8*   --    --    GLOB  3.0   --    -- AGRAT  0.9   --    --       CBC w/Diff Recent Labs      07/02/18   0530  07/01/18   0515  06/30/18   0330   WBC  8.8  7.5  9.9   RBC  3.50*  3.26*  3.61*   HGB  9.6*  9.1*  10.0*   HCT  31.8*  29.8*  33.1*   PLT  95*  93*  104*   GRANS  81*  86*  90*   LYMPH  8*  6*  4*   EOS  7*  6*  4      Cardiac Enzymes No results for input(s): CPK, CKND1, FABIAN in the last 72 hours. No lab exists for component: CKRMB, TROIP   Coagulation No results for input(s): PTP, INR, APTT in the last 72 hours. No lab exists for component: INREXT, INREXT    Lipid Panel Lab Results   Component Value Date/Time    Triglyceride 66 06/26/2018 05:00 AM      BNP No results for input(s): BNPP in the last 72 hours.    Liver Enzymes Recent Labs      07/02/18   0530   TP  5.8*   ALB  2.8*   AP  95   SGOT  18      Thyroid Studies No results found for: T4, T3U, TSH, TSHEXT, TSHEXT     Procedures/imaging: see electronic medical records for all procedures/Xrays and details which were not copied into this note but were reviewed prior to creation of Riki Salvador MD

## 2018-07-02 NOTE — PROGRESS NOTES
0700  Bedside shift change report received from Brenda Vidal RN. Report included the following information SBAR, Kardex, Procedure Summary, Intake/Output, MAR, Recent Results, Cardiac Rhythm NSR and Alarm Parameters . All questions were answered. 1030  MDRs completed with Dr. Arabella Sullivan and critical care team.  Plan of care discussed and orders received    1200 Reassessment complete    1300 Received Verbal order from Dr. 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 Consult with ID in progress. 1600 Reassessment complete    1900 Bedside shift change report given to Luigi Monique RN. Report included the following information SBAR, Kardex, Procedure Summary, Intake/Output, MAR, Recent Results, Cardiac Rhythm NSR and Alarm Parameters . All questions were answered.

## 2018-07-02 NOTE — PROGRESS NOTES
Patient diuressed well with lasix  Hospitalist wished Dr Silvia Salinas to call ID consult  ID team was here I placed order in system spoke with hospitalist that per protocol hospitalist need to call in ID consult   Michelle Isaakcarlie is more awake still spiking fever  Follow up on ID recommendations   ABG checked looks good will monitor closely   We might consider another dose of lasix in after noon  Will follow closely      Anthony Patrick M.D  [address]  [address] [address]  Phone [phone]   Fax [phone]  Pulmonary Critical Care & Sleep Medicine

## 2018-07-02 NOTE — PROGRESS NOTES
Very large thick amount of dried secretions gotten out of the back of patients throat with a yaunker and placed in a specimen cup. RN aware.

## 2018-07-02 NOTE — PROGRESS NOTES
CIRA [address]ickers [address] [facility] [address] [address]  Phone [phone]   Fax [phone]    Pulmonary Critical Care & Sleep Medicine         Name: Christie Carrillo MRN: [id number]   : 1950 Hospital: [facility] [place]    Date: 2018        [facility] Note    IMPRESSION:   · Acute hypoxic respiratory failure, on NC, came off of BiPAP; multifactorial from any generalized weakness; aspiration pneumonia following episode of mucus plug , previously had other etiologies with pulmonary edema, aspiration pneumonitis. No central or segmental PE on recent CTA chest. Previously required ventilator support this admission due to sepsis, reintubated on 18 and self-extubated   · S/p bronchoscopy 18: no aspiration noted then. Cx Negative. · Severe sepsis due to Klebsiella Oxytoca peritonitis and then perforation. S/p laparotomy 18 [POD # 5 tumor radical debulking] and peritoneal lavage: Klebsiella positive. Ruled out ischemic bowel in laparotomy. S/p laparotomy on 6/15/18 for peritonitis due to diverticulitis with perforation, s/p distal ileostomy. · Leukocytosis, improved on micafungin/vanco. Now aspiration pneumonia; repeat blood and fungal cx in process. Abd drain tip culture negative final  · Off heparin for intraabdominal bleeding and CTA chest no central PE. INR reversed with protamin, FFP.-- S/P IVC fillter   · S/p Shock suspected from sepsis and obstructive due to presumed PE with severe hypoxia and high A-a gradient, elevated RVSP 34 mm Hg. CTA 18 negative for central or main PA PE. Shock resolved. CTA chest no central PE. · Acute b/l peroneal DVT, s/p IVC filter by vascular surgery 18.    · Anemia, s/p PRBC tx 18, no gross bleeding  · Thrombocytopenia, mild, no active bleeding  · JESS, improved  · Hypernatremia, worsened following Lasix- improvement slowly - managed per renal  · Elevated LFT, due to shock liver, resolved  · Foreign body on peritoneal biopsy, per path report. · Anasarca due to fluid resuscitation, JESS, hypoalbuminemia and sequale of sepsis. 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 · Poor overall prognosis. RECOMMENDATIONS:  -- Fever some AMS not sure if going back in sepsis on vanco meropenam micofungin and flagyl  -- Recheck Culture  -- Check wound culture  -- DC flagyl as received >14days and meropenam has good anarobic corverage  -- ABD ct shows improvement in hematoma but with ongoing fever will need ID to weigh in as hematoma can get 2ndarily get infected  -- Consider ID consult --- Hospitalist informed to call consult per [facility] policy  -- Check ABG as worry about respiratory status  -- Give one dose lasix   -- Get Blood fungal culture due to TPN    Respiratory: ABG reviewed at bedside; took off of BiPAP and placed on NC O2 @ 4 Lpm support - tolerating better on follow up- BiPAP 12/6 at night and followed by ABG in AM; low threshold for re-intubation. Keep SPO2 >=92%. Spoke with brother at the bedside. CXR in AM.  HOB 30 degree elevation all the time. Aggressive pulmonary toileting. Aspiration precautions. Judicious opiate pain medications. Periodic Lasix as needed in coordination with renal services  ID:  R sided pulmonary infiltrates. Follow up drain tip culture negative to date  Repeat blood and urine cultures pending  Bronch cx normal robin. Peritoneal cx Klebsiella Oxytoca resistant to ampicillin. On micafungin/vanco. Continue Merrem started 6/30/18  Abx - Zosyn since 6/13 [day 18] stopped 6/30/18; levaquin since 6/17 [day 14] stopped 6/30/18. Stop  Flagyl since 6/15 [day 17]; iv vanco since 6/22, micafungin since 6/22  Hem: follow CBC; Keep Hb> 7 gm/dl.  s/p IVC filter placement 6/28. Transfusion per surgery  CVS: Echo ok with mildly elevated RVSP but normal RV systolic function. AFib converted to NSR. Off of cardizem drip. -- On Metorprolol IV   Renal: Nephrologist on case; Creatinine improved. Lasix PRN because of hypernatremia. On albumin. Spoke with pharmacy to see if IVF with antibiotics could be changed to D5, but nationwide shortage of small bags of IVF to mix antibiotics but department will look around. GI/: TPN; barium study 6/21 - patient could not do it on 6/22 due to abd pain  Endocrine: Maintain blood glucose 140-180. Humalog sc. Insulin in TPN. on Lantus  Neurology: Dilaudid- balance post-op pain without sedation- recommend judicious use of sedatives  Pain/Sedation: dilaudid - management per Dr Joaquina Spence - judicious use  Skin/Wound: local surgical site care. Wound vac changed 6/29. Electrolytes: Replace electrolytes per ICU electrolyte replacement protocol, renal service. Nutrition: TPN started 6/18. NPO   Prophylaxis: GI Prophylaxis (protonix)  Restraints: none  Lines/Tubes:   ETT: 6/8/18- self extubated 6/17/18  Central line: right subclavian 6/4/18 - removed 6/20  Picc line 6/19 - Central line bundle followed  Fry: 6/4/18 (Medically necessary for strict input/output monitoring in critically ill patient, will remove it when not needed. Fry bundle followed). Will defer respective systems problem management to primary and other respective consultant and follow patient in ICU with primary and other medical team.  Further recommendations will be based on the patient's response to recommended treatment and results of the investigation ordered. Quality Care: PPI, DVT prophylaxis, HOB elevated, Infection control all reviewed and addressed. Brother at bedside, updated: overall poor prognosis with patient at increased risk for further deterioration, prolonged hospitalization, nosocomial infections, failure to thrive. Patient has no children.    Code status: Full code. PICC Line/Fry cath medically necessary  PT and OT on case    Fry bundle   Central line bundle followed   Weaning per protocol     Quality Care: PPI, DVT prophylaxis, HOB elevated, Infection control all reviewed and addressed. Events and notes from last 24 hours reviewed. Care plan discussed with nursing CC TIME:45 min                                                     Subjective/History of Present Illness:   Patient is a 75 y.o. 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 7/2/18  This AM came off of BiPAP to O2 via NC based on RR, ABG, SPO2 and alertness. Drowsy but arousable breathing little fast  ? Received some diludid last night   Low grade fever of 100.8 noted  The patient denies cough, chest pain, dyspnea, wheezing or hemoptysis. No fever overnight. Persistent leg edema  Ileostomy without bleeding. On TPN  Hemodynamically stable      Review of Systems   Review of systems not obtained due to patient factors. Surgeries  5/29/18 - Laparoscopy converted to laparotomy, total abdominal hysterectomy, bilateral salpingo-oophorectomy, omentectomy, bilateral pelvic and periaortic lymphadenectomy and radical tumor debulking to R0. Path - ADENOCARCINOMA OF LEFT OVARY.     6/5/18 - Diagnostic laparoscopy converted to laparotomy, peritoneal biopsies, aerobic and anaerobic cultures of peritoneal fluid and peritoneal lavage. 6/15/18 - Wound exploration. Exploratory laparotomy. Lysis of adhesions. Ileostomy and wound VAC placement.   Had wound vac change 6/29. 1 unit of PRBC on 6/29  Had removal of BRANDON drain on 6/27/18, tip culture negative final          Current Facility-Administered Medications   Medication Dose Route Frequency    furosemide (LASIX) injection 40 mg  40 mg IntraVENous ONCE    TPN ADULT - CENTRAL   IntraVENous CONTINUOUS    meropenem (MERREM) 1 g in sterile water (preservative free) 20 mL IV syringe  1 g IntraVENous Q8H    vancomycin (VANCOCIN) 1500 mg in D5W 500 mL infusion  1,500 mg IntraVENous Q24H    sodium hypochlorite (QUARTER STRENGTH DAKIN'S) 0.125% irrigation (bottle)   Topical Q6H    0.9% sodium chloride infusion  25 mL/hr IntraVENous CONTINUOUS    acetaminophen (OFIRMEV) infusion 1,000 mg  1,000 mg IntraVENous Q6H    metoprolol (LOPRESSOR) injection 5 mg  5 mg IntraVENous Q6H    albumin human 25% (BUMINATE) solution 12.5 g  12.5 g IntraVENous Q8H    insulin glargine (LANTUS) injection 13 Units  13 Units SubCUTAneous DAILY    micafungin (MYCAMINE) 100 mg in 0.9% sodium chloride (MBP/ADV) 100 mL  100 mg IntraVENous Q24H    insulin lispro (HUMALOG) injection   SubCUTAneous Q6H    Vancomycin - Pharmacy to Dose  1 Each Other Rx Dosing/Monitoring    metroNIDAZOLE (FLAGYL) IVPB premix 500 mg  500 mg IntraVENous Q8H    pantoprazole (PROTONIX) 40 mg in sodium chloride 0.9% 10 mL injection  40 mg IntraVENous DAILY         Objective:   Vital Signs:    Visit Vitals    /77    Pulse (!) 106    Temp (!) 100.8 °F (38.2 °C)    Resp 24    Ht 5' 1\" (1.549 m)    Wt 104 kg (229 lb 4.5 oz)    SpO2 98%    BMI 43.32 kg/m2       O2 Device: Nasal cannula   O2 Flow Rate (L/min): 3 l/min   Temp (24hrs), Av.4 °F (36.9 °C), Min:97.1 °F (36.2 °C), Max:100.8 °F (38.2 °C)       Intake/Output:   Last shift:      701 -  1900  In: 1350 [I.V.:1200]  Out: 2150 [Urine:1425; Drains:50]    Last 3 shifts: 1901 -  0700  In: 5022.5 [I.V.:4722.5]  Out: 1671 [Urine:3725; Drains:25]      Intake/Output Summary (Last 24 hours) at 18 1215  Last data filed at 18 1009   Gross per 24 hour   Intake 4798.33 ml   Output             5400 ml   Net          -601.67 ml       Physical Exam:  General/Neurology: awake, following commands, improving looking, on O2 via NC @ 4 Lpm  Head:   Normocephalic, without obvious abnormality, atraumatic. Eye:   no scleral icterus, no pallor, no cyanosis. Pupils not dilated. Neck:   Symmetric. No lymphadenopathy. Trachea midline  Lung: Moderate equal air entry bilateral. Decreased breath sounds bases. No wheezing or crackles at bases. Heart:   S1 S2 present. No murmur. No JVD. Abdomen:  Soft. Obese. + BS. Midline lower abd surgical site - open wound with wound vac. Rt abd wall Ileostomy. No abd distension or guarding. Extremities:  No cyanosis. No clubbing. No hand edema. B/l feet/leg/thigh edema 2-3+. Pulses: Palpable radials and DPs bilateral.   Lymphatic:  No cervical or supraclav lymphadenopathy. Skin:   No rash      Data:      CBC w/Diff Recent Labs      07/02/18   0530  07/01/18   0515  06/30/18   0330   WBC  8.8  7.5  9.9   RBC  3.50*  3.26*  3.61*   HGB  9.6*  9.1*  10.0*   HCT  31.8*  29.8*  33.1*   PLT  95*  93*  104*   GRANS  81*  86*  90*   LYMPH  8*  6*  4*   EOS  7*  6*  4        Chemistry Recent Labs      07/02/18   0530  07/01/18   0515  06/30/18   0330   GLU  130*  145*  156*   NA  148*  150*  144   K  4.1  3.8  4.1   CL  114*  118*  112*   CO2  21  22  22   BUN  36*  34*  37*   CREA  0.76  0.99  0.90   CA  7.7*  7.4*  7.7*   MG  2.1  2.0  2.1   PHOS  2.4*  3.0  2.9   AGAP  13  10  10   BUCR  47*  34*  41*   AP  95   --    --    TP  5.8*   --    --    ALB  2.8*   --    --    GLOB  3.0   --    --    AGRAT  0.9   --    --         Lactic Acid Lactic acid   Date Value Ref Range Status   06/17/2018 2.3 (HH) 0.4 - 2.0 MMOL/L Final     Comment:     CALLED TO AND CORRECTLY REPEATED BY:  Dong Ortiz RN ICU ON 6/17/2018 2871 TO [id number]       No results for input(s): LAC in the last 72 hours.      Micro  Recent Labs      07/01/18   0440  06/30/18   4345 06/30/18   1316   CULT  NO GROWTH 1 DAY  NO GROWTH 2 DAYS  NO GROWTH 2 DAYS     Recent Labs      07/01/18   0440  06/30/18   1628  06/30/18   1316  06/30/18   1224   CULT  NO GROWTH 1 DAY  NO GROWTH 2 DAYS  NO GROWTH 2 DAYS  NO GROWTH 2 DAYS        ABG Recent Labs      07/02/18   0527  07/01/18   0512  06/30/18   1156   PHI  7.427  7.368  7.334*   PCO2I  29.9*  36.0  40.9   PO2I  93  156*  291*   HCO3I  19.7*  20.8*  21.7*   FIO2I  0.28  35  70        Liver Enzymes Protein, total   Date Value Ref Range Status   07/02/2018 5.8 (L) 6.4 - 8.2 g/dL Final     Albumin   Date Value Ref Range Status   07/02/2018 2.8 (L) 3.4 - 5.0 g/dL Final     Globulin   Date Value Ref Range Status   07/02/2018 3.0 2.0 - 4.0 g/dL Final     A-G Ratio   Date Value Ref Range Status   07/02/2018 0.9 0.8 - 1.7   Final     AST (SGOT)   Date Value Ref Range Status   07/02/2018 18 15 - 37 U/L Final     Alk. phosphatase   Date Value Ref Range Status   07/02/2018 95 45 - 117 U/L Final     Recent Labs      07/02/18   0530   TP  5.8*   ALB  2.8*   GLOB  3.0   AGRAT  0.9   SGOT  18   AP  95        Cardiac Enzymes No results found for: CPK, CK, CKMMB, CKMB, RCK3, CKMBT, CKNDX, CKND1, FABIAN, TROPT, TROIQ, KAYLAN, TROPT, TNIPOC, BNP, BNPP     BNP No results found for: BNP, BNPP, XBNPT     Coagulation No results for input(s): PTP, INR, APTT in the last 72 hours.     No lab exists for component: INREXT      Thyroid  No results found for: T4, T3U, TSH, TSHEXT       Lipid Panel Lab Results   Component Value Date/Time    Triglyceride 66 06/26/2018 05:00 AM          Urinalysis Lab Results   Component Value Date/Time    Color DARK YELLOW 06/04/2018 08:42 AM    Appearance CLOUDY 06/04/2018 08:42 AM    Specific gravity 1.028 06/04/2018 08:42 AM    pH (UA) 5.0 06/04/2018 08:42 AM    Protein 30 (A) 06/04/2018 08:42 AM    Glucose NEGATIVE  06/04/2018 08:42 AM    Ketone TRACE (A) 06/04/2018 08:42 AM    Bilirubin SMALL (A) 06/04/2018 08:42 AM    Urobilinogen 1.0 06/04/2018 08:42 AM    Nitrites NEGATIVE  06/04/2018 08:42 AM    Leukocyte Esterase NEGATIVE  06/04/2018 08:42 AM    Epithelial cells FEW 06/04/2018 08:42 AM    Bacteria 1+ (A) 06/04/2018 08:42 AM    WBC 0 to 3 06/04/2018 08:42 AM    RBC 0 to 3 06/04/2018 08:42 AM        XR (Most Recent). CXR reviewed by me and compared with previous CXR   Results from Hospital Encounter encounter on 06/04/18   XR ABD PORT  1 V   Narrative Supine abdominal radiograph    Comparison: 6/4/2018    Indication: Small bowel feeding tube placement. Findings: Small bowel feeding tube tip overlies distal stomach, may be partially  protruding into the duodenal bulb. Paucity of bowel gas. IVC filter noted. Impression Impression: Small bowel feeding tube tip is in the distal stomach or possibly  protruding into the duodenal bulb. CT (Most Recent)   Results from Hospital Encounter encounter on 06/04/18   CT CHEST ABD PELV WO CONT   Narrative EXAM: CT of the chest, abdomen, and pelvis    INDICATION: Left ovarian clear cell adenocarcinoma. Peritonitis. Unable to  tolerate by mouth. COMPARISON: 6/22/2018    TECHNIQUE: Axial CT imaging of the chest, abdomen, and pelvis was performed  without intravenous contrast. Multiplanar reformats were generated. One or more  dose reduction techniques were used on this CT: automated exposure control,  adjustment of the mAs and/or kVp according to patient's size, and iterative  reconstruction techniques. The specific techniques utilized on this CT exam have  been documented in the patient's electronic medical record.    _______________    FINDINGS:    CHEST:    LUNGS: Motion artifact. Significant passive atelectasis in the right greater  than left lower lobes. Scattered foci of groundglass density bilaterally likely  regions of microatelectasis. PLEURA: Normal, with no effusion or pneumothorax. AIRWAY: Normal.    MEDIASTINUM: Normal heart size. No pericardial fluid.  PICC tip is near the  cavoatrial junction. LYMPH NODES: Prominent mediastinal and bilateral axillary lymph nodes. For  example, a right paratracheal node on image 17 measures 1.3 cm in short axis. Most of the nodes are subcentimeter. OTHER: None. ABDOMEN/PELVIS:    LIVER, BILIARY: Likely simple cysts anterior right hepatic lobe. Normal liver  contour. No biliary dilation. Gallbladder is unremarkable. SPLEEN: Normal.    PANCREAS: Normal.    ADRENALS: Normal.    KIDNEYS: Normal.    LYMPH NODES: No enlarged lymph nodes. GASTROINTESTINAL TRACT: No evidence of bowel obstruction. Right lower quadrant  ileostomy noted. VASCULATURE: IVC filter. PELVIC ORGANS: Fry catheter in the urinary bladder. Hysterectomy. BONES: No acute or aggressive osseous abnormalities identified. OTHER: Small volume ascites. Mixed density fluid collection with small foci of  gas in the left aspect of the pelvis measuring approximately 10.9 x 10.6 cm on  image 136, appearance favoring hematoma, previously 12.8 x 10 cm. Surgical  drains have been removed. Separate right pelvic sidewall collection measuring 8.5 x 3.3 cm similar to  prior. External iliac artery extends through this collection. Small hematoma around the ileostomy in the subcutaneous tissues similar to  prior. Diffuse edema of the subcutaneous fat. Open ventral wound. _______________         Impression IMPRESSION:    1. No evidence of bowel obstruction or other acute GI process. Right lower  quadrant ileostomy. 2. Small volume ascites and collections in the pelvis suggestive of  postoperative hematoma and seroma. Left pelvic hematoma component measures  slightly smaller than prior. 3. Regions of atelectasis in the lungs, most pronounced in the right lower lobe. EKG No results found for this or any previous visit. ECHO No results found for this or any previous visit. PFT No flowsheet data found.      Other ASA reactivity:   Pre-albumin: Ionized Calcium:   NH4:   T3, FT4:  Cortisol:  Urine Osm:  Urine Lytes:   HbA1c:                Echo 6/9/18:  Left ventricle: Systolic function was normal. Ejection fraction was estimated   in the range of 65 % to 70 %. No obvious  wall motion abnormalities identified in the views obtained. Wall thickness   was mildly increased. Doppler parameters  were consistent with abnormal left ventricular relaxation (grade 1 diastolic   dysfunction). Right ventricle: The size was normal. Systolic function was normal.  Mitral valve: There was mild annular calcification. Tricuspid valve: Pulmonary artery systolic pressure was mildly increased. Pulmonary artery systolic pressure: 34 mmHg. PVL LE 6/6/18: acute b/l peroneal DVT. CT abd pelvis 6/4/18:  1. Postsurgical hysterectomy, bilateral oophorectomy, pelvic lymphadenectomy and  a mastectomy surgical changes. Small volume of ascites in the abdomen and  pelvis, with a few tiny locules of gas in the right hemipelvis would be in  keeping with recent postsurgical etiology. 2. Abnormal edematous thick-walled small bowel loops in the left abdomen and  pelvis, with TRAM lamellar edematous configuration, induration. No findings of  pneumatosis. The overall appearance is nonspecific. Diagnostic considerations  include infectious etiology, nonspecific edema which may be unresolved  postsurgical etiology. Ischemia is also included in the differential diagnostic  consideration, however, there are no findings of pneumatosis, portal venous gas. 3. Stool in the right colon extending to the hepatic flexure with surrounding  gas, foci of pneumatosis could be obscured. No associated bowel wall thickening. 4. Satisfactory position of nasogastric tube. 5. Hepatomegaly, steatosis with 2 rounded low-density lesions, potentially  cysts. 6. Bibasilar atelectasis.       Imaging:  [x]I have personally reviewed the patients chest radiographs images and report   7/1/18    Results from Hospital Encounter encounter on 06/04/18   XR ABD PORT  1 V   Narrative Supine abdominal radiograph    Comparison: 6/4/2018    Indication: Small bowel feeding tube placement. Findings: Small bowel feeding tube tip overlies distal stomach, may be partially  protruding into the duodenal bulb. Paucity of bowel gas. IVC filter noted. Impression Impression: Small bowel feeding tube tip is in the distal stomach or possibly  protruding into the duodenal bulb. 7/1/18  CXR  Frontal chest radiograph   Comparison: 6/30/2018   Indication: Right-sided pulmonary opacification follow-up.   Findings: Stable cardiomediastinal silhouette. Unchanged PICC placement. No visible pleural abnormality. Low lung volumes, more so on the right. Reticular opacification in the lower right lung with slightly less pronounced confluent appearance laterally. Left lung is clear. No acute osseous abnormality. IMPRESSION:    Persistent lower right lung opacification which appears predominantly reticular (less confluent appearance laterally). This could be infectious or atelectatic        Results from Hospital Encounter encounter on 06/04/18   CT CHEST ABD PELV WO CONT   Narrative EXAM: CT of the chest, abdomen, and pelvis    INDICATION: Left ovarian clear cell adenocarcinoma. Peritonitis. Unable to  tolerate by mouth. COMPARISON: 6/22/2018    TECHNIQUE: Axial CT imaging of the chest, abdomen, and pelvis was performed  without intravenous contrast. Multiplanar reformats were generated. One or more  dose reduction techniques were used on this CT: automated exposure control,  adjustment of the mAs and/or kVp according to patient's size, and iterative  reconstruction techniques. The specific techniques utilized on this CT exam have  been documented in the patient's electronic medical record.    _______________    FINDINGS:    CHEST:    LUNGS: Motion artifact. Significant passive atelectasis in the right greater  than left lower lobes. Scattered foci of groundglass density bilaterally likely  regions of microatelectasis. PLEURA: Normal, with no effusion or pneumothorax. AIRWAY: Normal.    MEDIASTINUM: Normal heart size. No pericardial fluid. PICC tip is near the  cavoatrial junction. LYMPH NODES: Prominent mediastinal and bilateral axillary lymph nodes. For  example, a right paratracheal node on image 17 measures 1.3 cm in short axis. Most of the nodes are subcentimeter. OTHER: None. ABDOMEN/PELVIS:    LIVER, BILIARY: Likely simple cysts anterior right hepatic lobe. Normal liver  contour. No biliary dilation. Gallbladder is unremarkable. SPLEEN: Normal.    PANCREAS: Normal.    ADRENALS: Normal.    KIDNEYS: Normal.    LYMPH NODES: No enlarged lymph nodes. GASTROINTESTINAL TRACT: No evidence of bowel obstruction. Right lower quadrant  ileostomy noted. VASCULATURE: IVC filter. PELVIC ORGANS: Fry catheter in the urinary bladder. Hysterectomy. BONES: No acute or aggressive osseous abnormalities identified. OTHER: Small volume ascites. Mixed density fluid collection with small foci of  gas in the left aspect of the pelvis measuring approximately 10.9 x 10.6 cm on  image 136, appearance favoring hematoma, previously 12.8 x 10 cm. Surgical  drains have been removed. Separate right pelvic sidewall collection measuring 8.5 x 3.3 cm similar to  prior. External iliac artery extends through this collection. Small hematoma around the ileostomy in the subcutaneous tissues similar to  prior. Diffuse edema of the subcutaneous fat. Open ventral wound. _______________         Impression IMPRESSION:    1. No evidence of bowel obstruction or other acute GI process. Right lower  quadrant ileostomy. 2. Small volume ascites and collections in the pelvis suggestive of  postoperative hematoma and seroma. Left pelvic hematoma component measures  slightly smaller than prior.     3. Regions of atelectasis in the lungs, most pronounced in the right lower lobe.                Radha Khan MD   7/2/2018

## 2018-07-02 NOTE — PROGRESS NOTES
Consult for TPN Dosing per Pharmacy by Dr. Divya Webb provided for this 75 y.o. female, for indication of Ileus  Day of Therapy 15     TPN Dosing Wt:  62.4 kg    Labs:  BMP BMP:   Lab Results   Component Value Date/Time     (H) 07/02/2018 05:30 AM    K 4.1 07/02/2018 05:30 AM     (H) 07/02/2018 05:30 AM    CO2 21 07/02/2018 05:30 AM    AGAP 13 07/02/2018 05:30 AM     (H) 07/02/2018 05:30 AM    BUN 36 (H) 07/02/2018 05:30 AM    CREA 0.76 07/02/2018 05:30 AM    GFRAA >60 07/02/2018 05:30 AM    GFRNA >60 07/02/2018 05:30 AM          CMP CMP:   Lab Results   Component Value Date/Time     (H) 07/02/2018 05:30 AM    K 4.1 07/02/2018 05:30 AM     (H) 07/02/2018 05:30 AM    CO2 21 07/02/2018 05:30 AM    AGAP 13 07/02/2018 05:30 AM     (H) 07/02/2018 05:30 AM    BUN 36 (H) 07/02/2018 05:30 AM    CREA 0.76 07/02/2018 05:30 AM    GFRAA >60 07/02/2018 05:30 AM    GFRNA >60 07/02/2018 05:30 AM    CA 7.7 (L) 07/02/2018 05:30 AM    MG 2.1 07/02/2018 05:30 AM    PHOS 2.4 (L) 07/02/2018 05:30 AM    ALB 2.8 (L) 07/02/2018 05:30 AM    TP 5.8 (L) 07/02/2018 05:30 AM    GLOB 3.0 07/02/2018 05:30 AM    AGRAT 0.9 07/02/2018 05:30 AM    SGOT 18 07/02/2018 05:30 AM    ALT 15 07/02/2018 05:30 AM         Ca/ Phos Lab Results   Component Value Date/Time    Calcium 7.7 (L) 07/02/2018 05:30 AM    Phosphorus 2.4 (L) 07/02/2018 05:30 AM       Mag    Lab Results   Component Value Date/Time    Magnesium 2.1 07/02/2018 05:30 AM      Tg No components found for: TGL   Albumin Lab Results   Component Value Date/Time    Protein, total 5.8 (L) 07/02/2018 05:30 AM    Albumin 2.8 (L) 07/02/2018 05:30 AM         GOAL:   Kcal requirements:    ~22 kcal/kg   Fluid requirements:    ~30 ml/kg   Macronutrients:   Protein     1.2 g/kg/day 300 kcal   Lipids      44 gm/day 396 kcal   Dextrose remainder of total kcal:  200 gm/day 680 kcal   Total Calories:    1376 kcal     Today's TPN formulation provides (100 % of Goal): Calories:   1376 kcal   Fluid:    ~18.5 ml/kg   Protein:   1.2 gm/kg/day   Fat:    44 gm/day   CHO:    3.2 gm/kg/day   Sodium:   0 mEq   Potassium:   115 mEq   Calcium:   0 gm   Magnesium:   1.5 gm   Thiamine:   0 mg   OAC:                          80 mEq  PO4:                           24 mmol  Insulin (Reg):             10 units    TPN to be initiated at 100% goal macronutrients and titrated to goal per patient tolerance. Electrolytes to be monitored and adjusted daily. MD to replete electrolytes acutely outside of TPN as needed. Additional recommendations/Orders:    1. Corrective insulin coverage q6h while on TPN. 2. Change/decrease IVF to while on TPN-MD to further adjust as needed  3. BMP, Ca, Mag, Phos ordered daily  4. Triglycerides, Prealbumin, CMP, INR ordered upon initiation and weekly  5. GIR: 2.3 mg/kg/min  6. Electrolyte adjustments for today:                 - No electrolyte changes today    Pharmacy to follow daily and will make changes based on labs/clinical status. Shelbie Reyna, Pharm. D.   Clinical Pharmacist  [phone]

## 2018-07-02 NOTE — PROGRESS NOTES
NUTRITION FOLLOW-UP    RECOMMENDATIONS/PLAN:   - Recc switching to Vital High Protein due to high output in ileostomy; recc Vital High Protein @ 15ml/hr 330kcal 29g PRO 276ml H20 37g CHO 8g Fat    Goal once pt is tolerating tube feed is Vital High Protein @ 60ml/hr to provide 1320kcal 116g PRO 1104ml H20 148g CHO 31g Fat    Recc continuing TPN at full strength until 50% of estimated needs are being met through tube feeds; at this time would recc to slowly titrate TPN down     Will defer free H20 to MD  Monitor labs/lytes, TPN tolerance, tube feed tolerance skin integrity, wt, fluid status, BM    NUTRITION ASSESSMENT:   Client Update: 75 yrs old Female with acute respiratory failure, ischemia colitis, sepsis, JESS, metabolic acidosis. 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 Pt was extubated, and 1 day later reintubated. Thought to have peritonitis. Pt is s/p laparotomy and peritoneal lavage with klebsiella positive. 6/15/18 s/p exp lap surgery for intraabdominal fluid collection/wound vac and ileostomy placed. Pt self extubated 6/17/18. Pt is on TPN. Ileostomy and wound vac in place [facility] filter placed. FOOD/NUTRITION INTAKE   Diet Order:  TPN: 1379kcal 200g dextrose 75g protein 44g lipids    Food Allergies: NKFA  Average PO Intake:      No data found. Pertinent Medications:  [x] Reviewed; lopressor, protonix, vancomycin   Electrolyte Replacement Protocol: [x]K [x]Mg [x]PO4  Insulin:  [x]SSI  []Pre-meal   []Basal    []Drip  []None  Cultural/Temple Food Preferences: None Identified    BIOCHEMICAL DATA & MEDICAL TESTS  Pertinent Labs:  [x] Reviewed;  Na-148, Ca-7.7 Phos-2.4   ANTHROPOMETRICS  Height: 5' 1\" (154.9 cm)       Weight: 104 kg (229 lb 4.5 oz)         BMI: 43.3 kg/m^2 morbidly obese (Greater than or = to 40% BMI)   Adm Weight: 196 lbs                Weight change: +31lbs since admission  Adjusted Body Weight: 58.1kg     NUTRITION-FOCUSED PHYSICAL ASSESSMENT  Skin: No PU      GI: +ileostomy output 640ml yesterday    NUTRITION PRESCRIPTION  Calories: 979-1246 kcal/day based on 11-14kcal/kg  Protein: 95 g/day based on 2.0g/kg of IBW  CHO: 122-156 g/day based on 50% of total energy  Fluid: 979-1246 ml/day based on 1 kcal/ml        NUTRITION DIAGNOSES:   1.   Inadequate oral intake related to diet order as evidence by NPO diet         NUTRITION INTERVENTIONS:   INTERVENTIONS:        GOALS:  1. EN: Vital High Protein @ 15ml/hr 330kcal 29g PRO 276ml H20 37g CHO 8g Fat   1. Start new tube feeds by next review 3 days     LEARNING NEEDS (Diet, Supplementation, Food/Nutrient-Drug Interaction):   [x] None Identified   [] Education provided/documented      Identified and patient: [] Declined   [] Was not appropriate/indicated        NUTRITION MONITORING /EVALUATION:   Adjust EN/PN as appropriate  Monitor wt  Monitor renal labs, electrolytes, fluid status     Previous Recommendations Implemented: yes        Previous Goals Met:  yes -      [x] Participated in Interdisciplinary Rounds    [x] Interdisciplinary Care Plan Reviewed  DISCHARGE NUTRITION RECOMMENDATIONS ADDRESSED:      [x] To be determined closer to discharge    NUTRITION RISK:           [x] At risk                        [] Not currently at risk        Will follow-up per policy.   Bao Arce 1

## 2018-07-03 ENCOUNTER — APPOINTMENT (OUTPATIENT)
Dept: GENERAL RADIOLOGY | Age: 68
DRG: 853 | End: 2018-07-03
Attending: INTERNAL MEDICINE
Payer: MEDICARE

## 2018-07-03 ENCOUNTER — APPOINTMENT (OUTPATIENT)
Dept: VASCULAR SURGERY | Age: 68
DRG: 853 | End: 2018-07-03
Attending: INTERNAL MEDICINE
Payer: MEDICARE

## 2018-07-03 LAB
ANION GAP SERPL CALC-SCNC: 10 MMOL/L (ref 3–18)
ARTERIAL PATENCY WRIST A: ABNORMAL
ARTERIAL PATENCY WRIST A: ABNORMAL
BASE DEFICIT BLD-SCNC: 3 MMOL/L
BASE DEFICIT BLDV-SCNC: 2 MMOL/L
BASOPHILS # BLD: 0 K/UL (ref 0–0.1)
BASOPHILS NFR BLD: 0 % (ref 0–3)
BDY SITE: ABNORMAL
BDY SITE: ABNORMAL
BNP SERPL-MCNC: 1258 PG/ML (ref 0–900)
BUN SERPL-MCNC: 36 MG/DL (ref 7–18)
BUN/CREAT SERPL: 58 (ref 12–20)
CA-I SERPL-SCNC: 1.12 MMOL/L (ref 1.12–1.32)
CALCIUM SERPL-MCNC: 7.9 MG/DL (ref 8.5–10.1)
CHLORIDE SERPL-SCNC: 119 MMOL/L (ref 100–108)
CO2 SERPL-SCNC: 21 MMOL/L (ref 21–32)
CREAT SERPL-MCNC: 0.62 MG/DL (ref 0.6–1.3)
DIFFERENTIAL METHOD BLD: ABNORMAL
EOSINOPHIL # BLD: 0.4 K/UL (ref 0–0.4)
EOSINOPHIL NFR BLD: 4 % (ref 0–5)
ERYTHROCYTE [DISTWIDTH] IN BLOOD BY AUTOMATED COUNT: 25.2 % (ref 11.6–14.5)
GAS FLOW.O2 O2 DELIVERY SYS: ABNORMAL L/MIN
GAS FLOW.O2 O2 DELIVERY SYS: ABNORMAL L/MIN
GAS FLOW.O2 SETTING OXYMISER: 3 L/M
GAS FLOW.O2 SETTING OXYMISER: 3 L/M
GLUCOSE BLD STRIP.AUTO-MCNC: 151 MG/DL (ref 70–110)
GLUCOSE BLD STRIP.AUTO-MCNC: 152 MG/DL (ref 70–110)
GLUCOSE BLD STRIP.AUTO-MCNC: 159 MG/DL (ref 70–110)
GLUCOSE BLD STRIP.AUTO-MCNC: 162 MG/DL (ref 70–110)
GLUCOSE SERPL-MCNC: 158 MG/DL (ref 74–99)
HCO3 BLD-SCNC: 20.8 MMOL/L (ref 22–26)
HCO3 BLDV-SCNC: 23.1 MMOL/L (ref 23–28)
HCT VFR BLD AUTO: 31.8 % (ref 35–45)
HGB BLD-MCNC: 9.8 G/DL (ref 12–16)
LYMPHOCYTES # BLD: 1.7 K/UL (ref 0.8–3.5)
LYMPHOCYTES NFR BLD: 16 % (ref 20–51)
MAGNESIUM SERPL-MCNC: 2.3 MG/DL (ref 1.6–2.6)
MCH RBC QN AUTO: 27.6 PG (ref 24–34)
MCHC RBC AUTO-ENTMCNC: 30.8 G/DL (ref 31–37)
MCV RBC AUTO: 89.6 FL (ref 74–97)
MONOCYTES # BLD: 0.1 K/UL (ref 0–1)
MONOCYTES NFR BLD: 1 % (ref 2–9)
NEUTS BAND NFR BLD MANUAL: 17 % (ref 0–5)
NEUTS SEG # BLD: 6.6 K/UL (ref 1.8–8)
NEUTS SEG NFR BLD: 62 % (ref 42–75)
O2/TOTAL GAS SETTING VFR VENT: 0.32 %
O2/TOTAL GAS SETTING VFR VENT: 0.32 %
PCO2 BLD: 29.9 MMHG (ref 35–45)
PCO2 BLDV: 38.4 MMHG (ref 41–51)
PH BLD: 7.45 [PH] (ref 7.35–7.45)
PH BLDV: 7.39 [PH] (ref 7.32–7.42)
PHOSPHATE SERPL-MCNC: 2.3 MG/DL (ref 2.5–4.9)
PLATELET # BLD AUTO: 88 K/UL (ref 135–420)
PLATELET COMMENTS,PCOM: ABNORMAL
PO2 BLD: 83 MMHG (ref 80–100)
PO2 BLDV: 32 MMHG (ref 25–40)
POTASSIUM SERPL-SCNC: 3.9 MMOL/L (ref 3.5–5.5)
PREALB SERPL-MCNC: 13.3 MG/DL (ref 20–40)
RBC # BLD AUTO: 3.55 M/UL (ref 4.2–5.3)
RBC MORPH BLD: ABNORMAL
SAO2 % BLD: 97 % (ref 92–97)
SAO2 % BLDV: 62 % (ref 65–88)
SERVICE CMNT-IMP: ABNORMAL
SERVICE CMNT-IMP: ABNORMAL
SODIUM SERPL-SCNC: 150 MMOL/L (ref 136–145)
SPECIMEN TYPE: ABNORMAL
SPECIMEN TYPE: ABNORMAL
TOTAL RESP. RATE, ITRR: 26
TOTAL RESP. RATE, ITRR: 26
TRIGL SERPL-MCNC: 117 MG/DL (ref ?–150)
WBC # BLD AUTO: 10.7 K/UL (ref 4.6–13.2)

## 2018-07-03 PROCEDURE — 74011000258 HC RX REV CODE- 258: Performed by: INTERNAL MEDICINE

## 2018-07-03 PROCEDURE — 82803 BLOOD GASES ANY COMBINATION: CPT

## 2018-07-03 PROCEDURE — 74011250636 HC RX REV CODE- 250/636: Performed by: INTERNAL MEDICINE

## 2018-07-03 PROCEDURE — 74011636637 HC RX REV CODE- 636/637: Performed by: INTERNAL MEDICINE

## 2018-07-03 PROCEDURE — 74011000250 HC RX REV CODE- 250: Performed by: INTERNAL MEDICINE

## 2018-07-03 PROCEDURE — 80048 BASIC METABOLIC PNL TOTAL CA: CPT | Performed by: SURGERY

## 2018-07-03 PROCEDURE — 84134 ASSAY OF PREALBUMIN: CPT | Performed by: SURGERY

## 2018-07-03 PROCEDURE — 74011250637 HC RX REV CODE- 250/637: Performed by: HOSPITALIST

## 2018-07-03 PROCEDURE — 36592 COLLECT BLOOD FROM PICC: CPT

## 2018-07-03 PROCEDURE — C9113 INJ PANTOPRAZOLE SODIUM, VIA: HCPCS | Performed by: INTERNAL MEDICINE

## 2018-07-03 PROCEDURE — 74011250636 HC RX REV CODE- 250/636: Performed by: OBSTETRICS & GYNECOLOGY

## 2018-07-03 PROCEDURE — 71045 X-RAY EXAM CHEST 1 VIEW: CPT

## 2018-07-03 PROCEDURE — 36600 WITHDRAWAL OF ARTERIAL BLOOD: CPT

## 2018-07-03 PROCEDURE — 82962 GLUCOSE BLOOD TEST: CPT

## 2018-07-03 PROCEDURE — 84145 PROCALCITONIN (PCT): CPT | Performed by: HOSPITALIST

## 2018-07-03 PROCEDURE — 77010033678 HC OXYGEN DAILY

## 2018-07-03 PROCEDURE — 84478 ASSAY OF TRIGLYCERIDES: CPT | Performed by: SURGERY

## 2018-07-03 PROCEDURE — 65270000029 HC RM PRIVATE

## 2018-07-03 PROCEDURE — 82330 ASSAY OF CALCIUM: CPT | Performed by: SURGERY

## 2018-07-03 PROCEDURE — 74011250637 HC RX REV CODE- 250/637: Performed by: OBSTETRICS & GYNECOLOGY

## 2018-07-03 PROCEDURE — P9047 ALBUMIN (HUMAN), 25%, 50ML: HCPCS | Performed by: INTERNAL MEDICINE

## 2018-07-03 PROCEDURE — 93971 EXTREMITY STUDY: CPT

## 2018-07-03 PROCEDURE — 94660 CPAP INITIATION&MGMT: CPT

## 2018-07-03 PROCEDURE — 83880 ASSAY OF NATRIURETIC PEPTIDE: CPT | Performed by: HOSPITALIST

## 2018-07-03 PROCEDURE — 83735 ASSAY OF MAGNESIUM: CPT | Performed by: SURGERY

## 2018-07-03 PROCEDURE — 84100 ASSAY OF PHOSPHORUS: CPT | Performed by: SURGERY

## 2018-07-03 PROCEDURE — 85025 COMPLETE CBC W/AUTO DIFF WBC: CPT | Performed by: SURGERY

## 2018-07-03 PROCEDURE — 74011636637 HC RX REV CODE- 636/637: Performed by: HOSPITALIST

## 2018-07-03 PROCEDURE — 77030018798 HC PMP KT ENTRL FED COVD -A

## 2018-07-03 RX ORDER — SODIUM,POTASSIUM PHOSPHATES 280-250MG
2 POWDER IN PACKET (EA) ORAL
Status: COMPLETED | OUTPATIENT
Start: 2018-07-03 | End: 2018-07-03

## 2018-07-03 RX ORDER — FUROSEMIDE 10 MG/ML
40 INJECTION INTRAMUSCULAR; INTRAVENOUS ONCE
Status: COMPLETED | OUTPATIENT
Start: 2018-07-03 | End: 2018-07-03

## 2018-07-03 RX ORDER — DILTIAZEM HYDROCHLORIDE 5 MG/ML
10 INJECTION INTRAVENOUS EVERY 6 HOURS
Status: DISCONTINUED | OUTPATIENT
Start: 2018-07-03 | End: 2018-07-06

## 2018-07-03 RX ORDER — SERTRALINE HYDROCHLORIDE 50 MG/1
50 TABLET, FILM COATED ORAL DAILY
Status: DISCONTINUED | OUTPATIENT
Start: 2018-07-03 | End: 2018-07-11 | Stop reason: HOSPADM

## 2018-07-03 RX ORDER — SODIUM,POTASSIUM PHOSPHATES 280-250MG
2 POWDER IN PACKET (EA) ORAL 4 TIMES DAILY
Status: DISCONTINUED | OUTPATIENT
Start: 2018-07-03 | End: 2018-07-03

## 2018-07-03 RX ORDER — DEXTROSE MONOHYDRATE 50 MG/ML
25 INJECTION, SOLUTION INTRAVENOUS CONTINUOUS
Status: DISCONTINUED | OUTPATIENT
Start: 2018-07-03 | End: 2018-07-08

## 2018-07-03 RX ORDER — SERTRALINE HYDROCHLORIDE 20 MG/ML
50 SOLUTION ORAL DAILY
Status: DISCONTINUED | OUTPATIENT
Start: 2018-07-03 | End: 2018-07-03 | Stop reason: RX

## 2018-07-03 RX ADMIN — DEXTROSE MONOHYDRATE 25 ML/HR: 5 INJECTION, SOLUTION INTRAVENOUS at 13:32

## 2018-07-03 RX ADMIN — SERTRALINE HYDROCHLORIDE 50 MG: 50 TABLET ORAL at 10:02

## 2018-07-03 RX ADMIN — MEROPENEM 1 G: 1 INJECTION, POWDER, FOR SOLUTION INTRAVENOUS at 10:03

## 2018-07-03 RX ADMIN — DILTIAZEM HYDROCHLORIDE 10 MG: 5 INJECTION INTRAVENOUS at 14:31

## 2018-07-03 RX ADMIN — METOPROLOL TARTRATE 5 MG: 5 INJECTION INTRAVENOUS at 13:33

## 2018-07-03 RX ADMIN — INSULIN LISPRO 2 UNITS: 100 INJECTION, SOLUTION INTRAVENOUS; SUBCUTANEOUS at 13:33

## 2018-07-03 RX ADMIN — DILTIAZEM HYDROCHLORIDE 10 MG: 5 INJECTION INTRAVENOUS at 18:57

## 2018-07-03 RX ADMIN — SODIUM CHLORIDE 40 MG: 9 INJECTION INTRAMUSCULAR; INTRAVENOUS; SUBCUTANEOUS at 09:00

## 2018-07-03 RX ADMIN — VANCOMYCIN HYDROCHLORIDE 1500 MG: 10 INJECTION, POWDER, LYOPHILIZED, FOR SOLUTION INTRAVENOUS at 14:31

## 2018-07-03 RX ADMIN — FUROSEMIDE 40 MG: 10 INJECTION, SOLUTION INTRAMUSCULAR; INTRAVENOUS at 13:33

## 2018-07-03 RX ADMIN — MAGNESIUM SULFATE HEPTAHYDRATE: 500 INJECTION, SOLUTION INTRAMUSCULAR; INTRAVENOUS at 17:35

## 2018-07-03 RX ADMIN — INSULIN LISPRO 2 UNITS: 100 INJECTION, SOLUTION INTRAVENOUS; SUBCUTANEOUS at 18:49

## 2018-07-03 RX ADMIN — ALBUMIN (HUMAN) 12.5 G: 0.25 INJECTION, SOLUTION INTRAVENOUS at 10:03

## 2018-07-03 RX ADMIN — METOPROLOL TARTRATE 5 MG: 5 INJECTION INTRAVENOUS at 18:49

## 2018-07-03 RX ADMIN — ACETAMINOPHEN 1000 MG: 10 INJECTION, SOLUTION INTRAVENOUS at 05:18

## 2018-07-03 RX ADMIN — HYDRALAZINE HYDROCHLORIDE 10 MG: 20 INJECTION INTRAMUSCULAR; INTRAVENOUS at 21:59

## 2018-07-03 RX ADMIN — METOPROLOL TARTRATE 5 MG: 5 INJECTION INTRAVENOUS at 06:10

## 2018-07-03 RX ADMIN — HYDROMORPHONE HYDROCHLORIDE 1 MG: 2 INJECTION, SOLUTION INTRAMUSCULAR; INTRAVENOUS; SUBCUTANEOUS at 00:55

## 2018-07-03 RX ADMIN — INSULIN LISPRO 2 UNITS: 100 INJECTION, SOLUTION INTRAVENOUS; SUBCUTANEOUS at 23:46

## 2018-07-03 RX ADMIN — INSULIN GLARGINE 13 UNITS: 100 INJECTION, SOLUTION SUBCUTANEOUS at 10:00

## 2018-07-03 RX ADMIN — METOPROLOL TARTRATE 5 MG: 5 INJECTION INTRAVENOUS at 23:35

## 2018-07-03 RX ADMIN — POTASSIUM & SODIUM PHOSPHATES POWDER PACK 280-160-250 MG 2 PACKET: 280-160-250 PACK at 20:46

## 2018-07-03 RX ADMIN — ACETAMINOPHEN 1000 MG: 10 INJECTION, SOLUTION INTRAVENOUS at 10:03

## 2018-07-03 RX ADMIN — HYDROMORPHONE HYDROCHLORIDE 1 MG: 2 INJECTION, SOLUTION INTRAMUSCULAR; INTRAVENOUS; SUBCUTANEOUS at 13:05

## 2018-07-03 RX ADMIN — INSULIN LISPRO 2 UNITS: 100 INJECTION, SOLUTION INTRAVENOUS; SUBCUTANEOUS at 06:10

## 2018-07-03 RX ADMIN — MICAFUNGIN SODIUM 100 MG: 20 INJECTION, POWDER, LYOPHILIZED, FOR SOLUTION INTRAVENOUS at 14:31

## 2018-07-03 RX ADMIN — ALBUMIN (HUMAN) 12.5 G: 0.25 INJECTION, SOLUTION INTRAVENOUS at 02:43

## 2018-07-03 RX ADMIN — HYDROMORPHONE HYDROCHLORIDE 1 MG: 2 INJECTION, SOLUTION INTRAMUSCULAR; INTRAVENOUS; SUBCUTANEOUS at 20:46

## 2018-07-03 RX ADMIN — MEROPENEM 1 G: 1 INJECTION, POWDER, FOR SOLUTION INTRAVENOUS at 02:42

## 2018-07-03 RX ADMIN — MEROPENEM 1 G: 1 INJECTION, POWDER, FOR SOLUTION INTRAVENOUS at 18:49

## 2018-07-03 NOTE — WOUND CARE
No issues noted with [facility], adequate vashe at bedside for instill, please notify wound care with any needs.

## 2018-07-03 NOTE — PROGRESS NOTES
CIRA [address] [facility] Graciela Estes [address] [facility] [address]  Phone [phone]   Fax [phone]    Pulmonary Critical Care & Sleep Medicine         Name: Opal Gomes MRN: [id number]   : 1950 Hospital: [facility] [place]    Date: 7/3/2018        [facility]    IMPRESSION:   · Acute hypoxic respiratory failure, on NC, came off of BiPAP; multifactorial from any generalized weakness; aspiration pneumonia following episode of mucus plug , previously had other etiologies with pulmonary edema, aspiration pneumonitis. No central or segmental PE on recent CTA chest. Previously required ventilator support this admission due to sepsis, reintubated on 18 and self-extubated   · S/p bronchoscopy 18: no aspiration noted then. Cx Negative. · Severe sepsis due to Klebsiella Oxytoca peritonitis and then perforation. S/p laparotomy 18 [POD # 5 tumor radical debulking] and peritoneal lavage: Klebsiella positive. Ruled out ischemic bowel in laparotomy. S/p laparotomy on 6/15/18 for peritonitis due to diverticulitis with perforation, s/p distal ileostomy. · Leukocytosis, improved on micafungin/vanco. Now aspiration pneumonia; repeat blood and fungal cx in process. Abd drain tip culture negative final  · Off heparin for intraabdominal bleeding and CTA chest no central PE. INR reversed with protamin, FFP.-- S/P IVC fillter   · S/p Shock suspected from sepsis and obstructive due to presumed PE with severe hypoxia and high A-a gradient, elevated RVSP 34 mm Hg. CTA 18 negative for central or main PA PE. Shock resolved. CTA chest no central PE. · Acute b/l peroneal DVT, s/p IVC filter by vascular surgery 18.    · Anemia, s/p PRBC tx 18, no gross bleeding  · Thrombocytopenia, mild, no active bleeding  · JESS, improved  · Hypernatremia, worsened following Lasix- improvement slowly - managed per renal  · Elevated LFT, due to shock liver, resolved  · Foreign body on peritoneal biopsy, per path report. · Anasarca due to fluid resuscitation, JESS, hypoalbuminemia and sequale of sepsis. 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 · Poor overall prognosis. RECOMMENDATIONS:  -- No fever today but realized patient was getting 1gm of acetamenophen every 6 hours will stop that as it will cause liver toxicity and will mask fever on this patient  -- ID gael appreciated wish to give vanco slowly  -- Procalcitonin and Pro bnp is ordered  -- Tachycardia is secondary increaseing betablocker will not change anything will add cardizem and treat underline cause of tachycardia  -- DVT study of upper ext to look for any non infectious cause of FEVER  -- Good response to lasix will give one more dose  -- NA up but will monitor change to D5 fluid  -- Drainage vs wound culture showing multiple WBC <not sure what the site was>   -- Head ct due to on and off AMS make sure no CVA due to profound Hypotension  -- DC flagyl as received >14days and meropenam has good anarobic corverage  -- ABD ct shows improvement in hematoma but with ongoing fever will need ID to weigh in as hematoma can get 2ndarily get infected  -- Pending  Blood fungal culture due to TPN    Respiratory: ABG reviewed at bedside; took off of BiPAP and placed on NC O2 @ 4 Lpm support - tolerating better on follow up- BiPAP 12/6 at night and followed by ABG in AM; low threshold for re-intubation. Keep SPO2 >=92%. Spoke with brother at the bedside. CXR in AM.  HOB 30 degree elevation all the time. Aggressive pulmonary toileting. Aspiration precautions. Judicious opiate pain medications. Periodic Lasix as needed   ID:  R sided pulmonary infiltrates.  Follow up drain tip culture negative to date  Repeat blood and urine cultures pending  Bronch cx normal robin. Peritoneal cx Klebsiella Oxytoca resistant to ampicillin. On micafungin/vanco. Continue Merrem started 6/30/18  Abx - Zosyn since 6/13 [day 18] stopped 6/30/18; levaquin since 6/17 [day 14] stopped 6/30/18. Stop  Flagyl since 6/15 [day 17]; iv vanco since 6/22, micafungin since 6/22  Hem: follow CBC; Keep Hb> 7 gm/dl. s/p IVC filter placement 6/28. Transfusion per surgery  CVS: Echo ok with mildly elevated RVSP but normal RV systolic function. AFib converted to NSR. Off of cardizem drip. -- On Metorprolol IV add cardizem iv every 6   Renal: Nephrologist seeing prn Creatinine improved. Lasix PRN because of hypernatremia. Stop albumin now as BP improved. Spoke with pharmacy to see if IVF with antibiotics could be changed to D5, but nationwide shortage of small bags of IVF to mix antibiotics but department will look around. GI/: TPN; barium study 6/21 - patient could not do it on 6/22 due to abd pain  Endocrine: Maintain blood glucose 140-180. Humalog sc. Insulin in TPN. on Lantus  Neurology: Dilaudid- balance post-op pain without sedation- recommend judicious use of sedatives  Pain/Sedation: dilaudid - management per Dr Ritu Owen - judicious use  Skin/Wound: local surgical site care. Wound vac changed 6/29. Electrolytes: Replace electrolytes per ICU electrolyte replacement protocol, renal service. Nutrition: TPN started 6/18. NPO   Prophylaxis: GI Prophylaxis (protonix)  Restraints: none  Lines/Tubes:   ETT: 6/8/18- self extubated 6/17/18  Central line: right subclavian 6/4/18 - removed 6/20  Picc line 6/19 - Central line bundle followed  Fry: 6/4/18 (Medically necessary for strict input/output monitoring in critically ill patient, will remove it when not needed. Fry bundle followed).     Will defer respective systems problem management to primary and other respective consultant and follow patient in ICU with primary and other medical team.  Further recommendations will be based on the patient's response to recommended treatment and results of the investigation ordered. Quality Care: PPI, DVT prophylaxis, HOB elevated, Infection control all reviewed and addressed. Brother at bedside, updated: overall poor prognosis with patient at increased risk for further deterioration, prolonged hospitalization, nosocomial infections, failure to thrive. Patient has no children. Code status: Full code. PICC Line/Fry cath medically necessary  PT and OT on case    Fry bundle   Central line bundle followed   Weaning per protocol     Quality Care: PPI, DVT prophylaxis, HOB elevated, Infection control all reviewed and addressed. Events and notes from last 24 hours reviewed. Care plan discussed with nursing CC TIME:42min                                                     Subjective/History of Present Illness:   Patient is a 75 y.o. 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 7/2/18  This AM came off of BiPAP to O2 via NC based on RR, ABG, SPO2 and alertness. Drowsy but arousable breathing little fast  ? Received some diludid last night   Low grade fever of 100.8 noted  The patient denies cough, chest pain, dyspnea, wheezing or hemoptysis. No fever overnight. Persistent leg edema  Ileostomy without bleeding. On TPN  Hemodynamically stable      Review of Systems   Review of systems not obtained due to patient factors.       Surgeries  5/29/18 - Laparoscopy converted to laparotomy, total abdominal hysterectomy, bilateral salpingo-oophorectomy, omentectomy, bilateral pelvic and periaortic lymphadenectomy and radical tumor debulking to R0. Path - ADENOCARCINOMA OF LEFT OVARY.     18 - Diagnostic laparoscopy converted to laparotomy, peritoneal biopsies, aerobic and anaerobic cultures of peritoneal fluid and peritoneal lavage. 6/15/18 - Wound exploration. Exploratory laparotomy. Lysis of adhesions. Ileostomy and wound VAC placement.   Had wound vac change . 1 unit of PRBC on   Had removal of BRANDON drain on 18, tip culture negative final          Current Facility-Administered Medications   Medication Dose Route Frequency    sertraline (ZOLOFT) tablet 50 mg  50 mg Oral DAILY    furosemide (LASIX) injection 40 mg  40 mg IntraVENous ONCE    dilTIAZem (CARDIZEM) injection 10 mg  10 mg IntraVENous Q6H    dextrose 5% infusion  25 mL/hr IntraVENous CONTINUOUS    TPN ADULT - CENTRAL   IntraVENous CONTINUOUS    meropenem (MERREM) 1 g in sterile water (preservative free) 20 mL IV syringe  1 g IntraVENous Q8H    vancomycin (VANCOCIN) 1500 mg in D5W 500 mL infusion  1,500 mg IntraVENous Q24H    metoprolol (LOPRESSOR) injection 5 mg  5 mg IntraVENous Q6H    albumin human 25% (BUMINATE) solution 12.5 g  12.5 g IntraVENous Q8H    insulin glargine (LANTUS) injection 13 Units  13 Units SubCUTAneous DAILY    micafungin (MYCAMINE) 100 mg in 0.9% sodium chloride (MBP/ADV) 100 mL  100 mg IntraVENous Q24H    insulin lispro (HUMALOG) injection   SubCUTAneous Q6H    Vancomycin - Pharmacy to Dose  1 Each Other Rx Dosing/Monitoring    pantoprazole (PROTONIX) 40 mg in sodium chloride 0.9% 10 mL injection  40 mg IntraVENous DAILY         Objective:   Vital Signs:    Visit Vitals    /69    Pulse 82    Temp 98.3 °F (36.8 °C)    Resp 23    Ht 5' 1\" (1.549 m)    Wt 104 kg (229 lb 4.5 oz)    SpO2 98%    BMI 43.32 kg/m2       O2 Device: Nasal cannula   O2 Flow Rate (L/min): 3 l/min   Temp (24hrs), Av.7 °F (37.6 °C), Min:98.3 °F (36.8 °C), Max:100.6 °F (38.1 °C)       Intake/Output:   Last shift:      07/03 0701 - 07/03 1900  In: -   Out: 450 [Urine:250]    Last 3 shifts: 07/01 1901 - 07/03 0700  In: 4447.2 [I.V.:3373.2]  Out: 9590 [IURKA:4384; Drains:165]      Intake/Output Summary (Last 24 hours) at 07/03/18 1141  Last data filed at 07/03/18 0725   Gross per 24 hour   Intake          2947.17 ml   Output             5965 ml   Net         -3017.83 ml       Physical Exam:  General/Neurology: awake, following commands, improving looking, on O2 via NC @ 4 Lpm  Head:   Normocephalic, without obvious abnormality, atraumatic. Eye:   no scleral icterus, no pallor, no cyanosis. Pupils not dilated. Neck:   Symmetric. No lymphadenopathy. Trachea midline  Lung: Moderate equal air entry bilateral. Decreased breath sounds bases. No wheezing or crackles at bases. Heart:   S1 S2 present. No murmur. No JVD. Abdomen:  Soft. Obese. + BS. Midline lower abd surgical site - open wound with wound vac. Rt abd wall Ileostomy. No abd distension or guarding. Extremities:  No cyanosis. No clubbing. No hand edema. B/l feet/leg/thigh edema 2-3+. Pulses: Palpable radials and DPs bilateral.   Lymphatic:  No cervical or supraclav lymphadenopathy.    Skin:   No rash      Data:      CBC w/Diff Recent Labs      07/03/18   0405  07/02/18   0530  07/01/18   0515   WBC  10.7  8.8  7.5   RBC  3.55*  3.50*  3.26*   HGB  9.8*  9.6*  9.1*   HCT  31.8*  31.8*  29.8*   PLT  88*  95*  93*   GRANS  62  81*  86*   LYMPH  16*  8*  6*   EOS  4  7*  6*        Chemistry Recent Labs      07/03/18   0405  07/02/18   0530  07/01/18   0515   GLU  158*  130*  145*   NA  150*  148*  150*   K  3.9  4.1  3.8   CL  119*  114*  118*   CO2  21  21  22   BUN  36*  36*  34*   CREA  0.62  0.76  0.99   CA  7.9*  7.7*  7.4*   MG  2.3  2.1  2.0   PHOS  2.3*  2.4*  3.0   AGAP  10  13  10   BUCR  58*  47*  34*   AP   --   95   --    TP   --   5.8*   --    ALB   --   2.8*   --    GLOB   --   3.0   --    AGRAT   --   0.9   --         Lactic Acid Lactic acid   Date Value Ref Range Status   06/17/2018 2.3 (HH) 0.4 - 2.0 MMOL/L Final     Comment:     CALLED TO AND CORRECTLY REPEATED BY:  Camryn Vallejo RN ICU ON 6/17/2018 [id number] TO [id number]       No results for input(s): LAC in the last 72 hours. Micro  Recent Labs      07/02/18   1319  07/02/18   1300  07/01/18   0440   CULT  NO GROWTH AFTER 17 HOURS  PENDING  NO GROWTH 2 DAYS     Recent Labs      07/02/18   1319  07/02/18   1300  07/01/18   0440  06/30/18   1628  06/30/18   1316   CULT  NO GROWTH AFTER 17 HOURS  PENDING  NO GROWTH 2 DAYS  NO GROWTH 2 DAYS  NO GROWTH 3 DAYS        ABG Recent Labs      07/03/18   0602  07/03/18   0556  07/02/18   1247  07/02/18   0527   PHI  7.450   --   7.423  7.427   PCO2I  29.9*   --   30.0*  29.9*   PO2I  83   --   76*  93   HCO3I  20.8*   --   19.6*  19.7*   FIO2I  0.32  0.32  32  0.28        Liver Enzymes Protein, total   Date Value Ref Range Status   07/02/2018 5.8 (L) 6.4 - 8.2 g/dL Final     Albumin   Date Value Ref Range Status   07/02/2018 2.8 (L) 3.4 - 5.0 g/dL Final     Globulin   Date Value Ref Range Status   07/02/2018 3.0 2.0 - 4.0 g/dL Final     A-G Ratio   Date Value Ref Range Status   07/02/2018 0.9 0.8 - 1.7   Final     AST (SGOT)   Date Value Ref Range Status   07/02/2018 18 15 - 37 U/L Final     Alk. phosphatase   Date Value Ref Range Status   07/02/2018 95 45 - 117 U/L Final     Recent Labs      07/02/18   0530   TP  5.8*   ALB  2.8*   GLOB  3.0   AGRAT  0.9   SGOT  18   AP  95        Cardiac Enzymes No results found for: CPK, CK, CKMMB, CKMB, RCK3, CKMBT, CKNDX, CKND1, FABIAN, TROPT, TROIQ, KAYLAN, TROPT, TNIPOC, BNP, BNPP     BNP No results found for: BNP, BNPP, XBNPT     Coagulation No results for input(s): PTP, INR, APTT in the last 72 hours.     No lab exists for component: INREXT, INREXT      Thyroid  No results found for: T4, T3U, TSH, TSHEXT, TSHEXT       Lipid Panel Lab Results   Component Value Date/Time    Triglyceride 66 06/26/2018 05:00 AM          Urinalysis Lab Results   Component Value Date/Time    Color DARK YELLOW 06/04/2018 08:42 AM    Appearance CLOUDY 06/04/2018 08:42 AM    Specific gravity 1.028 06/04/2018 08:42 AM    pH (UA) 5.0 06/04/2018 08:42 AM    Protein 30 (A) 06/04/2018 08:42 AM    Glucose NEGATIVE  06/04/2018 08:42 AM    Ketone TRACE (A) 06/04/2018 08:42 AM    Bilirubin SMALL (A) 06/04/2018 08:42 AM    Urobilinogen 1.0 06/04/2018 08:42 AM    Nitrites NEGATIVE  06/04/2018 08:42 AM    Leukocyte Esterase NEGATIVE  06/04/2018 08:42 AM    Epithelial cells FEW 06/04/2018 08:42 AM    Bacteria 1+ (A) 06/04/2018 08:42 AM    WBC 0 to 3 06/04/2018 08:42 AM    RBC 0 to 3 06/04/2018 08:42 AM        XR (Most Recent). CXR reviewed by me and compared with previous CXR   Results from Hospital Encounter encounter on 06/04/18   XR ABD PORT  1 V   Narrative Supine abdominal radiograph    Comparison: 6/4/2018    Indication: Small bowel feeding tube placement. Findings: Small bowel feeding tube tip overlies distal stomach, may be partially  protruding into the duodenal bulb. Paucity of bowel gas. IVC filter noted. Impression Impression: Small bowel feeding tube tip is in the distal stomach or possibly  protruding into the duodenal bulb. CT (Most Recent)   Results from Hospital Encounter encounter on 06/04/18   CT CHEST ABD PELV WO CONT   Narrative EXAM: CT of the chest, abdomen, and pelvis    INDICATION: Left ovarian clear cell adenocarcinoma. Peritonitis. Unable to  tolerate by mouth. COMPARISON: 6/22/2018    TECHNIQUE: Axial CT imaging of the chest, abdomen, and pelvis was performed  without intravenous contrast. Multiplanar reformats were generated. One or more  dose reduction techniques were used on this CT: automated exposure control,  adjustment of the mAs and/or kVp according to patient's size, and iterative  reconstruction techniques.  The specific techniques utilized on this CT exam have  been documented in the patient's electronic medical record.    _______________    FINDINGS:    CHEST:    LUNGS: Motion artifact. Significant passive atelectasis in the right greater  than left lower lobes. Scattered foci of groundglass density bilaterally likely  regions of microatelectasis. PLEURA: Normal, with no effusion or pneumothorax. AIRWAY: Normal.    MEDIASTINUM: Normal heart size. No pericardial fluid. PICC tip is near the  cavoatrial junction. LYMPH NODES: Prominent mediastinal and bilateral axillary lymph nodes. For  example, a right paratracheal node on image 17 measures 1.3 cm in short axis. Most of the nodes are subcentimeter. OTHER: None. ABDOMEN/PELVIS:    LIVER, BILIARY: Likely simple cysts anterior right hepatic lobe. Normal liver  contour. No biliary dilation. Gallbladder is unremarkable. SPLEEN: Normal.    PANCREAS: Normal.    ADRENALS: Normal.    KIDNEYS: Normal.    LYMPH NODES: No enlarged lymph nodes. GASTROINTESTINAL TRACT: No evidence of bowel obstruction. Right lower quadrant  ileostomy noted. VASCULATURE: IVC filter. PELVIC ORGANS: Fry catheter in the urinary bladder. Hysterectomy. BONES: No acute or aggressive osseous abnormalities identified. OTHER: Small volume ascites. Mixed density fluid collection with small foci of  gas in the left aspect of the pelvis measuring approximately 10.9 x 10.6 cm on  image 136, appearance favoring hematoma, previously 12.8 x 10 cm. Surgical  drains have been removed. Separate right pelvic sidewall collection measuring 8.5 x 3.3 cm similar to  prior. External iliac artery extends through this collection. Small hematoma around the ileostomy in the subcutaneous tissues similar to  prior. Diffuse edema of the subcutaneous fat. Open ventral wound. _______________         Impression IMPRESSION:    1. No evidence of bowel obstruction or other acute GI process. Right lower  quadrant ileostomy.     2. Small volume ascites and collections in the pelvis suggestive of  postoperative hematoma and seroma. Left pelvic hematoma component measures  slightly smaller than prior. 3. Regions of atelectasis in the lungs, most pronounced in the right lower lobe. EKG No results found for this or any previous visit. ECHO No results found for this or any previous visit. PFT No flowsheet data found. Other ASA reactivity:   Pre-albumin:   Ionized Calcium:   NH4:   T3, FT4:  Cortisol:  Urine Osm:  Urine Lytes:   HbA1c:                Echo 6/9/18:  Left ventricle: Systolic function was normal. Ejection fraction was estimated   in the range of 65 % to 70 %. No obvious  wall motion abnormalities identified in the views obtained. Wall thickness   was mildly increased. Doppler parameters  were consistent with abnormal left ventricular relaxation (grade 1 diastolic   dysfunction). Right ventricle: The size was normal. Systolic function was normal.  Mitral valve: There was mild annular calcification. Tricuspid valve: Pulmonary artery systolic pressure was mildly increased. Pulmonary artery systolic pressure: 34 mmHg. PVL LE 6/6/18: acute b/l peroneal DVT. CT abd pelvis 6/4/18:  1. Postsurgical hysterectomy, bilateral oophorectomy, pelvic lymphadenectomy and  a mastectomy surgical changes. Small volume of ascites in the abdomen and  pelvis, with a few tiny locules of gas in the right hemipelvis would be in  keeping with recent postsurgical etiology. 2. Abnormal edematous thick-walled small bowel loops in the left abdomen and  pelvis, with TRAM lamellar edematous configuration, induration. No findings of  pneumatosis. The overall appearance is nonspecific. Diagnostic considerations  include infectious etiology, nonspecific edema which may be unresolved  postsurgical etiology. Ischemia is also included in the differential diagnostic  consideration, however, there are no findings of pneumatosis, portal venous gas.   3. Stool in the right colon extending to the hepatic flexure with surrounding  gas, foci of pneumatosis could be obscured. No associated bowel wall thickening. 4. Satisfactory position of nasogastric tube. 5. Hepatomegaly, steatosis with 2 rounded low-density lesions, potentially  cysts. 6. Bibasilar atelectasis. Imaging:  [x]I have personally reviewed the patients chest radiographs images and report   7/1/18    Results from Hospital Encounter encounter on 06/04/18   XR ABD PORT  1 V   Narrative Supine abdominal radiograph    Comparison: 6/4/2018    Indication: Small bowel feeding tube placement. Findings: Small bowel feeding tube tip overlies distal stomach, may be partially  protruding into the duodenal bulb. Paucity of bowel gas. IVC filter noted. Impression Impression: Small bowel feeding tube tip is in the distal stomach or possibly  protruding into the duodenal bulb. 7/1/18  CXR  Frontal chest radiograph   Comparison: 6/30/2018   Indication: Right-sided pulmonary opacification follow-up.   Findings: Stable cardiomediastinal silhouette. Unchanged PICC placement. No visible pleural abnormality. Low lung volumes, more so on the right. Reticular opacification in the lower right lung with slightly less pronounced confluent appearance laterally. Left lung is clear. No acute osseous abnormality. IMPRESSION:    Persistent lower right lung opacification which appears predominantly reticular (less confluent appearance laterally). This could be infectious or atelectatic        Results from Hospital Encounter encounter on 06/04/18   CT CHEST ABD PELV WO CONT   Narrative EXAM: CT of the chest, abdomen, and pelvis    INDICATION: Left ovarian clear cell adenocarcinoma. Peritonitis. Unable to  tolerate by mouth. COMPARISON: 6/22/2018    TECHNIQUE: Axial CT imaging of the chest, abdomen, and pelvis was performed  without intravenous contrast. Multiplanar reformats were generated.  One or more  dose reduction techniques were used on this CT: automated exposure control,  adjustment of the mAs and/or kVp according to patient's size, and iterative  reconstruction techniques. The specific techniques utilized on this CT exam have  been documented in the patient's electronic medical record.    _______________    FINDINGS:    CHEST:    LUNGS: Motion artifact. Significant passive atelectasis in the right greater  than left lower lobes. Scattered foci of groundglass density bilaterally likely  regions of microatelectasis. PLEURA: Normal, with no effusion or pneumothorax. AIRWAY: Normal.    MEDIASTINUM: Normal heart size. No pericardial fluid. PICC tip is near the  cavoatrial junction. LYMPH NODES: Prominent mediastinal and bilateral axillary lymph nodes. For  example, a right paratracheal node on image 17 measures 1.3 cm in short axis. Most of the nodes are subcentimeter. OTHER: None. ABDOMEN/PELVIS:    LIVER, BILIARY: Likely simple cysts anterior right hepatic lobe. Normal liver  contour. No biliary dilation. Gallbladder is unremarkable. SPLEEN: Normal.    PANCREAS: Normal.    ADRENALS: Normal.    KIDNEYS: Normal.    LYMPH NODES: No enlarged lymph nodes. GASTROINTESTINAL TRACT: No evidence of bowel obstruction. Right lower quadrant  ileostomy noted. VASCULATURE: IVC filter. PELVIC ORGANS: Fry catheter in the urinary bladder. Hysterectomy. BONES: No acute or aggressive osseous abnormalities identified. OTHER: Small volume ascites. Mixed density fluid collection with small foci of  gas in the left aspect of the pelvis measuring approximately 10.9 x 10.6 cm on  image 136, appearance favoring hematoma, previously 12.8 x 10 cm. Surgical  drains have been removed. Separate right pelvic sidewall collection measuring 8.5 x 3.3 cm similar to  prior. External iliac artery extends through this collection. Small hematoma around the ileostomy in the subcutaneous tissues similar to  prior. Diffuse edema of the subcutaneous fat. Open ventral wound. _______________         Impression IMPRESSION:    1. No evidence of bowel obstruction or other acute GI process. Right lower  quadrant ileostomy. 2. Small volume ascites and collections in the pelvis suggestive of  postoperative hematoma and seroma. Left pelvic hematoma component measures  slightly smaller than prior. 3. Regions of atelectasis in the lungs, most pronounced in the right lower lobe.                Lia Guadarrama MD   7/3/2018

## 2018-07-03 NOTE — PROGRESS NOTES
Consult for TPN Dosing per Pharmacy by Dr. Armando Eagle provided for this 75 y.o. female, for indication of Ileus  Day of Therapy 16     TPN Dosing Wt:  62.4 kg    Labs:  BMP BMP:   Lab Results   Component Value Date/Time     (H) 07/03/2018 04:05 AM    K 3.9 07/03/2018 04:05 AM     (H) 07/03/2018 04:05 AM    CO2 21 07/03/2018 04:05 AM    AGAP 10 07/03/2018 04:05 AM     (H) 07/03/2018 04:05 AM    BUN 36 (H) 07/03/2018 04:05 AM    CREA 0.62 07/03/2018 04:05 AM    GFRAA >60 07/03/2018 04:05 AM    GFRNA >60 07/03/2018 04:05 AM          CMP CMP:   Lab Results   Component Value Date/Time     (H) 07/03/2018 04:05 AM    K 3.9 07/03/2018 04:05 AM     (H) 07/03/2018 04:05 AM    CO2 21 07/03/2018 04:05 AM    AGAP 10 07/03/2018 04:05 AM     (H) 07/03/2018 04:05 AM    BUN 36 (H) 07/03/2018 04:05 AM    CREA 0.62 07/03/2018 04:05 AM    GFRAA >60 07/03/2018 04:05 AM    GFRNA >60 07/03/2018 04:05 AM    CA 7.9 (L) 07/03/2018 04:05 AM    MG 2.3 07/03/2018 04:05 AM    PHOS 2.3 (L) 07/03/2018 04:05 AM         Ca/ Phos Lab Results   Component Value Date/Time    Calcium 7.9 (L) 07/03/2018 04:05 AM    Phosphorus 2.3 (L) 07/03/2018 04:05 AM       Mag    Lab Results   Component Value Date/Time    Magnesium 2.3 07/03/2018 04:05 AM      Tg No components found for: TGL   Albumin Lab Results   Component Value Date/Time    Protein, total 5.8 (L) 07/02/2018 05:30 AM    Albumin 2.8 (L) 07/02/2018 05:30 AM         GOAL:   Kcal requirements:    ~22 kcal/kg   Fluid requirements:    ~30 ml/kg   Macronutrients:   Protein     1.2 g/kg/day 300 kcal   Lipids      44 gm/day 396 kcal   Dextrose remainder of total kcal:  200 gm/day 680 kcal   Total Calories:    1376 kcal     Today's TPN formulation provides (50 % of Goal): Discussion w/ Dietary:  Enteral feeds w/  Vital High Protein @ 60ml/hr being tolerated well by pt. Dietary recommended decreasing TPN supplementation by ~50% today.   Calories:   717 kcal   Fluid: ~11.5 ml/kg   Protein:   0.61 gm/kg/day   Fat:    25 gm/day   CHO:    1.60 gm/kg/day   Sodium:   0 mEq   Potassium:   ~54 mEq   Calcium:   0 gm   Magnesium:   1 gm   Thiamine:   0 mg   OAC:                          40 mEq  PO4:                           9 mmol  Insulin (Reg):              5 units    TPN to be initiated at 50% goal macronutrients and titrated to goal per patient tolerance. Electrolytes to be monitored and adjusted daily. MD to replete electrolytes acutely outside of TPN as needed. Additional recommendations/Orders:    1. Corrective insulin coverage q6h while on TPN. 2. Change/decrease IVF to while on TPN-MD to further adjust as needed  3. BMP, Ca, Mag, Phos ordered daily  4. Triglycerides, Prealbumin, CMP, INR ordered upon initiation and weekly  5. GIR: 1.1 mg/kg/min (+ Enteral supplementation)  6. Electrolyte adjustments for today:                 - K decreased from 80 mEq to 40 mEq    - PO4 decreased from 24 mmol to 9 mmol    - Mag decreased from 1.5 gm to 1 gm    - Insulin added to TPN today: 5 units    Pharmacy to follow daily and will make changes based on labs/clinical status. Lizy Matute, Pharm. D.   Clinical Pharmacist  [phone]

## 2018-07-03 NOTE — CONSULTS
Infectious Disease Tele Medicine   Chart & labs reviewed  D/w Dr Lc Sahni / Dr Alina Cash / Dr Jaymie Romero on 7/2  Pt seen & examined via [id number] [facility] with  assistance of  patients nurse. No additional orders at this time . Please see orders from 7/2. Consult to follow .     Elizabet Núñez MD [address]

## 2018-07-03 NOTE — PROGRESS NOTES
Call received from [facility]. spoke with Roula Andrews wanted update on patient ,aware physicians still aiming to d/c pt on Friday,at this time LTAC bed still available,updates sent. Pyhsicians aware LTAC can cont same level of care needs for transitioning, LTAC services/criteria provided to  for review.

## 2018-07-03 NOTE — PROGRESS NOTES
Hospitalist Progress Note-critical care note     Patient: Mirella Newman MRN: [id number]  [facility]: [id number]    YOB: 1950  Age: 75 y.o. Sex: female    DOA: 6/4/2018 LOS:  LOS: 29 days            Chief complaint:   hyoerntremia. D/p ileostomy , peritonitis , sepsis , jess ,ovarian cancer, hypokalemia    Assessment/Plan         Hospital Problems  Date Reviewed: 6/5/2018          Codes Class Noted POA    Hypokalemia ICD-10-CM: E87.6  ICD-9-CM: 276.8  6/27/2018 Unknown        Hypernatremia ICD-10-CM: E87.0  ICD-9-CM: 276.0  6/25/2018 Unknown        S/P ileostomy ([facility] ICD-10-CM: Z93.2  ICD-9-CM: V44.2  6/16/2018 No        Hypoalbuminemia ICD-10-CM: E88.09  ICD-9-CM: 273.8  6/16/2018 Unknown        Peritonitis ([facility] ICD-10-CM: K65.9  ICD-9-CM: 567.9  6/16/2018 Unknown        Acute deep vein thrombosis (DVT) of lower extremity ([facility] ICD-10-CM: I82.409  ICD-9-CM: 453.40  6/7/2018 Yes        Acute respiratory failure ([facility] ICD-10-CM: J96.00  ICD-9-CM: 518.81  6/5/2018 No        JESS (acute kidney injury) ([facility] ICD-10-CM: N17.9  ICD-9-CM: 584.9  6/5/2018 Unknown        Metabolic acidosis OPX-[id number]: E87.2  ICD-9-CM: 276.2  6/5/2018 No        Abdominal pain ICD-10-CM: R10.9  ICD-9-CM: 789.00  6/4/2018 Yes        * (Principal)Sepsis ([facility] ICD-10-CM: A41.9  ICD-9-CM: 038.9, 995.91  6/4/2018 Yes        Oliguria ICD-10-CM: R34  ICD-9-CM: 788.5  6/4/2018 Yes        Ovarian ca [facility]) ICD-10-CM: C56.9  ICD-9-CM: 183.0  5/29/2018 Yes            Acute resp failure with hypoxia   Continue bipap at night or prn     - self extubated 06/17/2018.   - bronchoscopy 06/09/2018,   - resp cultures no growth to date. CTA chest negative for  PE         Severe Sepsis/shock    - secondary to peritonitis. Still fever . Case discussed with ID   Peritoneal fluid cx:klebsiella oxytoca. Continue iv abx  .micafungin , merrem. +vanc           Anasarca -  on albumin       Bleeding from BRANDON drain -resolved   protamine and FFP.   Acute bilateral peroneal DVT   - ivc filter placed   per dr. Corina New , no a candidate for anticoagulant due to bleeding   Anemia   Received transfusion, so far h/h stable        JESS   -atn ,  nephrology on board , cr wnl    Metabolic acidosis-resolved   Hypernatremia: improving  pharmacy to correct with TPN    Gi: tpn feeding        DM  lantus , ssi        Nurse: stable     Poor prognosis ,      Review of systems:  Unable to obtain due to refused to talk   Vital signs/Intake and Output:  Visit Vitals    /69    Pulse 82    Temp 98.3 °F (36.8 °C)    Resp 23    Ht 5' 1\" (1.549 m)    Wt 104 kg (229 lb 4.5 oz)    SpO2 98%    BMI 43.32 kg/m2     Current Shift:  07/03 0701 - 07/03 1900  In: -   Out: 450 [Urine:250]  Last three shifts:  07/01 1901 - 07/03 0700  In: 4447.2 [I.V.:3373.2]  Out: 9590 [Urine:6875; Drains:165]    Physical Exam:  General: WD, open eyes  , not in distress   HEENT: NC, Atraumatic. PERRLA, anicteric sclerae. bipap mask noted   Lungs: Coarse bilateral lungs   Heart:  Regular  rhythm,  + murmur, No Rubs, No Gallops  Abdomen: Soft, Non distended, Non tender.  +Bowel sounds, colostomy bag, wound vac noted   Extremities: No c/c. Edema   Psych:   Calm   Neurologic:  No new neuro deficit           Labs: Results:       Chemistry Recent Labs      07/02/18   0530  07/01/18   0515   GLU  130*  145*   NA  148*  150*   K  4.1  3.8   CL  114*  118*   CO2  21  22   BUN  36*  34*   CREA  0.76  0.99   CA  7.7*  7.4*   AGAP  13  10   BUCR  47*  34*   AP  95   --    TP  5.8*   --    ALB  2.8*   --    GLOB  3.0   --    AGRAT  0.9   --       CBC w/Diff Recent Labs      07/03/18   0405  07/02/18   0530  07/01/18   0515   WBC  10.7  8.8  7.5   RBC  3.55*  3.50*  3.26*   HGB  9.8*  9.6*  9.1*   HCT  31.8*  31.8*  29.8*   PLT  88*  95*  93*   GRANS  62  81*  86*   LYMPH  16*  8*  6*   EOS  4  7*  6*      Cardiac Enzymes No results for input(s): CPK, CKND1, FABIAN in the last 72 hours.     No lab exists for component: Sofiya Sims Coagulation No results for input(s): PTP, INR, APTT in the last 72 hours. No lab exists for component: INREXT, INREXT    Lipid Panel Lab Results   Component Value Date/Time    Triglyceride 66 06/26/2018 05:00 AM      BNP No results for input(s): BNPP in the last 72 hours.    Liver Enzymes Recent Labs      07/02/18   0530   TP  5.8*   ALB  2.8*   AP  95   SGOT  18      Thyroid Studies No results found for: T4, T3U, TSH, TSHEXT, TSHEXT     Procedures/imaging: see electronic medical records for all procedures/Xrays and details which were not copied into this note but were reviewed prior to creation of Tulio Daley MD

## 2018-07-03 NOTE — PROGRESS NOTES
Problem: Mobility Impaired (Adult and Pediatric)  Goal: *Acute Goals and Plan of Care (Insert Text)  Physical Therapy Goals  Updated 6/28/2018 and to be accomplished within 5-7 day(s)  1. Bed mobility: Rolling L to R to with max/mod A with use of HR for positioning. 2. Transfer: Supine to/from Sit with mod/max A with HR for change of position/prep for EOB sitting. 3. Activity Tolerance: Tolerate EOB sitting 5-10 minutes with fair UE supported balance for ADL/balance activities. 4. Patient will perform sit to stand with RW/maximal assistance and tolerate same x 15-30 seconds. 5. Ambulation:  Ambulate 5 ft. mod/max A with RW for increased functional mobility. Physical Therapy Goals  Updated 6/20/2018 and to be accomplished within 7 day(s)  1. Patient will move from supine to sit and sit to supine in bed with maximal assistance. 2.  Patient will transfer from bed to chair and chair to bed with maximal assistance using the least restrictive device. 3.  Patient will perform sit to stand with maximal assistance. 4.  Patient will ambulate with maximal assistance for 5 feet with the least restrictive device. Outcome: Not Met  Pt has not demonstrated skilled progress towards goals. Pt resistive to movements and demonstrated no active movement on command. Pt not following commands. Poor participation, poor motivation. Pt continues to maintain closed eyes throughout all sessions despite cues. Requires totalA for all bed mobility. Skilled PT has been attempted over the past 2 weeks and pt has made no progress. At this time pt will be discharged from skilled PT services. Please reorder if appropriateness for skilled PT services improves.

## 2018-07-03 NOTE — PROGRESS NOTES
Problem: Pressure Injury - Risk of  Goal: *Prevention of pressure injury  Document Oscar Scale and appropriate interventions in the flowsheet. Outcome: Progressing Towards Goal  Pressure Injury Interventions:  Sensory Interventions: Assess changes in LOC    Moisture Interventions: Absorbent underpads    Activity Interventions: Pressure redistribution bed/mattress(bed type)    Mobility Interventions: Pressure redistribution bed/mattress (bed type)    Nutrition Interventions: Document food/fluid/supplement intake    Friction and Shear Interventions: Lift sheet               Problem: Falls - Risk of  Goal: *Absence of Falls  Document Goyo Fall Risk and appropriate interventions in the flowsheet.    Outcome: Progressing Towards Goal  Fall Risk Interventions:  Mobility Interventions: Bed/chair exit alarm    Mentation Interventions: Adequate sleep, hydration, pain control, Bed/chair exit alarm, Door open when patient unattended    Medication Interventions: Evaluate medications/consider consulting pharmacy    Elimination Interventions: Call light in reach

## 2018-07-03 NOTE — PROGRESS NOTES
Admit date: 6/4/2018      Joelle North a 75 y. 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 Status post complete surgical resection with no evidence of residual disease. Typically patient would receive adjuvant chemotherapy for this diagnosis and we have discussed this however patient is not medically appropriate for chemotherapy at this time and will not be within 12 weeks of primary debulking. Patient is high risk of recurrence based on pathology (Clear Cell). Will follow patient closely for recurrence and develop treatment plan at that time depending on overall medical status. GI - Peritonitis resolved. Last drain removed 6/27/2018 and tip was cultured and NGTD. Post operative ileus resolved. Now tolerating TF. Good ileostomy output. Continue TPN for now. ID - Currently patient is on Meropenem D4/7, Vanc D12/14 and Micafungin D12/14. Zosyn 26 days completed 6/30/18, Levaquin 16 days completed 6/29/18, Flagyl 18 days complete 7/2/2018. Prior sepsis. Had clinically improved and was going to stop antibiotics for intra-abdominal sepsis once culture from tip of drain finalized no growth but patient developed fever. This happened just after patient had obstructive respiratory failure which required bag ventilation which raises the concern that fever is associated with aspiration  pneumonia.  Meropenem was initiated at that time, patient was already on Vanc and Flagyl and Micafungin and these were continued and Levaquin and Zosyn were stopped. I think we are clear from the intraabdominal process. The current potential sites of infection are the midline wound with vac in place, skin, and lungs. Wound vac was changed Friday and sharp debridement was performed. Wound vac replaced with intermittent irrigation of dilute Dakins solution. The wound vac is draining serosang no purulence. ID consulted. Renal - JESS, resolved and Nephrology has signed off. Heme - Thrombocytopenia, unclear cause. Patient is not receiving any Heparin. Hgb had been stable since last transfusion 6/29/18   DVT- bilateral peroneal DVT, full anticoagulation contraindicated. IVC filter was placed 6/28/2018 without issues. CV - Intermittently tachy. Episode of Afib this admission, converted to NSR. CHF, improved. HTN, on metoprolol and hydralazine. (probably could go up on Metoprolol). All managed by Intensivist/Hospitalist  Pulm - Currently stable on NC during the day and BIPAP. Managed by Intensivist  Psych - more interactive, seems depressed and have discussed starting antidepressant, will start this today. Disp - condition labile, continue ICU care, have been discussing LTAC but not ready for this yet  Code Status - Full Code      SUBJECTIVE  Patient is resting now with O2 per NC. Complains of abdominal pain. Getting IV Tylenol around the clock and IV Dilaudid prn. Ileostomy normal output. No complaints of nausea.       OBJECTIVE  Physical Exam:  Patient Vitals for the past 24 hrs:   BP Temp Pulse Resp SpO2 Weight   07/03/18 0700 123/67 - 76 21 97 % -   07/03/18 0610 142/85 - 98 - - -   07/03/18 0600 142/85 - 95 25 99 % -   07/03/18 0505 - - - - 99 % -   07/03/18 0500 136/73 - 93 21 100 % -   07/03/18 0400 140/83 - 91 20 100 % -   07/03/18 0300 130/75 - 88 25 100 % -   07/03/18 0200 122/67 - 86 19 99 % -   07/03/18 0100 130/77 - 91 21 98 % -   07/03/18 0030 - - - - 98 % -   07/03/18 0000 140/82 - 83 (!) 31 98 % -   07/02/18 2349 - 98.9 °F (37.2 °C) - - - -   07/02/18 2331 140/87 - (!) 101 - - -   07/02/18 2300 148/84 - 98 30 97 % -   07/02/18 2200 145/80 - 98 (!) 32 96 % -   07/02/18 2100 147/85 - (!) 101 30 97 % -   07/02/18 2000 144/81 - 94 30 96 % -   07/02/18 1900 145/85 - 92 (!) 31 97 % -   07/02/18 1800 142/74 - (!) 110 30 97 % -   07/02/18 1700 144/78 - (!) 121 29 98 % -   07/02/18 1631 - 99.4 °F (37.4 °C) - - - -   07/02/18 1600 142/78 99.4 °F (37.4 °C) (!) 111 22 98 % -   07/02/18 1400 134/75 - (!) 101 25 97 % -   07/02/18 1300 150/79 - (!) 106 28 94 % -   07/02/18 1230 138/81 (!) 100.6 °F (38.1 °C) (!) 102 (!) 31 95 % -   07/02/18 1221 - (!) 100.6 °F (38.1 °C) - - - -   07/02/18 1215 - - (!) 124 28 96 % -   07/02/18 1200 145/75 (!) 100.6 °F (38.1 °C) (!) 115 (!) 34 97 % -   07/02/18 1100 151/83 - (!) 124 29 97 % -   07/02/18 1000 148/81 - (!) 108 26 98 % -   07/02/18 0900 134/77 - (!) 106 24 98 % -   07/02/18 0800 134/69 (!) 100.8 °F (38.2 °C) (!) 105 23 97 % -   07/02/18 0759 - - - - - 104 kg (229 lb 4.5 oz)         Intake/Output Summary (Last 24 hours) at 07/03/18 0738  Last data filed at 07/03/18 0725   Gross per 24 hour   Intake          4297.17 ml   Output             8115 ml   Net         -3817.83 ml       Labs  CBC  Recent Labs      07/03/18   0405  07/02/18   0530  07/01/18   0515   WBC  10.7  8.8  7.5   HCT  31.8*  31.8*  29.8*   MCV  89.6  90.9  91.4   BANDS  17*   --    --    MONOS  1*  4  2*   EOS  4  7*  6*   BASOS  0  0  0        CMP  Recent Labs      07/02/18   0530  07/01/18   0515   NA  148*  150*   CO2  21  22   BUN  36*  34*        Lab Results   Component Value Date/Time    Glucose 130 (H) 07/02/2018 05:30 AM    Glucose (POC) 152 (H) 07/03/2018 06:05 AM    Glucose,  (H) 06/15/2018 05:14 PM          Current Hospital Medications    Current Facility-Administered Medications:     TPN ADULT - CENTRAL, , IntraVENous, CONTINUOUS, Jackie Jeffrey MD, Last Rate: 80 mL/hr at 07/02/18 1742    sodium hypochlorite (QUARTER STRENGTH DAKIN'S) 0.125% irrigation (bottle), , Topical, PRN, Bill Haskins MD    meropenem (MERREM) 1 g in sterile water (preservative free) 20 mL IV syringe, 1 g, IntraVENous, Q8H, Pollo Reveles MD, 1 g at 07/03/18 0242    vancomycin (VANCOCIN) 1500 mg in D5W 500 mL infusion, 1,500 mg, IntraVENous, Q24H, Pollo Reveles MD, Stopped at 07/02/18 1300    0.9% sodium chloride infusion 250 mL, 250 mL, IntraVENous, PRN, Bill Haskins MD    0.9% sodium chloride infusion, 25 mL/hr, IntraVENous, CONTINUOUS, Laverne Edwards MD, Last Rate: 25 mL/hr at 07/02/18 0756, 25 mL/hr at 07/02/18 0756    saliva stimulant (BIOTENE) 1 Spray, 1 Spray, Oral, PRN, Pollo Reveles MD, 1 Spray at 06/28/18 1108    HYDROmorphone (PF) (DILAUDID) injection 1 mg, 1 mg, IntraVENous, Q2H PRN, Bill Haskins MD, 1 mg at 07/03/18 0055    acetaminophen (OFIRMEV) infusion 1,000 mg, 1,000 mg, IntraVENous, Q6H, Pietro Forman MD, Last Rate: 400 mL/hr at 07/03/18 0518, 1,000 mg at 07/03/18 0518    metoprolol (LOPRESSOR) injection 5 mg, 5 mg, IntraVENous, Q6H, Pollo Reveles MD, 5 mg at 07/03/18 0610    hydrALAZINE (APRESOLINE) 20 mg/mL injection 10 mg, 10 mg, IntraVENous, Q4H PRN, Pollo Reveles MD, 10 mg at 06/26/18 2045    albumin human 25% (BUMINATE) solution 12.5 g, 12.5 g, IntraVENous, Q8H, Berenice Haas MD, 12.5 g at 07/03/18 0243    insulin glargine (LANTUS) injection 13 Units, 13 Units, SubCUTAneous, DAILY, Carri Waldron MD, 13 Units at 07/02/18 0814    micafungin (MYCAMINE) 100 mg in 0.9% sodium chloride (MBP/ADV) 100 mL, 100 mg, IntraVENous, Q24H, Pollo Reveles MD, Last Rate: 100 mL/hr at 07/02/18 1528, 100 mg at 07/02/18 1528    insulin lispro (HUMALOG) injection, , SubCUTAneous, Q6H, Mariola Olson MD, 2 Units at 07/03/18 0610    dextrose (D50W) injection syrg 12.5-25 g, 25-50 mL, IntraVENous, PRN, Mariola Olson MD    Vancomycin - Pharmacy to Dose, 1 Each, Other, Rx Dosing/Monitoring, Keely Auguste MD    ipratropium (ATROVENT) 0.02 % nebulizer solution 0.5 mg, 0.5 mg, Nebulization, Q4H PRN, Chadd Wesley MD, 0.5 mg at 06/29/18 1449    naloxone (NARCAN) injection 0.4 mg, 0.4 mg, IntraVENous, EVERY 2 MINUTES AS NEEDED, Keely Auguste MD    [facility]ron [facility]) injection 4 mg, 4 mg, IntraVENous, Q6H PRN, Frieda Mancilla MD, 4 mg at 06/08/18 2152    oxymetazoline (AFRIN) 0.05 % nasal spray 2 Spray, 2 Spray, Both Nostrils, BID PRN, Eusebio Dobbins MD    alum-mag hydroxide-simeth (MYLANTA) oral suspension 30 mL, 30 mL, Oral, Q4H PRN, Christen Abraham MD, 30 mL at 06/07/18 2225    fluticasone (FLONASE) 50 mcg/actuation nasal spray 2 Spray, 2 Spray, Both Nostrils, DAILY PRN, Sarah Madrid MD    ELECTROLYTE REPLACEMENT PROTOCOL-POTASSIUM Renal Dosing, 1 Each, Other, PRN, Christen Lee MD    ELECTROLYTE REPLACEMENT PROTOCOL-MAGNESIUM, 1 Each, Other, PRN, Christen Lee MD    ELECTROLYTE REPLACEMENT PROTOCOL-PHOSPHORUS Renal Dosing, 1 Each, Other, PRN, Christen Lee MD    ELECTROLYTE REPLACEMENT PROTOCOL-CALCIUM, 1 Each, Other, PRN, Christen Lee MD    sodium chloride (NS) flush 5-10 mL, 5-10 mL, IntraVENous, PRN, Juliet Horner MD    glucose chewable tablet 16 g, 4 Tab, Oral, PRN, Christen Lee MD    glucagon (GLUCAGEN) injection 1 mg, 1 mg, IntraMUSCular, PRN, Christen Lee MD    pantoprazole (PROTONIX) 40 mg in sodium chloride 0.9% 10 mL injection, 40 mg, IntraVENous, DAILY, Gabriele Alva MD, 40 mg at 07/02/18 Colby Miller MD

## 2018-07-03 NOTE — PROGRESS NOTES
@5856 pt taken over awake in bed eyes open spontaneously , follows commands but remains non interactive. Denies pain when asked remains on nasal cannula spo2 98%. Tube feeds continues via dobhoff, 0 residual observed. Wound vac remains in situ to abdomen. Illeostomy draining green fluid. Drain site draining small amount of serous sanguenous fluid. Fry remains in situ draining clear yellow urine. TPN in progress. Pt has a pink rash on arms chest abdomen and legs. Nursing assessment done and charted in appropriate flow sheets. Nursing management continues. @2130  No new developments. @2330 care continues , vital signs stable no changes. @0055 pt fl acc 7 analgesia administered and pt placed on BIPAP by RT.  @0110 pt denies pain care continues. @0300 pt asleep easily aroused vital signs fluctuates . Pt fl acc 0 and denies pain when awake. Observation continues. @0500 CHG bath completed care continues. @0700 no changes. @3273 Bedside and Verbal shift change report given to Vega Sumner (oncoming nurse) by Luigi Monique (offgoing nurse). Report included the following information SBAR, Kardex, Intake/Output, MAR, Recent Results and Med Rec Status.    Alarm parameters reviewed, on and audible Appropriate for patient clinical condition

## 2018-07-03 NOTE — PROGRESS NOTES
NUTRITION UPDATE    - Recc increasing tube feeds to goal rate and begin to titrate TPN down  - Recc Vital High Protein @ 60ml/hr to provide 1320kcal 116g PRO 1104ml H20 148g CHO 31g Fat  - Recc starting @ 15ml/hr and increasing by 5ml/hr Q6 until goal rate is met  - Check gastric residuals   - Will defer Free H20 to MD  - Will continue to check bowels, electrolytes, tolerance of tube feeds,       Chalo Wilkinson, DOUGLAS  PAGER:  [phone]

## 2018-07-03 NOTE — DIABETES MGMT
GLYCEMIC CONTROL PROGRESS NOTE:    -discussed in rounds, known h/o T2DM HbA1C within recommended range for age + comorbids on oral home regimen  -BG out of target range ICU: 140-180 mg/dL   -continues on TPN  80/hr + TF; TPN will decrease by 50% as TF increases by 50%   -TDD = 23 (TPN 10 regular insulin + 13 lantus )    Glucose Results:   Lab Results   Component Value Date/Time    GLUCPOC 152 (H) 07/03/2018 06:05 AM    GLUCPOC 146 (H) 07/02/2018 11:32 PM    GLUCPOC 126 (H) 07/02/2018 06:08 PM         Maria Luz Darby RN, MS  Glycemic Control Team  Pager [phone] (M-TH 8:30-5P)  *After Hours pager [phone]

## 2018-07-03 NOTE — PROGRESS NOTES
Pharmacy received an inquiry from Dr. Taylor Baxter this AM regarding Ms Mathew's continued worsening thrombocytopenia, to investigate whether any of her medications may be attributing to the cause. After a review of Ms Mathew's current medications and thorough discussion of Benefit vs Risk with regard to a potential causal relationship between her medications & her thrombocytopenia, it is our opinion that the relationship, while not impossible, is unlikely. Several of her current medications have an associated causal relationship to thrombocytopenia, although all are extremely low (usually <1%), therefore, the IDR Team felt that the clinical risk of her not receiving her medications such as vancomycin, meropenem, pantoprazole, etc (all of which carry some small thrombocytopenia risk) outweighed the continued completion of her current therapy. Jason Viveros, Pharm. D.   Clinical Pharmacist  [phone]

## 2018-07-04 ENCOUNTER — APPOINTMENT (OUTPATIENT)
Dept: CT IMAGING | Age: 68
DRG: 853 | End: 2018-07-04
Attending: INTERNAL MEDICINE
Payer: MEDICARE

## 2018-07-04 ENCOUNTER — APPOINTMENT (OUTPATIENT)
Dept: GENERAL RADIOLOGY | Age: 68
DRG: 853 | End: 2018-07-04
Attending: INTERNAL MEDICINE
Payer: MEDICARE

## 2018-07-04 LAB
ALBUMIN SERPL-MCNC: 2.4 G/DL (ref 3.4–5)
ALBUMIN/GLOB SERPL: 0.8 {RATIO} (ref 0.8–1.7)
ALP SERPL-CCNC: 130 U/L (ref 45–117)
ALT SERPL-CCNC: 16 U/L (ref 13–56)
ANION GAP SERPL CALC-SCNC: 10 MMOL/L (ref 3–18)
AST SERPL-CCNC: 26 U/L (ref 15–37)
BASOPHILS # BLD: 0 K/UL (ref 0–0.1)
BASOPHILS NFR BLD: 0 % (ref 0–3)
BILIRUB SERPL-MCNC: 0.9 MG/DL (ref 0.2–1)
BUN SERPL-MCNC: 36 MG/DL (ref 7–18)
BUN/CREAT SERPL: 51 (ref 12–20)
CA-I SERPL-SCNC: 1.07 MMOL/L (ref 1.12–1.32)
CA-I SERPL-SCNC: 1.09 MMOL/L (ref 1.12–1.32)
CA-I SERPL-SCNC: 1.15 MMOL/L (ref 1.12–1.32)
CALCIUM SERPL-MCNC: 7.4 MG/DL (ref 8.5–10.1)
CHLORIDE SERPL-SCNC: 113 MMOL/L (ref 100–108)
CO2 SERPL-SCNC: 23 MMOL/L (ref 21–32)
CREAT SERPL-MCNC: 0.7 MG/DL (ref 0.6–1.3)
DIFFERENTIAL METHOD BLD: ABNORMAL
EOSINOPHIL # BLD: 0.9 K/UL (ref 0–0.4)
EOSINOPHIL NFR BLD: 7 % (ref 0–5)
ERYTHROCYTE [DISTWIDTH] IN BLOOD BY AUTOMATED COUNT: 25 % (ref 11.6–14.5)
GLOBULIN SER CALC-MCNC: 3 G/DL (ref 2–4)
GLUCOSE BLD STRIP.AUTO-MCNC: 160 MG/DL (ref 70–110)
GLUCOSE BLD STRIP.AUTO-MCNC: 164 MG/DL (ref 70–110)
GLUCOSE BLD STRIP.AUTO-MCNC: 167 MG/DL (ref 70–110)
GLUCOSE BLD STRIP.AUTO-MCNC: 169 MG/DL (ref 70–110)
GLUCOSE SERPL-MCNC: 162 MG/DL (ref 74–99)
HCT VFR BLD AUTO: 33.6 % (ref 35–45)
HGB BLD-MCNC: 10.4 G/DL (ref 12–16)
LYMPHOCYTES # BLD: 1.9 K/UL (ref 0.8–3.5)
LYMPHOCYTES NFR BLD: 15 % (ref 20–51)
MAGNESIUM SERPL-MCNC: 1.9 MG/DL (ref 1.6–2.6)
MCH RBC QN AUTO: 27.7 PG (ref 24–34)
MCHC RBC AUTO-ENTMCNC: 31 G/DL (ref 31–37)
MCV RBC AUTO: 89.4 FL (ref 74–97)
MONOCYTES # BLD: 0.4 K/UL (ref 0–1)
MONOCYTES NFR BLD: 3 % (ref 2–9)
NEUTS BAND NFR BLD MANUAL: 17 % (ref 0–5)
NEUTS SEG # BLD: 7.4 K/UL (ref 1.8–8)
NEUTS SEG NFR BLD: 58 % (ref 42–75)
PHOSPHATE SERPL-MCNC: 2.3 MG/DL (ref 2.5–4.9)
PHOSPHATE SERPL-MCNC: 3.8 MG/DL (ref 2.5–4.9)
PLATELET # BLD AUTO: 100 K/UL (ref 135–420)
PLATELET COMMENTS,PCOM: ABNORMAL
POTASSIUM SERPL-SCNC: 4 MMOL/L (ref 3.5–5.5)
PROT SERPL-MCNC: 5.4 G/DL (ref 6.4–8.2)
RBC # BLD AUTO: 3.76 M/UL (ref 4.2–5.3)
RBC MORPH BLD: ABNORMAL
RBC MORPH BLD: ABNORMAL
SODIUM SERPL-SCNC: 146 MMOL/L (ref 136–145)
WBC # BLD AUTO: 12.7 K/UL (ref 4.6–13.2)

## 2018-07-04 PROCEDURE — 84100 ASSAY OF PHOSPHORUS: CPT | Performed by: INTERNAL MEDICINE

## 2018-07-04 PROCEDURE — 84100 ASSAY OF PHOSPHORUS: CPT | Performed by: HOSPITALIST

## 2018-07-04 PROCEDURE — 74011000250 HC RX REV CODE- 250: Performed by: INTERNAL MEDICINE

## 2018-07-04 PROCEDURE — 82330 ASSAY OF CALCIUM: CPT | Performed by: INTERNAL MEDICINE

## 2018-07-04 PROCEDURE — 74011250636 HC RX REV CODE- 250/636: Performed by: INTERNAL MEDICINE

## 2018-07-04 PROCEDURE — 74011250636 HC RX REV CODE- 250/636: Performed by: OBSTETRICS & GYNECOLOGY

## 2018-07-04 PROCEDURE — 74011000258 HC RX REV CODE- 258: Performed by: INTERNAL MEDICINE

## 2018-07-04 PROCEDURE — 85025 COMPLETE CBC W/AUTO DIFF WBC: CPT | Performed by: INTERNAL MEDICINE

## 2018-07-04 PROCEDURE — 71045 X-RAY EXAM CHEST 1 VIEW: CPT

## 2018-07-04 PROCEDURE — 82962 GLUCOSE BLOOD TEST: CPT

## 2018-07-04 PROCEDURE — 36592 COLLECT BLOOD FROM PICC: CPT

## 2018-07-04 PROCEDURE — 74011250636 HC RX REV CODE- 250/636: Performed by: HOSPITALIST

## 2018-07-04 PROCEDURE — 83735 ASSAY OF MAGNESIUM: CPT | Performed by: INTERNAL MEDICINE

## 2018-07-04 PROCEDURE — 65270000029 HC RM PRIVATE

## 2018-07-04 PROCEDURE — 77030011256 HC DRSG MEPILEX <16IN NO BORD MOLN -A

## 2018-07-04 PROCEDURE — 82330 ASSAY OF CALCIUM: CPT | Performed by: HOSPITALIST

## 2018-07-04 PROCEDURE — 74011636637 HC RX REV CODE- 636/637: Performed by: HOSPITALIST

## 2018-07-04 PROCEDURE — 70450 CT HEAD/BRAIN W/O DYE: CPT

## 2018-07-04 PROCEDURE — 74011250637 HC RX REV CODE- 250/637: Performed by: OBSTETRICS & GYNECOLOGY

## 2018-07-04 PROCEDURE — 94660 CPAP INITIATION&MGMT: CPT

## 2018-07-04 PROCEDURE — 74011250636 HC RX REV CODE- 250/636

## 2018-07-04 PROCEDURE — 74011250637 HC RX REV CODE- 250/637: Performed by: HOSPITALIST

## 2018-07-04 PROCEDURE — 74011636637 HC RX REV CODE- 636/637: Performed by: INTERNAL MEDICINE

## 2018-07-04 PROCEDURE — 82330 ASSAY OF CALCIUM: CPT | Performed by: SURGERY

## 2018-07-04 PROCEDURE — 80053 COMPREHEN METABOLIC PANEL: CPT | Performed by: INTERNAL MEDICINE

## 2018-07-04 PROCEDURE — C9113 INJ PANTOPRAZOLE SODIUM, VIA: HCPCS | Performed by: INTERNAL MEDICINE

## 2018-07-04 PROCEDURE — 74011000258 HC RX REV CODE- 258: Performed by: HOSPITALIST

## 2018-07-04 PROCEDURE — 77010033678 HC OXYGEN DAILY

## 2018-07-04 PROCEDURE — 77030018798 HC PMP KT ENTRL FED COVD -A

## 2018-07-04 RX ORDER — FUROSEMIDE 10 MG/ML
40 INJECTION INTRAMUSCULAR; INTRAVENOUS EVERY 12 HOURS
Status: DISCONTINUED | OUTPATIENT
Start: 2018-07-04 | End: 2018-07-05

## 2018-07-04 RX ORDER — FUROSEMIDE 10 MG/ML
INJECTION INTRAMUSCULAR; INTRAVENOUS
Status: COMPLETED
Start: 2018-07-04 | End: 2018-07-04

## 2018-07-04 RX ORDER — SODIUM,POTASSIUM PHOSPHATES 280-250MG
2 POWDER IN PACKET (EA) ORAL
Status: COMPLETED | OUTPATIENT
Start: 2018-07-04 | End: 2018-07-04

## 2018-07-04 RX ADMIN — FUROSEMIDE 40 MG: 10 INJECTION, SOLUTION INTRAMUSCULAR; INTRAVENOUS at 12:06

## 2018-07-04 RX ADMIN — CALCIUM GLUCONATE 2 G: 94 INJECTION, SOLUTION INTRAVENOUS at 05:14

## 2018-07-04 RX ADMIN — HYDROMORPHONE HYDROCHLORIDE 1 MG: 2 INJECTION, SOLUTION INTRAMUSCULAR; INTRAVENOUS; SUBCUTANEOUS at 23:42

## 2018-07-04 RX ADMIN — POTASSIUM & SODIUM PHOSPHATES POWDER PACK 280-160-250 MG 2 PACKET: 280-160-250 PACK at 05:16

## 2018-07-04 RX ADMIN — METOPROLOL TARTRATE 5 MG: 5 INJECTION INTRAVENOUS at 12:04

## 2018-07-04 RX ADMIN — HYDROMORPHONE HYDROCHLORIDE 1 MG: 2 INJECTION, SOLUTION INTRAMUSCULAR; INTRAVENOUS; SUBCUTANEOUS at 08:29

## 2018-07-04 RX ADMIN — MEROPENEM 1 G: 1 INJECTION, POWDER, FOR SOLUTION INTRAVENOUS at 02:56

## 2018-07-04 RX ADMIN — METOPROLOL TARTRATE 5 MG: 5 INJECTION INTRAVENOUS at 05:25

## 2018-07-04 RX ADMIN — INSULIN LISPRO 2 UNITS: 100 INJECTION, SOLUTION INTRAVENOUS; SUBCUTANEOUS at 05:22

## 2018-07-04 RX ADMIN — MAGNESIUM SULFATE HEPTAHYDRATE: 500 INJECTION, SOLUTION INTRAMUSCULAR; INTRAVENOUS at 18:36

## 2018-07-04 RX ADMIN — FUROSEMIDE 40 MG: 10 INJECTION, SOLUTION INTRAMUSCULAR; INTRAVENOUS at 21:21

## 2018-07-04 RX ADMIN — METOPROLOL TARTRATE 5 MG: 5 INJECTION INTRAVENOUS at 18:18

## 2018-07-04 RX ADMIN — METOPROLOL TARTRATE 5 MG: 5 INJECTION INTRAVENOUS at 23:41

## 2018-07-04 RX ADMIN — CALCIUM GLUCONATE 2 G: 94 INJECTION, SOLUTION INTRAVENOUS at 18:35

## 2018-07-04 RX ADMIN — MEROPENEM 1 G: 1 INJECTION, POWDER, FOR SOLUTION INTRAVENOUS at 18:18

## 2018-07-04 RX ADMIN — DILTIAZEM HYDROCHLORIDE 10 MG: 5 INJECTION INTRAVENOUS at 07:45

## 2018-07-04 RX ADMIN — MEROPENEM 1 G: 1 INJECTION, POWDER, FOR SOLUTION INTRAVENOUS at 12:03

## 2018-07-04 RX ADMIN — INSULIN GLARGINE 13 UNITS: 100 INJECTION, SOLUTION SUBCUTANEOUS at 08:29

## 2018-07-04 RX ADMIN — INSULIN LISPRO 2 UNITS: 100 INJECTION, SOLUTION INTRAVENOUS; SUBCUTANEOUS at 12:04

## 2018-07-04 RX ADMIN — VANCOMYCIN HYDROCHLORIDE 1500 MG: 10 INJECTION, POWDER, LYOPHILIZED, FOR SOLUTION INTRAVENOUS at 12:03

## 2018-07-04 RX ADMIN — SODIUM CHLORIDE 40 MG: 9 INJECTION INTRAMUSCULAR; INTRAVENOUS; SUBCUTANEOUS at 08:29

## 2018-07-04 RX ADMIN — HYDROMORPHONE HYDROCHLORIDE 1 MG: 2 INJECTION, SOLUTION INTRAMUSCULAR; INTRAVENOUS; SUBCUTANEOUS at 13:18

## 2018-07-04 RX ADMIN — INSULIN LISPRO 2 UNITS: 100 INJECTION, SOLUTION INTRAVENOUS; SUBCUTANEOUS at 18:18

## 2018-07-04 RX ADMIN — DILTIAZEM HYDROCHLORIDE 10 MG: 5 INJECTION INTRAVENOUS at 12:44

## 2018-07-04 RX ADMIN — DILTIAZEM HYDROCHLORIDE 10 MG: 5 INJECTION INTRAVENOUS at 00:36

## 2018-07-04 RX ADMIN — SERTRALINE HYDROCHLORIDE 50 MG: 50 TABLET ORAL at 08:29

## 2018-07-04 RX ADMIN — MICAFUNGIN SODIUM 100 MG: 20 INJECTION, POWDER, LYOPHILIZED, FOR SOLUTION INTRAVENOUS at 15:00

## 2018-07-04 RX ADMIN — DILTIAZEM HYDROCHLORIDE 10 MG: 5 INJECTION INTRAVENOUS at 18:35

## 2018-07-04 RX ADMIN — INSULIN LISPRO 2 UNITS: 100 INJECTION, SOLUTION INTRAVENOUS; SUBCUTANEOUS at 23:33

## 2018-07-04 NOTE — PROGRESS NOTES
Admit date: 6/4/2018      Myra pride 75 y. 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 Status post complete surgical resection with no evidence of residual disease. Typically patient would receive adjuvant chemotherapy for this diagnosis and we have discussed this however patient is not medically appropriate for chemotherapy at this time and will not be within 12 weeks of primary debulking. Patient is high risk of recurrence based on pathology (Clear Cell). Will follow patient closely for recurrence and develop treatment plan at that time depending on overall medical status. GI - Peritonitis resolved. Last drain removed 6/27/2018 and tip was cultured and NGTD. Post operative ileus resolved. Now tolerating TF. Good ileostomy output. Weaning TPN. ID - Currently patient is on Meropenem D5/7, Vanc D13/14 and Micafungin D13/14. Zosyn 26 days completed 6/30/18, Levaquin 16 days completed 6/29/18, Flagyl 18 days complete 7/2/2018. Prior sepsis. I think we are clear from the intraabdominal process. The current potential sites of infection are the midline wound with vac in place, skin, and lungs. Wound vac was changed Friday and sharp debridement was performed. Wound vac replaced with intermittent irrigation of dilute Dakins solution. The wound vac is draining serosang with fibrinous particulate matter consistent with chemical debridement. ID consulted.   Renal - JESS, resolved and Nephrology has signed off. Heme - Thrombocytopenia, unclear cause, stable. Hgb had been stable since last transfusion 6/29/18   DVT- bilateral peroneal DVT, full anticoagulation contraindicated. IVC filter was placed 6/28/2018 without issues. CV - Intermittently tachy. Episode of Afib this admission, converted to NSR. CHF, improved. HTN, on metoprolol and hydralazine. (probably could go up on Metoprolol). All managed by Intensivist/Hospitalist  Pulm - Currently stable on NC during the day and BIPAP. Managed by Intensivist  Psych - Depressed started Zoloft 7/3/18. Disp - condition labile, continue ICU care, have been discussing LTAC but not ready for this yet  Code Status - Full Code      SUBJECTIVE  Patient is resting now with O2 per NC. Complains of pain but will not localize. IV Dilaudid prn. Ileostomy normal output. No complaints of nausea. OBJECTIVE  Physical Exam:  General - Resting on O2 nc  HEENT - nml, skin breakdown from taped dobhoff  Heart - NSR  Abdomen - soft, mildly tender, nondistended, normal bowel sounds, ileostomy looks good, wound vac in place with no erythema, drainage bag in LLQ where drain was pulled much less output.   Incision - wound vac in place  Extremities - 2+ edema LE, weeping edema UE  Patient Vitals for the past 24 hrs:   BP Temp Pulse Resp SpO2   07/04/18 0718 - 99.8 °F (37.7 °C) - - -   07/04/18 0700 130/71 - 96 23 97 %   07/04/18 0600 135/79 - 99 19 97 %   07/04/18 0500 150/90 - (!) 112 19 96 %   07/04/18 0431 - - - - 97 %   07/04/18 0400 (!) 153/92 99.8 °F (37.7 °C) (!) 114 21 97 %   07/04/18 0300 (!) 153/93 - (!) 116 18 94 %   07/04/18 0200 146/87 - (!) 108 21 96 %   07/04/18 0100 154/84 - (!) 105 22 98 %   07/04/18 0001 - - - - 98 %   07/04/18 0000 (!) 159/92 - (!) 101 15 98 %   07/03/18 2345 (!) 157/99 - (!) 103 21 99 %   07/03/18 2335 (!) 175/103 - - - -   07/03/18 2300 (!) 170/103 - (!) 117 21 97 %   07/03/18 2257 - 98.2 °F (36.8 °C) - - -   07/03/18 2230 Rhonda Hawk ) 172/101 - (!) 112 22 98 %   07/03/18 2200 (!) 178/109 - 97 20 100 %   07/03/18 2159 (!) 178/109 - - - -   07/03/18 2100 (!) 162/95 - 91 17 -   07/03/18 2000 (!) 160/108 - 92 20 98 %   07/03/18 1900 (!) 155/91 98 °F (36.7 °C) 85 17 98 %   07/03/18 1800 (!) 159/95 - 90 12 91 %   07/03/18 1700 131/78 - (!) 106 17 96 %   07/03/18 1639 - 97.1 °F (36.2 °C) - - -   07/03/18 1600 122/82 - 90 16 93 %   07/03/18 1500 136/76 - 93 16 94 %   07/03/18 1400 (!) 160/98 - (!) 115 23 91 %   07/03/18 1300 150/87 - (!) 105 25 100 %   07/03/18 1200 (!) 149/95 98.9 °F (37.2 °C) 91 (!) 33 -   07/03/18 1100 142/84 - 84 30 100 %         Intake/Output Summary (Last 24 hours) at 07/04/18 0850  Last data filed at 07/04/18 0721   Gross per 24 hour   Intake          2029.08 ml   Output             4025 ml   Net         -1995.92 ml       Labs  CBC  Recent Labs      07/04/18   0400  07/03/18   0405  07/02/18   0530   WBC  12.7  10.7  8.8   HCT  33.6*  31.8*  31.8*   MCV  89.4  89.6  90.9   BANDS  17*  17*   --    MONOS  3  1*  4   EOS  7*  4  7*   BASOS  0  0  0        CMP  Recent Labs      07/04/18   0400  07/03/18   0405  07/02/18   0530   NA  146*  150*  148*   CO2  23  21  21   BUN  36*  36*  36*        Lab Results   Component Value Date/Time    Glucose 162 (H) 07/04/2018 04:00 AM    Glucose (POC) 164 (H) 07/04/2018 05:18 AM    Glucose,  (H) 06/15/2018 05:14 PM          Current Hospital Medications    Current Facility-Administered Medications:     sertraline (ZOLOFT) tablet 50 mg, 50 mg, Oral, DAILY, Pietro Forman MD, 50 mg at 07/04/18 5689    dilTIAZem (CARDIZEM) injection 10 mg, 10 mg, IntraVENous, Q6H, Christen Miranda MD, 10 mg at 07/04/18 0745    dextrose 5% infusion, 25 mL/hr, IntraVENous, CONTINUOUS, Christen Miranda MD, Last Rate: 25 mL/hr at 07/03/18 1332, 25 mL/hr at 07/03/18 1332    TPN ADULT - CENTRAL, , IntraVENous, CONTINUOUS, Christen Dowell Sa, MD, Last Rate: 50 mL/hr at 07/03/18 1736    sodium hypochlorite (QUARTER STRENGTH DAKIN'S) 0.125% irrigation (bottle), , Topical, PRN, Aviva Colvin MD    meropenem (MERREM) 1 g in sterile water (preservative free) 20 mL IV syringe, 1 g, IntraVENous, Q8H, Martine Clements MD, 1 g at 07/04/18 0256    vancomycin (VANCOCIN) 1500 mg in D5W 500 mL infusion, 1,500 mg, IntraVENous, Q24H, Martine Clements MD, 1,500 mg at 07/03/18 1431    0.9% sodium chloride infusion 250 mL, 250 mL, IntraVENous, PRN, Aviva Colvin MD    saliva stimulant (BIOTENE) 1 Spray, 1 Spray, Oral, PRN, Martine Clements MD, 1 Spray at 06/28/18 1108    HYDROmorphone (PF) (DILAUDID) injection 1 mg, 1 mg, IntraVENous, Q2H PRN, Aviva Colvin MD, 1 mg at 07/04/18 0829    metoprolol (LOPRESSOR) injection 5 mg, 5 mg, IntraVENous, Q6H, Martine Clements MD, 5 mg at 07/04/18 0525    hydrALAZINE (APRESOLINE) 20 mg/mL injection 10 mg, 10 mg, IntraVENous, Q4H PRN, Martine Clements MD, 10 mg at 07/03/18 2159    insulin glargine (LANTUS) injection 13 Units, 13 Units, SubCUTAneous, DAILY, Marcos Meadows MD, 13 Units at 07/04/18 0829    micafungin (MYCAMINE) 100 mg in 0.9% sodium chloride (MBP/ADV) 100 mL, 100 mg, IntraVENous, Q24H, Martine Clements MD, Last Rate: 100 mL/hr at 07/03/18 1431, 100 mg at 07/03/18 1431    insulin lispro (HUMALOG) injection, , SubCUTAneous, Q6H, Finn Andrews MD, 2 Units at 07/04/18 0522    dextrose (D50W) injection syrg 12.5-25 g, 25-50 mL, IntraVENous, PRN, Finn Andrews MD    Vancomycin - Pharmacy to Dose, 1 Each, Other, Rx Dosing/Monitoring, Finn Andrews MD    ipratropium (ATROVENT) 0.02 % nebulizer solution 0.5 mg, 0.5 mg, Nebulization, Q4H PRN, Domenic Gallego MD, 0.5 mg at 06/29/18 1449    naloxone (NARCAN) injection 0.4 mg, 0.4 mg, IntraVENous, EVERY 2 MINUTES AS NEEDED, Finn Andrews MD    ondansetron [facility]) injection 4 mg, 4 mg, IntraVENous, Q6H PRN, Robson Lomeli MD, 4 mg at 06/08/18 2585    oxymetazoline (AFRIN) 0.05 % nasal spray 2 Spray, 2 Spray, Both Nostrils, BID PRN, Jarad Alan MD    alum-mag hydroxide-[facility]) oral suspension 30 mL, 30 mL, Oral, Q4H PRN, Christen Dan MD, 30 mL at 06/07/18 5605    fluticasone (FLONASE) 50 mcg/actuation nasal spray 2 Spray, 2 Spray, Both Nostrils, DAILY PRN, Emily Stevenson MD    ELECTROLYTE REPLACEMENT PROTOCOL-POTASSIUM Renal Dosing, 1 Each, Other, PRN, Christen Calderon MD    ELECTROLYTE REPLACEMENT PROTOCOL-MAGNESIUM, 1 Each, Other, PRN, Christen Calderon MD    ELECTROLYTE REPLACEMENT PROTOCOL-PHOSPHORUS Renal Dosing, 1 Each, Other, PRN, Christen Calderon MD    ELECTROLYTE REPLACEMENT PROTOCOL-CALCIUM, 1 Each, Other, PRN, Christen Calderon MD    sodium chloride (NS) flush 5-10 mL, 5-10 mL, IntraVENous, PRN, Shae Guy MD    glucose chewable tablet 16 g, 4 Tab, Oral, PRN, Christen Calderon MD    glucagon (GLUCAGEN) injection 1 mg, 1 mg, IntraMUSCular, PRN, Christen Calderon MD    pantoprazole (PROTONIX) 40 mg in sodium chloride 0.9% 10 mL injection, 40 mg, IntraVENous, DAILY, Juan Orta MD, 40 mg at 07/04/18 6424      Emily Stevenson MD

## 2018-07-04 NOTE — PROGRESS NOTES
Bedside and Verbal shift change report given to Joaquina Baptiste, RN (oncoming nurse) by Macho Bullock RN (offgoing nurse). Report included the following information SBAR, Kardex, OR Summary, Procedure Summary, Intake/Output, Recent Results and Cardiac Rhythm NSR-ST.    0800: Assessment completed. Patient alert but not responding to verbal questions. Follows commands to squeeze hands and wiggle toes. NSR. Lungs sounds coarse on 3L NC. Belly obese with wound vac in place draining purulent drainage. Pouch in place over BRANDON drain site removal draining sanguinous drainage. Ileostomy in place with green/brown liquid stool. Fry draining gómez colored urine. Noted with +3 pitting and weeping edema in all extremetis. 0830: Brother at bedside. Dr. Wanda Coulter called for update on patient. States she will have an ID consult vial tele today. Also would like a full skin assessment to be sure there are no missed open areas possibly brewing an infection. Started patient on zoloft 50mg today. Also would like her ST and hypertension addressed to see what can be added to scheduled metoprolol. 1000: ID doctor available via tele. Assessed patient. Brother at bedside during this time. 1200: Reassessment completed. No changes. Dr. Nahid Donald to add 60mg lasix and wants to continue with vancomycin at a slower rate. Also added 10mg diltiazem IVP to medications. Fluids changed to D5% @ 25mL/hr. Brother and patient updated. 1330: Full skin assessment completed. No new open areas or wounds noted. 1430: Patient tolerating vancomycin running @ 125mL/hr at this time. 1530: Patient began to appear more red in face than earlier. Vancomycin slowed to 75mL/hr. Dr. Nahid Donald updated. Ordered to maintain vancomycin at slower speed and continue to monitor. 1600: Reassessment completed, no changes. Tube feeding increased to 20mL/hr per order. Residual and placement checked with auscultation. Residual of 15mL returned.  Patient tolerating feed well.     1700: New bag of TPN hung. Rate slowed from 80mL/hr to 50mL. Patient tolerating well.     1900: Patient continues to tolerate slower vancomycin rate. 1930: Bedside and Verbal shift change report given to Renetta Ryan RN (oncoming nurse) by Casimiro Sofia RN (offgoing nurse). Report included the following information SBAR, Procedure Summary, Intake/Output, Recent Results and Cardiac Rhythm NSR.

## 2018-07-04 NOTE — PROGRESS NOTES
Consult for TPN Dosing per Pharmacy by Dr. Deuce Ramires provided for this 75 y.o. female, for indication of Ileus  Day of Therapy 17     TPN Dosing Wt:  62.4 kg    Labs:  BMP BMP:   Lab Results   Component Value Date/Time     (H) 07/04/2018 04:00 AM    K 4.0 07/04/2018 04:00 AM     (H) 07/04/2018 04:00 AM    CO2 23 07/04/2018 04:00 AM    AGAP 10 07/04/2018 04:00 AM     (H) 07/04/2018 04:00 AM    BUN 36 (H) 07/04/2018 04:00 AM    CREA 0.70 07/04/2018 04:00 AM    GFRAA >60 07/04/2018 04:00 AM    GFRNA >60 07/04/2018 04:00 AM          CMP CMP:   Lab Results   Component Value Date/Time     (H) 07/04/2018 04:00 AM    K 4.0 07/04/2018 04:00 AM     (H) 07/04/2018 04:00 AM    CO2 23 07/04/2018 04:00 AM    AGAP 10 07/04/2018 04:00 AM     (H) 07/04/2018 04:00 AM    BUN 36 (H) 07/04/2018 04:00 AM    CREA 0.70 07/04/2018 04:00 AM    GFRAA >60 07/04/2018 04:00 AM    GFRNA >60 07/04/2018 04:00 AM    CA 7.4 (L) 07/04/2018 04:00 AM    MG 1.9 07/04/2018 04:00 AM    PHOS 2.3 (L) 07/04/2018 04:00 AM    ALB 2.4 (L) 07/04/2018 04:00 AM    TP 5.4 (L) 07/04/2018 04:00 AM    GLOB 3.0 07/04/2018 04:00 AM    AGRAT 0.8 07/04/2018 04:00 AM    SGOT 26 07/04/2018 04:00 AM    ALT 16 07/04/2018 04:00 AM         Ca/ Phos Lab Results   Component Value Date/Time    Calcium 7.4 (L) 07/04/2018 04:00 AM    Phosphorus 2.3 (L) 07/04/2018 04:00 AM       Mag    Lab Results   Component Value Date/Time    Magnesium 1.9 07/04/2018 04:00 AM      Tg No components found for: TGL   Albumin Lab Results   Component Value Date/Time    Protein, total 5.4 (L) 07/04/2018 04:00 AM    Albumin 2.4 (L) 07/04/2018 04:00 AM         GOAL:   Kcal requirements:    ~22 kcal/kg   Fluid requirements:    ~30 ml/kg   Macronutrients:   Protein     1.2 g/kg/day 300 kcal   Lipids      44 gm/day 396 kcal   Dextrose remainder of total kcal:  200 gm/day 680 kcal   Total Calories:    1376 kcal     Today's TPN formulation provides (~25 % of Goal):  Slow Weaning TPN. Discussion w/ Dietary:  Enteral feeds w/  Vital High Protein @ Goal ==> 60ml/hr. Current rate as of 10:00 7/4/18 ~35 ml/hr being tolerated well by pt., per RN. RN instruction is to increase Enteral x5 ml q6h, until goal;  RN stated low residuals, plan to continue to increase Enteral feeds on schedule. TPN decreased by 50% yesterday, decrease another 25% today ==> Plan for TPN D/C tomorrow 7/5/18 (pending successful Enteral goal). Calories:   ~349 kcal   Fluid:    ~5.77 ml/kg   Protein:   0.32 gm/kg/day   Fat:    11 gm/day   CHO:    0.80 gm/kg/day   Sodium:   0 mEq   Potassium:   ~33 mEq   Calcium:   0 gm   Magnesium:   1 gm   Thiamine:   0 mg   OAC:                           20 mEq  PO4:                            9 mmol  Insulin (Reg):               5 units    TPN to be decreased to 25% goal macronutrients. Electrolytes to be monitored and adjusted daily. MD to replete electrolytes acutely outside of TPN as needed. Additional recommendations/Orders:    1. Corrective insulin coverage q6h while on TPN. 2. Change/decrease IVF to while on TPN-MD to further adjust as needed  3. BMP, Ca, Mag, Phos ordered daily  4. Triglycerides, Prealbumin, CMP, INR ordered upon initiation and weekly  5. GIR: 0.6 mg/kg/min  6. Electrolyte adjustments for today:                 - K decreased from 54 mEq to 33 mEq    - OAC decreased from 40 mEq to 20 mEq    - Insulin added to TPN today: 5 units    Pharmacy to follow daily and will make changes based on labs/clinical status. Sylwia Drummond, Pharm. D.   Clinical Pharmacist  [phone] ([phone]) no apparent problem

## 2018-07-04 NOTE — PROGRESS NOTES
Problem: Pressure Injury - Risk of  Goal: *Prevention of pressure injury  Document Oscar Scale and appropriate interventions in the flowsheet. Outcome: Progressing Towards Goal  Pressure Injury Interventions:  Sensory Interventions: Keep linens dry and wrinkle-free, Float heels    Moisture Interventions: Check for incontinence Q2 hours and as needed, Contain wound drainage    Activity Interventions: PT/OT evaluation, Pressure redistribution bed/mattress(bed type)    Mobility Interventions: Float heels    Nutrition Interventions: Document food/fluid/supplement intake, Discuss nutritional consult with provider, Offer support with meals,snacks and hydration    Friction and Shear Interventions: Feet elevated on foot rest, Lift team/patient mobility team, Lift sheet               Problem: Falls - Risk of  Goal: *Absence of Falls  Document Goyo Fall Risk and appropriate interventions in the flowsheet.    Outcome: Progressing Towards Goal  Fall Risk Interventions:  Mobility Interventions: Bed/chair exit alarm    Mentation Interventions: Bed/chair exit alarm    Medication Interventions: Evaluate medications/consider consulting pharmacy, Bed/chair exit alarm    Elimination Interventions: Bed/chair exit alarm, Call light in reach, Toileting schedule/hourly rounds

## 2018-07-04 NOTE — PROGRESS NOTES
CIRA [address] [facility] Graciela [address]es [address] [facility] [address]  Phone [phone]   Fax [phone]    Pulmonary Critical Care & Sleep Medicine         Name: August Curran MRN: [id number]   : 1950 Hospital: [facility] [place]    Date: 2018        [facility] Note    IMPRESSION:   · Acute hypoxic respiratory failure, on NC, came off of BiPAP; multifactorial from any generalized weakness; aspiration pneumonia following episode of mucus plug , previously had other etiologies with pulmonary edema, aspiration pneumonitis. No central or segmental PE on recent CTA chest. Previously required ventilator support this admission due to sepsis, reintubated on 18 and self-extubated   · S/p bronchoscopy 18: no aspiration noted then. Cx Negative. · Severe sepsis due to Klebsiella Oxytoca peritonitis and then perforation. S/p laparotomy 18 [POD # 5 tumor radical debulking] and peritoneal lavage: Klebsiella positive. Ruled out ischemic bowel in laparotomy. S/p laparotomy on 6/15/18 for peritonitis due to diverticulitis with perforation, s/p distal ileostomy. · Leukocytosis, improved on micafungin/vanco. Now aspiration pneumonia; repeat blood and fungal cx in process. Abd drain tip culture negative final  · Off heparin for intraabdominal bleeding and CTA chest no central PE. INR reversed with protamin, FFP.-- S/P IVC fillter   · S/p Shock suspected from sepsis and obstructive due to presumed PE with severe hypoxia and high A-a gradient, elevated RVSP 34 mm Hg. CTA 18 negative for central or main PA PE. Shock resolved. CTA chest no central PE. · Acute b/l peroneal DVT, s/p IVC filter by vascular surgery 18.    · Anemia, s/p PRBC tx 18, no gross bleeding  · Thrombocytopenia, mild, no active bleeding  · JESS, improved  · Hypernatremia, worsened following Lasix- improvement slowly - managed per renal  · Elevated LFT, due to shock liver, resolved  · Foreign body on peritoneal biopsy, per path report. · Anasarca due to fluid resuscitation, JESS, hypoalbuminemia and sequale of sepsis. 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 · Poor overall prognosis. RECOMMENDATIONS:  -- ID eval appreciated wish to give vanco slowly, Yeast in fluid not sure if its skin or fluid culture on micofungin  -- Procalcitonin ordered  -- BP si better   -- DVT study of upper ext did not show any DVT  -- Good response to lasix will give one more dose  -- Na is better with lasix and D5 monitor  -- Head ct due to on and off AMS make sure no CVA due to profound Hypotension  -- DC flagyl as received >14days and meropenam has good anarobic corverage  -- ABD ct shows improvement in hematoma but with ongoing fever will need ID to weigh in as hematoma can get 2ndarily get infected  -- Pending  Blood fungal culture due to TPN   -- PLT is improving  -- Itching and rash per nursing might be due to bath and will try to avoid chk bath and see if still has asim like symptoms with vanco. Received vanco >14 days defer to ID but no MRSA and if it can be stopped   Respiratory: ABG reviewed at bedside; took off of BiPAP and placed on NC O2 @ 4 Lpm support - tolerating better on follow up- BiPAP 12/6 at night  low threshold for re-intubation. Keep SPO2 >=92%. Spoke with brother at the bedside. CXR in AM.  HOB 30 degree elevation all the time. Aggressive pulmonary toileting. Aspiration precautions. Judicious opiate pain medications. Periodic Lasix as needed   ID:  R sided pulmonary infiltrates. Follow up drain tip culture negative to date  Repeat blood and urine cultures pending  Bronch cx normal robin. Peritoneal cx Klebsiella Oxytoca resistant to ampicillin.   On micafungin/vanco. Continue Merrem started 6/30/18  Abx - Zosyn since 6/13 [day 18] stopped 6/30/18; levaquin since 6/17 [day 14] stopped 6/30/18. Stop  Flagyl since 6/15 [day 17]; iv vanco since 6/22, micafungin since 6/22  Hem: follow CBC; Keep Hb> 7 gm/dl. s/p IVC filter placement 6/28. Transfusion per surgery  CVS: Echo ok with mildly elevated RVSP but normal RV systolic function. AFib converted to NSR. Off of cardizem drip. -- On Metorprolol IV add cardizem iv every 6   Renal: Nephrologist seeing prn Creatinine improved. Lasix PRN because of hypernatremia. Stop albumin now as BP improved. Spoke with pharmacy to see if IVF with antibiotics could be changed to D5, but nationwide shortage of small bags of IVF to mix antibiotics but department will look around. GI/: TPN; barium study 6/21 - patient could not do it on 6/22 due to abd pain  Endocrine: Maintain blood glucose 140-180. Humalog sc. Insulin in TPN. on Lantus  Neurology: Dilaudid- balance post-op pain without sedation- recommend judicious use of sedatives  Pain/Sedation: dilaudid - management per Dr Clement Notice - judicious use  Skin/Wound: local surgical site care. Wound vac changed 6/29. Electrolytes: Replace electrolytes per ICU electrolyte replacement protocol, renal service. Nutrition: TPN started 6/18. NPO   Prophylaxis: GI Prophylaxis (protonix)  Restraints: none  Lines/Tubes:   ETT: 6/8/18- self extubated 6/17/18  Central line: right subclavian 6/4/18 - removed 6/20  Picc line 6/19 - Central line bundle followed  Fry: 6/4/18 (Medically necessary for strict input/output monitoring in critically ill patient, will remove it when not needed. Fry bundle followed).     Will defer respective systems problem management to primary and other respective consultant and follow patient in ICU with primary and other medical team.  Further recommendations will be based on the patient's response to recommended treatment and results of the investigation ordered. Quality Care: PPI, DVT prophylaxis, HOB elevated, Infection control all reviewed and addressed. Brother at bedside, updated: overall poor prognosis with patient at increased risk for further deterioration, prolonged hospitalization, nosocomial infections, failure to thrive. Patient has no children. Code status: Full code. PICC Line/Fry cath medically necessary  PT and OT on case    Fry bundle   Central line bundle followed   Weaning per protocol     Quality Care: PPI, DVT prophylaxis, HOB elevated, Infection control all reviewed and addressed. Events and notes from last 24 hours reviewed. Care plan discussed with nursing CC TIME:41min                                                     Subjective/History of Present Illness:   Patient is a 75 y.o. 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 7/4/18  This AM came off of BiPAP to O2 via NC based on RR, ABG, SPO2 and alertness. Drowsy but arousable breathing little fast  Fever is better  The patient denies cough, chest pain, dyspnea, wheezing or hemoptysis. No fever overnight. Persistent leg edema  Ileostomy without bleeding. On TPN  Hemodynamically stable  YEST ON FLUID  Spoke with family at length they are concerned about [facility] in room and spoke with nursing to call maintanance      Review of Systems   Review of systems not obtained due to patient factors. Surgeries  5/29/18 - Laparoscopy converted to laparotomy, total abdominal hysterectomy, bilateral salpingo-oophorectomy, omentectomy, bilateral pelvic and periaortic lymphadenectomy and radical tumor debulking to R0.    Path - ADENOCARCINOMA OF LEFT OVARY.     18 - Diagnostic laparoscopy converted to laparotomy, peritoneal biopsies, aerobic and anaerobic cultures of peritoneal fluid and peritoneal lavage. 6/15/18 - Wound exploration. Exploratory laparotomy. Lysis of adhesions. Ileostomy and wound VAC placement.   Had wound vac change . 1 unit of PRBC on   Had removal of BRANDON drain on 18, tip culture negative final          Current Facility-Administered Medications   Medication Dose Route Frequency    TPN ADULT - CENTRAL   IntraVENous CONTINUOUS    furosemide (LASIX) injection 40 mg  40 mg IntraVENous Q12H    sertraline (ZOLOFT) tablet 50 mg  50 mg Oral DAILY    dilTIAZem (CARDIZEM) injection 10 mg  10 mg IntraVENous Q6H    dextrose 5% infusion  25 mL/hr IntraVENous CONTINUOUS    TPN ADULT - CENTRAL   IntraVENous CONTINUOUS    meropenem (MERREM) 1 g in sterile water (preservative free) 20 mL IV syringe  1 g IntraVENous Q8H    vancomycin (VANCOCIN) 1500 mg in D5W 500 mL infusion  1,500 mg IntraVENous Q24H    metoprolol (LOPRESSOR) injection 5 mg  5 mg IntraVENous Q6H    insulin glargine (LANTUS) injection 13 Units  13 Units SubCUTAneous DAILY    micafungin (MYCAMINE) 100 mg in 0.9% sodium chloride (MBP/ADV) 100 mL  100 mg IntraVENous Q24H    insulin lispro (HUMALOG) injection   SubCUTAneous Q6H    Vancomycin - Pharmacy to Dose  1 Each Other Rx Dosing/Monitoring    pantoprazole (PROTONIX) 40 mg in sodium chloride 0.9% 10 mL injection  40 mg IntraVENous DAILY         Objective:   Vital Signs:    Visit Vitals    /89    Pulse (!) 105    Temp 99.8 °F (37.7 °C)    Resp 25    Ht 5' 1\" (1.549 m)    Wt 104 kg (229 lb 4.5 oz)    SpO2 92%    BMI 43.32 kg/m2       O2 Device: Nasal cannula   O2 Flow Rate (L/min): 3 l/min   Temp (24hrs), Av.6 °F (37 °C), Min:97.1 °F (36.2 °C), Max:99.8 °F (37.7 °C)       Intake/Output:   Last shift:       0701 -  1900  In: 0   Out: 700 [Urine:275; Drains:100]    Last 3 shifts: 07/02 1901 - 07/04 0700  In: 3637.1 [I.V.:2441.1]  Out: 6005 [Urine:3825; Drains:280]      Intake/Output Summary (Last 24 hours) at 07/04/18 1116  Last data filed at 07/04/18 0800   Gross per 24 hour   Intake          2029.08 ml   Output             4625 ml   Net         -2595.92 ml       Physical Exam:  General/Neurology: awake, following commands, improving looking, on O2 via NC @ 4 Lpm  Head:   Normocephalic, without obvious abnormality, atraumatic. Eye:   no scleral icterus, no pallor, no cyanosis. Pupils not dilated. Neck:   Symmetric. No lymphadenopathy. Trachea midline  Lung: Moderate equal air entry bilateral. Decreased breath sounds bases. No wheezing or crackles at bases. Heart:   S1 S2 present. No murmur. No JVD. Abdomen:  Soft. Obese. + BS. Midline lower abd surgical site - open wound with wound vac. Rt abd wall Ileostomy. No abd distension or guarding. Extremities:  No cyanosis. No clubbing. No hand edema. B/l feet/leg/thigh edema 2-3+. Pulses: Palpable radials and DPs bilateral.   Lymphatic:  No cervical or supraclav lymphadenopathy.    Skin:   No rash      Data:      CBC w/Diff Recent Labs      07/04/18   0400  07/03/18   0405  07/02/18   0530   WBC  12.7  10.7  8.8   RBC  3.76*  3.55*  3.50*   HGB  10.4*  9.8*  9.6*   HCT  33.6*  31.8*  31.8*   PLT  100*  88*  95*   GRANS  58  62  81*   LYMPH  15*  16*  8*   EOS  7*  4  7*        Chemistry Recent Labs      07/04/18   0400  07/03/18   0405  07/02/18   0530   GLU  162*  158*  130*   NA  146*  150*  148*   K  4.0  3.9  4.1   CL  113*  119*  114*   CO2  23  21  21   BUN  36*  36*  36*   CREA  0.70  0.62  0.76   CA  7.4*  7.9*  7.7*   MG  1.9  2.3  2.1   PHOS  2.3*  2.3*  2.4*   AGAP  10  10  13   BUCR  51*  58*  47*   AP  130*   --   95   TP  5.4*   --   5.8*   ALB  2.4*   --   2.8*   GLOB  3.0   --   3.0   AGRAT  0.8   --   0.9        Lactic Acid Lactic acid   Date Value Ref Range Status   06/17/2018 2.3 (HH) 0.4 - 2.0 MMOL/L Final     Comment:     CALLED TO AND CORRECTLY REPEATED BY:  Coreen Hammer RN ICU ON 6/17/2018 0904 TO [id number]       No results for input(s): LAC in the last 72 hours. Micro  Recent Labs      07/02/18   1319  07/02/18   1300   CULT  NO GROWTH 2 DAYS  FEW YEAST*     Recent Labs      07/02/18   1319  07/02/18   1300   CULT  NO GROWTH 2 DAYS  FEW YEAST*        ABG Recent Labs      07/03/18   0602  07/03/18   0556  07/02/18   1247  07/02/18   0527   PHI  7.450   --   7.423  7.427   PCO2I  29.9*   --   30.0*  29.9*   PO2I  83   --   76*  93   HCO3I  20.8*   --   19.6*  19.7*   FIO2I  0.32  0.32  32  0.28        Liver Enzymes Protein, total   Date Value Ref Range Status   07/04/2018 5.4 (L) 6.4 - 8.2 g/dL Final     Albumin   Date Value Ref Range Status   07/04/2018 2.4 (L) 3.4 - 5.0 g/dL Final     Globulin   Date Value Ref Range Status   07/04/2018 3.0 2.0 - 4.0 g/dL Final     A-G Ratio   Date Value Ref Range Status   07/04/2018 0.8 0.8 - 1.7   Final     AST (SGOT)   Date Value Ref Range Status   07/04/2018 26 15 - 37 U/L Final     Alk. phosphatase   Date Value Ref Range Status   07/04/2018 130 (H) 45 - 117 U/L Final     Recent Labs      07/04/18   0400  07/02/18   0530   TP  5.4*  5.8*   ALB  2.4*  2.8*   GLOB  3.0  3.0   AGRAT  0.8  0.9   SGOT  26  18   AP  130*  95        Cardiac Enzymes No results found for: CPK, CK, CKMMB, CKMB, RCK3, CKMBT, CKNDX, CKND1, FABIAN, TROPT, TROIQ, KAYLAN, TROPT, TNIPOC, BNP, BNPP     BNP No results found for: BNP, BNPP, XBNPT     Coagulation No results for input(s): PTP, INR, APTT in the last 72 hours.     No lab exists for component: INREXT, INREXT      Thyroid  No results found for: T4, T3U, TSH, TSHEXT, TSHEXT       Lipid Panel Lab Results   Component Value Date/Time    Triglyceride 117 07/03/2018 04:05 AM          Urinalysis Lab Results   Component Value Date/Time    Color DARK YELLOW 06/04/2018 08:42 AM    Appearance CLOUDY 06/04/2018 08:42 AM    Specific gravity 1.028 06/04/2018 08:42 AM    pH (UA) 5.0 06/04/2018 08:42 AM    Protein 30 (A) 06/04/2018 08:42 AM    Glucose NEGATIVE  06/04/2018 08:42 AM    Ketone TRACE (A) 06/04/2018 08:42 AM    Bilirubin SMALL (A) 06/04/2018 08:42 AM    Urobilinogen 1.0 06/04/2018 08:42 AM    Nitrites NEGATIVE  06/04/2018 08:42 AM    Leukocyte Esterase NEGATIVE  06/04/2018 08:42 AM    Epithelial cells FEW 06/04/2018 08:42 AM    Bacteria 1+ (A) 06/04/2018 08:42 AM    WBC 0 to 3 06/04/2018 08:42 AM    RBC 0 to 3 06/04/2018 08:42 AM        XR (Most Recent). CXR reviewed by me and compared with previous CXR   Results from Hospital Encounter encounter on 06/04/18   XR ABD PORT  1 V   Narrative Supine abdominal radiograph    Comparison: 6/4/2018    Indication: Small bowel feeding tube placement. Findings: Small bowel feeding tube tip overlies distal stomach, may be partially  protruding into the duodenal bulb. Paucity of bowel gas. IVC filter noted. Impression Impression: Small bowel feeding tube tip is in the distal stomach or possibly  protruding into the duodenal bulb. CT (Most Recent)   Results from Hospital Encounter encounter on 06/04/18   CT CHEST ABD PELV WO CONT   Narrative EXAM: CT of the chest, abdomen, and pelvis    INDICATION: Left ovarian clear cell adenocarcinoma. Peritonitis. Unable to  tolerate by mouth. COMPARISON: 6/22/2018    TECHNIQUE: Axial CT imaging of the chest, abdomen, and pelvis was performed  without intravenous contrast. Multiplanar reformats were generated. One or more  dose reduction techniques were used on this CT: automated exposure control,  adjustment of the mAs and/or kVp according to patient's size, and iterative  reconstruction techniques. The specific techniques utilized on this CT exam have  been documented in the patient's electronic medical record.    _______________    FINDINGS:    CHEST:    LUNGS: Motion artifact.  Significant passive atelectasis in the right greater  than left lower lobes. Scattered foci of groundglass density bilaterally likely  regions of microatelectasis. PLEURA: Normal, with no effusion or pneumothorax. AIRWAY: Normal.    MEDIASTINUM: Normal heart size. No pericardial fluid. PICC tip is near the  cavoatrial junction. LYMPH NODES: Prominent mediastinal and bilateral axillary lymph nodes. For  example, a right paratracheal node on image 17 measures 1.3 cm in short axis. Most of the nodes are subcentimeter. OTHER: None. ABDOMEN/PELVIS:    LIVER, BILIARY: Likely simple cysts anterior right hepatic lobe. Normal liver  contour. No biliary dilation. Gallbladder is unremarkable. SPLEEN: Normal.    PANCREAS: Normal.    ADRENALS: Normal.    KIDNEYS: Normal.    LYMPH NODES: No enlarged lymph nodes. GASTROINTESTINAL TRACT: No evidence of bowel obstruction. Right lower quadrant  ileostomy noted. VASCULATURE: IVC filter. PELVIC ORGANS: Fry catheter in the urinary bladder. Hysterectomy. BONES: No acute or aggressive osseous abnormalities identified. OTHER: Small volume ascites. Mixed density fluid collection with small foci of  gas in the left aspect of the pelvis measuring approximately 10.9 x 10.6 cm on  image 136, appearance favoring hematoma, previously 12.8 x 10 cm. Surgical  drains have been removed. Separate right pelvic sidewall collection measuring 8.5 x 3.3 cm similar to  prior. External iliac artery extends through this collection. Small hematoma around the ileostomy in the subcutaneous tissues similar to  prior. Diffuse edema of the subcutaneous fat. Open ventral wound. _______________         Impression IMPRESSION:    1. No evidence of bowel obstruction or other acute GI process. Right lower  quadrant ileostomy. 2. Small volume ascites and collections in the pelvis suggestive of  postoperative hematoma and seroma. Left pelvic hematoma component measures  slightly smaller than prior.     3. Regions of atelectasis in the lungs, most pronounced in the right lower lobe. EKG No results found for this or any previous visit. ECHO No results found for this or any previous visit. PFT No flowsheet data found. Other ASA reactivity:   Pre-albumin:   Ionized Calcium:   NH4:   T3, FT4:  Cortisol:  Urine Osm:  Urine Lytes:   HbA1c:                Echo 6/9/18:  Left ventricle: Systolic function was normal. Ejection fraction was estimated   in the range of 65 % to 70 %. No obvious  wall motion abnormalities identified in the views obtained. Wall thickness   was mildly increased. Doppler parameters  were consistent with abnormal left ventricular relaxation (grade 1 diastolic   dysfunction). Right ventricle: The size was normal. Systolic function was normal.  Mitral valve: There was mild annular calcification. Tricuspid valve: Pulmonary artery systolic pressure was mildly increased. Pulmonary artery systolic pressure: 34 mmHg. PVL LE 6/6/18: acute b/l peroneal DVT. CT abd pelvis 6/4/18:  1. Postsurgical hysterectomy, bilateral oophorectomy, pelvic lymphadenectomy and  a mastectomy surgical changes. Small volume of ascites in the abdomen and  pelvis, with a few tiny locules of gas in the right hemipelvis would be in  keeping with recent postsurgical etiology. 2. Abnormal edematous thick-walled small bowel loops in the left abdomen and  pelvis, with TRAM lamellar edematous configuration, induration. No findings of  pneumatosis. The overall appearance is nonspecific. Diagnostic considerations  include infectious etiology, nonspecific edema which may be unresolved  postsurgical etiology. Ischemia is also included in the differential diagnostic  consideration, however, there are no findings of pneumatosis, portal venous gas. 3. Stool in the right colon extending to the hepatic flexure with surrounding  gas, foci of pneumatosis could be obscured. No associated bowel wall thickening.   4. Satisfactory position of nasogastric tube. 5. Hepatomegaly, steatosis with 2 rounded low-density lesions, potentially  cysts. 6. Bibasilar atelectasis. Imaging:  [x]I have personally reviewed the patients chest radiographs images and report   7/1/18    Results from Hospital Encounter encounter on 06/04/18   XR ABD PORT  1 V   Narrative Supine abdominal radiograph    Comparison: 6/4/2018    Indication: Small bowel feeding tube placement. Findings: Small bowel feeding tube tip overlies distal stomach, may be partially  protruding into the duodenal bulb. Paucity of bowel gas. IVC filter noted. Impression Impression: Small bowel feeding tube tip is in the distal stomach or possibly  protruding into the duodenal bulb. 7/1/18  CXR  Frontal chest radiograph   Comparison: 6/30/2018   Indication: Right-sided pulmonary opacification follow-up.   Findings: Stable cardiomediastinal silhouette. Unchanged PICC placement. No visible pleural abnormality. Low lung volumes, more so on the right. Reticular opacification in the lower right lung with slightly less pronounced confluent appearance laterally. Left lung is clear. No acute osseous abnormality. IMPRESSION:    Persistent lower right lung opacification which appears predominantly reticular (less confluent appearance laterally). This could be infectious or atelectatic        Results from Hospital Encounter encounter on 06/04/18   CT CHEST ABD PELV WO CONT   Narrative EXAM: CT of the chest, abdomen, and pelvis    INDICATION: Left ovarian clear cell adenocarcinoma. Peritonitis. Unable to  tolerate by mouth. COMPARISON: 6/22/2018    TECHNIQUE: Axial CT imaging of the chest, abdomen, and pelvis was performed  without intravenous contrast. Multiplanar reformats were generated. One or more  dose reduction techniques were used on this CT: automated exposure control,  adjustment of the mAs and/or kVp according to patient's size, and iterative  reconstruction techniques.  The specific techniques utilized on this CT exam have  been documented in the patient's electronic medical record.    _______________    FINDINGS:    CHEST:    LUNGS: Motion artifact. Significant passive atelectasis in the right greater  than left lower lobes. Scattered foci of groundglass density bilaterally likely  regions of microatelectasis. PLEURA: Normal, with no effusion or pneumothorax. AIRWAY: Normal.    MEDIASTINUM: Normal heart size. No pericardial fluid. PICC tip is near the  cavoatrial junction. LYMPH NODES: Prominent mediastinal and bilateral axillary lymph nodes. For  example, a right paratracheal node on image 17 measures 1.3 cm in short axis. Most of the nodes are subcentimeter. OTHER: None. ABDOMEN/PELVIS:    LIVER, BILIARY: Likely simple cysts anterior right hepatic lobe. Normal liver  contour. No biliary dilation. Gallbladder is unremarkable. SPLEEN: Normal.    PANCREAS: Normal.    ADRENALS: Normal.    KIDNEYS: Normal.    LYMPH NODES: No enlarged lymph nodes. GASTROINTESTINAL TRACT: No evidence of bowel obstruction. Right lower quadrant  ileostomy noted. VASCULATURE: IVC filter. PELVIC ORGANS: Fry catheter in the urinary bladder. Hysterectomy. BONES: No acute or aggressive osseous abnormalities identified. OTHER: Small volume ascites. Mixed density fluid collection with small foci of  gas in the left aspect of the pelvis measuring approximately 10.9 x 10.6 cm on  image 136, appearance favoring hematoma, previously 12.8 x 10 cm. Surgical  drains have been removed. Separate right pelvic sidewall collection measuring 8.5 x 3.3 cm similar to  prior. External iliac artery extends through this collection. Small hematoma around the ileostomy in the subcutaneous tissues similar to  prior. Diffuse edema of the subcutaneous fat. Open ventral wound. _______________         Impression IMPRESSION:    1. No evidence of bowel obstruction or other acute GI process.  Right lower  quadrant ileostomy. 2. Small volume ascites and collections in the pelvis suggestive of  postoperative hematoma and seroma. Left pelvic hematoma component measures  slightly smaller than prior. 3. Regions of atelectasis in the lungs, most pronounced in the right lower lobe.                Maria M Hanson MD   7/4/2018

## 2018-07-04 NOTE — PROGRESS NOTES
Bedside and Verbal shift change report given to Mario Rod RN (oncoming nurse) by Lorin Curry RN (offgoing nurse). Report included the following information SBAR, Recent Results and Cardiac Rhythm NSR.    0800: Assessment completed. Alert and oriented to self. Does not respond when ask question on time, place, and situation. Nods appropriately when asked if in pain. Nods to pain rating of 10/10. Remains with rash over entire body and +3 weeping and pitting edema to all extremities. NSR on monitor with occasional PVC. SBP elevated in 140's. Lung sounds clear but diminished bilaterally. On 3L NC. Bowel sounds active. Ileostomy draining green/brown liquid stool. Pouch over BRANDON drain site draining serosanguinous fluid in small amount. Wound vac in place and draining serosanguineous drainage. Fry in place and draining gómez colored urine. 0830: Dr. Silvestre Prader in to see patient. No new orders. Mouth care completed due to patient's mouth being very dry with old dried secretions. Cleaned and moisturizer provided. Pain medication given. 0900: Dr. Mode Charlton in to see patient. Updated on care overnight. NNO.     0930: Tube feeding increased to 35mL/hr. 1000: Spoken with Pharmacist. Plans to decrease TPN more today due to enteral feeding increasing. If patient is at goal tomorrow and tolerating well, plans to d/c TPN. 1200: Reassessment completed, no changes from previous. Patient did have dried mucous stuck in the back of her mouth which was able to be retrieved with Jeni Prader. Mouth care completed on patient. Brothers at bedside. 1330: Patient given pain medication. States her pain is 9/10.     1445: Patient taken to CT. Returned to room. Brothers at bedside. 1600: Reassessment completed. No changes from previous. Patient continues resting comfortably. Tube feeding increased to 40mL/hr. Residual <10. Tolerating well.     1800: TPN decreased to 25mL/hr. Patient tolerating well.     1900:  Tape to nose for NG tube changed due to irritation from tape. Bedside and Verbal shift change report given to Lilliana Velez RN (oncoming nurse) by Ralph Akbar RN (offgoing nurse). Report included the following information Procedure Summary, Intake/Output, Recent Results and Cardiac Rhythm NSR.

## 2018-07-04 NOTE — PROGRESS NOTES
@9442 pt taken over awake alert in bed oriented to self , follows commands at times. BP and HR elevated, remains on tube feeds  Via Dobbhoff, wound vac remains in situ to midline surgical area, green fluid continues to drain from ileostomy . Fry in situ draining clear yellow urine, TPN continues via PICC . Assessment done and documented in appropriate flow sheets . Nursing management continues. @2046 pt fl acc 7 , pt moaning, repositioned no relief. Analgesia administered as prescribed. Nursing management continues. @2100 pt indicated that she is no longer in pain by nodding head when asked. Care continues. @2159 BP remains elevated , hydralazine administered as prescribed observation continues  @2300no new developments care continues. @0055 pt resting comfortably on bipap fl acc 0 vital signs continues to fluctuate medication administered as prescribed nursing management continues. @0230 no changes observation continues. @0430 pt awake denies pain when asked vital signs continues to fluctuate. Nursing care continues. @0600 pt had bath, due to red rash all over pt skin no CHG was used this am.   @0715 Bedside and Verbal shift change report given to Rock Rosado (oncoming nurse) by Christ Coelho (offgoing nurse). Report included the following information SBAR, Kardex, Intake/Output, MAR, Recent Results and Med Rec Status.    Alarm parameters reviewed, on and audible Appropriate for patient clinical condition

## 2018-07-04 NOTE — PROGRESS NOTES
TELE ID consult  TELE ID consultation  is done  by remote camera with patient . It is limited by absent clinical examination . If patient is unable to give history then Tele ID consult is accomplished by  talking to a family member /  care team and review of the  chart . This case was specially challenging as patient had prolonged stay since 6/4/18 & ID consult placed after> 30 days of hospital stay on 7/2.following multiple events & complicated stay . Pt was not communicative & did not interact on this Consultation. This Consultation is only a broad guideline & is very limited. If patient continues to require ID services  I recommend patient be seen by an  Infectious Disease service that is available for inpatient consultation. I  Date of Consultation:  July 3, 2018    Referring Physician: Dr Ayesha Rico:     Patient is a 75 y.o. female who is being seen for  sepsis     IMPRESSION:   1. Severe sepsis with intermittent fevers, acute respiratory failure, prolonged course since admission 6/4    2. 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 Laparoscopy converted to Laparotomy on 6/5 s/p peritoneal fluid washout , Fld culture + for pan sensitive Klebsiella oxytoca   4. S/p further abdominal surgery with lysis of adhesions, abdominal abscess washout , colorrhapy, enterectomy & ileostomy, wound vacc placement   5. Ongoing  Intermittent fevers, on broad spectrum antimicrobials   6. Ct abdomen pelvis from 7/2 indicates fluid collection R/pelvis, L/hematoma , both possible sources of infection & fever  7. B/l peroneal DVT could be contributory to fever  8. Pt has romero, Picc since 6/19 both possible sources of fever  9. Wound vacc site has no issues per Wound care note  10.  Site of Kartik Aurora has some drainage , appears sero sanguinous  11. Fld culture obtained on 7/2 + few yeast. Source ? If from drain not accurate . Pt anyway covered with micafungin  12. B/l pulmonary opacities, atelectasis may be contributory to fever , sepsis       PLAN:   1. Continue Vancomycin /  Meropenem , FlagylIV  2. BNP , if high lasix as needed  3. Repeat abdominal imaging intermittently  with po/IV contrast to evaluate R/& L collections  4. Continue antimicrobials untl collections resolve  5. Change lines, catheters if fever continues   6. Sputum for culture if possible  7. WC from woundsite if drainage appears purulent    8. BC, line cultures with next fever >100.5  9. This is only a broad ID guide to treatment & is very limited due to complexity of case,inability to do exam & provide face to face consultation in real time        This is a 79-year-old  female who was admitted by the GYN service  on 6/4. She came back to the hospital after surgery on 5/29. Pt had laparotomy doanvon total abdominal hysterectomy. She also had an oophorectomy, frozen pathology, paraaortic lymphadenectomy, radical tumor debulking, and a cystoscopy that was performed on May 5/29, . She came o the emergency room on 6/4  with increasing abdominal pain. She had been doing okay until she started having slight pain that worsened acutely and has subsequently been admitted to the intensive care unit. She has been seen by GYN as well as critical care. Surgery has been consulted as there was  concern for potential ischemic colitis. Abdominal compartment pressure has been checked in the ICU. Her urine output had also  been very poor  She had  extreme  abdominal pain and required a PCA pump.    Pt has had a long course s/p intubation, then extubation She underwent the following surgeries    6/5  DIAGNOSTIC LAPAROSCOPY CONVERTED TO LAPAROTOMY, PERITONEAL BIOPSIES, AEROBIC AND ANAEROBIC CULTURES OF PERITONEAL FLUID, PERITONEAL LAVAGE    Fld culture taken 6/5 grew out Klebsiella oxytoca 6/15  LYSIS OF ADHESIONS (41599), ABDOMINAL ABSCESS WASHOUT (61782), COLORRHAPHY (14961), ENTERECTOMY WITH END-LOOP ILEOSTOMY (03446), AND WOUND VAC PLACEMENT (24697)   BC have remained negative . WBC count now down to normal . Recent fever spikes since 6/29 . (100.5 ) ,6/28002.5, 7/2 100.6 Pt was on Vancomycin , Meropenem , Flagyl , Micafungin. S/p DC of Flagyl yesterday. S/p removal of drains per orders Wound care note wound vacc intact , no issues. Site of Lucille Lopez has some drainage, which is serosanguinous per RN . [address] culture taken , nurse not sure from which site, possibly from drainage     CT abdomen / pelvis( 7/01)  LUNGS: Motion artifact. Significant passive atelectasis in the right greater  than left lower lobes. Scattered foci of groundglass density bilaterally likely  regions of microatelectasis  Small volume ascites. Mixed density fluid collection with small foci of  gas in the left aspect of the pelvis measuring approximately 10.9 x 10.6 cm on  image 136, appearance favoring hematoma, previously 12.8 x 10 cm. Surgical  drains have been removed.     Separate right pelvic sidewall collection measuring 8.5 x 3.3 cm similar to  prior. External iliac artery extends through this collection.     Small hematoma around the ileostomy in the subcutaneous tissues similar to  prior.     Diffuse edema of the subcutaneous fat. Open ventral wound.   IMPRESSION:   1. No evidence of bowel obstruction or other acute GI process. Right lower  quadrant ileostomy.   2. Small volume ascites and collections in the pelvis suggestive of  postoperative hematoma and seroma. Left pelvic hematoma component measures  slightly smaller than prior.   3. Regions of atelectasis in the lungs, most pronounced in the right lower lobe.     Pt seen via Tele Medicine camera . Not interacting , will not look into camera . As per RN pt is very selective about whom she interacts with. .  Consultation & exam accomplished with assistance of RN.    Patient Active Problem List   Diagnosis Code    Ovarian ca ([facility] C56.9    Severe obesity (BMI 35.0-39.9) ([facility]) E66.01    Abdominal pain R10.9    Sepsis ([facility] A41.9    Oliguria R34    Acute respiratory failure ([facility] J96.00    JESS (acute kidney injury) ([facility] H55.8    Metabolic acidosis P77.4    Acute deep vein thrombosis (DVT) of lower extremity ([facility]) I82.409    S/P ileostomy ([facility] Z93.2    Hypoalbuminemia E88.09    Peritonitis ([facility] K65.9    Hypernatremia E87.0    Hypokalemia E87.6     Past Medical History:   Diagnosis Date    Diabetes ([facility]     many years type 2    Hypertension     many years      History reviewed. No pertinent family history. Social History   Substance Use Topics    Smoking status: Never Smoker    Smokeless tobacco: Never Used    Alcohol use No     Past Surgical History:   Procedure Laterality Date    HX OOPHORECTOMY  05/29/2018    HX TONSILLECTOMY      as a child       Prior to Admission medications    Medication Sig Start Date End Date Taking? Authorizing Provider   metFORMIN ER (GLUCOPHAGE XR) 500 mg tablet Take 2,000 mg by mouth daily (with dinner). Brand name only, pt reports allergy of cold sweats to the generic   Yes Historical Provider   fluticasone (FLONASE ALLERGY RELIEF) 50 mcg/actuation nasal spray 2 Sprays by Both Nostrils route daily as needed for Rhinitis. Yes Historical Provider   acetaminophen-codeine (TYLENOL #3) 300-30 mg per tablet Take 1 Tab by mouth every four (4) hours as needed for Pain. Max Daily Amount: 6 Tabs. 6/1/18  Yes Jamal Almanzar MD   HYDROmorphone (DILAUDID) 2 mg tablet Take 1 Tab by mouth every three (3) hours as needed. Max Daily Amount: 16 mg. 6/1/18  Yes Jamal Almanzar MD   acetaminophen (TYLENOL ARTHRITIS PAIN) 650 mg TbER Take 650 mg by mouth every eight (8) hours as needed. Yes Historical Provider   phenylephrine (NEOSYNEPHRINE) 1 % spry 2 Sprays by Both Nostrils route every six (6) hours as needed.  Pt uses several times every day for chronic nasal congestion    **pt pours phenylephrine into an AFRIN nasal spray bottle as she prefers this device**   Yes Historical Provider   amLODIPine (NORVASC) 10 mg tablet Take 10 mg by mouth daily. Yes Historical Provider   verapamil ER (VERELAN) 180 mg CR capsule Take 180 mg by mouth daily. Yes Historical Provider   atorvastatin (LIPITOR) 10 mg tablet Take 10 mg by mouth daily. Yes Historical Provider   losartan-hydroCHLOROthiazide (HYZAAR) 100-12.5 mg per tablet Take 1 Tab by mouth daily. Yes Historical Provider   Aspirin-Acetaminophen-Caffeine (GOODY'S EXTRA STRENGTH) 500-325-65 mg pwpk Take 2 Packets by mouth daily. Pt took daily for chronic HA until held for surgery in May 2018 then switched to tylenol arthritis daily    Historical Provider     Allergies   Allergen Reactions    Hydrocodone Other (comments)     Breaks into cold sweat    Metformin Other (comments)     Breaks out into cold sweat. Can Take Glucophage brand name med        Review of Systems:  Review of systems not obtained due to patient factors. Objective:   Blood pressure 142/77, pulse 94, temperature 97.9 °F (36.6 °C), resp. rate 17, height 5' 1\" (1.549 m), weight 222 lb 0.1 oz (100.7 kg), SpO2 97 %.   Temp (24hrs), Av.7 °F (37.1 °C), Min:97.9 °F (36.6 °C), Max:99.8 °F (37.7 °C)    Current Facility-Administered Medications   Medication Dose Route Frequency    TPN ADULT - CENTRAL   IntraVENous CONTINUOUS    furosemide (LASIX) injection 40 mg  40 mg IntraVENous Q12H    sertraline (ZOLOFT) tablet 50 mg  50 mg Oral DAILY    dilTIAZem (CARDIZEM) injection 10 mg  10 mg IntraVENous Q6H    dextrose 5% infusion  25 mL/hr IntraVENous CONTINUOUS    sodium hypochlorite (QUARTER STRENGTH DAKIN'S) 0.125% irrigation (bottle)   Topical PRN    meropenem (MERREM) 1 g in sterile water (preservative free) 20 mL IV syringe  1 g IntraVENous Q8H    vancomycin (VANCOCIN) 1500 mg in D5W 500 mL infusion  1,500 mg IntraVENous Q24H    0.9% sodium chloride infusion 250 mL  250 mL IntraVENous PRN    saliva stimulant (BIOTENE) 1 Spray  1 Spray Oral PRN    HYDROmorphone (PF) (DILAUDID) injection 1 mg  1 mg IntraVENous Q2H PRN    metoprolol (LOPRESSOR) injection 5 mg  5 mg IntraVENous Q6H    hydrALAZINE (APRESOLINE) 20 mg/mL injection 10 mg  10 mg IntraVENous Q4H PRN    insulin glargine (LANTUS) injection 13 Units  13 Units SubCUTAneous DAILY    micafungin (MYCAMINE) 100 mg in 0.9% sodium chloride (MBP/ADV) 100 mL  100 mg IntraVENous Q24H    insulin lispro (HUMALOG) injection   SubCUTAneous Q6H    dextrose (D50W) injection syrg 12.5-25 g  25-50 mL IntraVENous PRN    Vancomycin - Pharmacy to Dose  1 Each Other Rx Dosing/Monitoring    ipratropium (ATROVENT) 0.02 % nebulizer solution 0.5 mg  0.5 mg Nebulization Q4H PRN    naloxone (NARCAN) injection 0.4 mg  0.4 mg IntraVENous EVERY 2 MINUTES AS NEEDED    ondansetron (ZOFRAN) injection 4 mg  4 mg IntraVENous Q6H PRN    oxymetazoline (AFRIN) 0.05 % nasal spray 2 Spray  2 Spray Both Nostrils BID PRN    alum-mag hydroxide-simeth (MYLANTA) oral suspension 30 mL  30 mL Oral Q4H PRN    fluticasone (FLONASE) 50 mcg/actuation nasal spray 2 Spray  2 Spray Both Nostrils DAILY PRN    ELECTROLYTE REPLACEMENT PROTOCOL-POTASSIUM Renal Dosing  1 Each Other PRN    ELECTROLYTE REPLACEMENT PROTOCOL-MAGNESIUM  1 Each Other PRN    ELECTROLYTE REPLACEMENT PROTOCOL-PHOSPHORUS Renal Dosing  1 Each Other PRN    ELECTROLYTE REPLACEMENT PROTOCOL-CALCIUM  1 Each Other PRN    sodium chloride (NS) flush 5-10 mL  5-10 mL IntraVENous PRN    glucose chewable tablet 16 g  4 Tab Oral PRN    glucagon (GLUCAGEN) injection 1 mg  1 mg IntraMUSCular PRN    pantoprazole (PROTONIX) 40 mg in sodium chloride 0.9% 10 mL injection  40 mg IntraVENous DAILY        Exam:    General:  Awake, not  cooperative, non verbal, will not look toward camera   Eyes:  Sclera anicteric.  Pupils equally round reactive to light.   Mouth/Throat: Mucous membranes appear    Lungs:   Scattered rhonchi on  auscultation bilaterally per RN   CV:  Regular rate and rhythm,no murmur, per RN   Abdomen:   Obese, mildly distended, ileostomy +, wound vac+, wound-  Drainage +,sero sanguinous,  bowel sounds -ve   Extremities: LE  Edema+. Fry +     Data Review:   CBC:   Recent Labs      07/04/18   0400  07/03/18   0405  07/02/18   0530   WBC  12.7  10.7  8.8   RBC  3.76*  3.55*  3.50*   HGB  10.4*  9.8*  9.6*   HCT  33.6*  31.8*  31.8*   PLT  100*  88*  95*   GRANS  58  62  81*   LYMPH  15*  16*  8*   EOS  7*  4  7*     CMP:   Recent Labs      07/04/18 0400 07/03/18   0405  07/02/18   0530   GLU  162*  158*  130*   NA  146*  150*  148*   K  4.0  3.9  4.1   CL  113*  119*  114*   CO2  23  21  21   BUN  36*  36*  36*   CREA  0.70  0.62  0.76   CA  7.4*  7.9*  7.7*   AGAP  10  10  13   BUCR  51*  58*  47*   AP  130*   --   95   TP  5.4*   --   5.8*   ALB  2.4*   --   2.8*   GLOB  3.0   --   3.0   AGRAT  0.8   --   0.9       Lab Results   Component Value Date/Time    Culture result: NO GROWTH 2 DAYS 07/02/2018 01:19 PM    Culture result: FEW YEAST (A) 07/02/2018 01:00 PM    Culture result: NO GROWTH 3 DAYS 07/01/2018 04:40 AM    Culture result: NO GROWTH 2 DAYS 06/30/2018 04:28 PM    Culture result: NO GROWTH 4 DAYS 06/30/2018 01:16 PM          XR Results (most recent):    Results from Hospital Encounter encounter on 06/04/18   XR ABD PORT  1 V   Narrative Supine abdominal radiograph    Comparison: 6/4/2018    Indication: Small bowel feeding tube placement. Findings: Small bowel feeding tube tip overlies distal stomach, may be partially  protruding into the duodenal bulb. Paucity of bowel gas. IVC filter noted. Impression Impression: Small bowel feeding tube tip is in the distal stomach or possibly  protruding into the duodenal bulb. ICD-10-CM ICD-9-CM    1. Abdominal pain, generalized R10.84 789.07    2.  Lactic acid acidosis E87.2 276.2              Impression/Recommendation reviewed with Providers Dr Brad Dakins / David Priteo    Tele ID consultation id very  limited  & provides very broad guidelines. This case is very complex & patient has had prolonged complicated stay  . If pt continues to deteriorate recommendation would be to consult  ID Services that are available for face to facet patient care        Ellwood Apley MD FACP        Total time spent with direct face to face interview 27 minutes   Total time spent with Chart review, discussion with Provider/s , nurse in charge of patient.  93 minutes

## 2018-07-04 NOTE — PROGRESS NOTES
Hospitalist Progress Note    Patient: Chantal Cruz MRN: [id number]  CSN: [id number]    YOB: 1950  Age: 75 y.o. Sex: female    DOA: 6/4/2018 LOS:  LOS: 30 days          Chief Complaint:      Sepsis, ac resp failure    Assessment/Plan   76 yo WF admitted for sepsis, dx with ovarian ca prior to admission, had debulking surgery, came in with peritonitis and sepsis, multiple acute medical issues also      Acute resp failure with hypoxia -improved to wearing bipap at night, 02 via NC during day     - self extubated 06/17/2018.   - bronchoscopy 06/09/2018,     CTA chest negative for  PE     Severe Sepsis/shock    - secondary to peritonitis. Initial exp lap followed by surgery to debride and has 2 ostomies placed with large wound now with vac in place- ID on case-continue current antibiotics and mycafungin-fluid pos for yeast    Anasarca -  on albumin    Afib, tachycardia-IV cardizem q6H      Bleeding from BRANDON drain -resolved with protamine and FFP.     Acute bilateral peroneal DVT   - ivc filter placed  and no current plans for further anticoagulation  Ac blood loss anemia now stable      JESS-resolved    Metabolic acidosis-resolved   Hypokalemia resolved  Hypernatremia: improved    pharmacy to correct with TPN     Gi: weaning TPN feeds and advancing tube feeds with dobhoff    Wound care, wound vac  PT as tolerated  Mental status still depressed due to acute severe illness    DM  lantus , ssi        Poor prognosis, ICU level care needed      Disposition :  Patient Active Problem List   Diagnosis Code    Ovarian ca ([address] C56.9    Severe obesity (BMI 35.0-39.9) ([facility] E66.01    Abdominal pain R10.9    Sepsis ([address] A41.9    Oliguria R34    Acute respiratory failure ([address] J96.00    JESS (acute kidney injury) ([address] J79.4    Metabolic acidosis G89.6    Acute deep vein thrombosis (DVT) of lower extremity ([address] I82.409    S/P ileostomy ([address] Z93.2    Hypoalbuminemia E88.09    Peritonitis ([address] K65.9  Hypernatremia E87.0    Hypokalemia E87.6       Subjective:  TPN weaning per nurse  Tolerating TF's  On bipap at night, off to NC this am  No resp issues that are new  Patient weak and somnolent but awake, NAD      Review of systems:    Difficult to obtain from pt, little verbal interaction      Vital signs/Intake and Output:  Visit Vitals    /80    Pulse 92    Temp 99.8 °F (37.7 °C)    Resp 21    Ht 5' 1\" (1.549 m)    Wt 104 kg (229 lb 4.5 oz)    SpO2 98%    BMI 43.32 kg/m2     Current Shift:  07/04 0701 - 07/04 1900  In: 0   Out: 700 [Urine:275; Drains:100]  Last three shifts:  07/02 1901 - 07/04 0700  In: 3637.1 [I.V.:2441.1]  Out: 6005 [Urine:3825; Drains:280]    Exam:    General: weak frail WF, NAD, debilitated and ill, NG tube in  CVS:Regular rate and rhythm, no M/R/G, S1/S2 heard, no thrill  Lungs:Clear to auscultation bilaterally, no wheezes, rhonchi, or rales  Abdomen: midline lower wound, vac in place, bilateral ostomies of abd, diminished BS, NT  Extremities: 2 plus edema  Skin:normal texture and turgor, no rashes, no lesions  Neuro:grossly normal , follows commands  Psych:appropriate                Labs: Results:       Chemistry Recent Labs      07/04/18 0400 07/03/18 0405 07/02/18   0530   GLU  162*  158*  130*   NA  146*  150*  148*   K  4.0  3.9  4.1   CL  113*  119*  114*   CO2  23  21  21   BUN  36*  36*  36*   CREA  0.70  0.62  0.76   CA  7.4*  7.9*  7.7*   AGAP  10  10  13   BUCR  51*  58*  47*   AP  130*   --   95   TP  5.4*   --   5.8*   ALB  2.4*   --   2.8*   GLOB  3.0   --   3.0   AGRAT  0.8   --   0.9      CBC w/Diff Recent Labs      07/04/18 0400 07/03/18   0405  07/02/18   0530   WBC  12.7  10.7  8.8   RBC  3.76*  3.55*  3.50*   HGB  10.4*  9.8*  9.6*   HCT  33.6*  31.8*  31.8*   PLT  100*  88*  95*   GRANS  58  62  81*   LYMPH  15*  16*  8*   EOS  7*  4  7*      Cardiac Enzymes No results for input(s): CPK, CKND1, FABIAN in the last 72 hours.     No lab exists for component: CKRMB, TROIP   Coagulation No results for input(s): PTP, INR, APTT in the last 72 hours. No lab exists for component: INREXT    Lipid Panel Lab Results   Component Value Date/Time    Triglyceride 117 07/03/2018 04:05 AM      BNP No results for input(s): BNPP in the last 72 hours.    Liver Enzymes Recent Labs      07/04/18   0400   TP  5.4*   ALB  2.4*   AP  130*   SGOT  26      Thyroid Studies No results found for: T4, T3U, TSH, TSHEXT     Procedures/imaging: see electronic medical records for all procedures/Xrays and details which were not copied into this note but were reviewed prior to creation of Georgette MD Veena

## 2018-07-05 ENCOUNTER — APPOINTMENT (OUTPATIENT)
Dept: GENERAL RADIOLOGY | Age: 68
DRG: 853 | End: 2018-07-05
Attending: INTERNAL MEDICINE
Payer: MEDICARE

## 2018-07-05 LAB
ALBUMIN SERPL-MCNC: 2.1 G/DL (ref 3.4–5)
ALBUMIN/GLOB SERPL: 0.7 {RATIO} (ref 0.8–1.7)
ALP SERPL-CCNC: 123 U/L (ref 45–117)
ALT SERPL-CCNC: 18 U/L (ref 13–56)
ANION GAP SERPL CALC-SCNC: 8 MMOL/L (ref 3–18)
AST SERPL-CCNC: 23 U/L (ref 15–37)
BASOPHILS # BLD: 0.1 K/UL (ref 0–0.1)
BASOPHILS NFR BLD: 1 % (ref 0–3)
BILIRUB SERPL-MCNC: 0.8 MG/DL (ref 0.2–1)
BUN SERPL-MCNC: 44 MG/DL (ref 7–18)
BUN/CREAT SERPL: 59 (ref 12–20)
CA-I SERPL-SCNC: 1.16 MMOL/L (ref 1.12–1.32)
CALCIUM SERPL-MCNC: 7.8 MG/DL (ref 8.5–10.1)
CHLORIDE SERPL-SCNC: 112 MMOL/L (ref 100–108)
CO2 SERPL-SCNC: 26 MMOL/L (ref 21–32)
CREAT SERPL-MCNC: 0.75 MG/DL (ref 0.6–1.3)
DIFFERENTIAL METHOD BLD: ABNORMAL
EOSINOPHIL # BLD: 0.3 K/UL (ref 0–0.4)
EOSINOPHIL NFR BLD: 3 % (ref 0–5)
ERYTHROCYTE [DISTWIDTH] IN BLOOD BY AUTOMATED COUNT: 25.5 % (ref 11.6–14.5)
GLOBULIN SER CALC-MCNC: 3.2 G/DL (ref 2–4)
GLUCOSE BLD STRIP.AUTO-MCNC: 154 MG/DL (ref 70–110)
GLUCOSE BLD STRIP.AUTO-MCNC: 161 MG/DL (ref 70–110)
GLUCOSE BLD STRIP.AUTO-MCNC: 164 MG/DL (ref 70–110)
GLUCOSE BLD STRIP.AUTO-MCNC: 176 MG/DL (ref 70–110)
GLUCOSE SERPL-MCNC: 182 MG/DL (ref 74–99)
HCT VFR BLD AUTO: 34.3 % (ref 35–45)
HGB BLD-MCNC: 10.7 G/DL (ref 12–16)
LYMPHOCYTES # BLD: 2.5 K/UL (ref 0.8–3.5)
LYMPHOCYTES NFR BLD: 22 % (ref 20–51)
MAGNESIUM SERPL-MCNC: 1.9 MG/DL (ref 1.6–2.6)
MCH RBC QN AUTO: 27.9 PG (ref 24–34)
MCHC RBC AUTO-ENTMCNC: 31.2 G/DL (ref 31–37)
MCV RBC AUTO: 89.6 FL (ref 74–97)
MONOCYTES # BLD: 0.7 K/UL (ref 0–1)
MONOCYTES NFR BLD: 6 % (ref 2–9)
NEUTS BAND NFR BLD MANUAL: 6 % (ref 0–5)
NEUTS SEG # BLD: 7.1 K/UL (ref 1.8–8)
NEUTS SEG NFR BLD: 62 % (ref 42–75)
PHOSPHATE SERPL-MCNC: 2.3 MG/DL (ref 2.5–4.9)
PHOSPHATE SERPL-MCNC: 2.6 MG/DL (ref 2.5–4.9)
PHOSPHATE SERPL-MCNC: 2.6 MG/DL (ref 2.5–4.9)
PLATELET # BLD AUTO: 96 K/UL (ref 135–420)
PLATELET COMMENTS,PCOM: ABNORMAL
POTASSIUM SERPL-SCNC: 3.6 MMOL/L (ref 3.5–5.5)
POTASSIUM SERPL-SCNC: 3.8 MMOL/L (ref 3.5–5.5)
POTASSIUM SERPL-SCNC: 4.1 MMOL/L (ref 3.5–5.5)
PROCALCITONIN SERPL-MCNC: 0.72 NG/ML (ref 0–0.08)
PROT SERPL-MCNC: 5.3 G/DL (ref 6.4–8.2)
RBC # BLD AUTO: 3.83 M/UL (ref 4.2–5.3)
RBC MORPH BLD: ABNORMAL
RBC MORPH BLD: ABNORMAL
SODIUM SERPL-SCNC: 146 MMOL/L (ref 136–145)
WBC # BLD AUTO: 11.4 K/UL (ref 4.6–13.2)

## 2018-07-05 PROCEDURE — 84100 ASSAY OF PHOSPHORUS: CPT | Performed by: HOSPITALIST

## 2018-07-05 PROCEDURE — 74011250637 HC RX REV CODE- 250/637: Performed by: HOSPITALIST

## 2018-07-05 PROCEDURE — 74011636637 HC RX REV CODE- 636/637: Performed by: HOSPITALIST

## 2018-07-05 PROCEDURE — 77010033678 HC OXYGEN DAILY

## 2018-07-05 PROCEDURE — 65270000029 HC RM PRIVATE

## 2018-07-05 PROCEDURE — 82962 GLUCOSE BLOOD TEST: CPT

## 2018-07-05 PROCEDURE — 84132 ASSAY OF SERUM POTASSIUM: CPT | Performed by: INTERNAL MEDICINE

## 2018-07-05 PROCEDURE — 36592 COLLECT BLOOD FROM PICC: CPT

## 2018-07-05 PROCEDURE — 83735 ASSAY OF MAGNESIUM: CPT | Performed by: INTERNAL MEDICINE

## 2018-07-05 PROCEDURE — 94660 CPAP INITIATION&MGMT: CPT

## 2018-07-05 PROCEDURE — 74011250637 HC RX REV CODE- 250/637: Performed by: INTERNAL MEDICINE

## 2018-07-05 PROCEDURE — 74011000258 HC RX REV CODE- 258: Performed by: INTERNAL MEDICINE

## 2018-07-05 PROCEDURE — 74011250637 HC RX REV CODE- 250/637: Performed by: OBSTETRICS & GYNECOLOGY

## 2018-07-05 PROCEDURE — 74011000250 HC RX REV CODE- 250: Performed by: INTERNAL MEDICINE

## 2018-07-05 PROCEDURE — 84100 ASSAY OF PHOSPHORUS: CPT | Performed by: INTERNAL MEDICINE

## 2018-07-05 PROCEDURE — C9113 INJ PANTOPRAZOLE SODIUM, VIA: HCPCS | Performed by: INTERNAL MEDICINE

## 2018-07-05 PROCEDURE — 85025 COMPLETE CBC W/AUTO DIFF WBC: CPT | Performed by: INTERNAL MEDICINE

## 2018-07-05 PROCEDURE — 74011250636 HC RX REV CODE- 250/636: Performed by: INTERNAL MEDICINE

## 2018-07-05 PROCEDURE — 82330 ASSAY OF CALCIUM: CPT | Performed by: SURGERY

## 2018-07-05 PROCEDURE — 71045 X-RAY EXAM CHEST 1 VIEW: CPT

## 2018-07-05 PROCEDURE — 74011636637 HC RX REV CODE- 636/637: Performed by: INTERNAL MEDICINE

## 2018-07-05 PROCEDURE — 74011250636 HC RX REV CODE- 250/636: Performed by: OBSTETRICS & GYNECOLOGY

## 2018-07-05 RX ORDER — HYDROMORPHONE HYDROCHLORIDE 2 MG/ML
0.5 INJECTION, SOLUTION INTRAMUSCULAR; INTRAVENOUS; SUBCUTANEOUS
Status: DISCONTINUED | OUTPATIENT
Start: 2018-07-05 | End: 2018-07-11 | Stop reason: HOSPADM

## 2018-07-05 RX ORDER — POTASSIUM CHLORIDE 20MEQ/15ML
20 LIQUID (ML) ORAL
Status: COMPLETED | OUTPATIENT
Start: 2018-07-05 | End: 2018-07-05

## 2018-07-05 RX ORDER — POTASSIUM CHLORIDE 20MEQ/15ML
10 LIQUID (ML) ORAL
Status: COMPLETED | OUTPATIENT
Start: 2018-07-05 | End: 2018-07-05

## 2018-07-05 RX ORDER — FUROSEMIDE 10 MG/ML
40 INJECTION INTRAMUSCULAR; INTRAVENOUS DAILY
Status: DISCONTINUED | OUTPATIENT
Start: 2018-07-06 | End: 2018-07-11 | Stop reason: HOSPADM

## 2018-07-05 RX ORDER — SODIUM,POTASSIUM PHOSPHATES 280-250MG
1 POWDER IN PACKET (EA) ORAL
Status: COMPLETED | OUTPATIENT
Start: 2018-07-05 | End: 2018-07-05

## 2018-07-05 RX ADMIN — INSULIN GLARGINE 13 UNITS: 100 INJECTION, SOLUTION SUBCUTANEOUS at 09:59

## 2018-07-05 RX ADMIN — INSULIN LISPRO 2 UNITS: 100 INJECTION, SOLUTION INTRAVENOUS; SUBCUTANEOUS at 12:00

## 2018-07-05 RX ADMIN — POTASSIUM CHLORIDE 20 MEQ: 20 SOLUTION ORAL at 18:17

## 2018-07-05 RX ADMIN — HYDROMORPHONE HYDROCHLORIDE 1 MG: 2 INJECTION, SOLUTION INTRAMUSCULAR; INTRAVENOUS; SUBCUTANEOUS at 12:27

## 2018-07-05 RX ADMIN — METOPROLOL TARTRATE 5 MG: 5 INJECTION INTRAVENOUS at 05:31

## 2018-07-05 RX ADMIN — METOPROLOL TARTRATE 5 MG: 5 INJECTION INTRAVENOUS at 12:26

## 2018-07-05 RX ADMIN — MEROPENEM 1 G: 1 INJECTION, POWDER, FOR SOLUTION INTRAVENOUS at 03:16

## 2018-07-05 RX ADMIN — MICAFUNGIN SODIUM 100 MG: 20 INJECTION, POWDER, LYOPHILIZED, FOR SOLUTION INTRAVENOUS at 15:00

## 2018-07-05 RX ADMIN — SERTRALINE HYDROCHLORIDE 50 MG: 50 TABLET ORAL at 09:00

## 2018-07-05 RX ADMIN — METOPROLOL TARTRATE 5 MG: 5 INJECTION INTRAVENOUS at 23:56

## 2018-07-05 RX ADMIN — DEXTROSE MONOHYDRATE 25 ML/HR: 5 INJECTION, SOLUTION INTRAVENOUS at 10:30

## 2018-07-05 RX ADMIN — POTASSIUM & SODIUM PHOSPHATES POWDER PACK 280-160-250 MG 1 PACKET: 280-160-250 PACK at 04:48

## 2018-07-05 RX ADMIN — METOPROLOL TARTRATE 5 MG: 5 INJECTION INTRAVENOUS at 18:00

## 2018-07-05 RX ADMIN — INSULIN LISPRO 2 UNITS: 100 INJECTION, SOLUTION INTRAVENOUS; SUBCUTANEOUS at 23:54

## 2018-07-05 RX ADMIN — SODIUM CHLORIDE 40 MG: 9 INJECTION INTRAMUSCULAR; INTRAVENOUS; SUBCUTANEOUS at 10:01

## 2018-07-05 RX ADMIN — POTASSIUM & SODIUM PHOSPHATES POWDER PACK 280-160-250 MG 1 PACKET: 280-160-250 PACK at 18:16

## 2018-07-05 RX ADMIN — POTASSIUM CHLORIDE 10 MEQ: 20 SOLUTION ORAL at 04:48

## 2018-07-05 RX ADMIN — MEROPENEM 1 G: 1 INJECTION, POWDER, FOR SOLUTION INTRAVENOUS at 12:26

## 2018-07-05 RX ADMIN — POTASSIUM & SODIUM PHOSPHATES POWDER PACK 280-160-250 MG 1 PACKET: 280-160-250 PACK at 23:56

## 2018-07-05 RX ADMIN — INSULIN LISPRO 2 UNITS: 100 INJECTION, SOLUTION INTRAVENOUS; SUBCUTANEOUS at 05:32

## 2018-07-05 RX ADMIN — DILTIAZEM HYDROCHLORIDE 10 MG: 5 INJECTION INTRAVENOUS at 07:40

## 2018-07-05 RX ADMIN — VANCOMYCIN HYDROCHLORIDE 1500 MG: 10 INJECTION, POWDER, LYOPHILIZED, FOR SOLUTION INTRAVENOUS at 14:00

## 2018-07-05 RX ADMIN — FUROSEMIDE 40 MG: 10 INJECTION, SOLUTION INTRAMUSCULAR; INTRAVENOUS at 10:00

## 2018-07-05 RX ADMIN — DILTIAZEM HYDROCHLORIDE 10 MG: 5 INJECTION INTRAVENOUS at 20:39

## 2018-07-05 RX ADMIN — MEROPENEM 1 G: 1 INJECTION, POWDER, FOR SOLUTION INTRAVENOUS at 18:17

## 2018-07-05 RX ADMIN — DILTIAZEM HYDROCHLORIDE 10 MG: 5 INJECTION INTRAVENOUS at 01:33

## 2018-07-05 NOTE — PROGRESS NOTES
[address] [address] [facility] [address]  Phone [phone]   Fax [phone]    Pulmonary Critical Care & Sleep Medicine         Name: Austin Yo MRN: [id number]   : 1950 Hospital: [facility] [place]    Date: 2018        [facility]    IMPRESSION:   · Acute hypoxic respiratory failure, on NC, came off of BiPAP; multifactorial from any generalized weakness; aspiration pneumonia following episode of mucus plug , previously had other etiologies with pulmonary edema, aspiration pneumonitis. No central or segmental PE on recent CTA chest. Previously required ventilator support this admission due to sepsis, reintubated on 18 and self-extubated   · S/p bronchoscopy 18: no aspiration noted then. Cx Negative. · Severe sepsis due to Klebsiella Oxytoca peritonitis and then perforation. S/p laparotomy 18 [POD # 5 tumor radical debulking] and peritoneal lavage: Klebsiella positive. Ruled out ischemic bowel in laparotomy. S/p laparotomy on 6/15/18 for peritonitis due to diverticulitis with perforation, s/p distal ileostomy. · Leukocytosis, improved on micafungin/vanco. Now aspiration pneumonia; repeat blood and fungal cx in process. Abd drain tip culture negative final  · Off heparin for intraabdominal bleeding and CTA chest no central PE. INR reversed with protamin, FFP.-- S/P IVC fillter   · S/p Shock suspected from sepsis and obstructive due to presumed PE with severe hypoxia and high A-a gradient, elevated RVSP 34 mm Hg. CTA 18 negative for central or main PA PE. Shock resolved. CTA chest no central PE. · Acute b/l peroneal DVT, s/p IVC filter by vascular surgery 18.    · Anemia, s/p PRBC tx 18, no gross bleeding  · Thrombocytopenia, mild, no active bleeding  · JESS, improved  · Hypernatremia, worsened following Lasix- improvement slowly - managed per renal  · Elevated LFT, due to shock liver, resolved  · Foreign body on peritoneal biopsy, per path report. · Anasarca due to fluid resuscitation, JESS, hypoalbuminemia and sequale of sepsis. 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 · Poor overall prognosis. RECOMMENDATIONS:  -- ID eval appreciated duration of antibiotics to decide by ID  -- Procalcitonin ordered-- pending  -- DVT study of upper ext did not show any DVT  -- Change lasix to daily   -- Na is better with lasix and D5 monitor  -- Head ct due to on and off AMS-- Reported ok   -- Decrease diludid to see if that improves mentation  -- Family in discussion with casemanager for LTAC placement  -- DC flagyl as received >14days and meropenam has good anarobic corverage  -- ABD ct shows improvement in hematoma but with ongoing fever will need ID to weigh in as hematoma can get 2ndarily get infected  -- Pending  Blood fungal culture due to TPN -- Negative so far  -- TPN will be stopped on 7/5/18  -- PLT is improving  -- Itching and rash per nursing might be due to bath and will try to avoid chk bath and see if still has asim like symptoms with vanco. Received vanco >14 days defer to ID but no MRSA and if it can be stopped   Respiratory: ABG reviewed at bedside; took off of BiPAP and placed on NC O2 @ 3 Lpm support - tolerating better on follow up- BiPAP 12/6 at night  low threshold for re-intubation. Keep SPO2 >=92%. Spoke with brother at the bedside. Periodic CXR  HOB 30 degree elevation all the time. Aggressive pulmonary toileting. Aspiration precautions. Judicious opiate pain medications. ID:  R sided pulmonary infiltrates.  Follow up drain tip culture negative to date-- Improvement on xray with lasix noted  Repeat blood and urine cultures pending-- Negative so far  Bronch cx normal robin. Peritoneal cx Klebsiella Oxytoca resistant to ampicillin. On micafungin/vanco. Continue Merrem started 6/30/18  Abx - Zosyn since 6/13 [day 18] stopped 6/30/18; levaquin since 6/17 [day 14] stopped 6/30/18. Stop  Flagyl since 6/15 [day 17]; iv vanco since 6/22, micafungin since 6/22  Hem: follow CBC; Keep Hb> 7 gm/dl. s/p IVC filter placement 6/28. Transfusion per surgery  CVS: Echo ok with mildly elevated RVSP but normal RV systolic function. AFib converted to NSR. Off of cardizem drip. -- On Metorprolol IV add cardizem iv every 6   Renal: Nephrologist seeing prn Creatinine improved. Lasix PRN because of hypernatremia. Stop albumin now as BP improved. Spoke with pharmacy to see if IVF with antibiotics could be changed to D5, but nationwide shortage of small bags of IVF to mix antibiotics but department will look around. GI/: TPN; barium study 6/21 - Spoke with speech suggested patient is not cooperative to do studies to suggest po intake  Endocrine: Maintain blood glucose 140-180. Humalog sc. Insulin in TPN. on Lantus  Neurology: Dilaudid- balance post-op pain without sedation- recommend judicious use of sedatives  Pain/Sedation: dilaudid - management per Dr Alvaro Moore - judicious use  Skin/Wound: local surgical site care. Wound vac changed 6/29. Electrolytes: Replace electrolytes per ICU electrolyte replacement protocol, renal service. Nutrition: TPN started 6/18. NPO   Prophylaxis: GI Prophylaxis (protonix)  Restraints: none  Lines/Tubes:   ETT: 6/8/18- self extubated 6/17/18  Central line: right subclavian 6/4/18 - removed 6/20  Picc line 6/19 - Central line bundle followed  Fry: 6/4/18 (Medically necessary for strict input/output monitoring in critically ill patient, will remove it when not needed. Fry bundle followed).     Will defer respective systems problem management to primary and other respective consultant and follow patient in ICU with primary and other medical team.  Further recommendations will be based on the patient's response to recommended treatment and results of the investigation ordered. Quality Care: PPI, DVT prophylaxis, HOB elevated, Infection control all reviewed and addressed. Brother at bedside, updated: overall poor prognosis with patient at increased risk for further deterioration, prolonged hospitalization, nosocomial infections, failure to thrive. Patient has no children. Code status: Full code. PICC Line/Fry cath medically necessary  PT and OT on case    Fry bundle   Central line bundle followed   Weaning per protocol     Quality Care: PPI, DVT prophylaxis, HOB elevated, Infection control all reviewed and addressed. Events and notes from last 24 hours reviewed. Care plan discussed with nursing CC TIME:37min                                                     Subjective/History of Present Illness:   Patient is a 75 y.o. 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 7/5/18  This AM came off of BiPAP to O2 via NC based on RR, ABG, SPO2 and alertness. Drowsy but arousable breathing little fast  Fever is better  The patient denies cough, chest pain, dyspnea, wheezing or hemoptysis. No fever overnight. Persistent leg edema  Ileostomy without bleeding. On TPN  Hemodynamically stable  YEST ON FLUID-- As per gyn its taken from sac with skin contaminant and not concerned with it  Spoke with family at length   CXR continue to improve with lasix       Review of Systems   Review of systems not obtained due to patient factors.       Surgeries  5/29/18 - Laparoscopy converted to laparotomy, total abdominal hysterectomy, bilateral salpingo-oophorectomy, omentectomy, bilateral pelvic and periaortic lymphadenectomy and radical tumor debulking to R0. Path - ADENOCARCINOMA OF LEFT OVARY.     18 - Diagnostic laparoscopy converted to laparotomy, peritoneal biopsies, aerobic and anaerobic cultures of peritoneal fluid and peritoneal lavage. 6/15/18 - Wound exploration. Exploratory laparotomy. Lysis of adhesions. Ileostomy and wound VAC placement.   Had wound vac change . 1 unit of PRBC on   Had removal of BRANDON drain on 18, tip culture negative final          Current Facility-Administered Medications   Medication Dose Route Frequency    TPN ADULT - CENTRAL   IntraVENous CONTINUOUS    furosemide (LASIX) injection 40 mg  40 mg IntraVENous Q12H    sertraline (ZOLOFT) tablet 50 mg  50 mg Oral DAILY    dilTIAZem (CARDIZEM) injection 10 mg  10 mg IntraVENous Q6H    dextrose 5% infusion  25 mL/hr IntraVENous CONTINUOUS    meropenem (MERREM) 1 g in sterile water (preservative free) 20 mL IV syringe  1 g IntraVENous Q8H    vancomycin (VANCOCIN) 1500 mg in D5W 500 mL infusion  1,500 mg IntraVENous Q24H    metoprolol (LOPRESSOR) injection 5 mg  5 mg IntraVENous Q6H    insulin glargine (LANTUS) injection 13 Units  13 Units SubCUTAneous DAILY    micafungin (MYCAMINE) 100 mg in 0.9% sodium chloride (MBP/ADV) 100 mL  100 mg IntraVENous Q24H    insulin lispro (HUMALOG) injection   SubCUTAneous Q6H    Vancomycin - Pharmacy to Dose  1 Each Other Rx Dosing/Monitoring    pantoprazole (PROTONIX) 40 mg in sodium chloride 0.9% 10 mL injection  40 mg IntraVENous DAILY         Objective:   Vital Signs:    Visit Vitals    /88    Pulse (!) 113    Temp 97.4 °F (36.3 °C)    Resp 12    Ht 5' 1\" (1.549 m)    Wt 100.7 kg (222 lb 0.1 oz)    SpO2 98%    BMI 41.95 kg/m2       O2 Device: BIPAP   O2 Flow Rate (L/min): 3 l/min   Temp (24hrs), Av.6 °F (36.4 °C), Min:97.2 °F (36.2 °C), Max:97.9 °F (36.6 °C) Intake/Output:   Last shift:      07/05 0701 - 07/05 1900  In: 150   Out: 1500 [Urine:975; Drains:125]    Last 3 shifts: 07/03 1901 - 07/05 0700  In: 3653.8 [I.V.:1913.8]  Out: 7653 [Urine:4200; Drains:225]      Intake/Output Summary (Last 24 hours) at 07/05/18 1301  Last data filed at 07/05/18 1229   Gross per 24 hour   Intake          2660.92 ml   Output             5550 ml   Net         -2889.08 ml       Physical Exam:  General/Neurology: awake, following commands, improving looking, on O2 via NC @ 4 Lpm  Head:   Normocephalic, without obvious abnormality, atraumatic. Eye:   no scleral icterus, no pallor, no cyanosis. Pupils not dilated. Neck:   Symmetric. No lymphadenopathy. Trachea midline  Lung: Moderate equal air entry bilateral. Decreased breath sounds bases. No wheezing or crackles at bases. Heart:   S1 S2 present. No murmur. No JVD. Abdomen:  Soft. Obese. + BS. Midline lower abd surgical site - open wound with wound vac. Rt abd wall Ileostomy. No abd distension or guarding. Extremities:  No cyanosis. No clubbing. No hand edema. B/l feet/leg/thigh edema 2-3+. Pulses: Palpable radials and DPs bilateral.   Lymphatic:  No cervical or supraclav lymphadenopathy.    Skin:   No rash      Data:      CBC w/Diff Recent Labs      07/05/18   0410  07/04/18   0400  07/03/18   0405   WBC  11.4  12.7  10.7   RBC  3.83*  3.76*  3.55*   HGB  10.7*  10.4*  9.8*   HCT  34.3*  33.6*  31.8*   PLT  96*  100*  88*   GRANS  62  58  62   LYMPH  22  15*  16*   EOS  3  7*  4        Chemistry Recent Labs      07/05/18   0410  07/04/18   1050  07/04/18   0400  07/03/18   0405   GLU  182*   --   162*  158*   NA  146*   --   146*  150*   K  3.8   --   4.0  3.9   CL  112*   --   113*  119*   CO2  26   --   23  21   BUN  44*   --   36*  36*   CREA  0.75   --   0.70  0.62   CA  7.8*   --   7.4*  7.9*   MG  1.9   --   1.9  2.3   PHOS  2.6  3.8  2.3*  2.3*   AGAP  8   --   10  10   BUCR  59*   --   51*  58*   AP  123*   -- 130*   --    TP  5.3*   --   5.4*   --    ALB  2.1*   --   2.4*   --    GLOB  3.2   --   3.0   --    AGRAT  0.7*   --   0.8   --         Lactic Acid Lactic acid   Date Value Ref Range Status   06/17/2018 2.3 (HH) 0.4 - 2.0 MMOL/L Final     Comment:     CALLED TO AND CORRECTLY REPEATED BY:  Naya Manuel RN ICU ON 6/17/2018 0864 TO [id number]       No results for input(s): LAC in the last 72 hours. Micro  Recent Labs      07/02/18   1319   CULT  NO GROWTH 3 DAYS     Recent Labs      07/02/18   1319   CULT  NO GROWTH 3 DAYS        ABG Recent Labs      07/03/18   0602  07/03/18   0556   PHI  7.450   --    PCO2I  29.9*   --    PO2I  83   --    HCO3I  20.8*   --    FIO2I  0.32  0.32        Liver Enzymes Protein, total   Date Value Ref Range Status   07/05/2018 5.3 (L) 6.4 - 8.2 g/dL Final     Albumin   Date Value Ref Range Status   07/05/2018 2.1 (L) 3.4 - 5.0 g/dL Final     Globulin   Date Value Ref Range Status   07/05/2018 3.2 2.0 - 4.0 g/dL Final     A-G Ratio   Date Value Ref Range Status   07/05/2018 0.7 (L) 0.8 - 1.7   Final     AST (SGOT)   Date Value Ref Range Status   07/05/2018 23 15 - 37 U/L Final     Alk. phosphatase   Date Value Ref Range Status   07/05/2018 123 (H) 45 - 117 U/L Final     Recent Labs      07/05/18   0410  07/04/18   0400   TP  5.3*  5.4*   ALB  2.1*  2.4*   GLOB  3.2  3.0   AGRAT  0.7*  0.8   SGOT  23  26   AP  123*  130*        Cardiac Enzymes No results found for: CPK, CK, CKMMB, CKMB, RCK3, CKMBT, CKNDX, CKND1, FABIAN, TROPT, TROIQ, KAYLAN, TROPT, TNIPOC, BNP, BNPP     BNP No results found for: BNP, BNPP, XBNPT     Coagulation No results for input(s): PTP, INR, APTT in the last 72 hours.     No lab exists for component: INREXT, INREXT      Thyroid  No results found for: T4, T3U, TSH, TSHEXT, TSHEXT       Lipid Panel Lab Results   Component Value Date/Time    Triglyceride 117 07/03/2018 04:05 AM          Urinalysis Lab Results   Component Value Date/Time    Color DARK YELLOW 06/04/2018 08:42 AM Appearance CLOUDY 06/04/2018 08:42 AM    Specific gravity 1.028 06/04/2018 08:42 AM    pH (UA) 5.0 06/04/2018 08:42 AM    Protein 30 (A) 06/04/2018 08:42 AM    Glucose NEGATIVE  06/04/2018 08:42 AM    Ketone TRACE (A) 06/04/2018 08:42 AM    Bilirubin SMALL (A) 06/04/2018 08:42 AM    Urobilinogen 1.0 06/04/2018 08:42 AM    Nitrites NEGATIVE  06/04/2018 08:42 AM    Leukocyte Esterase NEGATIVE  06/04/2018 08:42 AM    Epithelial cells FEW 06/04/2018 08:42 AM    Bacteria 1+ (A) 06/04/2018 08:42 AM    WBC 0 to 3 06/04/2018 08:42 AM    RBC 0 to 3 06/04/2018 08:42 AM        XR (Most Recent). CXR reviewed by me and compared with previous CXR   Results from Hospital Encounter encounter on 06/04/18   XR CHEST PORT   Narrative Chest, single view    Indication: Right-sided infiltrate. Follow-up examination. Comparison: July 4, 2018    Findings:  Portable upright AP view of the chest was obtained. PICC line  projects over the superior cavoatrial junction. Small bowel feeding tube noted  below the diaphragm, tip automated from view. Improved aeration of the lung  bases noted from prior examination, with linear atelectatic opacity at the right  lung base noted. Improved interstitial opacities. Mild elevation of the right  hemidiaphragm unchanged. Cardiac size and mediastinal contours are stable. No  acute osseous abnormality. Impression Impression:    1. Right PICC line and visualized small bowel feeding tube as above. 2. Mild persistent right basilar atelectasis with improvement in the degree of  interstitial edema from prior exam.         CT (Most Recent)   Results from Hospital Encounter encounter on 06/04/18   CT CHEST ABD PELV WO CONT   Narrative EXAM: CT of the chest, abdomen, and pelvis    INDICATION: Left ovarian clear cell adenocarcinoma. Peritonitis. Unable to  tolerate by mouth.     COMPARISON: 6/22/2018    TECHNIQUE: Axial CT imaging of the chest, abdomen, and pelvis was performed  without intravenous contrast. Multiplanar reformats were generated. One or more  dose reduction techniques were used on this CT: automated exposure control,  adjustment of the mAs and/or kVp according to patient's size, and iterative  reconstruction techniques. The specific techniques utilized on this CT exam have  been documented in the patient's electronic medical record.    _______________    FINDINGS:    CHEST:    LUNGS: Motion artifact. Significant passive atelectasis in the right greater  than left lower lobes. Scattered foci of groundglass density bilaterally likely  regions of microatelectasis. PLEURA: Normal, with no effusion or pneumothorax. AIRWAY: Normal.    MEDIASTINUM: Normal heart size. No pericardial fluid. PICC tip is near the  cavoatrial junction. LYMPH NODES: Prominent mediastinal and bilateral axillary lymph nodes. For  example, a right paratracheal node on image 17 measures 1.3 cm in short axis. Most of the nodes are subcentimeter. OTHER: None. ABDOMEN/PELVIS:    LIVER, BILIARY: Likely simple cysts anterior right hepatic lobe. Normal liver  contour. No biliary dilation. Gallbladder is unremarkable. SPLEEN: Normal.    PANCREAS: Normal.    ADRENALS: Normal.    KIDNEYS: Normal.    LYMPH NODES: No enlarged lymph nodes. GASTROINTESTINAL TRACT: No evidence of bowel obstruction. Right lower quadrant  ileostomy noted. VASCULATURE: IVC filter. PELVIC ORGANS: Fry catheter in the urinary bladder. Hysterectomy. BONES: No acute or aggressive osseous abnormalities identified. OTHER: Small volume ascites. Mixed density fluid collection with small foci of  gas in the left aspect of the pelvis measuring approximately 10.9 x 10.6 cm on  image 136, appearance favoring hematoma, previously 12.8 x 10 cm. Surgical  drains have been removed. Separate right pelvic sidewall collection measuring 8.5 x 3.3 cm similar to  prior. External iliac artery extends through this collection.     Small hematoma around the ileostomy in the subcutaneous tissues similar to  prior. Diffuse edema of the subcutaneous fat. Open ventral wound. _______________         Impression IMPRESSION:    1. No evidence of bowel obstruction or other acute GI process. Right lower  quadrant ileostomy. 2. Small volume ascites and collections in the pelvis suggestive of  postoperative hematoma and seroma. Left pelvic hematoma component measures  slightly smaller than prior. 3. Regions of atelectasis in the lungs, most pronounced in the right lower lobe. EKG No results found for this or any previous visit. ECHO No results found for this or any previous visit. PFT No flowsheet data found. Other ASA reactivity:   Pre-albumin:   Ionized Calcium:   NH4:   T3, FT4:  Cortisol:  Urine Osm:  Urine Lytes:   HbA1c:                Echo 6/9/18:  Left ventricle: Systolic function was normal. Ejection fraction was estimated   in the range of 65 % to 70 %. No obvious  wall motion abnormalities identified in the views obtained. Wall thickness   was mildly increased. Doppler parameters  were consistent with abnormal left ventricular relaxation (grade 1 diastolic   dysfunction). Right ventricle: The size was normal. Systolic function was normal.  Mitral valve: There was mild annular calcification. Tricuspid valve: Pulmonary artery systolic pressure was mildly increased. Pulmonary artery systolic pressure: 34 mmHg. PVL LE 6/6/18: acute b/l peroneal DVT. CT abd pelvis 6/4/18:  1. Postsurgical hysterectomy, bilateral oophorectomy, pelvic lymphadenectomy and  a mastectomy surgical changes. Small volume of ascites in the abdomen and  pelvis, with a few tiny locules of gas in the right hemipelvis would be in  keeping with recent postsurgical etiology. 2. Abnormal edematous thick-walled small bowel loops in the left abdomen and  pelvis, with TRAM lamellar edematous configuration, induration.  No findings of  pneumatosis. The overall appearance is nonspecific. Diagnostic considerations  include infectious etiology, nonspecific edema which may be unresolved  postsurgical etiology. Ischemia is also included in the differential diagnostic  consideration, however, there are no findings of pneumatosis, portal venous gas. 3. Stool in the right colon extending to the hepatic flexure with surrounding  gas, foci of pneumatosis could be obscured. No associated bowel wall thickening. 4. Satisfactory position of nasogastric tube. 5. Hepatomegaly, steatosis with 2 rounded low-density lesions, potentially  cysts. 6. Bibasilar atelectasis. Imaging:  [x]I have personally reviewed the patients chest radiographs images and report   7/1/18    Results from Hospital Encounter encounter on 06/04/18   XR CHEST PORT   Narrative Chest, single view    Indication: Right-sided infiltrate. Follow-up examination. Comparison: July 4, 2018    Findings:  Portable upright AP view of the chest was obtained. PICC line  projects over the superior cavoatrial junction. Small bowel feeding tube noted  below the diaphragm, tip automated from view. Improved aeration of the lung  bases noted from prior examination, with linear atelectatic opacity at the right  lung base noted. Improved interstitial opacities. Mild elevation of the right  hemidiaphragm unchanged. Cardiac size and mediastinal contours are stable. No  acute osseous abnormality. Impression Impression:    1. Right PICC line and visualized small bowel feeding tube as above. 2. Mild persistent right basilar atelectasis with improvement in the degree of  interstitial edema from prior exam.      7/1/18  CXR  Frontal chest radiograph   Comparison: 6/30/2018   Indication: Right-sided pulmonary opacification follow-up.   Findings: Stable cardiomediastinal silhouette. Unchanged PICC placement. No visible pleural abnormality. Low lung volumes, more so on the right.  Reticular opacification in the lower right lung with slightly less pronounced confluent appearance laterally. Left lung is clear. No acute osseous abnormality. IMPRESSION:    Persistent lower right lung opacification which appears predominantly reticular (less confluent appearance laterally). This could be infectious or atelectatic        Results from Hospital Encounter encounter on 06/04/18   CT CHEST ABD PELV WO CONT   Narrative EXAM: CT of the chest, abdomen, and pelvis    INDICATION: Left ovarian clear cell adenocarcinoma. Peritonitis. Unable to  tolerate by mouth. COMPARISON: 6/22/2018    TECHNIQUE: Axial CT imaging of the chest, abdomen, and pelvis was performed  without intravenous contrast. Multiplanar reformats were generated. One or more  dose reduction techniques were used on this CT: automated exposure control,  adjustment of the mAs and/or kVp according to patient's size, and iterative  reconstruction techniques. The specific techniques utilized on this CT exam have  been documented in the patient's electronic medical record.    _______________    FINDINGS:    CHEST:    LUNGS: Motion artifact. Significant passive atelectasis in the right greater  than left lower lobes. Scattered foci of groundglass density bilaterally likely  regions of microatelectasis. PLEURA: Normal, with no effusion or pneumothorax. AIRWAY: Normal.    MEDIASTINUM: Normal heart size. No pericardial fluid. PICC tip is near the  cavoatrial junction. LYMPH NODES: Prominent mediastinal and bilateral axillary lymph nodes. For  example, a right paratracheal node on image 17 measures 1.3 cm in short axis. Most of the nodes are subcentimeter. OTHER: None. ABDOMEN/PELVIS:    LIVER, BILIARY: Likely simple cysts anterior right hepatic lobe. Normal liver  contour. No biliary dilation. Gallbladder is unremarkable.     SPLEEN: Normal.    PANCREAS: Normal.    ADRENALS: Normal.    KIDNEYS: Normal.    LYMPH NODES: No enlarged lymph nodes.    GASTROINTESTINAL TRACT: No evidence of bowel obstruction. Right lower quadrant  ileostomy noted. VASCULATURE: IVC filter. PELVIC ORGANS: Fry catheter in the urinary bladder. Hysterectomy. BONES: No acute or aggressive osseous abnormalities identified. OTHER: Small volume ascites. Mixed density fluid collection with small foci of  gas in the left aspect of the pelvis measuring approximately 10.9 x 10.6 cm on  image 136, appearance favoring hematoma, previously 12.8 x 10 cm. Surgical  drains have been removed. Separate right pelvic sidewall collection measuring 8.5 x 3.3 cm similar to  prior. External iliac artery extends through this collection. Small hematoma around the ileostomy in the subcutaneous tissues similar to  prior. Diffuse edema of the subcutaneous fat. Open ventral wound. _______________         Impression IMPRESSION:    1. No evidence of bowel obstruction or other acute GI process. Right lower  quadrant ileostomy. 2. Small volume ascites and collections in the pelvis suggestive of  postoperative hematoma and seroma. Left pelvic hematoma component measures  slightly smaller than prior. 3. Regions of atelectasis in the lungs, most pronounced in the right lower lobe.                Meena Martin MD   7/5/2018

## 2018-07-05 NOTE — PROGRESS NOTES
NUTRITION FOLLOW-UP    RECOMMENDATIONS/PLAN:   - At this time pt is tolerating tube feeds; TPN can be stopped as tube feeds are meeting estimated needs for pt   - continue w/ current tube feeds  Monitor labs/lytes, tube feed tolerance, skin integrity, wt, fluid status, BM    NUTRITION ASSESSMENT:   Client Update: 75 yrs old Female with acute respiratory failure, ischemia colitis, sepsis, JESS, metabolic acidosis. 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 Pt was extubated, and 1 day later reintubated. Thought to have peritonitis. Pt is s/p laparotomy and peritoneal lavage with klebsiella positive. 6/15/18 s/p exp lap surgery for intraabdominal fluid collection/wound vac and ileostomy placed. Pt self extubated 6/17/18. Pt is on TPN. Ileostomy and wound vac in place [facility] filter placed. CTA neg for PE     FOOD/NUTRITION INTAKE   Diet Order:  Vital High Protein @ 60ml/hr to provide 1320kcal 116g PRO 1104ml H20 148g CHO 31g Fat   Supplements: n/a   Food Allergies: NKFA  Average PO Intake:      Patient Vitals for the past 100 hrs:   % Diet Eaten   07/04/18 1600 0 %   07/04/18 0800 0 %      Pertinent Medications:  [x] Reviewed; dextrose 5%, lasix, lopressor, protonix, vancomycin   Electrolyte Replacement Protocol: [x]K [x]Mg [x]PO4  Insulin:  [x]SSI  []Pre-meal   []Basal    []Drip  []None  Cultural/Holiness Food Preferences: None Identified    BIOCHEMICAL DATA & MEDICAL TESTS  Pertinent Labs:  [x] Reviewed;  Na-146, Ca-7.8 Glucose-182   ANTHROPOMETRICS  Height: 5' 1\" (154.9 cm)       Weight: 100.7 kg (222 lb 0.1 oz)         BMI: 42 kg/m^2 morbidly obese (Greater than or = to 40% BMI)   Adm Weight: 196 lbs                Weight change: +26lbs  Adjusted Body Weight: 58.1kg     NUTRITION-FOCUSED PHYSICAL ASSESSMENT  Skin: No PU      GI:+ileostomy output 1475ml yesterday    NUTRITION PRESCRIPTION  Calories: 979-1246 kcal/day based on 11-14kcal/kg  Protein: 95 g/day based on 2.0g/kg of IBW  CHO: 122-156 g/day based on 50% of total energy  Fluid: 979-1246 ml/day based on 1 kcal/ml       NUTRITION DIAGNOSES:   1. Inadequate oral intake related to swallowing difficulties as evidence by need for nutrition support.          NUTRITION INTERVENTIONS:   INTERVENTIONS:                                                                                                                                                                         GOALS:  1.EN: Continue w/ tube feeds      1. Continue w/ tube feeds by next review 3 days       LEARNING NEEDS (Diet, Supplementation, Food/Nutrient-Drug Interaction):   [x] None Identified   [] Education provided/documented      Identified and patient: [] Declined   [] Was not appropriate/indicated        NUTRITION MONITORING /EVALUATION:   Adjust EN/PN as appropriate  Monitor wt  Monitor renal labs, electrolytes, fluid status  Monitor for additional supplement needs     Previous Recommendations Implemented: yes        Previous Goals Met:  yes -tube feeds at goal and pt is tolerating       [x] Participated in Interdisciplinary Rounds    [x] Interdisciplinary Care Plan Reviewed  DISCHARGE NUTRITION RECOMMENDATIONS ADDRESSED:      [x] To be determined closer to discharge    NUTRITION RISK:           [x] At risk                        [] Not currently at risk        Will follow-up per policy.   Bao Armas 1

## 2018-07-05 NOTE — PROGRESS NOTES
Ronald Dinero per Dr Regino Dan request from ICU rounds this am.  Instructed to continue Mycafungin and Meropenem dosing. Also, to   discontinue Vancomycin. Orders completed in chart. Milla Arevalo.  Counselor Kincaid   [phone]

## 2018-07-05 NOTE — PROGRESS NOTES
@1900 pt taken over drowsy in bed but eyes open spontaneously , followed some commands, denies pain presently, remains on 3L/nc spo2 97%. Vital signs fluctuates. Remains on tube feeds via dobhoff 0 residual seen , wound vac remains in place to mid abdomen surgical site with Vashe skin wound cleansing solution attached and set to infuse as ordered. Fry in situ draining clear yellow urine. Skin remains covered in red rash . TPN remains in progress. Assessment done and charted in relevant flow sheets. Nursing management continues. @2100 no new changes care continues , pt asleep intermittently easily aroused. Nursing observation continues. @2300 pt care continues no changes. @0045 pt asleep tolerating BIPAP well vital signs WNL care continues. @0200 no new developments vital signs stable nursing management continues. @0400 pt awake denies pain at present ,BIPAP remains in progress observation continues. @0600 full bath completed no itching observed care continues. @2967 Bedside and Verbal shift change report given to Melinda Gomez (oncoming nurse) by Sully Browne (offgoing nurse). Report included the following information SBAR, Kardex, Intake/Output, MAR, Recent Results and Med Rec Status.    Alarm parameters reviewed, on and audible Appropriate for patient clinical condition

## 2018-07-05 NOTE — PROGRESS NOTES
Hospitalist Progress Note-critical care note     Patient: Aleyda Felix MRN: [id number]  CSN: [id number]    YOB: 1950  Age: 75 y.o. Sex: female    DOA: 6/4/2018 LOS:  LOS: 31 days            Chief complaint:   hyoerntremia. D/p ileostomy , peritonitis , sepsis , jess ,ovarian cancer, hypokalemia    Assessment/Plan         Hospital Problems  Date Reviewed: 6/5/2018          Codes Class Noted POA    Hypokalemia ICD-10-CM: E87.6  ICD-9-CM: 276.8  6/27/2018 Unknown        Hypernatremia ICD-10-CM: E87.0  ICD-9-CM: 276.0  6/25/2018 Unknown        S/P ileostomy ([facility] ICD-10-CM: Z93.2  ICD-9-CM: V44.2  6/16/2018 No        Hypoalbuminemia ICD-10-CM: E88.09  ICD-9-CM: 273.8  6/16/2018 Unknown        Peritonitis ([facility] ICD-10-CM: K65.9  ICD-9-CM: 567.9  6/16/2018 Unknown        Acute deep vein thrombosis (DVT) of lower extremity ([facility] ICD-10-CM: I82.409  ICD-9-CM: 453.40  6/7/2018 Yes        Acute respiratory failure ([facility] ICD-10-CM: J96.00  ICD-9-CM: 518.81  6/5/2018 No        JESS (acute kidney injury) ([facility] ICD-10-CM: N17.9  ICD-9-CM: 584.9  6/5/2018 Unknown        Metabolic acidosis AUX-71-XZ: E87.2  ICD-9-CM: 276.2  6/5/2018 No        Abdominal pain ICD-10-CM: R10.9  ICD-9-CM: 789.00  6/4/2018 Yes        * (Principal)Sepsis ([facility] ICD-10-CM: A41.9  ICD-9-CM: 038.9, 995.91  6/4/2018 Yes        Oliguria ICD-10-CM: R34  ICD-9-CM: 788.5  6/4/2018 Yes        Ovarian ca ([facility] ICD-10-CM: C56.9  ICD-9-CM: 183.0  5/29/2018 Yes            Acute resp failure with hypoxia   Need  bipap at night     - self extubated 06/17/2018.   - bronchoscopy 06/09/2018,   - resp cultures no growth to date. CTA chest negative for  PE         Severe Sepsis/shock    - secondary to peritonitis. ID on board, leukocytosis and fever resolved   Peritoneal fluid cx:klebsiella oxytoca. Continue iv abx  .micafungin , merrem. +vanc           Anasarca -  on albumin       Bleeding from BRANDON drain   -resolved   protamine and FFP.   Acute bilateral peroneal DVT   - ivc filter placed   per dr. Shafer Arms , no a candidate for anticoagulant due to bleeding   Anemia   Received transfusion, so far h/h stable        JESS   -atn ,  nephrology on board , cr wnl    Metabolic acidosis-resolved   Hypernatremia: improving  pharmacy to correct with TPN    Gi: tpn feeding        DM  lantus , ssi        Nurse: stable     Poor prognosis ,  Brother is at the bedside     Review of systems:  Unable to obtain due to refused to talk , nodding to show felt better. Vital signs/Intake and Output:  Visit Vitals    /88    Pulse (!) 113    Temp 97.4 °F (36.3 °C)    Resp 12    Ht 5' 1\" (1.549 m)    Wt 100.7 kg (222 lb 0.1 oz)    SpO2 98%    BMI 41.95 kg/m2     Current Shift:  07/05 0701 - 07/05 1900  In: 150   Out: 1500 [Urine:975; Drains:125]  Last three shifts:  07/03 1901 - 07/05 0700  In: 3653.8 [I.V.:1913.8]  Out: 3981 [Urine:4200; Drains:225]    Physical Exam:  General: WD, open eyes  , not in distress   HEENT: NC, Atraumatic. PERRLA, anicteric sclerae. bipap mask noted   Lungs: Coarse bilateral lungs   Heart:  Regular  rhythm,  + murmur, No Rubs, No Gallops  Abdomen: Soft, Non distended, Non tender.  +Bowel sounds, colostomy bag, wound vac noted   Extremities: No c/c.  Edema   Psych:   Calm   Neurologic:  No new neuro deficit           Labs: Results:       Chemistry Recent Labs      07/05/18   0410  07/04/18   0400  07/03/18   0405   GLU  182*  162*  158*   NA  146*  146*  150*   K  3.8  4.0  3.9   CL  112*  113*  119*   CO2  26  23  21   BUN  44*  36*  36*   CREA  0.75  0.70  0.62   CA  7.8*  7.4*  7.9*   AGAP  8  10  10   BUCR  59*  51*  58*   AP  123*  130*   --    TP  5.3*  5.4*   --    ALB  2.1*  2.4*   --    GLOB  3.2  3.0   --    AGRAT  0.7*  0.8   --       CBC w/Diff Recent Labs      07/05/18   0410  07/04/18   0400  07/03/18   0405   WBC  11.4  12.7  10.7   RBC  3.83*  3.76*  3.55*   HGB  10.7*  10.4*  9.8*   HCT  34.3*  33.6*  31.8*   PLT  96*  100*  88* GRANS  62  58  62   LYMPH  22  15*  16*   EOS  3  7*  4      Cardiac Enzymes No results for input(s): CPK, CKND1, FABIAN in the last 72 hours. No lab exists for component: CKRMB, TROIP   Coagulation No results for input(s): PTP, INR, APTT in the last 72 hours. No lab exists for component: INREXT, INREXT    Lipid Panel Lab Results   Component Value Date/Time    Triglyceride 117 07/03/2018 04:05 AM      BNP No results for input(s): BNPP in the last 72 hours.    Liver Enzymes Recent Labs      07/05/18   0410   TP  5.3*   ALB  2.1*   AP  123*   SGOT  23      Thyroid Studies No results found for: T4, T3U, TSH, TSHEXT, TSHEXT     Procedures/imaging: see electronic medical records for all procedures/Xrays and details which were not copied into this note but were reviewed prior to creation of Kyra Dean MD

## 2018-07-05 NOTE — PROGRESS NOTES
0720 Bedside shift change report given to Jennifer Enriquez RN (oncoming nurse) by Warren Mckeon (offgoing nurse). Report included the following information SBAR, Kardex, ED Summary, OR Summary, Procedure Summary, Intake/Output, MAR, Accordion, Recent Results, Med Rec Status, Cardiac Rhythm NSR and Alarm Parameters . Assumed care of patient at this time, Patient is resting, on BIPAP well, appears comfortable, V/s 98.1,87,18,123/81. Patient easily awakened,denies pain or distress, Wound vac in place, to mid abdomen surgical site with vashe skin wound cleansing solution attached and set to infuse as ordered. Fry insitu draining clear yellow urine, skin noted with rash improved. Flow sheets in progress. 0800 Assessment  completed per flowsheets, Patient denies any pain at this time or distress. 1200 Reassessment completed, no change     1600 Reassessment completed, No change    1900 Bedside shift change report given to Warren Mckeon  (oncoming nurse) by Leonardo Arias (offgoing nurse). Report included the following information SBAR, Kardex, Procedure Summary, Intake/Output, MAR, Accordion, Recent Results, Med Rec Status, Cardiac Rhythm NSR and Alarm Parameters .

## 2018-07-05 NOTE — PROGRESS NOTES
Cm attended IDR's spoke with  and  from their standpoint pt stable enough for LTAC,notified  regarding icu d/c planning cm informed she will be meeting with wound care team for incision check will determine after assessment if appropriate for transition to LTAC concerned of pt redeveloping fever again,[facility] has ID team and speciality wound care services all services can resume,cm revisited pt room twice and attempted to call home number unable to leave message due to no voice mail to discuss possible transitioning to [facility] tomorrow which was discussed last week,cm updated  of unable to reach Brother as requested.

## 2018-07-05 NOTE — PROGRESS NOTES
Admit date: 6/4/2018      Nadia Dyer a 75 y. 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 Status post complete surgical resection with no evidence of residual disease. Typically patient would receive adjuvant chemotherapy for this diagnosis and we have discussed this however patient is not medically appropriate for chemotherapy at this time and will not be within 12 weeks of primary debulking. Patient is high risk of recurrence based on pathology (Clear Cell). Will follow patient closely for recurrence and develop treatment plan at that time depending on overall medical status. GI - Peritonitis resolved. Last drain removed 6/27/2018 and tip was cultured and NGTD. Post operative ileus resolved. Now tolerating TF. Good ileostomy output. Weaning TPN. ID - Currently patient is on Meropenem D6/7, Vanc D14/14 and Micafungin D14/14. Zosyn 26 days completed 6/30/18, Levaquin 16 days completed 6/29/18, Flagyl 18 days complete 7/2/2018. Prior sepsis. I think we are clear from the intraabdominal process. The current potential sites of infection are the midline wound with vac in place, skin, and lungs. Wound vac was changed Friday and sharp debridement was performed. Wound vac replaced with intermittent irrigation of dilute Dakins solution. The wound vac is draining serosang with fibrinous particulate matter consistent with chemical debridement.  Culture body fluid from 7/2/18 was apparently from the fluid draining from the site in the LLQ where the last BRANDON drain was removed 6/27/18. This is not clinically helpful and should be disregarded. Renal - JESS, resolved and Nephrology has signed off. Heme - Thrombocytopenia, unclear cause, stable. Hgb had been stable since last transfusion 6/29/18   DVT- bilateral peroneal DVT, full anticoagulation contraindicated. IVC filter was placed 6/28/2018 without issues. CV - Intermittently tachy. Episode of Afib this admission, converted to NSR. CHF, improved. HTN, on metoprolol and hydralazine. All managed by Intensivist/Hospitalist  Pulm - Currently stable on NC during the day and BIPAP. Managed by Intensivist  Psych - Depressed started Zoloft 7/3/18. Disp - condition labile, continue ICU care, have been discussing LTAC but not ready for this yet  Code Status - Full Code      SUBJECTIVE  Patient is resting now with O2 3L per NC. Complains of pain but will not localize. IV Dilaudid prn. Ileostomy normal output. No complaints of nausea.         OBJECTIVE  Physical Exam:  General - Resting on O2 nc  HEENT - nml, skin breakdown from taped dobhoff  Heart - NSR  Abdomen - soft, mildly tender, nondistended, normal bowel sounds, ileostomy looks good, wound vac in place with no erythema, drainage bag in LLQ where drain was pulled much less output.   Incision - wound vac in place  Extremities - 2+ edema LE, weeping edema UE    Patient Vitals for the past 24 hrs:   BP Temp Pulse Resp SpO2 Weight   07/05/18 0740 135/85 - 93 - - -   07/05/18 0733 - 97.6 °F (36.4 °C) - - - -   07/05/18 0700 123/81 - 87 18 100 % -   07/05/18 0600 124/73 - 85 18 99 % -   07/05/18 0531 133/81 - (!) 103 - - -   07/05/18 0500 140/79 - (!) 112 19 97 % -   07/05/18 0450 - - - - 99 % -   07/05/18 0400 133/81 - 100 22 98 % -   07/05/18 0356 - 97.9 °F (36.6 °C) - - - -   07/05/18 0300 137/78 - 96 17 97 % -   07/05/18 0200 109/68 - 91 19 96 % -   07/05/18 0133 138/79 - 99 - - - 07/05/18 0100 123/70 - 94 18 100 % -   07/05/18 0000 127/77 - 89 17 97 % -   07/04/18 2345 - - - - 96 % -   07/04/18 2341 137/84 - (!) 111 - - -   07/04/18 2300 140/81 - (!) 102 25 97 % -   07/04/18 2258 - 97.5 °F (36.4 °C) - - - -   07/04/18 2200 145/82 - 99 22 94 % -   07/04/18 2100 137/81 - 98 22 98 % -   07/04/18 2000 141/75 - 86 22 96 % -   07/04/18 1910 - 97.2 °F (36.2 °C) - - - -   07/04/18 1900 131/65 - 89 17 97 % -   07/04/18 1800 148/78 - (!) 107 13 98 % -   07/04/18 1704 - - - - - 100.7 kg (222 lb 0.1 oz)   07/04/18 1700 142/77 - 94 17 97 % -   07/04/18 1606 - 97.9 °F (36.6 °C) - - - -   07/04/18 1600 141/75 - 88 15 - -   07/04/18 1500 124/68 - 90 16 97 % -   07/04/18 1400 134/78 - 91 19 94 % -   07/04/18 1300 136/76 - 86 20 96 % -   07/04/18 1200 140/87 - 100 19 92 % -   07/04/18 1152 - 98.4 °F (36.9 °C) - - - -   07/04/18 1100 135/66 - 94 23 96 % -   07/04/18 1000 151/89 - (!) 105 25 92 % -         Intake/Output Summary (Last 24 hours) at 07/05/18 0902  Last data filed at 07/05/18 0716   Gross per 24 hour   Intake          2510.92 ml   Output             4450 ml   Net         -1939.08 ml          Labs  CBC  Recent Labs      07/05/18   0410  07/04/18   0400  07/03/18   0405   WBC  11.4  12.7  10.7   HCT  34.3*  33.6*  31.8*   MCV  89.6  89.4  89.6   BANDS  6*  17*  17*   MONOS  6  3  1*   EOS  3  7*  4   BASOS  1  0  0        CMP  Recent Labs      07/05/18   0410  07/04/18   0400  07/03/18   0405   NA  146*  146*  150*   CO2  26  23  21   BUN  44*  36*  36*        Lab Results   Component Value Date/Time    Glucose 182 (H) 07/05/2018 04:10 AM    Glucose (POC) 154 (H) 07/05/2018 05:17 AM    Glucose,  (H) 06/15/2018 05:14 PM          Current Hospital Medications    Current Facility-Administered Medications:     TPN ADULT - CENTRAL, , IntraVENous, CONTINUOUS, Christen Miranda MD, Last Rate: 25 mL/hr at 07/04/18 1836    furosemide (LASIX) injection 40 mg, 40 mg, IntraVENous, Q12H, Kristen Mejia MD, 40 mg at 07/04/18 2121    sertraline (ZOLOFT) tablet 50 mg, 50 mg, Oral, DAILY, Miguel Angel Soto MD, 50 mg at 07/04/18 0829    dilTIAZem (CARDIZEM) injection 10 mg, 10 mg, IntraVENous, Q6H, Christen Boothe MD, 10 mg at 07/05/18 0740    dextrose 5% infusion, 25 mL/hr, IntraVENous, CONTINUOUS, Silas Sams MD, Last Rate: 25 mL/hr at 07/03/18 1332, 25 mL/hr at 07/03/18 1332    sodium hypochlorite (QUARTER STRENGTH DAKIN'S) 0.125% irrigation (bottle), , Topical, PRN, Miguel Angel Soto MD    meropenem (MERREM) 1 g in sterile water (preservative free) 20 mL IV syringe, 1 g, IntraVENous, Q8H, Oskar Villalta MD, 1 g at 07/05/18 0316    vancomycin (VANCOCIN) 1500 mg in D5W 500 mL infusion, 1,500 mg, IntraVENous, Q24H, Oskar Villalta MD, 1,500 mg at 07/04/18 1203    0.9% sodium chloride infusion 250 mL, 250 mL, IntraVENous, PRN, Miguel Angel Soto MD    saliva stimulant (BIOTENE) 1 Spray, 1 Spray, Oral, PRN, Oskar Villalta MD, 1 Spray at 06/28/18 1108    HYDROmorphone (PF) (DILAUDID) injection 1 mg, 1 mg, IntraVENous, Q2H PRN, Miguel Angel Soto MD, 1 mg at 07/04/18 2342    metoprolol (LOPRESSOR) injection 5 mg, 5 mg, IntraVENous, Q6H, Oskar Villalta MD, 5 mg at 07/05/18 0531    hydrALAZINE (APRESOLINE) 20 mg/mL injection 10 mg, 10 mg, IntraVENous, Q4H PRN, Oskar Villalta MD, 10 mg at 07/03/18 2159    insulin glargine (LANTUS) injection 13 Units, 13 Units, SubCUTAneous, DAILY, Gerhardt Natter, MD, 13 Units at 07/04/18 0829    micafungin (MYCAMINE) 100 mg in 0.9% sodium chloride (MBP/ADV) 100 mL, 100 mg, IntraVENous, Q24H, Osakr Villalta MD, Last Rate: 100 mL/hr at 07/04/18 1500, 100 mg at 07/04/18 1500    insulin lispro (HUMALOG) injection, , SubCUTAneous, Q6H, Yaniv Hamm MD, 2 Units at 07/05/18 0532    dextrose (D50W) injection syrg 12.5-25 g, 25-50 mL, IntraVENous, PRN, Yaniv Hamm MD    Vancomycin - Pharmacy to Dose, 1 Each, Other, Rx Dosing/Monitoring, Yaniv Hamm MD    ipratropium (ATROVENT) 0.02 % nebulizer solution 0.5 mg, 0.5 mg, Nebulization, Q4H PRN, Mary Bonilla MD, 0.5 mg at 06/29/18 1449    naloxone (NARCAN) injection 0.4 mg, 0.4 mg, IntraVENous, EVERY 2 MINUTES AS NEEDED, Samy Leyva MD    [facility]ansetron [facility] injection 4 mg, 4 mg, IntraVENous, Q6H PRN, Mendoaz Shore MD, 4 mg at 06/08/18 2152    oxymetazoline (AFRIN) 0.05 % nasal spray 2 Spray, 2 Spray, Both Nostrils, BID PRN, Niki Vera MD    alum-mag hydroxide-simeth (MYLANTA) oral suspension 30 mL, 30 mL, Oral, Q4H PRN, Christen Rothman MD, 30 mL at 06/07/18 2225    fluticasone (FLONASE) 50 mcg/actuation nasal spray 2 Spray, 2 Spray, Both Nostrils, DAILY PRN, Charissa Lancaster MD    ELECTROLYTE REPLACEMENT PROTOCOL-POTASSIUM Renal Dosing, 1 Each, Other, PRN, Christen Leroy MD    ELECTROLYTE REPLACEMENT PROTOCOL-MAGNESIUM, 1 Each, Other, PRN, Christen Leroy MD    ELECTROLYTE REPLACEMENT PROTOCOL-PHOSPHORUS Renal Dosing, 1 Each, Other, PRN, Christen Leroy MD    ELECTROLYTE REPLACEMENT PROTOCOL-CALCIUM, 1 Each, Other, PRN, Christen Leroy MD    sodium chloride (NS) flush 5-10 mL, 5-10 mL, IntraVENous, PRN, Jovani Villalobos MD    glucose chewable tablet 16 g, 4 Tab, Oral, PRN, Christen Leroy MD    glucagon (GLUCAGEN) injection 1 mg, 1 mg, IntraMUSCular, PRN, Christen Leroy MD    pantoprazole (PROTONIX) 40 mg in sodium chloride 0.9% 10 mL injection, 40 mg, IntraVENous, DAILY, Jaimie Negrete MD, 40 mg at 07/04/18 8092      Charissa Lancaster MD

## 2018-07-06 ENCOUNTER — APPOINTMENT (OUTPATIENT)
Dept: GENERAL RADIOLOGY | Age: 68
DRG: 853 | End: 2018-07-06
Attending: INTERNAL MEDICINE
Payer: MEDICARE

## 2018-07-06 LAB
ALBUMIN SERPL-MCNC: 2 G/DL (ref 3.4–5)
ALBUMIN/GLOB SERPL: 0.6 {RATIO} (ref 0.8–1.7)
ALP SERPL-CCNC: 153 U/L (ref 45–117)
ALT SERPL-CCNC: 28 U/L (ref 13–56)
ANION GAP SERPL CALC-SCNC: 9 MMOL/L (ref 3–18)
AST SERPL-CCNC: 32 U/L (ref 15–37)
BACTERIA SPEC CULT: ABNORMAL
BACTERIA SPEC CULT: NORMAL
BACTERIA SPEC CULT: NORMAL
BASOPHILS # BLD: 0 K/UL (ref 0–0.1)
BASOPHILS NFR BLD: 0 % (ref 0–3)
BILIRUB SERPL-MCNC: 0.7 MG/DL (ref 0.2–1)
BUN SERPL-MCNC: 51 MG/DL (ref 7–18)
BUN/CREAT SERPL: 74 (ref 12–20)
CA-I SERPL-SCNC: 1.13 MMOL/L (ref 1.12–1.32)
CA-I SERPL-SCNC: 1.13 MMOL/L (ref 1.12–1.32)
CALCIUM SERPL-MCNC: 7.7 MG/DL (ref 8.5–10.1)
CHLORIDE SERPL-SCNC: 113 MMOL/L (ref 100–108)
CO2 SERPL-SCNC: 23 MMOL/L (ref 21–32)
CREAT SERPL-MCNC: 0.69 MG/DL (ref 0.6–1.3)
DIFFERENTIAL METHOD BLD: ABNORMAL
EOSINOPHIL # BLD: 0.8 K/UL (ref 0–0.4)
EOSINOPHIL NFR BLD: 7 % (ref 0–5)
ERYTHROCYTE [DISTWIDTH] IN BLOOD BY AUTOMATED COUNT: 25.1 % (ref 11.6–14.5)
GLOBULIN SER CALC-MCNC: 3.3 G/DL (ref 2–4)
GLUCOSE BLD STRIP.AUTO-MCNC: 137 MG/DL (ref 70–110)
GLUCOSE BLD STRIP.AUTO-MCNC: 163 MG/DL (ref 70–110)
GLUCOSE SERPL-MCNC: 179 MG/DL (ref 74–99)
GRAM STN SPEC: ABNORMAL
GRAM STN SPEC: ABNORMAL
HCT VFR BLD AUTO: 33.1 % (ref 35–45)
HGB BLD-MCNC: 10.3 G/DL (ref 12–16)
LYMPHOCYTES # BLD: 3.9 K/UL (ref 0.8–3.5)
LYMPHOCYTES NFR BLD: 35 % (ref 20–51)
MAGNESIUM SERPL-MCNC: 1.9 MG/DL (ref 1.6–2.6)
MCH RBC QN AUTO: 28 PG (ref 24–34)
MCHC RBC AUTO-ENTMCNC: 31.1 G/DL (ref 31–37)
MCV RBC AUTO: 89.9 FL (ref 74–97)
MONOCYTES # BLD: 0.4 K/UL (ref 0–1)
MONOCYTES NFR BLD: 4 % (ref 2–9)
NEUTS BAND NFR BLD MANUAL: 6 % (ref 0–5)
NEUTS SEG # BLD: 5.4 K/UL (ref 1.8–8)
NEUTS SEG NFR BLD: 48 % (ref 42–75)
NRBC BLD-RTO: 2 PER 100 WBC
PHOSPHATE SERPL-MCNC: 2.4 MG/DL (ref 2.5–4.9)
PHOSPHATE SERPL-MCNC: 2.5 MG/DL (ref 2.5–4.9)
PHOSPHATE SERPL-MCNC: 3.1 MG/DL (ref 2.5–4.9)
PLATELET # BLD AUTO: 100 K/UL (ref 135–420)
PLATELET COMMENTS,PCOM: ABNORMAL
POTASSIUM SERPL-SCNC: 4.1 MMOL/L (ref 3.5–5.5)
PROT SERPL-MCNC: 5.3 G/DL (ref 6.4–8.2)
RBC # BLD AUTO: 3.68 M/UL (ref 4.2–5.3)
RBC MORPH BLD: ABNORMAL
RBC MORPH BLD: ABNORMAL
SERVICE CMNT-IMP: ABNORMAL
SERVICE CMNT-IMP: NORMAL
SERVICE CMNT-IMP: NORMAL
SODIUM SERPL-SCNC: 145 MMOL/L (ref 136–145)
WBC # BLD AUTO: 11.2 K/UL (ref 4.6–13.2)
WBC MORPH BLD: ABNORMAL

## 2018-07-06 PROCEDURE — 74011000250 HC RX REV CODE- 250: Performed by: INTERNAL MEDICINE

## 2018-07-06 PROCEDURE — 82962 GLUCOSE BLOOD TEST: CPT

## 2018-07-06 PROCEDURE — 82330 ASSAY OF CALCIUM: CPT | Performed by: HOSPITALIST

## 2018-07-06 PROCEDURE — 74011250637 HC RX REV CODE- 250/637: Performed by: HOSPITALIST

## 2018-07-06 PROCEDURE — 84100 ASSAY OF PHOSPHORUS: CPT | Performed by: INTERNAL MEDICINE

## 2018-07-06 PROCEDURE — 74011000258 HC RX REV CODE- 258: Performed by: INTERNAL MEDICINE

## 2018-07-06 PROCEDURE — 84100 ASSAY OF PHOSPHORUS: CPT | Performed by: HOSPITALIST

## 2018-07-06 PROCEDURE — 94660 CPAP INITIATION&MGMT: CPT

## 2018-07-06 PROCEDURE — 82330 ASSAY OF CALCIUM: CPT | Performed by: SURGERY

## 2018-07-06 PROCEDURE — 77010033678 HC OXYGEN DAILY

## 2018-07-06 PROCEDURE — 74011636637 HC RX REV CODE- 636/637: Performed by: HOSPITALIST

## 2018-07-06 PROCEDURE — 97606 NEG PRS WND THER DME>50 SQCM: CPT

## 2018-07-06 PROCEDURE — 77030010541

## 2018-07-06 PROCEDURE — 74011250637 HC RX REV CODE- 250/637: Performed by: OBSTETRICS & GYNECOLOGY

## 2018-07-06 PROCEDURE — 74011250636 HC RX REV CODE- 250/636: Performed by: HOSPITALIST

## 2018-07-06 PROCEDURE — 85025 COMPLETE CBC W/AUTO DIFF WBC: CPT | Performed by: INTERNAL MEDICINE

## 2018-07-06 PROCEDURE — 65270000029 HC RM PRIVATE

## 2018-07-06 PROCEDURE — 74011250636 HC RX REV CODE- 250/636: Performed by: INTERNAL MEDICINE

## 2018-07-06 PROCEDURE — 83735 ASSAY OF MAGNESIUM: CPT | Performed by: INTERNAL MEDICINE

## 2018-07-06 PROCEDURE — 80053 COMPREHEN METABOLIC PANEL: CPT | Performed by: INTERNAL MEDICINE

## 2018-07-06 PROCEDURE — 74011000258 HC RX REV CODE- 258: Performed by: HOSPITALIST

## 2018-07-06 PROCEDURE — 36592 COLLECT BLOOD FROM PICC: CPT

## 2018-07-06 PROCEDURE — C9113 INJ PANTOPRAZOLE SODIUM, VIA: HCPCS | Performed by: INTERNAL MEDICINE

## 2018-07-06 PROCEDURE — 71045 X-RAY EXAM CHEST 1 VIEW: CPT

## 2018-07-06 PROCEDURE — 74011250637 HC RX REV CODE- 250/637: Performed by: INTERNAL MEDICINE

## 2018-07-06 PROCEDURE — 74011636637 HC RX REV CODE- 636/637: Performed by: INTERNAL MEDICINE

## 2018-07-06 PROCEDURE — 74011250636 HC RX REV CODE- 250/636

## 2018-07-06 RX ORDER — HYDROMORPHONE HYDROCHLORIDE 2 MG/ML
0.5 INJECTION, SOLUTION INTRAMUSCULAR; INTRAVENOUS; SUBCUTANEOUS ONCE
Status: COMPLETED | OUTPATIENT
Start: 2018-07-06 | End: 2018-07-06

## 2018-07-06 RX ORDER — SODIUM,POTASSIUM PHOSPHATES 280-250MG
2 POWDER IN PACKET (EA) ORAL 4 TIMES DAILY
Status: DISCONTINUED | OUTPATIENT
Start: 2018-07-06 | End: 2018-07-06

## 2018-07-06 RX ORDER — SODIUM,POTASSIUM PHOSPHATES 280-250MG
1 POWDER IN PACKET (EA) ORAL
Status: COMPLETED | OUTPATIENT
Start: 2018-07-06 | End: 2018-07-06

## 2018-07-06 RX ORDER — SODIUM,POTASSIUM PHOSPHATES 280-250MG
2 POWDER IN PACKET (EA) ORAL
Status: COMPLETED | OUTPATIENT
Start: 2018-07-06 | End: 2018-07-06

## 2018-07-06 RX ORDER — HYDROMORPHONE HYDROCHLORIDE 2 MG/ML
INJECTION, SOLUTION INTRAMUSCULAR; INTRAVENOUS; SUBCUTANEOUS
Status: COMPLETED
Start: 2018-07-06 | End: 2018-07-06

## 2018-07-06 RX ORDER — METOPROLOL TARTRATE 50 MG/1
50 TABLET ORAL EVERY 12 HOURS
Status: DISCONTINUED | OUTPATIENT
Start: 2018-07-06 | End: 2018-07-11 | Stop reason: HOSPADM

## 2018-07-06 RX ADMIN — MEROPENEM 1 G: 1 INJECTION, POWDER, FOR SOLUTION INTRAVENOUS at 11:26

## 2018-07-06 RX ADMIN — SERTRALINE HYDROCHLORIDE 50 MG: 50 TABLET ORAL at 09:02

## 2018-07-06 RX ADMIN — HYDROMORPHONE HYDROCHLORIDE 0.5 MG: 2 INJECTION, SOLUTION INTRAMUSCULAR; INTRAVENOUS; SUBCUTANEOUS at 15:11

## 2018-07-06 RX ADMIN — INSULIN LISPRO 2 UNITS: 100 INJECTION, SOLUTION INTRAVENOUS; SUBCUTANEOUS at 05:59

## 2018-07-06 RX ADMIN — CALCIUM GLUCONATE 2 G: 94 INJECTION, SOLUTION INTRAVENOUS at 05:58

## 2018-07-06 RX ADMIN — SODIUM CHLORIDE 40 MG: 9 INJECTION INTRAMUSCULAR; INTRAVENOUS; SUBCUTANEOUS at 09:02

## 2018-07-06 RX ADMIN — DILTIAZEM HYDROCHLORIDE 10 MG: 5 INJECTION INTRAVENOUS at 09:00

## 2018-07-06 RX ADMIN — POTASSIUM & SODIUM PHOSPHATES POWDER PACK 280-160-250 MG 2 PACKET: 280-160-250 PACK at 17:30

## 2018-07-06 RX ADMIN — INSULIN GLARGINE 13 UNITS: 100 INJECTION, SOLUTION SUBCUTANEOUS at 09:01

## 2018-07-06 RX ADMIN — METOPROLOL TARTRATE 50 MG: 50 TABLET, FILM COATED ORAL at 17:30

## 2018-07-06 RX ADMIN — MICAFUNGIN SODIUM 100 MG: 20 INJECTION, POWDER, LYOPHILIZED, FOR SOLUTION INTRAVENOUS at 15:34

## 2018-07-06 RX ADMIN — DILTIAZEM HYDROCHLORIDE 10 MG: 5 INJECTION INTRAVENOUS at 02:23

## 2018-07-06 RX ADMIN — FUROSEMIDE 40 MG: 10 INJECTION, SOLUTION INTRAMUSCULAR; INTRAVENOUS at 09:01

## 2018-07-06 RX ADMIN — MEROPENEM 1 G: 1 INJECTION, POWDER, FOR SOLUTION INTRAVENOUS at 02:24

## 2018-07-06 RX ADMIN — POTASSIUM & SODIUM PHOSPHATES POWDER PACK 280-160-250 MG 1 PACKET: 280-160-250 PACK at 05:59

## 2018-07-06 RX ADMIN — HYDROMORPHONE HYDROCHLORIDE 0.5 MG: 2 INJECTION, SOLUTION INTRAMUSCULAR; INTRAVENOUS; SUBCUTANEOUS at 00:13

## 2018-07-06 RX ADMIN — METOPROLOL TARTRATE 5 MG: 5 INJECTION INTRAVENOUS at 05:59

## 2018-07-06 RX ADMIN — HYDROMORPHONE HYDROCHLORIDE 0.5 MG: 2 INJECTION, SOLUTION INTRAMUSCULAR; INTRAVENOUS; SUBCUTANEOUS at 12:08

## 2018-07-06 NOTE — WOUND CARE
Wound Care Progress Note       Assessment:   Has a Fry and has an ostomy. Requires full assists in repositioning. Diet: Tube feedings  Grimaces at pain,  Pre-medicated for pain by RN. 1. Mid line abdomen  surgical  Incision 22 x 7x  5 (wound location & etiology) Base is fibrinous/ subcutaneous tissue with granulated borders tissue. Moderate exudate. Periwound intact & with out erythema. Margins are approximated mechanically. Treatment: HFB drew x 3, silver sponge bolster x 3, marginas approximated with steristrips  Ostomy bag and wafer alos changed with vac dressing change, stoma appears healthy and beefy red with liquid output. Skin Care & Pressure Relief Recommendations:  Minimize layers of linen/pads under patient to optimize support surface. Turn/reposition approximately every 2 hours and offload heels pillows or Prevalon boots.   Manage incontinence / promote continence; Proshield to buttocks and sacrum daily and as needed with incontinence care    Discussed above plan with Dr. Wanda Coulter    Transition of Care: Plan to follow weekly and per product recommendations while admitted to hospital.

## 2018-07-06 NOTE — PROGRESS NOTES
NUTRITION FOLLOW-UP    RECOMMENDATIONS/PLAN:   - continue tube feeds as ordered  - monitor weight/fluid status, ostomy output, skin integrity    NUTRITION ASSESSMENT:   Client Update: 75 yrs old Female with acute respiratory failure, ischemia colitis, sepsis, JESS, metabolic acidosis. 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 Pt was extubated, and 1 day later reintubated. Thought to have peritonitis. Pt is s/p laparotomy and peritoneal lavage with klebsiella positive. 6/15/18 s/p exp lap surgery for intraabdominal fluid collection/wound vac and ileostomy placed. Pt self extubated 6/17/18. Pt tpn turned off, tolerating tube feeds. Ileostomy and wound vac in place [facility] filter placed.  CTA neg for PE      FOOD/NUTRITION INTAKE   Diet Order:  Vital High Protein @ 60ml/hr to provide 1320kcal 116g PRO 1104ml H20 148g CHO 31g Fat  Supplements: n/a  Food Allergies: NKFA  Average PO Intake:      Patient Vitals for the past 100 hrs:   % Diet Eaten   07/04/18 1600 0 %   07/04/18 0800 0 %      Pertinent Medications:  [x] Reviewed; lasix, lantus, lispro, protonix  Electrolyte Replacement Protocol: []K []Mg []PO4  Insulin:  []SSI  []Pre-meal   []Basal    []Drip  [x]None  Cultural/Yazidism Food Preferences: None Identified    BIOCHEMICAL DATA & MEDICAL TESTS  Pertinent Labs:  [x] Reviewed;     ANTHROPOMETRICS  Height: 5' 1\" (154.9 cm)       Weight: 98.3 kg (216 lb 11.4 oz)         BMI: 41 kg/m^2 morbidly obese (Greater than or = to 40% BMI)   Adm Weight: 196 lbs                Weight change: +20lbs  Adjusted Body Weight: 58kg    NUTRITION-FOCUSED PHYSICAL ASSESSMENT  Skin: no pressure area per documentation    GI: 1950ml output yesterday    NUTRITION PRESCRIPTION  Calories: 979-1246 kcal/day based on 11-14kcal/kg  Protein: 95 g/day based on 2.0g/kg of IBW  CHO: 122-156 g/day based on 50% of total energy  ZAYFJ: 880-6448 LK/FQJ based on 1 kcal/ml             NUTRITION DIAGNOSES:   1. Inadequate oral intake related to swallowing difficulties as needed by nutrition support    NUTRITION INTERVENTIONS:   INTERVENTIONS:        GOALS:  1. EN: continue POC 1. Pt to continue to tolerate tube feeds throughout the next 3 days     LEARNING NEEDS (Diet, Supplementation, Food/Nutrient-Drug Interaction):   [x] None Identified   [] Education provided/documented      Identified and patient: [] Declined   [] Was not appropriate/indicated        NUTRITION MONITORING /EVALUATION:   Adjust EN/PN as appropriate  Monitor wt  Monitor renal labs, electrolytes, fluid status     Previous Recommendations Implemented: yes        Previous Goals Met:  yes -      [x] Participated in Interdisciplinary Rounds    [x] Interdisciplinary Care Plan Reviewed  DISCHARGE NUTRITION RECOMMENDATIONS ADDRESSED:       [x] To be determined closer to discharge    NUTRITION RISK:           [x] At risk                        [] Not currently at risk        Will follow-up per policy.   Bao Chávez 1

## 2018-07-06 NOTE — PROGRESS NOTES
Problem: Pressure Injury - Risk of  Goal: *Prevention of pressure injury  Document Oscar Scale and appropriate interventions in the flowsheet. Outcome: Progressing Towards Goal  Pressure Injury Interventions:  Sensory Interventions: Assess changes in LOC    Moisture Interventions: Absorbent underpads    Activity Interventions: Increase time out of bed    Mobility Interventions: Float heels, Turn and reposition approx. every two hours(pillow and wedges)    Nutrition Interventions: Document food/fluid/supplement intake    Friction and Shear Interventions: Apply protective barrier, creams and emollients               Problem: Falls - Risk of  Goal: *Absence of Falls  Document Goyo Fall Risk and appropriate interventions in the flowsheet.    Outcome: Progressing Towards Goal  Fall Risk Interventions:  Mobility Interventions: Strengthening exercises (ROM-active/passive)    Mentation Interventions: Bed/chair exit alarm, Reorient patient    Medication Interventions: Bed/chair exit alarm    Elimination Interventions: Call light in reach

## 2018-07-06 NOTE — PROGRESS NOTES
Spoke with Tam Francis from  [address] [phone] @ [address] can accept tomorrow no cut off acceptance time,please send D/C summary,medication hard scripts,SNF report ,medicate for comfort if appropriate,RN please call report prior to discharge,cm spoke with pt and brother agreeable with transition plan, Sarah mike also visited with pt and brother during the day,report given to weekend cm will be available for questions if needed. Care Management Interventions  PCP Verified by CM:  Yes  Palliative Care Criteria Met (RRAT>21 & CHF Dx)?: Yes  Mode of Transport at Discharge: ALS  Transition of Care Consult (CM Consult): LTAC  Discharge Durable Medical Equipment: No  Health Maintenance Reviewed: Yes  Physical Therapy Consult: Yes  Occupational Therapy Consult: Yes  Speech Therapy Consult: Yes  Current Support Network: Relative's Home (brother)  Confirm Follow Up Transport: Family  Plan discussed with Pt/Family/Caregiver: Yes  Freedom of Choice Offered: Yes (n/a)  Discharge Location  Discharge Placement: [address] [place] [address] (LTAC)

## 2018-07-06 NOTE — PROGRESS NOTES
@1900 pt taken over awake in bed on 3L/nc. Denies pain with nod of head. Tube feeds continues via dophoff, no residual observed. Fry insitu draining clear yellow urine . Wound vac in place to mid abdomen surgical site. Illeostomy draining yellow liquid stool in large amounts. BRANDON drain site to left abdomen draining serous sanguenous fluid. Left arm continues to weep . Generalized swelling seen an d red rash still present but is reducing. Assessment done and documented in relevant flow sheets nursing care continues. @2100   @0013 pt groaning , frowning when asked if in pain nod head indicating yes. Repositioned no relief, analgesia administered as prescribed care continues. @0030 pt asleep flacc 0 ,when aroused denies pain, observation continues. @0200 no new developments vital signs continues to fluctuate care continues. @0400 no changes. @0600 complete bath done and and sanitary pad changed small amount of dark red blood seen from stanley. @1068 Bedside and Verbal shift change report given to Aisha Raymond (oncoming nurse) by Kristin Wu (offgoing nurse). Report included the following information SBAR, Kardex, Intake/Output, MAR, Recent Results and Med Rec Status.   Alarm parameters reviewed, on and audible Appropriate for patient clinical condition

## 2018-07-06 NOTE — PROGRESS NOTES
Hospitalist Progress Note    Patient: Jodi Silva MRN: [id number]  CSN: [id number]    YOB: 1950  Age: 75 y.o. Sex: female    DOA: 6/4/2018 LOS:  LOS: 32 days          Chief Complaint:      Intraabdominal sepsis    Assessment/Plan     Assessment/Plan   76 yo WF admitted for sepsis, dx with ovarian ca prior to admission, had debulking surgery, came in with peritonitis and sepsis     Acute resp failure with hypoxia -improved to wearing bipap at night, 02 via NC during day      - self extubated 06/17/2018.   - bronchoscopy 06/09/2018,      CTA chest negative for  PE      Severe Sepsis/shock    - secondary to peritonitis. Initial exp lap followed by surgery to debride and has 2 ostomies placed with large wound now with vac in place- ID on case-continue current antibiotics and mycafungin-should be getting close to end of extensive abx course, ID has consulted-defer to primary surgery attending     Juan Miguel-appears improved     Afib, tachycardia-improved now off IV cardizem      Bleeding from BRANDON drain -resolved with protamine and FFP.      Acute bilateral peroneal DVT   - ivc filter placed  and no current plans for further anticoagulation    Ac blood loss anemia now stable  Thrombocytopenia without signs for bleeding      JESS-resolved     Metabolic acidosis-resolved   Hypokalemia resolved  Hypernatremia: improved     TPN weaned off and now on NG feedings, tolerating well    sevee physical debility will need extensive PT and rehab         Wound care, wound vac  PT as tolerated  Mental status still depressed due to acute severe illness     DM  lantus , ssi     Brother at bedside had no questions for me  Reviewed case with nurse this am    No major changes  Continue ICU care due to severity of recent events and still extensive weakness-however as she remains off gtts she may be able to transfer to floor soon    Not a chemo candidate for near future    Disposition :rehab   Patient Active Problem List Diagnosis Code    Ovarian ca ([facility] C56.9    Severe obesity (BMI 35.0-39.9) ([facility]) E66.01    Abdominal pain R10.9    Sepsis ([facility] A41.9    Oliguria R34    Acute respiratory failure ([facility]) J96.00    JESS (acute kidney injury) ([facility] S88.6    Metabolic acidosis V49.8    Acute deep vein thrombosis (DVT) of lower extremity ([facility]) I82.409    S/P ileostomy ([facility]) Z93.2    Hypoalbuminemia E88.09    Peritonitis ([facility]) K65.9    Hypernatremia E87.0    Hypokalemia E87.6       Subjective:    She is awake hard to talk due to tongue pain, denies sevre pain        Review of systems:    No new events overnight per nursing      Vital signs/Intake and Output:  Visit Vitals    /85    Pulse 97    Temp 98 °F (36.7 °C)    Resp 20    Ht 5' 1\" (1.549 m)    Wt 98.3 kg (216 lb 11.4 oz)    SpO2 98%    BMI 40.95 kg/m2     Current Shift:  07/06 0701 - 07/06 1900  In: -   Out: 1000 [Urine:700]  Last three shifts:  07/04 1901 - 07/06 0700  In: 3293.2 [I.V.:1154.2]  Out: 6870 [Urine:4000; Drains:270]    Exam:    [facility] elderly appearing ill WF, NG in, no resp distress  Head/Neck: NCAT, supple, No masses, No lymphadenopathy  CVS:Regular rate and rhythm, no M/R/G, S1/S2 heard, no thrill  Lungs:Clear to auscultation bilaterally, no wheezes, rhonchi, or rales  Abdomen: wound vac, 2 ostomy bags, ND, few BS  Extremities:less edema, DP palp  Skin:normal texture and turgor, no rashes, no lesions  Neuro:grossly normal , follows commands  Psych:appropriate                Labs: Results:       Chemistry Recent Labs      07/06/18   0430  07/05/18   2150  07/05/18   1222  07/05/18   0410  07/04/18   0400   GLU  179*   --    --   182*  162*   NA  145   --    --   146*  146*   K  4.1  4.1  3.6  3.8  4.0   CL  113*   --    --   112*  113*   CO2  23   --    --   26  23   BUN  51*   --    --   44*  36*   CREA  0.69   --    --   0.75  0.70   CA  7.7*   --    --   7.8*  7.4*   AGAP  9   --    --   8  10   BUCR  74*   --    --   59*  51* AP  153*   --    --   123*  130*   TP  5.3*   --    --   5.3*  5.4*   ALB  2.0*   --    --   2.1*  2.4*   GLOB  3.3   --    --   3.2  3.0   AGRAT  0.6*   --    --   0.7*  0.8      CBC w/Diff Recent Labs      07/06/18   0430  07/05/18   0410  07/04/18   0400   WBC  11.2  11.4  12.7   RBC  3.68*  3.83*  3.76*   HGB  10.3*  10.7*  10.4*   HCT  33.1*  34.3*  33.6*   PLT  100*  96*  100*   GRANS  48  62  58   LYMPH  35  22  15*   EOS  7*  3  7*      Cardiac Enzymes No results for input(s): CPK, CKND1, FABIAN in the last 72 hours. No lab exists for component: CKRMB, TROIP   Coagulation No results for input(s): PTP, INR, APTT in the last 72 hours. No lab exists for component: INREXT    Lipid Panel Lab Results   Component Value Date/Time    Triglyceride 117 07/03/2018 04:05 AM      BNP No results for input(s): BNPP in the last 72 hours.    Liver Enzymes Recent Labs      07/06/18   0430   TP  5.3*   ALB  2.0*   AP  153*   SGOT  32      Thyroid Studies No results found for: T4, T3U, TSH, TSHEXT     Procedures/imaging: see electronic medical records for all procedures/Xrays and details which were not copied into this note but were reviewed prior to creation of Cely Siegel MD

## 2018-07-06 NOTE — PROGRESS NOTES
0730-Bedside and Verbal shift change report given to Olu Jordan, RN (oncoming nurse) by Mau Melara, RN (offgoing nurse). Report included the following information SBAR, Kardex, Intake/Output, MAR, Recent Results and Cardiac Rhythm SR.     0800- Initial assessment complete. VS stable. Brother at bedside. Will continue to monitor. 1200- Reassessment complete, no changes to previous. Family remains at bedside. 1500- Woundcare team and Dr Angi Acosta at bedside. 1600- Reassessment complete, no changes. Woundcare team changed out woundvac dressing and BRANDON open site stitched by Dr Angi Acosta. Dressing CDI. 1819-Bedside and Verbal shift change report given to Mau Melara, RN (oncoming nurse) by Olu Jordan RN (offgoing nurse).  Report included the following information SBAR, Kardex, Intake/Output, MAR, Recent Results and Cardiac Rhythm ST.

## 2018-07-06 NOTE — PROGRESS NOTES
Admit date: 6/4/2018      Tracy Butler a 75 y. 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 Status post complete surgical resection with no evidence of residual disease. Typically patient would receive adjuvant chemotherapy for this diagnosis and we have discussed this however patient is not medically appropriate for chemotherapy at this time and will not be within 12 weeks of primary debulking. Patient is high risk of recurrence based on pathology (Clear Cell). Will follow patient closely for recurrence and develop treatment plan at that time depending on overall medical status. GI - Peritonitis resolved. Last drain removed 6/27/2018 and tip was cultured and NGTD. Post operative ileus resolved. Now tolerating TF. Good ileostomy output. ID - Currently patient is on Meropenem D7/7, Vanc D15/14 and Micafungin D15/14. Zosyn 26 days completed 6/30/18, Levaquin 16 days completed 6/29/18, Flagyl 18 days complete 7/2/2018. Prior sepsis. I think we are clear from the intraabdominal process. Will discontinue remaining abx now as we have met or exceeded goal for therapeutic intention. Renal - JESS, resolved and Nephrology has signed off. Heme - Thrombocytopenia, unclear cause, stable. Hgb had been stable since last transfusion 6/29/18   DVT- bilateral peroneal DVT, full anticoagulation contraindicated. IVC filter was placed 6/28/2018 without issues.   CV - Intermittently tachy. Episode of Afib this admission, converted to NSR. CHF, improved. HTN, on metoprolol and hydralazine. All managed by Intensivist/Hospitalist  Pulm - Currently stable on NC during the day and BIPAP. Managed by Intensivist  Psych - Depressed started Zoloft 7/3/18. Disp - stable and appropriate for LTAC transfer. Plan to transfer to [facility] tomorrow. Code Status - Full Code      SUBJECTIVE  Patient is resting now with O2 3L per NC. Complains of pain but relieved with IV Dilaudid. Tolerated wound care very well. . Ileostomy normal output. No complaints of nausea.           OBJECTIVE  Physical Exam:  General - Resting on O2 nc  HEENT - nml, skin breakdown from taped dobhoff  Heart - NSR  Abdomen/Incision - soft, non tender, nondistended, normal bowel sounds, ileostomy looks good, wound vac removed and wound examined, debrided and edges were approximated with steri-strips with hydrofera blue drew. Wound edges and periwound skin is soft with no induration or erythema. LLQ bag was removed and stitch was placed on site.    Extremities - 2+ edema LE    Patient Vitals for the past 24 hrs:   BP Temp Pulse Resp SpO2 Weight   07/06/18 1500 142/80 - (!) 118 24 99 % -   07/06/18 1400 137/85 - (!) 105 20 95 % -   07/06/18 1300 130/69 - (!) 104 (!) 33 97 % -   07/06/18 1200 137/76 - (!) 103 26 96 % -   07/06/18 1100 139/87 - (!) 115 22 98 % -   07/06/18 1000 132/81 - 91 21 100 % -   07/06/18 0900 139/85 - 97 20 98 % -   07/06/18 0800 137/86 98 °F (36.7 °C) 95 19 100 % -   07/06/18 0700 (!) 118/102 - 88 17 99 % -   07/06/18 0600 133/82 - - - - -   07/06/18 0559 133/82 - (!) 105 - - -   07/06/18 0500 132/70 - (!) 105 21 98 % -   07/06/18 0428 - - - - 98 % -   07/06/18 0420 - 99 °F (37.2 °C) - - - -   07/06/18 0400 135/72 - 94 21 97 % -   07/06/18 0300 123/61 - 86 23 97 % -   07/06/18 0223 129/66 - 97 - - -   07/06/18 0200 115/62 - 89 23 97 % -   07/06/18 0100 111/69 - 97 21 97 % -   07/06/18 0023 - - - - 97 % -   07/06/18 0000 139/79 - (!) 112 (!) 31 98 % -   07/05/18 2356 136/84 - - - - -   07/05/18 2342 - 99.5 °F (37.5 °C) - - - -   07/05/18 2300 137/79 - (!) 106 25 97 % -   07/05/18 2200 137/75 - 100 27 96 % -   07/05/18 2100 136/72 - 96 22 97 % -   07/05/18 2000 140/71 - 98 26 97 % -   07/05/18 1938 129/74 98.5 °F (36.9 °C) 94 22 98 % -   07/05/18 1900 136/76 - 88 18 97 % -   07/05/18 1830 (!) 139/91 - - - - -   07/05/18 1800 123/74 - 88 18 98 % -   07/05/18 1730 126/71 - 88 18 97 % -   07/05/18 1717 142/71 - (!) 101 - - 98.3 kg (216 lb 11.4 oz)   07/05/18 1700 142/71 - 94 18 94 % -   07/05/18 1630 128/78 - 91 16 96 % -   07/05/18 1623 - 97.3 °F (36.3 °C) - - - -   07/05/18 1600 136/78 97.3 °F (36.3 °C) 94 19 96 % -         Intake/Output Summary (Last 24 hours) at 07/06/18 1544  Last data filed at 07/06/18 0955   Gross per 24 hour   Intake          2226.08 ml   Output             4170 ml   Net         -1943.92 ml          Labs  CBC  Recent Labs      07/06/18   0430  07/05/18   0410  07/04/18   0400   WBC  11.2  11.4  12.7   HCT  33.1*  34.3*  33.6*   MCV  89.9  89.6  89.4   BANDS  6*  6*  17*   MONOS  4  6  3   EOS  7*  3  7*   BASOS  0  1  0        CMP  Recent Labs      07/06/18   0430  07/05/18   0410  07/04/18   0400   NA  145  146*  146*   CO2  23  26  23   BUN  51*  44*  36*        Lab Results   Component Value Date/Time    Glucose 179 (H) 07/06/2018 04:30 AM    Glucose (POC) 163 (H) 07/06/2018 05:57 AM    Glucose,  (H) 06/15/2018 05:14 PM          Current Hospital Medications    Current Facility-Administered Medications:     metoprolol tartrate (LOPRESSOR) tablet 50 mg, 50 mg, Oral, Q12H, Christen Busch MD    potassium, sodium phosphates (NEUTRA-PHOS) packet 2 Packet, 2 Packet, Oral, QID, Christen Busch MD    HYDROmorphone (PF) (DILAUDID) injection 0.5 mg, 0.5 mg, IntraVENous, Q6H PRN, Christen Busch MD, 0.5 mg at 07/06/18 1208    furosemide (LASIX) injection 40 mg, 40 mg, IntraVENous, DAILY, Christen Miranda MD, 40 mg at 07/06/18 0901    micafungin (MYCAMINE) 100 mg in 0.9% sodium chloride (MBP/ADV) 100 mL, 100 mg, IntraVENous, Q24H, Leroy Reynolds MD, Last Rate: 100 mL/hr at 07/06/18 1534, 100 mg at 07/06/18 1534    meropenem (MERREM) 1 g in sterile water (preservative free) 20 mL IV syringe, 1 g, IntraVENous, Q8H, Leroy Reynolds MD, 1 g at 07/06/18 1126    sertraline (ZOLOFT) tablet 50 mg, 50 mg, Oral, DAILY, Ruben Lanza MD, 50 mg at 07/06/18 0902    dextrose 5% infusion, 25 mL/hr, IntraVENous, CONTINUOUS, Meena Martin MD, Last Rate: 25 mL/hr at 07/05/18 1030, 25 mL/hr at 07/05/18 1030    sodium hypochlorite (QUARTER STRENGTH DAKIN'S) 0.125% irrigation (bottle), , Topical, PRN, Ruben Lanza MD    0.9% sodium chloride infusion 250 mL, 250 mL, IntraVENous, PRN, Ruben Lanza MD    saliva stimulant (BIOTENE) 1 Spray, 1 Spray, Oral, PRN, Roe Lucas MD, 1 [place] at 06/28/18 1108    hydrALAZINE (APRESOLINE) 20 mg/mL injection 10 mg, 10 mg, IntraVENous, Q4H PRN, Roe Lucas MD, 10 mg at 07/03/18 2159    insulin glargine (LANTUS) injection 13 Units, 13 Units, SubCUTAneous, DAILY, Manolo Acevedo MD, 13 Units at 07/06/18 0901    insulin lispro (HUMALOG) injection, , SubCUTAneous, Q6H, Noreen Ríos MD, 2 Units at 07/06/18 1200    dextrose (D50W) injection syrg 12.5-25 g, 25-50 mL, IntraVENous, PRN, Noreen Ríos MD    ipratropium (ATROVENT) 0.02 % nebulizer solution 0.5 mg, 0.5 mg, Nebulization, Q4H PRN, Cullen Walters MD, 0.5 mg at 06/29/18 1449    naloxone (NARCAN) injection 0.4 mg, 0.4 mg, IntraVENous, EVERY 2 MINUTES AS NEEDED, Noreen Ríos MD    ondansetron [facility]) injection 4 mg, 4 mg, IntraVENous, Q6H PRN, Vaughn Arceo MD, 4 mg at 06/08/18 2739    oxymetazoline (AFRIN) 0.05 % nasal spray 2 Spray, 2 Spray, Both Nostrils, BID PRN, Manolo Acevedo MD    alum-mag hydroxide-[facility]) oral suspension 30 mL, 30 mL, Oral, Q4H PRN, Meena Martin MD, 30 mL at 06/07/18 4270   fluticasone (FLONASE) 50 mcg/actuation nasal spray 2 Spray, 2 Spray, Both Nostrils, DAILY PRN, Shanon Galvez MD    ELECTROLYTE REPLACEMENT PROTOCOL-POTASSIUM Renal Dosing, 1 Each, Other, PRN, Christen No MD    ELECTROLYTE REPLACEMENT PROTOCOL-MAGNESIUM, 1 Each, Other, PRN, Christen No MD    ELECTROLYTE REPLACEMENT PROTOCOL-PHOSPHORUS Renal Dosing, 1 Each, Other, PRN, Christen No MD    ELECTROLYTE REPLACEMENT PROTOCOL-CALCIUM, 1 Each, Other, PRN, Christen No MD    sodium chloride (NS) flush 5-10 mL, 5-10 mL, IntraVENous, PRN, Jamila Suero MD    glucose chewable tablet 16 g, 4 Tab, Oral, PRN, Christen No MD    glucagon (GLUCAGEN) injection 1 mg, 1 mg, IntraMUSCular, PRN, Christen No MD    pantoprazole (PROTONIX) 40 mg in sodium chloride 0.9% 10 mL injection, 40 mg, IntraVENous, DAILY, Sai Hernandez MD, 40 mg at 07/06/18 Amisha White MD

## 2018-07-06 NOTE — PROGRESS NOTES
Alejandro Og M.D  [address] [facility]. [facility] [address] [facility] [place] [address]  Phone [phone]   Fax [phone]    Pulmonary Critical Care & Sleep Medicine         Name: Paco Murrell MRN: [id number]   : 1950 Hospital: [facility] [place]    Date: 2018        PCCM Note    IMPRESSION:   · Acute hypoxic respiratory failure, on NC, came off of BiPAP; multifactorial from any generalized weakness; aspiration pneumonia following episode of mucus plug , previously had other etiologies with pulmonary edema, aspiration pneumonitis. No central or segmental PE on recent CTA chest. Previously required ventilator support this admission due to sepsis, reintubated on 18 and self-extubated   · S/p bronchoscopy 18: no aspiration noted then. Cx Negative. · Severe sepsis due to Klebsiella Oxytoca peritonitis and then perforation. S/p laparotomy 18 [POD # 5 tumor radical debulking] and peritoneal lavage: Klebsiella positive. Ruled out ischemic bowel in laparotomy. S/p laparotomy on 6/15/18 for peritonitis due to diverticulitis with perforation, s/p distal ileostomy. · Leukocytosis, improved on micafungin/vanco. Now aspiration pneumonia; repeat blood and fungal cx in process. Abd drain tip culture negative final  · Off heparin for intraabdominal bleeding and CTA chest no central PE. INR reversed with protamin, FFP.-- S/P IVC fillter   · S/p Shock suspected from sepsis and obstructive due to presumed PE with severe hypoxia and high A-a gradient, elevated RVSP 34 mm Hg. CTA 18 negative for central or main PA PE. Shock resolved. CTA chest no central PE. · Acute b/l peroneal DVT, s/p IVC filter by vascular surgery 18.    · Anemia, s/p PRBC tx 18, no gross bleeding  · Thrombocytopenia, mild, no active bleeding  · JESS, improved  · Hypernatremia, worsened following Lasix- improvement slowly - managed per renal  · Elevated LFT, due to shock liver, resolved  · Foreign body on peritoneal biopsy, per path report. · Anasarca due to fluid resuscitation, JESS, hypoalbuminemia and sequale of sepsis. 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 · Poor overall prognosis. RECOMMENDATIONS:  -- ID eval appreciated duration of antibiotics to decide by ID  -- Procalcitonin came very high of 0.72 so we should continue with antibiotics rather than rapid deescalation  -- DC iv metoprolol and iv cardizem and start po metoprolol  -- Stop nightly bipap and just use oxygen will check abg in am  -- recheck abg in am  -- Encourage for OT PT and Speech to see patient   -- DVT study of upper ext did not show any DVT  -- Change lasix to daily   -- Na is better with lasix and D5 monitor  -- Head ct due to on and off AMS-- Reported ok   -- Family in discussion with casemanager for LTAC placement  -- DC flagyl as received >14days and meropenam has good anarobic corverage  -- ABD ct shows improvement in hematoma but with ongoing fever will need ID to weigh in as hematoma can get 2ndarily get infected  -- Pending  Blood fungal culture due to TPN -- Negative so far  -- TPN will be stopped on 7/5/18  -- PLT is improving  -- Itching and rash per nursing might be due to bath and will try to avoid chk bath and see if still has asim like symptoms with vanco. Received vanco >14 days defer to ID but no MRSA and if it can be stopped   Respiratory: ABG reviewed at bedside; took off of BiPAP and placed on NC O2 @ 3 Lpm support - tolerating better on follow up- BiPAP 12/6 at night  low threshold for re-intubation. Keep SPO2 >=92%. Spoke with brother at the bedside. Periodic CXR  HOB 30 degree elevation all the time. Aggressive pulmonary toileting. Aspiration precautions. Judicious opiate pain medications. ID:  R sided pulmonary infiltrates. Follow up drain tip culture negative to date-- Improvement on xray with lasix noted  Yeast on fluid but was sent with skin contaminant [per GYN   Repeat blood and urine cultures pending-- Negative so far  Bronch cx normal robin. Peritoneal cx Klebsiella Oxytoca resistant to ampicillin. On micafungin/vanco. Continue Merrem started 6/30/18  Abx - Zosyn since 6/13 [day 18] stopped 6/30/18; levaquin since 6/17 [day 14] stopped 6/30/18. Stop  Flagyl since 6/15 [day 17]; iv vanco since 6/22, micafungin since 6/22  Hem: follow CBC; Keep Hb> 7 gm/dl. s/p IVC filter placement 6/28. Transfusion per surgery  CVS: Echo ok with mildly elevated RVSP but normal RV systolic function. AFib converted to NSR. Off of cardizem drip. -- On Metorprolol IV add cardizem iv every 6   Renal: Nephrologist seeing prn Creatinine improved. Lasix PRN because of hypernatremia. Stop albumin now as BP improved. Spoke with pharmacy to see if IVF with antibiotics could be changed to D5, but nationwide shortage of small bags of IVF to mix antibiotics but department will look around. GI/: TPN; barium study 6/21 - Spoke with speech suggested patient is not cooperative to do studies to suggest po intake  Endocrine: Maintain blood glucose 140-180. Humalog sc. Insulin in TPN. on Lantus  Neurology: Dilaudid- balance post-op pain without sedation- recommend judicious use of sedatives  Pain/Sedation: dilaudid - management per Dr Cedillo Ped - judicious use  Skin/Wound: local surgical site care. Wound vac changed 6/29. Electrolytes: Replace electrolytes per ICU electrolyte replacement protocol, renal service. Nutrition: TPN started 6/18.  NPO   Prophylaxis: GI Prophylaxis (protonix)  Restraints: none  Lines/Tubes:   ETT: 6/8/18- self extubated 6/17/18  Central line: right subclavian 6/4/18 - removed 6/20  Picc line 6/19 - Central line bundle followed  Fry: 6/4/18 (Medically necessary for strict input/output monitoring in critically ill patient, will remove it when not needed. Fry bundle followed). Will defer respective systems problem management to primary and other respective consultant and follow patient in ICU with primary and other medical team.  Further recommendations will be based on the patient's response to recommended treatment and results of the investigation ordered. Quality Care: PPI, DVT prophylaxis, HOB elevated, Infection control all reviewed and addressed. Brother at bedside, updated: overall poor prognosis with patient at increased risk for further deterioration, prolonged hospitalization, nosocomial infections, failure to thrive. Patient has no children. Code status: Full code. PICC Line/Fry cath medically necessary  PT and OT on case    Fry bundle   Central line bundle followed   Weaning per protocol     Quality Care: PPI, DVT prophylaxis, HOB elevated, Infection control all reviewed and addressed. Events and notes from last 24 hours reviewed. Care plan discussed with nursing CC TIME:32min                                                     Subjective/History of Present Illness:   Patient is a 75 y.o. 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 7/6/18  This AM came off of BiPAP to O2 via NC based on RR, ABG, SPO2 and alertness. Drowsy but arousable breathing little fast  Fever is better. Overall doing well on NG feeds now and TPN is off  ID suggested to continue micofungin and meropenam , Vanco stopped yesterday  No fever overnight.   Persistent leg edema  Hemodynamically stable  YEST ON FLUID-- As per gyn its taken from sac with skin contaminant and not concerned with it  Spoke with family at length   CXR continue to improve with lasix       Review of Systems   Review of systems not obtained due to patient factors. Surgeries  5/29/18 - Laparoscopy converted to laparotomy, total abdominal hysterectomy, bilateral salpingo-oophorectomy, omentectomy, bilateral pelvic and periaortic lymphadenectomy and radical tumor debulking to R0. Path - ADENOCARCINOMA OF LEFT OVARY.     6/5/18 - Diagnostic laparoscopy converted to laparotomy, peritoneal biopsies, aerobic and anaerobic cultures of peritoneal fluid and peritoneal lavage. 6/15/18 - Wound exploration. Exploratory laparotomy. Lysis of adhesions. Ileostomy and wound VAC placement.   Had wound vac change 6/29. 1 unit of PRBC on 6/29  Had removal of BRANDON drain on 6/27/18, tip culture negative final          Current Facility-Administered Medications   Medication Dose Route Frequency    metoprolol tartrate (LOPRESSOR) tablet 50 mg  50 mg Oral Q12H    potassium, sodium phosphates (NEUTRA-PHOS) packet 2 Packet  2 Packet Oral QID    HYDROmorphone (PF) (DILAUDID) injection 0.5 mg  0.5 mg IntraVENous ONCE    HYDROmorphone (PF) (DILAUDID) 2 mg/mL injection        furosemide (LASIX) injection 40 mg  40 mg IntraVENous DAILY    micafungin (MYCAMINE) 100 mg in 0.9% sodium chloride (MBP/ADV) 100 mL  100 mg IntraVENous Q24H    meropenem (MERREM) 1 g in sterile water (preservative free) 20 mL IV syringe  1 g IntraVENous Q8H    sertraline (ZOLOFT) tablet 50 mg  50 mg Oral DAILY    dextrose 5% infusion  25 mL/hr IntraVENous CONTINUOUS    insulin glargine (LANTUS) injection 13 Units  13 Units SubCUTAneous DAILY    insulin lispro (HUMALOG) injection   SubCUTAneous Q6H    pantoprazole (PROTONIX) 40 mg in sodium chloride 0.9% 10 mL injection  40 mg IntraVENous DAILY         Objective:   Vital Signs:    Visit Vitals    /85    Pulse (!) 105    Temp 98 °F (36.7 °C)    Resp 20    Ht 5' 1\" (1.549 m)    Wt 98.3 kg (216 lb 11.4 oz)    SpO2 95%    BMI 40.95 kg/m2       O2 Device: BIPAP   O2 Flow Rate (L/min): 3 l/min   Temp (24hrs), Av.3 °F (36.8 °C), Min:97.3 °F (36.3 °C), Max:99.5 °F (37.5 °C)       Intake/Output:   Last shift:      701 - 1900  In: -   Out: 1000 [Urine:700]    Last 3 shifts: 1901 - 700  In: 3293.2 [I.V.:1154.2]  Out: 0040 [Urine:4000; Drains:270]      Intake/Output Summary (Last 24 hours) at 18 1509  Last data filed at 18 0955   Gross per 24 hour   Intake          2226.08 ml   Output             4170 ml   Net         -1943.92 ml       Physical Exam:  General/Neurology: awake, following commands, improving looking, on O2 via NC @ 4 Lpm  Head:   Normocephalic, without obvious abnormality, atraumatic. Eye:   no scleral icterus, no pallor, no cyanosis. Pupils not dilated. Neck:   Symmetric. No lymphadenopathy. Trachea midline  Lung: Moderate equal air entry bilateral. Decreased breath sounds bases. No wheezing or crackles at bases. Heart:   S1 S2 present. No murmur. No JVD. Abdomen:  Soft. Obese. + BS. Midline lower abd surgical site - open wound with wound vac. Rt abd wall Ileostomy. No abd distension or guarding. Extremities:  No cyanosis. No clubbing. No hand edema. B/l feet/leg/thigh edema 2-3+. Pulses: Palpable radials and DPs bilateral.   Lymphatic:  No cervical or supraclav lymphadenopathy.    Skin:   No rash      Data:      CBC w/Diff Recent Labs      18   0430  18   0410  18   0400   WBC  11.2  11.4  12.7   RBC  3.68*  3.83*  3.76*   HGB  10.3*  10.7*  10.4*   HCT  33.1*  34.3*  33.6*   PLT  100*  96*  100*   GRANS  48  62  58   LYMPH  35  22  15*   EOS  7*  3  7*        Chemistry Recent Labs      18   1223  18   0430  18   2150  18   1222  18   0410   18   0400   GLU   --   179*   --    --   182*   --   162*   NA   --   145   -- --   146*   --   146*   K   --   4.1  4.1  3.6  3.8   --   4.0   CL   --   113*   --    --   112*   --   113*   CO2   --   23   --    --   26   --   23   BUN   --   51*   --    --   44*   --   36*   CREA   --   0.69   --    --   0.75   --   0.70   CA   --   7.7*   --    --   7.8*   --   7.4*   MG   --   1.9   --    --   1.9   --   1.9   PHOS  2.4*  2.5  2.6  2.3*  2.6   < >  2.3*   AGAP   --   9   --    --   8   --   10   BUCR   --   74*   --    --   59*   --   51*   AP   --   153*   --    --   123*   --   130*   TP   --   5.3*   --    --   5.3*   --   5.4*   ALB   --   2.0*   --    --   2.1*   --   2.4*   GLOB   --   3.3   --    --   3.2   --   3.0   AGRAT   --   0.6*   --    --   0.7*   --   0.8    < > = values in this interval not displayed. Lactic Acid Lactic acid   Date Value Ref Range Status   06/17/2018 2.3 (HH) 0.4 - 2.0 MMOL/L Final     Comment:     CALLED TO AND CORRECTLY REPEATED BY:  Petey De León RN ICU ON 6/17/2018 7042 TO [id number]       No results for input(s): LAC in the last 72 hours. Micro  No results for input(s): SDES, CULT in the last 72 hours. No results for input(s): CULT in the last 72 hours. ABG No results for input(s): PHI, PHI, POC2, PCO2I, PO2, PO2I, HCO3, HCO3I, FIO2, FIO2I in the last 72 hours. Liver Enzymes Protein, total   Date Value Ref Range Status   07/06/2018 5.3 (L) 6.4 - 8.2 g/dL Final     Albumin   Date Value Ref Range Status   07/06/2018 2.0 (L) 3.4 - 5.0 g/dL Final     Globulin   Date Value Ref Range Status   07/06/2018 3.3 2.0 - 4.0 g/dL Final     A-G Ratio   Date Value Ref Range Status   07/06/2018 0.6 (L) 0.8 - 1.7   Final     AST (SGOT)   Date Value Ref Range Status   07/06/2018 32 15 - 37 U/L Final     Alk.  phosphatase   Date Value Ref Range Status   07/06/2018 153 (H) 45 - 117 U/L Final     Recent Labs      07/06/18   0430  07/05/18   0410  07/04/18   0400   TP  5.3*  5.3*  5.4*   ALB  2.0*  2.1*  2.4*   GLOB  3.3  3.2  3.0   AGRAT  0.6*  0.7*  0.8   SGOT 32  23  26   AP  153*  123*  130*        Cardiac Enzymes No results found for: CPK, CK, CKMMB, CKMB, RCK3, CKMBT, CKNDX, CKND1, FABIAN, TROPT, TROIQ, KAYLAN, TROPT, TNIPOC, BNP, BNPP     BNP No results found for: BNP, BNPP, XBNPT     Coagulation No results for input(s): PTP, INR, APTT in the last 72 hours. No lab exists for component: INREXT, INREXT      Thyroid  No results found for: T4, T3U, TSH, TSHEXT, TSHEXT       Lipid Panel Lab Results   Component Value Date/Time    Triglyceride 117 07/03/2018 04:05 AM          Urinalysis Lab Results   Component Value Date/Time    Color DARK YELLOW 06/04/2018 08:42 AM    Appearance CLOUDY 06/04/2018 08:42 AM    Specific gravity 1.028 06/04/2018 08:42 AM    pH (UA) 5.0 06/04/2018 08:42 AM    Protein 30 (A) 06/04/2018 08:42 AM    Glucose NEGATIVE  06/04/2018 08:42 AM    Ketone TRACE (A) 06/04/2018 08:42 AM    Bilirubin SMALL (A) 06/04/2018 08:42 AM    Urobilinogen 1.0 06/04/2018 08:42 AM    Nitrites NEGATIVE  06/04/2018 08:42 AM    Leukocyte Esterase NEGATIVE  06/04/2018 08:42 AM    Epithelial cells FEW 06/04/2018 08:42 AM    Bacteria 1+ (A) 06/04/2018 08:42 AM    WBC 0 to 3 06/04/2018 08:42 AM    RBC 0 to 3 06/04/2018 08:42 AM        XR (Most Recent). CXR reviewed by me and compared with previous CXR   Results from Hospital Encounter encounter on 06/04/18   XR CHEST PORT   Narrative Chest, single view    Indication: Right-sided infiltrate. Follow-up examination. Comparison: Several prior exams, most recently July 5, 2018. Findings:  Portable upright AP view of the chest was obtained. The patient is  rotated on the provided projection with associated foreshortening of the right  hemithorax. Right PICC line present with tip projecting over the superior  cavoatrial junction. Small bowel feeding tube noted with tip present below the  diaphragm and collimated from view. Similar degree of pulmonary hypoinflation with linear right basilar opacity  unchanged.  Left midlung interstitial opacities similar to prior. No pneumothorax  or pleural effusion. Stable cardiac size and mediastinal contours. No acute  osseous abnormality. Impression Impression:    1. Rotated projection of the chest with linear left mid and right lower lung  atelectatic opacities similar to prior. 2. Right PICC line and visualized small bowel feeding tube as above. CT (Most Recent)   Results from Hospital Encounter encounter on 06/04/18   CT CHEST ABD PELV WO CONT   Narrative EXAM: CT of the chest, abdomen, and pelvis    INDICATION: Left ovarian clear cell adenocarcinoma. Peritonitis. Unable to  tolerate by mouth. COMPARISON: 6/22/2018    TECHNIQUE: Axial CT imaging of the chest, abdomen, and pelvis was performed  without intravenous contrast. Multiplanar reformats were generated. One or more  dose reduction techniques were used on this CT: automated exposure control,  adjustment of the mAs and/or kVp according to patient's size, and iterative  reconstruction techniques. The specific techniques utilized on this CT exam have  been documented in the patient's electronic medical record.    _______________    FINDINGS:    CHEST:    LUNGS: Motion artifact. Significant passive atelectasis in the right greater  than left lower lobes. Scattered foci of groundglass density bilaterally likely  regions of microatelectasis. PLEURA: Normal, with no effusion or pneumothorax. AIRWAY: Normal.    MEDIASTINUM: Normal heart size. No pericardial fluid. PICC tip is near the  cavoatrial junction. LYMPH NODES: Prominent mediastinal and bilateral axillary lymph nodes. For  example, a right paratracheal node on image 17 measures 1.3 cm in short axis. Most of the nodes are subcentimeter. OTHER: None. ABDOMEN/PELVIS:    LIVER, BILIARY: Likely simple cysts anterior right hepatic lobe. Normal liver  contour. No biliary dilation. Gallbladder is unremarkable.     SPLEEN: Normal.    PANCREAS: Normal.    ADRENALS: Normal.    KIDNEYS: Normal.    LYMPH NODES: No enlarged lymph nodes. GASTROINTESTINAL TRACT: No evidence of bowel obstruction. Right lower quadrant  ileostomy noted. VASCULATURE: IVC filter. PELVIC ORGANS: Fry catheter in the urinary bladder. Hysterectomy. BONES: No acute or aggressive osseous abnormalities identified. OTHER: Small volume ascites. Mixed density fluid collection with small foci of  gas in the left aspect of the pelvis measuring approximately 10.9 x 10.6 cm on  image 136, appearance favoring hematoma, previously 12.8 x 10 cm. Surgical  drains have been removed. Separate right pelvic sidewall collection measuring 8.5 x 3.3 cm similar to  prior. External iliac artery extends through this collection. Small hematoma around the ileostomy in the subcutaneous tissues similar to  prior. Diffuse edema of the subcutaneous fat. Open ventral wound. _______________         Impression IMPRESSION:    1. No evidence of bowel obstruction or other acute GI process. Right lower  quadrant ileostomy. 2. Small volume ascites and collections in the pelvis suggestive of  postoperative hematoma and seroma. Left pelvic hematoma component measures  slightly smaller than prior. 3. Regions of atelectasis in the lungs, most pronounced in the right lower lobe. EKG No results found for this or any previous visit. ECHO No results found for this or any previous visit. PFT No flowsheet data found. Other ASA reactivity:   Pre-albumin:   Ionized Calcium:   NH4:   T3, FT4:  Cortisol:  Urine Osm:  Urine Lytes:   HbA1c:                Echo 6/9/18:  Left ventricle: Systolic function was normal. Ejection fraction was estimated   in the range of 65 % to 70 %. No obvious  wall motion abnormalities identified in the views obtained. Wall thickness   was mildly increased.  Doppler parameters  were consistent with abnormal left ventricular relaxation (grade 1 diastolic dysfunction). Right ventricle: The size was normal. Systolic function was normal.  Mitral valve: There was mild annular calcification. Tricuspid valve: Pulmonary artery systolic pressure was mildly increased. Pulmonary artery systolic pressure: 34 mmHg. PVL LE 6/6/18: acute b/l peroneal DVT. CT abd pelvis 6/4/18:  1. Postsurgical hysterectomy, bilateral oophorectomy, pelvic lymphadenectomy and  a mastectomy surgical changes. Small volume of ascites in the abdomen and  pelvis, with a few tiny locules of gas in the right hemipelvis would be in  keeping with recent postsurgical etiology. 2. Abnormal edematous thick-walled small bowel loops in the left abdomen and  pelvis, with TRAM lamellar edematous configuration, induration. No findings of  pneumatosis. The overall appearance is nonspecific. Diagnostic considerations  include infectious etiology, nonspecific edema which may be unresolved  postsurgical etiology. Ischemia is also included in the differential diagnostic  consideration, however, there are no findings of pneumatosis, portal venous gas. 3. Stool in the right colon extending to the hepatic flexure with surrounding  gas, foci of pneumatosis could be obscured. No associated bowel wall thickening. 4. Satisfactory position of nasogastric tube. 5. Hepatomegaly, steatosis with 2 rounded low-density lesions, potentially  cysts. 6. Bibasilar atelectasis. Imaging:  [x]I have personally reviewed the patients chest radiographs images and report   7/1/18    Results from Hospital Encounter encounter on 06/04/18   XR CHEST PORT   Narrative Chest, single view    Indication: Right-sided infiltrate. Follow-up examination. Comparison: Several prior exams, most recently July 5, 2018. Findings:  Portable upright AP view of the chest was obtained. The patient is  rotated on the provided projection with associated foreshortening of the right  hemithorax.  Right PICC line present with tip projecting over the superior  cavoatrial junction. Small bowel feeding tube noted with tip present below the  diaphragm and collimated from view. Similar degree of pulmonary hypoinflation with linear right basilar opacity  unchanged. Left midlung interstitial opacities similar to prior. No pneumothorax  or pleural effusion. Stable cardiac size and mediastinal contours. No acute  osseous abnormality. Impression Impression:    1. Rotated projection of the chest with linear left mid and right lower lung  atelectatic opacities similar to prior. 2. Right PICC line and visualized small bowel feeding tube as above. 7/1/18  CXR  Frontal chest radiograph   Comparison: 6/30/2018   Indication: Right-sided pulmonary opacification follow-up.   Findings: Stable cardiomediastinal silhouette. Unchanged PICC placement. No visible pleural abnormality. Low lung volumes, more so on the right. Reticular opacification in the lower right lung with slightly less pronounced confluent appearance laterally. Left lung is clear. No acute osseous abnormality. IMPRESSION:    Persistent lower right lung opacification which appears predominantly reticular (less confluent appearance laterally). This could be infectious or atelectatic        Results from Hospital Encounter encounter on 06/04/18   CT CHEST ABD PELV WO CONT   Narrative EXAM: CT of the chest, abdomen, and pelvis    INDICATION: Left ovarian clear cell adenocarcinoma. Peritonitis. Unable to  tolerate by mouth. COMPARISON: 6/22/2018    TECHNIQUE: Axial CT imaging of the chest, abdomen, and pelvis was performed  without intravenous contrast. Multiplanar reformats were generated. One or more  dose reduction techniques were used on this CT: automated exposure control,  adjustment of the mAs and/or kVp according to patient's size, and iterative  reconstruction techniques.  The specific techniques utilized on this CT exam have  been documented in the patient's electronic medical record.    _______________    FINDINGS:    CHEST:    LUNGS: Motion artifact. Significant passive atelectasis in the right greater  than left lower lobes. Scattered foci of groundglass density bilaterally likely  regions of microatelectasis. PLEURA: Normal, with no effusion or pneumothorax. AIRWAY: Normal.    MEDIASTINUM: Normal heart size. No pericardial fluid. PICC tip is near the  cavoatrial junction. LYMPH NODES: Prominent mediastinal and bilateral axillary lymph nodes. For  example, a right paratracheal node on image 17 measures 1.3 cm in short axis. Most of the nodes are subcentimeter. OTHER: None. ABDOMEN/PELVIS:    LIVER, BILIARY: Likely simple cysts anterior right hepatic lobe. Normal liver  contour. No biliary dilation. Gallbladder is unremarkable. SPLEEN: Normal.    PANCREAS: Normal.    ADRENALS: Normal.    KIDNEYS: Normal.    LYMPH NODES: No enlarged lymph nodes. GASTROINTESTINAL TRACT: No evidence of bowel obstruction. Right lower quadrant  ileostomy noted. VASCULATURE: IVC filter. PELVIC ORGANS: Fry catheter in the urinary bladder. Hysterectomy. BONES: No acute or aggressive osseous abnormalities identified. OTHER: Small volume ascites. Mixed density fluid collection with small foci of  gas in the left aspect of the pelvis measuring approximately 10.9 x 10.6 cm on  image 136, appearance favoring hematoma, previously 12.8 x 10 cm. Surgical  drains have been removed. Separate right pelvic sidewall collection measuring 8.5 x 3.3 cm similar to  prior. External iliac artery extends through this collection. Small hematoma around the ileostomy in the subcutaneous tissues similar to  prior. Diffuse edema of the subcutaneous fat. Open ventral wound. _______________         Impression IMPRESSION:    1. No evidence of bowel obstruction or other acute GI process. Right lower  quadrant ileostomy.     2. Small volume ascites and collections in the pelvis suggestive of  postoperative hematoma and seroma. Left pelvic hematoma component measures  slightly smaller than prior. 3. Regions of atelectasis in the lungs, most pronounced in the right lower lobe.                Zarina Claros MD   7/6/2018

## 2018-07-06 NOTE — PROGRESS NOTES
Problem: Pressure Injury - Risk of  Goal: *Prevention of pressure injury  Document Oscar Scale and appropriate interventions in the flowsheet. Outcome: Progressing Towards Goal  Pressure Injury Interventions:  Sensory Interventions: Assess changes in LOC    Moisture Interventions: Absorbent underpads    Activity Interventions: Increase time out of bed    Mobility Interventions: Pressure redistribution bed/mattress (bed type)    Nutrition Interventions: Document food/fluid/supplement intake    Friction and Shear Interventions: Apply protective barrier, creams and emollients               Problem: Falls - Risk of  Goal: *Absence of Falls  Document Goyo Fall Risk and appropriate interventions in the flowsheet.    Outcome: Progressing Towards Goal  Fall Risk Interventions:  Mobility Interventions: Communicate number of staff needed for ambulation/transfer    Mentation Interventions: Bed/chair exit alarm    Medication Interventions: Evaluate medications/consider consulting pharmacy    Elimination Interventions: Call light in reach

## 2018-07-06 NOTE — WOUND CARE
Home Care Instructions  Wound Cleansing & Dressings  Remove: 3 hydrofera blue drew, 3 pieces of silver sponge, drape and steristrips    Clean wound with Normal Saline/Dermal Wound Cleanser    Apply to wound bed: Approximate wound bed with steri-strips, place Hydrofera blue drew between strips. Cover with silver sponge bolster. If silver not available black may be used. Apply to lolis-wound: Skin prep    Change weekly with silver sponge or twice weekly with black sponge    Wound VAC Therapy:  Drape and connect to wound vac at 125 mm hg.  Document number of sponge pieces removed and applied.

## 2018-07-07 ENCOUNTER — APPOINTMENT (OUTPATIENT)
Dept: GENERAL RADIOLOGY | Age: 68
DRG: 853 | End: 2018-07-07
Attending: HOSPITALIST
Payer: MEDICARE

## 2018-07-07 ENCOUNTER — APPOINTMENT (OUTPATIENT)
Dept: GENERAL RADIOLOGY | Age: 68
DRG: 853 | End: 2018-07-07
Attending: OBSTETRICS & GYNECOLOGY
Payer: MEDICARE

## 2018-07-07 ENCOUNTER — APPOINTMENT (OUTPATIENT)
Dept: GENERAL RADIOLOGY | Age: 68
DRG: 853 | End: 2018-07-07
Attending: INTERNAL MEDICINE
Payer: MEDICARE

## 2018-07-07 LAB
ALBUMIN SERPL-MCNC: 1.8 G/DL (ref 3.4–5)
ALBUMIN/GLOB SERPL: 0.6 {RATIO} (ref 0.8–1.7)
ALP SERPL-CCNC: 177 U/L (ref 45–117)
ALT SERPL-CCNC: 35 U/L (ref 13–56)
ANION GAP SERPL CALC-SCNC: 7 MMOL/L (ref 3–18)
ARTERIAL PATENCY WRIST A: YES
AST SERPL-CCNC: 32 U/L (ref 15–37)
BASE DEFICIT BLD-SCNC: 1 MMOL/L
BASOPHILS # BLD: 0 K/UL (ref 0–0.1)
BASOPHILS NFR BLD: 0 % (ref 0–3)
BDY SITE: ABNORMAL
BILIRUB SERPL-MCNC: 0.6 MG/DL (ref 0.2–1)
BODY TEMPERATURE: 97.8
BUN SERPL-MCNC: 52 MG/DL (ref 7–18)
BUN/CREAT SERPL: 88 (ref 12–20)
CA-I SERPL-SCNC: 1.18 MMOL/L (ref 1.12–1.32)
CALCIUM SERPL-MCNC: 7.9 MG/DL (ref 8.5–10.1)
CHLORIDE SERPL-SCNC: 116 MMOL/L (ref 100–108)
CO2 SERPL-SCNC: 24 MMOL/L (ref 21–32)
CREAT SERPL-MCNC: 0.59 MG/DL (ref 0.6–1.3)
DIFFERENTIAL METHOD BLD: ABNORMAL
EOSINOPHIL # BLD: 0.6 K/UL (ref 0–0.4)
EOSINOPHIL NFR BLD: 6 % (ref 0–5)
ERYTHROCYTE [DISTWIDTH] IN BLOOD BY AUTOMATED COUNT: 25 % (ref 11.6–14.5)
EST. AVERAGE GLUCOSE BLD GHB EST-MCNC: 123 MG/DL
GAS FLOW.O2 O2 DELIVERY SYS: ABNORMAL L/MIN
GAS FLOW.O2 SETTING OXYMISER: 3 L/M
GLOBULIN SER CALC-MCNC: 3.1 G/DL (ref 2–4)
GLUCOSE BLD STRIP.AUTO-MCNC: 144 MG/DL (ref 70–110)
GLUCOSE BLD STRIP.AUTO-MCNC: 164 MG/DL (ref 70–110)
GLUCOSE BLD STRIP.AUTO-MCNC: 165 MG/DL (ref 70–110)
GLUCOSE BLD STRIP.AUTO-MCNC: 169 MG/DL (ref 70–110)
GLUCOSE BLD STRIP.AUTO-MCNC: 175 MG/DL (ref 70–110)
GLUCOSE SERPL-MCNC: 145 MG/DL (ref 74–99)
HBA1C MFR BLD: 5.9 % (ref 4.5–5.6)
HCO3 BLD-SCNC: 24 MMOL/L (ref 22–26)
HCT VFR BLD AUTO: 31.7 % (ref 35–45)
HGB BLD-MCNC: 9.7 G/DL (ref 12–16)
LYMPHOCYTES # BLD: 4.1 K/UL (ref 0.8–3.5)
LYMPHOCYTES NFR BLD: 45 % (ref 20–51)
MAGNESIUM SERPL-MCNC: 1.8 MG/DL (ref 1.6–2.6)
MAGNESIUM SERPL-MCNC: 2.2 MG/DL (ref 1.6–2.6)
MCH RBC QN AUTO: 27.6 PG (ref 24–34)
MCHC RBC AUTO-ENTMCNC: 30.6 G/DL (ref 31–37)
MCV RBC AUTO: 90.3 FL (ref 74–97)
METAMYELOCYTES NFR BLD MANUAL: 1 %
MONOCYTES # BLD: 0.4 K/UL (ref 0–1)
MONOCYTES NFR BLD: 4 % (ref 2–9)
NEUTS BAND NFR BLD MANUAL: 2 % (ref 0–5)
NEUTS SEG # BLD: 3.9 K/UL (ref 1.8–8)
NEUTS SEG NFR BLD: 42 % (ref 42–75)
NRBC BLD-RTO: 1 PER 100 WBC
O2/TOTAL GAS SETTING VFR VENT: 32 %
PCO2 BLD: 39.8 MMHG (ref 35–45)
PH BLD: 7.39 [PH] (ref 7.35–7.45)
PHOSPHATE SERPL-MCNC: 3.1 MG/DL (ref 2.5–4.9)
PLATELET # BLD AUTO: 95 K/UL (ref 135–420)
PLATELET COMMENTS,PCOM: ABNORMAL
PO2 BLD: 99 MMHG (ref 80–100)
POTASSIUM SERPL-SCNC: 3.4 MMOL/L (ref 3.5–5.5)
POTASSIUM SERPL-SCNC: 3.7 MMOL/L (ref 3.5–5.5)
PROT SERPL-MCNC: 4.9 G/DL (ref 6.4–8.2)
RBC # BLD AUTO: 3.51 M/UL (ref 4.2–5.3)
RBC MORPH BLD: ABNORMAL
SAO2 % BLD: 98 % (ref 92–97)
SERVICE CMNT-IMP: ABNORMAL
SODIUM SERPL-SCNC: 147 MMOL/L (ref 136–145)
SPECIMEN TYPE: ABNORMAL
WBC # BLD AUTO: 9.2 K/UL (ref 4.6–13.2)

## 2018-07-07 PROCEDURE — 74011250636 HC RX REV CODE- 250/636: Performed by: HOSPITALIST

## 2018-07-07 PROCEDURE — 74011250636 HC RX REV CODE- 250/636: Performed by: INTERNAL MEDICINE

## 2018-07-07 PROCEDURE — 74011250637 HC RX REV CODE- 250/637: Performed by: OBSTETRICS & GYNECOLOGY

## 2018-07-07 PROCEDURE — 74011000258 HC RX REV CODE- 258: Performed by: INTERNAL MEDICINE

## 2018-07-07 PROCEDURE — 83036 HEMOGLOBIN GLYCOSYLATED A1C: CPT | Performed by: OBSTETRICS & GYNECOLOGY

## 2018-07-07 PROCEDURE — 84100 ASSAY OF PHOSPHORUS: CPT | Performed by: INTERNAL MEDICINE

## 2018-07-07 PROCEDURE — 65270000029 HC RM PRIVATE

## 2018-07-07 PROCEDURE — 77010033678 HC OXYGEN DAILY

## 2018-07-07 PROCEDURE — 83735 ASSAY OF MAGNESIUM: CPT | Performed by: INTERNAL MEDICINE

## 2018-07-07 PROCEDURE — C9113 INJ PANTOPRAZOLE SODIUM, VIA: HCPCS | Performed by: INTERNAL MEDICINE

## 2018-07-07 PROCEDURE — 82962 GLUCOSE BLOOD TEST: CPT

## 2018-07-07 PROCEDURE — 84145 PROCALCITONIN (PCT): CPT | Performed by: OBSTETRICS & GYNECOLOGY

## 2018-07-07 PROCEDURE — 74018 RADEX ABDOMEN 1 VIEW: CPT

## 2018-07-07 PROCEDURE — 71045 X-RAY EXAM CHEST 1 VIEW: CPT

## 2018-07-07 PROCEDURE — 85025 COMPLETE CBC W/AUTO DIFF WBC: CPT | Performed by: INTERNAL MEDICINE

## 2018-07-07 PROCEDURE — 84132 ASSAY OF SERUM POTASSIUM: CPT | Performed by: INTERNAL MEDICINE

## 2018-07-07 PROCEDURE — 83735 ASSAY OF MAGNESIUM: CPT | Performed by: HOSPITALIST

## 2018-07-07 PROCEDURE — 84134 ASSAY OF PREALBUMIN: CPT | Performed by: OBSTETRICS & GYNECOLOGY

## 2018-07-07 PROCEDURE — 97163 PT EVAL HIGH COMPLEX 45 MIN: CPT

## 2018-07-07 PROCEDURE — 97164 PT RE-EVAL EST PLAN CARE: CPT

## 2018-07-07 PROCEDURE — 74011636637 HC RX REV CODE- 636/637: Performed by: INTERNAL MEDICINE

## 2018-07-07 PROCEDURE — 74011636637 HC RX REV CODE- 636/637: Performed by: HOSPITALIST

## 2018-07-07 PROCEDURE — 82330 ASSAY OF CALCIUM: CPT | Performed by: SURGERY

## 2018-07-07 PROCEDURE — 82803 BLOOD GASES ANY COMBINATION: CPT

## 2018-07-07 PROCEDURE — 97530 THERAPEUTIC ACTIVITIES: CPT

## 2018-07-07 PROCEDURE — 77030037878 HC DRSG MEPILEX >48IN BORD MOLN -B

## 2018-07-07 PROCEDURE — 36592 COLLECT BLOOD FROM PICC: CPT

## 2018-07-07 PROCEDURE — 74011250637 HC RX REV CODE- 250/637: Performed by: INTERNAL MEDICINE

## 2018-07-07 PROCEDURE — 74011000250 HC RX REV CODE- 250: Performed by: INTERNAL MEDICINE

## 2018-07-07 PROCEDURE — 36600 WITHDRAWAL OF ARTERIAL BLOOD: CPT

## 2018-07-07 RX ORDER — MAGNESIUM SULFATE 1 G/100ML
1 INJECTION INTRAVENOUS
Status: COMPLETED | OUTPATIENT
Start: 2018-07-07 | End: 2018-07-07

## 2018-07-07 RX ORDER — POTASSIUM CHLORIDE 7.45 MG/ML
10 INJECTION INTRAVENOUS ONCE
Status: COMPLETED | OUTPATIENT
Start: 2018-07-07 | End: 2018-07-07

## 2018-07-07 RX ORDER — POTASSIUM CHLORIDE 20MEQ/15ML
30 LIQUID (ML) ORAL DAILY
Status: COMPLETED | OUTPATIENT
Start: 2018-07-07 | End: 2018-07-07

## 2018-07-07 RX ORDER — INSULIN GLARGINE 100 [IU]/ML
15 INJECTION, SOLUTION SUBCUTANEOUS DAILY
Status: DISCONTINUED | OUTPATIENT
Start: 2018-07-08 | End: 2018-07-08

## 2018-07-07 RX ADMIN — INSULIN GLARGINE 13 UNITS: 100 INJECTION, SOLUTION SUBCUTANEOUS at 09:22

## 2018-07-07 RX ADMIN — POTASSIUM CHLORIDE 30 MEQ: 20 SOLUTION ORAL at 16:30

## 2018-07-07 RX ADMIN — FUROSEMIDE 40 MG: 10 INJECTION, SOLUTION INTRAMUSCULAR; INTRAVENOUS at 09:21

## 2018-07-07 RX ADMIN — POTASSIUM CHLORIDE 10 MEQ: 10 INJECTION, SOLUTION INTRAVENOUS at 05:59

## 2018-07-07 RX ADMIN — INSULIN LISPRO 2 UNITS: 100 INJECTION, SOLUTION INTRAVENOUS; SUBCUTANEOUS at 18:08

## 2018-07-07 RX ADMIN — INSULIN LISPRO 2 UNITS: 100 INJECTION, SOLUTION INTRAVENOUS; SUBCUTANEOUS at 23:49

## 2018-07-07 RX ADMIN — INSULIN LISPRO 2 UNITS: 100 INJECTION, SOLUTION INTRAVENOUS; SUBCUTANEOUS at 12:00

## 2018-07-07 RX ADMIN — METOPROLOL TARTRATE 50 MG: 50 TABLET, FILM COATED ORAL at 20:45

## 2018-07-07 RX ADMIN — METOPROLOL TARTRATE 50 MG: 50 TABLET, FILM COATED ORAL at 11:45

## 2018-07-07 RX ADMIN — HYDROMORPHONE HYDROCHLORIDE 0.5 MG: 2 INJECTION, SOLUTION INTRAMUSCULAR; INTRAVENOUS; SUBCUTANEOUS at 16:31

## 2018-07-07 RX ADMIN — DEXTROSE MONOHYDRATE 25 ML/HR: 5 INJECTION, SOLUTION INTRAVENOUS at 04:57

## 2018-07-07 RX ADMIN — HYDROMORPHONE HYDROCHLORIDE 0.5 MG: 2 INJECTION, SOLUTION INTRAMUSCULAR; INTRAVENOUS; SUBCUTANEOUS at 02:32

## 2018-07-07 RX ADMIN — INSULIN LISPRO 2 UNITS: 100 INJECTION, SOLUTION INTRAVENOUS; SUBCUTANEOUS at 01:07

## 2018-07-07 RX ADMIN — SODIUM CHLORIDE 40 MG: 9 INJECTION INTRAMUSCULAR; INTRAVENOUS; SUBCUTANEOUS at 09:24

## 2018-07-07 RX ADMIN — MAGNESIUM SULFATE HEPTAHYDRATE 1 G: 1 INJECTION, SOLUTION INTRAVENOUS at 06:00

## 2018-07-07 RX ADMIN — SERTRALINE HYDROCHLORIDE 50 MG: 50 TABLET ORAL at 11:45

## 2018-07-07 NOTE — PROGRESS NOTES
Bedside shift change report given to Andriy Alegria (oncoming nurse) by Dewayne Mar nurse). Report included the following information SBAR, Kardex, Procedure Summary, Intake/Output, MAR, Accordion, Recent Results, Med Rec Status, Cardiac Rhythm NSR and Alarm Parameters . Report given to this writer that pt pulle dout her dobhoff and that DR Formannotified and hospitalist. For order to DON another Dophoff    0800 Assessment completed, pt denies pain or distress and alert      1145 Dopoff placed by Sera Butler and assisted by this writer. xray verified, successful placement, orders to use, and restart food per DR Forman    1200 Reassessment completed, Pt is alert, denies any pain or distress, family at bedside     1600 Reassessment completed, no changes     1900 Bedside} shift change report given to Bryant Costa (oncoming nurse) by Andriy Alegria (offgoing nurse). Report included the following information SBAR, Kardex, ED Summary, OR Summary, Procedure Summary, Intake/Output, MAR, Accordion, Recent Results, Med Rec Status, Cardiac Rhythm nsr and Alarm Parameters . Patient in bed watching tv, denies any pain or distress. Patient more interactive today

## 2018-07-07 NOTE — PROGRESS NOTES
Hospitalist Progress Note    Patient: Mirella Newman MRN: [id number]  [facility]: [id number]    YOB: 1950  Age: 75 y.o. Sex: female    DOA: 6/4/2018 LOS:  LOS: 33 days                Assessment and Plan:    Principal Problem:    Sepsis ([address] (6/4/2018)    Active Problems:    Ovarian ca ([address] (5/29/2018)      Abdominal pain (6/4/2018)      Oliguria (6/4/2018)      Acute respiratory failure ([address] (6/5/2018)      JESS (acute kidney injury) ([address] (2/9/7580)      Metabolic acidosis (6/0/1083)      Acute deep vein thrombosis (DVT) of lower extremity ([address] (6/7/2018)      S/P ileostomy ([address] (6/16/2018)      Hypoalbuminemia (6/16/2018)      Peritonitis ([address] (6/16/2018)      Hypernatremia (6/25/2018)      Hypokalemia (6/27/2018)        Assessment/Plan   76 yo WF admitted for sepsis, dx with ovarian ca prior to admission, had debulking surgery, came in with peritonitis and sepsis      Acute resp failure with hypoxia stayed on nc all night and did well     - self extubated 06/17/2018.   - bronchoscopy 06/09/2018,       CTA chest negative for  PE      Severe Sepsis/shock    - secondary to peritonitis. Initial exp lap followed by surgery to debride and has 2 ostomies placed with large wound now with vac in place- ID on case- Finished multiople courses of antibiotics and antifungals. I talked to dr. Maggie Beck and we discussed rechecking procalcitonin and how it was drawn 4 days ago . We will watch another another day      Anasarca-appears improved      Afib, tachycardia-improved now off IV cardizem, now on  Po metoprolol        Bleeding from BRANDON drain -resolved with protamine and FFP.       Acute bilateral peroneal DVT   - ivc filter placed  and no current plans for further anticoagulation     Ac blood loss anemia now stable  Thrombocytopenia without signs for bleeding      JESS-resolved      Metabolic acidosis-resolved   Hypokalemia resolved  Hypernatremia: improved      TPN weaned off and now on NG feedings, tolerating well except she pulled Dophoff so will replace        She will need extensive PT and rehab        Wound care, wound vac    Mental status slightly better but still intermittently confused      DM:   Lantus , ssi      Reviewed case with nurse this am    Not a chemo candidate for near future        Chief complaint:  Peritonitis and sepsis        Subjective:  Eyes open, puled out Dobhoff today        Review of systems:    General: No fevers or chills. Cardiovascular: No chest pain or pressure. No palpitations. Pulmonary: No shortness of breath. Gastrointestinal: No nausea, vomiting. Objective:    Vital signs/Intake and Output:  Visit Vitals    /79    Pulse 90    Temp 97.6 °F (36.4 °C)    Resp 16    Ht 5' 1\" (1.549 m)    Wt 98.3 kg (216 lb 11.4 oz)    SpO2 99%    BMI 40.95 kg/m2     Current Shift:     Last three shifts:  07/05 1901 - 07/07 0700  In: 2652.3 [I.V.:1453.3]  Out: 0038 [Urine:3325; Drains:95]    Physical Exam:  General: NAD,eyes open, answers questions    HEENT: NC/AT. PERRLA, EOMI.  MMM. Lungs: Nml inspection. CTA B/L. No wheezing, rales or rhonchi. Heart:  S1S2 RRR,  PMI mid 5th IC space. No M/RG. Abdomen: Soft, NT/ND.  BS+. No peritoneal signs. Extremities: No C/C/E. Psych:   Nml affect.   Neurologic:  Slow to respond            Labs: Results:       Chemistry Recent Labs      07/07/18   0450  07/06/18   0430  07/05/18   2150   07/05/18   0410   GLU  145*  179*   --    --   182*   NA  147*  145   --    --   146*   K  3.7  4.1  4.1   < >  3.8   CL  116*  113*   --    --   112*   CO2  24  23   --    --   26   BUN  52*  51*   --    --   44*   CREA  0.59*  0.69   --    --   0.75   CA  7.9*  7.7*   --    --   7.8*   AGAP  7  9   --    --   8   BUCR  88*  74*   --    --   59*   AP  177*  153*   --    --   123*   TP  4.9*  5.3*   --    --   5.3*   ALB  1.8*  2.0*   --    --   2.1*   GLOB  3.1  3.3   --    --   3.2   AGRAT  0.6*  0.6*   --    --   0.7*    < > = values in this interval not displayed. CBC w/Diff Recent Labs      07/07/18   0450  07/06/18   0430  07/05/18   0410   WBC  9.2  11.2  11.4   RBC  3.51*  3.68*  3.83*   HGB  9.7*  10.3*  10.7*   HCT  31.7*  33.1*  34.3*   PLT  95*  100*  96*   GRANS  42  48  62   LYMPH  45  35  22   EOS  6*  7*  3      Cardiac Enzymes No results for input(s): CPK, CKND1, FABIAN in the last 72 hours. No lab exists for component: CKRMB, TROIP   Coagulation No results for input(s): PTP, INR, APTT in the last 72 hours. No lab exists for component: INREXT    Lipid Panel Lab Results   Component Value Date/Time    Triglyceride 117 07/03/2018 04:05 AM      BNP No results for input(s): BNPP in the last 72 hours.    Liver Enzymes Recent Labs      07/07/18   0450   TP  4.9*   ALB  1.8*   AP  177*   SGOT  32      Thyroid Studies No results found for: T4, T3U, TSH, TSHEXT     Procedures/imaging: see electronic medical records for all procedures/Xrays and details which were not copied into this note but were reviewed prior to creation of Plan

## 2018-07-07 NOTE — PROGRESS NOTES
DC Plan:  Discharge to [facility] [phone] @ [address] today, once medically stable. Will need dc order/summary before pt can transition to next level of care. Chart accessed as CM on call. Chart reviewed. Received page from Rajni catalan [employer] for status update [phone]. This CM called ICU and LM for primary RN Jennifer to call back to see if patient is ready for dc. No discharge order noted at this time. [address] with primary RN Jennifer in ICU, no discharge order noted. [address] with Rajni catalan [employer] and informed her MD is holding off on discharge at this time. CM will cont to be available, as needed. Care Management Interventions  PCP Verified by CM:  Yes  Palliative Care Criteria Met (RRAT>21 & CHF Dx)?: Yes  Mode of Transport at Discharge: ALS  Transition of Care Consult (CM Consult): LTAC  Discharge Durable Medical Equipment: No  Health Maintenance Reviewed: Yes  Physical Therapy Consult: Yes  Occupational Therapy Consult: Yes  Speech Therapy Consult: Yes  Current Support Network: Relative's Home (brother)  Confirm Follow Up Transport: Family  Plan discussed with Pt/Family/Caregiver: Yes  Freedom of Choice Offered: Yes (n/a)  Discharge Location  Discharge Placement: [facility] (LT[facility])

## 2018-07-07 NOTE — PROGRESS NOTES
@3033 pt taken over awake in bed , denies pain remains on 3L/nc spo2 99%. Remains on tube feeds via dobbhoff. 0 residual observed. Vital signs stable at this time Wound vac remains in place to abdomen , ileostomy  draining yellow liquid stool. Assessment done and charted in relevant flow sheets nursing management continues. @2015 no changes care continues. @2200 pt asleep intermittently easily aroused vital signs remains WNL nursing management continues. @0000 no new developments observation continues. @0200 care continues. @0478 pt complaint of pain and requested pain medication , analgesia administered as prescribed observation continues. @0300 pt indicated that she is no longer in pain care continues. @0500 pt awake and conversing . Pt pulled out Dobbhoff and refused to have it replaced at this time. @0318 personal hygiene needs met care continues. @0383 Dr. Magdalena Jones present and was informed that pt pulled out her dobbhoff. @0800 Bedside and Verbal shift change report given to Jennifer Rosas (oncoming nurse) by Enzo Altman (offgoing nurse). Report included the following information SBAR, Kardex, Intake/Output, MAR, Recent Results and Med Rec Status.  Alarm parameters reviewed, on and audible Appropriate for patient clinical condition

## 2018-07-07 NOTE — PROGRESS NOTES
Conferred with Dr. Tone Huddleston at bedside regarding medical management in preparation for transfer to [place] [address]. Insulin adjustment was made. Will continue to hold abx. Ordered repeat procalcitonin this am to calculate a delta PCT as prognostic indicator as a solitary value is not enough information. This test is ideally drawn on D0 or D1 of severe sepsis and again on D4 to have most value. Unfortunately we don't have that data but today is day 4 from when it was first drawn and we will calculate the delta PCT with these values. Dr. Tone Huddleston will interpret this when available.   Maggi Mo MD

## 2018-07-07 NOTE — PROGRESS NOTES
Problem: Mobility Impaired (Adult and Pediatric)  Goal: *Acute Goals and Plan of Care (Insert Text)  Physical Therapy Goals  Pt re-evaluated 7/07/2018  Goals to be accomplished 5-7 days    1. Bed mobility: Rolling L to R to with mod A with use of HR for positioning. 2. Transfer: Supine to/from Sit with mod/max A with HR for change of position/prep for EOB sitting. 3. Activity Tolerance: Tolerate EOB sitting 15-20 minutes with fair UE supported balance for ADL/balance activities. 4. Patient will perform sit to stand with RW/maximal assistance and tolerate same x 15-30 seconds. 5. Ambulation:  Ambulate 5 ft. mod/max A with RW for increased functional mobility. Updated 6/28/2018 and to be accomplished within 5-7 day(s)  1. Bed mobility: Rolling L to R to with max/mod A with use of HR for positioning. 2. Transfer: Supine to/from Sit with mod/max A with HR for change of position/prep for EOB sitting. 3. Activity Tolerance: Tolerate EOB sitting 5-10 minutes with fair UE supported balance for ADL/balance activities. 4. Patient will perform sit to stand with RW/maximal assistance and tolerate same x 15-30 seconds. 5. Ambulation:  Ambulate 5 ft. mod/max A with RW for increased functional mobility. Physical Therapy Goals  Updated 6/20/2018 and to be accomplished within 7 day(s)  1. Patient will move from supine to sit and sit to supine in bed with maximal assistance. 2.  Patient will transfer from bed to chair and chair to bed with maximal assistance using the least restrictive device. 3.  Patient will perform sit to stand with maximal assistance. 4.  Patient will ambulate with maximal assistance for 5 feet with the least restrictive device.             physical Therapy EVALUATION    Patient: Kindra Nathan (9 y.o. female)  Date: 7/7/2018  Primary Diagnosis: Abdominal pain  Abdominal pain  Abdominal Pain  aspiration  POSTOP WOUND EXPLORATION  Procedure(s) (LRB):  WOUND EXPLORATION, EXPLORATORY LAPAROTOMY, ILLEOSTMOY, LYSES OF ADHESIONS AND WOUND VAC PLACEMENT (N/A) 22 Days Post-Op   Precautions:   Fall    ASSESSMENT :  Based on the objective data described below, the patient presents with MD Mock) request of IE due to pt increased compliance w/ medical plan, and increased willingness to participate w/ PT. Pt had been seen initially by PT for >2 weeks and d/c due to non compliance and no progress 07/04/2018. Pt cont to present w/ decreased AROM, decreased generalized strength x4 extremities, dec tolerance for activity, dec bed mobility, dec transfers and safety. Pt able to demonstrate improved participation following commands and participating in activities. Pt has profound weakness due to being in bed over course of weeks and has R side preferences. Pt denied pain until transfer supine to sit requiring mod A x2, sitting EOB pt had R LE cramping but resolved w/ stretching. Pt able to participate w/ postural activity in sitting and cervical stretching. Pt sat EOB w/ support CGA to min/mod A ~10'. Pt returned sit to supine requiring max A due to fatigue. Pt would benefit from another PT course attempt to improve functional mobility and independence as long as pt continues to participate. Dr. Toyin Hampton present throughout session, brothers present as well providing encouragement. Recommend LTCF upon hospital d/c. Nurse aware. Patient will benefit from skilled intervention to address the above impairments.   Patients rehabilitation potential is considered to be Fair  Factors which may influence rehabilitation potential include:   []         None noted  []         Mental ability/status  [x]         Medical condition  []         Home/family situation and support systems  []         Safety awareness  []         Pain tolerance/management  []         Other:      PLAN :  Recommendations and Planned Interventions:  [x]           Bed Mobility Training             []    Neuromuscular Re-Education  [x]           Transfer Training                   []    Orthotic/Prosthetic Training  [x]           Gait Training                          []    Modalities  [x]           Therapeutic Exercises          []    Edema Management/Control  [x]           Therapeutic Activities            [x]    Patient and Family Training/Education  []           Other (comment):    Frequency/Duration: Patient will be followed by physical therapy daily to address goals. Discharge Recommendations: LTCF  Further Equipment Recommendations for Discharge: N/A     SUBJECTIVE:   Patient stated I will try.     OBJECTIVE DATA SUMMARY:     Past Medical History:   Diagnosis Date    Diabetes ([facility] [address]     many years type 2    Hypertension     many years     Past Surgical History:   Procedure Laterality Date    HX OOPHORECTOMY  05/29/2018    HX TONSILLECTOMY      as a child      Barriers to Learning/Limitations: yes;  physical  Compensate with: visual, verbal, tactile, kinesthetic cues/model  Prior Level of Function/Home Situation:   Home Situation  Home Environment: Private residence  # Steps to Enter: 4  One/Two Story Residence: One story  Living Alone: No  Support Systems: Family member(s)  Patient Expects to be Discharged to[de-identified] Unknown  Current DME Used/Available at Home: Commode, bedside, Shower chair, Walker, rolling, Wheelchair  Tub or Shower Type: Shower  Critical Behavior:  Neurologic State: Alert  Orientation Level: Oriented to person;Oriented to place; Disoriented to time  Cognition: Follows commands; Appropriate for age attention/concentration  Psychosocial  Patient Behaviors: Calm; Cooperative  Needs Expressed: Educational  Purposeful Interaction: Yes  Pt Identified Daily Priority: Clinical issues (comment)  Caritas Process: Nurture loving kindness  Caring Interventions: Reassure; Therapeutic modalities  Reassure:  Therapeutic listening  Therapeutic Modalities: Deep breathing  Skin Condition/Temp: Warm  Skin Integumentary  Skin Color: Ecchymosis (comment)  Skin Condition/Temp: Warm  Strength:    Strength: Generally decreased, functional  Tone & Sensation:   Tone: Normal  Sensation: Intact  Range Of Motion:  AROM: Generally decreased, functional  PROM: Generally decreased, functional  Functional Mobility:  Bed Mobility:  Rolling: Maximum assistance; Additional time (vc)  Supine to Sit: Moderate assistance;Assist x2; Additional time (vc)  Sit to Supine: Maximum assistance;Assist x2 (vc)  Scooting: Maximum assistance;Assist x2 (vc)  Transfers:  Balance:   Sitting: Impaired  Sitting - Static: Poor (constant support); Fair (occasional)  Sitting - Dynamic: Poor (constant support)  Ambulation/Gait Training:  Therapeutic Exercises:   Pain:  Pain Scale 1: Numeric (0 - 10)  Pain Intensity 1: 0  Pain Location 1: Abdomen  Pain Orientation 1: Anterior  Pain Description 1: Aching  Pain Intervention(s) 1: Medication (see MAR)  Activity Tolerance:   Poor+  Please refer to the flowsheet for vital signs taken during this treatment. After treatment:   []         Patient left in no apparent distress sitting up in chair  [x]         Patient left in no apparent distress in bed  [x]         Call bell left within reach  [x]         Nursing notified  [x]         Brothers present  []         Bed alarm activated    COMMUNICATION/EDUCATION:   [x]         Fall prevention education was provided and the patient/caregiver indicated understanding. [x]         Patient/family have participated as able in goal setting and plan of care. [x]         Patient/family agree to work toward stated goals and plan of care. []         Patient understands intent and goals of therapy, but is neutral about his/her participation. []         Patient is unable to participate in goal setting and plan of care.     Thank you for this referral.  Vani Salinas, PT   Time Calculation: 34 mins        Eval Complexity: History: HIGH Complexity :3+ comorbidities / personal factors will impact the outcome/ POC Exam:HIGH Complexity : 4+ Standardized tests and measures addressing body structure, function, activity limitation and / or participation in recreation  Presentation: HIGH Complexity : Unstable and unpredictable characteristics  Clinical Decision Making:High Complexity unable to stand Overall Complexity:HIGH

## 2018-07-07 NOTE — PROGRESS NOTES
Personally witnessed patient with trial of ice chips. Patient was able to chew and swallow without any complaints, discomfort, coughing or gagging. Will allow ice chips only for now until able to be evaluated by speech.   Tasneem Easley MD

## 2018-07-07 NOTE — PROGRESS NOTES
[address] [address] [facility]  Phone [phone]   Fax [phone]    Pulmonary Critical Care & Sleep Medicine         Name: Razia Strauss MRN: [id number]   : 1950 Hospital: [facility] [place]    Date: 2018        PCCM Note    IMPRESSION:   · Acute hypoxic respiratory failure, on NC, came off of BiPAP; multifactorial from any generalized weakness; aspiration pneumonia following episode of mucus plug , previously had other etiologies with pulmonary edema, aspiration pneumonitis. No central or segmental PE on recent CTA chest. Previously required ventilator support this admission due to sepsis, reintubated on 18 and self-extubated   · S/p bronchoscopy 18: no aspiration noted then. Cx Negative. · Severe sepsis due to Klebsiella Oxytoca peritonitis and then perforation. S/p laparotomy 18 [POD # 5 tumor radical debulking] and peritoneal lavage: Klebsiella positive. Ruled out ischemic bowel in laparotomy. S/p laparotomy on 6/15/18 for peritonitis due to diverticulitis with perforation, s/p distal ileostomy. · Leukocytosis, improved on micafungin/vanco. Now aspiration pneumonia; repeat blood and fungal cx in process. Abd drain tip culture negative final  · Off heparin for intraabdominal bleeding and CTA chest no central PE. INR reversed with protamin, FFP.-- S/P IVC fillter   · S/p Shock suspected from sepsis and obstructive due to presumed PE with severe hypoxia and high A-a gradient, elevated RVSP 34 mm Hg. CTA 18 negative for central or main PA PE. Shock resolved. CTA chest no central PE. · Acute b/l peroneal DVT, s/p IVC filter by vascular surgery 18.    · Anemia, s/p PRBC tx 18, no gross bleeding  · Thrombocytopenia, mild, no active bleeding  · JESS, improved  · Hypernatremia, worsened following Lasix- improvement slowly - managed per renal  · Elevated LFT, due to shock liver, resolved  · Foreign body on peritoneal biopsy, per path report. · Anasarca due to fluid resuscitation, JESS, hypoalbuminemia and sequale of sepsis. 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 · Poor overall prognosis.       RECOMMENDATIONS:  -- Doing well off bipap abg looks good will try to taper oxygen off  -- Still very weak and not able to lift left ext up  -- Speech suggested MBS before feeding but Dr. Clement Notice have decided to feed her by mouth after bedside evaluation by her  -- As per notes from pharmacy seems like ID recommended to continue meropenam and micofungin -  -- Procalcitonin came very high of 0.72 so we should continue with antibiotics rather than rapid deescalation< How ever I see that its been dcd by Dr. Clement Notice primary attending will defer to her>  -- DC iv metoprolol and iv cardizem and on po metoprolol  -- Encourage for OT PT and Speech to see patient   -- Overall doing well still very weak and possibility of underline sepsis-- at this point seems like primary attending is managing her most of needs and critical care management and nothing much I can add from my point of view so I will sign off, If anything changes and if we can be of any help please call us back , as per family plan is LTAC  -- DVT study of upper ext did not show any DVT  -- Lasix per primary  -- Na is better with lasix and D5 monitor  -- Head ct due to on and off AMS-- Reported ok   -- ABD ct shows improvement in hematoma but with ongoing fever will need ID to weigh in as hematoma can get 2ndarily get infected  -- Pending  Blood fungal culture due to TPN -- Negative so far  -- TPN will be stopped on 7/5/18  -- PLT is improving   Respiratory: Off bipap, On NC will taper to room air  Periodic CXR  HOB 30 degree elevation all the time. Aggressive pulmonary toileting. Aspiration precautions. Judicious opiate pain medications. ID:  R sided pulmonary infiltrates. Follow up drain tip culture negative to date-- Improvement on xray with lasix noted  Yeast on fluid but was sent with skin contaminant [per GYN   Repeat blood and urine cultures pending-- Negative so far  Bronch cx normal robin. Peritoneal cx Klebsiella Oxytoca resistant to ampicillin. On micafungin/vanco. Continue Merrem started 6/30/18  Abx - Zosyn since 6/13 [day 18] stopped 6/30/18; levaquin since 6/17 [day 14] stopped 6/30/18. Stop  Flagyl since 6/15 [day 17]; iv vanco since 6/22, micafungin since 6/22  Hem: follow CBC; Keep Hb> 7 gm/dl. s/p IVC filter placement 6/28. Transfusion per surgery  [facility]: Echo ok with mildly elevated RVSP but normal RV systolic function. AFib converted to NSR. Off of cardizem drip. -- now on po metoprolol  Renal: Nephrologist seeing prn Creatinine improved. Lasix PRN because of hypernatremia. Stop albumin now as BP improved. Spoke with pharmacy to see if IVF with antibiotics could be changed to D5, but nationwide shortage of small bags of IVF to mix antibiotics but department will look around. GI/: TPN; barium study 6/21 - Spoke with speech suggested patient is not cooperative to do studies to suggest po intake-- Suggested MBS before feeding   Endocrine: Maintain blood glucose 140-180. Humalog sc. Insulin in TPN. on Lantus  Neurology: Dilaudid- balance post-op pain without sedation- recommend judicious use of sedatives  Pain/Sedation: dilaudid - management per Dr Wanda Coulter - judicious use  Skin/Wound: local surgical site care. Wound vac changed 6/29. Electrolytes: Replace electrolytes per ICU electrolyte replacement protocol, renal service. Nutrition: TPN started 6/18.  NPO   Prophylaxis: GI Prophylaxis (protonix)  Restraints: none  Lines/Tubes:   ETT: 6/8/18- self extubated 6/17/18  Central line: right subclavian 6/4/18 - removed 6/20  Picc line 6/19 - Central line bundle followed  Fry: 6/4/18 (Medically necessary for strict input/output monitoring in critically ill patient, will remove it when not needed. Fry bundle followed). Will defer respective systems problem management to primary and other respective consultant and follow patient in ICU with primary and other medical team.  Further recommendations will be based on the patient's response to recommended treatment and results of the investigation ordered. Quality Care: PPI, DVT prophylaxis, HOB elevated, Infection control all reviewed and addressed. Brother at bedside, updated: overall poor prognosis with patient at increased risk for further deterioration, prolonged hospitalization, nosocomial infections, failure to thrive. Patient has no children. Code status: Full code. PICC Line/Fry cath medically necessary  PT and OT on case    Fry bundle consider removing it . Andreas Ramirez Again defer to primary attending. Central line bundle followed      Quality Care: PPI, DVT prophylaxis, HOB elevated, Infection control all reviewed and addressed. Events and notes from last 24 hours reviewed. Care plan discussed with nursing CC TIME:33 min                                                     Subjective/History of Present Illness:   Patient is a 75 y.o. 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        7/7/18  Today am awake and talking smiling doing much better  Still breathing very fast  ID have suggested to continue antibiotics and primary have dcd them  Speech have suggested against feeding by mouth till [facility] [place] primary have decide to give ice chips to patient   Patient stayed off bipap last night and did well   ABG is pretty good off bipap  Pulled doughoff and another one placed   Fever is better. ID suggested to continue micofungin and meropenam , Vanco stopped 7/5/18  No fever overnight. Persistent leg edema  Hemodynamically stable  YEST ON FLUID-- As per gyn its taken from sac with skin contaminant and not concerned with it  Spoke with family at length     Review of Systems   Review of systems not obtained due to patient factors. Surgeries  5/29/18 - Laparoscopy converted to laparotomy, total abdominal hysterectomy, bilateral salpingo-oophorectomy, omentectomy, bilateral pelvic and periaortic lymphadenectomy and radical tumor debulking to R0. Path - ADENOCARCINOMA OF LEFT OVARY.     6/5/18 - Diagnostic laparoscopy converted to laparotomy, peritoneal biopsies, aerobic and anaerobic cultures of peritoneal fluid and peritoneal lavage. 6/15/18 - Wound exploration. Exploratory laparotomy. Lysis of adhesions. Ileostomy and wound VAC placement.   Had wound vac change 6/29. 1 unit of PRBC on 6/29  Had removal of BRANDON drain on 6/27/18, tip culture negative final          Current Facility-Administered Medications   Medication Dose Route Frequency    metoprolol tartrate (LOPRESSOR) tablet 50 mg  50 mg Oral Q12H    furosemide (LASIX) injection 40 mg  40 mg IntraVENous DAILY    sertraline (ZOLOFT) tablet 50 mg  50 mg Oral DAILY    dextrose 5% infusion  25 mL/hr IntraVENous CONTINUOUS    insulin glargine (LANTUS) injection 13 Units  13 Units SubCUTAneous DAILY    insulin lispro (HUMALOG) injection   SubCUTAneous Q6H    pantoprazole (PROTONIX) 40 mg in sodium chloride 0.9% 10 mL injection  40 mg IntraVENous DAILY         Objective:   Vital Signs:    Visit Vitals    BP (!) 147/92    Pulse (!) 103    Temp 97.6 °F (36.4 °C)    Resp 23    Ht 5' 1\" (1.549 m)    Wt 98.3 kg (216 lb 11.4 oz)    SpO2 97%    BMI 40.95 kg/m2       O2 Device: Nasal cannula   O2 Flow Rate (L/min): 2 l/min   Temp (24hrs), Av.8 °F (36.6 °C), Min:97.6 °F (36.4 °C), Max:98 °F (36.7 °C)       Intake/Output:   Last shift:      701 - 1900  In: -   Out: 400 [Urine:400]    Last 3 shifts: 1901 - 700  In: 2936.1 [I.V.:1737.1]  Out: 4927 [Urine:3325; Drains:95]      Intake/Output Summary (Last 24 hours) at 18 1129  Last data filed at 18 0800   Gross per 24 hour   Intake              860 ml   Output             2700 ml   Net            -1840 ml       Physical Exam:  General/Neurology: awake, following commands, improving looking, on O2 via NC @ 4 Lpm  Head:   Normocephalic, without obvious abnormality, atraumatic. Eye:   no scleral icterus, no pallor, no cyanosis. Pupils not dilated. Neck:   Symmetric. No lymphadenopathy. Trachea midline  Lung: Moderate equal air entry bilateral. Decreased breath sounds bases. No wheezing or crackles at bases. Heart:   S1 S2 present. No murmur. No JVD. Abdomen:  Soft. Obese. + BS. Midline lower abd surgical site - open wound with wound vac. Rt abd wall Ileostomy. No abd distension or guarding. Extremities:  No cyanosis. No clubbing. No hand edema. B/l feet/leg/thigh edema 2-3+. Pulses: Palpable radials and DPs bilateral.   Lymphatic:  No cervical or supraclav lymphadenopathy.    Skin:   No rash      Data:      CBC w/Diff Recent Labs      18   0450  18   0430  18   0410   WBC  9.2  11.2  11.4   RBC  3.51*  3.68*  3.83*   HGB  9.7*  10.3*  10.7*   HCT  31.7*  33.1*  34.3*   PLT  95*  100*  96*   GRANS  42  48  62   LYMPH  45  35  22   EOS  6*  7*  3        Chemistry Recent Labs      18   0450  18   2200  18   1223  0718   GLU  145*   --    --   179*   --    --   182*   NA  147*   --    --   145   --    --   146*   K  3.7   --    --   4.1  4.1   < >  3.8   CL  116*   --    --   113*   --    --   112*   CO2 24   --    --   23   --    --   26   BUN  52*   --    --   51*   --    --   44*   CREA  0.59*   --    --   0.69   --    --   0.75   CA  7.9*   --    --   7.7*   --    --   7.8*   MG  1.8   --    --   1.9   --    --   1.9   PHOS  3.1  3.1  2.4*  2.5  2.6   < >  2.6   AGAP  7   --    --   9   --    --   8   BUCR  88*   --    --   74*   --    --   59*   AP  177*   --    --   153*   --    --   123*   TP  4.9*   --    --   5.3*   --    --   5.3*   ALB  1.8*   --    --   2.0*   --    --   2.1*   GLOB  3.1   --    --   3.3   --    --   3.2   AGRAT  0.6*   --    --   0.6*   --    --   0.7*    < > = values in this interval not displayed. Lactic Acid Lactic acid   Date Value Ref Range Status   06/17/2018 2.3 (HH) 0.4 - 2.0 MMOL/L Final     Comment:     CALLED TO AND CORRECTLY REPEATED BY:  Naya Manuel, RN ICU ON 6/17/2018 [id number] TO [id number]       No results for input(s): LAC in the last 72 hours. Micro  No results for input(s): SDES, CULT in the last 72 hours. No results for input(s): CULT in the last 72 hours. ABG Recent Labs      07/07/18   0524   PHI  7.387   PCO2I  39.8   PO2I  99   HCO3I  24.0   FIO2I  32        Liver Enzymes Protein, total   Date Value Ref Range Status   07/07/2018 4.9 (L) 6.4 - 8.2 g/dL Final     Albumin   Date Value Ref Range Status   07/07/2018 1.8 (L) 3.4 - 5.0 g/dL Final     Globulin   Date Value Ref Range Status   07/07/2018 3.1 2.0 - 4.0 g/dL Final     A-G Ratio   Date Value Ref Range Status   07/07/2018 0.6 (L) 0.8 - 1.7   Final     AST (SGOT)   Date Value Ref Range Status   07/07/2018 32 15 - 37 U/L Final     Alk.  phosphatase   Date Value Ref Range Status   07/07/2018 177 (H) 45 - 117 U/L Final     Recent Labs      07/07/18   0450  07/06/18   0430  07/05/18   0410   TP  4.9*  5.3*  5.3*   ALB  1.8*  2.0*  2.1*   GLOB  3.1  3.3  3.2   AGRAT  0.6*  0.6*  0.7*   SGOT  32  32  23   AP  177*  153*  123*        Cardiac Enzymes No results found for: CPK, CK, CKMMB, CKMB, RCK3, CKMBT, CKNDX, CKND1, FABIAN, TROPT, TROIQ, KAYLAN, TROPT, TNIPOC, BNP, BNPP     BNP No results found for: BNP, BNPP, XBNPT     Coagulation No results for input(s): PTP, INR, APTT in the last 72 hours. No lab exists for component: INREXT, INREXT      Thyroid  No results found for: T4, T3U, TSH, TSHEXT, TSHEXT       Lipid Panel Lab Results   Component Value Date/Time    Triglyceride 117 07/03/2018 04:05 AM          Urinalysis Lab Results   Component Value Date/Time    Color DARK YELLOW 06/04/2018 08:42 AM    Appearance CLOUDY 06/04/2018 08:42 AM    Specific gravity 1.028 06/04/2018 08:42 AM    pH (UA) 5.0 06/04/2018 08:42 AM    Protein 30 (A) 06/04/2018 08:42 AM    Glucose NEGATIVE  06/04/2018 08:42 AM    Ketone TRACE (A) 06/04/2018 08:42 AM    Bilirubin SMALL (A) 06/04/2018 08:42 AM    Urobilinogen 1.0 06/04/2018 08:42 AM    Nitrites NEGATIVE  06/04/2018 08:42 AM    Leukocyte Esterase NEGATIVE  06/04/2018 08:42 AM    Epithelial cells FEW 06/04/2018 08:42 AM    Bacteria 1+ (A) 06/04/2018 08:42 AM    WBC 0 to 3 06/04/2018 08:42 AM    RBC 0 to 3 06/04/2018 08:42 AM        XR (Most Recent). CXR reviewed by me and compared with previous CXR   Results from [place] encounter on 06/04/18   XR ABD (KUB)   Narrative Abdomen, single view    COMPARISON: July 7, 2018. INDICATION: Dobbhoff tube advancement    FINDINGS: Supine AP view of the abdomen obtained on a single exposure. Interval  advancement of the weighted tip of a small bowel feeding tube, tip presently  over the descending portion of the duodenum. Included bowel gas pattern is  nonobstructive and nonspecific. Inferior vena cava filter redemonstrated. Impression Impression:  Interval advancement of a small bowel feeding tube, with tip now projecting over  the descending portion of the duodenum.           CT (Most Recent)   Results from Hospital Encounter encounter on 06/04/18   CT CHEST ABD PELV WO CONT   Narrative EXAM: CT of the chest, abdomen, and pelvis    INDICATION: Left ovarian clear cell adenocarcinoma. Peritonitis. Unable to  tolerate by mouth. COMPARISON: 6/22/2018    TECHNIQUE: Axial CT imaging of the chest, abdomen, and pelvis was performed  without intravenous contrast. Multiplanar reformats were generated. One or more  dose reduction techniques were used on this CT: automated exposure control,  adjustment of the mAs and/or kVp according to patient's size, and iterative  reconstruction techniques. The specific techniques utilized on this CT exam have  been documented in the patient's electronic medical record.    _______________    FINDINGS:    CHEST:    LUNGS: Motion artifact. Significant passive atelectasis in the right greater  than left lower lobes. Scattered foci of groundglass density bilaterally likely  regions of microatelectasis. PLEURA: Normal, with no effusion or pneumothorax. AIRWAY: Normal.    MEDIASTINUM: Normal heart size. No pericardial fluid. PICC tip is near the  cavoatrial junction. LYMPH NODES: Prominent mediastinal and bilateral axillary lymph nodes. For  example, a right paratracheal node on image 17 measures 1.3 cm in short axis. Most of the nodes are subcentimeter. OTHER: None. ABDOMEN/PELVIS:    LIVER, BILIARY: Likely simple cysts anterior right hepatic lobe. Normal liver  contour. No biliary dilation. Gallbladder is unremarkable. SPLEEN: Normal.    PANCREAS: Normal.    ADRENALS: Normal.    KIDNEYS: Normal.    LYMPH NODES: No enlarged lymph nodes. GASTROINTESTINAL TRACT: No evidence of bowel obstruction. Right lower quadrant  ileostomy noted. VASCULATURE: IVC filter. PELVIC ORGANS: Fry catheter in the urinary bladder. Hysterectomy. BONES: No acute or aggressive osseous abnormalities identified. OTHER: Small volume ascites.  Mixed density fluid collection with small foci of  gas in the left aspect of the pelvis measuring approximately 10.9 x 10.6 cm on  image 136, appearance favoring hematoma, previously 12.8 x 10 cm. Surgical  drains have been removed. Separate right pelvic sidewall collection measuring 8.5 x 3.3 cm similar to  prior. External iliac artery extends through this collection. Small hematoma around the ileostomy in the subcutaneous tissues similar to  prior. Diffuse edema of the subcutaneous fat. Open ventral wound. _______________         Impression IMPRESSION:    1. No evidence of bowel obstruction or other acute GI process. Right lower  quadrant ileostomy. 2. Small volume ascites and collections in the pelvis suggestive of  postoperative hematoma and seroma. Left pelvic hematoma component measures  slightly smaller than prior. 3. Regions of atelectasis in the lungs, most pronounced in the right lower lobe. Echo 6/9/18:  Left ventricle: Systolic function was normal. Ejection fraction was estimated   in the range of 65 % to 70 %. No obvious  wall motion abnormalities identified in the views obtained. Wall thickness   was mildly increased. Doppler parameters  were consistent with abnormal left ventricular relaxation (grade 1 diastolic   dysfunction). Right ventricle: The size was normal. Systolic function was normal.  Mitral valve: There was mild annular calcification. Tricuspid valve: Pulmonary artery systolic pressure was mildly increased. Pulmonary artery systolic pressure: 34 mmHg. PVL LE 6/6/18: acute b/l peroneal DVT. CT abd pelvis 6/4/18:  1. Postsurgical hysterectomy, bilateral oophorectomy, pelvic lymphadenectomy and  a mastectomy surgical changes. Small volume of ascites in the abdomen and  pelvis, with a few tiny locules of gas in the right hemipelvis would be in  keeping with recent postsurgical etiology. 2. Abnormal edematous thick-walled small bowel loops in the left abdomen and  pelvis, with TRAM lamellar edematous configuration, induration. No findings of  pneumatosis. The overall appearance is nonspecific.  Diagnostic considerations  include infectious etiology, nonspecific edema which may be unresolved  postsurgical etiology. Ischemia is also included in the differential diagnostic  consideration, however, there are no findings of pneumatosis, portal venous gas. 3. Stool in the right colon extending to the hepatic flexure with surrounding  gas, foci of pneumatosis could be obscured. No associated bowel wall thickening. 4. Satisfactory position of nasogastric tube. 5. Hepatomegaly, steatosis with 2 rounded low-density lesions, potentially  cysts. 6. Bibasilar atelectasis. Imaging:  [x]I have personally reviewed the patients chest radiographs images and report   7/1/18    Results from Hospital Encounter encounter on 06/04/18   XR ABD (KUB)   Narrative Abdomen, single view    COMPARISON: July 7, 2018. INDICATION: Dobbhoff tube advancement    FINDINGS: Supine AP view of the abdomen obtained on a single exposure. Interval  advancement of the weighted tip of a small bowel feeding tube, tip presently  over the descending portion of the duodenum. Included bowel gas pattern is  nonobstructive and nonspecific. Inferior vena cava filter redemonstrated. Impression Impression:  Interval advancement of a small bowel feeding tube, with tip now projecting over  the descending portion of the duodenum. 7/1/18  CXR  Frontal chest radiograph   Comparison: 6/30/2018   Indication: Right-sided pulmonary opacification follow-up.   Findings: Stable cardiomediastinal silhouette. Unchanged PICC placement. No visible pleural abnormality. Low lung volumes, more so on the right. Reticular opacification in the lower right lung with slightly less pronounced confluent appearance laterally. Left lung is clear. No acute osseous abnormality. IMPRESSION:    Persistent lower right lung opacification which appears predominantly reticular (less confluent appearance laterally).  This could be infectious or atelectatic        Results from Hospital Encounter encounter on 06/04/18   CT CHEST ABD PELV WO CONT   Narrative EXAM: CT of the chest, abdomen, and pelvis    INDICATION: Left ovarian clear cell adenocarcinoma. Peritonitis. Unable to  tolerate by mouth. COMPARISON: 6/22/2018    TECHNIQUE: Axial CT imaging of the chest, abdomen, and pelvis was performed  without intravenous contrast. Multiplanar reformats were generated. One or more  dose reduction techniques were used on this CT: automated exposure control,  adjustment of the mAs and/or kVp according to patient's size, and iterative  reconstruction techniques. The specific techniques utilized on this CT exam have  been documented in the patient's electronic medical record.    _______________    FINDINGS:    CHEST:    LUNGS: Motion artifact. Significant passive atelectasis in the right greater  than left lower lobes. Scattered foci of groundglass density bilaterally likely  regions of microatelectasis. PLEURA: Normal, with no effusion or pneumothorax. AIRWAY: Normal.    MEDIASTINUM: Normal heart size. No pericardial fluid. PICC tip is near the  cavoatrial junction. LYMPH NODES: Prominent mediastinal and bilateral axillary lymph nodes. For  example, a right paratracheal node on image 17 measures 1.3 cm in short axis. Most of the nodes are subcentimeter. OTHER: None. ABDOMEN/PELVIS:    LIVER, BILIARY: Likely simple cysts anterior right hepatic lobe. Normal liver  contour. No biliary dilation. Gallbladder is unremarkable. SPLEEN: Normal.    PANCREAS: Normal.    ADRENALS: Normal.    KIDNEYS: Normal.    LYMPH NODES: No enlarged lymph nodes. GASTROINTESTINAL TRACT: No evidence of bowel obstruction. Right lower quadrant  ileostomy noted. VASCULATURE: IVC filter. PELVIC ORGANS: Fry catheter in the urinary bladder. Hysterectomy. BONES: No acute or aggressive osseous abnormalities identified. OTHER: Small volume ascites.  Mixed density fluid collection with small foci of  gas in the left aspect of the pelvis measuring approximately 10.9 x 10.6 cm on  image 136, appearance favoring hematoma, previously 12.8 x 10 cm. Surgical  drains have been removed. Separate right pelvic sidewall collection measuring 8.5 x 3.3 cm similar to  prior. External iliac artery extends through this collection. Small hematoma around the ileostomy in the subcutaneous tissues similar to  prior. Diffuse edema of the subcutaneous fat. Open ventral wound. _______________         Impression IMPRESSION:    1. No evidence of bowel obstruction or other acute GI process. Right lower  quadrant ileostomy. 2. Small volume ascites and collections in the pelvis suggestive of  postoperative hematoma and seroma. Left pelvic hematoma component measures  slightly smaller than prior. 3. Regions of atelectasis in the lungs, most pronounced in the right lower lobe.                Cb Peoples MD   7/7/2018

## 2018-07-07 NOTE — ROUTINE PROCESS
1920 Bedside and Verbal shift change  Received from FIDELIA Hernandez RN (outgoing nurse), to COLTEN Plascencia (oncoming)  Pt. Is AOX 2. IV patent and infusing well, Pt. No cues of  pain at this time. Report included the following information SBAR, Kardex, Procedure Summary, Intake/Output, MAR, Recent Lab Results, and  Cardiac Rhythm @ NSR. Will resume care and monitor Pt. Condition. Pt. Educated on call bell when in need of help and assistance. Pt. verbalized understanding/re-educated. Pt. Head to toe Assessment Done and documented. 2000 Pt. Resting in bed comfortably, no signs of distress. Elevated BUE/BLE    Pt. Made no complaints. Elevated BLE and BUE on pillows     2200  Pt. Repositioned to R side.     0000 Pt. resting comfortably in bed, no signs of distress. On Semiflowlers position.     0200  Pt not in distress.     0400  PT. Given complete care, back care, bath, lolis and repositioned to L side.     0600  Pt. Able to rest well throughout the shift. Verbal and bedside Shift changed report given to Gauri Laguna RN (oncoming RN) on Pt. Condition. Report consisted of patients Situation, History, Activities, intake/output,  Background, Assessment and Recommendations(SBAR). Information from the following report(s) Kardex, order Summary, Lab results and MAR was reviewed with the receiving nurse. Opportunity for questions and clarification was provided.

## 2018-07-07 NOTE — PROGRESS NOTES
Admit date: 6/4/2018      Annalee pride 75 y. 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 Status post complete surgical resection with no evidence of residual disease. Typically patient would receive adjuvant chemotherapy for this diagnosis and we have discussed this however patient is not medically appropriate for chemotherapy at this time and will not be within 12 weeks of primary debulking. Patient is high risk of recurrence based on pathology (Clear Cell). Will follow patient closely for recurrence and develop treatment plan at that time depending on overall medical status. GI - Peritonitis resolved. Last drain removed 6/27/2018 and tip was cultured and NGTD. Post operative ileus resolved. Now tolerating TF. Good ileostomy output. Patient accidentally pulled out dobhoff this am. Replaced dobhoff and awaiting xray to confirm position prior to restarting TF. I had a long discussion with patient and family this am about benefits of TF and anticipated length of time patient will need TF and or supplements. I think patient would benefit from a PEG. I explained this to patient and family and I have consulted Dr. Genie Finnegan to consider this procedure. ID - Afebrile, nml WBC now off all antibiotics and anti-fungal. Completed Meropenem 7 days 7/6/18, Vanc 15 days 7/6/18, and Micafungin 15 days 7/6/18.   Zosyn 26 days completed 6/30/18, Levaquin 16 days completed 6/29/18, Flagyl 18 days complete 7/2/2018. Renal - JESS, resolved and Nephrology has signed off. Heme - Thrombocytopenia, unclear cause, stable. Hgb had been stable since last transfusion 6/29/18   DVT- bilateral peroneal DVT, full anticoagulation contraindicated. IVC filter was placed 6/28/2018 without issues. CV - Intermittently tachy. Episode of Afib this admission, converted to NSR. CHF, improved. HTN, on metoprolol and hydralazine. All managed by Intensivist/Hospitalist  Pulm - Currently stable on NC   Psych - Depressed, started Zoloft 7/3/18. Disp - stable, patient pulled dobhoff this am. Holding off on LTAC transfer today because patient is going to be evaluated by GI for PEG. Code Status - Full Code      SUBJECTIVE  Patient is resting now with O2 3L per NC. No complaints. Patient accidentally pulled out dobhoff this am. Replaced by nurse. Denies pain now. Denies nausea now. Is more alert today. Wants ice chips. Ileostomy normal output.          OBJECTIVE  Physical Exam:  General - Resting on O2 nc 2L  HEENT - nml, skin breakdown from taped dobhoff  Heart - NSR  Abdomen/Incision - soft, non tender, nondistended, normal bowel sounds, ileostomy looks good, wound vac in place. No obvious drainage from LLQ drain site.    Extremities - 2+ edema LE  Patient Vitals for the past 24 hrs:   BP Temp Pulse Resp SpO2   07/07/18 0921 153/86 - 93 - -   07/07/18 0900 153/86 - 95 26 98 %   07/07/18 0800 137/88 97.6 °F (36.4 °C) 96 19 99 %   07/07/18 0700 144/82 - 89 21 -   07/07/18 0600 144/79 - 90 16 99 %   07/07/18 0525 - - - - 98 %   07/07/18 0400 115/76 - 97 17 97 %   07/07/18 0300 132/77 97.8 °F (36.6 °C) 91 19 99 %   07/07/18 0200 133/74 - 91 20 99 %   07/07/18 0100 132/69 - 94 23 100 %   07/07/18 0000 127/75 - 92 19 97 %   07/06/18 2300 138/80 - 93 20 99 %   07/06/18 2200 132/75 - 90 23 99 %   07/06/18 2100 129/76 - 86 20 98 %   07/06/18 2000 133/74 - 88 17 99 %   07/06/18 1900 129/72 98 °F (36.7 °C) 78 19 99 % 07/06/18 1800 146/88 - 84 17 98 %   07/06/18 1700 (!) 155/91 - (!) 107 19 -   07/06/18 1600 141/85 - (!) 112 21 95 %   07/06/18 1500 142/80 - (!) 118 24 99 %   07/06/18 1400 137/85 - (!) 105 20 95 %   07/06/18 1300 130/69 - (!) 104 (!) 33 97 %   07/06/18 1200 137/76 - (!) 103 26 96 %   07/06/18 1100 139/87 - (!) 115 22 98 %         Intake/Output Summary (Last 24 hours) at 07/07/18 1053  Last data filed at 07/07/18 0800   Gross per 24 hour   Intake              860 ml   Output             2700 ml   Net            -1840 ml          Labs  CBC  Recent Labs      07/07/18   0450  07/06/18   0430  07/05/18   0410   WBC  9.2  11.2  11.4   HCT  31.7*  33.1*  34.3*   MCV  90.3  89.9  89.6   BANDS  2  6*  6*   MONOS  4  4  6   EOS  6*  7*  3   BASOS  0  0  1        CMP  Recent Labs      07/07/18   0450  07/06/18   0430  07/05/18   0410   NA  147*  145  146*   CO2  24  23  26   BUN  52*  51*  44*        Lab Results   Component Value Date/Time    Glucose 145 (H) 07/07/2018 04:50 AM    Glucose (POC) 144 (H) 07/07/2018 05:26 AM    Glucose,  (H) 06/15/2018 05:14 PM          Current Hospital Medications    Current Facility-Administered Medications:     metoprolol tartrate (LOPRESSOR) tablet 50 mg, 50 mg, Oral, Q12H, Christen Bhatia MD, Stopped at 07/07/18 0900    HYDROmorphone (PF) (DILAUDID) injection 0.5 mg, 0.5 mg, IntraVENous, Q6H PRN, Christen Miranda MD, 0.5 mg at 07/07/18 0232    furosemide (LASIX) injection 40 mg, 40 mg, IntraVENous, DAILY, Christen Miranda MD, 40 mg at 07/07/18 2284    sertraline (ZOLOFT) tablet 50 mg, 50 mg, Oral, DAILY, Daydaisy Forman MD, 50 mg at 07/06/18 0902    dextrose 5% infusion, 25 mL/hr, IntraVENous, CONTINUOUS, Christen Miranda MD, Last Rate: 25 mL/hr at 07/07/18 0457, 25 mL/hr at 07/07/18 0457    0.9% sodium chloride infusion 250 mL, 250 mL, IntraVENous, PRN, Mary Calderon MD    saliva stimulant (BIOTENE) 1 Spray, 1 Spray, Oral, PRN, Kameron Salguero MD, 1 Spray at 06/28/18 1103   hydrALAZINE (APRESOLINE) 20 mg/mL injection 10 mg, 10 mg, IntraVENous, Q4H PRN, Nilesh Gonzalez MD, 10 mg at 07/03/18 2159    insulin glargine (LANTUS) injection 13 Units, 13 Units, SubCUTAneous, DAILY, Niki Vera MD, 13 Units at 07/07/18 5196    insulin lispro (HUMALOG) injection, , SubCUTAneous, Q6H, Samy Leyva MD, Stopped at 07/07/18 0600    dextrose (D50W) injection syrg 12.5-25 g, 25-50 mL, IntraVENous, PRN, Samy Leyva MD    ipratropium (ATROVENT) 0.02 % nebulizer solution 0.5 mg, 0.5 mg, Nebulization, Q4H PRN, Mary Bonilla MD, 0.5 mg at 06/29/18 1449    naloxone (NARCAN) injection 0.4 mg, 0.4 mg, IntraVENous, EVERY 2 MINUTES AS NEEDED, Samy Leyva MD    [facility]ron [facility]) injection 4 mg, 4 mg, IntraVENous, Q6H PRN, Mendoza Shore MD, 4 mg at 06/08/18 2152    oxymetazoline (AFRIN) 0.05 % nasal spray 2 Spray, 2 Spray, Both Nostrils, BID PRN, Niki Vera MD    alum-mag hydroxide-simeth (MYLANTA) oral suspension 30 mL, 30 mL, Oral, Q4H PRN, Christen Rothman MD, 30 mL at 06/07/18 2225    fluticasone (FLONASE) 50 mcg/actuation nasal spray 2 Spray, 2 Spray, Both Nostrils, DAILY PRN, Charissa Lancaster MD    ELECTROLYTE REPLACEMENT PROTOCOL-POTASSIUM Renal Dosing, 1 Each, Other, PRN, Christen Leroy MD    ELECTROLYTE REPLACEMENT PROTOCOL-MAGNESIUM, 1 Each, Other, PRN, Christen Leroy MD    ELECTROLYTE REPLACEMENT PROTOCOL-PHOSPHORUS Renal Dosing, 1 Each, Other, PRN, Christen Leroy MD    ELECTROLYTE REPLACEMENT PROTOCOL-CALCIUM, 1 Each, Other, PRN, Christen Leroy MD    sodium chloride (NS) flush 5-10 mL, 5-10 mL, IntraVENous, PRN, Jovani Villalobos MD    glucose chewable tablet 16 g, 4 Tab, Oral, PRN, Christen Leroy MD    glucagon (GLUCAGEN) injection 1 mg, 1 mg, IntraMUSCular, PRN, Christen Leroy MD    pantoprazole (PROTONIX) 40 mg in sodium chloride 0.9% 10 mL injection, 40 mg, IntraVENous, DAILY, Jaimie Negrete MD, 40 mg at 07/07/18 0052      Charissa Lancaster MD

## 2018-07-08 ENCOUNTER — APPOINTMENT (OUTPATIENT)
Dept: GENERAL RADIOLOGY | Age: 68
DRG: 853 | End: 2018-07-08
Attending: INTERNAL MEDICINE
Payer: MEDICARE

## 2018-07-08 LAB
ALBUMIN SERPL-MCNC: 2 G/DL (ref 3.4–5)
ALBUMIN/GLOB SERPL: 0.6 {RATIO} (ref 0.8–1.7)
ALP SERPL-CCNC: 193 U/L (ref 45–117)
ALT SERPL-CCNC: 44 U/L (ref 13–56)
ANION GAP SERPL CALC-SCNC: 10 MMOL/L (ref 3–18)
AST SERPL-CCNC: 39 U/L (ref 15–37)
BASOPHILS # BLD: 0 K/UL (ref 0–0.1)
BASOPHILS NFR BLD: 0 % (ref 0–3)
BILIRUB SERPL-MCNC: 0.8 MG/DL (ref 0.2–1)
BUN SERPL-MCNC: 53 MG/DL (ref 7–18)
BUN/CREAT SERPL: 85 (ref 12–20)
CA-I SERPL-SCNC: 1.15 MMOL/L (ref 1.12–1.32)
CALCIUM SERPL-MCNC: 8.3 MG/DL (ref 8.5–10.1)
CHLORIDE SERPL-SCNC: 115 MMOL/L (ref 100–108)
CO2 SERPL-SCNC: 21 MMOL/L (ref 21–32)
CREAT SERPL-MCNC: 0.62 MG/DL (ref 0.6–1.3)
DIFFERENTIAL METHOD BLD: ABNORMAL
EOSINOPHIL # BLD: 0.1 K/UL (ref 0–0.4)
EOSINOPHIL NFR BLD: 1 % (ref 0–5)
ERYTHROCYTE [DISTWIDTH] IN BLOOD BY AUTOMATED COUNT: 25.2 % (ref 11.6–14.5)
GLOBULIN SER CALC-MCNC: 3.4 G/DL (ref 2–4)
GLUCOSE BLD STRIP.AUTO-MCNC: 178 MG/DL (ref 70–110)
GLUCOSE BLD STRIP.AUTO-MCNC: 183 MG/DL (ref 70–110)
GLUCOSE BLD STRIP.AUTO-MCNC: 196 MG/DL (ref 70–110)
GLUCOSE BLD STRIP.AUTO-MCNC: 198 MG/DL (ref 70–110)
GLUCOSE SERPL-MCNC: 183 MG/DL (ref 74–99)
HCT VFR BLD AUTO: 34.1 % (ref 35–45)
HGB BLD-MCNC: 10.3 G/DL (ref 12–16)
LYMPHOCYTES # BLD: 2.4 K/UL (ref 0.8–3.5)
LYMPHOCYTES NFR BLD: 27 % (ref 20–51)
MAGNESIUM SERPL-MCNC: 2.2 MG/DL (ref 1.6–2.6)
MCH RBC QN AUTO: 27.4 PG (ref 24–34)
MCHC RBC AUTO-ENTMCNC: 30.2 G/DL (ref 31–37)
MCV RBC AUTO: 90.7 FL (ref 74–97)
METAMYELOCYTES NFR BLD MANUAL: 1 %
MONOCYTES # BLD: 0.6 K/UL (ref 0–1)
MONOCYTES NFR BLD: 7 % (ref 2–9)
MYELOCYTES NFR BLD MANUAL: 1 %
NEUTS BAND NFR BLD MANUAL: 2 % (ref 0–5)
NEUTS SEG # BLD: 5.4 K/UL (ref 1.8–8)
NEUTS SEG NFR BLD: 61 % (ref 42–75)
PHOSPHATE SERPL-MCNC: 2.9 MG/DL (ref 2.5–4.9)
PLATELET # BLD AUTO: 106 K/UL (ref 135–420)
PLATELET COMMENTS,PCOM: ABNORMAL
POTASSIUM SERPL-SCNC: 3.8 MMOL/L (ref 3.5–5.5)
PREALB SERPL-MCNC: 12.3 MG/DL (ref 20–40)
PROT SERPL-MCNC: 5.4 G/DL (ref 6.4–8.2)
RBC # BLD AUTO: 3.76 M/UL (ref 4.2–5.3)
RBC MORPH BLD: ABNORMAL
SODIUM SERPL-SCNC: 146 MMOL/L (ref 136–145)
WBC # BLD AUTO: 8.9 K/UL (ref 4.6–13.2)
WBC MORPH BLD: ABNORMAL

## 2018-07-08 PROCEDURE — 82962 GLUCOSE BLOOD TEST: CPT

## 2018-07-08 PROCEDURE — 97530 THERAPEUTIC ACTIVITIES: CPT

## 2018-07-08 PROCEDURE — 85025 COMPLETE CBC W/AUTO DIFF WBC: CPT | Performed by: OBSTETRICS & GYNECOLOGY

## 2018-07-08 PROCEDURE — 82330 ASSAY OF CALCIUM: CPT | Performed by: SURGERY

## 2018-07-08 PROCEDURE — 71045 X-RAY EXAM CHEST 1 VIEW: CPT

## 2018-07-08 PROCEDURE — 74011250637 HC RX REV CODE- 250/637: Performed by: OBSTETRICS & GYNECOLOGY

## 2018-07-08 PROCEDURE — 74011250637 HC RX REV CODE- 250/637: Performed by: INTERNAL MEDICINE

## 2018-07-08 PROCEDURE — 74011636637 HC RX REV CODE- 636/637: Performed by: INTERNAL MEDICINE

## 2018-07-08 PROCEDURE — 84100 ASSAY OF PHOSPHORUS: CPT | Performed by: OBSTETRICS & GYNECOLOGY

## 2018-07-08 PROCEDURE — 65270000029 HC RM PRIVATE

## 2018-07-08 PROCEDURE — 80053 COMPREHEN METABOLIC PANEL: CPT | Performed by: OBSTETRICS & GYNECOLOGY

## 2018-07-08 PROCEDURE — 74011250636 HC RX REV CODE- 250/636: Performed by: INTERNAL MEDICINE

## 2018-07-08 PROCEDURE — 77030018846 HC SOL IRR STRL H20 ICUM -A

## 2018-07-08 PROCEDURE — C9113 INJ PANTOPRAZOLE SODIUM, VIA: HCPCS | Performed by: INTERNAL MEDICINE

## 2018-07-08 PROCEDURE — 83735 ASSAY OF MAGNESIUM: CPT | Performed by: OBSTETRICS & GYNECOLOGY

## 2018-07-08 RX ORDER — INSULIN GLARGINE 100 [IU]/ML
20 INJECTION, SOLUTION SUBCUTANEOUS DAILY
Status: DISCONTINUED | OUTPATIENT
Start: 2018-07-09 | End: 2018-07-11 | Stop reason: HOSPADM

## 2018-07-08 RX ADMIN — INSULIN LISPRO 2 UNITS: 100 INJECTION, SOLUTION INTRAVENOUS; SUBCUTANEOUS at 23:54

## 2018-07-08 RX ADMIN — INSULIN GLARGINE 15 UNITS: 100 INJECTION, SOLUTION SUBCUTANEOUS at 10:00

## 2018-07-08 RX ADMIN — SODIUM CHLORIDE 40 MG: 9 INJECTION INTRAMUSCULAR; INTRAVENOUS; SUBCUTANEOUS at 10:01

## 2018-07-08 RX ADMIN — FUROSEMIDE 40 MG: 10 INJECTION, SOLUTION INTRAMUSCULAR; INTRAVENOUS at 10:01

## 2018-07-08 RX ADMIN — METOPROLOL TARTRATE 50 MG: 50 TABLET, FILM COATED ORAL at 10:01

## 2018-07-08 RX ADMIN — INSULIN LISPRO 2 UNITS: 100 INJECTION, SOLUTION INTRAVENOUS; SUBCUTANEOUS at 18:39

## 2018-07-08 RX ADMIN — HYDROMORPHONE HYDROCHLORIDE 0.5 MG: 2 INJECTION, SOLUTION INTRAMUSCULAR; INTRAVENOUS; SUBCUTANEOUS at 14:03

## 2018-07-08 RX ADMIN — SERTRALINE HYDROCHLORIDE 50 MG: 50 TABLET ORAL at 10:01

## 2018-07-08 RX ADMIN — INSULIN LISPRO 2 UNITS: 100 INJECTION, SOLUTION INTRAVENOUS; SUBCUTANEOUS at 14:08

## 2018-07-08 RX ADMIN — INSULIN LISPRO 2 UNITS: 100 INJECTION, SOLUTION INTRAVENOUS; SUBCUTANEOUS at 06:14

## 2018-07-08 RX ADMIN — METOPROLOL TARTRATE 50 MG: 50 TABLET, FILM COATED ORAL at 21:45

## 2018-07-08 NOTE — PROGRESS NOTES
Admit date: 6/4/2018      Earl Adamson a 75 y. 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 Status post complete surgical resection with no evidence of residual disease. Typically patient would receive adjuvant chemotherapy for this diagnosis and we have discussed this however patient is not medically appropriate for chemotherapy at this time and will not be within 12 weeks of primary debulking. Patient is high risk of recurrence based on pathology (Clear Cell). Will follow patient closely for recurrence and develop treatment plan at that time depending on overall medical status. GI - Peritonitis resolved. Last drain removed 6/27/2018 and tip was cultured and NGTD. Post operative ileus resolved. Now tolerating TF. Good ileostomy output. Patient accidentally pulled out dobhoff yesterday am. Replaced dobhoff. I had a long discussion with patient and family about benefits of TF and anticipated length of time patient will need TF and or supplements. I discussed PEG but patient has decided to keep dobhoff for now with. ID - Afebrile, nml WBC now off all antibiotics and anti-fungal > 36 hours. Completed Meropenem 7 days 7/6/18, Vanc 15 days 7/6/18, and Micafungin 15 days 7/6/18. Zosyn 26 days completed 6/30/18, Levaquin 16 days completed 6/29/18, Flagyl 18 days complete 7/2/2018.  Procalcitonin was repeated yesterday to calculate delta PCT but results are still pending will continue to follow as inpatient until this is resulted. FEN - Tolerating TF well. Blood glucose still running high. Dr. Jayy Child adjusted insulin again today. Appreciate recs for DM management. Would like blood sugar to be better controlled to improve healing and prognosis with malignancy. Renal - JESS, resolved and Nephrology has signed off. Good UOP and normal Cr. Heme - Thrombocytopenia, unclear cause, stable but has petechia today which was not present previously. Hgb had been stable since last transfusion 6/29/18   DVT- bilateral peroneal DVT, full anticoagulation contraindicated. IVC filter was placed 6/28/2018 without issues. CV - Intermittently tachy. Episode of Afib this admission, converted to NSR. CHF, improved. HTN, on metoprolol. Pulm - Currently stable on NC   Psych - Depressed, started Zoloft 7/3/18. [address] discharge to LTAC. Patient was kept overnight to observe off antibiotics for fever and to repeat WBC and Procalcitonin. No fever, WBC trending down but procalcitonin still pending. Will await results prior to discharging patient. Code Status - Full Code      SUBJECTIVE  Patient is resting. No complaints. Denies pain now. Denies nausea now. Ileostomy normal output. Is happy with chips.          OBJECTIVE  Physical Exam:  General - Resting   HEENT - nml, skin breakdown from taped dobhoff  Heart - NSR  Abdomen/Incision - soft, non tender, nondistended, normal bowel sounds, ileostomy looks good, wound vac in place but today there is sanguinous drainage from the inferior portion and collecting in the fold under panus. Vac seems to be holding seal. No drainage from LLQ drain site.    Extremities - 2+ edema LE    Patient Vitals for the past 24 hrs:   BP Temp Pulse Resp SpO2 Weight   07/08/18 0714 - 98.6 °F (37 °C) - - - -   07/08/18 0333 151/81 98.1 °F (36.7 °C) 94 22 95 % -   07/08/18 0031 - 98.2 °F (36.8 °C) - - - -   07/07/18 1900 - 97.8 °F (36.6 °C) - - - -   07/07/18 1700 144/86 - 86 21 - -   07/07/18 1600 122/73 97.8 °F (36.6 °C) 85 25 - -   07/07/18 1500 127/70 - 85 24 - -   07/07/18 1458 - 98 °F (36.7 °C) - - - -   07/07/18 1430 148/90 - 81 26 99 % -   07/07/18 1400 136/82 - 81 23 97 % -   07/07/18 1330 126/68 - 80 20 99 % -   07/07/18 1238 - - - - - 92.2 kg (203 lb 4.2 oz)   07/07/18 1230 137/84 - 94 25 96 % -   07/07/18 1200 147/90 96.8 °F (36 °C) (!) 105 17 98 % -   07/07/18 1100 (!) 147/92 - (!) 103 23 97 % -   07/07/18 1000 138/86 - 92 19 - -         Intake/Output Summary (Last 24 hours) at 07/08/18 0922  Last data filed at 07/08/18 0333   Gross per 24 hour   Intake                0 ml   Output             4070 ml   Net            -4070 ml          Labs  CBC  Recent Labs      07/08/18   0425  07/07/18   0450  07/06/18   0430   WBC  8.9  9.2  11.2   HCT  34.1*  31.7*  33.1*   MCV  90.7  90.3  89.9   BANDS  2  2  6*   MONOS  7  4  4   EOS  1  6*  7*   BASOS  0  0  0        CMP  Recent Labs      07/08/18   0425  07/07/18   0450  07/06/18   0430   NA  146*  147*  145   CO2  21  24  23   BUN  53*  52*  51*        Lab Results   Component Value Date/Time    Glucose 183 (H) 07/08/2018 04:25 AM    Glucose (POC) 198 (H) 07/08/2018 06:11 AM    Glucose,  (H) 06/15/2018 05:14 PM          Current Hospital Medications    Current Facility-Administered Medications:     insulin glargine (LANTUS) injection 15 Units, 15 Units, SubCUTAneous, DAILY, Phi Skinner MD    metoprolol tartrate (LOPRESSOR) tablet 50 mg, 50 mg, Oral, Q12H, Rutul A Fern Schirmer, MD, 50 mg at 07/07/18 2045    HYDROmorphone (PF) (DILAUDID) injection 0.5 mg, 0.5 mg, IntraVENous, Q6H PRN, Rutul A Fern Schirmer, MD, 0.5 mg at 07/07/18 1631    furosemide (LASIX) injection 40 mg, 40 mg, IntraVENous, DAILY, Christen Miranda MD, 40 mg at 07/07/18 9501    sertraline (ZOLOFT) tablet 50 mg, 50 mg, Oral, DAILY, Pietro Forman MD, 50 mg at 07/07/18 1145    dextrose 5% infusion, 25 mL/hr, IntraVENous, CONTINUOUS, Sai Hernandez MD, Last Rate: 25 mL/hr at 07/07/18 0457, 25 mL/hr at 07/07/18 0457    0.9% sodium chloride infusion 250 mL, 250 mL, IntraVENous, PRN, Laurel Dueñas MD    saliva stimulant (BIOTENE) 1 Spray, 1 Spray, Oral, PRN, Konstantin Mendiola MD, 1 [place] at 06/28/18 1108    hydrALAZINE (APRESOLINE) 20 mg/mL injection 10 mg, 10 mg, IntraVENous, Q4H PRN, Konstantin Mendiola MD, 10 mg at 07/03/18 2159    insulin lispro (HUMALOG) injection, , SubCUTAneous, Q6H, Gabbi Spence MD, 2 Units at 07/08/18 6335    dextrose (D50W) injection syrg 12.5-25 g, 25-50 mL, IntraVENous, PRN, Gabbi Spence MD    ipratropium (ATROVENT) 0.02 % nebulizer solution 0.5 mg, 0.5 mg, Nebulization, Q4H PRN, Praveena Quintana MD, 0.5 mg at 06/29/18 1449    naloxone (NARCAN) injection 0.4 mg, 0.4 mg, IntraVENous, EVERY 2 MINUTES AS NEEDED, Gabbi Spence MD    ondansetron [facility]) injection 4 mg, 4 mg, IntraVENous, Q6H PRN, Nicki Garza MD, 4 mg at 06/08/18 2152    oxymetazoline (AFRIN) 0.05 % nasal spray 2 Spray, 2 Spray, Both Nostrils, BID PRN, Tushar Garcia MD    alum-mag hydroxide-simeth (MYLANTA) oral suspension 30 mL, 30 mL, Oral, Q4H PRN, Christen Nj MD, 30 mL at 06/07/18 2225    fluticasone (FLONASE) 50 mcg/actuation nasal spray 2 Spray, 2 Spray, Both Nostrils, DAILY PRN, Laurel Dueñas MD    ELECTROLYTE REPLACEMENT PROTOCOL-POTASSIUM Renal Dosing, 1 Each, Other, PRN, Sharee Santana MD    ELECTROLYTE REPLACEMENT PROTOCOL-MAGNESIUM, 1 Each, Other, PRN, Christen Suh MD    ELECTROLYTE REPLACEMENT PROTOCOL-PHOSPHORUS Renal Dosing, 1 Each, Other, PRN, Christen Suh MD    ELECTROLYTE REPLACEMENT PROTOCOL-CALCIUM, 1 Each, Other, PRN, Christen Suh MD    sodium chloride (NS) flush 5-10 mL, 5-10 mL, IntraVENous, PRN, Nba Cotton MD    glucose chewable tablet 16 g, 4 Tab, Oral, PRN, Christen Suh MD    glucagon (GLUCAGEN) injection 1 mg, 1 mg, IntraMUSCular, PRN, Christen Suh MD    pantoprazole (PROTONIX) 40 mg in sodium chloride 0.9% 10 mL injection, 40 mg, IntraVENous, DAILY, Sharee Santana MD, 40 mg at 07/07/18 6351      Laurel Dueñas MD

## 2018-07-08 NOTE — PROGRESS NOTES
Problem: Pressure Injury - Risk of  Goal: *Prevention of pressure injury  Document Oscar Scale and appropriate interventions in the flowsheet. Outcome: Progressing Towards Goal  Pressure Injury Interventions:  Sensory Interventions: Assess changes in LOC    Moisture Interventions: Maintain skin hydration (lotion/cream)    Activity Interventions: Assess need for specialty bed    Mobility Interventions: HOB 30 degrees or less    Nutrition Interventions: Document food/fluid/supplement intake    Friction and Shear Interventions: Apply protective barrier, creams and emollients               Problem: Falls - Risk of  Goal: *Absence of Falls  Document Goyo Fall Risk and appropriate interventions in the flowsheet.    Outcome: Progressing Towards Goal  Fall Risk Interventions:  Mobility Interventions: Bed/chair exit alarm    Mentation Interventions: Adequate sleep, hydration, pain control, Bed/chair exit alarm    Medication Interventions: Bed/chair exit alarm, Patient to call before getting OOB    Elimination Interventions: Bed/chair exit alarm, Patient to call for help with toileting needs, Call light in reach

## 2018-07-08 NOTE — PROGRESS NOTES
Problem: Mobility Impaired (Adult and Pediatric)  Goal: *Acute Goals and Plan of Care (Insert Text)  Physical Therapy Goals  Pt re-evaluated 7/07/2018  Goals to be accomplished 5-7 days    1. Bed mobility: Rolling L to R to with mod A with use of HR for positioning. 2. Transfer: Supine to/from Sit with mod/max A with HR for change of position/prep for EOB sitting. 3. Activity Tolerance: Tolerate EOB sitting 15-20 minutes with fair UE supported balance for ADL/balance activities. 4. Patient will perform sit to stand with RW/maximal assistance and tolerate same x 15-30 seconds. 5. Ambulation:  Ambulate 5 ft. mod/max A with RW for increased functional mobility. Updated 6/28/2018 and to be accomplished within 5-7 day(s)  1. Bed mobility: Rolling L to R to with max/mod A with use of HR for positioning. 2. Transfer: Supine to/from Sit with mod/max A with HR for change of position/prep for EOB sitting. 3. Activity Tolerance: Tolerate EOB sitting 5-10 minutes with fair UE supported balance for ADL/balance activities. 4. Patient will perform sit to stand with RW/maximal assistance and tolerate same x 15-30 seconds. 5. Ambulation:  Ambulate 5 ft. mod/max A with RW for increased functional mobility. Physical Therapy Goals  Updated 6/20/2018 and to be accomplished within 7 day(s)  1. Patient will move from supine to sit and sit to supine in bed with maximal assistance. 2.  Patient will transfer from bed to chair and chair to bed with maximal assistance using the least restrictive device. 3.  Patient will perform sit to stand with maximal assistance. 4.  Patient will ambulate with maximal assistance for 5 feet with the least restrictive device.              Outcome: Progressing Towards Goal  physical Therapy TREATMENT    Patient: Wilver Singh (14 y.o. female)  Date: 7/8/2018  Diagnosis: Abdominal pain  Abdominal pain  Abdominal Pain  aspiration  POSTOP WOUND EXPLORATION Sepsis ([facility] [address]  Procedure(s) (LRB):  WOUND EXPLORATION, EXPLORATORY LAPAROTOMY, ILLEOSTMOY, LYSES OF ADHESIONS AND WOUND VAC PLACEMENT (N/A) 23 Days Post-Op  Precautions: Fall   Chart, physical therapy assessment, plan of care and goals were reviewed. ASSESSMENT:  Pt is less motivated than yesterday's session. Very little voluntary movement today and session was used to improve cervical spine movement. Has poor tolerance to Left lateral flexion and left rotation of cervical spine. Progressed to left left trunk positioning, with Elbow propping (1 min x4). Pt notes feeling of falling with this position, using L UE to return herself to upright. Only able to maintain midline for 2 min, before falling toward right. Pt demanding to be let go on some occasions and refuses to assist in ROM activities. Returned to supine and pillows were used to achieve midline head position. Spoke with RN in regard to extremity PROM and multi podus style boot to assist in foot positioning (order needed). Progression toward goals:  []      Improving appropriately and progressing toward goals  []      Improving slowly and progressing toward goals  [x]      Not making progress toward goals and plan of care will be adjusted     PLAN:  Patient continues to benefit from skilled intervention to address the above impairments. Continue treatment per established plan of care. Discharge Recommendations:  LTAC   Further Equipment Recommendations for Discharge:  bedside commode and rolling walker     SUBJECTIVE:   Patient stated Back up.     OBJECTIVE DATA SUMMARY:   Critical Behavior:  Neurologic State: Alert  Orientation Level: Disoriented to situation, Disoriented to time  Cognition: Follows commands  Safety/Judgement: Awareness of environment, Decreased insight into deficits  Functional Mobility Training:  Bed Mobility:  Rolling: Total assistance  Supine to Sit: Total assistance  Sit to Supine: Total assistance  Scooting:  Total assistance  Transfers:  Sit to Stand: Total assistance  Stand to Sit: Total assistance  Balance:  Sitting: Impaired  Sitting - Static: Poor (constant support)  Sitting - Dynamic: None  Therapeutic Exercises:   PROM of LE   Pain:  Pain Scale 1: Adult Nonverbal Pain Scale  Pain Intensity 1: 0  Activity Tolerance:   Good  Please refer to the flowsheet for vital signs taken during this treatment.   After treatment:   [] Patient left in no apparent distress sitting up in chair  [x] Patient left in no apparent distress in bed  [x] Call bell left within reach  [x] Nursing notified  [x] Caregiver present  [] Bed alarm activated      Enmanuel Kendrick PTA   Time Calculation: 41 mins

## 2018-07-08 NOTE — PROGRESS NOTES
Hospitalist Progress Note    Patient: Mirella Newman MRN: [id number]  [facility]: [id number]    YOB: 1950  Age: 75 y.o. Sex: female    DOA: 6/4/2018 LOS:  LOS: 34 days                Assessment and Plan:    Principal Problem:    Sepsis ([address] (6/4/2018)    Active Problems:    Ovarian ca ([address] (5/29/2018)      Abdominal pain (6/4/2018)      Oliguria (6/4/2018)      Acute respiratory failure ([address] (6/5/2018)      JESS (acute kidney injury) ([address] (4/0/6993)      Metabolic acidosis (6/9/5334)      Acute deep vein thrombosis (DVT) of lower extremity ([address] (6/7/2018)      S/P ileostomy ([address] (6/16/2018)      Hypoalbuminemia (6/16/2018)      Peritonitis ([address] (6/16/2018)      Hypernatremia (6/25/2018)      Hypokalemia (6/27/2018)              Acute resp failure with hypoxia stayed on nc all night and did well     - self extubated 06/17/2018.   - bronchoscopy 06/09/2018,       CTA chest negative for  PE      Severe Sepsis/shock    - secondary to peritonitis. Initial exp lap followed by surgery to debride and has 2 ostomies placed with large wound now with vac in place- ID on case- Finished multiople courses of antibiotics and antifungals. I talked to dr. Maggie Beck and we discussed rechecking procalcitonin and how it was drawn 4 days ago . IO think waiting another day to see what white count does is prudent. Anasarca-appears improved      Afib, tachycardia-improved now off IV cardizem, now on  Po metoprolol        Bleeding from BRANDON drain -resolved with protamine and FFP.       Acute bilateral peroneal DVT   - ivc filter placed  and no current plans for further anticoagulation      Ac blood loss anemia now stable  Thrombocytopenia without signs for bleeding      JESS-resolved      Metabolic acidosis-resolved   Hypokalemia resolved  Hypernatremia: improved      TPN weaned off and now on NG feedings, tolerating well except she pulled Dophoff so will replace         She will need extensive PT and rehab        Wound care, wound vac     Mental status slightly better but still intermittently confused      DM:   Lantus , ssi . Will increase the Lantus today       Reviewed case with nurse this am     Not a chemo candidate for near future         Chief complaint:  74 yo WF admitted for sepsis, dx with ovarian ca prior to admission, had debulking surgery, came in with peritonitis and sepsis      Subjective:    More alert today        Review of systems:    General: No fevers or chills. Cardiovascular: No chest pain or pressure. No palpitations. Pulmonary: No shortness of breath. Gastrointestinal: No nausea, vomiting. Objective:    Vital signs/Intake and Output:  Visit Vitals    /81    Pulse 94    Temp 98.6 °F (37 °C)    Resp 22    Ht 5' 1\" (1.549 m)    Wt 92.2 kg (203 lb 4.2 oz)    SpO2 95%    BMI 38.41 kg/m2     Current Shift:     Last three shifts:  07/06 1901 - 07/08 0700  In: 283.8 [I.V.:283.8]  Out: 7766 [Urine:5100; Drains:250]    Physical Exam:  General: NAD, AAOx3. Non-toxic. HEENT: NC/AT. PERRLA, EOMI.  MMM. Lungs: Nml inspection. CTA B/L. No wheezing, rales or rhonchi. Heart:  S1S2 RRR,  PMI mid 5th IC space. No M/RG. Abdomen: Soft, NT/ND.  BS+. No peritoneal signs. Extremities: No C/C/E. Psych:   Nml affect. Neurologic:  2-12 intact. Strength 5/5 throughout.   Sensation symmetrical.          Labs: Results:       Chemistry Recent Labs      07/08/18   0425  07/07/18   1400  07/07/18   0450  07/06/18   0430   GLU  183*   --   145*  179*   NA  146*   --   147*  145   K  3.8  3.4*  3.7  4.1   CL  115*   --   116*  113*   CO2  21   --   24  23   BUN  53*   --   52*  51*   CREA  0.62   --   0.59*  0.69   CA  8.3*   --   7.9*  7.7*   AGAP  10   --   7  9   BUCR  85*   --   88*  74*   AP  193*   --   177*  153*   TP  5.4*   --   4.9*  5.3*   ALB  2.0*   --   1.8*  2.0*   GLOB  3.4   --   3.1  3.3   AGRAT  0.6*   --   0.6*  0.6*      CBC w/Diff Recent Labs      07/08/18   0425  07/07/18   0453 07/06/18   0430   WBC  8.9  9.2  11.2   RBC  3.76*  3.51*  3.68*   HGB  10.3*  9.7*  10.3*   HCT  34.1*  31.7*  33.1*   PLT  106*  95*  100*   GRANS  61  42  48   LYMPH  27  45  35   EOS  1  6*  7*      Cardiac Enzymes No results for input(s): CPK, CKND1, FABIAN in the last 72 hours. No lab exists for component: CKRMB, TROIP   Coagulation No results for input(s): PTP, INR, APTT in the last 72 hours. No lab exists for component: INREXT    Lipid Panel Lab Results   Component Value Date/Time    Triglyceride 117 07/03/2018 04:05 AM      BNP No results for input(s): BNPP in the last 72 hours.    Liver Enzymes Recent Labs      07/08/18   0425   TP  5.4*   ALB  2.0*   AP  193*   SGOT  39*      Thyroid Studies No results found for: T4, T3U, TSH, TSHEXT     Procedures/imaging: see electronic medical records for all procedures/Xrays and details which were not copied into this note but were reviewed prior to creation of Plan

## 2018-07-08 NOTE — ROUTINE PROCESS
Bedside and Verbal shift change report given to TONY Lozada RN (oncoming nurse) by COLTEN Ann RN (offgoing nurse). Report included the following information SBAR, Kardex and MAR.

## 2018-07-08 NOTE — PROGRESS NOTES
Dr. Priti Merino requested that wound care visit patient on 7/9/2018 to change wound vac and assess wound in LLQ of abdomen currently covered with tegaderm and aquacel. Per MD, patient appears to have sanguineous drainage at the pannus. Nurse assessed area and did not note any drainage - area appeared dry. Wound care line was called and nurse left detailed message. Will report to night shift nurse to forward details to day nurse.

## 2018-07-08 NOTE — PROGRESS NOTES
Attempted to perform passive ROM exercises with patient. Each movement of arms included 3 sets of 10 movements/side of flexing and extending the elbow joint and wrist joint. The arm was moved up and down at the shoulder joint. Each digit was also flexed and extended individually for 3 sets of 10. The patient tolerated the process without issue and did not c/o pain. Patient was encouraged to open and closed hand for 3 sets of 5, but the nurse was able to assist to completion. The patients knees and ankles were flex and extended for 3 sets of 10 movements/side. The leg was raised and lowered at the hip joint for 3 sets of 5 movements/side, but patient refused further movements related to abdominal pain. The toes were flex and extended individually for 3 sets of 5 movements per side. All motions were slow and deliberate. The patient was assessed for pain throughout process and process was discontinued upon complaints of pain. This was performed at [address] and again at 1445.

## 2018-07-08 NOTE — PROGRESS NOTES
Chart accessed as CM on call. Spoke with primary RN Sandy. No discharge order noted at this time. Primary RN will contact CM is there are plans to discharge today.

## 2018-07-08 NOTE — PROGRESS NOTES
Patient is not followed up chart reviewed for ICU quality    SCDs, Protonix, Line and romero need,  Antibiotics as per primary  Feeding as per primary  BS concern raised by primary yesterday how every as per current CCM guidelines keep sugars between 140-180 and manage with SSI    Dr. Olivia Melton will be here tomorrow  Please call him if we can be of any help      Felicity Hinton M.D  [address] [place] [address].  Cecil Galarza [address] [address]  Phone [phone]   Fax [phone]  Pulmonary Critical Care & Sleep Medicine

## 2018-07-09 ENCOUNTER — APPOINTMENT (OUTPATIENT)
Dept: GENERAL RADIOLOGY | Age: 68
DRG: 853 | End: 2018-07-09
Attending: INTERNAL MEDICINE
Payer: MEDICARE

## 2018-07-09 PROBLEM — C56.2: Status: ACTIVE | Noted: 2018-07-09

## 2018-07-09 LAB
ALBUMIN SERPL-MCNC: 2 G/DL (ref 3.4–5)
ALBUMIN/GLOB SERPL: 0.6 {RATIO} (ref 0.8–1.7)
ALP SERPL-CCNC: 203 U/L (ref 45–117)
ALT SERPL-CCNC: 51 U/L (ref 13–56)
ANION GAP SERPL CALC-SCNC: 10 MMOL/L (ref 3–18)
AST SERPL-CCNC: 38 U/L (ref 15–37)
BASOPHILS # BLD: 0 K/UL (ref 0–0.06)
BASOPHILS NFR BLD: 0 % (ref 0–2)
BILIRUB SERPL-MCNC: 0.7 MG/DL (ref 0.2–1)
BUN SERPL-MCNC: 54 MG/DL (ref 7–18)
BUN/CREAT SERPL: 92 (ref 12–20)
CA-I SERPL-SCNC: 1.19 MMOL/L (ref 1.12–1.32)
CALCIUM SERPL-MCNC: 8.2 MG/DL (ref 8.5–10.1)
CHLORIDE SERPL-SCNC: 117 MMOL/L (ref 100–108)
CO2 SERPL-SCNC: 23 MMOL/L (ref 21–32)
CREAT SERPL-MCNC: 0.59 MG/DL (ref 0.6–1.3)
DIFFERENTIAL METHOD BLD: ABNORMAL
EOSINOPHIL # BLD: 0.2 K/UL (ref 0–0.4)
EOSINOPHIL NFR BLD: 2 % (ref 0–5)
ERYTHROCYTE [DISTWIDTH] IN BLOOD BY AUTOMATED COUNT: 25.3 % (ref 11.6–14.5)
GLOBULIN SER CALC-MCNC: 3.5 G/DL (ref 2–4)
GLUCOSE BLD STRIP.AUTO-MCNC: 172 MG/DL (ref 70–110)
GLUCOSE BLD STRIP.AUTO-MCNC: 189 MG/DL (ref 70–110)
GLUCOSE BLD STRIP.AUTO-MCNC: 189 MG/DL (ref 70–110)
GLUCOSE SERPL-MCNC: 192 MG/DL (ref 74–99)
HCT VFR BLD AUTO: 33.2 % (ref 35–45)
HGB BLD-MCNC: 10.2 G/DL (ref 12–16)
INR PPP: 1.2 (ref 0.8–1.2)
LYMPHOCYTES # BLD: 2.3 K/UL (ref 0.9–3.6)
LYMPHOCYTES NFR BLD: 23 % (ref 21–52)
MAGNESIUM SERPL-MCNC: 2 MG/DL (ref 1.6–2.6)
MCH RBC QN AUTO: 28.1 PG (ref 24–34)
MCHC RBC AUTO-ENTMCNC: 30.7 G/DL (ref 31–37)
MCV RBC AUTO: 91.5 FL (ref 74–97)
MONOCYTES # BLD: 0.8 K/UL (ref 0.05–1.2)
MONOCYTES NFR BLD: 8 % (ref 3–10)
NEUTS SEG # BLD: 6.5 K/UL (ref 1.8–8)
NEUTS SEG NFR BLD: 67 % (ref 40–73)
PHOSPHATE SERPL-MCNC: 3.4 MG/DL (ref 2.5–4.9)
PLATELET # BLD AUTO: 101 K/UL (ref 135–420)
POTASSIUM SERPL-SCNC: 3.3 MMOL/L (ref 3.5–5.5)
POTASSIUM SERPL-SCNC: 3.5 MMOL/L (ref 3.5–5.5)
PREALB SERPL-MCNC: 27.4 MG/DL (ref 20–40)
PROT SERPL-MCNC: 5.5 G/DL (ref 6.4–8.2)
PROTHROMBIN TIME: 14.2 SEC (ref 11.5–15.2)
RBC # BLD AUTO: 3.63 M/UL (ref 4.2–5.3)
SODIUM SERPL-SCNC: 150 MMOL/L (ref 136–145)
WBC # BLD AUTO: 9.9 K/UL (ref 4.6–13.2)

## 2018-07-09 PROCEDURE — 77030019934 HC DRSG VAC ASST KCON -B

## 2018-07-09 PROCEDURE — 82962 GLUCOSE BLOOD TEST: CPT

## 2018-07-09 PROCEDURE — C9113 INJ PANTOPRAZOLE SODIUM, VIA: HCPCS | Performed by: INTERNAL MEDICINE

## 2018-07-09 PROCEDURE — 65270000029 HC RM PRIVATE

## 2018-07-09 PROCEDURE — 83735 ASSAY OF MAGNESIUM: CPT | Performed by: OBSTETRICS & GYNECOLOGY

## 2018-07-09 PROCEDURE — 77030018798 HC PMP KT ENTRL FED COVD -A

## 2018-07-09 PROCEDURE — 84134 ASSAY OF PREALBUMIN: CPT | Performed by: OBSTETRICS & GYNECOLOGY

## 2018-07-09 PROCEDURE — 77030010522

## 2018-07-09 PROCEDURE — 71045 X-RAY EXAM CHEST 1 VIEW: CPT

## 2018-07-09 PROCEDURE — 84132 ASSAY OF SERUM POTASSIUM: CPT | Performed by: OBSTETRICS & GYNECOLOGY

## 2018-07-09 PROCEDURE — 74011250637 HC RX REV CODE- 250/637: Performed by: OBSTETRICS & GYNECOLOGY

## 2018-07-09 PROCEDURE — 74011250637 HC RX REV CODE- 250/637: Performed by: INTERNAL MEDICINE

## 2018-07-09 PROCEDURE — 74011250636 HC RX REV CODE- 250/636: Performed by: INTERNAL MEDICINE

## 2018-07-09 PROCEDURE — 97110 THERAPEUTIC EXERCISES: CPT

## 2018-07-09 PROCEDURE — 77030037878 HC DRSG MEPILEX >48IN BORD MOLN -B

## 2018-07-09 PROCEDURE — 97606 NEG PRS WND THER DME>50 SQCM: CPT

## 2018-07-09 PROCEDURE — 77010033678 HC OXYGEN DAILY

## 2018-07-09 PROCEDURE — 74011636637 HC RX REV CODE- 636/637: Performed by: INTERNAL MEDICINE

## 2018-07-09 PROCEDURE — 85025 COMPLETE CBC W/AUTO DIFF WBC: CPT | Performed by: OBSTETRICS & GYNECOLOGY

## 2018-07-09 PROCEDURE — 77030010541

## 2018-07-09 PROCEDURE — 97167 OT EVAL HIGH COMPLEX 60 MIN: CPT

## 2018-07-09 PROCEDURE — 85610 PROTHROMBIN TIME: CPT | Performed by: OBSTETRICS & GYNECOLOGY

## 2018-07-09 PROCEDURE — 77030011256 HC DRSG MEPILEX <16IN NO BORD MOLN -A

## 2018-07-09 PROCEDURE — 82330 ASSAY OF CALCIUM: CPT | Performed by: SURGERY

## 2018-07-09 PROCEDURE — 84100 ASSAY OF PHOSPHORUS: CPT | Performed by: OBSTETRICS & GYNECOLOGY

## 2018-07-09 RX ORDER — POTASSIUM CHLORIDE 20MEQ/15ML
20 LIQUID (ML) ORAL ONCE
Status: COMPLETED | OUTPATIENT
Start: 2018-07-09 | End: 2018-07-09

## 2018-07-09 RX ORDER — POTASSIUM CHLORIDE 7.45 MG/ML
10 INJECTION INTRAVENOUS
Status: COMPLETED | OUTPATIENT
Start: 2018-07-09 | End: 2018-07-09

## 2018-07-09 RX ADMIN — POTASSIUM CHLORIDE 10 MEQ: 10 INJECTION, SOLUTION INTRAVENOUS at 16:34

## 2018-07-09 RX ADMIN — INSULIN GLARGINE 20 UNITS: 100 INJECTION, SOLUTION SUBCUTANEOUS at 09:36

## 2018-07-09 RX ADMIN — METOPROLOL TARTRATE 50 MG: 50 TABLET, FILM COATED ORAL at 09:35

## 2018-07-09 RX ADMIN — INSULIN LISPRO 2 UNITS: 100 INJECTION, SOLUTION INTRAVENOUS; SUBCUTANEOUS at 06:25

## 2018-07-09 RX ADMIN — FUROSEMIDE 40 MG: 10 INJECTION, SOLUTION INTRAMUSCULAR; INTRAVENOUS at 09:35

## 2018-07-09 RX ADMIN — SODIUM CHLORIDE 40 MG: 9 INJECTION INTRAMUSCULAR; INTRAVENOUS; SUBCUTANEOUS at 09:35

## 2018-07-09 RX ADMIN — HYDROMORPHONE HYDROCHLORIDE 0.5 MG: 2 INJECTION, SOLUTION INTRAMUSCULAR; INTRAVENOUS; SUBCUTANEOUS at 11:04

## 2018-07-09 RX ADMIN — INSULIN LISPRO 2 UNITS: 100 INJECTION, SOLUTION INTRAVENOUS; SUBCUTANEOUS at 19:03

## 2018-07-09 RX ADMIN — POTASSIUM CHLORIDE 20 MEQ: 20 SOLUTION ORAL at 09:35

## 2018-07-09 RX ADMIN — POTASSIUM CHLORIDE 10 MEQ: 10 INJECTION, SOLUTION INTRAVENOUS at 17:46

## 2018-07-09 RX ADMIN — POTASSIUM CHLORIDE 10 MEQ: 10 INJECTION, SOLUTION INTRAVENOUS at 18:58

## 2018-07-09 RX ADMIN — METOPROLOL TARTRATE 50 MG: 50 TABLET, FILM COATED ORAL at 21:52

## 2018-07-09 RX ADMIN — HYDROMORPHONE HYDROCHLORIDE 0.5 MG: 2 INJECTION, SOLUTION INTRAMUSCULAR; INTRAVENOUS; SUBCUTANEOUS at 01:48

## 2018-07-09 RX ADMIN — SERTRALINE HYDROCHLORIDE 50 MG: 50 TABLET ORAL at 09:35

## 2018-07-09 RX ADMIN — INSULIN LISPRO 2 UNITS: 100 INJECTION, SOLUTION INTRAVENOUS; SUBCUTANEOUS at 16:45

## 2018-07-09 NOTE — INTERDISCIPLINARY ROUNDS
CRITICAL CARE INTERDISCIPLINARY ROUNDS     Patient Information:     Name:   August Curran     Age:   75 y.o.     Admission Date:   6/4/2018     Critical Care Day: 29     Surgery Date:  6-5-18     Attending Provider:   Tasneem Easley MD     Surgeon: Brinda Carlson      Consultant(s):   Mehran Mast (Pulmonary), Duc (Renal), Micheal Murphy (Vascular Surgery)     Critical Care Physician:  Mehran Mast     Code Status: Full Code     Problem List:     Patient Active Problem List   Diagnosis Code    Ovarian ca ([address] C56.9    Severe obesity (BMI 35.0-39.9) ([address] E66.01    Abdominal pain R10.9    Sepsis ([address] A41.9    Oliguria R34    Acute respiratory failure ([address] J96.00    JESS (acute kidney injury) ([address] S16.7    Metabolic acidosis K18.2    Acute deep vein thrombosis (DVT) of lower extremity ([address] I82.409    S/P ileostomy ([address] Z93.2    Hypoalbuminemia E88.09    Peritonitis ([address] K65.9    Hypernatremia E87.0    Hypokalemia E87. 6         Principal Problem:  Sepsis ([address]     During rounds the following quality care indicators and evidence based practices were addressed :  PUD Prophylaxis, Pressure Injury Prevention, Pain Management and Nutritional Status Fry Day 35  (M-Care YES) ; Central Line Day 20: ; Antibiotic Stewardship:       Interdisciplinary team rounds were held with the following team membersCare Management, Diabetes Treatment Specialist, Nursing, Nutrition, Occupational Therapy, Pharmacy, Physician and Respiratory Therapy. Plan of care discussed.      Goals of Care/ Recommendations: CONTINUE ICU LEVEL OF CARE. WEAN HI-FLOW NC WHEN APPROPRIATE AND TOLERABLE. PLAN FOR IVC FILTER TODAY. ELECTROLYTE REPLACEMENT WITH TPN. MANAGE PAIN CONTROL.; WILL NEED MBSS WHEN STABLE AND ABLE TO TOLERATE PROCEDURE/MOVEMENT. See clinical pathway and/or care plan for interventions and desired outcomes.

## 2018-07-09 NOTE — WOUND CARE
Wound Care Progress Note       Follow-up visit for wound vac change      Assessment:   Communication: pt responds when spoken to  Continent  has a Fry and an ostomy      Requires full assist in repositioning. Diet: tube feeding      Bilateral heel and sacral skin intact     Generalized edema  to lower legs and feet. 1 Surgical Wound - Abdomen 16.0 x 6.0 x 5.6 cm  Base of wound 100% slough, sides of wound 90% pink, 5% yellow and 5% slough. Nai wound intact and without erythema  Treatment :  Santyl applied to wound followed by wound vac. Suction set at 125 mm hg. No air leaks noted at this time. Ostomy wafer and bag changed at this visit. Patient not repositioned at this time, nurse to change gown and pad underneath pt before repositioning pt. Heels offloaded on pillow. Wound Recommendations:    Continue vac dressing change at least 2 times per week. Skin Care & Pressure Relief Recommendations:  Minimize layers of linen/pads under patient to optimize support surface. Turn/reposition approximately every 2 hours and offload heels pillows or Prevalon boots.   Manage incontinence / promote continence; Proshield to buttocks and sacrum daily and as needed with incontinence care    Discussed above plan with patient & Davis Abbott and Lobito Beltran RN and Dr. Clement Notice    Transition of Care: Plan to follow weekly and per product recommendations while admitted to hospital.

## 2018-07-09 NOTE — PROGRESS NOTES
Problem: Self Care Deficits Care Plan (Adult)  Goal: *Acute Goals and Plan of Care (Insert Text)  Initial Occupational Therapy Goals (7/9/2018) Within 7 day(s):    1. Patient will perform simple grooming with setup/[place] for increased independence with ADLs. 2. Patient will perform UB dressing with minimal assist & 1-2 verbal cues for attention to task for increased independence with ADLs. 3. Patient will perform rolling with moderate assist in preparation for toileting for increased independence with ADLs. 4. Patient will perform bed mobility to EOB with moderate assist for increased independence with ADLs. 5. Patient will sit EOB with CGA x 10 minutes in preparation for ADLs. 6. Patient will adhere to abdominal surgery precautions 100% of the time with 1 verbal cue for increased independence with ADLs. 7. Patient will utilize energy conservation techniques with 1 verbal cue(s) for increased independence with ADLs. Outcome: Progressing Towards Goal  Occupational Therapy EVALUATION    Patient: Opal Gomes (7 y.o. female)  Date: 7/9/2018  Primary Diagnosis: Abdominal pain  Abdominal pain  Abdominal Pain  aspiration  POSTOP WOUND EXPLORATION  Procedure(s) (LRB):  WOUND EXPLORATION, EXPLORATORY LAPAROTOMY, ILLEOSTMOY, LYSES OF ADHESIONS AND WOUND VAC PLACEMENT (N/A) 24 Days Post-Op   Precautions: Skin (abdominal wound),   Fall    ASSESSMENT :  Based on the objective data described below, the patient presents with decreased functional strength, decreased functional balance, decreased overall activity tolerance limiting independence with ADLs. Patient well-known to this OT from previous assessments with pt having poor participation. Pt has begun anti-depressant and more participatory with PT over weekend. Pt continues to be total assist for simple ADLs this date and opening eyes at end of session.  Will trial patient with OT (last opportunity) to maximize independence and strength in preparation for further tx.    Education: Patient instructed on home safety, body mechanics for optimal respiratory effort, Energy Conservation/Work Simplification Techniques, adaptive strategies and adaptive dressing techniques including clothing modifications with patient unable to  verbalize understanding at this time. Patient will benefit from skilled intervention to address the above impairments. Patients rehabilitation potential is considered to be Fair  Factors which may influence rehabilitation potential include:   []             None noted  [x]             Mental ability/status  [x]             Medical condition  []             Home/family situation and support systems  []             Safety awareness  [x]             Pain tolerance/management  []             Other:      PLAN :  Recommendations and Planned Interventions:  [x]               Self Care Training                  [x]        Therapeutic Activities  [x]               Functional Mobility Training    [x]        Cognitive Retraining  [x]               Therapeutic Exercises           [x]        Endurance Activities  [x]               Balance Training                   [x]        Neuromuscular Re-Education  []               Visual/Perceptual Training     []   Home Safety Training  [x]               Patient Education                 [x]        Family Training/Education  []               Other (comment):    Frequency/Duration: Patient will be followed by occupational therapy 3 times a week to address goals. Discharge Recommendations: LTACH  Further Equipment Recommendations for Discharge: To Be Determined (TBD) at next level of care      SUBJECTIVE:   Patient stated [no attempts to [place].     OBJECTIVE DATA SUMMARY:     Past Medical History:   Diagnosis Date    Diabetes ([facility]     many years type 2    Hypertension     many years     Past Surgical History:   Procedure Laterality Date    HX OOPHORECTOMY  05/29/2018    HX TONSILLECTOMY      as a child Barriers to Learning/Limitations: yes;  cognitive and physical  Compensate with: visual, verbal, tactile, kinesthetic cues/model    Prior Level of Function/Home Situation:   Home Situation  Home Environment: Private residence  # Steps to Enter: 4  One/Two Story Residence: One story  Living Alone: No  Support Systems: Family member(s)  Patient Expects to be Discharged to[de-identified] Unknown  Current DME Used/Available at Home: Commode, bedside, Shower chair, Walker, rolling, Wheelchair  Tub or Shower Type: Shower  [x]  Right hand dominant   []  Left hand dominant    Cognitive/Behavioral Status:  Neurologic State: Eyes open to stimulus; Lethargic  Orientation Level: Disoriented to situation;Disoriented to time;Oriented to person;Oriented to place  Cognition: Decreased attention/concentration;Decreased command following;Memory loss  Safety/Judgement: Decreased awareness of need for assistance;Decreased awareness of need for safety; Lack of insight into deficits    Skin: abdominal wound w/ VAC  Edema: mild/moderate peripheral edema of BUEs    Vision/Perceptual:      unable to assess     Coordination:  Coordination: Generally decreased, functional  Fine Motor Skills-Upper: Left Impaired;Right Impaired    Gross Motor Skills-Upper: Left Impaired;Right Impaired    Balance:  Sitting: Impaired; With support    Strength:  Strength: Generally decreased, functional  Tone & Sensation:  Tone: Normal  Sensation: Intact  Range of Motion:  AROM: Generally decreased, functional  PROM: Generally decreased, functional    Functional Mobility and Transfers for ADLs:  Bed Mobility:  Scooting: Total assistance    ADL Assessment:   Feeding: Total assistance (NPO/ice chips sparingly)    Oral Facial Hygiene/Grooming: Total assistance    Bathing: Total assistance    Upper Body Dressing: Total assistance    Lower Body Dressing: Total assistance    Toileting: Total assistance    ADL Intervention:  Grooming  Grooming Assistance:  Total assistance(dependent)  Washing Hands: Total assistance (dependent) (with wipes)    Lower Body Dressing Assistance  Socks: Total assistance (dependent)    Cognitive Retraining  Orientation Retraining: Reorienting;Place;Situation;Time  Problem Solving: Inductive reason; Identifying the task; Identifying the problem;General alternative solution;Deductive reason  Executive Functions: Executing cognitive plans;Regulating behavior  Organizing/Sequencing: Breaking task down;Prioritizing  Attention to Task: Distractibility; Single task  Maintains Attention For (Time): 30 seconds (<)  Safety/Judgement: Decreased awareness of need for assistance;Decreased awareness of need for safety; Lack of insight into deficits  Cues: Tactile cues provided;Verbal cues provided;Visual cues provided    Therapeutic Exercise:  BUE PROM w/ attempts at [facility] all joints, all planes    Pain:  Pre-treatment: no s/s of pain/FACES  Post-treatment: FACES 0/10    Activity Tolerance:   Pt placed in semi-chair position of bed; Patient able to complete ADLs with constant rest breaks. Patient limited by self/cognition/strength/balance. Please refer to the flowsheet for vital signs taken during this treatment. After treatment:   [] Patient left in no apparent distress sitting up in chair  [x] Patient left in no apparent distress in bed  [x] Call bell left within reach  [x] Nursing notified/Doe & Annabella Carlos  [] Caregiver present  [] Bed alarm activated    COMMUNICATION/EDUCATION:   [x] Home safety education was provided and the patient/caregiver indicated understanding. [] Patient/family have participated as able in goal setting and plan of care. [] Patient/family agree to work toward stated goals and plan of care. [] Patient understands intent and goals of therapy, but is neutral about his/her participation. [x] Patient is unable to participate in goal setting and plan of care.     Thank you for this referral.  Kaylynn Murray, OTR/L  Time Calculation: 15 mins    G-Codes (GP)  Self Care   Current  CN= 100%  [id number] Goal  CK= 40-59%  The severity rating is based on the professional judgement & direct observation of Level of Assistance required for Functional Mobility and ADLs. Eval Complexity: History: HIGH Complexity : Extensive review of history including physical, cognitive and psychosocial history ; Examination: HIGH Complexity : 5 or more performance deficits relating to physical, cognitive , or psychosocial skils that result in activity limitations and / or participation restrictions; Decision Making:HIGH Complexity : Patient presents with comorbidities that affect occupational performance.  Signifigant modification of tasks or assistance (eg, physical or verbal) with assessment (s) is necessary to enable patient to complete evaluation

## 2018-07-09 NOTE — PROGRESS NOTES
Bedside and Verbal shift change report given to Rayshawn Gore RN (oncoming nurse) by Erna Mcbride RN (offgoing nurse). Report included the following information SBAR, Kardex, MAR and Cardiac Rhythm Sinus. 0800 - Assessment completed. 1100 - 0.25mg dilaudid given for pain control while wound care visits and changes patients dressing. 1200 - Woundcare is applying Sanitel on the patients midline incision, and VAC will be replaced. No changes to assessment. Passive range of motion done  1600 -  No changes to assessment. 8578 - Patient is reporting that she is \"very hungry\". Will check for solid food orders. Patient is currently on tube feeding. 1900- At this time patient is more alert than previous assessment. Attempted swallow study screen, no difficultly with swallowing, but alertness changed, decided to not continue the assessment at this time. Patient is confused to place. 1930 - Bedside and Verbal shift change report given to Erna Mcbride RN (oncoming nurse) by Rayshawn Gore RN (offgoing nurse). Report included the following information SBAR, Kardex, MAR and Cardiac Rhythm Sinus.

## 2018-07-09 NOTE — PROGRESS NOTES
Problem: Mobility Impaired (Adult and Pediatric)  Goal: *Acute Goals and Plan of Care (Insert Text)  Physical Therapy Goals  Pt re-evaluated 7/07/2018  Goals to be accomplished 5-7 days    1. Bed mobility: Rolling L to R to with mod A with use of HR for positioning. 2. Transfer: Supine to/from Sit with mod/max A with HR for change of position/prep for EOB sitting. 3. Activity Tolerance: Tolerate EOB sitting 15-20 minutes with fair UE supported balance for ADL/balance activities. 4. Patient will perform sit to stand with RW/maximal assistance and tolerate same x 15-30 seconds. 5. Ambulation:  Ambulate 5 ft. mod/max A with RW for increased functional mobility. Updated 6/28/2018 and to be accomplished within 5-7 day(s)  1. Bed mobility: Rolling L to R to with max/mod A with use of HR for positioning. 2. Transfer: Supine to/from Sit with mod/max A with HR for change of position/prep for EOB sitting. 3. Activity Tolerance: Tolerate EOB sitting 5-10 minutes with fair UE supported balance for ADL/balance activities. 4. Patient will perform sit to stand with RW/maximal assistance and tolerate same x 15-30 seconds. 5. Ambulation:  Ambulate 5 ft. mod/max A with RW for increased functional mobility. Physical Therapy Goals  Updated 6/20/2018 and to be accomplished within 7 day(s)  1. Patient will move from supine to sit and sit to supine in bed with maximal assistance. 2.  Patient will transfer from bed to chair and chair to bed with maximal assistance using the least restrictive device. 3.  Patient will perform sit to stand with maximal assistance. 4.  Patient will ambulate with maximal assistance for 5 feet with the least restrictive device.              physical Therapy TREATMENT    Patient: Kindra Nathan (75 y.o. female)  Date: 7/9/2018  Diagnosis: Abdominal pain  Abdominal pain  Abdominal Pain  aspiration  POSTOP WOUND EXPLORATION Sepsis ([facility] [address]  Procedure(s) (LRB):  WOUND EXPLORATION, EXPLORATORY LAPAROTOMY, ILLEOSTMOY, LYSES OF ADHESIONS AND WOUND VAC PLACEMENT (N/A) 24 Days Post-Op  Precautions: Fall   Chart, physical therapy assessment, plan of care and goals were reviewed. ASSESSMENT:  Pt continues to be ICU pt and seen there. NG feeding tube in place, along with PICC line, lower abdominal wound vac, Fry catheter, and SCD's. Pt on RA sating at 98%. Lethargic throughout session and only participates for AA ROM ex 3 reps at best during L side: mass grasp, horizontal elbow ext, hip adduction and heel slide ext; R side LE's same. No active engagement R UE noted. Pt brother Laron Ryan present during session. Pt does nod head appropriately at times, and observed lifting head off pillow for adjustment. Left with all equipment in place. Nurse Wilver David aware. Progression toward goals:  []      Improving appropriately and progressing toward goals  [x]      Improving slowly and progressing toward goals  []      Not making progress toward goals and plan of care will be adjusted     PLAN:  Patient continues to benefit from skilled intervention to address the above impairments. Continue treatment per established plan of care. Discharge Recommendations:  LTACH  Further Equipment Recommendations for Discharge:  N/A     SUBJECTIVE:   Patient stated .    OBJECTIVE DATA SUMMARY:   Critical Behavior:  Neurologic State: Eyes open to stimulus, Lethargic  Orientation Level: Disoriented to situation, Disoriented to time, Oriented to person, Oriented to place  Cognition: Decreased attention/concentration, Decreased command following, Memory loss  Safety/Judgement: Decreased awareness of need for assistance, Decreased awareness of need for safety, Lack of insight into deficits  Therapeutic Exercises:   PROM, AAROM for strengthening as noted above BUE's and BLE's.   Pain:  Pain Scale 1: Adult Nonverbal Pain Scale  Pain Intensity 1: 0  Activity Tolerance:   Fair   Please refer to the flowsheet for vital signs taken during this treatment.   After treatment:   [] Patient left in no apparent distress sitting up in chair  [x] Patient left in no apparent distress in bed  [] Call bell left within reach  [x] Nursing notified  [x] Caregiver present  [] Bed alarm activated      Kina Oas, PT   Time Calculation: 32 mins

## 2018-07-09 NOTE — PROGRESS NOTES
Chart reviewed and attended IDR's per Anthony Onofre pt pending d/c from ICU pending ID consult/lab results at this time LTAC bed remains available at [facility].

## 2018-07-09 NOTE — DIABETES MGMT
GLYCEMIC CONTROL PROGRESS NOTE:    -discussed in rounds, known h/o T2DM HbA1C within recommended range for age + comorbids on oral home regimen  -BG out of target range ICU: 140-180 mg/dL   -NPO, TF  -Lantus increased to 20 units daily  -TDD = 23 (Lantus 15 + 8 - Humalog Normal Insulin Sensitivity Corrective Coverage )    Glucose Results:   Lab Results   Component Value Date/Time     (H) 07/09/2018 05:00 AM    GLUCPOC 189 (H) 07/09/2018 05:13 AM    GLUCPOC 178 (H) 07/08/2018 11:36 PM    GLUCPOC 183 (H) 07/08/2018 06:34 PM         Xena Carlos RN, MS  Glycemic Control Team  Pager [phone] (M-TH 8:30-5P)  *After Hours pager [phone]

## 2018-07-09 NOTE — PROGRESS NOTES
Speech Therapy Note:    6782: SLP orders received and attempted however, RN and CNA at bedside changing patient. SLP will f/u later this AM.     1120: Re-attempted dysphagia evaluation. Pt refused PO trials. SLP will f/u later this day. 1500: Patient remains inappropriate for PO intake at this time. SLP will re-attempt next day.      Shoshana Valdez M.S., CF-SLP  Speech Language Pathologist

## 2018-07-09 NOTE — PROGRESS NOTES
NUTRITION FOLLOW-UP    RECOMMENDATIONS/PLAN:   - continue tube feeds as ordered  - monitor weight/fluid status, ostomy output, skin integrity    NUTRITION ASSESSMENT:   Client Update: 75 yrs old Female with acute respiratory failure, ischemia colitis, sepsis, JESS, metabolic acidosis. 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 Pt was extubated, and 1 day later reintubated. Thought to have peritonitis. Pt is s/p laparotomy and peritoneal lavage with klebsiella positive. 6/15/18 s/p exp lap surgery for intraabdominal fluid collection/wound vac and ileostomy placed. Pt self extubated 6/17/18. Pt tpn turned off, tolerating tube feeds. Ileostomy and wound vac in place [facility] filter placed.  CTA neg for PE       FOOD/NUTRITION INTAKE   Diet Order:  Vital High Protein @ 60ml/hr to provide 1320kcal 116g PRO 1104ml H20 148g CHO 31g Fat   Food Allergies: NKFA  Average PO Intake:      No data found. Pertinent Medications:  [x] Reviewed; lasix, lopressor, protonix, KCl  Electrolyte Replacement Protocol: [x]K [x]Mg [x]PO4  Insulin:  [x]SSI  []Pre-meal   []Basal    []Drip  []None  Cultural/[employer] Food Preferences: None Identified    BIOCHEMICAL DATA & MEDICAL TESTS  Pertinent Labs:  [x] Reviewed; Na-150, glucose-192   ANTHROPOMETRICS  Height: 5' 1\" (154.9 cm)       Weight: 92 kg (202 lb 13.2 oz)         BMI: 38.3 kg/m^2 obese (30%-39.9% BMI)   Adm Weight: 196 lbs                Weight change: +6lbs  Adjusted Body Weight: 58kg     NUTRITION-FOCUSED PHYSICAL ASSESSMENT  Skin: No PU       GI: 810ml output yesterday     NUTRITION PRESCRIPTION  Calories: 979-1246 kcal/day based on 11-14kcal/kg  Protein: 95 g/day based on 2.0g/kg of IBW  CHO: 122-156 g/day based on 50% of total energy  Fluid: 979-1246 ml/day based on 1 kcal/ml        NUTRITION DIAGNOSES:   1.  Inadequate oral intake related to swallowing difficulties as needed by nutrition support     NUTRITION INTERVENTIONS:   INTERVENTIONS:                                                                                                                                                                         GOALS:  1. EN: continue POC 1. Pt to continue to tolerate tube feeds throughout the next 3 days        LEARNING NEEDS (Diet, Supplementation, Food/Nutrient-Drug Interaction):   [x] None Identified   [] Education provided/documented      Identified and patient: [] Declined   [] Was not appropriate/indicated        NUTRITION MONITORING /EVALUATION:   Adjust EN/PN as appropriate  Monitor wt  Monitor renal labs, electrolytes, fluid status     Previous Recommendations Implemented: yes        Previous Goals Met:  yes -tube feeds at goal       [x] Participated in Interdisciplinary Rounds    [x] Interdisciplinary Care Plan Reviewed  DISCHARGE NUTRITION RECOMMENDATIONS ADDRESSED:      [x] To be determined closer to discharge    NUTRITION RISK:           [x] At risk                        [] Not currently at risk        Will follow-up per policy.   Bao Baron 1

## 2018-07-09 NOTE — PROGRESS NOTES
Hospitalist Progress Note    Patient: Isael Mccormack MRN: [id number]  [facility]: [id number]    YOB: 1950  Age: 75 y.o. Sex: female    DOA: 6/4/2018 LOS:  LOS: 35 days          Chief Complaint: Ac sepsis      Assessment/Plan     Acute resp failure with hypoxia -improved and on NC 02    Severe Sepsis/shock    - secondary to peritonitis. Initial exp lap followed by surgery to debride and has 2 ostomies placed with large wound now with vac in place Finished multiople completed courses of antibiotics and antifungals.       Anasarca- improved      Afib, tachycardia-improved now off IV cardizem, now on  Po metoprolol        Bleeding from BRANDON drain -resolved       Acute bilateral peroneal DVT   - ivc filter placed  and no current plans for further anticoagulation-no PE on CTA chest      Ac blood loss anemia now stable    Thrombocytopenia without signs for bleeding    Ovarian cancer s/p debulking      JESS-resolved      Metabolic acidosis-resolved   Hypokalemia resolved  Hypernatremia: Na up to 150      TPN weaned off and now on NG feedings       Wound care, wound vac      Mental status slightly better but still intermittently confused      DM:   Lantus , ssi .        Looks like she may be able to come out of ICU soon, continue feedings, I suspect she may need LTAC level care with wounds and ostomies and feedings  Will follow from hospitalist team          Disposition :  Patient Active Problem List   Diagnosis Code    Ovarian ca ([address] C56.9    Severe obesity (BMI 35.0-39.9) ([address] E66.01    Abdominal pain R10.9    Sepsis ([address] A41.9    Oliguria R34    Acute respiratory failure ([address] J96.00    JESS (acute kidney injury) ([address] Y18.0    Metabolic acidosis U69.9    Acute deep vein thrombosis (DVT) of lower extremity (HCC) I82.409    S/P ileostomy ([address] Z93.2    Hypoalbuminemia E88.09    Peritonitis ([address] K65.9    Hypernatremia E87.0    Hypokalemia E87.6       Subjective:    No events overnight    Review of systems:    shes asleep this am, did not answer my questions      Vital signs/Intake and Output:  Visit Vitals    /74    Pulse 99    Temp 97.3 °F (36.3 °C)    Resp 21    Ht 5' 1\" (1.549 m)    Wt 92 kg (202 lb 13.2 oz)    SpO2 98%    BMI 38.32 kg/m2     Current Shift:     Last three shifts:  07/07 1901 - 07/09 0700  In: 2643   Out: 5630 [Urine:3350; Drains:250]    Exam:    General: somnolent WF, NAD, VSS on monitro, sleeping soundly  Head/Neck: NCAT, supple, No masses, No lymphadenopathy  CVS:Regular rate and rhythm, no M/R/G, S1/S2 heard, no thrill  Lungs:Clear to auscultation bilaterally, no wheezes, rhonchi, or rales  Abdomen: Soft, dec BS, wound vac, ostomies  romero  Extremities:1 plus edema  Skin:fading maculopapular rash                Labs: Results:       Chemistry Recent Labs      07/09/18   0500  07/08/18   0425  07/07/18   1400  07/07/18   0450   GLU  192*  183*   --   145*   NA  150*  146*   --   147*   K  3.5  3.8  3.4*  3.7   CL  117*  115*   --   116*   CO2  23  21   --   24   BUN  54*  53*   --   52*   CREA  0.59*  0.62   --   0.59*   CA  8.2*  8.3*   --   7.9*   AGAP  10  10   --   7   BUCR  92*  85*   --   88*   AP  203*  193*   --   177*   TP  5.5*  5.4*   --   4.9*   ALB  2.0*  2.0*   --   1.8*   GLOB  3.5  3.4   --   3.1   AGRAT  0.6*  0.6*   --   0.6*      CBC w/Diff Recent Labs      07/09/18   0500  07/08/18   0425  07/07/18   0450   WBC  9.9  8.9  9.2   RBC  3.63*  3.76*  3.51*   HGB  10.2*  10.3*  9.7*   HCT  33.2*  34.1*  31.7*   PLT  101*  106*  95*   GRANS  67  61  42   LYMPH  23  27  45   EOS  2  1  6*      Cardiac Enzymes No results for input(s): CPK, CKND1, FABIAN in the last 72 hours. No lab exists for component: CKRMB, TROIP   Coagulation Recent Labs      07/09/18   0500   PTP  14.2   INR  1.2       Lipid Panel Lab Results   Component Value Date/Time    Triglyceride 117 07/03/2018 04:05 AM      BNP No results for input(s): BNPP in the last 72 hours.    Liver Enzymes Recent Labs      07/09/18   0500   TP  5.5*   ALB  2.0*   AP  203*   SGOT  38*      Thyroid Studies No results found for: T4, T3U, TSH, TSHEXT     Procedures/imaging: see electronic medical records for all procedures/Xrays and details which were not copied into this note but were reviewed prior to creation of Esperanza Costa MD

## 2018-07-09 NOTE — PROGRESS NOTES
At this time due to inability to transfer pt to [place] [address], León Sanchez moved pt status from accept to pending,this means when pt is medically ready [facility] may not have available bed,bed has olivia held for patient over an week.

## 2018-07-09 NOTE — PROGRESS NOTES
1915-Bedside verbal report received from Siena Neely RN. Sbar, Mar, labs, kardex and patient status reviewed. 2000-Assessment completed. Changed bloody, jelly discharge from vaginal area and repositioned in bed. PROM performed on all extremities at this time and encouraged patient to actively move in bed. Wound vac continues to drain minimal serosanguinous drainage. Mepilex placed on small open area on inner left thigh-appears to be from romero or wound vac system rubbing site. 0000-Assessment completed. PROM performed on all extremities. Encouraged patient to move extremities self. No complaints at this time. 0130-Patient complaining of abdominal pain 7-10 at this time. Gave PRN Dilaudid per orders. 0200-Reassessed pain at this time. Patient is resting quietly and appears comfortable. 0400-Assessment completed. No changes from previous assessment. Patient shakes head \"no\" to pain at this time. 0715-Bedside verbal report given to Christiano Cummins RN & Renata Woods RN. Sbar, mar, labs, kardex and patient status reviewed.

## 2018-07-09 NOTE — PROGRESS NOTES
[facility]                  Pulmonary, Critical Care, and Sleep Medicine     Name: Leah Wheeler MRN: [id number]   : 1950 Hospital: [facility] [place]    Date: 2018        [facility] Note    IMPRESSION:   · Acute hypoxic respiratory failure, on NC, came off of BiPAP; multifactorial from any generalized weakness; aspiration pneumonia following episode of mucus plug , previously had other etiologies with pulmonary edema, aspiration pneumonitis. No central or segmental PE on CTA chest. Previously required ventilator support this admission due to sepsis, reintubated on 18 and self-extubated . · S/p bronchoscopy 18: no aspiration noted then. Cx Negative. · Severe sepsis due to Klebsiella Oxytoca peritonitis initially and then perforation. S/p laparotomy 18 (tumor radical debulking) and peritoneal lavage: Klebsiella positive. Ruled out ischemic bowel in laparotomy. S/p laparotomy on 6/15/18 for peritonitis due to diverticulitis with perforation, s/p distal ileostomy. · Acute b/l peroneal DVT, s/p IVC filter by vascular surgery 18. · Anemia, s/p PRBC tx 18  · Thrombocytopenia, mild  · Pelvic hematoma and BRANDON drain. Hematoma stable/better on CT 18  · B/l LE DVT. Off heparin for intraabdominal bleeding and pelvic hematoma on CT, and CTA chest no central PE. INR reversed with protamin, FFP.-- S/P IVC fillter   · S/p Shock suspected from sepsis and obstructive due to presumed PE initially with severe hypoxia and high A-a gradient, elevated RVSP 34 mm Hg. CTA 18 negative for central or main PA PE. Shock resolved. CTA chest no central PE.  · JESS, improved  · Hypernatremia, worsened following Lasix- improvement slowly with free water   · Elevated LFT, due to shock liver initially  · Foreign body on peritoneal biopsy, per path report. · Anasarca due to fluid resuscitation, JESS, hypoalbuminemia and sequale of sepsis. Improved.    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 · Physical deconditioning due to acute illness and prolonged hospital course     · Code status: Full code. · Poor overall prognosis. RECOMMENDATIONS:   Respiratory:   Doing well on NC fio2. Poor cough due to generalized weakness and malnourishment and bedbound status. C/w aspiration precautions, HOB >30 degree all the time, aspiration precautions. OOB if possible. C/w working with PT.   CVS: hemodynamics stable. AFib in the hospital course, resolved to NSR. On metoprolol, tolerating well. B/l peroneal DVT, but off heparin due to bleeding in BRANDON drain and pelvic hematoma on CT, s/p IVC filter placed on 6/28/18. Mild thrombocytopenia, stable. No more active bleeding now. H&H stable. Clear cell ovarian cancer is high risk for VTE. She is still bedbound due to deconditioning. Ideally, she should be anticoagulated. Discussed with Dr. Silvia Salinas today, will re-discuss with her again about the CT pelvis 7/1/18 with hematoma before considering to start anticoagulation. ID:  Waiting for repeat procalcitonin result. Off Abx and off antifungal since 7/6/18, no fever or leucocytosis or clinical sign of infection/peritonitis. All her recent cx including fungal blood cx has been negative so far. Pelvic fluid collection noted on CT, hematoma likely. Sepsis bundle and protocol followed. Follow serial lactic acid, frequent BMP check, fluid resuscitation. Follow cultures. Deescalate antibiotic when appropriate. Hematology/Oncology: thrombocytopenia initially thought to be due to sepsis, medications etc. She is not on any medications to cause thrombocytopenia now. She has petechia on b/l legs up to knees. No petechia on chest wall or UE. Monitor closely. No sign of TTP. If recovers from physical strength, then she will receive adjuvant chemotherapy as outpatient. Renal: JESS resolved. Persistently elevated BUN and Na, on free water. Monitor closely on lasix. GI/: tolerating DHT feed with free water. Off TPN. S/p ileostomy- no bleeding. Still mildly elevated AST and ALP, monitor. Endocrine: Maintain blood glucose 140-180. Increased lantus from 15 to 20 on 7/9/18  Neurology: mental status improved, alert awake but generalized weakness due to critical illness and possible myopathy. C/w bedside PT. Recent CT head unremarkable for acute findings. On zoloft for depression. Pain/Sedation: no pain. Skin/Wound: local wound care at surgical site by wound care consult. Electrolytes: Replace electrolytes per ICU electrolyte replacement protocol. IVF: none  Nutrition: DHT with free water. Prophylaxis: DVT Prophylaxis - SCD. GI Prophylaxis with protonix. Lines/Tubes:    Right arm PICC line 6/19/18: medically necessary due to poor IV access. Fry 6/4/18: medically necessary for strict I/o. Ileostomy. A/dvance Directive/Palliative Care: Full code. Placement: LTAC. Will defer respective systems problem management to primary and other respective consultant and follow patient in ICU with primary and other medical team.  Further recommendations will be based on the patient's response to recommended treatment and results of the investigation ordered. Quality Care: PPI, DVT prophylaxis, HOB elevated, Infection control all reviewed and addressed. Care of plan d/w RN, RT, PT/OT, ST, pharmacist, palliative care team, MDR, patient, family- brother (answered all questions to satisfaction). Moderate complexity decision making was performed during the evaluation of this patient at high risk for decompensation with multiple organ involvement. Total critical care time spent rendering care exclusive of procedures: 37 minutes.        Subjective/History of Present Illness: Patient is a 75 y.o. 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 7/9/18:  No fever. BP stable. Hb and Platelets stable. Edema much improved. Awake and following commands, responsive  Tolerating DHT feed  abd soft, NT  No other issues overnight. Allergies   Allergen Reactions    Hydrocodone Other (comments)     Breaks into cold sweat    Metformin Other (comments)     Breaks out into cold sweat. Can Take Glucophage brand name med      Past Medical History:   Diagnosis Date    Diabetes ([address]     many years type 2    Hypertension     many years      Past Surgical History:   Procedure Laterality Date    HX OOPHORECTOMY  05/29/2018    HX TONSILLECTOMY      as a child       Social History   Substance Use Topics    Smoking status: Never Smoker    Smokeless tobacco: Never Used    Alcohol use No      History reviewed. No pertinent family history. Prior to Admission medications    Medication Sig Start Date End Date Taking? Authorizing Provider   metFORMIN ER (GLUCOPHAGE XR) 500 mg tablet Take 2,000 mg by mouth daily (with dinner). Brand name only, pt reports allergy of cold sweats to the generic   Yes Historical Provider   fluticasone (FLONASE ALLERGY RELIEF) 50 mcg/actuation nasal spray 2 Sprays by Both Nostrils route daily as needed for Rhinitis. Yes Historical Provider   acetaminophen-codeine (TYLENOL #3) 300-30 mg per tablet Take 1 Tab by mouth every four (4) hours as needed for Pain. Max Daily Amount: 6 Tabs. 6/1/18  Yes Charissa Lancaster MD   HYDROmorphone (DILAUDID) 2 mg tablet Take 1 Tab by mouth every three (3) hours as needed.  Max Daily Amount: 16 mg. 18  Yes Emily Stevenson MD   acetaminophen (TYLENOL ARTHRITIS PAIN) 650 mg TbER Take 650 mg by mouth every eight (8) hours as needed. Yes Historical Provider   phenylephrine (NEOSYNEPHRINE) 1 % spry 2 Sprays by Both Nostrils route every six (6) hours as needed. Pt uses several times every day for chronic nasal congestion    **pt pours phenylephrine into an AFRIN nasal spray bottle as she prefers this device**   Yes Historical Provider   amLODIPine (NORVASC) 10 mg tablet Take 10 mg by mouth daily. Yes Historical Provider   verapamil ER (VERELAN) 180 mg CR capsule Take 180 mg by mouth daily. Yes Historical Provider   atorvastatin (LIPITOR) 10 mg tablet Take 10 mg by mouth daily. Yes Historical Provider   losartan-hydroCHLOROthiazide (HYZAAR) 100-12.5 mg per tablet Take 1 Tab by mouth daily. Yes Historical Provider   Aspirin-Acetaminophen-Caffeine (GOODY'S EXTRA STRENGTH) 500-325-65 mg pwpk Take 2 Packets by mouth daily.  Pt took daily for chronic HA until held for surgery in May 2018 then switched to tylenol arthritis daily    Historical Provider     Current Facility-Administered Medications   Medication Dose Route Frequency    insulin glargine (LANTUS) injection 20 Units  20 Units SubCUTAneous DAILY    metoprolol tartrate (LOPRESSOR) tablet 50 mg  50 mg Oral Q12H    furosemide (LASIX) injection 40 mg  40 mg IntraVENous DAILY    sertraline (ZOLOFT) tablet 50 mg  50 mg Oral DAILY    insulin lispro (HUMALOG) injection   SubCUTAneous Q6H    pantoprazole (PROTONIX) 40 mg in sodium chloride 0.9% 10 mL injection  40 mg IntraVENous DAILY         Objective:   Vital Signs:    Visit Vitals    /78    Pulse 95    Temp 97.7 °F (36.5 °C)    Resp 16    Ht 5' 1\" (1.549 m)    Wt 92 kg (202 lb 13.2 oz)    SpO2 97%    BMI 38.32 kg/m2       O2 Device: Room air   O2 Flow Rate (L/min): 2 l/min   Temp (24hrs), Av.7 °F (36.5 °C), Min:97.3 °F (36.3 °C), Max:97.9 °F (36.6 °C) Intake/Output:   Last shift:           Last 3 shifts: 07/07 1901 - 07/09 0700  In: 2643   Out: 5630 [Urine:3350; Drains:250]      Intake/Output Summary (Last 24 hours) at 07/09/18 0949  Last data filed at 07/09/18 4275   Gross per 24 hour   Intake             2493 ml   Output             3310 ml   Net             -817 ml       Last 3 Recorded Weights in this Encounter    07/05/18 1717 07/07/18 1238 07/08/18 1642   Weight: 98.3 kg (216 lb 11.4 oz) 92.2 kg (203 lb 4.2 oz) 92 kg (202 lb 13.2 oz)       Physical Exam:     General/Neurology: Alert, Awake, NAD. Moving all 4 limbs but with muscular weakness. Head:   Normocephalic, without obvious abnormality, atraumatic. Eye:   EOM intact, no scleral icterus  Nose:   No sinus tenderness. DHT. Neck:   No JVD. Lung: Moderate air entry bilateral equal. No rales. No rhonchi. No wheezing. No stridors. No prolongded expiration. No accessory muscle use. Heart:   Regular rate & rhythm. S1 S2 present. No murmur. No JVD. Abdomen:  Soft. NT. ND. +BS. Ileostomy in place. Surgical site under wound vac dressing. Extremities:  Trace pedal edema. No cyanosis. No clubbing. Hand edema resolved. Pulses: 2+ and symmetric in DP. Capillary refill: normal  Skin:   Petechia on b/l LE up to knees.         Data:       Recent Results (from the past 24 hour(s))   GLUCOSE, POC    Collection Time: 07/08/18  2:08 PM   Result Value Ref Range    Glucose (POC) 196 (H) 70 - 110 mg/dL   GLUCOSE, POC    Collection Time: 07/08/18  6:34 PM   Result Value Ref Range    Glucose (POC) 183 (H) 70 - 110 mg/dL   GLUCOSE, POC    Collection Time: 07/08/18 11:36 PM   Result Value Ref Range    Glucose (POC) 178 (H) 70 - 110 mg/dL   MAGNESIUM    Collection Time: 07/09/18  5:00 AM   Result Value Ref Range    Magnesium 2.0 1.6 - 2.6 mg/dL   CALCIUM, IONIZED    Collection Time: 07/09/18  5:00 AM   Result Value Ref Range    Ionized Calcium 1.19 1.12 - 1.32 MMOL/L   PHOSPHORUS    Collection Time: 07/09/18 5:00 AM   Result Value Ref Range    Phosphorus 3.4 2.5 - 4.9 MG/DL   CBC WITH AUTOMATED DIFF    Collection Time: 07/09/18  5:00 AM   Result Value Ref Range    WBC 9.9 4.6 - 13.2 K/uL    RBC 3.63 (L) 4.20 - 5.30 M/uL    HGB 10.2 (L) 12.0 - 16.0 g/dL    HCT 33.2 (L) 35.0 - 45.0 %    MCV 91.5 74.0 - 97.0 FL    MCH 28.1 24.0 - 34.0 PG    MCHC 30.7 (L) 31.0 - 37.0 g/dL    RDW 25.3 (H) 11.6 - 14.5 %    PLATELET 370 (L) 867 - 420 K/uL    NEUTROPHILS 67 40 - 73 %    LYMPHOCYTES 23 21 - 52 %    MONOCYTES 8 3 - 10 %    EOSINOPHILS 2 0 - 5 %    BASOPHILS 0 0 - 2 %    ABS. NEUTROPHILS 6.5 1.8 - 8.0 K/UL    ABS. LYMPHOCYTES 2.3 0.9 - 3.6 K/UL    ABS. MONOCYTES 0.8 0.05 - 1.2 K/UL    ABS. EOSINOPHILS 0.2 0.0 - 0.4 K/UL    ABS. BASOPHILS 0.0 0.0 - 0.06 K/UL    DF AUTOMATED     METABOLIC PANEL, COMPREHENSIVE    Collection Time: 07/09/18  5:00 AM   Result Value Ref Range    Sodium 150 (H) 136 - 145 mmol/L    Potassium 3.5 3.5 - 5.5 mmol/L    Chloride 117 (H) 100 - 108 mmol/L    CO2 23 21 - 32 mmol/L    Anion gap 10 3.0 - 18 mmol/L    Glucose 192 (H) 74 - 99 mg/dL    BUN 54 (H) 7.0 - 18 MG/DL    Creatinine 0.59 (L) 0.6 - 1.3 MG/DL    BUN/Creatinine ratio 92 (H) 12 - 20      GFR est AA >60 >60 ml/min/1.73m2    GFR est non-AA >60 >60 ml/min/1.73m2    Calcium 8.2 (L) 8.5 - 10.1 MG/DL    Bilirubin, total 0.7 0.2 - 1.0 MG/DL    ALT (SGPT) 51 13 - 56 U/L    AST (SGOT) 38 (H) 15 - 37 U/L    Alk.  phosphatase 203 (H) 45 - 117 U/L    Protein, total 5.5 (L) 6.4 - 8.2 g/dL    Albumin 2.0 (L) 3.4 - 5.0 g/dL    Globulin 3.5 2.0 - 4.0 g/dL    A-G Ratio 0.6 (L) 0.8 - 1.7     PROTHROMBIN TIME + INR    Collection Time: 07/09/18  5:00 AM   Result Value Ref Range    Prothrombin time 14.2 11.5 - 15.2 sec    INR 1.2 0.8 - 1.2     GLUCOSE, POC    Collection Time: 07/09/18  5:13 AM   Result Value Ref Range    Glucose (POC) 189 (H) 70 - 110 mg/dL         Chemistry Recent Labs      07/09/18   0500  07/08/18   0425  07/07/18   1400  07/07/18   0450   GLU  192* 183*   --   145*   NA  150*  146*   --   147*   K  3.5  3.8  3.4*  3.7   CL  117*  115*   --   116*   CO2  23  21   --   24   BUN  54*  53*   --   52*   CREA  0.59*  0.62   --   0.59*   CA  8.2*  8.3*   --   7.9*   MG  2.0  2.2  2.2  1.8   PHOS  3.4  2.9   --   3.1   AGAP  10  10   --   7   BUCR  92*  85*   --   88*   AP  203*  193*   --   177*   TP  5.5*  5.4*   --   4.9*   ALB  2.0*  2.0*   --   1.8*   GLOB  3.5  3.4   --   3.1   AGRAT  0.6*  0.6*   --   0.6*        Lactic Acid Lactic acid   Date Value Ref Range Status   06/17/2018 2.3 (HH) 0.4 - 2.0 MMOL/L Final     Comment:     CALLED TO AND CORRECTLY REPEATED BY:  Sunil Weeks, RN ICU ON 6/17/2018 0704 TO 3753       No results for input(s): LAC in the last 72 hours. Liver Enzymes Protein, total   Date Value Ref Range Status   07/09/2018 5.5 (L) 6.4 - 8.2 g/dL Final     Albumin   Date Value Ref Range Status   07/09/2018 2.0 (L) 3.4 - 5.0 g/dL Final     Globulin   Date Value Ref Range Status   07/09/2018 3.5 2.0 - 4.0 g/dL Final     A-G Ratio   Date Value Ref Range Status   07/09/2018 0.6 (L) 0.8 - 1.7   Final     AST (SGOT)   Date Value Ref Range Status   07/09/2018 38 (H) 15 - 37 U/L Final     Alk.  phosphatase   Date Value Ref Range Status   07/09/2018 203 (H) 45 - 117 U/L Final     Recent Labs      07/09/18   0500  07/08/18   0425  07/07/18   0450   TP  5.5*  5.4*  4.9*   ALB  2.0*  2.0*  1.8*   GLOB  3.5  3.4  3.1   AGRAT  0.6*  0.6*  0.6*   SGOT  38*  39*  32   AP  203*  193*  177*        CBC w/Diff Recent Labs      07/09/18   0500  07/08/18   0425  07/07/18   0450   WBC  9.9  8.9  9.2   RBC  3.63*  3.76*  3.51*   HGB  10.2*  10.3*  9.7*   HCT  33.2*  34.1*  31.7*   PLT  101*  106*  95*   GRANS  67  61  42   LYMPH  23  27  45   EOS  2  1  6*        Cardiac Enzymes No results found for: CPK, CK, CKMMB, CKMB, RCK3, CKMBT, CKNDX, CKND1, FABIAN, TROPT, TROIQ, KAYLAN, TROPT, TNIPOC, BNP, BNPP     BNP No results found for: BNP, BNPP, XBNPT     Coagulation Recent Labs      07/09/18   0500   PTP  14.2   INR  1.2         Thyroid  No results found for: T4, T3U, TSH, TSHEXT    No results found for: T4     Urinalysis Lab Results   Component Value Date/Time    Color DARK YELLOW 06/04/2018 08:42 AM    Appearance CLOUDY 06/04/2018 08:42 AM    Specific gravity 1.028 06/04/2018 08:42 AM    pH (UA) 5.0 06/04/2018 08:42 AM    Protein 30 (A) 06/04/2018 08:42 AM    Glucose NEGATIVE  06/04/2018 08:42 AM    Ketone TRACE (A) 06/04/2018 08:42 AM    Bilirubin SMALL (A) 06/04/2018 08:42 AM    Urobilinogen 1.0 06/04/2018 08:42 AM    Nitrites NEGATIVE  06/04/2018 08:42 AM    Leukocyte Esterase NEGATIVE  06/04/2018 08:42 AM    Epithelial cells FEW 06/04/2018 08:42 AM    Bacteria 1+ (A) 06/04/2018 08:42 AM    WBC 0 to 3 06/04/2018 08:42 AM    RBC 0 to 3 06/04/2018 08:42 AM        Micro  No results for input(s): SDES, CULT in the last 72 hours. No results for input(s): CULT in the last 72 hours. ABG Recent Labs      07/07/18   0524   PHI  7.387   PCO2I  39.8   PO2I  99   HCO3I  24.0   FIO2I  32          CT (Most Recent) (CT chest reviewed by me)   Results from [place] encounter on 06/04/18   CT HEAD WO CONT   Narrative EXAM: CT head    INDICATION: Altered mental status. COMPARISON: None. TECHNIQUE: Axial CT imaging of the head was performed without intravenous  contrast. One or more dose reduction techniques were used on this CT: automated  exposure control, adjustment of the mAs and/or kVp according to patient's size,  and iterative reconstruction techniques. The specific techniques utilized on  this CT exam have been documented in the patient's electronic medical record.    _______________    FINDINGS:    BRAIN:  Intraparenchymal hemorrhage: None. Mass effect/edema: None. Infarcts/encephalomalacia: None. White matter: Patchy hypodensities, likely chronic microvascular ischemic  changes. Brain volume: Normal for age. EXTRA-AXIAL SPACES:  Hemorrhage: None.   Mass: None.  Hydrocephalus: None. SINUSES: Clear. CALVARIUM: Unremarkable. OTHER: None.    _______________         Impression IMPRESSION:     No evidence of acute intracranial process. XR (Most Recent). CXR  reviewed by me and compared with previous CXR   Results from [place] encounter on 06/04/18   XR ABD (KUB)   Narrative Abdomen, single view    COMPARISON: July 7, 2018. INDICATION: Dobbhoff tube advancement    FINDINGS: Supine AP view of the abdomen obtained on a single exposure. Interval  advancement of the weighted tip of a small bowel feeding tube, tip presently  over the descending portion of the duodenum. Included bowel gas pattern is  nonobstructive and nonspecific. Inferior vena cava filter redemonstrated. Impression Impression:  Interval advancement of a small bowel feeding tube, with tip now projecting over  the descending portion of the duodenum.             Sherry Castro MD  7/9/2018

## 2018-07-10 ENCOUNTER — APPOINTMENT (OUTPATIENT)
Dept: GENERAL RADIOLOGY | Age: 68
DRG: 853 | End: 2018-07-10
Attending: INTERNAL MEDICINE
Payer: MEDICARE

## 2018-07-10 VITALS
HEIGHT: 61 IN | DIASTOLIC BLOOD PRESSURE: 98 MMHG | BODY MASS INDEX: 36.25 KG/M2 | TEMPERATURE: 97.4 F | RESPIRATION RATE: 21 BRPM | WEIGHT: 192.02 LBS | HEART RATE: 98 BPM | OXYGEN SATURATION: 99 % | SYSTOLIC BLOOD PRESSURE: 149 MMHG

## 2018-07-10 LAB
ALBUMIN SERPL-MCNC: 2.2 G/DL (ref 3.4–5)
ALBUMIN/GLOB SERPL: 0.7 {RATIO} (ref 0.8–1.7)
ALP SERPL-CCNC: 212 U/L (ref 45–117)
ALT SERPL-CCNC: 59 U/L (ref 13–56)
ANION GAP SERPL CALC-SCNC: 8 MMOL/L (ref 3–18)
AST SERPL-CCNC: 45 U/L (ref 15–37)
BASOPHILS # BLD: 0 K/UL (ref 0–0.06)
BASOPHILS NFR BLD: 0 % (ref 0–2)
BILIRUB SERPL-MCNC: 0.8 MG/DL (ref 0.2–1)
BUN SERPL-MCNC: 50 MG/DL (ref 7–18)
BUN/CREAT SERPL: 102 (ref 12–20)
CA-I SERPL-SCNC: 1.19 MMOL/L (ref 1.12–1.32)
CALCIUM SERPL-MCNC: 8.1 MG/DL (ref 8.5–10.1)
CHLORIDE SERPL-SCNC: 118 MMOL/L (ref 100–108)
CO2 SERPL-SCNC: 25 MMOL/L (ref 21–32)
CREAT SERPL-MCNC: 0.49 MG/DL (ref 0.6–1.3)
DIFFERENTIAL METHOD BLD: ABNORMAL
EOSINOPHIL # BLD: 0.2 K/UL (ref 0–0.4)
EOSINOPHIL NFR BLD: 2 % (ref 0–5)
ERYTHROCYTE [DISTWIDTH] IN BLOOD BY AUTOMATED COUNT: 25.2 % (ref 11.6–14.5)
GLOBULIN SER CALC-MCNC: 3.2 G/DL (ref 2–4)
GLUCOSE BLD STRIP.AUTO-MCNC: 123 MG/DL (ref 70–110)
GLUCOSE BLD STRIP.AUTO-MCNC: 133 MG/DL (ref 70–110)
GLUCOSE BLD STRIP.AUTO-MCNC: 138 MG/DL (ref 70–110)
GLUCOSE BLD STRIP.AUTO-MCNC: 164 MG/DL (ref 70–110)
GLUCOSE SERPL-MCNC: 174 MG/DL (ref 74–99)
HCT VFR BLD AUTO: 34 % (ref 35–45)
HGB BLD-MCNC: 10.2 G/DL (ref 12–16)
LYMPHOCYTES # BLD: 1.8 K/UL (ref 0.9–3.6)
LYMPHOCYTES NFR BLD: 18 % (ref 21–52)
MAGNESIUM SERPL-MCNC: 2.1 MG/DL (ref 1.6–2.6)
MCH RBC QN AUTO: 27.9 PG (ref 24–34)
MCHC RBC AUTO-ENTMCNC: 30 G/DL (ref 31–37)
MCV RBC AUTO: 93.2 FL (ref 74–97)
MONOCYTES # BLD: 0.8 K/UL (ref 0.05–1.2)
MONOCYTES NFR BLD: 8 % (ref 3–10)
NEUTS SEG # BLD: 7.1 K/UL (ref 1.8–8)
NEUTS SEG NFR BLD: 72 % (ref 40–73)
PHOSPHATE SERPL-MCNC: 3 MG/DL (ref 2.5–4.9)
PLATELET # BLD AUTO: 98 K/UL (ref 135–420)
POTASSIUM SERPL-SCNC: 3.8 MMOL/L (ref 3.5–5.5)
POTASSIUM SERPL-SCNC: 3.9 MMOL/L (ref 3.5–5.5)
PREALB SERPL-MCNC: 29.8 MG/DL (ref 20–40)
PROCALCITONIN SERPL-MCNC: 0.36 NG/ML (ref 0–0.08)
PROT SERPL-MCNC: 5.4 G/DL (ref 6.4–8.2)
RBC # BLD AUTO: 3.65 M/UL (ref 4.2–5.3)
SODIUM SERPL-SCNC: 151 MMOL/L (ref 136–145)
TRIGL SERPL-MCNC: 121 MG/DL (ref ?–150)
WBC # BLD AUTO: 9.9 K/UL (ref 4.6–13.2)

## 2018-07-10 PROCEDURE — 92610 EVALUATE SWALLOWING FUNCTION: CPT

## 2018-07-10 PROCEDURE — 36592 COLLECT BLOOD FROM PICC: CPT

## 2018-07-10 PROCEDURE — 74011250637 HC RX REV CODE- 250/637: Performed by: INTERNAL MEDICINE

## 2018-07-10 PROCEDURE — 92526 ORAL FUNCTION THERAPY: CPT

## 2018-07-10 PROCEDURE — 84478 ASSAY OF TRIGLYCERIDES: CPT | Performed by: OBSTETRICS & GYNECOLOGY

## 2018-07-10 PROCEDURE — 84132 ASSAY OF SERUM POTASSIUM: CPT | Performed by: INTERNAL MEDICINE

## 2018-07-10 PROCEDURE — 84100 ASSAY OF PHOSPHORUS: CPT | Performed by: OBSTETRICS & GYNECOLOGY

## 2018-07-10 PROCEDURE — 74011250637 HC RX REV CODE- 250/637: Performed by: OBSTETRICS & GYNECOLOGY

## 2018-07-10 PROCEDURE — C9113 INJ PANTOPRAZOLE SODIUM, VIA: HCPCS | Performed by: INTERNAL MEDICINE

## 2018-07-10 PROCEDURE — 74011636637 HC RX REV CODE- 636/637: Performed by: INTERNAL MEDICINE

## 2018-07-10 PROCEDURE — 71045 X-RAY EXAM CHEST 1 VIEW: CPT

## 2018-07-10 PROCEDURE — 74011250636 HC RX REV CODE- 250/636: Performed by: INTERNAL MEDICINE

## 2018-07-10 PROCEDURE — 84134 ASSAY OF PREALBUMIN: CPT | Performed by: OBSTETRICS & GYNECOLOGY

## 2018-07-10 PROCEDURE — 85025 COMPLETE CBC W/AUTO DIFF WBC: CPT | Performed by: OBSTETRICS & GYNECOLOGY

## 2018-07-10 PROCEDURE — 83735 ASSAY OF MAGNESIUM: CPT | Performed by: OBSTETRICS & GYNECOLOGY

## 2018-07-10 PROCEDURE — 82962 GLUCOSE BLOOD TEST: CPT

## 2018-07-10 PROCEDURE — 82330 ASSAY OF CALCIUM: CPT | Performed by: SURGERY

## 2018-07-10 RX ORDER — INSULIN LISPRO 100 [IU]/ML
INJECTION, SOLUTION INTRAVENOUS; SUBCUTANEOUS
Qty: 1 VIAL | Refills: 0 | Status: SHIPPED | OUTPATIENT
Start: 2018-07-10

## 2018-07-10 RX ORDER — SERTRALINE HYDROCHLORIDE 50 MG/1
50 TABLET, FILM COATED ORAL DAILY
Qty: 30 TAB | Refills: 0 | Status: SHIPPED | OUTPATIENT
Start: 2018-07-11 | End: 2019-01-21

## 2018-07-10 RX ORDER — METOPROLOL TARTRATE 50 MG/1
50 TABLET ORAL EVERY 12 HOURS
Qty: 28 TAB | Refills: 0 | Status: SHIPPED | OUTPATIENT
Start: 2018-07-10 | End: 2018-07-24

## 2018-07-10 RX ORDER — MAG HYDROX/ALUMINUM HYD/SIMETH 200-200-20
30 SUSPENSION, ORAL (FINAL DOSE FORM) ORAL
Qty: 1 BOTTLE | Refills: 0 | Status: SHIPPED | OUTPATIENT
Start: 2018-07-10 | End: 2018-07-24

## 2018-07-10 RX ORDER — FUROSEMIDE 40 MG/1
40 TABLET ORAL DAILY
Qty: 30 TAB | Refills: 0 | Status: SHIPPED | OUTPATIENT
Start: 2018-07-10 | End: 2019-01-21

## 2018-07-10 RX ORDER — ACETAMINOPHEN 325 MG/1
325 TABLET ORAL
Qty: 30 TAB | Refills: 0 | Status: SHIPPED | OUTPATIENT
Start: 2018-07-10 | End: 2019-01-21

## 2018-07-10 RX ORDER — IPRATROPIUM BROMIDE 0.5 MG/2.5ML
0.5 SOLUTION RESPIRATORY (INHALATION)
Qty: 1 VIAL | Refills: 0 | Status: SHIPPED | OUTPATIENT
Start: 2018-07-10 | End: 2018-07-24

## 2018-07-10 RX ORDER — INSULIN GLARGINE 100 [IU]/ML
INJECTION, SOLUTION SUBCUTANEOUS
Qty: 1 VIAL | Refills: 0 | Status: SHIPPED | OUTPATIENT
Start: 2018-07-11 | End: 2019-01-21

## 2018-07-10 RX ORDER — POTASSIUM CHLORIDE 20MEQ/15ML
10 LIQUID (ML) ORAL ONCE
Status: COMPLETED | OUTPATIENT
Start: 2018-07-10 | End: 2018-07-10

## 2018-07-10 RX ADMIN — FUROSEMIDE 40 MG: 10 INJECTION, SOLUTION INTRAMUSCULAR; INTRAVENOUS at 10:08

## 2018-07-10 RX ADMIN — HYDROMORPHONE HYDROCHLORIDE 0.5 MG: 2 INJECTION, SOLUTION INTRAMUSCULAR; INTRAVENOUS; SUBCUTANEOUS at 10:30

## 2018-07-10 RX ADMIN — INSULIN GLARGINE 20 UNITS: 100 INJECTION, SOLUTION SUBCUTANEOUS at 10:03

## 2018-07-10 RX ADMIN — INSULIN LISPRO 2 UNITS: 100 INJECTION, SOLUTION INTRAVENOUS; SUBCUTANEOUS at 13:34

## 2018-07-10 RX ADMIN — METOPROLOL TARTRATE 50 MG: 50 TABLET, FILM COATED ORAL at 10:04

## 2018-07-10 RX ADMIN — SODIUM CHLORIDE 40 MG: 9 INJECTION INTRAMUSCULAR; INTRAVENOUS; SUBCUTANEOUS at 10:08

## 2018-07-10 RX ADMIN — HYDROMORPHONE HYDROCHLORIDE 0.5 MG: 2 INJECTION, SOLUTION INTRAMUSCULAR; INTRAVENOUS; SUBCUTANEOUS at 00:41

## 2018-07-10 RX ADMIN — SERTRALINE HYDROCHLORIDE 50 MG: 50 TABLET ORAL at 10:04

## 2018-07-10 RX ADMIN — POTASSIUM CHLORIDE 10 MEQ: 20 SOLUTION ORAL at 03:00

## 2018-07-10 RX ADMIN — HYDROMORPHONE HYDROCHLORIDE 0.5 MG: 2 INJECTION, SOLUTION INTRAMUSCULAR; INTRAVENOUS; SUBCUTANEOUS at 18:48

## 2018-07-10 NOTE — PROGRESS NOTES
Hospitalist Progress Note    Patient: Gilberto Dinero MRN: [id number]  CSN: [id number]    YOB: 1950  Age: 75 y.o. Sex: female    DOA: 6/4/2018 LOS:  LOS: 36 days                Assessment/Plan     Patient Active Problem List   Diagnosis Code    Ovarian ca ([facility] C56.9    Severe obesity (BMI 35.0-39.9) ([facility]) E66.01    Abdominal pain R10.9    Sepsis ([facility] A41.9    Oliguria R34    Acute respiratory failure ([facility] J96.00    JESS (acute kidney injury) ([facility] X78.4    Metabolic acidosis B72.1    Acute deep vein thrombosis (DVT) of lower extremity ([facility]) I82.409    S/P ileostomy ([facility] Z93.2    Hypoalbuminemia E88.09    Peritonitis ([facility] K65.9    Hypernatremia E87.0    Hypokalemia E87.6    Clear cell adenocarcinoma of ovary, left ([facility] C56.2        76 yo female admitted by Gyn/onc. Awake answering to questions    Acute resp failure with hypoxia -improved and on NC 02     Severe Sepsis/shock    - secondary to peritonitis. Initial exp lap followed by surgery to debride and has 2 ostomies placed with large wound now with vac in place Finished multiople courses of antibiotics and antifungals.        Anasarca- improved      Afib, tachycardia-improved now off IV cardizem, on Po metoprolol        Bleeding from BRANDON drain -resolved       Acute bilateral peroneal DVT   - ivc filter placed, starting on anticoagulation per primary.      Ac blood loss anemia now stable     Thrombocytopenia without signs for bleeding     Ovarian cancer s/p debulking      JESS-resolved      Metabolic acidosis-resolved   Hypokalemia resolved  Hypernatremia:       TPN weaned off and now on NG feedings       Wound care, wound vac      DM:   Jing , ssi . Discharge planning, plan to transfer to LTAC       Disposition : transfer to LTAC    Review of systems  General: No fevers or chills. Cardiovascular: No chest pain or pressure. No palpitations. Pulmonary: No shortness of breath. Gastrointestinal: No nausea, vomiting. Physical Exam:  General: Awake, cooperative, no acute distress    HEENT: NC, Atraumatic. PERRLA, anicteric sclerae. NG tube in  Lungs: CTA Bilaterally. No Wheezing/Rhonchi/Rales. Heart:  Regular  rhythm,  No murmur, No Rubs, No Gallops  Abdomen: Soft, Non distended, Non tender.  +Bowel sounds, ostomies, wound vac  Extremities: Edema upper and LE bilaterally  Psych:   Not anxious or agitated. Neurologic:  No acute neurological deficit. Vital signs/Intake and Output:  Visit Vitals    BP (!) 147/92    Pulse 87    Temp 97.5 °F (36.4 °C)    Resp 17    Ht 5' 1\" (1.549 m)    Wt 92 kg (202 lb 13.2 oz)    SpO2 98%    BMI 38.32 kg/m2     Current Shift:  07/10 0701 - 07/10 1900  In: 150   Out: 1000 [Urine:450; Drains:25]  Last three shifts:  07/08 1901 - 07/10 0700  In: 3328 [I.V.:200]  Out: 5660 [Urine:4150]            Labs: Results:       Chemistry Recent Labs      07/10/18   0640  07/10/18   0043  07/09/18   1400  07/09/18   0500  07/08/18   0425   GLU  174*   --    --   192*  183*   NA  151*   --    --   150*  146*   K  3.9  3.8  3.3*  3.5  3.8   CL  118*   --    --   117*  115*   CO2  25   --    --   23  21   BUN  50*   --    --   54*  53*   CREA  0.49*   --    --   0.59*  0.62   CA  8.1*   --    --   8.2*  8.3*   AGAP  8   --    --   10  10   BUCR  102*   --    --   92*  85*   AP  212*   --    --   203*  193*   TP  5.4*   --    --   5.5*  5.4*   ALB  2.2*   --    --   2.0*  2.0*   GLOB  3.2   --    --   3.5  3.4   AGRAT  0.7*   --    --   0.6*  0.6*      CBC w/Diff Recent Labs      07/10/18   0640  07/09/18   0500  07/08/18   0425   WBC  9.9  9.9  8.9   RBC  3.65*  3.63*  3.76*   HGB  10.2*  10.2*  10.3*   HCT  34.0*  33.2*  34.1*   PLT  98*  101*  106*   GRANS  72  67  61   LYMPH  18*  23  27   EOS  2  2  1      Cardiac Enzymes No results for input(s): CPK, CKND1, FABIAN in the last 72 hours.     No lab exists for component: CKRMB, TROIP   Coagulation Recent Labs      07/09/18   0500   PTP  14.2 INR  1.2       Lipid Panel Lab Results   Component Value Date/Time    Triglyceride 121 07/10/2018 06:40 AM      BNP No results for input(s): BNPP in the last 72 hours.    Liver Enzymes Recent Labs      07/10/18   0640   TP  5.4*   ALB  2.2*   AP  212*   SGOT  45*      Thyroid Studies No results found for: T4, T3U, TSH, TSHEXT     Procedures/imaging: see electronic medical records for all procedures/Xrays and details which were not copied into this note but were reviewed prior to creation of Plan

## 2018-07-10 NOTE — PROGRESS NOTES
[facility]                  Pulmonary, Critical Care, and Sleep Medicine     Name: Dusty Dempsey MRN: [id number]   : 1950 Hospital: [facility] [place]    Date: 7/10/2018        [facility] Note    IMPRESSION:    Acute hypoxic respiratory failure, on NC, came off of BiPAP; multifactorial from any generalized weakness; aspiration pneumonia following episode of mucus plug , previously had other etiologies with pulmonary edema, aspiration pneumonitis. No central or segmental PE on CTA chest. Previously required ventilator support this admission due to sepsis, reintubated on 18 and self-extubated .  S/p bronchoscopy 18: no aspiration noted then. Cx Negative.  Severe sepsis due to Klebsiella Oxytoca peritonitis initially and then perforation. S/p laparotomy 18 (tumor radical debulking) and peritoneal lavage: Klebsiella positive. Ruled out ischemic bowel in laparotomy. S/p laparotomy on 6/15/18 for peritonitis due to diverticulitis with perforation, s/p distal ileostomy.  Acute b/l peroneal DVT, s/p IVC filter by vascular surgery 18.  Anemia, s/p PRBC tx 18   Thrombocytopenia, mild   Pelvic hematoma and BRANDON drain. Hematoma stable/better on CT 18   B/l LE DVT. Off heparin for intraabdominal bleeding and pelvic hematoma on CT, and CTA chest no central PE. INR reversed with protamin, FFP.-- S/P IVC fillter    S/p Shock suspected from sepsis and obstructive due to presumed PE initially with severe hypoxia and high A-a gradient, elevated RVSP 34 mm Hg. CTA 18 negative for central or main PA PE. Shock resolved. CTA chest no central PE.   JESS, improved   Hypernatremia, worsened following Lasix- improvement slowly with free water    Elevated LFT, due to shock liver initially   Foreign body on peritoneal biopsy, per path report.  Anasarca due to fluid resuscitation, JESS, hypoalbuminemia and sequale of sepsis. Improved.    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  Physical deconditioning due to acute illness and prolonged hospital course     Code status: Full code. Poor overall prognosis. RECOMMENDATIONS:   Respiratory:   Doing well on room air fio2. Poor cough due to generalized weakness and malnourishment and bedbound status. C/w aspiration precautions, HOB >30 degree all the time, aspiration precautions. OOB if possible. C/w working with PT.   CVS: hemodynamics stable. AFib in the hospital course, resolved to NSR. On metoprolol, tolerating well. B/l peroneal DVT, but off heparin due to bleeding in BRANDON drain and pelvic hematoma on CT, s/p IVC filter placed on 6/28/18. Mild thrombocytopenia, stable. No more active bleeding now. H&H stable. Clear cell ovarian cancer is high risk for VTE. She is still bedbound due to deconditioning. Ideally, she should be anticoagulated. Will re-discuss with Dr. Silvestre Prader again about the CT pelvis 7/1/18 with hematoma before considering to start anticoagulation. ID:  Waiting for repeat procalcitonin result, will call lab to get the result. Off Abx and off antifungal since 7/6/18, no fever or leucocytosis or clinical sign of infection/peritonitis. All her recent cx including fungal blood cx has been negative so far. Pelvic fluid collection noted on CT, hematoma likely. Sepsis bundle and protocol followed. Follow serial lactic acid, frequent BMP check, fluid resuscitation. Follow cultures. Deescalate antibiotic when appropriate. Hematology/Oncology: thrombocytopenia initially thought to be due to sepsis, medications etc. She is not on any medications to cause thrombocytopenia now. She has petechia on b/l legs up to knees. No petechia on chest wall or UE. Monitor closely.  No sign of TTP. If recovers from physical strength, then she will receive adjuvant chemotherapy as outpatient. Renal: JESS resolved. Persistently elevated BUN and Na, on free water. Monitor closely on lasix. Inicreased free water to 150 ml q3 hrs. Monitor Na. GI/: tolerating DHT feed with free water. Off TPN. S/p ileostomy- no bleeding. Still mildly elevated AST and ALP, monitor. Endocrine: Maintain blood glucose 140-180. Increased lantus from 15 to 20 on 7/9/18  Neurology: mental status improved, alert awake but generalized weakness due to critical illness and possible myopathy. C/w bedside PT. Recent CT head unremarkable for acute findings. On zoloft for depression. Pain/Sedation: no pain. Skin/Wound: local wound care at surgical site by wound care consult. Electrolytes: Replace electrolytes per ICU electrolyte replacement protocol. IVF: none  Nutrition: DHT with free water. Prophylaxis: DVT Prophylaxis - SCD. GI Prophylaxis with protonix. Lines/Tubes:    Right arm PICC line 6/19/18: medically necessary due to poor IV access. Fry 6/4/18: medically necessary for strict I/o. Ileostomy. A/dvance Directive/Palliative Care: Full code. Placement: LTAC. Will defer respective systems problem management to primary and other respective consultant and follow patient in ICU with primary and other medical team.  Further recommendations will be based on the patient's response to recommended treatment and results of the investigation ordered. Quality Care: PPI, DVT prophylaxis, HOB elevated, Infection control all reviewed and addressed. Care of plan d/w RN, RT, PT/OT, ST, pharmacist, palliative care team, MDR, patient, family- brother (answered all questions to satisfaction). Moderate complexity decision making was performed during the evaluation of this patient at high risk for decompensation with multiple organ involvement.   Total critical care time spent rendering care exclusive of procedures: 37 minutes. Subjective/History of Present Illness:   Patient is a 75 y.o. 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 7/10/18:  No fever. BP stable. Hb and Platelets stable. Na in creasing. Edema much improved. Awake and following commands, responsive  Tolerating DHT feed  abd soft, NT  No other issues overnight. Allergies   Allergen Reactions    Hydrocodone Other (comments)     Breaks into cold sweat    Metformin Other (comments)     Breaks out into cold sweat. Can Take Glucophage brand name med      Past Medical History:   Diagnosis Date    Diabetes ([address]     many years type 2    Hypertension     many years      Past Surgical History:   Procedure Laterality Date    HX OOPHORECTOMY  05/29/2018    HX TONSILLECTOMY      as a child       Social History   Substance Use Topics    Smoking status: Never Smoker    Smokeless tobacco: Never Used    Alcohol use No      History reviewed. No pertinent family history. Prior to Admission medications    Medication Sig Start Date End Date Taking? Authorizing Provider   metFORMIN ER (GLUCOPHAGE XR) 500 mg tablet Take 2,000 mg by mouth daily (with dinner). Brand name only, pt reports allergy of cold sweats to the generic   Yes Historical Provider   fluticasone (FLONASE ALLERGY RELIEF) 50 mcg/actuation nasal spray 2 Sprays by Both Nostrils route daily as needed for Rhinitis. Yes Historical Provider   acetaminophen-codeine (TYLENOL #3) 300-30 mg per tablet Take 1 Tab by mouth every four (4) hours as needed for Pain. Max Daily Amount: 6 Tabs.  6/1/18  Yes Demarco Faria MD HYDROmorphone (DILAUDID) 2 mg tablet Take 1 Tab by mouth every three (3) hours as needed. Max Daily Amount: 16 mg. 6/1/18  Yes Ruben Lanza MD   acetaminophen (TYLENOL ARTHRITIS PAIN) 650 mg TbER Take 650 mg by mouth every eight (8) hours as needed. Yes Historical Provider   phenylephrine (NEOSYNEPHRINE) 1 % spry 2 Sprays by Both Nostrils route every six (6) hours as needed. Pt uses several times every day for chronic nasal congestion    **pt pours phenylephrine into an AFRIN nasal spray bottle as she prefers this device**   Yes Historical Provider   amLODIPine (NORVASC) 10 mg tablet Take 10 mg by mouth daily. Yes Historical Provider   verapamil ER (VERELAN) 180 mg CR capsule Take 180 mg by mouth daily. Yes Historical Provider   atorvastatin (LIPITOR) 10 mg tablet Take 10 mg by mouth daily. Yes Historical Provider   losartan-hydroCHLOROthiazide (HYZAAR) 100-12.5 mg per tablet Take 1 Tab by mouth daily. Yes Historical Provider   Aspirin-Acetaminophen-Caffeine (GOODY'S EXTRA STRENGTH) 500-325-65 mg pwpk Take 2 Packets by mouth daily.  Pt took daily for chronic HA until held for surgery in May 2018 then switched to tylenol arthritis daily    Historical Provider     Current Facility-Administered Medications   Medication Dose Route Frequency    insulin glargine (LANTUS) injection 20 Units  20 Units SubCUTAneous DAILY    metoprolol tartrate (LOPRESSOR) tablet 50 mg  50 mg Oral Q12H    furosemide (LASIX) injection 40 mg  40 mg IntraVENous DAILY    sertraline (ZOLOFT) tablet 50 mg  50 mg Oral DAILY    insulin lispro (HUMALOG) injection   SubCUTAneous Q6H    pantoprazole (PROTONIX) 40 mg in sodium chloride 0.9% 10 mL injection  40 mg IntraVENous DAILY         Objective:   Vital Signs:    Visit Vitals    /73    Pulse 81    Temp 98 °F (36.7 °C)    Resp 14    Ht 5' 1\" (1.549 m)    Wt 92 kg (202 lb 13.2 oz)    SpO2 95%    BMI 38.32 kg/m2       O2 Device: Room air   O2 Flow Rate (L/min): 2 l/min   Temp (24hrs), Av.8 °F (36.6 °C), Min:97.5 °F (36.4 °C), Max:98.2 °F (36.8 °C)       Intake/Output:   Last shift:      07/10 0701 - 07/10 1900  In: 150   Out: 850 [Urine:450; Drains:25]    Last 3 shifts: 1901 - 07/10 0700  In: 3328 [I.V.:200]  Out: 5660 [Urine:4150]      Intake/Output Summary (Last 24 hours) at 07/10/18 1135  Last data filed at 07/10/18 1018   Gross per 24 hour   Intake             2135 ml   Output             5100 ml   Net            -2965 ml       Last 3 Recorded Weights in this Encounter    18 1717 18 1238 18 1642   Weight: 98.3 kg (216 lb 11.4 oz) 92.2 kg (203 lb 4.2 oz) 92 kg (202 lb 13.2 oz)       Physical Exam:     General/Neurology: Alert, Awake, NAD. Moving all 4 limbs but with muscular weakness. Head:   Normocephalic, without obvious abnormality, atraumatic. Eye:   EOM intact, no scleral icterus  Nose:   No sinus tenderness. DHT. Neck:   No JVD. Lung: Moderate air entry bilateral equal. No rales. No rhonchi. No wheezing. No stridors. No prolongded expiration. No accessory muscle use. Heart:   Regular rate & rhythm. S1 S2 present. No murmur. No JVD. Abdomen:  Soft. NT. ND. +BS. Ileostomy in place. Surgical site under wound vac dressing. Extremities:  Trace pedal edema. No cyanosis. No clubbing. Hand edema resolved. Pulses: 2+ and symmetric in DP. Capillary refill: normal  Skin:   Petechia on b/l LE up to knees.         Data:       Recent Results (from the past 24 hour(s))   POTASSIUM    Collection Time: 18  2:00 PM   Result Value Ref Range    Potassium 3.3 (L) 3.5 - 5.5 mmol/L   GLUCOSE, POC    Collection Time: 18  5:46 PM   Result Value Ref Range    Glucose (POC) 172 (H) 70 - 110 mg/dL   POTASSIUM    Collection Time: 07/10/18 12:43 AM   Result Value Ref Range    Potassium 3.8 3.5 - 5.5 mmol/L   GLUCOSE, POC    Collection Time: 07/10/18 12:43 AM   Result Value Ref Range    Glucose (POC) 133 (H) 70 - 110 mg/dL   MAGNESIUM Collection Time: 07/10/18  6:40 AM   Result Value Ref Range    Magnesium 2.1 1.6 - 2.6 mg/dL   CALCIUM, IONIZED    Collection Time: 07/10/18  6:40 AM   Result Value Ref Range    Ionized Calcium 1.19 1.12 - 1.32 MMOL/L   PHOSPHORUS    Collection Time: 07/10/18  6:40 AM   Result Value Ref Range    Phosphorus 3.0 2.5 - 4.9 MG/DL   CBC WITH AUTOMATED DIFF    Collection Time: 07/10/18  6:40 AM   Result Value Ref Range    WBC 9.9 4.6 - 13.2 K/uL    RBC 3.65 (L) 4.20 - 5.30 M/uL    HGB 10.2 (L) 12.0 - 16.0 g/dL    HCT 34.0 (L) 35.0 - 45.0 %    MCV 93.2 74.0 - 97.0 FL    MCH 27.9 24.0 - 34.0 PG    MCHC 30.0 (L) 31.0 - 37.0 g/dL    RDW 25.2 (H) 11.6 - 14.5 %    PLATELET 98 (L) 273 - 420 K/uL    NEUTROPHILS 72 40 - 73 %    LYMPHOCYTES 18 (L) 21 - 52 %    MONOCYTES 8 3 - 10 %    EOSINOPHILS 2 0 - 5 %    BASOPHILS 0 0 - 2 %    ABS. NEUTROPHILS 7.1 1.8 - 8.0 K/UL    ABS. LYMPHOCYTES 1.8 0.9 - 3.6 K/UL    ABS. MONOCYTES 0.8 0.05 - 1.2 K/UL    ABS. EOSINOPHILS 0.2 0.0 - 0.4 K/UL    ABS. BASOPHILS 0.0 0.0 - 0.06 K/UL    DF AUTOMATED     TRIGLYCERIDE    Collection Time: 07/10/18  6:40 AM   Result Value Ref Range    Triglyceride 121 <946 MG/DL   METABOLIC PANEL, COMPREHENSIVE    Collection Time: 07/10/18  6:40 AM   Result Value Ref Range    Sodium 151 (H) 136 - 145 mmol/L    Potassium 3.9 3.5 - 5.5 mmol/L    Chloride 118 (H) 100 - 108 mmol/L    CO2 25 21 - 32 mmol/L    Anion gap 8 3.0 - 18 mmol/L    Glucose 174 (H) 74 - 99 mg/dL    BUN 50 (H) 7.0 - 18 MG/DL    Creatinine 0.49 (L) 0.6 - 1.3 MG/DL    BUN/Creatinine ratio 102 (H) 12 - 20      GFR est AA >60 >60 ml/min/1.73m2    GFR est non-AA >60 >60 ml/min/1.73m2    Calcium 8.1 (L) 8.5 - 10.1 MG/DL    Bilirubin, total 0.8 0.2 - 1.0 MG/DL    ALT (SGPT) 59 (H) 13 - 56 U/L    AST (SGOT) 45 (H) 15 - 37 U/L    Alk.  phosphatase 212 (H) 45 - 117 U/L    Protein, total 5.4 (L) 6.4 - 8.2 g/dL    Albumin 2.2 (L) 3.4 - 5.0 g/dL    Globulin 3.2 2.0 - 4.0 g/dL    A-G Ratio 0.7 (L) 0.8 - 1.7 GLUCOSE, POC    Collection Time: 07/10/18  6:43 AM   Result Value Ref Range    Glucose (POC) 123 (H) 70 - 110 mg/dL         Chemistry Recent Labs      07/10/18   0640  07/10/18   0043  07/09/18   1400  07/09/18   0500  07/08/18   0425   GLU  174*   --    --   192*  183*   NA  151*   --    --   150*  146*   K  3.9  3.8  3.3*  3.5  3.8   CL  118*   --    --   117*  115*   CO2  25   --    --   23  21   BUN  50*   --    --   54*  53*   CREA  0.49*   --    --   0.59*  0.62   CA  8.1*   --    --   8.2*  8.3*   MG  2.1   --    --   2.0  2.2   PHOS  3.0   --    --   3.4  2.9   AGAP  8   --    --   10  10   BUCR  102*   --    --   92*  85*   AP  212*   --    --   203*  193*   TP  5.4*   --    --   5.5*  5.4*   ALB  2.2*   --    --   2.0*  2.0*   GLOB  3.2   --    --   3.5  3.4   AGRAT  0.7*   --    --   0.6*  0.6*        Lactic Acid Lactic acid   Date Value Ref Range Status   06/17/2018 2.3 (HH) 0.4 - 2.0 MMOL/L Final     Comment:     CALLED TO AND CORRECTLY REPEATED BY:  Nola Ellis, RN ICU ON 6/17/2018 0904 TO 1020       No results for input(s): LAC in the last 72 hours. Liver Enzymes Protein, total   Date Value Ref Range Status   07/10/2018 5.4 (L) 6.4 - 8.2 g/dL Final     Albumin   Date Value Ref Range Status   07/10/2018 2.2 (L) 3.4 - 5.0 g/dL Final     Globulin   Date Value Ref Range Status   07/10/2018 3.2 2.0 - 4.0 g/dL Final     A-G Ratio   Date Value Ref Range Status   07/10/2018 0.7 (L) 0.8 - 1.7   Final     AST (SGOT)   Date Value Ref Range Status   07/10/2018 45 (H) 15 - 37 U/L Final     Alk.  phosphatase   Date Value Ref Range Status   07/10/2018 212 (H) 45 - 117 U/L Final     Recent Labs      07/10/18   0640  07/09/18   0500  07/08/18   0425   TP  5.4*  5.5*  5.4*   ALB  2.2*  2.0*  2.0*   GLOB  3.2  3.5  3.4   AGRAT  0.7*  0.6*  0.6*   SGOT  45*  38*  39*   AP  212*  203*  193*        CBC w/Diff Recent Labs      07/10/18   0640  07/09/18   0500  07/08/18   0425   WBC  9.9  9.9  8.9   RBC  3.65*  3.63* 3.76*   HGB  10.2*  10.2*  10.3*   HCT  34.0*  33.2*  34.1*   PLT  98*  101*  106*   GRANS  72  67  61   LYMPH  18*  23  27   EOS  2  2  1        Cardiac Enzymes No results found for: CPK, CK, CKMMB, CKMB, RCK3, CKMBT, CKNDX, CKND1, FABIAN, TROPT, TROIQ, KAYLAN, TROPT, TNIPOC, BNP, BNPP     BNP No results found for: BNP, BNPP, XBNPT     Coagulation Recent Labs      07/09/18   0500   PTP  14.2   INR  1.2         Thyroid  No results found for: T4, T3U, TSH, TSHEXT, TSHEXT    No results found for: T4     Urinalysis Lab Results   Component Value Date/Time    Color DARK YELLOW 06/04/2018 08:42 AM    Appearance CLOUDY 06/04/2018 08:42 AM    Specific gravity 1.028 06/04/2018 08:42 AM    pH (UA) 5.0 06/04/2018 08:42 AM    Protein 30 (A) 06/04/2018 08:42 AM    Glucose NEGATIVE  06/04/2018 08:42 AM    Ketone TRACE (A) 06/04/2018 08:42 AM    Bilirubin SMALL (A) 06/04/2018 08:42 AM    Urobilinogen 1.0 06/04/2018 08:42 AM    Nitrites NEGATIVE  06/04/2018 08:42 AM    Leukocyte Esterase NEGATIVE  06/04/2018 08:42 AM    Epithelial cells FEW 06/04/2018 08:42 AM    Bacteria 1+ (A) 06/04/2018 08:42 AM    WBC 0 to 3 06/04/2018 08:42 AM    RBC 0 to 3 06/04/2018 08:42 AM        Micro  No results for input(s): SDES, CULT in the last 72 hours. No results for input(s): CULT in the last 72 hours. ABG No results for input(s): PHI, PHI, POC2, PCO2I, PO2, PO2I, HCO3, HCO3I, FIO2, FIO2I in the last 72 hours. CT (Most Recent) (CT chest reviewed by me)   Results from Hospital Encounter encounter on 06/04/18   CT HEAD WO CONT   Narrative EXAM: CT head    INDICATION: Altered mental status. COMPARISON: None. TECHNIQUE: Axial CT imaging of the head was performed without intravenous  contrast. One or more dose reduction techniques were used on this CT: automated  exposure control, adjustment of the mAs and/or kVp according to patient's size,  and iterative reconstruction techniques.  The specific techniques utilized on  this CT exam have been documented in the patient's electronic medical record.    _______________    FINDINGS:    BRAIN:  Intraparenchymal hemorrhage: None. Mass effect/edema: None. Infarcts/encephalomalacia: None. White matter: Patchy hypodensities, likely chronic microvascular ischemic  changes. Brain volume: Normal for age. EXTRA-AXIAL SPACES:  Hemorrhage: None. Mass: None. Hydrocephalus: None. SINUSES: Clear. CALVARIUM: Unremarkable. OTHER: None.    _______________         Impression IMPRESSION:     No evidence of acute intracranial process. XR (Most Recent). CXR  reviewed by me and compared with previous CXR   Results from [place] encounter on 06/04/18   XR ABD (KUB)   Narrative Abdomen, single view    COMPARISON: July 7, 2018. INDICATION: Dobbhoff tube advancement    FINDINGS: Supine AP view of the abdomen obtained on a single exposure. Interval  advancement of the weighted tip of a small bowel feeding tube, tip presently  over the descending portion of the duodenum. Included bowel gas pattern is  nonobstructive and nonspecific. Inferior vena cava filter redemonstrated. Impression Impression:  Interval advancement of a small bowel feeding tube, with tip now projecting over  the descending portion of the duodenum.             Cindy Sanchez MD  7/10/2018

## 2018-07-10 NOTE — PROGRESS NOTES
conducted a Follow up consultation and Spiritual Assessment for Liza Burris, who is a 75 y.o.,female. Patient was wincing and asked for pain meds. I informed her nurse. Had prayer with patient. The  provided the following Interventions:  Continued the relationship of care and support. Listened empathically. Offered prayer and assurance of continued prayer on patients behalf. Chart reviewed. The following outcomes were achieved:  Patient expressed gratitude for Spiritual Care visit. Patient was reviewed in ICU Interdisciplinary Rounds. Assessment:  There are no further spiritual or Caodaism issues which require Spiritual Care Services intervention at this time. Plan:  Chaplains will continue to follow and will provide pastoral care as needed or requested.  recommends bedside caregivers page the  on duty if patient shows signs of acute spiritual or emotional distress. [address] [place], [place].    Board Certified   [phone] - Office

## 2018-07-10 NOTE — DIABETES MGMT
GLYCEMIC CONTROL PROGRESS NOTE:    -discussed in rounds, known h/o T2DM HbA1C within recommended range for age + comorbids on oral home regimen  -BG out of target range ICU: 140-180 mg/dL   -NPO, TF  -Lantus increased to 20 units daily, 24 BG trending down  -TDD = 26 (Lantus 20 + 6 - Humalog Normal Insulin Sensitivity Corrective Coverage )  -responding well to insulin adjustments recommend continue current regimen    Glucose Results:   Lab Results   Component Value Date/Time     (H) 07/10/2018 06:40 AM    GLUCPOC 123 (H) 07/10/2018 06:43 AM    GLUCPOC 133 (H) 07/10/2018 12:43 AM    GLUCPOC 172 (H) 07/09/2018 05:46 PM         Abigail Hutton RN, MS  Glycemic Control Team  Pager [phone] (M-TH 8:30-5P)  *After Hours pager [phone]

## 2018-07-10 NOTE — INTERDISCIPLINARY ROUNDS
CRITICAL CARE INTERDISCIPLINARY ROUNDS      Patient Information:      Name: Brian Sanders  Age:   70 y.o.      Admission Date:   6/4/2018      Critical Care Day: 29      Surgery Date:  6-5-18      Attending Provider: Deepika Peraza MD      Surgeon: Vladimir Doll      Consultant(s):   Fausto (Pulmonary), Duc (Renal), Earnest Saucedo (Vascular Surgery)      Critical Care Physician: Rosa Merrill  Code Status: Full Code      Problem List:          Patient Active Problem List   Diagnosis Code    Ovarian ca ([address] C56.9    Severe obesity (BMI 35.0-39.9) ([facility]) E66.01    Abdominal pain R10.9    Sepsis ([address] A41.9    Oliguria R34    Acute respiratory failure ([address] J96.00    JESS (acute kidney injury) ([address] E50.4    Metabolic acidosis M48.6    Acute deep vein thrombosis (DVT) of lower extremity ([address] I82.409    S/P ileostomy ([address] Z93.2    Hypoalbuminemia E88.09    Peritonitis ([address] K65.9    Hypernatremia E87.0    Hypokalemia E87.6           Principal Problem:  Sepsis ([address]      During rounds the following quality care indicators and evidence based practices were addressed :  PUD Prophylaxis, Pressure Injury Prevention, Pain Management and Nutritional Status Fry Day 36  (M-Care YES) ; Central Line Day 21: ; Antibiotic Stewardship:         Interdisciplinary team rounds were held with the following team membersCare Management, Diabetes Treatment Specialist, Nursing, Nutrition, Occupational Therapy, Pharmacy, Physician and Respiratory Therapy. Plan of care discussed.       Goals of Care/ Recommendations: CONTINUE ICU LEVEL OF CARE.

## 2018-07-10 NOTE — PROGRESS NOTES
Problem: Dysphagia (Adult)  Goal: *Acute Goals and Plan of Care (Insert Text)  Recommendations:  Diet: Puree/nectar-thick liquid   Meds: Per patient preference  Aspiration Precautions  Oral Care TID    Goals:  Patient will:  1. Tolerate PO trials with 0 s/s overt distress in 4/5 trials  2. Utilize compensatory swallow strategies/maneuvers (decrease bite/sip, size/rate, alt. liq/sol) with min cues in 4/5 trials  3. Perform oral-motor/laryngeal exercises to increase oropharyngeal swallow function with min cues  4. Complete an objective swallow study (i.e., MBSS) to assess swallow integrity, r/o aspiration, and determine of safest LRD, min A               Outcome: Progressing Towards Goal    Speech LAnguage Pathology bedside swallow   evaluation & TREATMENT     Patient: Chantal Cruz (60 y.o. female)  Date: 7/10/2018  Primary Diagnosis: Abdominal pain  Abdominal pain  Abdominal Pain  aspiration  POSTOP WOUND EXPLORATION  Procedure(s) (LRB):  WOUND EXPLORATION, EXPLORATORY LAPAROTOMY, ILLEOSTMOY, LYSES OF ADHESIONS AND WOUND VAC PLACEMENT (N/A) 25 Days Post-Op   Precautions: Aspiration  Fall  PLOF: Living with family  ASSESSMENT :  Based on the objective data described below, the patient presents with mild oral, moderate pharyngeal dysphagia. SLP familiar with patient from previous assessments/tx within current hospitalization. Pt d/c-ed from [address] [place] [address] caseload due to change in medical status and poor participation in tx. MBSS attempted 6/21, however was unable to complete due to abdominal pain, as procedure requires upright seated positioning. Per d/w Dr. Clement Notice 7/9, MBSS will not be re-attempted. Pt would benefit from FEES following d/c from current inpatient facility. A&Ox4. RN, Davis Or at bedside. Significant increase in alertness from previous day.  This AM, pt accepted SLP-fed ice chips and thin liquid via spoon and cup sip; exhibited immediate cough and frequent throat clearing following each trial. Cough resolved with nectar-thick liquid via spoon and cup sip. Puree trial; pt with delayed propulsion of bolus and mild lingual residue, cleared given increased time. 0 overt s/s of aspiration. O2 and RR remained consistent across all PO trials. Recommend puree/nectar-thick liquid diet with STRICT aspiration precautions and 1:1 supervision by nursing. PO only when alert. Skilled therapy initiated; Educated pt and RN on aspiration precautions and importance of compensatory swallow techniques to decrease aspiration risk (decrease rate of intake & sip/bite size, upright @HOB for all po intake and ~30 minutes after po); verbalized comprehension, patient needs reinforcement. ST will continue to follow to assess diet tolerance and education of patient and family. Patient will benefit from skilled intervention to address the above impairments. Patients rehabilitation potential is considered to be Guarded  Factors which may influence rehabilitation potential include:   []            None noted  []            Mental ability/status  [x]            Medical condition  []            Home/family situation and support systems  [x]            Safety awareness  []            Pain tolerance/management  []            Other:      PLAN :  Recommendations and Planned Interventions:  Puree/NTL  Frequency/Duration: Patient will be followed by speech-language pathology 1-2 times per day/4-7 days per week to address goals. Discharge Recommendations: LTAC     SUBJECTIVE:   Patient stated I want more.     OBJECTIVE:     Past Medical History:   Diagnosis Date    Diabetes ([facility] [address]     many years type 2    Hypertension     many years     Past Surgical History:   Procedure Laterality Date    HX OOPHORECTOMY  05/29/2018    HX TONSILLECTOMY      as a child      Prior Level of Function/Home Situation: Living with family  Home Situation  Home Environment: Private residence  # Steps to Enter: 4  One/Two Story Residence: One story  Living Alone: No  Support Systems: Family member(s)  Patient Expects to be Discharged to[de-identified] Unknown  Current DME Used/Available at Home: Commode, bedside, Shower chair, Walker, rolling, Wheelchair  Tub or Shower Type: Shower  Diet prior to admission: Regular/thin  Current Diet:  Puree/NTL   Cognitive and Communication Status:  Neurologic State: Alert  Orientation Level: Oriented X4  Cognition: Follows commands  Perception: Appears intact  Perseveration: No perseveration noted  Safety/Judgement: Decreased awareness of need for safety, Awareness of environment  Oral Assessment:  Oral Assessment  Labial: No impairment  Dentition: Natural;Intact  Oral Hygiene: Fair  Lingual: Decreased rate;Decreased strength  Velum: No impairment  Mandible: No impairment  P.O. Trials:  Patient Position: HOB 50  Vocal quality prior to P.O.: Low volume  Consistency Presented: Ice chips; Thin liquid; Nectar thick liquid;Puree  How Presented: SLP-fed/presented;Spoon;Cup/sip     Bolus Acceptance: No impairment  Bolus Formation/Control: Impaired  Type of Impairment: Delayed;Mastication  Propulsion: Delayed (# of seconds)  Oral Residue: Less than 10% of bolus; Lingual  Initiation of Swallow: No impairment     Aspiration Signs/Symptoms: Clear throat;Delayed cough/throat clear;Weak cough  Pharyngeal Phase Characteristics: Altered vocal quality; Easily fatigued   Effective Modifications: Small sips and bites;Cup/sip  Cues for Modifications: Minimal       Oral Phase Severity: Mild  Pharyngeal Phase Severity : Mild-moderate    GCODESwallowing:  Swallow Current Status CL= 60-79%   Swallow Goal Status CH= 0%    The severity rating is based on the following outcomes:  LEOPOLDO Noms Swallow Level 3    Clinical Judgement    PAIN:  Start of Eval/Tx: 0  End of Eval/Tx: 0     After treatment:   []            Patient left in no apparent distress sitting up in chair  [x]            Patient left in no apparent distress in bed  [x]            Call bell left within reach  [x] Nursing notified  []            Family present  []            Caregiver present  []            Bed alarm activated    COMMUNICATION/EDUCATION:   [x]            Safe swallowing guidelines; compensatory techniques. [x]            Patient/family have participated as able in goal setting and plan of care. [x]            Patient/family agree to work toward stated goals and plan of care. []            Patient understands intent and goals of therapy; neutral about participation. []            Patient unable to participate in goal setting/plan of care; educ ongoing with interdisciplinary staff  []         Posted safety precautions in patient's room.     Thank you for this referral.  KENNETH Bee  Time Calculation: 20 mins  Evaluation Time: 10 minutes   Treatment Time: 10 minutes

## 2018-07-10 NOTE — PROGRESS NOTES
I have reviewed discharge instructions with the Receiving facility, Pamela Franz RN. The Receiving nurse verbalized understanding.

## 2018-07-10 NOTE — PROGRESS NOTES
Cm spoke with patients brother via phone conversation aware of discharge states he will be on unit at 8pm for transfer, brother has address and contact number to [facility],thanked cm for assisting with transfer.

## 2018-07-10 NOTE — PROGRESS NOTES
Problem: Mobility Impaired (Adult and Pediatric)  Goal: *Acute Goals and Plan of Care (Insert Text)  Physical Therapy Goals  Pt re-evaluated 7/07/2018  Goals to be accomplished 5-7 days    1. Bed mobility: Rolling L to R to with mod A with use of HR for positioning. 2. Transfer: Supine to/from Sit with mod/max A with HR for change of position/prep for EOB sitting. 3. Activity Tolerance: Tolerate EOB sitting 15-20 minutes with fair UE supported balance for ADL/balance activities. 4. Patient will perform sit to stand with RW/maximal assistance and tolerate same x 15-30 seconds. 5. Ambulation:  Ambulate 5 ft. mod/max A with RW for increased functional mobility. Updated 6/28/2018 and to be accomplished within 5-7 day(s)  1. Bed mobility: Rolling L to R to with max/mod A with use of HR for positioning. 2. Transfer: Supine to/from Sit with mod/max A with HR for change of position/prep for EOB sitting. 3. Activity Tolerance: Tolerate EOB sitting 5-10 minutes with fair UE supported balance for ADL/balance activities. 4. Patient will perform sit to stand with RW/maximal assistance and tolerate same x 15-30 seconds. 5. Ambulation:  Ambulate 5 ft. mod/max A with RW for increased functional mobility. Physical Therapy Goals  Updated 6/20/2018 and to be accomplished within 7 day(s)  1. Patient will move from supine to sit and sit to supine in bed with maximal assistance. 2.  Patient will transfer from bed to chair and chair to bed with maximal assistance using the least restrictive device. 3.  Patient will perform sit to stand with maximal assistance. 4.  Patient will ambulate with maximal assistance for 5 feet with the least restrictive device. Outcome: Not Progressing Towards Goal  Pt refused PT due to:  [x]  \"No. I don't feel like it right now. \"   []  Eating  []  Pain  []  Pt lethargic  []  Off Unit  Will f/u later, if not transferred. Thank you.   Chucho Burns, PTA

## 2018-07-10 NOTE — DISCHARGE SUMMARY
GYN ONCOLOGY Discharge Summary                        Patient ID:  Adiel Nur  1950 75 y.o.  [id number]                                                             Admission Date: 6/4/2018  Discharge Date:  07/10/18                                                 Attending: Jayden Gifford MD  PCP: Adi Felix MD                                                                                               Reason for admission:   Abdominal sepsis    Discharge  diagnosis:   Principal Problem:    Sepsis ([address] (6/4/2018)    Active Problems:    Ovarian ca ([address] (5/29/2018)      Abdominal pain (6/4/2018)      Oliguria (6/4/2018)      Acute respiratory failure ([address] (6/5/2018)      JESS (acute kidney injury) ([address] (4/5/8131)      Metabolic acidosis (1/9/1200)      Acute deep vein thrombosis (DVT) of lower extremity ([address] (6/7/2018)      S/P ileostomy ([address] (6/16/2018)      Hypoalbuminemia (6/16/2018)      Peritonitis ([address] (6/16/2018)      Hypernatremia (6/25/2018)      Hypokalemia (6/27/2018)      Clear cell adenocarcinoma of ovary, left ([address] (7/9/2018)        Associated Conditions:        Patient Active Problem List   Diagnosis Code    Ovarian ca ([address] C56.9    Severe obesity (BMI 35.0-39.9) ([facility]) E66.01    Abdominal pain R10.9    Sepsis ([address] A41.9    Oliguria R34    Acute respiratory failure ([facility]) J96.00    JESS (acute kidney injury) ([address] C78.6    Metabolic acidosis D82.5    Acute deep vein thrombosis (DVT) of lower extremity ([facility]) I82.409    S/P ileostomy ([address] Z93.2    Hypoalbuminemia E88.09    Peritonitis ([address] K65.9    Hypernatremia E87.0    Hypokalemia E87.6    Clear cell adenocarcinoma of ovary, left ([address] C56.2              Past Medical History:   Diagnosis Date    Diabetes ([address]     many years type 2    Hypertension     many years       Operative Procedure:   1) 6/15/18 WOUND EXPLORATION, EXPLORATORY LAPAROTOMY, LYSIS OF ADHESIONS, ILLEOSTOMY AND WOUND VAC PLACEMENT   2) 6/5/18 DIAGNOSTIC LAPAROSCOPY CONVERTED TO LAPAROTOMY, PERITONEAL BIOPSIES, AEROBIC AND ANAEROBIC CULTURES OF PERITONEAL FLUID, PERITONEAL LAVAGE  3) 5/29/18 LAPAROSCOPY CONVERTED TO LAPAROTOMY, TOTAL ABDOMINAL HYSTERECTOMY, BILATERAL SALPINGO OOPHORECTOMY, FROZEN PATHOLOGY [facility] SPECIMEN COLLECTION FOR CARIS, BILATERAL PELVIC AND PARA-AORTIC LYMPHADENECTOMY, RADICAL TUMOR DEBULKING TO R0, CYSTOSCOPY     Other Procedures:  1) 6/4/18 Subclavian Central Line, Dr. Jessica Almonte   2) 6/6/18 LE venous doppler results show bilateral DVT peroneal veins  3) 6/9/18 Bronchosopy  4) 6/19/18 PICC right arm  5) 6/28/18 IVC Filter, Dr. 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 With presumed diagnosis of Intra-abdominal sepsis patient was admitted to the ICU. Patient's symptoms progressed and she was taken to the OR for exploratory laparotomy with findings significant for no evidence of bowel injury or bowel perforation. No evidence of bowel ischemia. No evidence of residual malignancy. Patient remained intubated overnight and was extubated POD# 2 but re-intubated 3 days later with fulminant sepsis, respiratory failure, renal failure, shock liver, DVT, and hypoxic encephaolopathy. She was managed aggressively with ventilator support, broad spectrum antibiotics, and anti-coagulation. Throughout her admission she was managed in the ICU by Hospitalists and Pulmonary along with consultants from nephrology, vascular surgery and ID. Patient had intra-abdominal bleeding with heparin gtt and IVC filter was placed.  Antibiotics were tailored throughout the admission to treat peritonitis and pneumonia. Patient completed Meropenem 7 days 7/6/18, Vanc 15 days 7/6/18, Micafungin 15 days 7/6/18, Zosyn 26 days 6/30/18, Levaquin 16 days  6/29/18, and Flagyl 18 days 7/2/2018. Prior to transfer to [facility], patient had been off antibiotics for 4 days and has been afebrile with normal WBC. Patient has severe protein-caloric malnutrition and was on TPN. She is currently on TF with Vital High Protein at 60 cc per hour with 150 cc free water every 4 hours. She has a distal ileostomy which is functioning well. She has a midline vertical surgical wound that has been managed with wound vac with Santyl and NS for chemical debridement. She has a PICC line. Patient has been followed by speech therapy, occupational therapy, and physical therapy however she has not been very receptive to therapy recently. She has a depressed affect and was started on Zoloft on 7/3/2018.      Today's IDR:  CRITICAL CARE INTERDISCIPLINARY ROUNDS      Patient Information:      Name: Kevin Cruz  Age:   70 y.o.      Admission Date:   6/4/2018      Critical Care Day: 29      Surgery Date:  6-5-18      Attending Provider: Nilton Cervantes MD      Surgeon: Al Traylor      Consultant(s):   Fausto (Pulmonary), Duc (Renal), Clover Owens (Vascular Surgery)      Critical Care Physician: Chaz Pickard  Code Status: Full Code      Problem List:             Patient Active Problem List   Diagnosis Code    Ovarian ca ([address] C56.9    Severe obesity (BMI 35.0-39.9) ([facility]) E66.01    Abdominal pain R10.9    Sepsis ([address] A41.9    Oliguria R34    Acute respiratory failure ([address] J96.00    JESS (acute kidney injury) ([address] I27.8    Metabolic acidosis T03.8    Acute deep vein thrombosis (DVT) of lower extremity ([address] I82.409    S/P ileostomy ([address] Z93.2    Hypoalbuminemia E88.09    Peritonitis ([address] K65.9    Hypernatremia E87.0    Hypokalemia E87.6           Principal Problem:  Sepsis ([address]      During rounds the following quality care indicators and evidence based practices were addressed :  PUD Prophylaxis, Pressure Injury Prevention, Pain Management and Nutritional Status Fry Day 36  (M-Care YES) ; Central Line Day 21: ; Antibiotic Stewardship:           Interdisciplinary team rounds were held with the following team membersCare Management, Diabetes Treatment Specialist, Nursing, Nutrition, Occupational Therapy, Pharmacy, Physician and Respiratory Therapy. Plan of care discussed.       Goals of Care/ Recommendations: CONTINUE ICU LEVEL OF CARE. Today's Assessment and Plan from ICU attending:  VASQUEZ Note     IMPRESSION:   · Acute hypoxic respiratory failure, on NC, came off of BiPAP; multifactorial from any generalized weakness; aspiration pneumonia following episode of mucus plug 6/29, previously had other etiologies with pulmonary edema, aspiration pneumonitis. No central or segmental PE on CTA chest. Previously required ventilator support this admission due to sepsis, reintubated on 6/8/18 and self-extubated 6/17. · S/p bronchoscopy 6/9/18: no aspiration noted then. Cx Negative. · Severe sepsis due to Klebsiella Oxytoca peritonitis initially and then perforation. S/p laparotomy 6/5/18 (tumor radical debulking) and peritoneal lavage: Klebsiella positive. Ruled out ischemic bowel in laparotomy. S/p laparotomy on 6/15/18 for peritonitis due to diverticulitis with perforation, s/p distal ileostomy. · Acute b/l peroneal DVT, s/p IVC filter by vascular surgery 6/28/18. · Anemia, s/p PRBC tx 6/29/18  · Thrombocytopenia, mild  · Pelvic hematoma and BRANDON drain. Hematoma stable/better on CT 7/1/18  · B/l LE DVT. Off heparin for intraabdominal bleeding and pelvic hematoma on CT, and CTA chest no central PE. INR reversed with protamin, FFP.-- S/P IVC fillter   · S/p Shock suspected from sepsis and obstructive due to presumed PE initially with severe hypoxia and high A-a gradient, elevated RVSP 34 mm Hg.  CTA 6/22/18 negative for central or main PA PE. Shock resolved. CTA chest no central PE.  · JESS, improved  · Hypernatremia, worsened following Lasix- improvement slowly with free water   · Elevated LFT, due to shock liver initially  · Foreign body on peritoneal biopsy, per path report. · Anasarca due to fluid resuscitation, JESS, hypoalbuminemia and sequale of sepsis. Improved. 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 · Physical deconditioning due to acute illness and prolonged hospital course     · Code status: Full code. Poor overall prognosis.       RECOMMENDATIONS:   Respiratory:   Doing well on room air fio2. Poor cough due to generalized weakness and malnourishment and bedbound status. C/w aspiration precautions, HOB >30 degree all the time, aspiration precautions. OOB if possible. C/w working with PT.   CVS: hemodynamics stable. AFib in the hospital course, resolved to NSR. On metoprolol, tolerating well. B/l peroneal DVT, but off heparin due to bleeding in BRANDON drain and pelvic hematoma on CT, s/p IVC filter placed on 6/28/18. Mild thrombocytopenia, stable. No more active bleeding now. H&H stable. Clear cell ovarian cancer is high risk for VTE. She is still bedbound due to deconditioning. Ideally, she should be anticoagulated. Will re-discuss with Dr. Priti Merino again about the CT pelvis 7/1/18 with hematoma before considering to start anticoagulation. ID:  Waiting for repeat procalcitonin result, will call lab to get the result. Off Abx and off antifungal since 7/6/18, no fever or leucocytosis or clinical sign of infection/peritonitis. All her recent cx including fungal blood cx has been negative so far. Pelvic fluid collection noted on CT, hematoma likely.    Sepsis bundle and protocol followed. Follow serial lactic acid, frequent BMP check, fluid resuscitation. Follow cultures. Deescalate antibiotic when appropriate. Hematology/Oncology: thrombocytopenia initially thought to be due to sepsis, medications etc. She is not on any medications to cause thrombocytopenia now. She has petechia on b/l legs up to knees. No petechia on chest wall or UE. Monitor closely. No sign of TTP. If recovers from physical strength, then she will receive adjuvant chemotherapy as outpatient. Renal: JESS resolved. Persistently elevated BUN and Na, on free water. Monitor closely on lasix. Inicreased free water to 150 ml q3 hrs. Monitor Na. GI/: tolerating DHT feed with free water. Off TPN. S/p ileostomy- no bleeding. Still mildly elevated AST and ALP, monitor. Endocrine: Maintain blood glucose 140-180. Increased lantus from 15 to 20 on 7/9/18  Neurology: mental status improved, alert awake but generalized weakness due to critical illness and possible myopathy. C/w bedside PT. Recent CT head unremarkable for acute findings. On zoloft for depression. Pain/Sedation: no pain. Skin/Wound: local wound care at surgical site by wound care consult. Electrolytes: Replace electrolytes per ICU electrolyte replacement protocol. IVF: none  Nutrition: DHT with free water. Prophylaxis: DVT Prophylaxis - SCD. GI Prophylaxis with protonix. Lines/Tubes:    Right arm PICC line 6/19/18: medically necessary due to poor IV access. Fry 6/4/18: medically necessary for strict I/o. Ileostomy. A/dvance Directive/Palliative Care: Full code. Placement: LTAC.      Will defer respective systems problem management to primary and other respective consultant and follow patient in ICU with primary and other medical team.  Further recommendations will be based on the patient's response to recommended treatment and results of the investigation ordered.   Quality Care: PPI, DVT prophylaxis, HOB elevated, Infection control all reviewed and addressed.     Care of plan d/w RN, RT, PT/OT, ST, pharmacist, palliative care team, MDR, patient, family- brother (answered all questions to satisfaction).      Moderate complexity decision making was performed during the evaluation of this patient at high risk for decompensation with multiple organ involvement. Total critical care time spent rendering care exclusive of procedures: 37 minutes.        Today's progress note from Gyn Onc:  Rashad pride 75 y. 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 Status post complete surgical resection with no evidence of residual disease. Typically patient would receive adjuvant chemotherapy for this diagnosis and we have discussed this however patient is not medically appropriate for chemotherapy at this time and likely will not be within 12 weeks of primary debulking. Patient is high risk of recurrence based on pathology (Clear Cell). Will follow patient closely for recurrence and develop treatment plan at that time depending on overall medical status. GI - Peritonitis resolved. Last drain removed 6/27/2018 and tip was cultured and NGTD. Post operative ileus resolved. Now tolerating TF. Good ileostomy output. Patient has dobhoff. She was offered PEG but refused. ID - Afebrile, nml WBC now off all antibiotics and anti-fungal since Friday.  Completed Meropenem 7 days 7/6/18, Vanc 15 days 7/6/18, and Micafungin 15 days 7/6/18.  Zosyn 26 days completed 6/30/18, Levaquin 16 days completed 6/29/18, Flagyl 18 days complete 7/2/2018. Procalcitonin is elevated but trending down. FEN - Tolerating TF well. Blood glucose still running high. Would like blood sugar to be better controlled to improve healing and prognosis with malignancy. Renal - JESS, resolved and Nephrology has signed off. Good UOP and normal Cr. Heme - Thrombocytopenia, unclear cause, stable. Hgb had been stable since last transfusion 6/29/18   DVT- bilateral peroneal DVT, full anticoagulation contraindicated. IVC filter was placed 6/28/2018 without issues. CV - Intermittently tachy. Episode of Afib this admission, converted to NSR. CHF, improved. HTN, on metoprolol. Pulm - Currently stable on NC   Psych - Depressed, started Zoloft 7/3/18. [address] discharge to LTAC today. Code Status - Full Code      SUBJECTIVE  Patient is resting. Refused PT. Complains of pain. Denies nausea now.  Ileostomy normal output.  Taking ice chips and had applesauce this am.    Condition at discharge: stable and Afebrile     Labs:  Lab Results   Component Value Date/Time    WBC 9.9 07/10/2018 06:40 AM    HGB 10.2 (L) 07/10/2018 06:40 AM    HCT 34.0 (L) 07/10/2018 06:40 AM    PLATELET 98 (L) 60/66/7952 06:40 AM    MCV 93.2 07/10/2018 06:40 AM     Lab Results   Component Value Date/Time    Sodium 151 (H) 07/10/2018 06:40 AM    Potassium 3.9 07/10/2018 06:40 AM    Chloride 118 (H) 07/10/2018 06:40 AM    CO2 25 07/10/2018 06:40 AM    Anion gap 8 07/10/2018 06:40 AM    Glucose 174 (H) 07/10/2018 06:40 AM    BUN 50 (H) 07/10/2018 06:40 AM    Creatinine 0.49 (L) 07/10/2018 06:40 AM    BUN/Creatinine ratio 102 (H) 07/10/2018 06:40 AM    GFR est AA >60 07/10/2018 06:40 AM    GFR est non-AA >60 07/10/2018 06:40 AM         Current Discharge Medication List      START taking these medications    Details   alum-mag hydroxide-simeth (MYLANTA) 200-200-20 mg/5 mL susp Take 30 mL by mouth every four (4) hours as needed for up to 14 days. Qty: 1 Bottle, Refills: 0      ipratropium (ATROVENT) 0.02 % soln 2.5 mL by Nebulization route every four (4) hours as needed for up to 14 days. Qty: 1 Vial, Refills: 0      metoprolol tartrate (LOPRESSOR) 50 mg tablet Take 1 Tab by mouth every twelve (12) hours for 14 days. Qty: 28 Tab, Refills: 0      insulin glargine (LANTUS) 100 unit/mL injection 20 units subcutaneous daily at 0900  Qty: 1 Vial, Refills: 0      insulin lispro (HUMALOG) 100 unit/mL injection As of 6/22/2018: INITIATE INSULIN CORRECTIVE PROTOCOL: Normal Insulin Sensitivity   For Blood Sugar (mg/dL) of:     Less than 150 =   0 units           150 -199 =   2 units  200 -249 =   4 units  250 -299 =   6 units  300 -349 =   8 units  350 and above = 10 units and Call Physician  Qty: 1 Vial, Refills: 0      saliva stimulant (BIOTENE) spry Take 1 Spray by mouth as needed for Other (Dry Mouth). Qty: 1 Bottle, Refills: 0      sertraline (ZOLOFT) 50 mg tablet Take 1 Tab by mouth daily. Qty: 30 Tab, Refills: 0      furosemide (LASIX) 40 mg tablet Take 1 Tab by mouth daily. Qty: 30 Tab, Refills: 0      acetaminophen (TYLENOL) 325 mg tablet Take 1 Tab by mouth every six (6) hours as needed for Pain or Fever. Qty: 30 Tab, Refills: 0      Omeprazole Magnesium (PRILOSEC) 10 mg suDR Take 20 mg by mouth daily. Qty: 30 Each, Refills: 0         CONTINUE these medications which have NOT CHANGED    Details   fluticasone (FLONASE ALLERGY RELIEF) 50 mcg/actuation nasal spray 2 Sprays by Both Nostrils route daily as needed for Rhinitis.          STOP taking these medications       metFORMIN ER (GLUCOPHAGE XR) 500 mg tablet Comments:   Reason for Stopping:         acetaminophen-codeine (TYLENOL #3) 300-30 mg per tablet Comments:   Reason for Stopping:         HYDROmorphone (DILAUDID) 2 mg tablet Comments:   Reason for Stopping:         acetaminophen (TYLENOL ARTHRITIS PAIN) 650 mg TbER Comments:   Reason for Stopping: phenylephrine (NEOSYNEPHRINE) 1 % spry Comments:   Reason for Stopping:         amLODIPine (NORVASC) 10 mg tablet Comments:   Reason for Stopping:         verapamil ER (VERELAN) 180 mg CR capsule Comments:   Reason for Stopping:         atorvastatin (LIPITOR) 10 mg tablet Comments:   Reason for Stopping:         losartan-hydroCHLOROthiazide (HYZAAR) 100-12.5 mg per tablet Comments:   Reason for Stopping:         Aspirin-Acetaminophen-Caffeine (GOODY'S EXTRA STRENGTH) 500-325-65 mg pwpk Comments:   Reason for Stopping:             Patient Instructions:        Disposition:  LTAC for wound vac management and rehab (speech therapy, OT, PT)    Follow-up:  Follow up with Dr. Mykel Anders after discharge from [address] [place] [address] Avenue:   Natalie Dong MD  Gynecologic Oncology  7/10/2018  5:00 PM  [phone]

## 2018-07-10 NOTE — PROGRESS NOTES
Admit date: 6/4/2018      Miller Diaz a 75 y. 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 Status post complete surgical resection with no evidence of residual disease. Typically patient would receive adjuvant chemotherapy for this diagnosis and we have discussed this however patient is not medically appropriate for chemotherapy at this time and likely will not be within 12 weeks of primary debulking. Patient is high risk of recurrence based on pathology (Clear Cell). Will follow patient closely for recurrence and develop treatment plan at that time depending on overall medical status. GI - Peritonitis resolved. Last drain removed 6/27/2018 and tip was cultured and NGTD. Post operative ileus resolved. Now tolerating TF. Good ileostomy output. Patient has dobhoff. She was offered PEG but refused. ID - Afebrile, nml WBC now off all antibiotics and anti-fungal since Friday. Completed Meropenem 7 days 7/6/18, Vanc 15 days 7/6/18, and Micafungin 15 days 7/6/18.  Zosyn 26 days completed 6/30/18, Levaquin 16 days completed 6/29/18, Flagyl 18 days complete 7/2/2018. Procalcitonin is elevated but trending down. FEN - Tolerating TF well. Blood glucose still running high. Would like blood sugar to be better controlled to improve healing and prognosis with malignancy. Renal - JESS, resolved and Nephrology has signed off. Good UOP and normal Cr.   Heme - Thrombocytopenia, unclear cause, stable. Hgb had been stable since last transfusion 6/29/18   DVT- bilateral peroneal DVT, full anticoagulation contraindicated. IVC filter was placed 6/28/2018 without issues. CV - Intermittently tachy. Episode of Afib this admission, converted to NSR. CHF, improved. HTN, on metoprolol. Pulm - Currently stable on NC   Psych - Depressed, started Zoloft 7/3/18. [address] discharge to [facility] today. Code Status - Full Code      SUBJECTIVE  Patient is resting. Refused PT. Complains of pain. Denies nausea now. Ileostomy normal output. Taking ice chips and had applesauce this am.          OBJECTIVE  Physical Exam:  General - Resting   HEENT - nml  Heart - NSR  Abdomen/Incision - soft, non tender, nondistended, normal bowel sounds, ileostomy looks good, wound vac in place and holding seal. No drainage from LLQ drain site.    Extremities - 2+ edema LE, SCDs in place    Patient Vitals for the past 24 hrs:   BP Temp Pulse Resp SpO2   07/10/18 1237 - 97.5 °F (36.4 °C) - - -   07/10/18 1200 148/78 97.5 °F (36.4 °C) 82 15 97 %   07/10/18 1100 136/73 - 81 14 95 %   07/10/18 1000 151/81 - 94 21 99 %   07/10/18 0900 156/81 - 90 17 99 %   07/10/18 0800 142/87 98 °F (36.7 °C) 93 19 96 %   07/10/18 0700 147/90 98 °F (36.7 °C) 91 16 98 %   07/10/18 0600 (!) 150/95 - 91 19 96 %   07/10/18 0500 138/80 - 91 14 94 %   07/10/18 0400 149/86 97.8 °F (36.6 °C) 85 18 97 %   07/10/18 0300 113/67 - 87 15 91 %   07/10/18 0200 118/69 - 88 16 94 %   07/10/18 0100 138/82 - 87 25 94 %   07/10/18 0000 131/78 98.2 °F (36.8 °C) 82 22 97 %   07/09/18 2300 146/80 - 81 24 98 %   07/09/18 2200 (!) 147/92 - 89 21 98 %   07/09/18 2152 139/84 - 95 - -   07/09/18 2100 134/78 - 98 18 98 %   07/09/18 2000 121/69 - 92 23 96 %   07/09/18 1900 136/85 - 95 20 97 %   07/09/18 1800 138/76 - 92 19 97 %   07/09/18 1700 137/81 - 95 18 97 %   07/09/18 1635 - 97.5 °F (36.4 °C) - - -   07/09/18 1600 129/77 - 85 16 98 %   07/09/18 1500 (!) 149/94 - 88 14 97 %   07/09/18 1400 (!) 145/96 - 85 16 94 %         Intake/Output Summary (Last 24 hours) at 07/10/18 1327  Last data filed at 07/10/18 1018   Gross per 24 hour   Intake             2135 ml   Output             5100 ml   Net            -2965 ml          Labs  CBC  Recent Labs      07/10/18   0640  07/09/18   0500  07/08/18   0425   WBC  9.9  9.9  8.9   HCT  34.0*  33.2*  34.1*   MCV  93.2  91.5  90.7   BANDS   --    --   2   MONOS  8  8  7   EOS  2  2  1   BASOS  0  0  0        CMP  Recent Labs      07/10/18   0640  07/09/18   0500  07/08/18   0425   NA  151*  150*  146*   CO2  25  23  21   BUN  50*  54*  53*        Lab Results   Component Value Date/Time    Glucose 174 (H) 07/10/2018 06:40 AM    Glucose (POC) 164 (H) 07/10/2018 12:23 PM    Glucose,  (H) 06/15/2018 05:14 PM          Current Hospital Medications    Current Facility-Administered Medications:     insulin glargine (LANTUS) injection 20 Units, 20 Units, SubCUTAneous, DAILY, Joelle Shaw MD, 20 Units at 07/10/18 1003    metoprolol tartrate (LOPRESSOR) tablet 50 mg, 50 mg, Oral, Q12H, Christen Odell MD, 50 mg at 07/10/18 1004    HYDROmorphone (PF) (DILAUDID) injection 0.5 mg, 0.5 mg, IntraVENous, Q6H PRN, Christen Odell MD, 0.5 mg at 07/10/18 1030    furosemide (LASIX) injection 40 mg, 40 mg, IntraVENous, DAILY, Christen Miranda MD, 40 mg at 07/10/18 1008    sertraline (ZOLOFT) tablet 50 mg, 50 mg, Oral, DAILY, Pietro Forman MD, 50 mg at 07/10/18 1004    0.9% sodium chloride infusion 250 mL, 250 mL, IntraVENous, PRN, Antonio Clifford MD    saliva stimulant (BIOTENE) 1 Spray, 1 Spray, Oral, PRN, Darshana Dixon MD, 1 [place] at 06/28/18 1108    hydrALAZINE (APRESOLINE) 20 mg/mL injection 10 mg, 10 mg, IntraVENous, Q4H PRN, Darshana Dixon MD, 10 mg at 07/03/18 2159    insulin lispro (HUMALOG) injection, , SubCUTAneous, Q6H, Lyndon Tavares MD, Stopped at 07/10/18 0044    dextrose (D50W) injection syrg 12.5-25 g, 25-50 mL, IntraVENous, PRN, Katt Pak MD    ipratropium (ATROVENT) 0.02 % nebulizer solution 0.5 mg, 0.5 mg, Nebulization, Q4H PRN, Alicia Schmidt MD, 0.5 mg at 06/29/18 1449    naloxone (NARCAN) injection 0.4 mg, 0.4 mg, IntraVENous, EVERY 2 MINUTES AS NEEDED, Katt Pak MD    ondansetron [facility]) injection 4 mg, 4 mg, IntraVENous, Q6H PRN, Kee Sanchez MD, 4 mg at 06/08/18 2152    oxymetazoline (AFRIN) 0.05 % nasal spray 2 Spray, 2 Spray, Both Nostrils, BID PRN, Jaymie Romero MD    alum-mag hydroxide-simeth (MYLANTA) oral suspension 30 mL, 30 mL, Oral, Q4H PRN, Christen Cash MD, 30 mL at 06/07/18 2225    fluticasone (FLONASE) 50 mcg/actuation nasal spray 2 Spray, 2 Spray, Both Nostrils, DAILY PRN, Mina Franco MD    ELECTROLYTE REPLACEMENT PROTOCOL-POTASSIUM Renal Dosing, 1 Each, Other, PRN, Christen Zendejas MD    ELECTROLYTE REPLACEMENT PROTOCOL-MAGNESIUM, 1 Each, Other, PRN, Christen Zendejas MD    ELECTROLYTE REPLACEMENT PROTOCOL-PHOSPHORUS Renal Dosing, 1 Each, Other, PRN, Christen Zendejas MD    sodium chloride (NS) flush 5-10 mL, 5-10 mL, IntraVENous, PRN, Magdalena Diaz MD    glucose chewable tablet 16 g, 4 Tab, Oral, PRN, Christen Zendejas MD    glucagon (GLUCAGEN) injection 1 mg, 1 mg, IntraMUSCular, PRN, Christen Zendejas MD    pantoprazole (PROTONIX) 40 mg in sodium chloride 0.9% 10 mL injection, 40 mg, IntraVENous, DAILY, Alden Blandon MD, 40 mg at 07/10/18 1008      Mina Franco MD

## 2018-07-10 NOTE — PROGRESS NOTES
Bedside and Verbal shift change report given to Kathy Sow (oncoming nurse) by Louann Vásquez (offgoing nurse). Report included the following information SBAR, Kardex, MAR and Cardiac Rhythm Sinus. 0800 - Initial assessment completed, speech therapist says that patient can have nectar thick foods/liquids. Range of motion performed. 1030 - Patient is reporting pain at a 10 out of 10 in the abdominal region towards the midline incision. Patient medicated with Dilaudid. 1100 - Patients pain is a 0 out of 10.   1200- No changes to assessment, patient is still pain free. Range of motion performed. 1500 - Fry removed. 1540- Transport order in for 8:30pm. Wet to dry dressing to be applied before transport. Called Sheila Sam's family member about transfer. 1600 - Reassessment completed, no change, patient denies pain   1800- Picc line dressing changed. 1730 -TRANSFER - OUT REPORT:    Verbal report given to Rico Bhat  on Leah Wheeler  being transferred to [address] [place] [address] for routine progression of care       Report consisted of patients Situation, Background, Assessment and   Recommendations(SBAR). Information from the following report(s) SBAR, Kardex, ED Summary, OR Summary, Procedure Summary, Intake/Output, MAR, Accordion, Recent Results, Med Rec Status, Cardiac Rhythm NSR and Alarm Parameters  was reviewed with the receiving nurse.     Lines:   PICC Double Lumen 72/15/39 Right;Basilic (Active)   Central Line Being Utilized Yes 7/10/2018  6:11 PM   Criteria for Appropriate Use Limited/no vessel suitable for conventional peripheral access 7/10/2018  6:11 PM   Site Assessment Clean, dry, & intact 7/10/2018  6:11 PM   Phlebitis Assessment 0 7/10/2018  6:11 PM   Infiltration Assessment 0 7/10/2018  6:11 PM   Date of Last Dressing Change 07/10/18 7/10/2018  6:11 PM   Dressing Status Clean, dry, & intact 7/10/2018  6:11 PM   Action Taken Dressing changed 7/10/2018  6:11 PM   Dressing Type Disk with Chlorhexadine gluconate (CHG) 7/10/2018  6:11 PM   Hub Color/Line Status Capped;Cap end changed 7/10/2018  6:11 PM   Positive Blood Return (Site #1) Yes 7/10/2018  6:11 PM   Hub Color/Line Status Capped;Cap end changed 7/10/2018  6:11 PM   Positive Blood Return (Site #2) Yes 7/10/2018  6:11 PM   Alcohol Cap Used Yes 7/10/2018  6:11 PM        Opportunity for questions and clarification was provided.

## 2018-07-10 NOTE — PROGRESS NOTES
Cm met  on unit, planning for discharged today,cm spoke with admissions department at [facility] [address] [place]  [address] bed is available for admission today can accept patient, informed that facility physician Angelica Schirmer is available for questions [phone] and facility chief officer Jd Asencio [phone] available for wound related questions ,made aware of d/c summary needed prior to transport which she will complete after surgery,unit will set up medical transportation for approx 8pm,admission  Today, coordinator Jurgen Mitchell aware facility has no cut off time for admissions,once d/c summary completed please send copy with pt,RN please call report @[phone] prior to pt leaving.   Case Management Assessment      Preferred Language for Healthcare Related Communication         Spiritual/Ethnic/Cultural/Taoist Needs that Should be Incorporated Into Your Care          FUNCTIONAL ASSESSMENT   Fall in Past 12 Months: No   Fall With Injury: No           Decline in Gait/Transfer/Balance: No       Decline in Capacity to Feed/Dress/Bathe: No   Developmental Delay: No   Chewing/Swallowing Problems: No      DYSPHAGIA SCREENING   Vocal Quality/Secretions: Normal       O2 Saturation: Normal   Alertness: (!) Abnormal          Difficulty with Secretions     Difficulty with Secretions: No      Speech Slurred/Thick/Garbled     Speech Slurred/Thick/Garbled: No      ABUSE/NEGLECT SCREENING   Physical Abuse/Neglect: Denies   Sexual Abuse: Denies   Sexual Abuse: Denies   Other Abuse/Issues: Denies          PRIMARY DECISION MAKER                                        ADVANCE CARE PLANNING (ACP) DOCUMENTS                  Suicide/Psychosocial Screening                                      SAD PERSONS                                                  READMIT RISK TOOL   Support Systems: Family member(s)                       Living Alone: No      CARE MANAGEMENT INTERVENTIONS       PCP Verified by CM:  Yes       Palliative Care Criteria Met (RRAT>21 & CHF Dx)?: Yes   Mode of Transport at Discharge: ALS                           Discharge Durable Medical Equipment: No   Physical Therapy Consult: Yes   Occupational Therapy Consult: Yes   Speech Therapy Consult: Yes   Current Support Network: Relative's Home (brother)                                                                           Confirm Follow Up Transport: Family       Plan discussed with Pt/Family/Caregiver: Yes   Freedom of Choice Offered: Yes (n/a)      DISCHARGE LOCATION   Discharge Placement: [address] [place] [address] (AC)

## 2018-07-10 NOTE — PROGRESS NOTES
1925-Bedside verbal report received from Karly Allred RN. Sbar, mar, labs, kardex and patient status reviewed. 2030-Assessment completed. No changes from previous assessment. Emptied large amount of liquid stool from Colostomy at this time. 2200-Pt appears comfortable. When asked if in pain patient replied, \"no\". 0000-Assessment completed. Patient complaining of moderate pain 5-10 at this time in Abdomen. Repositioned and lights dimmed for comfort. 0130-Gave Prn Dilaudid per orders for 6-10pain at this time. 0200-Appears comfortable resting quietly. 0400-No changes from previous assessment. 0720-Report given to Sandra Hernandez RN. Sbar, mar, labs, kardex and patient status reviewed.

## 2018-07-11 NOTE — PROGRESS NOTES
2000- Care assumed, discharge completed per Garima DERAS . Awaiting transport,  gave orders to change abdominal dressing to wet/dry when transport arrives. 2045- Medical transport on unit, abdominal dressing changed per orders. NAD. VSS, respirations regular and unlabored. 2115- Discharged with medical transport.

## 2018-07-11 NOTE — PROGRESS NOTES
Bedside shift change report given to Lary Barrera  (oncoming nurse) by Mayte Quick (offgoing nurse). Report included the following information SBAR, Kardex, ED Summary, OR Summary, Procedure Summary, Intake/Output, MAR, Accordion, Recent Results, Med Rec Status, Cardiac Rhythm NSR and Alarm Parameters .

## 2018-07-16 LAB
BACTERIA SPEC CULT: NORMAL
FUNGUS SMEAR,FNGSMR: NORMAL
SERVICE CMNT-IMP: NORMAL

## 2018-07-25 LAB
ACID FAST STN SPEC: NEGATIVE
MYCOBACTERIUM SPEC QL CULT: NEGATIVE
SPECIMEN PREPARATION: NORMAL
SPECIMEN SOURCE: NORMAL

## 2018-07-31 LAB
BACTERIA SPEC CULT: NORMAL
SERVICE CMNT-IMP: NORMAL

## 2018-08-01 LAB
BACTERIA SPEC CULT: NORMAL
SERVICE CMNT-IMP: NORMAL

## 2018-09-03 NOTE — PROGRESS NOTES
Patient transferred from ICU with Nurse. Placed on table and nplaced on monitor. Dr Nga Vee present and discussed procedure with patient. Ultra sound  pictures completed. Tracy Quevedo MD  15M presenting with petechial rash, likely viral. on exam rash throughout, with some areas in the leg with petechia features; Will check cbc

## 2018-09-22 ENCOUNTER — HOSPITAL ENCOUNTER (OUTPATIENT)
Dept: CT IMAGING | Age: 68
Discharge: HOME | End: 2018-09-22
Attending: OBSTETRICS & GYNECOLOGY
Payer: MEDICARE

## 2018-09-22 DIAGNOSIS — Z86.711 PERSONAL HISTORY OF PE (PULMONARY EMBOLISM): ICD-10-CM

## 2018-09-22 DIAGNOSIS — C56.2 MALIGNANT NEOPLASM OF LEFT OVARY (HCC): ICD-10-CM

## 2018-09-22 LAB — CREAT UR-MCNC: 0.8 MG/DL (ref 0.6–1.3)

## 2018-09-22 PROCEDURE — 82565 ASSAY OF CREATININE: CPT

## 2018-09-22 PROCEDURE — 74011636320 HC RX REV CODE- 636/320: Performed by: OBSTETRICS & GYNECOLOGY

## 2018-09-22 PROCEDURE — 71260 CT THORAX DX C+: CPT

## 2018-09-22 RX ADMIN — IOPAMIDOL 100 ML: 612 INJECTION, SOLUTION INTRAVENOUS at 08:06

## 2019-01-01 ENCOUNTER — HOSPITAL ENCOUNTER (OUTPATIENT)
Dept: PET IMAGING | Age: 69
Discharge: HOME | End: 2019-11-12
Attending: OBSTETRICS & GYNECOLOGY
Payer: MEDICARE

## 2019-01-01 ENCOUNTER — HOSPITAL ENCOUNTER (OUTPATIENT)
Dept: MRI IMAGING | Age: 69
Discharge: HOME | End: 2019-08-07
Attending: OBSTETRICS & GYNECOLOGY
Payer: MEDICARE

## 2019-01-01 ENCOUNTER — HOSPITAL ENCOUNTER (OUTPATIENT)
Dept: PET IMAGING | Age: 69
Discharge: HOME | End: 2019-04-30
Attending: OBSTETRICS & GYNECOLOGY
Payer: MEDICARE

## 2019-01-01 DIAGNOSIS — C56.2 MALIGNANT NEOPLASM OF LEFT OVARY (HCC): ICD-10-CM

## 2019-01-01 PROCEDURE — A9552 F18 FDG: HCPCS

## 2019-01-01 PROCEDURE — 72195 MRI PELVIS W/O DYE: CPT

## 2019-01-08 ENCOUNTER — HOSPITAL ENCOUNTER (OUTPATIENT)
Dept: PET IMAGING | Age: 69
Discharge: HOME | End: 2019-01-08
Attending: OBSTETRICS & GYNECOLOGY
Payer: MEDICARE

## 2019-01-08 DIAGNOSIS — Z86.711 PERSONAL HISTORY OF PE (PULMONARY EMBOLISM): ICD-10-CM

## 2019-01-08 DIAGNOSIS — C56.2 MALIGNANT NEOPLASM OF LEFT OVARY (HCC): ICD-10-CM

## 2019-01-08 PROCEDURE — A9552 F18 FDG: HCPCS

## 2019-01-21 RX ORDER — METFORMIN HYDROCHLORIDE 500 MG/1
1000 TABLET ORAL 2 TIMES DAILY WITH MEALS
COMMUNITY

## 2019-01-21 RX ORDER — LOSARTAN POTASSIUM 50 MG/1
50 TABLET ORAL DAILY
COMMUNITY

## 2019-01-21 RX ORDER — ACETAMINOPHEN AND CODEINE PHOSPHATE 300; 30 MG/1; MG/1
1 TABLET ORAL
COMMUNITY

## 2019-01-21 RX ORDER — VERAPAMIL HYDROCHLORIDE 180 MG/1
180 TABLET, EXTENDED RELEASE ORAL
COMMUNITY

## 2019-01-21 RX ORDER — ATORVASTATIN CALCIUM 10 MG/1
TABLET, FILM COATED ORAL DAILY
COMMUNITY

## 2019-01-22 RX ORDER — SODIUM CHLORIDE 9 MG/ML
25 INJECTION, SOLUTION INTRAVENOUS CONTINUOUS
Status: CANCELLED | OUTPATIENT
Start: 2019-01-28

## 2019-01-22 RX ORDER — FLUMAZENIL 0.1 MG/ML
0.2 INJECTION INTRAVENOUS
Status: CANCELLED | OUTPATIENT
Start: 2019-01-28

## 2019-01-22 RX ORDER — MIDAZOLAM HYDROCHLORIDE 1 MG/ML
.5-4 INJECTION, SOLUTION INTRAMUSCULAR; INTRAVENOUS
Status: CANCELLED | OUTPATIENT
Start: 2019-01-28

## 2019-01-22 RX ORDER — FENTANYL CITRATE 50 UG/ML
25-200 INJECTION, SOLUTION INTRAMUSCULAR; INTRAVENOUS
Status: CANCELLED | OUTPATIENT
Start: 2019-01-28

## 2019-01-22 RX ORDER — NALOXONE HYDROCHLORIDE 0.4 MG/ML
0.2 INJECTION, SOLUTION INTRAMUSCULAR; INTRAVENOUS; SUBCUTANEOUS AS NEEDED
Status: CANCELLED | OUTPATIENT
Start: 2019-01-28

## 2019-01-22 NOTE — PROGRESS NOTES
PT aware of NPO status. PT aware of need to hold anticoagulants per protocol. PT aware of potential for sedation administration and need for  at discharge. PT aware of arrival time pre procedure. Pt states no questions at this time.  Her CT BX does not use contrast so she doesn't need to prep for allergy to contrast.

## 2019-01-24 ENCOUNTER — HOSPITAL ENCOUNTER (OUTPATIENT)
Dept: CT IMAGING | Age: 69
Discharge: HOME | End: 2019-01-24
Attending: OBSTETRICS & GYNECOLOGY

## 2019-01-28 ENCOUNTER — HOSPITAL ENCOUNTER (OUTPATIENT)
Dept: CT IMAGING | Age: 69
Discharge: HOME | End: 2019-01-28
Attending: OBSTETRICS & GYNECOLOGY
Payer: MEDICARE

## 2019-01-28 ENCOUNTER — HOSPITAL ENCOUNTER (OUTPATIENT)
Dept: CT IMAGING | Age: 69
Discharge: HOME HEALTH | End: 2019-01-28
Attending: RADIOLOGY | Admitting: RADIOLOGY

## 2019-01-28 DIAGNOSIS — C56.2 MALIGNANT NEOPLASM OF LEFT OVARY (HCC): ICD-10-CM

## 2019-01-28 PROCEDURE — 74176 CT ABD & PELVIS W/O CONTRAST: CPT

## 2019-01-28 NOTE — PROGRESS NOTES
Pt here for f/u CT and possible biopsy of left pelvic lesion. Initial CT from 9/2018 demonstrated a thick-walled complex fluid collection in this area measuring ~4.3 x 3.1cm. I suspect this was most likely a post op collection such as seroma or hematoma, or abscess. This same area has decreased significantly in size on f/u PET/CT from 1/8/19 and demonstrated mild FDG uptake, and is stable on todays f/u CT 1/28/19. Additionally this area is fairly deep w/in the pelvis and the percutaneous window to get to it would require coming very close to major blood vessels and bowel. Given that it is stable and percutaneous window would carry moderate risk, would recommend against biopsy at this time. Consider f/u CT with IV contrast in 3 months to reassess. Findings and tenative plan discussed with Mrs Sophie London and her brother, and both agreed with this approach.         Moses Mercado MD  Vascular & Interventional Radiology  [facility]  1/28/2019

## 2019-01-28 NOTE — PROGRESS NOTES
Dr. Yoselyn Rose at pt's bedside to discuss ct imaging results; based on results, MD cancelled procedure and recommended follow up CT in 3months. See MD's note. Pt and pt's brother verbalized understanding.

## 2020-01-01 ENCOUNTER — HOSPITAL ENCOUNTER (OUTPATIENT)
Dept: LAB | Age: 70
Discharge: HOME | End: 2020-02-04
Payer: MEDICARE

## 2020-01-01 ENCOUNTER — TELEPHONE (OUTPATIENT)
Dept: ONCOLOGY | Age: 70
End: 2020-01-01

## 2020-01-01 ENCOUNTER — OFFICE VISIT (OUTPATIENT)
Dept: ONCOLOGY | Age: 70
End: 2020-01-01

## 2020-01-01 VITALS
HEIGHT: 61 IN | WEIGHT: 184 LBS | HEART RATE: 99 BPM | TEMPERATURE: 97.5 F | RESPIRATION RATE: 14 BRPM | OXYGEN SATURATION: 94 % | BODY MASS INDEX: 34.74 KG/M2 | DIASTOLIC BLOOD PRESSURE: 95 MMHG | SYSTOLIC BLOOD PRESSURE: 171 MMHG

## 2020-01-01 DIAGNOSIS — C56.3 MALIGNANT NEOPLASM OF BOTH OVARIES (HCC): Primary | ICD-10-CM

## 2020-01-01 DIAGNOSIS — C56.3 MALIGNANT NEOPLASM OF BOTH OVARIES (HCC): ICD-10-CM

## 2020-01-01 LAB — CANCER AG125 SERPL-ACNC: 12 U/ML (ref 1.5–35)

## 2020-01-01 PROCEDURE — 86304 IMMUNOASSAY TUMOR CA 125: CPT

## 2020-01-01 PROCEDURE — 36415 COLL VENOUS BLD VENIPUNCTURE: CPT

## 2020-01-01 RX ORDER — KETOCONAZOLE 20 MG/G
CREAM TOPICAL
COMMUNITY

## 2020-01-31 NOTE — PROGRESS NOTES
[address], [address]  Sofiya Albert, [address] [facility] 
[address] [facility], [address] Connor Crowder 
 [phone] Mariaa Gooden DO Patient ID: 
Name:  Lily Pope MRN:  [id number] :  1950/69 y.o. Date:  2020 HISTORY OF PRESENT ILLNESS: 
Lily Pope is a 78 y. o.  postmenopausal female referred by Dr. Yuliya Singleton for Stage IC clear cell carcinoma of the ovary. Pt was a patient of dr. Kaleb Brunson s/p Laparoscopy converted to laparotomy, HEATH/BSO, bilateral pelvic and para-aortic lymphadenectomy, radical tumor debulking on 2018. Post op course complicated by abdominal sepsis requiring multiple surgeries and ileostomy. had a fall in January and had some spotting after but no bleeding since. No abdominopelvic pain or change in bladder/bowel. Labs: 
 2018: 23.3 Imaging PETCT 2019 FINDINGS: 
  
-----PET------- 
  
Reference value mediastinal blood pool SUV (mean) = 2.5, previously 2.4. Reference value liver parenchyma SUV (mean) = 3.1, previously 3.0. 
  
Skull base/Neck:  No evidence of malignant glucose activity. 
  
Chest:  No evidence of malignant glucose activity. 
  
Abdomen/Pelvis:   
  > Interval decrease in FDG activity (SUV max = 4.9, previously 8.1) within 
left adnexal soft tissue (image 186), decreasing in size measuring 2.7 x 1.3 cm, 
previously 2.8 x 2.0 cm. 
  
Osseous structures:  Bone scintigraphy is more sensitive for detection of 
osteoblastic metastatic disease and PET more sensitive for osteolytic lesions, 
suggesting the imaging modalities should be regarded as complementary, and 
should not replace each other. Otherwise, no evidence of malignant glucose 
activity. 
  
  
-----Additional CT-----  Exam is limited due to lack of intravenous contrast. 
  
CT Skull base/Neck:  No significant change since prior PET/CT.  Stable right 
thyroid lesion. 
  
 CT Chest:  No significant new finding since prior PET/CT. No enlarged lymph 
nodes. No suspicious pulmonary nodule. 
  
CT Abdomen/Pelvis:  As above. Persistent gallbladder distention. IVC Filter. Right lower quadrant colostomy with peristomal hernia, unchanged without bowel 
obstruction. 
  
CT Osseous structures: Stable moderate compression deformity of L1. 
  
______________________________________ 
  
IMPRESSION IMPRESSION: 
  
1. Decrease in FDG activity involving the decreasing sized left adnexal soft 
tissue mass. Although nonspecific, in the absence of interval therapy for ovary 
carcinoma, this likely represents decreasing inflammatory process. 
  
2. No other findings of malignancy MRI 8/7/2019 Impression: 
  
1. Noncontrast exam per patient. Prior total hysterectomy and 
salpingo-oophorectomy. Residual complex  3 cm oval cystic mass left pelvis with 
bandlike areas of low signal scar tethering adjacent rectosigmoid and involving 
the vaginal cuff apex and bladder dome. Given the absence of significant change 
in size or morphology over nearly 7 months, this most likely represents chronic 
postoperative sequelae such as hematoma with dense surrounding fibrosis and 
multiple fibrous bands; absence of intravenous contrast is not helpful. Suggest 
correlation with CEA levels and follow-up pelvic MR in 6 months or PET CT in 6 
months which would also correspond to a one year interval since that prior exam. 
No evident pelvic lymphadenopathy, ascites or discrete new pelvic mass. 
  
  
2. Sigmoid diverticulosis and myochosis. Lumbar L4-5 and L5-S1 degenerative disc 
disease and L4-5 spinal stenosis ROS:  
As above Patient Active Problem List  
 Diagnosis Date Noted  Clear cell adenocarcinoma of ovary, left ([address] 07/09/2018  Hypokalemia 06/27/2018  Hypernatremia 06/25/2018  S/P ileostomy ([address] 06/16/2018  Hypoalbuminemia 06/16/2018  Peritonitis ([address] 06/16/2018  Acute deep vein thrombosis (DVT) of lower extremity ([facility] 06/07/2018  Acute respiratory failure ([facility] 06/05/2018  JESS (acute kidney injury) ([facility] 06/05/2018  Metabolic acidosis 61/58/2069  Abdominal pain 06/04/2018  Sepsis ([facility] 06/04/2018  Oliguria 06/04/2018  Severe obesity (BMI 35.0-39.9) 05/31/2018  Ovarian ca ([facility] 05/29/2018 Past Medical History:  
Diagnosis Date  Diabetes ([facility]   
 many years type 2  
 Hypertension   
 many years Past Surgical History:  
Procedure Laterality Date  HX GI  2018 Ileostomy  HX OOPHORECTOMY  05/29/2018  HX TONSILLECTOMY    
 as a child OB History No obstetric history on file. Social History Tobacco Use  Smoking status: Never Smoker  Smokeless tobacco: Never Used Substance Use Topics  Alcohol use: No  
  
Family History Problem Relation Age of Onset  Cancer Father Current Outpatient Medications Medication Sig  
 ketoconazole (NIZORAL) 2 % topical cream ketoconazole 2 % topical cream  
 atorvastatin (LIPITOR) 10 mg tablet Take  by mouth daily.  losartan (COZAAR) 50 mg tablet Take 50 mg by mouth daily.  verapamil ER (CALAN-SR) 180 mg CR tablet Take 180 mg by mouth nightly.  metFORMIN (GLUCOPHAGE) 500 mg tablet Take 1,000 mg by mouth two (2) times daily (with meals).  acetaminophen-codeine (TYLENOL-CODEINE #3) 300-30 mg per tablet Take 1 Tab by mouth every four (4) hours as needed for Pain.  insulin lispro (HUMALOG) 100 unit/mL injection As of 6/22/2018: INITIATE INSULIN CORRECTIVE PROTOCOL: Normal Insulin Sensitivity For Blood Sugar (mg/dL) of:    
Less than 150 =   0 units          
150 -199 =   2 units 200 -249 =   4 units 250 -299 =   6 units 300 -349 =   8 units 350 and above = 10 units and Call Physician No current facility-administered medications for this visit. Allergies Allergen Reactions  Contrast Agent [Iodine] Itching  Hydrocodone Other (comments) Breaks into cold sweat  Metformin Other (comments) Breaks out into cold sweat. Can Take Glucophage brand name med OBJECTIVE: 
 
Physical Exam 
VITAL SIGNS: Visit Vitals BP (!) 171/95 (BP 1 Location: Left arm, BP Patient Position: Sitting) Pulse 99 Temp 97.5 °F (36.4 °C) (Oral) Resp 14 Ht 5' 1\" (1.549 m) Wt 83.5 kg (184 lb) SpO2 94% BMI 34.77 kg/m² GENERAL JOAQUIM: in no apparent distress and well developed and well nourished MUSCULOSKEL: no joint tenderness, deformity or swelling INTEGUMENT:  warm and dry, no rashes or lesions ABDOMEN . soft, NT, ND, No masses appreciated, ileostomy, midline hernia EXTREMITIES: extremities normal, atraumatic, no cyanosis or edema RECTAL: deferred LIVIER SURVEY: Cervical, supraclavicular, axillary and inguinal nodes normal.  
NEURO: Grossly normal  
 
 
 
IMPRESSION/PLAN: 
1. Stage IC clear cell cancer of ovary 
 -reviewed her surveillance strategy 
 -will check  and call with results 
 -plan to repeat PETCT in 6 months 
 -f/u in 3 months The total time spent was 60 minutes regarding this patients diagnosis of ovarian cancer and >50% of this time was spent counseling and coordinating care Lavell Regalado DO Gynecologic Oncology 2/4/20202:25 PM

## 2020-02-04 NOTE — PATIENT INSTRUCTIONS
Ovarian Cancer: Care Instructions Your Care Instructions Ovarian cancer occurs when abnormal cells grow out of control in or near your ovaries. These cells may spread to other areas in the body. The ovaries are the organs that hold and release a woman's eggs. Treatment usually involves surgery to remove the ovaries. Chemotherapy may also be used. Radiation therapy is rarely used, but in some cases it might be part of treatment. You also may get medicines to help with the side effects of chemotherapy, which may include nausea, vomiting, and fatigue. Finding out that you have cancer is scary. You may feel many emotions and may need some help coping. Seek out family, friends, and counselors for support. You can do things at home to make yourself feel better while you go through treatment. You also can call the [employer] (5-942.758.1800) or visit its website at [employer] [place] for more information. Follow-up care is a key part of your treatment and safety. Be sure to make and go to all appointments, and call your doctor if you are having problems. It's also a good idea to know your test results and keep a list of the medicines you take. How can you care for yourself at home? · Take your medicines exactly as prescribed. Call your doctor if you think you are having a problem with your medicine. You may get medicine for nausea and vomiting if you have these side effects. · Eat healthy food. If you do not feel like eating, try to eat food that has protein and extra calories to keep up your strength and prevent weight loss. Drink liquid meal replacements for extra calories and protein. Try to eat your main meal early. Eating smaller portions more often may help as well. · Get some physical activity every day, but do not get too tired. Keep doing the hobbies you enjoy as your energy allows. · Take steps to control your stress and workload. Learn relaxation techniques. ? Share your feelings. Stress and tension affect our emotions. By expressing your feelings to others, you may be able to understand and cope with them. ? Consider joining a support group. Talking about a problem with your spouse, a good friend, or other people with similar problems is a good way to reduce tension and stress. ? Express yourself through art. Try writing, dance, art, or crafts to relieve tension. Some dance, writing, or art groups may be available just for people who have cancer. ? Be kind to your body and mind. Getting enough sleep, eating a healthy diet, and taking time to do things you enjoy can contribute to an overall feeling of balance in your life and help reduce stress. ? Get help if you need it. Discuss your concerns with your doctor or counselor. · If you are vomiting or have diarrhea: ? Drink plenty of fluids (enough so that your urine is light yellow or clear like water) to prevent dehydration. Choose water and other caffeine-free clear liquids. If you have kidney, heart, or liver disease and have to limit fluids, talk with your doctor before you increase the amount of fluids you drink. ? When you are able to eat, try clear soups, mild foods, and liquids until all symptoms are gone for 12 to 48 hours. Other good choices include dry toast, crackers, cooked cereal, and gelatin dessert, such as Jell-O. 
· Take care of your urinary tract to prevent problems such as infection, which can be caused by ovarian cancer and its treatment. Limit drinks with caffeine, drink plenty of fluids, and urinate every 3 or 4 hours. · If you have not already done so, prepare a list of advance directives. Advance directives are instructions to your doctor and family members about what kind of care you want if you become unable to speak or express yourself. When should you call for help? Call 911 anytime you think you may need emergency care. For example, call if:   · You passed out (lost consciousness).  
 Call your doctor now or seek immediate medical care if: 
  · You have a fever.  
  · You have abnormal bleeding.  
  · You have new or worse pain.  
  · You think you have an infection.  
  · You have new symptoms, such as a cough, belly pain, vomiting, diarrhea, or a rash.  
 Watch closely for changes in your health, and be sure to contact your doctor if: 
  · You are much more tired than usual.  
  · You have swollen glands in your armpits, groin, or neck.  
  · You do not get better as expected. Where can you learn more? Go to http://eric-corby.info/. Enter D145 in the search box to learn more about \"Ovarian Cancer: Care Instructions. \" Current as of: December 19, 2018 Content Version: 12.2 © 6401-3218 [employer]. Care instructions adapted under license by [employer] (which disclaims liability or warranty for this information). If you have questions about a medical condition or this instruction, always ask your healthcare professional. [address] any warranty or liability for your use of this information.

## 2020-02-04 NOTE — PROGRESS NOTES
Wilver Singh is a 78 y.o. female presents in office for ovarian cancer transfer of care from Dr. Alvaro Moore. Chief Complaint Patient presents with  New Patient Transfer of care from Dr. Alvaro Moore  Ovarian Cancer Do you have any unusual vaginal bleeding, discharge or irritation? No 
Do you have any changes in your bowel movements? No 
Have you been experiencing nausea or vomiting? No 
Have you been experiencing any continuous or worsening abdominal pain? No 
Any urinary burning? No 
 
Visit Vitals BP (!) 171/95 (BP 1 Location: Left arm, BP Patient Position: Sitting) Pulse 99 Temp 97.5 °F (36.4 °C) (Oral) Resp 14 Ht 5' 1\" (1.549 m) Wt 83.5 kg (184 lb) SpO2 94% BMI 34.77 kg/m² Health Maintenance Due Topic Date Due  
 Hepatitis C Screening  1950  
 DTaP/Tdap/Td series (1 - Tdap) 08/01/1960  Shingrix Vaccine Age 50> (1 of 2) 08/01/1999  BREAST CANCER SCRN MAMMOGRAM  08/01/1999  
 FOBT Q 1 YEAR AGE 50-75  08/01/1999  GLAUCOMA SCREENING Q2Y  08/01/2014  Bone Densitometry (Dexa) Screening  08/01/2014  Pneumococcal 65+ years (1 of 1 - PPSV23) 08/01/2014  MEDICARE YEARLY EXAM  05/16/2018  Influenza Age 5 to Adult  08/01/2019 1. Have you been to the ER, urgent care clinic since your last visit? Hospitalized since your last visit? No 
 
2. Have you seen or consulted any other health care providers outside of the [address] [place] [address] since your last visit? Include any pap smears or colon screening. Dr. Cobian Fairly (PCP) 3 most recent PHQ Screens 2/4/2020 Little interest or pleasure in doing things Not at all Feeling down, depressed, irritable, or hopeless Not at all Total Score PHQ 2 0 Fall Risk Assessment, last 12 mths 2/4/2020 Able to walk? Yes Fall in past 12 months? Yes Fall with injury? No  
Number of falls in past 12 months 1 Fall Risk Score 1 Learning Assessment 2/4/2020 PRIMARY LEARNER Patient BARRIERS PRIMARY LEARNER NONE  
CO-LEARNER CAREGIVER No  
PRIMARY LANGUAGE ENGLISH  
LEARNER PREFERENCE PRIMARY DEMONSTRATION  
ANSWERED BY Patient RELATIONSHIP SELF

## 2020-02-04 NOTE — LETTER
2/4/20 Patient: August Curran YOB: 1950 Date of Visit: 2/4/2020 Yojana Nunez, [facility] A [address] [place] [address] VIA Facsimile: [phone] Dear Yojana Nunez MD, Thank you for referring Ms. August Curran to [facility] for evaluation. My notes for this consultation are attached. If you have questions, please do not hesitate to call me. I look forward to following your patient along with you. Sincerely, Indy Rene MD

## 2020-05-12 NOTE — PROGRESS NOTES
Problem: Pressure Injury - Risk of  Goal: *Prevention of pressure injury  Document Oscar Scale and appropriate interventions in the flowsheet. Outcome: Progressing Towards Goal  Pressure Injury Interventions:  Sensory Interventions: Check visual cues for pain, Keep linens dry and wrinkle-free, Float heels, Discuss PT/OT consult with provider    Moisture Interventions: Check for incontinence Q2 hours and as needed, Contain wound drainage, Internal/External urinary devices, Maintain skin hydration (lotion/cream), Offer toileting Q_hr    Activity Interventions: PT/OT evaluation, Pressure redistribution bed/mattress(bed type)    Mobility Interventions: PT/OT evaluation, Pressure redistribution bed/mattress (bed type)    Nutrition Interventions: Document food/fluid/supplement intake, Discuss nutritional consult with provider, Offer support with meals,snacks and hydration    Friction and Shear Interventions: Lift team/patient mobility team, HOB 30 degrees or less               Problem: Falls - Risk of  Goal: *Absence of Falls  Document Goyo Fall Risk and appropriate interventions in the flowsheet. Outcome: Progressing Towards Goal  Fall Risk Interventions:  Mobility Interventions: PT Consult for mobility concerns, PT Consult for assist device competence, OT consult for ADLs, Bed/chair exit alarm    Mentation Interventions: Bed/chair exit alarm, Door open when patient unattended    Medication Interventions: Bed/chair exit alarm    Elimination Interventions:  Toileting schedule/hourly rounds, Call light in reach, Bed/chair exit alarm Report received from RN Cap at this time. Patient asleep, no signs of distress 
noted, per RN patient requested food, breakfast tray ordered. Patient within 
view of staff at all times, will continue to monitor.

## 2022-04-22 NOTE — PROGRESS NOTES
CHIEF COMPLAINT:   Chief Complaint   Patient presents with   • Knee Pain     right knee   • Office Visit       Rob Vael, 42 year old, male presenting for right knee.   Pt presents today unaccompanied  Any use of an assistive device? no assistive device    Occupation: consultant (desk)  Exercise/Sports: golfing, spin bike  Injury noted:  4/15/2022, no known injury  Tobacco/Drug/Alcohol use verified.  Latex:  Patient denies allergy to latex.  Medications reviewed with patient:Changes made.  Surgical and Medical History reviewed with patient, no changes  PCP Waqas Nieto MD   [facility]                  Pulmonary, Critical Care, and Sleep Medicine     Name: Asher London MRN: [id number]   : 1950 Hospital: [facility] [place]    Date: 2018        [facility]                                              Subjective/History of Present Illness:     Patient is a 75 y.o. 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 S/p exploratory laparotomy 6/15/18 - findings of sigmoid diverticulitis with suspected perforation, underwent ileostomy and wound vac placement. 18:  Pt remained on RA resting. Mild tachypnea. Drowsy and weak but opens eyes and follows commands. No fever  Leucocytosis improving  BP stable  On NSR while on cardizem 5  Received 1 prbc this am, pending repeat H&H  Bloody drain in left BRANDON drain  On TPN  No other issues overnight      Surgeries  18 - Laparoscopy converted to laparotomy, total abdominal hysterectomy, bilateral salpingo-oophorectomy, omentectomy, bilateral pelvic and periaortic lymphadenectomy and radical tumor debulking to R0. Path - ADENOCARCINOMA OF LEFT OVARY.     18 - Diagnostic laparoscopy converted to laparotomy, peritoneal biopsies, aerobic and anaerobic cultures of peritoneal fluid and peritoneal lavage. 6/15/18 - Wound exploration. Exploratory laparotomy. Lysis of adhesions. Ileostomy and wound VAC placement. Allergies   Allergen Reactions    Hydrocodone Other (comments)     Breaks into cold sweat    Metformin Other (comments)     Breaks out into cold sweat.  Can Take Glucophage brand name med      Past Medical History: Diagnosis Date    Diabetes ([facility] [address]     many years type 2    Hypertension     many years      Past Surgical History:   Procedure Laterality Date    HX OOPHORECTOMY  2018    HX TONSILLECTOMY      as a child       Social History   Substance Use Topics    Smoking status: Never Smoker    Smokeless tobacco: Never Used    Alcohol use No        Current Facility-Administered Medications   Medication Dose Route Frequency    TPN ADULT - CENTRAL   IntraVENous CONTINUOUS    potassium chloride 10 mEq in 100 ml IVPB  10 mEq IntraVENous ONCE    micafungin (MYCAMINE) 100 mg in 0.9% sodium chloride (MBP/ADV) 100 mL  100 mg IntraVENous Q24H    insulin glargine (LANTUS) injection 10 Units  10 Units SubCUTAneous DAILY    insulin lispro (HUMALOG) injection   SubCUTAneous Q6H    TPN ADULT - CENTRAL   IntraVENous CONTINUOUS    Vancomycin - Pharmacy to Dose  1 Each Other Rx Dosing/Monitoring    vancomycin (VANCOCIN) 1250 mg in  ml infusion  1,250 mg IntraVENous Q24H    dilTIAZem (CARDIZEM) 100 mg in 0.9% sodium chloride (MBP/ADV) 100 mL infusion  0-15 mg/hr IntraVENous TITRATE    albumin human 25% (BUMINATE) solution 12.5 g  12.5 g IntraVENous Q12H    levoFLOXacin (LEVAQUIN) 750 mg in D5W IVPB  750 mg IntraVENous Q48H    metroNIDAZOLE (FLAGYL) IVPB premix 500 mg  500 mg IntraVENous Q8H    piperacillin-tazobactam (ZOSYN) 2.25 g in 0.9% sodium chloride (MBP/ADV) 50 mL MBP  2.25 g IntraVENous Q6H    fentaNYL (PF) 900 mcg/30 ml infusion soln  0-75 mcg/hr IntraVENous TITRATE    pantoprazole (PROTONIX) 40 mg in sodium chloride 0.9% 10 mL injection  40 mg IntraVENous DAILY         Objective:   Vital Signs:    Visit Vitals    /78    Pulse 79    Temp 97.4 °F (36.3 °C)    Resp 24    Ht 5' 1\" (1.549 m)    Wt 98.7 kg (217 lb 9.5 oz)    SpO2 99%    BMI 41.11 kg/m2       O2 Device: Nasal cannula   O2 Flow Rate (L/min): 4 l/min   Temp (24hrs), Av.4 °F (36.3 °C), Min:96.1 °F (35.6 °C), Max:98.3 °F (36.8 °C)       Intake/Output:   Last shift:      06/23 0701 - 06/23 1900  In: 312.1   Out: 265 [Urine:175; Drains:90]    Last 3 shifts: 06/21 1901 - 06/23 0700  In: 6034.5 [I.V.:4544.3]  Out: 4020 [Urine:2875; Drains:845]      Intake/Output Summary (Last 24 hours) at 06/23/18 1111  Last data filed at 06/23/18 1020   Gross per 24 hour   Intake          4759.42 ml   Output             3385 ml   Net          1374.42 ml       ABG:  Post-self extubation  No results found for: PH, PHI, PCO2, PCO2I, PO2, PO2I, HCO3, HCO3I, FIO2, FIO2I    Physical Exam:     General/Neurology: weak, lethargic, weak. Opens eyes and follows commands. Head:   Normocephalic, without obvious abnormality, atraumatic. Eye:   no scleral icterus, no pallor, no cyanosis. Pupils not dilated. Neck:   Symmetric. No lymphadenopathy. Trachea midline  Lung: Moderate air entry bilateral equal. Decreased breath sounds bases. Mild wheezing and crackles at bases. Heart:   S1 S2 present. No murmur. No JVD. Abdomen:  Soft. Obese. + BS. Midline lower abd surgical site - open wound with wound vac. BRANDON drain LLQ- red blood in bulbs, Rt abd wall Ileostomy. No abd distension or guarding. Extremities:  No cyanosis. No clubbing. Bilateral UE and LE edema 2+. Anasarca. Pulses: Palpable radials and DPs bilateral.   Lymphatic:  No cervical or supraclav lymphadenopathy.    Skin:   No rash      Data:       Recent Results (from the past 12 hour(s))   GLUCOSE, POC    Collection Time: 06/22/18 11:33 PM   Result Value Ref Range    Glucose (POC) 193 (H) 70 - 110 mg/dL   MAGNESIUM    Collection Time: 06/23/18  2:44 AM   Result Value Ref Range    Magnesium 2.1 1.6 - 2.6 mg/dL   CALCIUM, IONIZED    Collection Time: 06/23/18  2:44 AM   Result Value Ref Range    Ionized Calcium 1.10 (L) 1.12 - 1.32 MMOL/L   CBC WITH AUTOMATED DIFF    Collection Time: 06/23/18  2:44 AM   Result Value Ref Range    WBC 14.2 (H) 4.6 - 13.2 K/uL    RBC 2.77 (L) 4.20 - 5.30 M/uL    HGB 7.5 (L) 12.0 - 16.0 g/dL    HCT 23.1 (L) 35.0 - 45.0 %    MCV 83.4 74.0 - 97.0 FL    MCH 27.1 24.0 - 34.0 PG    MCHC 32.5 31.0 - 37.0 g/dL    RDW 23.3 (H) 11.6 - 14.5 %    PLATELET 886 (L) 288 - 420 K/uL    NEUTROPHILS 88 (H) 42 - 75 %    LYMPHOCYTES 9 (L) 20 - 51 %    MONOCYTES 1 (L) 2 - 9 %    EOSINOPHILS 1 0 - 5 %    BASOPHILS 1 0 - 3 %    ABS. NEUTROPHILS 12.6 (H) 1.8 - 8.0 K/UL    ABS. LYMPHOCYTES 1.3 0.8 - 3.5 K/UL    ABS. MONOCYTES 0.1 0 - 1.0 K/UL    ABS. EOSINOPHILS 0.1 0.0 - 0.4 K/UL    ABS.  BASOPHILS 0.1 0.0 - 0.1 K/UL    PLATELET COMMENTS LARGE PLATELETS      RBC COMMENTS ANISOCYTOSIS  2+        RBC COMMENTS MICROCYTOSIS  1+        RBC COMMENTS MACROCYTOSIS  1+        RBC COMMENTS POLYCHROMASIA  1+        RBC COMMENTS POIKILOCYTOSIS  2+        RBC COMMENTS OVALOCYTES  1+        RBC COMMENTS TARGET CELLS  1+        RBC COMMENTS TEARDROP CELLS  1+        DF MANUAL     RENAL FUNCTION PANEL    Collection Time: 06/23/18  2:44 AM   Result Value Ref Range    Sodium 151 (H) 136 - 145 mmol/L    Potassium 3.6 3.5 - 5.5 mmol/L    Chloride 116 (H) 100 - 108 mmol/L    CO2 23 21 - 32 mmol/L    Anion gap 12 3.0 - 18 mmol/L    Glucose 208 (H) 74 - 99 mg/dL    BUN 44 (H) 7.0 - 18 MG/DL    Creatinine 1.42 (H) 0.6 - 1.3 MG/DL    BUN/Creatinine ratio 31 (H) 12 - 20      GFR est AA 45 (L) >60 ml/min/1.73m2    GFR est non-AA 37 (L) >60 ml/min/1.73m2    Calcium 7.4 (L) 8.5 - 10.1 MG/DL    Phosphorus 3.4 2.5 - 4.9 MG/DL    Albumin 2.3 (L) 3.4 - 5.0 g/dL   PROTHROMBIN TIME + INR    Collection Time: 06/23/18  2:44 AM   Result Value Ref Range    Prothrombin time 15.5 (H) 11.5 - 15.2 sec    INR 1.3 (H) 0.8 - 1.2     GLUCOSE, POC    Collection Time: 06/23/18  6:16 AM   Result Value Ref Range    Glucose (POC) 197 (H) 70 - 110 mg/dL   POTASSIUM    Collection Time: 06/23/18  9:45 AM   Result Value Ref Range    Potassium 3.8 3.5 - 5.5 mmol/L           Chemistry Recent Labs      06/23/18   0945  06/23/18   0244  06/22/18   0445  06/21/18   6696 06/21/18   0545   GLU   --   208*  239*  283*   --   180*   NA   --   151*  148*  148*   --   150*   K  3.8  3.6  4.1  3.9   < >  3.4*   CL   --   116*  116*  115*   --   115*   CO2   --   23  19*  19*   --   22   BUN   --   44*  45*  41*   --   41*   CREA   --   1.42*  1.50*  1.45*   --   1.42*   CA   --   7.4*  7.3*  7.1*   --   7.5*   MG   --   2.1  2.0   --    --   1.9   PHOS   --   3.4  3.1   --    --   2.7   AGAP   --   12  13  14   --   13   BUCR   --   31*  30*  28*   --   29*   AP   --    --    --   58   --    --    TP   --    --    --   4.8*   --    --    ALB   --   2.3*   --   2.0*   --    --    GLOB   --    --    --   2.8   --    --    AGRAT   --    --    --   0.7*   --    --     < > = values in this interval not displayed. Lactic Acid Lactic acid   Date Value Ref Range Status   06/17/2018 2.3 (HH) 0.4 - 2.0 MMOL/L Final     Comment:     CALLED TO AND CORRECTLY REPEATED BY:  Monse Choe, RN ICU ON 6/17/2018 0790 TO 8056       No results for input(s): LAC in the last 72 hours. Liver Enzymes Protein, total   Date Value Ref Range Status   06/21/2018 4.8 (L) 6.4 - 8.2 g/dL Final     Albumin   Date Value Ref Range Status   06/23/2018 2.3 (L) 3.4 - 5.0 g/dL Final     Globulin   Date Value Ref Range Status   06/21/2018 2.8 2.0 - 4.0 g/dL Final     A-G Ratio   Date Value Ref Range Status   06/21/2018 0.7 (L) 0.8 - 1.7   Final     AST (SGOT)   Date Value Ref Range Status   06/21/2018 17 15 - 37 U/L Final     Alk.  phosphatase   Date Value Ref Range Status   06/21/2018 58 45 - 117 U/L Final     Recent Labs      06/23/18   0244  06/21/18   1850   TP   --   4.8*   ALB  2.3*  2.0*   GLOB   --   2.8   AGRAT   --   0.7*   SGOT   --   17   AP   --   58        CBC w/Diff Recent Labs      06/23/18   0244  06/22/18   1225  06/22/18   0445  06/21/18   1850   WBC  14.2*  23.6*  31.6*  33.8*   RBC  2.77*  2.93*  3.53*  3.97*   HGB  7.5*  7.6*  9.2*  10.4*   HCT  23.1*  23.3*  27.8*  31.7*   PLT  104*  158  181  197 GRANS  88*   --   85*  91*   LYMPH  9*   --   9*  5*   EOS  1   --   0  0        Cardiac Enzymes No results found for: CPK, CK, CKMMB, CKMB, RCK3, CKMBT, CKNDX, CKND1, FABIAN, TROPT, TROIQ, KAYLAN, TROPT, TNIPOC, BNP, BNPP     BNP No results found for: BNP, BNPP, XBNPT     Coagulation Recent Labs      06/23/18   0244  06/22/18   1225  06/22/18   0445  06/21/18   1850  06/21/18   1315   PTP  15.5*  17.0*   --   16.3*   --    INR  1.3*  1.4*   --   1.4*   --    APTT   --   33.5  98.2*   --   91.4*         Thyroid  No results found for: T4, T3U, TSH, TSHEXT, TSHEXT    No results found for: T4       ABG Recent Labs      06/22/18   1034   PHI  7.459*   PCO2I  25.3*   PO2I  65*   HCO3I  17.9*   FIO2I  36        All Micro Results     Procedure Component Value Units Date/Time    CULTURE, BLOOD FOR FUNGUS [[id number]]     Order Status:  Sent Specimen:  Blood     CULTURE, FUNGUS [[id number]] Collected:  06/09/18 1024    Order Status:  Completed Specimen:  Bronchial lavage Updated:  06/18/18 1329     Special Requests: NO SPECIAL REQUESTS        FUNGUS SMEAR NO FUNGAL ELEMENTS SEEN        Culture result: NO FUNGUS ISOLATED 9 DAYS       CULTURE, BLOOD [[id number]] Collected:  06/09/18 1423    Order Status:  Completed Specimen:  Whole Blood from Blood Updated:  06/15/18 0230     Special Requests: NO SPECIAL REQUESTS        Culture result: NO GROWTH 6 DAYS       CULTURE, BLOOD [[id number]] Collected:  06/09/18 1223    Order Status:  Completed Specimen:  Whole Blood from Blood Updated:  06/15/18 0230     Special Requests: left hand     Culture result: NO GROWTH 6 DAYS       AFB CULTURE + SMEAR W/RFLX PCR AND ID [[id number]] Collected:  06/09/18 1024    Order Status:  Completed Specimen:  Respiratory sample Updated:  06/12/18 2106     Source BRONCHIAL LAVAGE        AFB Specimen processing Concentration     Acid Fast Smear NEGATIVE          (NOTE)  Performed At: [address] [id number]  [facility] [place] [facility] TI:[id number]          Acid Fast Culture PENDING    CULTURE, SPUTUM/BRONCH/OTH [[id number]] Collected:  06/09/18 1024    Order Status:  Completed Specimen:  Sputum from Bronchial lavage Updated:  06/11/18 1058     Special Requests: NO SPECIAL REQUESTS        GRAM STAIN FEW WBC'S         FEW GRAM NEGATIVE RODS        Culture result:         RARE NORMAL RESPIRATORY JOSETTE    CULTURE, ANAEROBIC [[id number]] Collected:  06/05/18 0220    Order Status:  Completed Specimen:  Peritoneal Fluid Updated:  06/11/18 0734     Special Requests: NO SPECIAL REQUESTS        Culture result:         NO ANAEROBES ISOLATED 5 DAYS    CULTURE, BLOOD [[id number]] Collected:  06/04/18 0740    Order Status:  Completed Specimen:  Blood from Blood Updated:  06/10/18 0655     Special Requests: NO SPECIAL REQUESTS        Culture result: NO GROWTH 6 DAYS       CULTURE, BLOOD [[id number]] Collected:  06/04/18 0740    Order Status:  Completed Specimen:  Blood from Blood Updated:  06/10/18 0655     Special Requests: NO SPECIAL REQUESTS        Culture result: NO GROWTH 6 DAYS       CULTURE, BODY FLUID Minnette Hoe STAIN [[id number]]  (Abnormal)  (Susceptibility) Collected:  06/05/18 0220    Order Status:  Completed Specimen:  Peritoneal Fluid Updated:  06/08/18 0936     Special Requests: NO SPECIAL REQUESTS        GRAM STAIN MANY WBC'S         NO ORGANISMS SEEN        Culture result:         RARE KLEBSIELLA OXYTOCA (A)            CALLED TO AND CORRECTLY REPEATED BY:  ANGEL LUIS DE LOS SANTOS RN ICU ON 6/7/2018 1032 TO 5087      CULTURE, URINE [[id number]] Collected:  06/05/18 1245    Order Status:  Completed Specimen:  Urine from Fry Specimen Updated:  06/07/18 1103     Special Requests: NO SPECIAL REQUESTS        Culture result: NO GROWTH 2 DAYS       C. DIFFICILE/EPI PCR [[id number]] Collected:  06/05/18 0930    Order Status:  Canceled Specimen:  Stool         Echo 6/9/18:  Left ventricle: Systolic function was normal. Ejection fraction was estimated   in the range of 65 % to 70 %. No obvious  wall motion abnormalities identified in the views obtained. Wall thickness   was mildly increased. Doppler parameters  were consistent with abnormal left ventricular relaxation (grade 1 diastolic   dysfunction). Right ventricle: The size was normal. Systolic function was normal.  Mitral valve: There was mild annular calcification. Tricuspid valve: Pulmonary artery systolic pressure was mildly increased. Pulmonary artery systolic pressure: 34 mmHg. PVL LE 6/6/18: acute b/l peroneal DVT. CT abd pelvis 6/4/18:  1. Postsurgical hysterectomy, bilateral oophorectomy, pelvic lymphadenectomy and  a mastectomy surgical changes. Small volume of ascites in the abdomen and  pelvis, with a few tiny locules of gas in the right hemipelvis would be in  keeping with recent postsurgical etiology. 2. Abnormal edematous thick-walled small bowel loops in the left abdomen and  pelvis, with TRAM lamellar edematous configuration, induration. No findings of  pneumatosis. The overall appearance is nonspecific. Diagnostic considerations  include infectious etiology, nonspecific edema which may be unresolved  postsurgical etiology. Ischemia is also included in the differential diagnostic  consideration, however, there are no findings of pneumatosis, portal venous gas. 3. Stool in the right colon extending to the hepatic flexure with surrounding  gas, foci of pneumatosis could be obscured. No associated bowel wall thickening. 4. Satisfactory position of nasogastric tube. 5. Hepatomegaly, steatosis with 2 rounded low-density lesions, potentially  cysts. 6. Bibasilar atelectasis. CT chest/abd/plevis: 6/22/18:  Study limited due to streak and motion artifacts as described. No evidence of pulmonary embolism within the limitations of artifacts. Prominent consolidation right lower lobe likely infiltrates and atelectasis. Small consolidation seen in the left lower lobe.   Mild diffuse ascites present throughout the abdomen pelvis with diffuse  anasarca. Large soft tissue consolidation in the pelvis containing air bubbles likely  representing adherent bowel with adjacent trapped fluid. There is what may be a subcutaneous hematoma adjacent to the right ileostomy  measuring 5.5 cm x 3.7 cm x 7.0 cm, however, there is no definite  retroperitoneal or any other intra-abdominal hematoma. Bilateral pelvic drains as described above. Chronic compression deformity of L1. CXR reviewed by me:  CXR 6/23/18:  Small layering pleural effusion R>L. Pulmonary vascular congestion. IMPRESSION:   · Severe sepsis due to Klebsiella Oxytoca peritonitis and then perforation. · Klebsiella oxytoca peritonitis. S/p laparotomy and peritoneal lavage: Klebsiella positive. Ruled out ischemic bowel in laparotomy. · S/p laparotomy on 6/15/18 for peritonitis due to diverticulitis with perforation, s/p distal ileostomy. · Intraabdominal bleeding while on heparin. · S/p bronchoscopy 6/9/18: no aspiration noted. Cx Negative. · VDRF due to sepsis/?PE/?ARDS, reintubated on 6/8/18 and self-extubated 6/17  · S/p Shock likely due to sepsis and obstructive due to presumed PE. Shock resolved. · Acute b/l peroneal DVT  · Presumed PE with severe hypoxia and high A-a gradient, elevated RVSP 34 mm Hg. CTA 6/22/18 negative for central or main PA PE. Off heparin due to intraabdominal bleeding. · Anemia  · Thrombocytopenia  · JESS, improving  · Hypernatremia. · Metabolic and lactic acidosis, resolved. · Elevated LFT, due to shock liver, improving  · Foreign body on peritoneal biopsy, per path report. · Anasarca due to fluid resuscitation, JESS, hypoalbuminemia and sequale of sepsis. · AFib converted to NSR  · Adenocarcinoma of ovary     · Code status: DNR. · Very poor overall prognosis.        RECOMMENDATIONS:   Respiratory: mildly tachypneic; ABG shows resp alkalosis; continue o2 support; low threshold for re-intubation; heparin drip (for DVTs and likely PEs) has been stopped since 6/22/18 due to intra abdominal bleeding. INR reversed with protamin, FFP. CTA negative for central PE. PVL LE repeat pending result. If positive for DVT, then consider IVC filter. Keep SPO2 >=92%. HOB 30 degree elevation all the time. Aggressive pulmonary toileting. Aspiration precautions. Hem: see above. follow H/H, coags; Keep Hb>8gm/dl. CVS: Echo ok with mildly elevated RVSP but normal RV systolic function. AFib converted to NSR. D/c cardizem drip. ID:  Bronch cx normal robin. Peritoneal cx Klebsiella Oxytoca resistant to ampicillin. Leucocytosis-improving now since on micafungin/vanco. Afebrile. Leucocytosis ?stress related vs intra-abd infection  Abx -  Zosyn since 6/13; levaquin since 6/18, flagyl since 6/15; iv vanc since 6/22, micafungin since 6/22  Renal: Nephrologist on case; Creatinine stable/improving despite of IV contrast, monitor. Lasix was held for hypernatremia, but received 490 mg iv once for tachypnea and PVC on cxr. On albumin. On TPM.   GI/: TPN; barium study 6/21 - patient could not do it on 6/22 due to abd pains  Endocrine: Maintain blood glucose 140-180. Humalog sc. Insulin in TPN. on Lantus  Neurology: wean fentanyl drip - balance post-op pains with excessive sedation; prn iv fentanyl   Pain/Sedation: fentanyl drip - management per Dr Khalida Gomez  Skin/Wound: local surgical site care. Wound vac on. Electrolytes: Replace electrolytes per ICU electrolyte replacement protocol. Nutrition: TPN started 6/18   Prophylaxis: DVT and GI Prophylaxis (heparin /protonix)  Restraints: none  Lines/Tubes:   ETT: 6/8/18- self extubated 6/17/18  Central line: right subclavian 6/4/18 - removed 6/20  Picc line 6/19 - Central line bundle followed  Fry: 6/4/18 (Medically necessary for strict input/output monitoring in critically ill patient, will remove it when not needed. Fry bundle followed).     Will defer respective systems problem management to primary and other respective consultant and follow patient in ICU with primary and other medical team.  Further recommendations will be based on the patient's response to recommended treatment and results of the investigation ordered. Quality Care: PPI, DVT prophylaxis, HOB elevated, Infection control all reviewed and addressed. Updated patient Brother Jacqulyne Barthel: updated with critical condition and overall poor prognosis. Patient has no children. Code status: Full code. PICC Line/Fry cath medically necessary  PT and OT on case    Care of plan d/w RN, RT, MDR.    High complexity decision making was performed during the evaluation of this patient   CC time 37 mins, excludes d/w family or procedures         Praveena Quintana MD  6/23/2018

## 2022-06-06 NOTE — PERIOP NOTES
TRANSFER - OUT REPORT:    Verbal report given to Graciela RN (name) on Richard Nelson  being transferred to 3 S (unit) for routine progression of care       Report consisted of patients Situation, Background, Assessment and   Recommendations(SBAR). Information from the following report(s) SBAR, Kardex, Procedure Summary and Intake/Output was reviewed with the receiving nurse. Lines:   Peripheral IV 05/29/18 Left Hand (Active)   Site Assessment Clean, dry, & intact 5/29/2018  4:45 PM   Phlebitis Assessment 0 5/29/2018  4:45 PM   Infiltration Assessment 0 5/29/2018  4:45 PM   Dressing Status Clean, dry, & intact 5/29/2018  4:45 PM   Dressing Type Transparent;Tape 5/29/2018  4:45 PM   Hub Color/Line Status Infusing 5/29/2018  4:45 PM   Alcohol Cap Used No 5/29/2018  9:27 AM        Opportunity for questions and clarification was provided.       Patient transported with:   O2 @ 2 liters  Registered Nurse No

## 2022-07-25 NOTE — PROGRESS NOTES
Pt has been seen by therapy today and is not very motivated. Given this if pt does not ambulate pt is at risk of transitioning to a SNF rather than home with [facility]. Noted pt would benefit from OT services. If in agreement please order. CM to continue to follow       1500:  Met with pt at bedside to discuss plan of care options. Pt is agreeable to SNF if needed. Offered [facility] and provided a list of area facilities. Pt has selected [facility]. CMS has been notified to assist.  Please note, if pt will is able to transfer home with [facility] when medically stable she will also need to have a RW. CM continuing to follow. The catheter was removed from the ostium   right coronary artery.

## 2024-11-18 NOTE — DIABETES MGMT
ANTICOAGULATION THERAPY EDUCATION   PATIENT  INSTRUCTION    Your Guide to Using Warfarin:  Please refer to this handout for questions regarding your medication, Coumadin/Warfarin. This handout includes information on dosing, blood testing, possible side effects of Coumadin/Warfarin, using other medications, dietary guidelines and safety concerns while on anticoagulation therapy.    Please contact your primary care physician and/or seek appropriate medical care if you experience:  A serious fall  Increased menstrual bleeding   An injury to your head  Notice a different color urine or stool   Increased bleeding with teeth brushing   If you have any other unusual bruising   Have bleeding from your nose or a cut that doesn't stop bleeding         Call your physician immediately if you notice any signs and symptoms of a blood clot such as:  Sudden weakness in any limb  Dizziness or faintness   Numbness or tingling anywhere  New shortness of breath or chest pain   Sudden onset of slurred speech or inability to speak  New pain, swelling, redness or heat in any extremity   Visual changes or loss of sight in either eye         Other factors that may affect your anticoagulation therapy include:  Fever  Stress   Diarrhea  Changes in exercise/activity level   Vomiting         While on Anticoagulation therapy avoid:  Pregnancy, using birth control and hormone replacement therapy    Body piercing or tattoos      Please inform family members and other health care providers that you are on anticoagulation therapy. Make sure to carry an up-to-date medication list. You can also wear a medical alert bracelet to identify that you are on anticoagulation therapy.    If you are utilizing an injectable anticoagulation medication you should be aware of how and where the medication should be injected and how to appropriately dispose of the injection needles (sharps).    Additional patient education materials provided to you:  Important  GLYCEMIC CONTROL PROGRESS NOTE:    -discussed in rounds, known h/o T2DM HbA1C within recommended range for age + comorbids on oral home regimen  -BG out of target range ICU: 140-180 mg/dL   -TDD = 10 units regular insulin, TPN  -24 hour BG slight downward trend, monitor BG trends for the need to adjust regular insulin in TPN    -Glucose Results:   Lab Results   Component Value Date/Time     (H) 06/21/2018 05:45 AM    GLUCPOC 180 (H) 06/21/2018 05:28 AM    GLUCPOC 186 (H) 06/20/2018 11:33 PM    GLUCPOC 197 (H) 06/20/2018 05:43 PM         Jhoana Khan RN, MS  Glycemic Control Team  Pager [phone] (M-TH 8:30-5P)  *After Hours pager [phone] Facts about your Coumadin (color handout)  Vitamin K Food List  Travel Fitness Tips for Patients on Coumadin  Prevention and Treatment of Nosebleeds  When To Call Your Physician  Potential and Documented Interaction of Herbs with Coumadin/Warfarin  Lab hours for [facility]

## (undated) DEVICE — SOLUTION IRRIG 3000ML LAC R FLX CONT

## (undated) DEVICE — SHEARS ENDOSCP L36CM DIA5MM ULTRASONIC CRV TIP W/ ADV

## (undated) DEVICE — (D)PREP SKN CHLRAPRP APPL 26ML -- CONVERT TO ITEM 371833

## (undated) DEVICE — VISUALIZATION SYSTEM: Brand: CLEARIFY

## (undated) DEVICE — SUTURE PERMAHAND SZ 2-0 L30IN NONABSORBABLE BLK SILK W/O A305H

## (undated) DEVICE — SOLUTION IRRIGATION H2O 0797305] ICU MEDICAL INC]

## (undated) DEVICE — SUT VCRL + 0 36IN CT1 UD --

## (undated) DEVICE — DISPOSABLE SUCTION/IRRIGATOR TUBE SET WITH TIP: Brand: AHTO

## (undated) DEVICE — SYRINGE BLB 50CC IRRIG PLIABLE FNGR FLNG GRAD FLSK DISP

## (undated) DEVICE — Device

## (undated) DEVICE — SUTURE PDS + SZ 1 L96IN ABSRB VLT L65MM TP-1 1/2 CIR PDP880G

## (undated) DEVICE — MAJ-1414 SINGLE USE ADPATER BIOPSY VALV: Brand: SINGLE USE ADAPTOR BIOPSY VALVE

## (undated) DEVICE — 4-PORT MANIFOLD: Brand: NEPTUNE 2

## (undated) DEVICE — TRAP MUCUS SPECIMEN 40ML -- MEDICHOICE

## (undated) DEVICE — SOLUTION INJ 1000ML ST H2O FLX CONT

## (undated) DEVICE — SPONGE: LAP 18X18 PW 200/CS: Brand: NOVAPLUS®

## (undated) DEVICE — INTENDED FOR TISSUE SEPARATION, AND OTHER PROCEDURES THAT REQUIRE A SHARP SURGICAL BLADE TO PUNCTURE OR CUT.: Brand: BARD-PARKER ® CARBON RIB-BACK BLADES

## (undated) DEVICE — TK® TI-KNOT® DEVICE: Brand: TK® TI-KNOT®

## (undated) DEVICE — SUT SLK 0 30IN TIE MP BLK --

## (undated) DEVICE — SUTURE PDS II SZ 1 L36IN ABSRB VLT L48MM CTX 1/2 CIR Z371T

## (undated) DEVICE — MATERNITY PAD,HEAVY: Brand: CURITY

## (undated) DEVICE — SUTURE VCRL SZ 3-0 L36IN ABSRB UD L36MM CT-1 1/2 CIR J944H

## (undated) DEVICE — BARRIER TISS ABSRB 5X6IN 1/PK -- DIRECT ORDER ONLY NON MEDLINE

## (undated) DEVICE — DRAPE TWL SURG 16X26IN BLU ORB04] ALLCARE INC]

## (undated) DEVICE — SUT VCRL + 2-0 27IN SH UD --

## (undated) DEVICE — SUT ETHLN 3-0 18IN PS2 BLK --

## (undated) DEVICE — LEGGINGS, PAIR, 31X48, STERILE: Brand: MEDLINE

## (undated) DEVICE — SUTURE VCRL + SZ 0 L18IN ABSRB UD L36MM CT-1 1/2 CIR VCP840D

## (undated) DEVICE — CYSTO/BLADDER IRRIGATION SET, REGULATING CLAMP

## (undated) DEVICE — TRAY PREP DRY W/ PREM GLV 2 APPL 6 SPNG 2 UNDPD 1 OVERWRAP

## (undated) DEVICE — TRAY CATH 16FR DRN BG LF -- CONVERT TO ITEM 363158

## (undated) DEVICE — STERILE POLYISOPRENE POWDER-FREE SURGICAL GLOVES: Brand: PROTEXIS

## (undated) DEVICE — SPONGE LAP 18X18IN STRL -- 5/PK

## (undated) DEVICE — MASTISOL ADHESIVE LIQ 2/3ML

## (undated) DEVICE — KENDALL SCD EXPRESS SLEEVES, KNEE LENGTH, MEDIUM: Brand: KENDALL SCD

## (undated) DEVICE — LAP CHOLE: Brand: MEDLINE INDUSTRIES, INC.

## (undated) DEVICE — SHEET,DRAPE,70X85,STERILE: Brand: MEDLINE

## (undated) DEVICE — BLADE ELECTRODE: Brand: EDGE

## (undated) DEVICE — APPLIER CLP L L13IN TI MULT RNG HNDL 20 CLP STR LIGACLP

## (undated) DEVICE — SUTURE MCRYL SZ 4-0 L27IN ABSRB UD L19MM PS-2 1/2 CIR PRIM Y426H

## (undated) DEVICE — STAPLER INT L75MM CUT LN L73MM STPL LN L77MM BLU B FRM 8

## (undated) DEVICE — FORCEPS BPLR DIA5MM MACRO JAW LAP ENDOPATH

## (undated) DEVICE — SUTURE VCRL + SZ 3-0 L36IN ABSRB UD L36MM CT-1 1/2 CIR VCP944H

## (undated) DEVICE — 40580 - THE PINK PAD - ADVANCED TRENDELENBURG POSITIONING KIT: Brand: 40580 - THE PINK PAD - ADVANCED TRENDELENBURG POSITIONING KIT

## (undated) DEVICE — 5MM RD180® DEVICE: Brand: RD180® - THE RUNNING DEVICE®

## (undated) DEVICE — SUT VCRL + 2-0 36IN CT1 UD --

## (undated) DEVICE — 3-0 COATED VICRYL PLUS UNDYED 1X27" SH --

## (undated) DEVICE — (D)ADHESIVE TISS HI VISC 1ML -- DISC USE ITEM 346585

## (undated) DEVICE — NEEDLE HYPO 22GA L1.5IN BLK S STL HUB POLYPR SHLD REG BVL

## (undated) DEVICE — APPLICATOR SEAL FLOSEAL 5MM+ --

## (undated) DEVICE — TROCAR ENDOSCP L100MM DIA12MM STBL SL BLDELSS ENDOPATH XCEL

## (undated) DEVICE — DRAIN SURG W10MMXL20CM SIL FLAT HUBLESS W/ RADPQ STRP 3/4

## (undated) DEVICE — COLOSTOMY/ILEOSTOMY KIT,FLEXWEAR: Brand: NEW IMAGE

## (undated) DEVICE — TROCAR LAP L100MM DIA5MM BLDELSS W/ STBL SL ENDOPATH XCEL

## (undated) DEVICE — MEDI-VAC NON-CONDUCTIVE SUCTION TUBING: Brand: CARDINAL HEALTH

## (undated) DEVICE — REM POLYHESIVE ADULT PATIENT RETURN ELECTRODE: Brand: VALLEYLAB

## (undated) DEVICE — SUTURE VCRL SZ 0 L36IN ABSRB UD L36MM CT-1 1/2 CIR J946H

## (undated) DEVICE — SOL IRRIGATION INJ NACL 0.9% 500ML BTL

## (undated) DEVICE — Z INACTIVE USE 2527070 DRAPE SURG W40XL44IN UNDERBUTTOCK SMS POLYPR W/ PCH BK DISP

## (undated) DEVICE — DRAIN SURG L0.75IN TRCR

## (undated) DEVICE — APPLIER LIG CLP M L11IN TI STR RNG HNDL FOR 20 CLP DISP

## (undated) DEVICE — ROCKER SWITCH PENCIL HOLSTER: Brand: VALLEYLAB

## (undated) DEVICE — PENROSE TUBING RADIOPAQUE: Brand: ARGYLE

## (undated) DEVICE — SUTURE VCRL + SZ 3-0 L18IN ABSRB UD SH 1/2 CIR TAPERCUT NDL VCP864D

## (undated) DEVICE — SUTURE PDS II SZ 1 L96IN ABSRB VLT TP-1 L65MM 1/2 CIR Z880G

## (undated) DEVICE — SUT MONOCRYL PLUS UD 4-0 --

## (undated) DEVICE — MEDI-VAC YANK SUCT HNDL W/TPRD BULBOUS TIP: Brand: CARDINAL HEALTH

## (undated) DEVICE — ROD OST L65MM LOOP FLNG FOR SURFIT NATURA AUTOLOK GENTLE

## (undated) DEVICE — GOWN,SIRUS,NONRNF,SETINSLV,XL,20/CS: Brand: MEDLINE

## (undated) DEVICE — TRI-LUMEN FILTERED TUBE SET WITH ACTIVATED CHARCOAL FILTER: Brand: AIRSEAL

## (undated) DEVICE — RELOAD STPL L75MM OPN STPL H4.5MM CLS STPL H2MM WIRE

## (undated) DEVICE — 3M™ IOBAN™ 2 ANTIMICROBIAL INCISE DRAPE 6650EZ: Brand: IOBAN™ 2

## (undated) DEVICE — AIRSEAL 5 MM ACCESS PORT AND LOW PROFILE OBTURATOR WITH BLADELESS OPTICAL TIP, 100 MM LENGTH: Brand: AIRSEAL

## (undated) DEVICE — AMD ANTIMICROBIAL DRAIN SPONGES, 6 PLY, 0.2% POLYHEXAMETHYLENE BIGUANIDE HCI (PHMB): Brand: EXCILON

## (undated) DEVICE — TK® QUICK LOAD® UNIT: Brand: TK® QUICK LOAD®

## (undated) DEVICE — RD® QUICK LOAD® SURGICAL SUTURE, 2-0 MONOGLIDE®, ABSORBABLE, 53", PURPLE, MONOFILAMENT: Brand: RD® QUICK LOAD®

## (undated) DEVICE — APPLICATOR BNDG 1MM ADH PREMIERPRO EXOFIN

## (undated) DEVICE — TELFA NON-ADHERENT ABSORBENT DRESSING: Brand: TELFA